# Patient Record
Sex: MALE | Race: WHITE | NOT HISPANIC OR LATINO | Employment: OTHER | URBAN - METROPOLITAN AREA
[De-identification: names, ages, dates, MRNs, and addresses within clinical notes are randomized per-mention and may not be internally consistent; named-entity substitution may affect disease eponyms.]

---

## 2017-01-27 RX ORDER — LEVOTHYROXINE SODIUM 0.1 MG/1
TABLET ORAL
Qty: 30 TABLET | Refills: 0 | OUTPATIENT
Start: 2017-01-27

## 2017-02-27 RX ORDER — LEVOTHYROXINE SODIUM 0.1 MG/1
TABLET ORAL
Qty: 30 TABLET | Refills: 0 | OUTPATIENT
Start: 2017-02-27

## 2017-03-28 RX ORDER — LEVOTHYROXINE SODIUM 0.1 MG/1
TABLET ORAL
Qty: 30 TABLET | Refills: 0 | OUTPATIENT
Start: 2017-03-28

## 2017-04-27 RX ORDER — LEVOTHYROXINE SODIUM 0.1 MG/1
TABLET ORAL
Qty: 30 TABLET | Refills: 0 | OUTPATIENT
Start: 2017-04-27

## 2017-05-27 RX ORDER — LEVOTHYROXINE SODIUM 0.1 MG/1
TABLET ORAL
Qty: 30 TABLET | Refills: 0 | OUTPATIENT
Start: 2017-05-27

## 2017-06-26 RX ORDER — LEVOTHYROXINE SODIUM 0.1 MG/1
TABLET ORAL
Qty: 30 TABLET | Refills: 0 | Status: SHIPPED | OUTPATIENT
Start: 2017-06-26 | End: 2017-12-14 | Stop reason: SDUPTHER

## 2017-12-14 ENCOUNTER — OFFICE VISIT (OUTPATIENT)
Dept: FAMILY MEDICINE CLINIC | Facility: CLINIC | Age: 42
End: 2017-12-14

## 2017-12-14 VITALS
DIASTOLIC BLOOD PRESSURE: 90 MMHG | WEIGHT: 223.2 LBS | SYSTOLIC BLOOD PRESSURE: 134 MMHG | BODY MASS INDEX: 33.06 KG/M2 | HEART RATE: 90 BPM | OXYGEN SATURATION: 97 % | RESPIRATION RATE: 16 BRPM | HEIGHT: 69 IN

## 2017-12-14 DIAGNOSIS — J40 BRONCHITIS: Primary | ICD-10-CM

## 2017-12-14 DIAGNOSIS — E03.9 ACQUIRED HYPOTHYROIDISM: ICD-10-CM

## 2017-12-14 PROBLEM — F32.A DEPRESSION: Status: ACTIVE | Noted: 2017-12-14

## 2017-12-14 PROBLEM — K21.9 GASTROESOPHAGEAL REFLUX DISEASE: Status: ACTIVE | Noted: 2017-12-14

## 2017-12-14 PROBLEM — F41.1 GENERALIZED ANXIETY DISORDER: Status: ACTIVE | Noted: 2017-12-14

## 2017-12-14 PROCEDURE — 99213 OFFICE O/P EST LOW 20 MIN: CPT | Performed by: FAMILY MEDICINE

## 2017-12-14 RX ORDER — LEVOTHYROXINE SODIUM 0.1 MG/1
100 TABLET ORAL DAILY
Qty: 90 TABLET | Refills: 1 | Status: SHIPPED | OUTPATIENT
Start: 2017-12-14 | End: 2018-06-13 | Stop reason: SDUPTHER

## 2017-12-14 RX ORDER — AZITHROMYCIN 250 MG/1
TABLET, FILM COATED ORAL
Qty: 6 TABLET | Refills: 0 | Status: SHIPPED | OUTPATIENT
Start: 2017-12-14 | End: 2018-03-01

## 2017-12-15 NOTE — PROGRESS NOTES
"Subjective   Manjeet Hall is a 42 y.o. male.     URI    This is a new problem. The current episode started in the past 7 days. The problem has been unchanged. There has been no fever. Associated symptoms include coughing and wheezing. Pertinent negatives include no congestion, dysuria, nausea or sinus pain. He has tried inhaler use for the symptoms. The treatment provided mild relief.   Wheezing    This is a chronic problem. The problem occurs intermittently. The problem has been unchanged. Associated symptoms include coughing. Pertinent negatives include no chills, fever or shortness of breath. The symptoms are aggravated by exercise. He has tried beta agonist inhalers for the symptoms. The treatment provided mild relief. His past medical history is significant for asthma.   Hypothyroidism   This is a chronic problem. The problem has been unchanged. Associated symptoms include coughing. Pertinent negatives include no chills, congestion, fever or nausea. The treatment provided significant relief.        The following portions of the patient's history were reviewed and updated as appropriate: allergies, current medications, past social history and problem list.    Review of Systems   Constitutional: Negative for chills and fever.   HENT: Positive for postnasal drip. Negative for congestion and sinus pain.    Respiratory: Positive for cough and wheezing. Negative for shortness of breath.    Gastrointestinal: Negative for nausea.   Genitourinary: Negative for dysuria.       Objective   /90  Pulse 90  Resp 16  Ht 175.3 cm (69\")  Wt 101 kg (223 lb 3.2 oz)  SpO2 97%  BMI 32.96 kg/m2  Physical Exam   Constitutional: He is oriented to person, place, and time. He appears well-developed and well-nourished. He is cooperative.   HENT:   Head: Normocephalic.   Right Ear: External ear normal.   Left Ear: External ear normal.   Nose: Nose normal.   Mouth/Throat: Oropharynx is clear and moist.   Eyes: Conjunctivae are " normal. Pupils are equal, round, and reactive to light. No scleral icterus.   Neck: Neck supple. Carotid bruit is not present. No thyromegaly present.   Cardiovascular: Normal rate, regular rhythm and normal heart sounds.    Pulmonary/Chest: Effort normal.   Upper rhonchi   Abdominal: There is no hepatosplenomegaly.   Musculoskeletal: Normal range of motion.   Neurological: He is alert and oriented to person, place, and time.   No focal deficits no lateralizing signs   Skin: Skin is warm and dry. No rash noted.   Psychiatric: He has a normal mood and affect. Cognition and memory are normal.   Nursing note and vitals reviewed.      Assessment/Plan   Problem List Items Addressed This Visit     None      Visit Diagnoses     Bronchitis    -  Primary    Acquired hypothyroidism        Relevant Medications    levothyroxine (SYNTHROID, LEVOTHROID) 100 MCG tablet          New Medications Ordered This Visit   Medications   • levothyroxine (SYNTHROID, LEVOTHROID) 100 MCG tablet     Sig: Take 1 tablet by mouth Daily.     Dispense:  90 tablet     Refill:  1   • azithromycin (ZITHROMAX) 250 MG tablet     Sig: Take 2 tablets the first day, then 1 tablet daily for 4 days.     Dispense:  6 tablet     Refill:  0     Thyroid 100 µg recheck level in 6-8 weeks.

## 2018-03-01 ENCOUNTER — OFFICE VISIT (OUTPATIENT)
Dept: FAMILY MEDICINE CLINIC | Facility: CLINIC | Age: 43
End: 2018-03-01

## 2018-03-01 VITALS
RESPIRATION RATE: 16 BRPM | HEART RATE: 92 BPM | DIASTOLIC BLOOD PRESSURE: 88 MMHG | OXYGEN SATURATION: 98 % | WEIGHT: 222 LBS | BODY MASS INDEX: 32.88 KG/M2 | SYSTOLIC BLOOD PRESSURE: 142 MMHG | HEIGHT: 69 IN

## 2018-03-01 DIAGNOSIS — Z98.890 H/O AORTIC VALVE REPAIR: ICD-10-CM

## 2018-03-01 DIAGNOSIS — Z86.79 H/O AORTIC VALVE REPAIR: ICD-10-CM

## 2018-03-01 DIAGNOSIS — J40 BRONCHITIS: Primary | ICD-10-CM

## 2018-03-01 DIAGNOSIS — J45.21 MILD INTERMITTENT ASTHMA WITH ACUTE EXACERBATION: ICD-10-CM

## 2018-03-01 PROBLEM — Q24.9 CONGENITAL HEART DISEASE: Status: ACTIVE | Noted: 2018-03-01

## 2018-03-01 PROCEDURE — 99213 OFFICE O/P EST LOW 20 MIN: CPT | Performed by: FAMILY MEDICINE

## 2018-03-01 RX ORDER — DOXYCYCLINE HYCLATE 100 MG/1
100 CAPSULE ORAL 2 TIMES DAILY
Qty: 20 CAPSULE | Refills: 0 | Status: SHIPPED | OUTPATIENT
Start: 2018-03-01 | End: 2018-04-11

## 2018-03-01 RX ORDER — LAMOTRIGINE 200 MG/1
300 TABLET ORAL DAILY
Refills: 2 | COMMUNITY
Start: 2018-02-15 | End: 2018-11-06 | Stop reason: SDUPTHER

## 2018-03-01 RX ORDER — METHYLPREDNISOLONE 4 MG/1
TABLET ORAL
Qty: 1 EACH | Refills: 0 | Status: SHIPPED | OUTPATIENT
Start: 2018-03-01 | End: 2018-04-11

## 2018-03-01 RX ORDER — DESVENLAFAXINE 100 MG/1
100 TABLET, EXTENDED RELEASE ORAL DAILY
Refills: 0 | COMMUNITY
Start: 2018-01-30 | End: 2018-11-06 | Stop reason: SDUPTHER

## 2018-03-01 RX ORDER — GABAPENTIN 300 MG/1
300 CAPSULE ORAL
Refills: 2 | COMMUNITY
Start: 2018-02-01 | End: 2021-06-30

## 2018-03-02 NOTE — PROGRESS NOTES
"Subjective   Manjeet Hall is a 42 y.o. male.     URI    This is a new problem. The current episode started in the past 7 days. The problem has been unchanged. There has been no fever. Associated symptoms include congestion, coughing and wheezing. Pertinent negatives include no chest pain, dysuria, nausea, sore throat or vomiting. He has tried inhaler use for the symptoms. The treatment provided no relief.   Wheezing    This is a recurrent problem. The current episode started in the past 7 days. The problem occurs intermittently. The problem has been unchanged. Associated symptoms include coughing. Pertinent negatives include no chest pain, chills, fever, sore throat or vomiting. He has tried beta agonist inhalers for the symptoms. The treatment provided mild relief. His past medical history is significant for asthma.        The following portions of the patient's history were reviewed and updated as appropriate: allergies, current medications, past social history and problem list.    Review of Systems   Constitutional: Negative for chills and fever.   HENT: Positive for congestion. Negative for sore throat.    Respiratory: Positive for cough and wheezing.    Cardiovascular: Negative for chest pain, palpitations and leg swelling.   Gastrointestinal: Negative for nausea and vomiting.   Genitourinary: Negative for dysuria and hematuria.   Neurological: Negative.        Objective   /88  Pulse 92  Resp 16  Ht 175.3 cm (69\")  Wt 101 kg (222 lb)  SpO2 98%  BMI 32.78 kg/m2  Physical Exam   Constitutional: He is oriented to person, place, and time. He appears well-developed and well-nourished. He is cooperative.   HENT:   Head: Normocephalic.   Right Ear: External ear normal.   Left Ear: External ear normal.   Nose: Nose normal.   Mouth/Throat: Oropharynx is clear and moist.   Eyes: Conjunctivae are normal. Pupils are equal, round, and reactive to light. No scleral icterus.   Neck: Neck supple. Carotid bruit is " not present. No thyromegaly present.   Cardiovascular: Normal rate and regular rhythm.    Pulmonary/Chest: Effort normal. He has wheezes.   Abdominal: There is no hepatosplenomegaly.   Musculoskeletal: Normal range of motion.   Neurological: He is alert and oriented to person, place, and time.   No focal deficits no lateralizing signs   Skin: Skin is warm and dry. No rash noted.   Psychiatric: He has a normal mood and affect. Cognition and memory are normal.   Nursing note and vitals reviewed.      Assessment/Plan   Problem List Items Addressed This Visit     H/O aortic valve repair    Relevant Orders    Ambulatory Referral to Cardiology      Other Visit Diagnoses     Bronchitis    -  Primary    Mild intermittent asthma with acute exacerbation              New Medications Ordered This Visit   Medications   • lamoTRIgine (LaMICtal) 200 MG tablet     Sig: Take 200 mg by mouth Daily.     Refill:  2   • desvenlafaxine (PRISTIQ) 100 MG 24 hr tablet     Sig: Take 100 mg by mouth Daily.     Refill:  0   • gabapentin (NEURONTIN) 300 MG capsule     Sig: Take 200 mg by mouth 2 (Two) Times a Day. Take 2 tablets AM, 3 tablets in the afternoon     Refill:  2   • doxycycline (VIBRAMYCIN) 100 MG capsule     Sig: Take 1 capsule by mouth 2 (Two) Times a Day.     Dispense:  20 capsule     Refill:  0   • MethylPREDNISolone (MEDROL, PORFIRIO,) 4 MG tablet     Sig: Take as directed on package instructions.     Dispense:  1 each     Refill:  0     OK cardiology consultation as Dr Cortés no longer in practice. Try to obtain her records.

## 2018-04-11 ENCOUNTER — CONSULT (OUTPATIENT)
Dept: CARDIOLOGY | Facility: CLINIC | Age: 43
End: 2018-04-11

## 2018-04-11 VITALS
WEIGHT: 224 LBS | DIASTOLIC BLOOD PRESSURE: 80 MMHG | BODY MASS INDEX: 33.18 KG/M2 | HEART RATE: 87 BPM | SYSTOLIC BLOOD PRESSURE: 134 MMHG | HEIGHT: 69 IN

## 2018-04-11 DIAGNOSIS — Q24.9 CONGENITAL HEART DISEASE: Primary | ICD-10-CM

## 2018-04-11 PROCEDURE — 99204 OFFICE O/P NEW MOD 45 MIN: CPT | Performed by: INTERNAL MEDICINE

## 2018-04-11 PROCEDURE — 93000 ELECTROCARDIOGRAM COMPLETE: CPT | Performed by: INTERNAL MEDICINE

## 2018-04-11 NOTE — PROGRESS NOTES
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  -Developed subvalvular aortic stenosis which required surgery, VSD closed as well at 15yo  -Ross procedure for increasing aortic insufficiency with moderate aortic stenosis at 21yo  -Treated for Hodgkin's lymphoma, age 21 - CTX and chest XRT    ASSESSMENT:   Diagnosis Plan   1. Congenital heart disease         PLAN:  Transthoracic echocardiogram  Fasting lipid panel - will manage lipids more aggressively due to history of chest radiation and subsequent risk for accelerated atherosclerosis    History of Present Illness   Has noticed shortness of breath and a class II pattern over the previous year or 2.  This is been associated with a gradual weight gain.  Denies chest pain.  No palpitations.  No PND or orthopnea.  No lower extremity edema.  No presyncope or syncope.  Notices shortness of breath if he does activities such as jogging up an incline, otherwise no significant limitations.  Blood pressures running less than 140/90 mmHg.    PHYSICAL EXAMINATION:  Vitals:    04/11/18 0833   Weight: 102 kg (224 lb)   Height: 175.3 cm (69\")     General Appearance:    Alert, cooperative, no distress, appears stated age   Head:    Normocephalic, without obvious abnormality, atraumatic   Eyes:    conjunctiva/corneas clear, EOM's intact, fundi     benign, both eyes   Ears:    Normal TM's and external ear canals, both ears   Nose:   Nares normal, septum midline, mucosa normal, no drainage    or sinus tenderness   Throat:   Lips, mucosa, and tongue normal; teeth and gums normal   Neck:   Supple, symmetrical, trachea midline, no adenopathy;     thyroid:  " no enlargement/tenderness/nodules; no carotid    bruit or JVD   Back:     Symmetric, no curvature, ROM normal, no CVA tenderness   Lungs:     Clear to auscultation bilaterally, respirations unlabored   Chest Wall:    No tenderness or deformity    Heart:    Regular rate and rhythm, S1 and S2 normal, 3/6 systolic ejection murmur is heard at the left and right upper sternal borders., rub   or gallop, normal carotid impulse bilaterally without bruit.   Abdomen:     Soft, non-tender, bowel sounds active all four quadrants,     no masses, no organomegaly   Extremities:   Extremities normal, atraumatic, no cyanosis or edema   Pulses:   2+ and symmetric all extremities   Skin:   Skin color, texture, turgor normal, no rashes or lesions   Lymph nodes:   Cervical, supraclavicular, and axillary nodes normal   Neurologic:   normal strength, sensation intact     throughout       Diagnostic Data:    ECG 12 Lead  Date/Time: 4/11/2018 8:43 AM  Performed by: MICKY STEINER III  Authorized by: MICKY STEINER III   Previous ECG: no previous ECG available  Rhythm: sinus rhythm  Other findings comments: Nonspecific lateral ST abnormality  Clinical impression: non-specific ECG          Lab Results   Component Value Date    CHLPL 259 (H) 07/17/2015    TRIG 135 07/17/2015    HDL 59 07/17/2015     Lab Results   Component Value Date    GLUCOSE 88 07/17/2015    BUN 18 07/17/2015    CREATININE 1.1 07/17/2015     07/17/2015    K 4.3 07/17/2015     07/17/2015    CO2 26 07/17/2015     No results found for: HGBA1C  Lab Results   Component Value Date    WBC 6.20 07/17/2015    HGB 15.0 07/17/2015    HCT 45.2 07/17/2015     07/17/2015       PROBLEM LIST:  Patient Active Problem List   Diagnosis   • Depression   • Generalized anxiety disorder   • Hypothyroidism   • Gastroesophageal reflux disease   • Congenital heart disease   • H/O aortic valve repair       PAST MEDICAL HX  Past Medical History:   Diagnosis Date   • Abnormal heart  rhythm    • Anxiety    • Cancer     hodgkins   • Depression    • GERD (gastroesophageal reflux disease)    • Heart murmur    • History of echocardiogram    • History of left heart catheterization    • History of stomach ulcers    • Hypothyroidism    • Mitral valvular disorder    • Thyroid disorder        Allergies  Allergies   Allergen Reactions   • Other        Current Medications    Current Outpatient Prescriptions:   •  desvenlafaxine (PRISTIQ) 100 MG 24 hr tablet, Take 100 mg by mouth Daily., Disp: , Rfl: 0  •  gabapentin (NEURONTIN) 300 MG capsule, Take 200 mg by mouth 5 (Five) Times a Day. Take 2 tablets AM, 3 tablets in the afternoon, Disp: , Rfl: 2  •  lamoTRIgine (LaMICtal) 200 MG tablet, Take 300 mg by mouth Daily., Disp: , Rfl: 2  •  Lansoprazole (PREVACID PO), Take  by mouth., Disp: , Rfl:   •  levothyroxine (SYNTHROID, LEVOTHROID) 100 MCG tablet, Take 1 tablet by mouth Daily., Disp: 90 tablet, Rfl: 1  •  MethylPREDNISolone (MEDROL, PORFIRIO,) 4 MG tablet, Take as directed on package instructions., Disp: 1 each, Rfl: 0  •  NEXIUM 24HR 20 MG capsule, TAKE 1 CAPSULE DAILY., Disp: 30 capsule, Rfl: 0         ROS  Review of Systems   Constitution: Positive for weight gain.   Respiratory: Positive for shortness of breath and wheezing.      All other body systems reviewed and are negative    SOCIAL HX  Social History     Social History   • Marital status:      Spouse name: N/A   • Number of children: N/A   • Years of education: N/A     Occupational History   • Not on file.     Social History Main Topics   • Smoking status: Never Smoker   • Smokeless tobacco: Never Used   • Alcohol use Yes      Comment: social   • Drug use: No   • Sexual activity: Defer     Other Topics Concern   • Not on file     Social History Narrative   • No narrative on file       FAMILY HX  Family History   Problem Relation Age of Onset   • COPD Mother    • Cancer Mother    • Hypertension Father    • Stroke Father    • Stroke Paternal  Grandmother    • Hypertension Paternal Grandmother    • Cancer Paternal Grandmother    • Cancer Paternal Grandfather    • Heart disease Paternal Grandfather              Vimal John III, MD, FACC

## 2018-05-29 ENCOUNTER — HOSPITAL ENCOUNTER (OUTPATIENT)
Dept: CARDIOLOGY | Facility: HOSPITAL | Age: 43
Discharge: HOME | End: 2018-05-29
Attending: INTERNAL MEDICINE | Admitting: INTERNAL MEDICINE

## 2018-05-29 VITALS — HEIGHT: 69 IN | BODY MASS INDEX: 33.18 KG/M2 | WEIGHT: 224 LBS

## 2018-05-29 DIAGNOSIS — I71.9 AORTIC ANEURYSM WITHOUT RUPTURE, UNSPECIFIED PORTION OF AORTA (HCC): Primary | ICD-10-CM

## 2018-05-29 DIAGNOSIS — Q24.9 CONGENITAL HEART DISEASE: ICD-10-CM

## 2018-05-29 LAB
AORTIC DIMENSIONLESS INDEX: 0.4 (DI)
BH CV ECHO MEAS - AI DEC SLOPE: 249.6 CM/SEC^2
BH CV ECHO MEAS - AI MAX PG: 62.4 MMHG
BH CV ECHO MEAS - AI MAX VEL: 387.8 CM/SEC
BH CV ECHO MEAS - AI P1/2T: 455.1 MSEC
BH CV ECHO MEAS - AO MAX PG (FULL): 12.4 MMHG
BH CV ECHO MEAS - AO MAX PG: 19 MMHG
BH CV ECHO MEAS - AO MEAN PG (FULL): 5.8 MMHG
BH CV ECHO MEAS - AO MEAN PG: 10 MMHG
BH CV ECHO MEAS - AO ROOT AREA (BSA CORRECTED): 2.2
BH CV ECHO MEAS - AO ROOT AREA: 18.1 CM^2
BH CV ECHO MEAS - AO ROOT DIAM: 4.8 CM
BH CV ECHO MEAS - AO V2 MAX: 217 CM/SEC
BH CV ECHO MEAS - AO V2 MEAN: 128.4 CM/SEC
BH CV ECHO MEAS - AO V2 VTI: 45 CM
BH CV ECHO MEAS - ASC AORTA: 5.3 CM
BH CV ECHO MEAS - AVA(I,A): 1.5 CM^2
BH CV ECHO MEAS - AVA(I,D): 1.4 CM^2
BH CV ECHO MEAS - AVA(V,A): 1.5 CM^2
BH CV ECHO MEAS - AVA(V,D): 1.5 CM^2
BH CV ECHO MEAS - BSA(HAYCOCK): 2.3 M^2
BH CV ECHO MEAS - BSA: 2.2 M^2
BH CV ECHO MEAS - BZI_BMI: 33.1 KILOGRAMS/M^2
BH CV ECHO MEAS - BZI_METRIC_HEIGHT: 175.3 CM
BH CV ECHO MEAS - BZI_METRIC_WEIGHT: 101.6 KG
BH CV ECHO MEAS - CONTRAST EF (2CH): 56.7 ML/M^2
BH CV ECHO MEAS - CONTRAST EF 4CH: 51.5 ML/M^2
BH CV ECHO MEAS - EDV(CUBED): 84 ML
BH CV ECHO MEAS - EDV(MOD-SP2): 180 ML
BH CV ECHO MEAS - EDV(MOD-SP4): 163 ML
BH CV ECHO MEAS - EDV(TEICH): 86.8 ML
BH CV ECHO MEAS - EF(CUBED): 71.3 %
BH CV ECHO MEAS - EF(MOD-BP): 54 %
BH CV ECHO MEAS - EF(MOD-SP2): 56.7 %
BH CV ECHO MEAS - EF(MOD-SP4): 51.5 %
BH CV ECHO MEAS - EF(TEICH): 63.2 %
BH CV ECHO MEAS - ESV(CUBED): 24.1 ML
BH CV ECHO MEAS - ESV(MOD-SP2): 78 ML
BH CV ECHO MEAS - ESV(MOD-SP4): 79 ML
BH CV ECHO MEAS - ESV(TEICH): 31.9 ML
BH CV ECHO MEAS - FS: 34 %
BH CV ECHO MEAS - IVS/LVPW: 1
BH CV ECHO MEAS - IVSD: 1.2 CM
BH CV ECHO MEAS - LA DIMENSION: 4.5 CM
BH CV ECHO MEAS - LA/AO: 0.94
BH CV ECHO MEAS - LAT PEAK E' VEL: 13.8 CM/SEC
BH CV ECHO MEAS - LV DIASTOLIC VOL/BSA (35-75): 75.2 ML/M^2
BH CV ECHO MEAS - LV IVRT: 0.08 SEC
BH CV ECHO MEAS - LV MASS(C)D: 187.7 GRAMS
BH CV ECHO MEAS - LV MASS(C)DI: 86.6 GRAMS/M^2
BH CV ECHO MEAS - LV MAX PG: 3.6 MMHG
BH CV ECHO MEAS - LV MEAN PG: 2 MMHG
BH CV ECHO MEAS - LV SYSTOLIC VOL/BSA (12-30): 36.4 ML/M^2
BH CV ECHO MEAS - LV V1 MAX: 95 CM/SEC
BH CV ECHO MEAS - LV V1 MEAN: 63.7 CM/SEC
BH CV ECHO MEAS - LV V1 VTI: 20.1 CM
BH CV ECHO MEAS - LVIDD: 4.4 CM
BH CV ECHO MEAS - LVIDS: 2.9 CM
BH CV ECHO MEAS - LVLD AP2: 8 CM
BH CV ECHO MEAS - LVLD AP4: 8.1 CM
BH CV ECHO MEAS - LVLS AP2: 6.7 CM
BH CV ECHO MEAS - LVLS AP4: 6.6 CM
BH CV ECHO MEAS - LVOT AREA (M): 3.1 CM^2
BH CV ECHO MEAS - LVOT AREA: 3.1 CM^2
BH CV ECHO MEAS - LVOT DIAM: 2 CM
BH CV ECHO MEAS - LVPWD: 1.2 CM
BH CV ECHO MEAS - MED PEAK E' VEL: 6.58 CM/SEC
BH CV ECHO MEAS - MV A MAX VEL: 97.4 CM/SEC
BH CV ECHO MEAS - MV DEC TIME: 0.22 SEC
BH CV ECHO MEAS - MV E MAX VEL: 115 CM/SEC
BH CV ECHO MEAS - MV E/A: 1.2
BH CV ECHO MEAS - PA ACC SLOPE: 469 CM/SEC^2
BH CV ECHO MEAS - PA ACC TIME: 0.13 SEC
BH CV ECHO MEAS - PA PR(ACCEL): 18.7 MMHG
BH CV ECHO MEAS - PULM A REVS VEL: 30.3 CM/SEC
BH CV ECHO MEAS - PULM DIAS VEL: 46.3 CM/SEC
BH CV ECHO MEAS - PULM S/D: 1
BH CV ECHO MEAS - PULM SYS VEL: 46.8 CM/SEC
BH CV ECHO MEAS - RAP SYSTOLE: 3 MMHG
BH CV ECHO MEAS - RVSP: 37 MMHG
BH CV ECHO MEAS - SI(AO): 349.8 ML/M^2
BH CV ECHO MEAS - SI(CUBED): 27.6 ML/M^2
BH CV ECHO MEAS - SI(LVOT): 29.1 ML/M^2
BH CV ECHO MEAS - SI(MOD-SP2): 47.1 ML/M^2
BH CV ECHO MEAS - SI(MOD-SP4): 38.8 ML/M^2
BH CV ECHO MEAS - SI(TEICH): 25.3 ML/M^2
BH CV ECHO MEAS - SV(AO): 758.2 ML
BH CV ECHO MEAS - SV(CUBED): 59.9 ML
BH CV ECHO MEAS - SV(LVOT): 63.1 ML
BH CV ECHO MEAS - SV(MOD-SP2): 102 ML
BH CV ECHO MEAS - SV(MOD-SP4): 84 ML
BH CV ECHO MEAS - SV(TEICH): 54.8 ML
BH CV ECHO MEAS - TAPSE (>1.6): 1.8 CM2
BH CV ECHO MEAS - TR MAX V: 34 MMHG
BH CV ECHO MEAS - TR MAX VEL: 292 CM/SEC
BH CV ECHO MEASUREMENTS AVERAGE E/E' RATIO: 11.29
BH CV VAS BP LEFT ARM: NORMAL MMHG
BH CV XLRA - RV BASE: 5 CM
BH CV XLRA - RV LENGTH: 7.7 CM
BH CV XLRA - RV MID: 3.5 CM
BH CV XLRA - TDI S': 13.5 CM/SEC
LEFT ATRIUM VOLUME INDEX: 35.9 ML/M2

## 2018-05-29 PROCEDURE — 93306 TTE W/DOPPLER COMPLETE: CPT

## 2018-05-29 PROCEDURE — 93306 TTE W/DOPPLER COMPLETE: CPT | Performed by: INTERNAL MEDICINE

## 2018-06-13 RX ORDER — LEVOTHYROXINE SODIUM 0.1 MG/1
TABLET ORAL
Qty: 90 TABLET | Refills: 0 | Status: SHIPPED | OUTPATIENT
Start: 2018-06-13 | End: 2018-09-13 | Stop reason: SDUPTHER

## 2018-06-14 ENCOUNTER — HOSPITAL ENCOUNTER (OUTPATIENT)
Dept: CT IMAGING | Facility: HOSPITAL | Age: 43
Discharge: HOME | End: 2018-06-14
Attending: INTERNAL MEDICINE | Admitting: INTERNAL MEDICINE

## 2018-06-14 DIAGNOSIS — I71.9 AORTIC ANEURYSM WITHOUT RUPTURE, UNSPECIFIED PORTION OF AORTA (HCC): ICD-10-CM

## 2018-06-14 PROCEDURE — 71275 CT ANGIOGRAPHY CHEST: CPT

## 2018-06-14 PROCEDURE — 0 IOPAMIDOL PER 1 ML: Performed by: INTERNAL MEDICINE

## 2018-06-14 RX ADMIN — IOPAMIDOL 90 ML: 755 INJECTION, SOLUTION INTRAVENOUS at 09:05

## 2018-06-15 ENCOUNTER — TELEPHONE (OUTPATIENT)
Dept: CARDIOLOGY | Facility: CLINIC | Age: 43
End: 2018-06-15

## 2018-06-15 NOTE — TELEPHONE ENCOUNTER
Needs consultation with CT surgery at [facility] for thoracic aneurysm.  I called and talked to patient about the findings and our gameplan.

## 2018-06-25 ENCOUNTER — OFFICE VISIT (OUTPATIENT)
Dept: CARDIOLOGY | Facility: CLINIC | Age: 43
End: 2018-06-25

## 2018-06-25 VITALS
SYSTOLIC BLOOD PRESSURE: 118 MMHG | HEART RATE: 70 BPM | BODY MASS INDEX: 31.37 KG/M2 | HEIGHT: 68 IN | WEIGHT: 207 LBS | DIASTOLIC BLOOD PRESSURE: 74 MMHG

## 2018-06-25 DIAGNOSIS — Q24.9 CONGENITAL HEART DISEASE: Primary | ICD-10-CM

## 2018-06-25 DIAGNOSIS — I71.20 THORACIC AORTIC ANEURYSM WITHOUT RUPTURE (HCC): ICD-10-CM

## 2018-06-25 DIAGNOSIS — Z86.79 H/O AORTIC VALVE REPAIR: ICD-10-CM

## 2018-06-25 DIAGNOSIS — Z98.890 H/O AORTIC VALVE REPAIR: ICD-10-CM

## 2018-06-25 PROCEDURE — 99213 OFFICE O/P EST LOW 20 MIN: CPT | Performed by: INTERNAL MEDICINE

## 2018-06-25 NOTE — PROGRESS NOTES
[facility]  Office visit  Manjeet Hall  1975      VISIT DATE:  06/25/2018    PCP: Nile Faust MD  [address] [place] [id number]    CC:  Chief Complaint   Patient presents with   • Congenital heart disease        Previous cardiac history:  -Diagnosed with large PDA and VSD at birth, PDA close spontaneously.  -Developed subvalvular aortic stenosis which required surgery, VSD closed as well at 15yo  -Ross procedure for increasing aortic insufficiency with moderate aortic stenosis at 21yo  -Treated for Hodgkin's lymphoma, age 21 - CTX and chest XRT  -May 2018 Echo  · Estimated EF appears to be in the range of 56 - 60%.  · Left ventricular wall thickness is consistent with mild concentric hypertrophy.  · Mild to moderate aortic valve regurgitation is present.  · Right ventricular cavity is borderline dilated.  · Left atrial cavity size is mildly dilated.  · Moderate dilation of the aortic root is present. 4.8 cm. Moderate dilation of the ascending aorta is present. 5.4 cm.  -CT angiography chest June 2018:aortic root at the upper portion of the valve largest AP dimension is 5.4 cm. Approximately 2 cm above the aortic root the ascending thoracic aorta largest oblique dimension is 7.0 cm and AP dimension 6.2 cm.    ASSESSMENT:   Diagnosis Plan   1. Congenital heart disease     2. H/O aortic valve repair     3. Thoracic aortic aneurysm without rupture         PLAN:  Continue beta-blockade, goal blood pressure less than 120/80 mmHg.  Discussed lifting restrictions.  Referral to [facility] for recommendations regarding timing of repair of thoracic aortic aneurysm with potential redo valve surgery pending.  Repeat CT angiography in 6 months.    Subjective  Dyspnea on exertion has improved.  Denies chest discomfort.  Blood pressures running less than 120/80 mmHg.  He is lost about 16 pounds with dietary modifications.  Functional capacity is  "improving.  Long discussion regarding findings on transthoracic echo and CT angiography today.    PHYSICAL EXAMINATION:  Vitals:    06/25/18 0853   BP: 118/74   BP Location: Right arm   Patient Position: Sitting   Pulse: 70   Weight: 93.9 kg (207 lb)   Height: 172.7 cm (68\")     General Appearance:    Alert, cooperative, no distress, appears stated age   Head:    Normocephalic, without obvious abnormality, atraumatic   Eyes:    conjunctiva/corneas clear   Nose:   Nares normal, septum midline, mucosa normal, no drainage   Throat:   Lips, teeth and gums normal   Neck:   Supple, symmetrical, trachea midline, no carotid    bruit or JVD   Lungs:     Clear to auscultation bilaterally, respirations unlabored   Chest Wall:    No tenderness or deformity    Heart:    Regular rate and rhythm, S1 and S2 normal, 2/6 early peaking systolic murmur right upper sternal border, rub   or gallop, normal carotid impulse bilaterally without bruit.   Abdomen:     Soft, non-tender   Extremities:   Extremities normal, atraumatic, no cyanosis or edema   Pulses:   2+ and symmetric all extremities   Skin:   Skin color, texture, turgor normal, no rashes or lesions       Diagnostic Data:  Procedures  Lab Results   Component Value Date    CHLPL 259 (H) 07/17/2015    TRIG 135 07/17/2015    HDL 59 07/17/2015     Lab Results   Component Value Date    GLUCOSE 88 07/17/2015    BUN 18 07/17/2015    CREATININE 1.1 07/17/2015     07/17/2015    K 4.3 07/17/2015     07/17/2015    CO2 26 07/17/2015     No results found for: HGBA1C  Lab Results   Component Value Date    WBC 6.20 07/17/2015    HGB 15.0 07/17/2015    HCT 45.2 07/17/2015     07/17/2015       Allergies  Allergies   Allergen Reactions   • Other        Current Medications    Current Outpatient Prescriptions:   •  desvenlafaxine (PRISTIQ) 100 MG 24 hr tablet, Take 100 mg by mouth Daily., Disp: , Rfl: 0  •  gabapentin (NEURONTIN) 300 MG capsule, Take 300 mg by mouth 5 (Five) Times " a Day. Take 2 tablets AM, 3 tablets in the afternoon, Disp: , Rfl: 2  •  lamoTRIgine (LaMICtal) 200 MG tablet, Take 300 mg by mouth Daily., Disp: , Rfl: 2  •  Lansoprazole (PREVACID PO), Take 20 mg by mouth Daily., Disp: , Rfl:   •  levothyroxine (SYNTHROID, LEVOTHROID) 100 MCG tablet, TAKE 1 TABLET BY MOUTH EVERY DAY, Disp: 90 tablet, Rfl: 0  •  metoprolol tartrate (LOPRESSOR) 25 MG tablet, Take 1 tablet by mouth 2 (Two) Times a Day., Disp: 180 tablet, Rfl: 0          ROS  Review of Systems   Cardiovascular: Negative for chest pain, dyspnea on exertion, irregular heartbeat and syncope.   Respiratory: Negative for cough, shortness of breath and wheezing.          SOCIAL HX  Social History     Social History   • Marital status:      Spouse name: N/A   • Number of children: N/A   • Years of education: N/A     Occupational History   • Not on file.     Social History Main Topics   • Smoking status: Never Smoker   • Smokeless tobacco: Never Used   • Alcohol use Yes      Comment: social   • Drug use: No   • Sexual activity: Defer     Other Topics Concern   • Not on file     Social History Narrative   • No narrative on file       FAMILY HX  Family History   Problem Relation Age of Onset   • COPD Mother    • Cancer Mother    • Hypertension Father    • Stroke Father    • Stroke Paternal Grandmother    • Hypertension Paternal Grandmother    • Cancer Paternal Grandmother    • Cancer Paternal Grandfather    • Heart disease Paternal Grandfather              Vimal John III, MD, [facility]

## 2018-09-13 RX ORDER — LEVOTHYROXINE SODIUM 0.1 MG/1
TABLET ORAL
Qty: 90 TABLET | Refills: 0 | Status: SHIPPED | OUTPATIENT
Start: 2018-09-13 | End: 2018-12-13 | Stop reason: SDUPTHER

## 2018-09-18 ENCOUNTER — OFFICE VISIT (OUTPATIENT)
Dept: FAMILY MEDICINE CLINIC | Facility: CLINIC | Age: 43
End: 2018-09-18

## 2018-09-18 VITALS
DIASTOLIC BLOOD PRESSURE: 60 MMHG | BODY MASS INDEX: 27.89 KG/M2 | TEMPERATURE: 98.3 F | WEIGHT: 184 LBS | RESPIRATION RATE: 16 BRPM | SYSTOLIC BLOOD PRESSURE: 102 MMHG | OXYGEN SATURATION: 97 % | HEIGHT: 68 IN | HEART RATE: 102 BPM

## 2018-09-18 DIAGNOSIS — I71.20 THORACIC AORTIC ANEURYSM WITHOUT RUPTURE (HCC): ICD-10-CM

## 2018-09-18 DIAGNOSIS — I63.132 CEREBROVASCULAR ACCIDENT (CVA) DUE TO EMBOLISM OF LEFT CAROTID ARTERY (HCC): ICD-10-CM

## 2018-09-18 DIAGNOSIS — Z79.01 WARFARIN ANTICOAGULATION: ICD-10-CM

## 2018-09-18 DIAGNOSIS — K85.92 ACUTE PANCREATITIS WITH INFECTED NECROSIS, UNSPECIFIED PANCREATITIS TYPE: Primary | ICD-10-CM

## 2018-09-18 LAB — INR PPP: 4 (ref 0.9–1.1)

## 2018-09-18 PROCEDURE — 99214 OFFICE O/P EST MOD 30 MIN: CPT | Performed by: FAMILY MEDICINE

## 2018-09-18 PROCEDURE — 85610 PROTHROMBIN TIME: CPT | Performed by: FAMILY MEDICINE

## 2018-09-19 ENCOUNTER — OFFICE VISIT (OUTPATIENT)
Dept: FAMILY MEDICINE CLINIC | Facility: CLINIC | Age: 43
End: 2018-09-19

## 2018-09-19 VITALS
HEART RATE: 91 BPM | HEIGHT: 68 IN | DIASTOLIC BLOOD PRESSURE: 60 MMHG | OXYGEN SATURATION: 98 % | RESPIRATION RATE: 16 BRPM | BODY MASS INDEX: 27.98 KG/M2 | SYSTOLIC BLOOD PRESSURE: 98 MMHG

## 2018-09-19 DIAGNOSIS — Z95.2 S/P AVR (AORTIC VALVE REPLACEMENT): Primary | ICD-10-CM

## 2018-09-19 DIAGNOSIS — Z79.01 WARFARIN ANTICOAGULATION: Primary | ICD-10-CM

## 2018-09-19 DIAGNOSIS — K85.81 OTHER ACUTE PANCREATITIS WITH UNINFECTED NECROSIS: ICD-10-CM

## 2018-09-19 PROCEDURE — 99213 OFFICE O/P EST LOW 20 MIN: CPT | Performed by: FAMILY MEDICINE

## 2018-09-19 NOTE — PROGRESS NOTES
Subjective   Manjeet Hall is a 42 y.o. male.     Recent 5 week stay at [facility] has aortic anyeursym repair and valve place complicated by a stroke and pancreatitis, his pain is much better mild soa no swelling some speech difficulty,Needs referral for GI and Neuro and monitoring of INR      Stroke   This is a new problem. The current episode started 1 to 4 weeks ago. The problem has been gradually improving. Associated symptoms include abdominal pain and chest pain. Pertinent negatives include no chills, congestion, fever, myalgias or vomiting. The symptoms are aggravated by stress (talking). Treatments tried: anticoagulation. The treatment provided moderate relief.   Pancreatitis   This is a new problem. The current episode started 1 to 4 weeks ago. The problem has been gradually improving. Associated symptoms include abdominal pain and chest pain. Pertinent negatives include no chills, congestion, fever, myalgias or vomiting. Nothing aggravates the symptoms. Treatments tried: iv antibiotic  The treatment provided significant relief.        The following portions of the patient's history were reviewed and updated as appropriate: allergies, current medications, past social history and problem list.    Review of Systems   Constitutional: Positive for activity change. Negative for chills and fever.   HENT: Negative for congestion.    Cardiovascular: Positive for chest pain. Negative for palpitations and leg swelling.   Gastrointestinal: Positive for abdominal pain. Negative for blood in stool, diarrhea and vomiting.   Endocrine: Negative for cold intolerance and heat intolerance.   Genitourinary: Negative for dysuria and hematuria.   Musculoskeletal: Negative for myalgias.   Skin: Negative for color change.   Neurological:        Some speech impairment mild   Psychiatric/Behavioral: The patient is nervous/anxious.        Objective   /60   Pulse 102   Temp 98.3 °F (36.8 °C)   Resp 16   Ht 172.7 cm  "(68\")   Wt 83.5 kg (184 lb)   SpO2 97%   BMI 27.98 kg/m²   Physical Exam   Constitutional: He is oriented to person, place, and time. He appears well-developed and well-nourished. He is cooperative.   HENT:   Head: Normocephalic.   Right Ear: External ear normal.   Left Ear: External ear normal.   Nose: Nose normal.   Mouth/Throat: Oropharynx is clear and moist.   Eyes: Pupils are equal, round, and reactive to light. Conjunctivae are normal. No scleral icterus.   Neck: Neck supple. Carotid bruit is not present. No thyromegaly present.   Cardiovascular: Normal rate and regular rhythm.    Chest wall well healed PIC line in right arm   Pulmonary/Chest: Effort normal and breath sounds normal.   Abdominal: Soft. Bowel sounds are normal. He exhibits no distension. There is no hepatosplenomegaly. There is no tenderness. There is no guarding.   Musculoskeletal: Normal range of motion.   Neurological: He is alert and oriented to person, place, and time.   Mild speech impairment no weakness sensation is normal  is normal   Skin: Skin is warm and dry. No rash noted.   Psychiatric: He has a normal mood and affect. Cognition and memory are normal.   Nursing note and vitals reviewed.      Assessment/Plan   Problem List Items Addressed This Visit     Thoracic aortic aneurysm (CMS/HCC)      Other Visit Diagnoses     Acute pancreatitis with infected necrosis, unspecified pancreatitis type    -  Primary    Relevant Orders    Ambulatory Referral to Gastroenterology    Warfarin anticoagulation        Relevant Orders    POCT INR (Completed)    Cerebrovascular accident (CVA) due to embolism of left carotid artery (CMS/HCC)        Relevant Orders    Ambulatory Referral to Neurology    Ambulatory Referral to Speech Therapy          No orders of the defined types were placed in this encounter.    Meropen infused per instructions from CC tolerated well. RTC tomorrow for another infusion to watch wife do it and recheck inr redice to " 0.5 mg warfarin today.

## 2018-09-20 DIAGNOSIS — K85.91 ACUTE PANCREATITIS WITH UNINFECTED NECROSIS, UNSPECIFIED PANCREATITIS TYPE: Primary | ICD-10-CM

## 2018-09-20 NOTE — PROGRESS NOTES
"Subjective   Manjeet Hall is a 42 y.o. male.     Patient returns for a follow-up for repeat INR he is feeling a little better today wife is with him and were going to teach her to administer his antibiotics.  He is eating better his bowel movements are normal.  Fever chills or sweats.         The following portions of the patient's history were reviewed and updated as appropriate: allergies, current medications, past social history and problem list.    Review of Systems   Constitutional: Negative for chills and fever.   HENT: Negative for congestion and sore throat.    Respiratory: Negative for cough and shortness of breath.    Cardiovascular: Negative for chest pain and palpitations.   Gastrointestinal: Negative for abdominal pain, blood in stool and constipation.   Genitourinary: Negative for dysuria and hematuria.       Objective   BP 98/60   Pulse 91   Resp 16   Ht 172.7 cm (68\")   SpO2 98%   BMI 27.98 kg/m²   Physical Exam   Constitutional: He is oriented to person, place, and time. He appears well-developed and well-nourished.   HENT:   Head: Normocephalic and atraumatic.   Eyes: Pupils are equal, round, and reactive to light. Conjunctivae are normal.   Neck: Neck supple. No JVD present.   Cardiovascular: Normal rate and regular rhythm.    Pulmonary/Chest: Effort normal and breath sounds normal.   Abdominal: Soft. Bowel sounds are normal.   Musculoskeletal: He exhibits no edema or tenderness.   Neurological: He is alert and oriented to person, place, and time.       Assessment/Plan   Problem List Items Addressed This Visit     None      Visit Diagnoses     Warfarin anticoagulation    -  Primary    Relevant Orders    POC Glycosylated Hemoglobin (Hb A1C) (Completed)    Other acute pancreatitis with uninfected necrosis        Relevant Orders    Ambulatory Referral to Infectious Disease    CT Abdomen Pelvis With Contrast          No orders of the defined types were placed in this encounter.  For a for " infectious disease to assist with his PICC line and IV antibiotics.  Referral for GI evaluation of his pancreatitis and I ordered a CT scan for follow-up to rule out pseudocyst.  Also be seeing neurology in follow-up of his stroke.  Turn to Friday for an INR 0.5 mg warfarin today 1 mg warfarin tomorrow.

## 2018-09-21 ENCOUNTER — ANTICOAGULATION VISIT (OUTPATIENT)
Dept: FAMILY MEDICINE CLINIC | Facility: CLINIC | Age: 43
End: 2018-09-21

## 2018-09-21 ENCOUNTER — TRANSCRIBE ORDERS (OUTPATIENT)
Dept: LAB | Facility: HOSPITAL | Age: 43
End: 2018-09-21

## 2018-09-21 ENCOUNTER — LAB (OUTPATIENT)
Dept: LAB | Facility: HOSPITAL | Age: 43
End: 2018-09-21

## 2018-09-21 ENCOUNTER — LAB (OUTPATIENT)
Dept: FAMILY MEDICINE CLINIC | Facility: CLINIC | Age: 43
End: 2018-09-21

## 2018-09-21 DIAGNOSIS — K85.20 ALCOHOL-INDUCED ACUTE PANCREATITIS, UNSPECIFIED COMPLICATION STATUS: ICD-10-CM

## 2018-09-21 DIAGNOSIS — K85.82 OTHER ACUTE PANCREATITIS WITH INFECTED NECROSIS: ICD-10-CM

## 2018-09-21 DIAGNOSIS — I10 ESSENTIAL HYPERTENSION, MALIGNANT: ICD-10-CM

## 2018-09-21 DIAGNOSIS — C85.90 LEUCOSARCOMA (HCC): Primary | ICD-10-CM

## 2018-09-21 DIAGNOSIS — Z79.01 WARFARIN ANTICOAGULATION: Primary | ICD-10-CM

## 2018-09-21 DIAGNOSIS — K85.91 ACUTE PANCREATITIS WITH UNINFECTED NECROSIS, UNSPECIFIED PANCREATITIS TYPE: ICD-10-CM

## 2018-09-21 DIAGNOSIS — C85.90 LEUCOSARCOMA (HCC): ICD-10-CM

## 2018-09-21 LAB
ALBUMIN SERPL-MCNC: 3.57 G/DL (ref 3.2–4.8)
ALBUMIN/GLOB SERPL: 0.9 G/DL (ref 1.5–2.5)
ALP SERPL-CCNC: 89 U/L (ref 25–100)
ALT SERPL W P-5'-P-CCNC: 47 U/L (ref 7–40)
AMYLASE SERPL-CCNC: 26 U/L (ref 30–118)
ANION GAP SERPL CALCULATED.3IONS-SCNC: 10 MMOL/L (ref 3–11)
AST SERPL-CCNC: 42 U/L (ref 0–33)
BASOPHILS # BLD AUTO: 0.02 10*3/MM3 (ref 0–0.2)
BASOPHILS NFR BLD AUTO: 0.2 % (ref 0–1)
BILIRUB SERPL-MCNC: 0.5 MG/DL (ref 0.3–1.2)
BUN BLD-MCNC: 12 MG/DL (ref 9–23)
BUN/CREAT SERPL: 13 (ref 7–25)
CALCIUM SPEC-SCNC: 8.9 MG/DL (ref 8.7–10.4)
CHLORIDE SERPL-SCNC: 106 MMOL/L (ref 99–109)
CO2 SERPL-SCNC: 27 MMOL/L (ref 20–31)
CREAT BLD-MCNC: 0.92 MG/DL (ref 0.6–1.3)
DEPRECATED RDW RBC AUTO: 54.7 FL (ref 37–54)
EOSINOPHIL # BLD AUTO: 0.15 10*3/MM3 (ref 0–0.3)
EOSINOPHIL NFR BLD AUTO: 1.6 % (ref 0–3)
ERYTHROCYTE [DISTWIDTH] IN BLOOD BY AUTOMATED COUNT: 17.2 % (ref 11.3–14.5)
ERYTHROCYTE [SEDIMENTATION RATE] IN BLOOD: 79 MM/HR (ref 0–15)
GFR SERPL CREATININE-BSD FRML MDRD: 90 ML/MIN/1.73
GLOBULIN UR ELPH-MCNC: 3.8 GM/DL
GLUCOSE BLD-MCNC: 78 MG/DL (ref 70–100)
HCT VFR BLD AUTO: 32.8 % (ref 38.9–50.9)
HGB BLD-MCNC: 9.4 G/DL (ref 13.1–17.5)
HYPOCHROMIA BLD QL: NORMAL
IMM GRANULOCYTES # BLD: 0.17 10*3/MM3 (ref 0–0.03)
IMM GRANULOCYTES NFR BLD: 1.8 % (ref 0–0.6)
INR PPP: 1.5
LIPASE SERPL-CCNC: 30 U/L (ref 6–51)
LYMPHOCYTES # BLD AUTO: 2.67 10*3/MM3 (ref 0.6–4.8)
LYMPHOCYTES NFR BLD AUTO: 28.8 % (ref 24–44)
MCH RBC QN AUTO: 25.2 PG (ref 27–31)
MCHC RBC AUTO-ENTMCNC: 28.7 G/DL (ref 32–36)
MCV RBC AUTO: 87.9 FL (ref 80–99)
MONOCYTES # BLD AUTO: 0.46 10*3/MM3 (ref 0–1)
MONOCYTES NFR BLD AUTO: 5 % (ref 0–12)
NEUTROPHILS # BLD AUTO: 5.98 10*3/MM3 (ref 1.5–8.3)
NEUTROPHILS NFR BLD AUTO: 64.4 % (ref 41–71)
PLAT MORPH BLD: NORMAL
PLATELET # BLD AUTO: 443 10*3/MM3 (ref 150–450)
PMV BLD AUTO: 9.8 FL (ref 6–12)
POTASSIUM BLD-SCNC: 4.4 MMOL/L (ref 3.5–5.5)
PROT SERPL-MCNC: 7.4 G/DL (ref 5.7–8.2)
RBC # BLD AUTO: 3.73 10*6/MM3 (ref 4.2–5.76)
SODIUM BLD-SCNC: 143 MMOL/L (ref 132–146)
WBC MORPH BLD: NORMAL
WBC NRBC COR # BLD: 9.28 10*3/MM3 (ref 3.5–10.8)

## 2018-09-21 PROCEDURE — 85025 COMPLETE CBC W/AUTO DIFF WBC: CPT

## 2018-09-21 PROCEDURE — 36415 COLL VENOUS BLD VENIPUNCTURE: CPT

## 2018-09-21 PROCEDURE — 85610 PROTHROMBIN TIME: CPT | Performed by: FAMILY MEDICINE

## 2018-09-21 PROCEDURE — 85007 BL SMEAR W/DIFF WBC COUNT: CPT

## 2018-09-21 PROCEDURE — 83690 ASSAY OF LIPASE: CPT | Performed by: FAMILY MEDICINE

## 2018-09-21 PROCEDURE — 80053 COMPREHEN METABOLIC PANEL: CPT

## 2018-09-21 PROCEDURE — 82150 ASSAY OF AMYLASE: CPT | Performed by: FAMILY MEDICINE

## 2018-09-21 RX ORDER — WARFARIN SODIUM 1 MG/1
TABLET ORAL
Qty: 30 TABLET | Refills: 0 | Status: SHIPPED | OUTPATIENT
Start: 2018-09-21 | End: 2019-05-17 | Stop reason: DRUGHIGH

## 2018-09-24 ENCOUNTER — ANTICOAGULATION VISIT (OUTPATIENT)
Dept: FAMILY MEDICINE CLINIC | Facility: CLINIC | Age: 43
End: 2018-09-24

## 2018-09-24 DIAGNOSIS — Z79.01 CHRONIC ANTICOAGULATION: Primary | ICD-10-CM

## 2018-09-24 LAB — INR PPP: 1.2 (ref 0.9–1.1)

## 2018-09-24 PROCEDURE — 85610 PROTHROMBIN TIME: CPT | Performed by: FAMILY MEDICINE

## 2018-09-24 NOTE — PROGRESS NOTES
I spoke with Mrs. Hall by phone at [phone].  We discussed her 's INR result today, recent surgical procedure, fragility of optimizing his INR to goal of 2-3.  I recommended the Coumadin Clinic at [facility] and she was amendable (logistics with other specialist).  He will increase his coumadin dose to 2 mg po once daily and present for an INR evaluation on Thursday.  She will communicate any further concerns.

## 2018-09-25 ENCOUNTER — APPOINTMENT (OUTPATIENT)
Dept: CT IMAGING | Facility: HOSPITAL | Age: 43
End: 2018-09-25
Attending: FAMILY MEDICINE

## 2018-09-26 ENCOUNTER — TELEPHONE (OUTPATIENT)
Dept: PHARMACY | Facility: HOSPITAL | Age: 43
End: 2018-09-26

## 2018-09-26 ENCOUNTER — HOSPITAL ENCOUNTER (OUTPATIENT)
Dept: CT IMAGING | Facility: HOSPITAL | Age: 43
Discharge: HOME | End: 2018-09-26
Attending: FAMILY MEDICINE | Admitting: FAMILY MEDICINE

## 2018-09-26 DIAGNOSIS — K85.81 OTHER ACUTE PANCREATITIS WITH UNINFECTED NECROSIS: ICD-10-CM

## 2018-09-26 PROCEDURE — 74177 CT ABD & PELVIS W/CONTRAST: CPT

## 2018-09-26 PROCEDURE — 25010000002 IOPAMIDOL 61 % SOLUTION: Performed by: FAMILY MEDICINE

## 2018-09-26 RX ADMIN — IOPAMIDOL 100 ML: 612 INJECTION, SOLUTION INTRAVENOUS at 11:43

## 2018-09-26 NOTE — TELEPHONE ENCOUNTER
Dr. Portillo called with a new referral for Mr. Manjeet Hall. The patient and his wife are agreeable to come into the clinic for INR checks. Dr. Portillo informed me that this is actually Dr. Nile Faust's patient, however he is on vacation this week. When Dr. Faust comes back from vacation we will need to have him complete an electronic referral as we as sign the collaborative care agreement

## 2018-09-27 ENCOUNTER — LAB (OUTPATIENT)
Dept: LAB | Facility: HOSPITAL | Age: 43
End: 2018-09-27

## 2018-09-27 ENCOUNTER — ANTICOAGULATION VISIT (OUTPATIENT)
Dept: PHARMACY | Facility: HOSPITAL | Age: 43
End: 2018-09-27

## 2018-09-27 ENCOUNTER — TRANSCRIBE ORDERS (OUTPATIENT)
Dept: LAB | Facility: HOSPITAL | Age: 43
End: 2018-09-27

## 2018-09-27 DIAGNOSIS — K85.82 OTHER ACUTE PANCREATITIS WITH INFECTED NECROSIS: ICD-10-CM

## 2018-09-27 DIAGNOSIS — I63.9 IMPENDING CEREBROVASCULAR ACCIDENT (HCC): ICD-10-CM

## 2018-09-27 DIAGNOSIS — Z95.2 HEART VALVE REPLACED: ICD-10-CM

## 2018-09-27 DIAGNOSIS — I71.20 THORACIC AORTIC ANEURYSM WITHOUT RUPTURE (HCC): ICD-10-CM

## 2018-09-27 DIAGNOSIS — I71.20 THORACIC AORTIC ANEURYSM WITHOUT RUPTURE (HCC): Primary | ICD-10-CM

## 2018-09-27 DIAGNOSIS — C85.90 LEUCOSARCOMA (HCC): ICD-10-CM

## 2018-09-27 DIAGNOSIS — K85.02 IDIOPATHIC ACUTE PANCREATITIS WITH INFECTED NECROSIS: ICD-10-CM

## 2018-09-27 DIAGNOSIS — Z95.2 H/O MECHANICAL AORTIC VALVE REPLACEMENT: Primary | ICD-10-CM

## 2018-09-27 LAB
ALBUMIN SERPL-MCNC: 3.57 G/DL (ref 3.2–4.8)
ALBUMIN/GLOB SERPL: 1 G/DL (ref 1.5–2.5)
ALP SERPL-CCNC: 89 U/L (ref 25–100)
ALT SERPL W P-5'-P-CCNC: 48 U/L (ref 7–40)
ANION GAP SERPL CALCULATED.3IONS-SCNC: 10 MMOL/L (ref 3–11)
AST SERPL-CCNC: 33 U/L (ref 0–33)
BASOPHILS # BLD AUTO: 0.02 10*3/MM3 (ref 0–0.2)
BASOPHILS NFR BLD AUTO: 0.3 % (ref 0–1)
BILIRUB SERPL-MCNC: 0.4 MG/DL (ref 0.3–1.2)
BUN BLD-MCNC: 12 MG/DL (ref 9–23)
BUN/CREAT SERPL: 14.3 (ref 7–25)
CALCIUM SPEC-SCNC: 8.7 MG/DL (ref 8.7–10.4)
CHLORIDE SERPL-SCNC: 106 MMOL/L (ref 99–109)
CO2 SERPL-SCNC: 27 MMOL/L (ref 20–31)
CREAT BLD-MCNC: 0.84 MG/DL (ref 0.6–1.3)
CRP SERPL-MCNC: 2.39 MG/DL (ref 0–1)
DEPRECATED RDW RBC AUTO: 56.2 FL (ref 37–54)
EOSINOPHIL # BLD AUTO: 0.15 10*3/MM3 (ref 0–0.3)
EOSINOPHIL NFR BLD AUTO: 2.1 % (ref 0–3)
ERYTHROCYTE [DISTWIDTH] IN BLOOD BY AUTOMATED COUNT: 17.6 % (ref 11.3–14.5)
ERYTHROCYTE [SEDIMENTATION RATE] IN BLOOD: 53 MM/HR (ref 0–15)
GFR SERPL CREATININE-BSD FRML MDRD: 100 ML/MIN/1.73
GLOBULIN UR ELPH-MCNC: 3.5 GM/DL
GLUCOSE BLD-MCNC: 83 MG/DL (ref 70–100)
HCT VFR BLD AUTO: 28.8 % (ref 38.9–50.9)
HGB BLD-MCNC: 8.4 G/DL (ref 13.1–17.5)
IMM GRANULOCYTES # BLD: 0.03 10*3/MM3 (ref 0–0.03)
IMM GRANULOCYTES NFR BLD: 0.4 % (ref 0–0.6)
INR PPP: 1.3 (ref 0.91–1.09)
LYMPHOCYTES # BLD AUTO: 2.51 10*3/MM3 (ref 0.6–4.8)
LYMPHOCYTES NFR BLD AUTO: 34.6 % (ref 24–44)
MCH RBC QN AUTO: 25.6 PG (ref 27–31)
MCHC RBC AUTO-ENTMCNC: 29.2 G/DL (ref 32–36)
MCV RBC AUTO: 87.8 FL (ref 80–99)
MONOCYTES # BLD AUTO: 0.58 10*3/MM3 (ref 0–1)
MONOCYTES NFR BLD AUTO: 8 % (ref 0–12)
NEUTROPHILS # BLD AUTO: 4 10*3/MM3 (ref 1.5–8.3)
NEUTROPHILS NFR BLD AUTO: 55 % (ref 41–71)
PLATELET # BLD AUTO: 319 10*3/MM3 (ref 150–450)
PMV BLD AUTO: 10.6 FL (ref 6–12)
POTASSIUM BLD-SCNC: 4.3 MMOL/L (ref 3.5–5.5)
PROT SERPL-MCNC: 7.1 G/DL (ref 5.7–8.2)
PROTHROMBIN TIME: 15.7 SECONDS (ref 10–13.8)
RBC # BLD AUTO: 3.28 10*6/MM3 (ref 4.2–5.76)
SODIUM BLD-SCNC: 143 MMOL/L (ref 132–146)
WBC NRBC COR # BLD: 7.26 10*3/MM3 (ref 3.5–10.8)

## 2018-09-27 PROCEDURE — G0463 HOSPITAL OUTPT CLINIC VISIT: HCPCS

## 2018-09-27 PROCEDURE — 86140 C-REACTIVE PROTEIN: CPT

## 2018-09-27 PROCEDURE — 80053 COMPREHEN METABOLIC PANEL: CPT

## 2018-09-27 PROCEDURE — 85025 COMPLETE CBC W/AUTO DIFF WBC: CPT

## 2018-09-27 PROCEDURE — 36415 COLL VENOUS BLD VENIPUNCTURE: CPT

## 2018-09-27 PROCEDURE — 36416 COLLJ CAPILLARY BLOOD SPEC: CPT

## 2018-09-27 PROCEDURE — 85610 PROTHROMBIN TIME: CPT

## 2018-09-27 NOTE — PROGRESS NOTES
Anticoagulation Clinic - Remote Progress Note   REMOTE LAB    Indication: On-X Mechanical valve #25 and a 30mm hemashield  Referring Provider: Bandar (will change to Dr. Faust)  Initial Warfarin Start Date:  ~9/2018  Goal INR: 2-3 per referral (will confirm not 2.5-3.5 as patient's wife has reported that [facility] was targeting)  Current Drug Interactions: Figure out antibiotic  CHADS-VASc:   Bleed Risk: ascending aortic aneurysm   Other: Non- Hodgkins lymphoma (hx of chemo and radiation); CAD, HTN,     Diet: avoids  Alcohol: socially  Tobacco: None  OTC Pain Medication: APAP PRN pain (voiced understanding to     INR History:  Date 9/27           Total Weekly Dose 8mg           INR 1.3           Notes              Phone Interview:  Verbal Release Authorization signed on 9/27/2018- may speak with Gaby Hall (Wife)  Tablet Strength: pt has 1mg   Current Maintenance Dose: dose finding was taking 1mg   Patient Contact Info: [phone] (Gaby- Wife)  Verbal Release Auth:   Lab Contact Info: Megan donovan     Patient Findings   Positives:   Change in medications   Negatives:   Signs/symptoms of thrombosis, Signs/symptoms of bleeding, Laboratory test error suspected, Change in health, Change in alcohol use, Change in activity, Upcoming invasive procedure, Emergency department visit, Upcoming dental procedure, Missed doses, Extra doses, Change in diet/appetite, Hospital admission, Bruising, Other complaints   Comments:   No history of warfarin dosing while inpatient at this point in time except for the dosing from Dr. Portillo's office and that he had an INR of 6.0 in the hospital. He is currently receiving infusions from infectious disease; Invanz one dose daily per Dr. Peterson. He is still in his appointment with Dr. Peterson today and will find out when he is able to discontinue. They will call the clinic with any medication changes.     Dr. Faust wants patient between 2-3 not 2.5-3.5 per [place].  "  Per his discharge records from [facility]: \"Anticoagulation difficult as patient ahs unusual responoses to coumadin doses. Coumadin restarted on 9/11 after being held for 5 days. First 5mg followed by 2.5 9/12. On 9/13 INR was 5.6, then further increased to 6.6 on 9/14. Coumadin will be held. Discussed with GI regarding possible pancreatitits being the cause, neurology aware. ID consulted ABX did not interact with INR. INR 3.1 on 9/17      Dosing since outpatient at Dr. Faust's office:  9/18: received 0.5mg of warfarin  9/19: INR was 3.3; warfarin dose 0.5mg  9/20: 1mg of warfarin  Per Dr. Christianne Paige *Of note: Lovenox has not been used yet*    Welcomed Manjeet Hall to the [facility]. I introduced myself and discussed that we will assist with his warfarin monitoring and work with his cardiologist or other physicians to provide patient care. Encouraged patient to call the clinic with requests for warfarin refills, changes in medications / diet, change in health, or upcoming procedures / surgeries. Discussed signs and symptoms of bleeding, signs and symptoms of TIA / CVA, use of OTC pain medications, and alcohol / tobacco / dietary / other drug interactions in relation to warfarin. Furthermore, we discussed available dates to come to the [facility] for face to face meetings. Patient was agreeable to meeting twice per year. At this time, Manjeet Hall did not have further questions or concerns. Provided patient with the clinic's contact information for future reference.     Plan:  1. INR is SUBtherapeutic today. Instructed pt to begin lovenox 80mg SubQ q12h starting today. Ok per Dr. Portillo. Will boost dose tonight to 4mg and repeat INR. Given uncertainty of warfarin dosing while at [facility] and reports of variable INR results. Will monitor closely.  2. Repeat INR Friday 9/28/2018.  3. Pt declines warfarin refills at this time. Consider calling in a higher " strength tablet.   4. Verbal information provided over the phone to  Manjeet Hall and caregiver. Manjeet Hall and caregiver (wife) expresses understanding by teach back, RBV dosing instructions, and has no further questions at this time.  5. Dr. Portillo is the referring provider today given Dr. Faust is out of town. Will need Dr. Faust to sign the CCA and send over a referral so referring provider can be switched to him on Monday 10/1.  6. Would like to look into home monitoring once has been on warfarin for 90 days.      Sondra Brooks, PharmD  9/27/2018  3:02 PM

## 2018-09-27 NOTE — TELEPHONE ENCOUNTER
Per Dr. Portillo's last note he is to have an INR drawn today. Please call the patient to schedule an appointment in clinic (tomorrow at the latest). Please call his office to have Dr. Portillo send over a referral, and then we can switch referring provider to Dr. Faust once he has returned from vacation next week/ signs CCA and referral.     Do you mind to send his information to registration to check estimated OOP?    Thanks

## 2018-09-28 ENCOUNTER — INFUSION (OUTPATIENT)
Dept: ONCOLOGY | Facility: HOSPITAL | Age: 43
End: 2018-09-28

## 2018-09-28 ENCOUNTER — LAB (OUTPATIENT)
Dept: LAB | Facility: HOSPITAL | Age: 43
End: 2018-09-28

## 2018-09-28 VITALS
RESPIRATION RATE: 16 BRPM | TEMPERATURE: 98.1 F | SYSTOLIC BLOOD PRESSURE: 113 MMHG | DIASTOLIC BLOOD PRESSURE: 68 MMHG | HEART RATE: 87 BPM

## 2018-09-28 DIAGNOSIS — Q24.9 CONGENITAL HEART DISEASE: ICD-10-CM

## 2018-09-28 DIAGNOSIS — Z95.2 H/O MECHANICAL AORTIC VALVE REPLACEMENT: ICD-10-CM

## 2018-09-28 LAB
INR PPP: 1.22 (ref 0.91–1.09)
PROTHROMBIN TIME: 12.8 SECONDS (ref 9.6–11.5)

## 2018-09-28 PROCEDURE — 85610 PROTHROMBIN TIME: CPT

## 2018-09-28 PROCEDURE — G0463 HOSPITAL OUTPT CLINIC VISIT: HCPCS

## 2018-09-29 ENCOUNTER — ANTICOAGULATION VISIT (OUTPATIENT)
Dept: PHARMACY | Facility: HOSPITAL | Age: 43
End: 2018-09-29

## 2018-09-29 NOTE — PROGRESS NOTES
Anticoagulation Clinic - Remote Progress Note   REMOTE LAB    Indication: On-X Mechanical valve #25 and a 30mm hemashield  Referring Provider: Bandar (will change to Dr. Faust)  Initial Warfarin Start Date:  ~9/2018  Goal INR: 2-3 per referral (will confirm not 2.5-3.5 as patient's wife has reported that [facility] was targeting)  Current Drug Interactions: Figure out antibiotic  CHADS-VASc:   Bleed Risk: ascending aortic aneurysm   Other: Non- Hodgkins lymphoma (hx of chemo and radiation); CAD, HTN,     Diet: avoids  Alcohol: socially  Tobacco: None  OTC Pain Medication: APAP PRN pain (voiced understanding to     INR History:  Date 9/27 9/28          Total Weekly Dose 8mg 12mg 19mg         INR 1.3 1.22          Notes  enox enox           Phone Interview:  Verbal Release Authorization signed on 9/27/2018- may speak with Gaby Hall (Wife)  Tablet Strength: pt has 1mg   Current Maintenance Dose: dose finding was taking 1mg   Patient Contact Info: [phone] (Gaby- Wife)  Verbal Release Auth:   Lab Contact Info: Megan Vu     Patient Findings:  Negatives:   Signs/symptoms of thrombosis, Signs/symptoms of bleeding, Laboratory test error suspected, Change in health, Change in alcohol use, Change in activity, Upcoming invasive procedure, Emergency department visit, Upcoming dental procedure, Missed doses, Extra doses, Change in medications, Change in diet/appetite, Hospital admission, Bruising, Other complaints     Dosing from [facility]:  Date INR Dose   9/01 1.3 5 mg   9/02 1.3 5 mg   9/03 1.3 7.5 mg   9/04 1.3 7.5 mg   9/05 1.4 Coumadin held from 9/5 to   9/06 2.5 9/10 due to acute brain   9/07 2.5 infarct   9/08 2.2 HELD   9/09 2.5 HELD   9/10 2.3 HELD   9/11 2.2 5 mg   9/12 2.8 2.5 mg   9/13 5.6 HELD   9/14 6.6 HELD   9/15 6.0 HELD   9/16 4.7 HELD   9/17 3.1 1mg   9/18 Have your INR checked on this day     Dosing since outpatient at Dr. Faust's office:  9/18: received 0.5mg of  warfarin  9/19: INR was 3.3; warfarin dose 0.5mg  9/20: 1mg of warfarin    Plan:  1. INR reamins SUBtherapeutic, so instructed Mrs. Hall to have her  take warfarin 4mg tonight and tomorrow, and continue Lovenox 80mg SubQ q12h.  2. Repeat INR Monday (encouraged lab draw before 1400, so results are more likely to result prior to clinic closing for the day).   3. Mrs. Hall declines warfarin refills at this time. Consider calling in a higher strength tablet once needed.  4. Verbal information provided over the phone. Manjeet Hall 's wife expresses understanding by teach back, RBV dosing instructions, and has no further questions at this time.  5. Dr. Portillo is the referring provider given Dr. Faust is out of town. Will need Dr. Faust to sign the CCA and send over a referral so referring provider can be switched to him on Monday 10/1.  6. Would like to look into home monitoring once has been on warfarin for 90 days.    [facility], located [facility]  9/29/2018  2:57 PM

## 2018-10-01 ENCOUNTER — LAB (OUTPATIENT)
Dept: LAB | Facility: HOSPITAL | Age: 43
End: 2018-10-01

## 2018-10-01 ENCOUNTER — ANTICOAGULATION VISIT (OUTPATIENT)
Dept: PHARMACY | Facility: HOSPITAL | Age: 43
End: 2018-10-01

## 2018-10-01 DIAGNOSIS — Z95.2 H/O MECHANICAL AORTIC VALVE REPLACEMENT: ICD-10-CM

## 2018-10-01 LAB
INR PPP: 1.3 (ref 0.91–1.09)
PROTHROMBIN TIME: 13.6 SECONDS (ref 9.6–11.5)

## 2018-10-01 PROCEDURE — 85610 PROTHROMBIN TIME: CPT

## 2018-10-01 PROCEDURE — 36415 COLL VENOUS BLD VENIPUNCTURE: CPT

## 2018-10-01 RX ORDER — WARFARIN SODIUM 5 MG/1
TABLET ORAL
Qty: 45 TABLET | Refills: 1 | Status: SHIPPED | OUTPATIENT
Start: 2018-10-01 | End: 2018-11-23 | Stop reason: SDUPTHER

## 2018-10-01 NOTE — PROGRESS NOTES
Anticoagulation Clinic - Remote Progress Note   REMOTE LAB    Indication: On-X Mechanical valve #25 and a 30mm hemashield  Referring Provider: Bandar (will change to Dr. Faust)  Initial Warfarin Start Date:  ~9/2018  Goal INR: 2-3 per referral (will confirm not 2.5-3.5 as patient's wife has reported that [facility] was targeting)  Current Drug Interactions: ertapenem til 10/1  CHADS-VASc:   Bleed Risk: ascending aortic aneurysm   Other: Non- Hodgkins lymphoma (hx of chemo and radiation); CAD, HTN,     Diet: avoids  Alcohol: socially  Tobacco: None  OTC Pain Medication: APAP PRN pain (voiced understanding to     INR History:  Date 9/27 9/28 10/1 10/4        Total Weekly Dose 8mg 12mg 19mg         INR 1.3 1.22 1.3         Notes  enox enox           Phone Interview:  Verbal Release Authorization signed on 9/27/2018- may speak with Gaby Hall (Wife)  Tablet Strength: pt has 1mg   Current Maintenance Dose: dose finding was taking 1mg   Patient Contact Info: [phone] (Gaby- Wife)  Verbal Release Auth:   Lab Contact Info: Megan Vu     Patient Findings:  Negatives:   Signs/symptoms of thrombosis, Signs/symptoms of bleeding, Laboratory test error suspected, Change in health, Change in alcohol use, Change in activity, Upcoming invasive procedure, Emergency department visit, Upcoming dental procedure, Missed doses, Extra doses, Change in medications, Change in diet/appetite, Hospital admission, Bruising, Other complaints   Comments:   Patients last dose of ertapenem will be Thur 10/4 (know DDI on micromedex listed)       Dosing from [facility]:  Date INR Dose   9/01 1.3 5 mg   9/02 1.3 5 mg   9/03 1.3 7.5 mg   9/04 1.3 7.5 mg   9/05 1.4 Coumadin held from 9/5 to   9/06 2.5 9/10 due to acute brain   9/07 2.5 infarct   9/08 2.2 HELD   9/09 2.5 HELD   9/10 2.3 HELD   9/11 2.2 5 mg   9/12 2.8 2.5 mg   9/13 5.6 HELD   9/14 6.6 HELD   9/15 6.0 HELD   9/16 4.7 HELD   9/17 3.1 1mg   9/18 Have  your INR checked on this day     Dosing since outpatient at Dr. Faust's office:  9/18: received 0.5mg of warfarin  9/19: INR was 3.3; warfarin dose 0.5mg  9/20: 1mg of warfarin    Plan:  1. INR reamins SUBtherapeutic at 1.3 today despite dose escalation last week, so instructed Mrs. Hall to have her  take warfarin 5mg tonight, Tue and Wed along with Lovenox 80mg SubQ q12h. Pt will return to the clinic here for INR check Thur (52% dose inc for the week).  2. Repeat INR Thur here in clinic  3. Call in 5mg tabs to pharmacy for Mr Hall ([facility] ) see separate refill encounter.  4. Verbal information provided over the phone. Manjeet Hall 's wife expresses understanding by teach back, RBV dosing instructions, and has no further questions at this time.  5. Dr. Portillo is the referring provider given Dr. Faust is out of town. Will need Dr. Faust to sign the CCA and send over a referral so referring provider can be switched to him on Monday 10/1.  6. Would like to look into home monitoring once has been on warfarin for 90 days.    Nik Burgos, PharmD Candidate 2019  10/1/2018  2:07 PM       IAbdi, [facility], have reviewed the note in full and agree with the assessment and plan.  10/01/18  3:48 PM

## 2018-10-04 ENCOUNTER — HOSPITAL ENCOUNTER (OUTPATIENT)
Dept: CARDIOLOGY | Facility: HOSPITAL | Age: 43
Discharge: HOME | End: 2018-10-04
Attending: INTERNAL MEDICINE | Admitting: INTERNAL MEDICINE

## 2018-10-04 ENCOUNTER — ANTICOAGULATION VISIT (OUTPATIENT)
Dept: PHARMACY | Facility: HOSPITAL | Age: 43
End: 2018-10-04

## 2018-10-04 ENCOUNTER — TELEPHONE (OUTPATIENT)
Dept: PHARMACY | Facility: HOSPITAL | Age: 43
End: 2018-10-04

## 2018-10-04 VITALS — WEIGHT: 184 LBS | HEIGHT: 68 IN | BODY MASS INDEX: 27.89 KG/M2

## 2018-10-04 DIAGNOSIS — Z95.2 S/P AVR (AORTIC VALVE REPLACEMENT): ICD-10-CM

## 2018-10-04 DIAGNOSIS — Z95.2 HX OF AORTIC VALVE REPLACEMENT, MECHANICAL: ICD-10-CM

## 2018-10-04 LAB
BH CV ECHO MEAS - AO MAX PG (FULL): 8.8 MMHG
BH CV ECHO MEAS - AO MAX PG: 11.6 MMHG
BH CV ECHO MEAS - AO MEAN PG (FULL): 3.6 MMHG
BH CV ECHO MEAS - AO MEAN PG: 4.9 MMHG
BH CV ECHO MEAS - AO V2 MAX: 170.6 CM/SEC
BH CV ECHO MEAS - AO V2 MEAN: 101.9 CM/SEC
BH CV ECHO MEAS - AO V2 VTI: 32.8 CM
BH CV ECHO MEAS - AVA(I,A): 1.5 CM^2
BH CV ECHO MEAS - AVA(I,D): 1.5 CM^2
BH CV ECHO MEAS - AVA(V,A): 1.6 CM^2
BH CV ECHO MEAS - AVA(V,D): 1.6 CM^2
BH CV ECHO MEAS - BSA(HAYCOCK): 2 M^2
BH CV ECHO MEAS - BSA: 2 M^2
BH CV ECHO MEAS - BZI_BMI: 28 KILOGRAMS/M^2
BH CV ECHO MEAS - BZI_METRIC_HEIGHT: 172.7 CM
BH CV ECHO MEAS - BZI_METRIC_WEIGHT: 83.5 KG
BH CV ECHO MEAS - EDV(CUBED): 55.8 ML
BH CV ECHO MEAS - EDV(MOD-SP2): 79 ML
BH CV ECHO MEAS - EDV(MOD-SP4): 98 ML
BH CV ECHO MEAS - EDV(TEICH): 62.8 ML
BH CV ECHO MEAS - EF(CUBED): 54.9 %
BH CV ECHO MEAS - EF(MOD-BP): 55 %
BH CV ECHO MEAS - EF(MOD-SP2): 55.7 %
BH CV ECHO MEAS - EF(MOD-SP4): 57.1 %
BH CV ECHO MEAS - EF(TEICH): 47.3 %
BH CV ECHO MEAS - ESV(CUBED): 25.2 ML
BH CV ECHO MEAS - ESV(MOD-SP2): 35 ML
BH CV ECHO MEAS - ESV(MOD-SP4): 42 ML
BH CV ECHO MEAS - ESV(TEICH): 33 ML
BH CV ECHO MEAS - FS: 23.3 %
BH CV ECHO MEAS - IVS/LVPW: 1.2
BH CV ECHO MEAS - IVSD: 1.4 CM
BH CV ECHO MEAS - LA DIMENSION: 3.5 CM
BH CV ECHO MEAS - LAD MAJOR: 6.1 CM
BH CV ECHO MEAS - LAT PEAK E' VEL: 8.6 CM/SEC
BH CV ECHO MEAS - LATERAL E/E' RATIO: 12.2
BH CV ECHO MEAS - LV DIASTOLIC VOL/BSA (35-75): 49.7 ML/M^2
BH CV ECHO MEAS - LV MASS(C)D: 176.2 GRAMS
BH CV ECHO MEAS - LV MASS(C)DI: 89.3 GRAMS/M^2
BH CV ECHO MEAS - LV MAX PG: 2.8 MMHG
BH CV ECHO MEAS - LV MEAN PG: 1.3 MMHG
BH CV ECHO MEAS - LV SYSTOLIC VOL/BSA (12-30): 21.3 ML/M^2
BH CV ECHO MEAS - LV V1 MAX: 83.9 CM/SEC
BH CV ECHO MEAS - LV V1 MEAN: 50.8 CM/SEC
BH CV ECHO MEAS - LV V1 VTI: 15.4 CM
BH CV ECHO MEAS - LVIDD: 3.8 CM
BH CV ECHO MEAS - LVIDS: 2.9 CM
BH CV ECHO MEAS - LVLD AP2: 6.8 CM
BH CV ECHO MEAS - LVLD AP4: 7.6 CM
BH CV ECHO MEAS - LVLS AP2: 6.7 CM
BH CV ECHO MEAS - LVLS AP4: 6.7 CM
BH CV ECHO MEAS - LVOT AREA (M): 3.1 CM^2
BH CV ECHO MEAS - LVOT AREA: 3.3 CM^2
BH CV ECHO MEAS - LVOT DIAM: 2 CM
BH CV ECHO MEAS - LVPWD: 1.2 CM
BH CV ECHO MEAS - MV A MAX VEL: 75.5 CM/SEC
BH CV ECHO MEAS - MV DEC TIME: 0.18 SEC
BH CV ECHO MEAS - MV E MAX VEL: 106.1 CM/SEC
BH CV ECHO MEAS - MV E/A: 1.4
BH CV ECHO MEAS - PA ACC SLOPE: 567.5 CM/SEC^2
BH CV ECHO MEAS - PA ACC TIME: 0.15 SEC
BH CV ECHO MEAS - PA MAX PG: 8.9 MMHG
BH CV ECHO MEAS - PA PR(ACCEL): 13.6 MMHG
BH CV ECHO MEAS - PA V2 MAX: 149.3 CM/SEC
BH CV ECHO MEAS - RAP SYSTOLE: 8 MMHG
BH CV ECHO MEAS - RVSP: 19 MMHG
BH CV ECHO MEAS - SI(CUBED): 15.5 ML/M^2
BH CV ECHO MEAS - SI(LVOT): 25.4 ML/M^2
BH CV ECHO MEAS - SI(MOD-SP2): 22.3 ML/M^2
BH CV ECHO MEAS - SI(MOD-SP4): 28.4 ML/M^2
BH CV ECHO MEAS - SI(TEICH): 15.1 ML/M^2
BH CV ECHO MEAS - SV(CUBED): 30.6 ML
BH CV ECHO MEAS - SV(LVOT): 50.2 ML
BH CV ECHO MEAS - SV(MOD-SP2): 44 ML
BH CV ECHO MEAS - SV(MOD-SP4): 56 ML
BH CV ECHO MEAS - SV(TEICH): 29.7 ML
BH CV ECHO MEAS - TAPSE (>1.6): 0.9 CM2
BH CV ECHO MEAS - TR MAX PG: 11 MMHG
BH CV ECHO MEAS - TR MAX VEL: 166.8 CM/SEC
BH CV XLRA - RV BASE: 3.9 CM
BH CV XLRA - RV LENGTH: 5.9 CM
BH CV XLRA - RV MID: 3.1 CM
INR PPP: 1.8 (ref 0.91–1.09)
LEFT ATRIUM VOLUME INDEX: 25 ML/M2
PROTHROMBIN TIME: 21.8 SECONDS (ref 10–13.8)

## 2018-10-04 PROCEDURE — 93306 TTE W/DOPPLER COMPLETE: CPT

## 2018-10-04 PROCEDURE — G0463 HOSPITAL OUTPT CLINIC VISIT: HCPCS

## 2018-10-04 PROCEDURE — 85610 PROTHROMBIN TIME: CPT

## 2018-10-04 PROCEDURE — 93306 TTE W/DOPPLER COMPLETE: CPT | Performed by: INTERNAL MEDICINE

## 2018-10-04 PROCEDURE — 36416 COLLJ CAPILLARY BLOOD SPEC: CPT

## 2018-10-04 PROCEDURE — 25010000002 SULFUR HEXAFLUORIDE MICROSPH 60.7-25 MG RECONSTITUTED SUSPENSION: Performed by: INTERNAL MEDICINE

## 2018-10-04 RX ORDER — WARFARIN SODIUM 2 MG/1
TABLET ORAL
Qty: 180 TABLET | Refills: 1 | Status: SHIPPED | OUTPATIENT
Start: 2018-10-04 | End: 2019-05-17 | Stop reason: SDUPTHER

## 2018-10-04 RX ADMIN — SULFUR HEXAFLUORIDE 3 ML: KIT at 14:26

## 2018-10-04 NOTE — PROGRESS NOTES
Anticoagulation Clinic - Remote Progress Note  REMOTE LAB    Indication: On-X Mechanical valve #25 and a 30mm hemashield  Referring Provider: Bandar Danielson)  Initial Warfarin Start Date:  ~9/2018  Goal INR: 2-3 per referral  Current Drug Interactions: desvenlafaxine, levothyroxine  Bleed Risk: ascending aortic aneurysm   Other: Hodgkins Lymphoma (hx of chemo and radiation); h/o CVA + hemorrhagic pancreatitis (during hospitalization for mAVR)    Diet: avoids  Alcohol: socially  Tobacco: None  OTC Pain Medication: APAP PRN pain (voiced understanding to     INR History:  Date 9/27 9/28 10/1 10/4        Total Weekly Dose 8mg 12mg 19mg 29mg        INR 1.3 1.22 1.3 1.8        Notes clinic enox enox enox; clinic enox         Dosing from [facility]:  Date INR Dose   9/01 1.3 5 mg   9/02 1.3 5 mg   9/03 1.3 7.5 mg   9/04 1.3 7.5 mg   9/05 1.4 Coumadin held from 9/5 to   9/06 2.5 9/10 due to acute brain   9/07 2.5 infarct   9/08 2.2 HELD   9/09 2.5 HELD   9/10 2.3 HELD   9/11 2.2 5 mg   9/12 2.8 2.5 mg   9/13 5.6 HELD   9/14 6.6 HELD   9/15 6.0 HELD   9/16 4.7 HELD   9/17 3.1 1mg   9/18 Have your INR checked on this day     Dosing since outpatient at Dr. Faust's office:  9/18: received 0.5mg of warfarin  9/19: INR was 3.3; warfarin dose 0.5mg  9/20: 1mg of warfarin    Phone Interview:  Verbal Release Authorization signed on 9/27/2018- may speak with Gaby Hall (Wife)  Tablet Strength: pt has 1mg, 2mg, and 5mg tablets  Current Maintenance Dose: dose finding was taking 1mg   Patient Contact Info: [phone] (Gaby- Wife)  Lab Contact Info: Megan Vu     Patient Findings:  Negatives:   Signs/symptoms of thrombosis, Signs/symptoms of bleeding, Laboratory test error suspected, Change in health, Change in alcohol use, Change in activity, Upcoming invasive procedure, Emergency department visit, Upcoming dental procedure, Missed doses, Extra doses, Change in medications, Change in diet/appetite, Hospital  admission, Bruising, Other complaints     Plan:  1. INR is subtherapeutic but much improved since earlier this week. For now, instructed Mr. Hall to take warfarin 4mg daily (target 31mg by recheck = 7% incr.) and continue Lovenox 80mg SubQ q12h.   2. Repeat INR on Monday at [facility].  3. Verbal and written information provided today in clinic. Manjeet Hall 's wife expresses understanding by teach back, RBV dosing instructions, and has no further questions at this time.  4. Dr. Portillo is the original referring provider given Dr. Faust was out of town. Will have Dr. John sign an updated referral.  5. Mr. Hall would like to look into home monitoring once has been on warfarin for 90 days.  6. Refill sent for Lovenox, warfarin 2mg tabs.    Ann Cowden Mayer, PharmD  10/4/2018  11:49 AM

## 2018-10-05 ENCOUNTER — TELEPHONE (OUTPATIENT)
Dept: CARDIOLOGY | Facility: CLINIC | Age: 43
End: 2018-10-05

## 2018-10-05 NOTE — TELEPHONE ENCOUNTER
----- Message from Vimal John III, MD sent at 10/5/2018  8:58 AM EDT -----  Normal heart and valve function, no fluid around heart.

## 2018-10-05 NOTE — PROGRESS NOTES
I apologize. It was my understanding that you had called his referral to us on behalf of Dr. Faust. We are seeking a referral from Mr. Hall's cardiologist, so I will update the orders appropriately.

## 2018-10-05 NOTE — TELEPHONE ENCOUNTER
Called patient. Spoke to spouse. Told her echo revealed normal heart and valve function, no fluid around the heart. She verbalized understanding.

## 2018-10-08 ENCOUNTER — LAB (OUTPATIENT)
Dept: LAB | Facility: HOSPITAL | Age: 43
End: 2018-10-08

## 2018-10-08 ENCOUNTER — ANTICOAGULATION VISIT (OUTPATIENT)
Dept: PHARMACY | Facility: HOSPITAL | Age: 43
End: 2018-10-08

## 2018-10-08 DIAGNOSIS — Z95.2 HX OF AORTIC VALVE REPLACEMENT, MECHANICAL: ICD-10-CM

## 2018-10-08 DIAGNOSIS — Z95.2 H/O MECHANICAL AORTIC VALVE REPLACEMENT: ICD-10-CM

## 2018-10-08 LAB
INR PPP: 1.85 (ref 0.91–1.09)
PROTHROMBIN TIME: 19.4 SECONDS (ref 9.6–11.5)

## 2018-10-08 PROCEDURE — 85610 PROTHROMBIN TIME: CPT

## 2018-10-08 PROCEDURE — 36415 COLL VENOUS BLD VENIPUNCTURE: CPT

## 2018-10-09 ENCOUNTER — OFFICE VISIT (OUTPATIENT)
Dept: NEUROLOGY | Facility: CLINIC | Age: 43
End: 2018-10-09

## 2018-10-09 ENCOUNTER — LAB (OUTPATIENT)
Dept: LAB | Facility: HOSPITAL | Age: 43
End: 2018-10-09

## 2018-10-09 VITALS
OXYGEN SATURATION: 99 % | HEART RATE: 83 BPM | WEIGHT: 184 LBS | HEIGHT: 68 IN | DIASTOLIC BLOOD PRESSURE: 64 MMHG | SYSTOLIC BLOOD PRESSURE: 118 MMHG | BODY MASS INDEX: 27.89 KG/M2 | RESPIRATION RATE: 16 BRPM

## 2018-10-09 DIAGNOSIS — I69.30 SEQUELAE, POST-STROKE: ICD-10-CM

## 2018-10-09 DIAGNOSIS — I69.30 SEQUELAE, POST-STROKE: Primary | ICD-10-CM

## 2018-10-09 LAB
ARTICHOKE IGE QN: 132 MG/DL (ref 0–130)
CHOLEST SERPL-MCNC: 197 MG/DL (ref 0–200)
HDLC SERPL-MCNC: 53 MG/DL (ref 40–60)
TRIGL SERPL-MCNC: 182 MG/DL (ref 0–150)

## 2018-10-09 PROCEDURE — 80061 LIPID PANEL: CPT

## 2018-10-09 PROCEDURE — 99204 OFFICE O/P NEW MOD 45 MIN: CPT | Performed by: PSYCHIATRY & NEUROLOGY

## 2018-10-09 RX ORDER — ATORVASTATIN CALCIUM 40 MG/1
40 TABLET, FILM COATED ORAL DAILY
Qty: 30 TABLET | Refills: 11 | Status: SHIPPED | OUTPATIENT
Start: 2018-10-09 | End: 2019-09-30 | Stop reason: SDUPTHER

## 2018-10-09 NOTE — PROGRESS NOTES
Subjective:    CC: Manjeet Hall is seen today in consultation at the request of Nile Faust MD for Stroke (new pt)       HPI:  42-year-old male accompanied by his wife with a history of aortic stenosis status post valve replacement and VSD repair at age 14, aortic aneurysm and aortic valve regurgitation status post aneurysmal repair and aortic valve repair, anxiety, depression, Hodgkin's lymphoma at age 23 status post chemotherapy and radiation, hypothyroidism presents with a stroke.  As per patient's wife he underwent  a mechanical aortic valve repair as well as and aneurysmal repair in [facility] in August.  He was started on Coumadin soon after the procedure however he developed pancreatitis after surgery hence his Coumadin was stopped for 1 week and he was started on IV heparin.  During that time he had symptoms of garbled speech.  An MRI brain later confirmed a left hemispheric stroke.  He was also found to have a thrombus in his mechanical aortic valve and restarted back on Coumadin.  Currently being bridged with Lovenox.  His last INR yesterday was 1.85.  He has not yet started getting speech therapy and will start soon.    The following portions of the patient's history were reviewed today and updated as of 10/09/2018  : allergies, current medications, past family history, past medical history, past social history, past surgical history and problem list  These document will be scanned to patient's chart.      Current Outpatient Prescriptions:   •  desvenlafaxine (PRISTIQ) 100 MG 24 hr tablet, Take 100 mg by mouth Daily., Disp: , Rfl: 0  •  enoxaparin (LOVENOX) 80 MG/0.8ML solution syringe, Inject 0.8 mL under the skin into the appropriate area as directed Every 12 (Twelve) Hours., Disp: 14 syringe, Rfl: 2  •  Ertapenem Sodium (INVANZ IV), Infuse  into a venous catheter., Disp: , Rfl:   •  gabapentin (NEURONTIN) 300 MG capsule, Take 300 mg by mouth 5 (Five) Times a Day. Take 2 tablets AM, 3  tablets in the afternoon, Disp: , Rfl: 2  •  lamoTRIgine (LaMICtal) 200 MG tablet, Take 300 mg by mouth Daily., Disp: , Rfl: 2  •  Lansoprazole (PREVACID PO), Take 20 mg by mouth Daily., Disp: , Rfl:   •  levothyroxine (SYNTHROID, LEVOTHROID) 100 MCG tablet, TAKE 1 TABLET BY MOUTH EVERY DAY **NEED APPT FOR FURTHER REFILLS, Disp: 90 tablet, Rfl: 0  •  metoprolol tartrate (LOPRESSOR) 25 MG tablet, Take 1 tablet by mouth 2 (Two) Times a Day., Disp: 180 tablet, Rfl: 0  •  warfarin (COUMADIN) 1 MG tablet, Take 1 daily as directed, Disp: 30 tablet, Rfl: 0  •  warfarin (COUMADIN) 2 MG tablet, Take 2 tablets by mouth daily, as directed by the anticoagulation clinic., Disp: 180 tablet, Rfl: 1  •  warfarin (COUMADIN) 5 MG tablet, Take 1 tablet po daily or as directed by anticoagulation clinic., Disp: 45 tablet, Rfl: 1   Past Medical History:   Diagnosis Date   • Abnormal heart rhythm    • Anxiety    • Cancer (CMS/HCC)     hodgkins   • Depression    • GERD (gastroesophageal reflux disease)    • Heart murmur    • History of echocardiogram    • History of left heart catheterization    • History of stomach ulcers    • Hypothyroidism    • Mitral valvular disorder    • Thyroid disorder       Past Surgical History:   Procedure Laterality Date   • SKIN CANCER EXCISION      mass removed from neck and chest for hodgkins      Family History   Problem Relation Age of Onset   • COPD Mother    • Cancer Mother    • Hypertension Father    • Stroke Father    • Stroke Paternal Grandmother    • Hypertension Paternal Grandmother    • Cancer Paternal Grandmother    • Cancer Paternal Grandfather    • Heart disease Paternal Grandfather       Social History     Social History   • Marital status:      Spouse name: N/A   • Number of children: N/A   • Years of education: N/A     Occupational History   • Not on file.     Social History Main Topics   • Smoking status: Never Smoker   • Smokeless tobacco: Never Used   • Alcohol use Yes      Comment:  "social   • Drug use: No   • Sexual activity: Defer     Other Topics Concern   • Not on file     Social History Narrative   • No narrative on file     Review of Systems    Objective:    /64   Pulse 83   Resp 16   Ht 172.7 cm (68\")   Wt 83.5 kg (184 lb)   SpO2 99%   BMI 27.98 kg/m²     Neurology Exam:    General apperance: NAD.     Mental status: Alert, awake and oriented to time place and person.    Recent and Remote memory: Intact.    Attention span and Concentration: Normal.     Language and Speech: Intact- No dysarthria.    Fluency, Naming , Repitition and Comprehension: Significantly impaired fluency, naming and repetition.  Could comprehend simple commands but not complex commands    Cranial Nerves:   CN II: Visual fields are full. Intact. Fundi - Normal, No papillederma, Pupils - MATTHIEU  CN III, IV and VI: Extraocular movements are intact. Normal saccades.   CN V: Facial sensation is intact.   CN VII: Muscles of facial expression reveal no asymmetry. Intact.   CN VIII: Hearing is intact. Whispered voice intact.   CN IX and X: Palate elevates symmetrically. Intact  CN XI: Shoulder shrug is intact.   CN XII: Tongue is midline without evidence of atrophy or fasciculation.     Ophthalmoscopic exam of optic disc-normal    Motor:  Right UE muscle strength 5/5. Normal tone.     Left UE muscle strength 5/5. Normal tone.      Right LE muscle strength5/5. Normal tone.     Left LE muscle strength 5/5. Normal tone.      Sensory: Normal light touch, vibration and pinprick sensation bilaterally.    DTRs: 2+ bilaterally in upper and lower extremities.    Babinski: Negative bilaterally.    Co-ordination: Normal finger-to-nose, heel to shin B/L.    Rhomberg: Negative.    Gait: Normal.    Cardiovascular: Regular rate and rhythm     Assessment and Plan:  1. Sequelae, post-stroke  Patient had a left hemispheric infarct secondary to an artificial aortic valve thrombus ( will obtain his MRI brain from [facility] " clinic)  Currently on Coumadin, being bridged with Lovenox.  Goal INR 2.5-3.5  I will get a carotid Doppler at baseline  Maintain blood pressure less than 130/90  I will also get a lipid panel as the last one done in 2015 showed an elevated cholesterol and LDL.  Goal LDL less than 100  He will need extensive speech therapy  I have advised patient to not drive.  We will write a letter for his workplace stating that he is unable to return to work at this time due to significant speech deficits as well as difficulty comprehending  - Lipid Panel; Future  - Duplex Carotid Ultrasound CAR; Future     Addendum-I reviewed his notes from [facility].  CT head showed decreased attenuation in the left temporal operculum and left inferior parietal lobe and parts of the lateral left superior parietal lobe reflecting a left MCA infarct.  MRI brain also showed restricted diffusion in the left posterior MCA area and left insular cortex (aspect score of 5)  CTA head showed thrombus in the left M1-2 junction with good distal collateral vessels and absent left A1 .The rest of the anterior circulation and vertebral basilar circulation was patent.  CTA neck showed mild carotid plaques without any significant stenosis on either side.  Echo showed EF of 51% with the limitation of right ventricle and moderate dilatation of left atrium, moderate aortic valve regurg    Return in about 2 months (around 12/9/2018).       Lindsay Summers MD

## 2018-10-10 ENCOUNTER — TELEPHONE (OUTPATIENT)
Dept: FAMILY MEDICINE CLINIC | Facility: CLINIC | Age: 43
End: 2018-10-10

## 2018-10-10 NOTE — TELEPHONE ENCOUNTER
----- Message from Nile Faust MD sent at 10/10/2018 11:51 AM EDT -----  Regarding: RE: PRESCRIPTION  Contact: [phone]  Taking 4 mg daily is managed by anticoagulation clinic at [facility]  [id number]  ----- Message -----  From: Royal Ferreira MA  Sent: 10/10/2018  11:25 AM  To: Nile Faust MD  Subject: FW: PRESCRIPTION                                     ----- Message -----  From: Ann Diallo RegSched Rep  Sent: 10/10/2018  11:21 AM  To: Royal Ferreira MA  Subject: PRESCRIPTION                                     CALL BACK [phone] REGARDING PATIENTS WARFEN DOSING

## 2018-10-11 ENCOUNTER — OFFICE VISIT (OUTPATIENT)
Dept: FAMILY MEDICINE CLINIC | Facility: CLINIC | Age: 43
End: 2018-10-11

## 2018-10-11 ENCOUNTER — ANTICOAGULATION VISIT (OUTPATIENT)
Dept: PHARMACY | Facility: HOSPITAL | Age: 43
End: 2018-10-11

## 2018-10-11 ENCOUNTER — LAB (OUTPATIENT)
Dept: LAB | Facility: HOSPITAL | Age: 43
End: 2018-10-11

## 2018-10-11 VITALS
WEIGHT: 189 LBS | HEART RATE: 97 BPM | DIASTOLIC BLOOD PRESSURE: 82 MMHG | RESPIRATION RATE: 16 BRPM | BODY MASS INDEX: 28.64 KG/M2 | TEMPERATURE: 98.6 F | HEIGHT: 68 IN | SYSTOLIC BLOOD PRESSURE: 116 MMHG | OXYGEN SATURATION: 97 %

## 2018-10-11 DIAGNOSIS — Z95.2 HX OF AORTIC VALVE REPLACEMENT, MECHANICAL: ICD-10-CM

## 2018-10-11 DIAGNOSIS — F41.1 GENERALIZED ANXIETY DISORDER: Primary | ICD-10-CM

## 2018-10-11 DIAGNOSIS — Z95.2 H/O MECHANICAL AORTIC VALVE REPLACEMENT: ICD-10-CM

## 2018-10-11 DIAGNOSIS — I69.30 SEQUELAE, POST-STROKE: ICD-10-CM

## 2018-10-11 LAB
INR PPP: 1.7 (ref 0.91–1.09)
PROTHROMBIN TIME: 17.8 SECONDS (ref 9.6–11.5)

## 2018-10-11 PROCEDURE — 90471 IMMUNIZATION ADMIN: CPT | Performed by: FAMILY MEDICINE

## 2018-10-11 PROCEDURE — 99213 OFFICE O/P EST LOW 20 MIN: CPT | Performed by: FAMILY MEDICINE

## 2018-10-11 PROCEDURE — 36415 COLL VENOUS BLD VENIPUNCTURE: CPT

## 2018-10-11 PROCEDURE — 90686 IIV4 VACC NO PRSV 0.5 ML IM: CPT | Performed by: FAMILY MEDICINE

## 2018-10-11 PROCEDURE — 85610 PROTHROMBIN TIME: CPT

## 2018-10-11 NOTE — PROGRESS NOTES
Anticoagulation Clinic - Remote Progress Note  REMOTE LAB    Indication: On-X Mechanical valve #25 and a 30mm hemashield  Referring Provider: Alvaro  Initial Warfarin Start Date:  ~9/2018  Goal INR: 2-3 per referral  Current Drug Interactions: desvenlafaxine, levothyroxine  Bleed Risk: ascending aortic aneurysm   Other: Hodgkins Lymphoma (hx of chemo and radiation); h/o CVA + hemorrhagic pancreatitis (during hospitalization for mAVR)    Diet: avoids  Alcohol: socially  Tobacco: None  OTC Pain Medication: APAP PRN pain (voiced understanding to     INR History:  Date 9/27 9/28 10/1 10/4 10/8 10/11      Total Weekly Dose 8mg 12mg 19mg 29mg 31mg 34mg 40mg     INR 1.3 1.22 1.3 1.8 1.85 1.7      Notes clinic enox enox enox; clinic enox enox        Dosing from [facility]:  Date INR Dose   9/01 1.3 5 mg   9/02 1.3 5 mg   9/03 1.3 7.5 mg   9/04 1.3 7.5 mg   9/05 1.4 Coumadin held from 9/5 to   9/06 2.5 9/10 due to acute brain   9/07 2.5 infarct   9/08 2.2 HELD   9/09 2.5 HELD   9/10 2.3 HELD   9/11 2.2 5 mg   9/12 2.8 2.5 mg   9/13 5.6 HELD   9/14 6.6 HELD   9/15 6.0 HELD   9/16 4.7 HELD   9/17 3.1 1mg   9/18 Have your INR checked on this day     Dosing since outpatient at Dr. Faust's office:  9/18: received 0.5mg of warfarin  9/19: INR was 3.3; warfarin dose 0.5mg  9/20: 1mg of warfarin    Phone Interview:  Verbal Release Authorization signed on 9/27/2018- may speak with Gaby Hall (Wife)  Tablet Strength: pt has 1mg, 2mg, and 5mg tablets  Current Maintenance Dose: dose finding was taking 1mg   Patient Contact Info: [phone] (Gaby-wife)  Lab Contact Info: Megan Vu     Patient Findings:  Negatives:   Signs/symptoms of thrombosis, Signs/symptoms of bleeding, Laboratory test error suspected, Change in health, Change in alcohol use, Change in activity, Upcoming invasive procedure, Emergency department visit, Upcoming dental procedure, Missed doses, Extra doses, Change in medications, Change in  diet/appetite, Hospital admission, Bruising, Other complaints     Plan:  1. INR has decreased slightly to 1.7 today despite boosting the last 2 days doses to 7mg. Mrs Hall denies any changes and confirmed doses/compliance. Instructed Mrs. Hlal to have her  take warfarin 7mg tonight and tomorrow then 5mg on SatSun (17.6% increase) + continue Lovenox 80mg SubQ q12h.   2. Repeat INR on Monday 10/15.   3. Verbal information provided over the phone. Mrs. Hall expresses understanding with teach back and has no further questions at this time.  4. Mr. Hall would like to look into home monitoring once has been on warfarin for 90 days.    Nik Burgos, PharmD Candidate 2019  10/11/2018  4:12 PM     Called and verified that [facility] pharmacy had refills for Lovenox -- staff reported a fill was sold today, along with a 10mg warfarin rx under Dr. Shelby? Discussed with pt's wife and asked her to be sure her  isn't taking this strength (white tab).   I, Hafsa Lowery, [facility], have reviewed the note in full and agree with the assessment and plan.  10/11/18  4:31 PM

## 2018-10-12 ENCOUNTER — TRANSCRIBE ORDERS (OUTPATIENT)
Dept: PHARMACY | Facility: HOSPITAL | Age: 43
End: 2018-10-12

## 2018-10-12 ENCOUNTER — ANTICOAGULATION VISIT (OUTPATIENT)
Dept: PHARMACY | Facility: HOSPITAL | Age: 43
End: 2018-10-12

## 2018-10-12 DIAGNOSIS — Z95.2 H/O MECHANICAL AORTIC VALVE REPLACEMENT: Primary | ICD-10-CM

## 2018-10-12 DIAGNOSIS — Z95.2 HX OF AORTIC VALVE REPLACEMENT, MECHANICAL: ICD-10-CM

## 2018-10-12 LAB — INR PPP: 1.7

## 2018-10-12 RX ORDER — ALPRAZOLAM 0.25 MG/1
0.25 TABLET ORAL 2 TIMES DAILY PRN
Qty: 30 TABLET | Refills: 0
Start: 2018-10-12 | End: 2018-11-06 | Stop reason: SDUPTHER

## 2018-10-12 NOTE — PROGRESS NOTES
"Subjective   Manjeet Hall is a 42 y.o. male.     Fabrizio was recently briefly hospitalized at [facility] where he was taken from [facility] where he had been arrested for some sort of domestic conflict, his daughters with him today she is now managing his medicines and he does seem to be doing some better.  He is getting pro times at the anticoagulation clinic at [facility].  He admits to sometimes getting upset and being unable to control his anxiety and mood.  Is still having some expressive difficulty speech.         The following portions of the patient's history were reviewed and updated as appropriate: allergies, current medications, past social history and problem list.    Review of Systems   Constitutional: Negative for chills and fever.   HENT: Negative for congestion and sore throat.    Respiratory: Negative for cough and shortness of breath.    Cardiovascular: Negative for chest pain, palpitations and leg swelling.   Gastrointestinal: Negative for diarrhea, nausea and vomiting.   Endocrine: Positive for heat intolerance. Negative for cold intolerance.   Genitourinary: Negative for dysuria and hematuria.   Skin: Negative.    Neurological: Positive for weakness. Negative for dizziness.        Some expressive aphasia   Psychiatric/Behavioral: Positive for agitation and dysphoric mood. The patient is nervous/anxious.        Objective   /82   Pulse 97   Temp 98.6 °F (37 °C)   Resp 16   Ht 172.7 cm (68\")   Wt 85.7 kg (189 lb)   SpO2 97%   BMI 28.74 kg/m²   Physical Exam   Constitutional: He appears well-developed and well-nourished.   HENT:   Head: Normocephalic and atraumatic.   Eyes: Pupils are equal, round, and reactive to light. Conjunctivae are normal.   Neck: Neck supple.   Cardiovascular: Normal rate and regular rhythm.    Pulmonary/Chest: Effort normal and breath sounds normal.   Musculoskeletal: Normal range of motion. He exhibits no edema.   Neurological: He is alert.   Mild-to-moderate " speech impediment   Skin: Skin is warm and dry.   Psychiatric:   Mood is essentially normal he feels like things are better and he is on good terms again with his wife, his daughter Pramod taking care of his medicines and taking him to appointments.   Nursing note and vitals reviewed.      Assessment/Plan   Problem List Items Addressed This Visit     Generalized anxiety disorder - Primary    Hx of aortic valve replacement, mechanical [Z95.2]    Sequelae, post-stroke          No orders of the defined types were placed in this encounter.    Ill need follow-up with mental health services's daughter will administer Xanax as directed for panic or agitation until he can be treated by his psychiatrist.

## 2018-10-12 NOTE — PROGRESS NOTES
Anticoagulation Clinic - Remote Progress Note  REMOTE LAB    Indication: On-X Mechanical valve #25 and a 30mm hemashield  Referring Provider: Alvaro  Initial Warfarin Start Date:  ~9/2018  Goal INR: 2-3 per referral  Current Drug Interactions: desvenlafaxine, levothyroxine  Bleed Risk: ascending aortic aneurysm   Other: Hodgkins Lymphoma (hx of chemo and radiation); h/o CVA + hemorrhagic pancreatitis (during hospitalization for mAVR)    Diet: avoids  Alcohol: socially  Tobacco: None  OTC Pain Medication: APAP PRN pain (voiced understanding to     INR History:  Date 9/27 9/28 10/1 10/4 10/8 10/11      Total Weekly Dose 8mg 12mg 19mg 29mg 31mg 34mg 40mg     INR 1.3 1.22 1.3 1.8 1.85 1.7      Notes clinic enox enox enox; clinic enox enox        Dosing from [facility]:  Date INR Dose   9/01 1.3 5 mg   9/02 1.3 5 mg   9/03 1.3 7.5 mg   9/04 1.3 7.5 mg   9/05 1.4 Coumadin held from 9/5 to   9/06 2.5 9/10 due to acute brain   9/07 2.5 infarct   9/08 2.2 HELD   9/09 2.5 HELD   9/10 2.3 HELD   9/11 2.2 5 mg   9/12 2.8 2.5 mg   9/13 5.6 HELD   9/14 6.6 HELD   9/15 6.0 HELD   9/16 4.7 HELD   9/17 3.1 1mg   9/18 Have your INR checked on this day     Dosing since outpatient at Dr. Faust's office:  9/18: received 0.5mg of warfarin  9/19: INR was 3.3; warfarin dose 0.5mg  9/20: 1mg of warfarin    Phone Interview:  Verbal Release Authorization: Spoke with patient 10/12, Mr. Hall reports that Mrs. Hall (his wife) is  him and we are no longer to provide her his health information. Mr. Hall's daughter, Faina (goes by [place]) Rafael will be helping with his care and he has authorized us to share all healthcare related information with her.  Tablet Strength: pt has 1mg, 2mg, and 5mg tablets  Current Maintenance Dose: dose finding was taking 1mg   Patient Contact Info:  Lab Contact Info: Megan Vu     Patient Findings:  Positives:   Other complaints   Negatives:   Signs/symptoms of thrombosis,  Signs/symptoms of bleeding, Laboratory test error suspected, Change in health, Change in alcohol use, Change in activity, Upcoming invasive procedure, Emergency department visit, Upcoming dental procedure, Missed doses, Extra doses, Change in medications, Change in diet/appetite, Hospital admission, Bruising   Comments:   Wife will no longer be a health care provider or authorized to receive health care information. Provide all information to Mr. Hall or his daughter Salima     *Spoke with Salima and Mr. Hall via phone today 10/12, Mr. Hall's wife is  him and his daughter will be taking over managing his care. Please cease providing health information to Mrs. Hall. Mr. Hall gave a verbal release for health information to be communicated to Salima. Re-communicated the plan for Mr. Lemuss warfarin to daughter as she reports the wife has stopped passing on health information to the patient. Provided Salima with warfarin education including bleeding risk, what to do in case of a bleed, fall risk and response, DDIs, vitamin K intake, medication refills, and lovenox bridging information. Salima confirms that pt received correct 7mg dose last night and continues to use lovenox 80mg sq bid, declines refills for either.    Plan:  1. INR has decreased slightly to 1.7 today despite boosting the last 2 days doses to 7mg. Mrs Hall denies any changes and confirmed doses/compliance. Instructed Mr. Hall and Salima to have Mr. Hall take warfarin 7mg tonight and tomorrow then 5mg on SatSun (17.6% increase) + continue Lovenox 80mg SubQ q12h.   2. Repeat INR on Monday 10/15.   3. Verbal information provided over the phone. Mr. Hall's daughter Salima who expresses understanding with teach back and has no further questions at this time.  4. Mr. Hall would like to look into home monitoring once has been on warfarin for 90 days.    Nik Burgos, PharmD Candidate 2019  10/12/2018  1:35 PM        Recommended dose increase is to 38mg/week- patient will receive 42mg by Monday. Agree with plan given patient has been SUBtherapeutic.  I, Sondra Brooks, PharmD, have reviewed the note in full and agree with the assessment and plan.  10/12/18  5:26 PM

## 2018-10-16 ENCOUNTER — LAB (OUTPATIENT)
Dept: LAB | Facility: HOSPITAL | Age: 43
End: 2018-10-16

## 2018-10-16 DIAGNOSIS — Z95.2 HX OF AORTIC VALVE REPLACEMENT, MECHANICAL: Primary | ICD-10-CM

## 2018-10-16 DIAGNOSIS — Z95.2 HX OF AORTIC VALVE REPLACEMENT, MECHANICAL: ICD-10-CM

## 2018-10-16 LAB
INR PPP: 2.91 (ref 0.91–1.09)
PROTHROMBIN TIME: 30.6 SECONDS (ref 9.6–11.5)

## 2018-10-16 PROCEDURE — 85610 PROTHROMBIN TIME: CPT

## 2018-10-16 PROCEDURE — 36415 COLL VENOUS BLD VENIPUNCTURE: CPT

## 2018-10-17 ENCOUNTER — ANTICOAGULATION VISIT (OUTPATIENT)
Dept: PHARMACY | Facility: HOSPITAL | Age: 43
End: 2018-10-17

## 2018-10-17 DIAGNOSIS — Z95.2 HX OF AORTIC VALVE REPLACEMENT, MECHANICAL: ICD-10-CM

## 2018-10-17 NOTE — PROGRESS NOTES
Anticoagulation Clinic - Remote Progress Note  REMOTE LAB    Indication: On-X Mechanical valve #25 and a 30mm hemashield  Referring Provider: Alvaro  Initial Warfarin Start Date:  ~9/2018  Goal INR: 2-3 per referral  Current Drug Interactions: desvenlafaxine, levothyroxine  Bleed Risk: ascending aortic aneurysm   Other: Hodgkins Lymphoma (hx of chemo and radiation); h/o CVA + hemorrhagic pancreatitis (during hospitalization for mAVR)    Diet: avoids  Alcohol: socially  Tobacco: None  OTC Pain Medication: APAP PRN pain (voiced understanding to     INR History:  Date 9/27 9/28 10/1 10/4 10/8 10/11 10/17     Total Weekly Dose 8mg 12mg 19mg 29mg 31mg 34mg 43mg     INR 1.3 1.22 1.3 1.8 1.85 1.7 2.91     Notes clinic enox enox enox; clinic enox enox        Phone Interview:  Verbal Release Authorization: Spoke with patient 10/12, Mr. Hall reports that Mrs. Hall (his wife) is  him and we are no longer to provide her his health information. Mr. Hall's daughter, Faina (goes by Salima) Rafael will be helping with his care and he has authorized us to share all healthcare related information with her.  Tablet Strength: pt has 1mg, 2mg, and 5mg tablets  Current Maintenance Dose: dose finding was taking 1mg   Patient Contact Info: brother- (armida: [phone])  Lab Contact Info: Megan Vu     Patient Findings   Negatives:   Signs/symptoms of thrombosis, Signs/symptoms of bleeding, Laboratory test error suspected, Change in health, Change in alcohol use, Change in activity, Upcoming invasive procedure, Emergency department visit, Upcoming dental procedure, Missed doses, Extra doses, Change in medications, Change in diet/appetite, Hospital admission, Bruising, Other complaints     *Spoke with Salima and Mr. Hall via phone today 10/12, Mr. Hall's wife is  him and his daughter will be taking over managing his care. Please cease providing health information to Mrs. Hall. Mr. Hall gave a  verbal release for health information to be communicated to Salima. Re-communicated the plan for Mr. Hall's warfarin to daughter as she reports the wife has stopped passing on health information to the patient. Provided Salima with warfarin education including bleeding risk, what to do in case of a bleed, fall risk and response, DDIs, vitamin K intake, medication refills, and lovenox bridging information. Salima confirms that pt received correct 7mg dose last night and continues to use lovenox 80mg sq bid, declines refills for either.    Plan:  1. INR is WNL at 2.91. Mrs. Hall answered the phone today she shared Marko Mas phone number and reported he will be involved with Mr. Hall's care (Mr. Mas brothers number: [phone]). I spoke with Marko who will be involved with Mr. Hall's anticoagulation care. Given increase over 1.0 point, decrease slightly to 5mg daily except 7mg TueThurSat tentatively.   2. Repeat INR Friday 10/19.  3. Verbal information provided over the phone. Mr. Hall who expresses understanding with teach back and has no further questions at this time.  4. Mr. Hall would like to look into home monitoring once has been on warfarin for 90 days.  5. Of note: need to get Salima's phone number to add to profile as well.     Sondra Brooks, PharmD  10/17/2018  8:53 AM

## 2018-10-19 ENCOUNTER — LAB (OUTPATIENT)
Dept: LAB | Facility: HOSPITAL | Age: 43
End: 2018-10-19

## 2018-10-19 DIAGNOSIS — Z95.2 HX OF AORTIC VALVE REPLACEMENT, MECHANICAL: ICD-10-CM

## 2018-10-19 LAB
INR PPP: 2.82
INR PPP: 2.82 (ref 0.91–1.09)
PROTHROMBIN TIME: 29.6 SECONDS (ref 9.6–11.5)

## 2018-10-19 PROCEDURE — 85610 PROTHROMBIN TIME: CPT

## 2018-10-19 PROCEDURE — 36415 COLL VENOUS BLD VENIPUNCTURE: CPT

## 2018-10-22 ENCOUNTER — ANTICOAGULATION VISIT (OUTPATIENT)
Dept: PHARMACY | Facility: HOSPITAL | Age: 43
End: 2018-10-22

## 2018-10-22 ENCOUNTER — HOSPITAL ENCOUNTER (OUTPATIENT)
Dept: CARDIOLOGY | Facility: HOSPITAL | Age: 43
Discharge: HOME | End: 2018-10-22
Attending: PSYCHIATRY & NEUROLOGY | Admitting: PSYCHIATRY & NEUROLOGY

## 2018-10-22 VITALS — WEIGHT: 189 LBS | BODY MASS INDEX: 28.64 KG/M2 | HEIGHT: 68 IN

## 2018-10-22 DIAGNOSIS — Z95.2 HX OF AORTIC VALVE REPLACEMENT, MECHANICAL: ICD-10-CM

## 2018-10-22 DIAGNOSIS — I69.30 SEQUELAE, POST-STROKE: ICD-10-CM

## 2018-10-22 LAB
BH CV ECHO MEAS - BSA(HAYCOCK): 2 M^2
BH CV ECHO MEAS - BSA: 2 M^2
BH CV ECHO MEAS - BZI_BMI: 28.7 KILOGRAMS/M^2
BH CV ECHO MEAS - BZI_METRIC_HEIGHT: 172.7 CM
BH CV ECHO MEAS - BZI_METRIC_WEIGHT: 85.7 KG
BH CV XLRA MEAS LEFT CCA RATIO VEL: 93.3 CM/SEC
BH CV XLRA MEAS LEFT DIST CCA EDV: 23.6 CM/SEC
BH CV XLRA MEAS LEFT DIST CCA PSV: 93.3 CM/SEC
BH CV XLRA MEAS LEFT DIST ICA EDV: 18.3 CM/SEC
BH CV XLRA MEAS LEFT DIST ICA PSV: 55.2 CM/SEC
BH CV XLRA MEAS LEFT ICA RATIO VEL: 77.2 CM/SEC
BH CV XLRA MEAS LEFT ICA/CCA RATIO: 0.83
BH CV XLRA MEAS LEFT MID CCA EDV: 29.5 CM/SEC
BH CV XLRA MEAS LEFT MID CCA PSV: 124 CM/SEC
BH CV XLRA MEAS LEFT MID ICA EDV: 19.9 CM/SEC
BH CV XLRA MEAS LEFT MID ICA PSV: 56.2 CM/SEC
BH CV XLRA MEAS LEFT PROX CCA EDV: 23.6 CM/SEC
BH CV XLRA MEAS LEFT PROX CCA PSV: 107 CM/SEC
BH CV XLRA MEAS LEFT PROX ECA PSV: 116 CM/SEC
BH CV XLRA MEAS LEFT PROX ICA EDV: 23.7 CM/SEC
BH CV XLRA MEAS LEFT PROX ICA PSV: 77.2 CM/SEC
BH CV XLRA MEAS LEFT PROX SCLA PSV: 61.4 CM/SEC
BH CV XLRA MEAS LEFT VERTEBRAL A EDV: 27 CM/SEC
BH CV XLRA MEAS LEFT VERTEBRAL A PSV: 81.1 CM/SEC
BH CV XLRA MEAS RIGHT CCA RATIO VEL: 72.3 CM/SEC
BH CV XLRA MEAS RIGHT DIST CCA EDV: 22 CM/SEC
BH CV XLRA MEAS RIGHT DIST CCA PSV: 72.3 CM/SEC
BH CV XLRA MEAS RIGHT DIST ICA EDV: 20.2 CM/SEC
BH CV XLRA MEAS RIGHT DIST ICA PSV: 56.2 CM/SEC
BH CV XLRA MEAS RIGHT ICA RATIO VEL: 66.8 CM/SEC
BH CV XLRA MEAS RIGHT ICA/CCA RATIO: 0.92
BH CV XLRA MEAS RIGHT MID CCA EDV: 22.8 CM/SEC
BH CV XLRA MEAS RIGHT MID CCA PSV: 97.4 CM/SEC
BH CV XLRA MEAS RIGHT MID ICA EDV: 21 CM/SEC
BH CV XLRA MEAS RIGHT MID ICA PSV: 59 CM/SEC
BH CV XLRA MEAS RIGHT PROX CCA EDV: 21.2 CM/SEC
BH CV XLRA MEAS RIGHT PROX CCA PSV: 82.5 CM/SEC
BH CV XLRA MEAS RIGHT PROX ECA PSV: 94.3 CM/SEC
BH CV XLRA MEAS RIGHT PROX ICA EDV: 23.6 CM/SEC
BH CV XLRA MEAS RIGHT PROX ICA PSV: 66.8 CM/SEC
BH CV XLRA MEAS RIGHT PROX SCLA PSV: 117 CM/SEC
BH CV XLRA MEAS RIGHT VERTEBRAL A EDV: 14.9 CM/SEC
BH CV XLRA MEAS RIGHT VERTEBRAL A PSV: 50.2 CM/SEC

## 2018-10-22 PROCEDURE — 93880 EXTRACRANIAL BILAT STUDY: CPT | Performed by: INTERNAL MEDICINE

## 2018-10-22 PROCEDURE — 93880 EXTRACRANIAL BILAT STUDY: CPT

## 2018-10-22 NOTE — PROGRESS NOTES
Anticoagulation Clinic - Remote Progress Note  REMOTE LAB    Indication: On-X Mechanical valve #25 and a 30mm hemashield  Referring Provider: Alvaro  Initial Warfarin Start Date:  ~9/2018  Goal INR: 2-3 per referral  Current Drug Interactions: desvenlafaxine, levothyroxine  Bleed Risk: ascending aortic aneurysm   Other: Hodgkins Lymphoma (hx of chemo and radiation); h/o CVA + hemorrhagic pancreatitis (during hospitalization for mAVR)    Diet: avoids  Alcohol: socially  Tobacco: None  OTC Pain Medication: APAP PRN pain (voiced understanding to     INR History:  Date 9/27 9/28 10/1 10/4 10/8 10/11 10/17 10/19    Total Weekly Dose 8mg 12mg 19mg 29mg 31mg 34mg 43mg 41 mg    INR 1.3 1.22 1.3 1.8 1.85 1.7 2.91 2.82    Notes clinic enox enox enox; clinic enox enox        Phone Interview:  Verbal Release Authorization: Spoke with patient 10/12, Mr. Hall reports that Mrs. Hall (his wife) is  him and we are no longer to provide her his health information. Mr. Hall's daughter, Faina (goes by Salima) Rafael will be helping with his care and he has authorized us to share all healthcare related information with her.  Tablet Strength: pt has 1mg, 2mg, and 5mg tablets  Current Maintenance Dose: dose finding was taking 1mg   Patient Contact Info: brother- (Marko: [phone])  Lab Contact Info: Megan Vu     Patient Findings   Positives:   Change in medications   Negatives:   Signs/symptoms of thrombosis, Signs/symptoms of bleeding, Laboratory test error suspected, Change in health, Change in alcohol use, Change in activity, Upcoming invasive procedure, Emergency department visit, Upcoming dental procedure, Missed doses, Extra doses, Change in diet/appetite, Hospital admission, Bruising, Other complaints   Comments:   Spoke to Marko, patient's brother today.  He stated that Manjeet had only taken one dose of 5 mg last week, that he thought he had taken 7 mg doses all other days.   We had a different  dosing regimen prescribed.  Unsure what doses he actually took Sunday and Monday of last week.     To verify within therapeutic range, he will have his INR checked tomorrow.       Plan:  1. INR was WNL 10/19 at 2.82.  I spoke with Marko who will be managing Mr. Hall's anticoagulation care. See patient findings above.  As unsure exactly what doses Mr. Hall has been receiving, recommend warfarin 5 mg oral tonight and then recheck INR in am tomorrow.   note:  planned maintenance dose was 7mg TueThurSat tentatively.   2. Repeat INR Tuesday, 10/23.  3. Verbal information provided over the phone. Mr. Hall who expresses understanding with teach back and has no further questions at this time.  4. Mr. Hall would like to look into home monitoring once has been on warfarin for 90 days.  5. Of note: need to get Salima's phone number to add to profile as well.     Adeline Bearden, PharmD  10/22/2018  9:03 AM

## 2018-10-23 ENCOUNTER — LAB (OUTPATIENT)
Dept: LAB | Facility: HOSPITAL | Age: 43
End: 2018-10-23

## 2018-10-23 DIAGNOSIS — Z95.2 HX OF AORTIC VALVE REPLACEMENT, MECHANICAL: ICD-10-CM

## 2018-10-23 LAB
INR PPP: 3.79 (ref 0.91–1.09)
PROTHROMBIN TIME: 39.8 SECONDS (ref 9.6–11.5)

## 2018-10-23 PROCEDURE — 36415 COLL VENOUS BLD VENIPUNCTURE: CPT

## 2018-10-23 PROCEDURE — 85610 PROTHROMBIN TIME: CPT

## 2018-10-24 ENCOUNTER — ANTICOAGULATION VISIT (OUTPATIENT)
Dept: PHARMACY | Facility: HOSPITAL | Age: 43
End: 2018-10-24

## 2018-10-24 ENCOUNTER — LAB (OUTPATIENT)
Dept: LAB | Facility: HOSPITAL | Age: 43
End: 2018-10-24

## 2018-10-24 DIAGNOSIS — Z95.2 HX OF AORTIC VALVE REPLACEMENT, MECHANICAL: ICD-10-CM

## 2018-10-24 LAB
INR PPP: 3.3 (ref 0.91–1.09)
PROTHROMBIN TIME: 34.7 SECONDS (ref 9.6–11.5)

## 2018-10-24 PROCEDURE — 85610 PROTHROMBIN TIME: CPT

## 2018-10-24 RX ORDER — LANSOPRAZOLE 30 MG/1
30 CAPSULE, DELAYED RELEASE ORAL DAILY
Qty: 30 CAPSULE | Refills: 2 | Status: SHIPPED | OUTPATIENT
Start: 2018-10-24 | End: 2019-01-23 | Stop reason: SDUPTHER

## 2018-10-24 NOTE — PROGRESS NOTES
Anticoagulation Clinic - Remote Progress Note  REMOTE LAB    Indication: On-X Mechanical valve #25 and a 30mm hemashield  Referring Provider: Alvaro  Initial Warfarin Start Date:  ~9/2018  Goal INR: 2-3 per referral  Current Drug Interactions: desvenlafaxine, levothyroxine  Bleed Risk: ascending aortic aneurysm   Other: Hodgkins Lymphoma (hx of chemo and radiation); h/o CVA + hemorrhagic pancreatitis (during hospitalization for mAVR)    Diet: avoids  Alcohol: socially  Tobacco: None  OTC Pain Medication: APAP PRN pain    INR History:  Date 9/27 9/28 10/1 10/4 10/8 10/11 10/17 10/19 10/23   Total Weekly Dose 8mg 12mg 19mg 29mg 31mg 34mg 43mg 41 mg 45mg   INR 1.3 1.22 1.3 1.8 1.85 1.7 2.91 2.82 3.79   Notes clinic enox enox enox; clinic enox enox        Date            Total Weekly Dose 40mg           INR            Notes              Phone Interview:  Verbal Release Authorization: Spoke with patient 10/12, Mr. Hall reports that Mrs. Hall (his wife) is  him and we are no longer to provide her his health information. Mr. Hall's daughter, Faina (goes by [place]) Rafael will be helping with his care and he has authorized us to share all healthcare related information with her.  Tablet Strength: pt has 1mg, 2mg, and 5mg tablets  Current Maintenance Dose: dose finding was taking 1mg   Patient Contact Info: Marko (brother): [phone]  Lab Contact Info: Megan Vu     Patient Findings:  Negatives:   Signs/symptoms of thrombosis, Signs/symptoms of bleeding, Laboratory test error suspected, Change in health, Change in alcohol use, Change in activity, Upcoming invasive procedure, Emergency department visit, Upcoming dental procedure, Missed doses, Extra doses, Change in medications, Change in diet/appetite, Hospital admission, Bruising, Other complaints     Plan:  1. INR was supratherapeutic yesterday, uncertain the cause (although there has been some recent uncertainty re: dosing). Instructed  Marko to have Mr. Hall take warfarin 2mg tonight, 5mg tomorrow.  2. Repeat INR on Friday.   3. Verbal information provided over the phone. Mr. Hall's brother, Marko, expresses understanding with teach back and has no further questions at this time.  4. Mr. Hall would like to look into home monitoring once has been on warfarin for 90 days.  5. Of note: need to get Salima's phone number to add to profile as well.     Ann Cowden Mayer, PharmD  10/24/2018  8:50 AM

## 2018-10-25 ENCOUNTER — TELEPHONE (OUTPATIENT)
Dept: FAMILY MEDICINE CLINIC | Facility: CLINIC | Age: 43
End: 2018-10-25

## 2018-10-25 NOTE — TELEPHONE ENCOUNTER
Called informed pt brotheryoli ASHLEY    ----- Message from Nile Fauts MD sent at 10/24/2018  4:59 PM EDT -----  Regarding: RE: MED REFILL   I do not prescribe lamictal and pristiq has a psych for that can ok prevacid  ----- Message -----  From: Royal Ferreira MA  Sent: 10/23/2018   4:55 PM  To: Nile Faust MD  Subject: FW: MED REFILL                                    The Synthroid is to early to refill, but the others don't look like you prescribed them to him.    ----- Message -----  From: Chapo Pleitez  Sent: 10/23/2018   3:49 PM  To: Royal Ferreira MA  Subject: MED REFILL                                       Pt called in to refill rx     PRISTIQ 100 MG  GABAPENTIN 300 MG  LAMICTAL 200 MG  PREVACID 20 MG  SYNTHROID 100 MG    [facility]

## 2018-10-26 ENCOUNTER — TELEPHONE (OUTPATIENT)
Dept: NEUROLOGY | Facility: CLINIC | Age: 43
End: 2018-10-26

## 2018-10-26 NOTE — TELEPHONE ENCOUNTER
----- Message from Lindsay Summers MD sent at 10/25/2018  4:48 PM EDT -----  His carotid Doppler shows only mild plaques bilaterally without any significant carotid stenosis.

## 2018-11-01 ENCOUNTER — TRANSCRIBE ORDERS (OUTPATIENT)
Dept: ADMINISTRATIVE | Facility: HOSPITAL | Age: 43
End: 2018-11-01

## 2018-11-01 DIAGNOSIS — K85.82 OTHER ACUTE PANCREATITIS WITH INFECTED NECROSIS: Primary | ICD-10-CM

## 2018-11-06 ENCOUNTER — OFFICE VISIT (OUTPATIENT)
Dept: FAMILY MEDICINE CLINIC | Facility: CLINIC | Age: 43
End: 2018-11-06

## 2018-11-06 ENCOUNTER — TELEPHONE (OUTPATIENT)
Dept: PHARMACY | Facility: HOSPITAL | Age: 43
End: 2018-11-06

## 2018-11-06 VITALS
RESPIRATION RATE: 16 BRPM | BODY MASS INDEX: 29.61 KG/M2 | WEIGHT: 195.4 LBS | HEART RATE: 77 BPM | OXYGEN SATURATION: 96 % | SYSTOLIC BLOOD PRESSURE: 110 MMHG | HEIGHT: 68 IN | DIASTOLIC BLOOD PRESSURE: 70 MMHG

## 2018-11-06 DIAGNOSIS — I69.30 SEQUELAE, POST-STROKE: ICD-10-CM

## 2018-11-06 DIAGNOSIS — F32.89 OTHER DEPRESSION: Primary | ICD-10-CM

## 2018-11-06 DIAGNOSIS — F41.1 GENERALIZED ANXIETY DISORDER: ICD-10-CM

## 2018-11-06 PROCEDURE — 99213 OFFICE O/P EST LOW 20 MIN: CPT | Performed by: FAMILY MEDICINE

## 2018-11-06 RX ORDER — LAMOTRIGINE 200 MG/1
300 TABLET ORAL DAILY
Qty: 45 TABLET | Refills: 0 | Status: SHIPPED | OUTPATIENT
Start: 2018-11-06 | End: 2018-12-01 | Stop reason: SDUPTHER

## 2018-11-06 RX ORDER — DESVENLAFAXINE 100 MG/1
100 TABLET, EXTENDED RELEASE ORAL DAILY
Qty: 30 TABLET | Refills: 0 | Status: SHIPPED | OUTPATIENT
Start: 2018-11-06 | End: 2019-01-07

## 2018-11-06 RX ORDER — ALPRAZOLAM 0.25 MG/1
0.25 TABLET ORAL 2 TIMES DAILY PRN
Qty: 30 TABLET | Refills: 0
Start: 2018-11-06 | End: 2019-05-17

## 2018-11-07 ENCOUNTER — TELEPHONE (OUTPATIENT)
Dept: FAMILY MEDICINE CLINIC | Facility: CLINIC | Age: 43
End: 2018-11-07

## 2018-11-07 RX ORDER — WARFARIN SODIUM 10 MG/1
TABLET ORAL
Refills: 0 | COMMUNITY
Start: 2018-10-11 | End: 2019-05-17 | Stop reason: DRUGHIGH

## 2018-11-07 NOTE — TELEPHONE ENCOUNTER
----- Message from Lina Nunez sent at 11/7/2018  1:59 PM EST -----  Regarding: PLEASE CALL   Contact: [phone]  PLEASE CALL REGARDING LETTER FOR DISABILITY

## 2018-11-08 ENCOUNTER — ANTICOAGULATION VISIT (OUTPATIENT)
Dept: PHARMACY | Facility: HOSPITAL | Age: 43
End: 2018-11-08

## 2018-11-08 ENCOUNTER — LAB (OUTPATIENT)
Dept: LAB | Facility: HOSPITAL | Age: 43
End: 2018-11-08

## 2018-11-08 DIAGNOSIS — Z95.2 HX OF AORTIC VALVE REPLACEMENT, MECHANICAL: ICD-10-CM

## 2018-11-08 LAB
INR PPP: 3 (ref 0.91–1.09)
PROTHROMBIN TIME: 31.5 SECONDS (ref 9.6–11.5)

## 2018-11-08 PROCEDURE — 36415 COLL VENOUS BLD VENIPUNCTURE: CPT

## 2018-11-08 PROCEDURE — 85610 PROTHROMBIN TIME: CPT

## 2018-11-08 NOTE — PROGRESS NOTES
Anticoagulation Clinic - Remote Progress Note  REMOTE LAB    Indication: On-X Mechanical valve #25 and a 30mm hemashield  Referring Provider: Alvaro  Initial Warfarin Start Date:  ~9/2018  Goal INR: 2-3 per referral  Current Drug Interactions: desvenlafaxine, levothyroxine  Bleed Risk: ascending aortic aneurysm   Other: Hodgkins Lymphoma (hx of chemo and radiation); h/o CVA + hemorrhagic pancreatitis (during hospitalization for mAVR)    Diet: avoids  Alcohol: socially  Tobacco: None  OTC Pain Medication: APAP PRN pain    INR History:  Date 9/27 9/28 10/1 10/4 10/8 10/11 10/17 10/19 10/23   Total Weekly Dose 8mg 12mg 19mg 29mg 31mg 34mg 43mg 41 mg 45mg   INR 1.3 1.22 1.3 1.8 1.85 1.7 2.91 2.82 3.79   Notes clinic enox enox enox; clinic enox enox        Date 11/8           Total Weekly Dose 49mg           INR 3.0           Notes              Phone Interview:  Verbal Release Authorization: Spoke with patient 10/12, Mr. Hall reports that Mrs. Hall (his wife) is  him and we are no longer to provide her his health information. Mr. Hall's daughter, Faina (goes by [place]) Rafael will be helping with his care and he has authorized us to share all healthcare related information with her.  Tablet Strength: pt has 1mg, 2mg, and 5mg tablets  Patient Contact Info: Marko (brother): [phone]  Lab Contact Info: Megan Vu     Patient Findings:  Positives:   Other complaints   Negatives:   Signs/symptoms of thrombosis, Signs/symptoms of bleeding, Laboratory test error suspected, Change in health, Change in alcohol use, Change in activity, Upcoming invasive procedure, Emergency department visit, Upcoming dental procedure, Missed doses, Extra doses, Change in medications, Change in diet/appetite, Hospital admission, Bruising   Comments:   Patient has been taking 7mg daily which was different than the scheduled maintenance plan. Patient is therapeutic so per Sondra Brooks, PharmD, we will continue this  dose and have patient recheck INR in one week.     Plan:  1. INR is therapeutic today at 3.0. After consulting with Sondra Brooks, PharmD, instructed Marko to have Mr. Hall continue warfarin 7mg daily.  2. Repeat INR in one week, 11/15.   3. Verbal information provided over the phone. Mr. Hall's brother, Marko, expresses understanding with teach back and has no further questions at this time.  4. Mr. Hall would like to look into home monitoring once has been on warfarin for 90 days.  5. Of note: need to get [place]'s phone number to add to profile as well.     Aileen Grullon, Laine  11/8/2018  4:08 PM     I, Abdi Vides, [facility], have reviewed the note in full and agree with the assessment and plan.  11/09/18  1:04 PM

## 2018-11-08 NOTE — PROGRESS NOTES
"Subjective   Manjeet Hall is a 42 y.o. male.     Anxiety   Presents for follow-up visit. Symptoms include nervous/anxious behavior and restlessness. Patient reports no chest pain, confusion, decreased concentration, depressed mood, dizziness, nausea, shortness of breath or suicidal ideas. Symptoms occur constantly. The severity of symptoms is causing significant distress. The quality of sleep is fair. Nighttime awakenings: occasional.     His past medical history is significant for depression.   Depression   Visit Type: follow-up  Patient presents with the following symptoms: nervousness/anxiety and restlessness.  Patient is not experiencing: confusion, decreased concentration, depressed mood, shortness of breath and suicidal ideas.  Severity: moderate   Sleep quality: fair  Nighttime awakenings: occasional         The following portions of the patient's history were reviewed and updated as appropriate: allergies, current medications, past social history and problem list.    Review of Systems   Constitutional: Negative for appetite change and fatigue.   HENT: Negative for congestion and sore throat.    Respiratory: Negative for chest tightness and shortness of breath.    Cardiovascular: Negative for chest pain and leg swelling.   Gastrointestinal: Negative for abdominal pain, diarrhea and nausea.   Neurological: Positive for speech difficulty. Negative for dizziness, tremors, weakness, light-headedness and headaches.        Is now in speech therapy   Psychiatric/Behavioral: Positive for dysphoric mood and sleep disturbance. Negative for agitation, behavioral problems, confusion, decreased concentration and suicidal ideas. The patient is nervous/anxious.        Objective   /70   Pulse 77   Resp 16   Ht 172.7 cm (68\")   Wt 88.6 kg (195 lb 6.4 oz)   SpO2 96%   BMI 29.71 kg/m²   Physical Exam   Constitutional: He is oriented to person, place, and time. He appears well-developed and well-nourished. He is " cooperative.   HENT:   Head: Normocephalic.   Right Ear: External ear normal.   Left Ear: External ear normal.   Nose: Nose normal.   Mouth/Throat: Oropharynx is clear and moist.   Eyes: Pupils are equal, round, and reactive to light. Conjunctivae are normal. No scleral icterus.   Neck: Neck supple. Carotid bruit is not present. No thyromegaly present.   Cardiovascular: Normal rate, regular rhythm and intact distal pulses.    Pulmonary/Chest: Effort normal and breath sounds normal.   Abdominal: There is no hepatosplenomegaly.   Musculoskeletal: Normal range of motion.   Neurological: He is alert and oriented to person, place, and time.   No focal deficits no lateralizing signs   Skin: Skin is warm and dry. No rash noted.   Psychiatric: Cognition and memory are normal.   Anxious mood   Nursing note and vitals reviewed.      Assessment/Plan   Problem List Items Addressed This Visit     Depression - Primary    Relevant Medications    ALPRAZolam (XANAX) 0.25 MG tablet    desvenlafaxine (PRISTIQ) 100 MG 24 hr tablet    Generalized anxiety disorder    Relevant Medications    ALPRAZolam (XANAX) 0.25 MG tablet    desvenlafaxine (PRISTIQ) 100 MG 24 hr tablet    Sequelae, post-stroke          New Medications Ordered This Visit   Medications   • ALPRAZolam (XANAX) 0.25 MG tablet     Sig: Take 1 tablet by mouth 2 (Two) Times a Day As Needed for Anxiety.     Dispense:  30 tablet     Refill:  0   • desvenlafaxine (PRISTIQ) 100 MG 24 hr tablet     Sig: Take 1 tablet by mouth Daily.     Dispense:  30 tablet     Refill:  0   • lamoTRIgine (LaMICtal) 200 MG tablet     Sig: Take 1.5 tablets by mouth Daily.     Dispense:  45 tablet     Refill:  0       Follow up with mental health for future refills on meds. Family members trying to help, but is difficult ex wife with him today.

## 2018-11-12 ENCOUNTER — TELEPHONE (OUTPATIENT)
Dept: FAMILY MEDICINE CLINIC | Facility: CLINIC | Age: 43
End: 2018-11-12

## 2018-11-12 NOTE — TELEPHONE ENCOUNTER
Family notified  ----- Message from Nile Faust MD sent at 11/9/2018  1:13 PM EST -----  Regarding: RE: PLEASE CALL   Contact: [phone]  Had a stroke after open heart surgery and has significant impairment.  ----- Message -----  From: Ijeoma Fabian MA  Sent: 11/9/2018   1:05 PM  To: Nile Faust MD  Subject: FW: PLEASE CALL                                  I can write this letter. What exactly is the reason that he cannot return to work?    ----- Message -----  From: Royal Ferreira MA  Sent: 11/9/2018   8:54 AM  To: Nile Faust MD, Ijeoma Fabian MA  Subject: FW: PLEASE CALL                                  Spoke with Chacha Delgadillo, she said that the disability  needs a letter stating why pt can not return back to work.    ----- Message -----  From: Lina Nunez  Sent: 11/7/2018   1:59 PM  To: Royal Ferreira MA  Subject: PLEASE CALL                                      PLEASE CALL REGARDING LETTER FOR DISABILITY

## 2018-11-16 ENCOUNTER — LAB (OUTPATIENT)
Dept: LAB | Facility: HOSPITAL | Age: 43
End: 2018-11-16

## 2018-11-16 ENCOUNTER — ANTICOAGULATION VISIT (OUTPATIENT)
Dept: PHARMACY | Facility: HOSPITAL | Age: 43
End: 2018-11-16

## 2018-11-16 DIAGNOSIS — Z95.2 HX OF AORTIC VALVE REPLACEMENT, MECHANICAL: ICD-10-CM

## 2018-11-16 LAB
INR PPP: 2.96 (ref 0.91–1.09)
PROTHROMBIN TIME: 31.1 SECONDS (ref 9.6–11.5)

## 2018-11-16 PROCEDURE — 85610 PROTHROMBIN TIME: CPT

## 2018-11-16 PROCEDURE — 36415 COLL VENOUS BLD VENIPUNCTURE: CPT

## 2018-11-16 NOTE — PROGRESS NOTES
Anticoagulation Clinic - Remote Progress Note  REMOTE LAB    Indication: On-X Mechanical valve #25 and a 30mm hemashield  Referring Provider: Alvaro  Initial Warfarin Start Date:  ~9/2018  Goal INR: 2-3 per referral  Current Drug Interactions: desvenlafaxine, levothyroxine  Bleed Risk: ascending aortic aneurysm   Other: Hodgkins Lymphoma (hx of chemo and radiation); h/o CVA + hemorrhagic pancreatitis (during hospitalization for mAVR)    Diet: avoids  Alcohol: socially  Tobacco: None  OTC Pain Medication: APAP PRN pain    INR History:  Date 9/27 9/28 10/1 10/4 10/8 10/11 10/17 10/19 10/23   Total Weekly Dose 8mg 12mg 19mg 29mg 31mg 34mg 43mg 41 mg 45mg   INR 1.3 1.22 1.3 1.8 1.85 1.7 2.91 2.82 3.79   Notes clinic enox enox enox; clinic enox enox        Date 11/8 11/16          Total Weekly Dose 49mg 49mg          INR 3.0 2.96          Notes              Phone Interview:  Verbal Release Authorization: Spoke with patient 10/12, Mr. Hall reports that Mrs. Hall (his wife) is  him and we are no longer to provide her his health information. Mr. Hall's daughter, Faina (goes by [place]) Rafael will be helping with his care and he has authorized us to share all healthcare related information with her.  Tablet Strength: pt has 1mg, 2mg, and 5mg tablets  Patient Contact Info: Marko (brother): [phone]  Lab Contact Info: Megan Vu     Patient Findings:  Negatives:  Signs/symptoms of thrombosis, Signs/symptoms of bleeding, Laboratory test error suspected, Change in health, Change in alcohol use, Change in activity, Upcoming invasive procedure, Emergency department visit, Upcoming dental procedure, Missed doses, Extra doses, Change in medications, Change in diet/appetite, Hospital admission, Bruising, Other complaints     Plan:  1. INR is therapeutic today at 2.96. After consulting with Sondra Brooks, PharmD, instructed Marko to have Mr. Hall continue warfarin 7mg daily.  2. Repeat INR in two  weeks, 11/30.   3. Verbal information provided over the phone. Mr. Hall's brother, Marko, expresses understanding with teach back and has no further questions at this time.  4. Mr. Hall would like to look into home monitoring once has been on warfarin for 90 days.  5. Of note, asked Marko for Salima's phone number (patient daughter) and he states he is the only one we need to talk to about Manjeet's INR/warfarin.    Aileen Grullon, Laine  11/16/2018  3:58 PM     ISondra, PharmD, have reviewed the note in full and agree with the assessment and plan.  11/16/18  5:30 PM

## 2018-11-20 ENCOUNTER — TELEPHONE (OUTPATIENT)
Dept: NEUROLOGY | Facility: CLINIC | Age: 43
End: 2018-11-20

## 2018-11-26 ENCOUNTER — HOSPITAL ENCOUNTER (OUTPATIENT)
Dept: CT IMAGING | Facility: HOSPITAL | Age: 43
End: 2018-11-26
Attending: INTERNAL MEDICINE

## 2018-11-26 RX ORDER — WARFARIN SODIUM 5 MG/1
TABLET ORAL
Qty: 45 TABLET | Refills: 1 | Status: SHIPPED | OUTPATIENT
Start: 2018-11-26 | End: 2019-01-23 | Stop reason: SDUPTHER

## 2018-11-30 ENCOUNTER — HOSPITAL ENCOUNTER (OUTPATIENT)
Dept: CT IMAGING | Facility: HOSPITAL | Age: 43
Discharge: HOME | End: 2018-11-30
Attending: INTERNAL MEDICINE | Admitting: INTERNAL MEDICINE

## 2018-11-30 DIAGNOSIS — K85.82 OTHER ACUTE PANCREATITIS WITH INFECTED NECROSIS: ICD-10-CM

## 2018-11-30 PROCEDURE — 74150 CT ABDOMEN W/O CONTRAST: CPT

## 2018-12-03 RX ORDER — LAMOTRIGINE 200 MG/1
300 TABLET ORAL DAILY
Qty: 45 TABLET | Refills: 3 | Status: SHIPPED | OUTPATIENT
Start: 2018-12-03 | End: 2019-01-24 | Stop reason: SDUPTHER

## 2018-12-13 RX ORDER — LEVOTHYROXINE SODIUM 0.1 MG/1
TABLET ORAL
Qty: 90 TABLET | Refills: 0 | Status: SHIPPED | OUTPATIENT
Start: 2018-12-13 | End: 2019-03-10 | Stop reason: SDUPTHER

## 2018-12-17 ENCOUNTER — TELEPHONE (OUTPATIENT)
Dept: FAMILY MEDICINE CLINIC | Facility: CLINIC | Age: 43
End: 2018-12-17

## 2018-12-17 NOTE — TELEPHONE ENCOUNTER
----- Message from Nile Faust MD sent at 12/17/2018 12:54 PM EST -----  Regarding: RE: [facility]  Contact: [phone]  Left a message  ----- Message -----  From: Royal Ferreira MA  Sent: 12/11/2018   4:59 PM  To: Nile Faust MD  Subject: FW: [facility]                     ----- Message -----  From: Katerina Jules RegSched Rep  Sent: 12/11/2018   3:29 PM  To: Royal Ferreira MA  Subject: [facility]                     EMMA CASTELLANO WITH [facility] IS SEEING ABOVE PT BUT WOULD LIKE TO SPEAK TO DR FAUST ABOUT SOME CONCERNS SHE HAS

## 2018-12-18 ENCOUNTER — TELEPHONE (OUTPATIENT)
Dept: PHARMACY | Facility: HOSPITAL | Age: 43
End: 2018-12-18

## 2018-12-21 ENCOUNTER — LAB (OUTPATIENT)
Dept: LAB | Facility: HOSPITAL | Age: 43
End: 2018-12-21

## 2018-12-21 ENCOUNTER — ANTICOAGULATION VISIT (OUTPATIENT)
Dept: PHARMACY | Facility: HOSPITAL | Age: 43
End: 2018-12-21

## 2018-12-21 DIAGNOSIS — Z95.2 HX OF AORTIC VALVE REPLACEMENT, MECHANICAL: ICD-10-CM

## 2018-12-21 LAB
INR PPP: 3.44 (ref 0.91–1.09)
PROTHROMBIN TIME: 36.1 SECONDS (ref 9.6–11.5)

## 2018-12-21 PROCEDURE — 85610 PROTHROMBIN TIME: CPT

## 2018-12-21 PROCEDURE — 36415 COLL VENOUS BLD VENIPUNCTURE: CPT

## 2018-12-21 NOTE — PROGRESS NOTES
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 Hall reports that Mrs. Hall (his wife) is  him and we are no longer to provide her his health information. Mr. Hall's daughter, Faina (goes by [place]) Rafael will be helping with his care and he has authorized us to share all healthcare related information with her.  Tablet Strength: pt has 1mg, 2mg, and 5mg tablets  Patient Contact Info: Marko (brother): [phone]  Lab Contact Info: Megan Vu     Patient Findings:  Positives:  Change in medications, Change in diet/appetite   Negatives:  Signs/symptoms of thrombosis, Signs/symptoms of bleeding, Laboratory test error suspected, Change in health, Change in alcohol use, Change in activity, Upcoming invasive procedure, Emergency department visit, Upcoming dental procedure, Missed doses, Extra doses, Hospital admission, Bruising, Other complaints   Comments:  Patient's brother states patient is trying to lose weight so overall he has been eating smaller portions. He has also been put on  Gabapentin and Pristiq which are reflected in the medication list. Per [employer]: Concurrent use of DESVENLAFAXINE and ANTICOAGULANTS may result in an increased risk of bleeding.      Plan:  1. INR is supratherapeutic today at 3.44. After consulting with Sondra Brooks, PharmD, instructed Marko to have Mr. Hall DECREASE tonight's dose to warfarin 2.5mg then continue warfarin 7mg daily until recheck.  2. Repeat INR in two weeks, 1/4.   3. Verbal information provided over the phone. Mr. Hall's brother, Marko, expresses understanding with teach back and has no further questions at this time.  4. Mr. Hall would like to look into home monitoring once has been on warfarin for 90 days.  5. Of note, asked Marko for Salima's phone number (patient daughter) and he states he is the only one we need to talk to about Manjeet's INR/warfarin.    Aileen Grullon, Laine  12/21/2018  5:05 PM     I, Sondra Brooks, PharmD, have reviewed the note in full and agree with the assessment and plan.  12/21/18  5:15 PM

## 2019-01-02 ENCOUNTER — TELEPHONE (OUTPATIENT)
Dept: NEUROLOGY | Facility: CLINIC | Age: 44
End: 2019-01-02

## 2019-01-02 NOTE — TELEPHONE ENCOUNTER
Sujit, , called again to check on status for work and since we put patient out past December this past time and appt was changed to January, he went ahead and continued FMLA for now, but will need updated paperwork/office notes etc. When we do see him in January.

## 2019-01-07 ENCOUNTER — OFFICE VISIT (OUTPATIENT)
Dept: CARDIOLOGY | Facility: CLINIC | Age: 44
End: 2019-01-07

## 2019-01-07 VITALS
HEART RATE: 72 BPM | SYSTOLIC BLOOD PRESSURE: 120 MMHG | DIASTOLIC BLOOD PRESSURE: 72 MMHG | WEIGHT: 183 LBS | BODY MASS INDEX: 27.11 KG/M2 | OXYGEN SATURATION: 93 % | HEIGHT: 69 IN

## 2019-01-07 DIAGNOSIS — Q24.9 CONGENITAL HEART DISEASE: Primary | ICD-10-CM

## 2019-01-07 DIAGNOSIS — I71.20 THORACIC AORTIC ANEURYSM WITHOUT RUPTURE (HCC): ICD-10-CM

## 2019-01-07 PROCEDURE — 99213 OFFICE O/P EST LOW 20 MIN: CPT | Performed by: INTERNAL MEDICINE

## 2019-01-07 NOTE — PROGRESS NOTES
[facility] at [facility]  Office visit  Manjeet Hall  1975      VISIT DATE:  06/25/2018    PCP: Nile Faust MD  [address]  [facility] [id number]    CC:  Chief Complaint   Patient presents with   • congenital herat disease       Previous cardiac history:  -Diagnosed with large PDA and VSD at birth, PDA close spontaneously.  -Developed subvalvular aortic stenosis which required surgery, VSD closed as well at 13yo  -Ross procedure for increasing aortic insufficiency with moderate aortic stenosis at 21yo  -Treated for Hodgkin's lymphoma, age 21 - CTX and chest XRT    -May 2018 Echo  · Estimated EF appears to be in the range of 56 - 60%.  · Left ventricular wall thickness is consistent with mild concentric hypertrophy.  · Mild to moderate aortic valve regurgitation is present.  · Right ventricular cavity is borderline dilated.  · Left atrial cavity size is mildly dilated.  · Moderate dilation of the aortic root is present. 4.8 cm. Moderate dilation of the ascending aorta is present. 5.4 cm.    -CT angiography chest June 2018:aortic root at the upper portion of the valve largest AP dimension is 5.4 cm. Approximately 2 cm above the aortic root the ascending thoracic aorta largest oblique dimension is 7.0 cm and AP dimension 6.2 cm.    Cardiac catheterization in July 2018 [facility]  LMT: - The LMT has mild luminal irregularities.  LAD:  - There was moderate diffuse disease.  - The mid LAD is narrowed 30 % - focal disease.   Additional Comment: very large vessel, wraps around the apex to perfuse the   entire mid and apical inferior wall.  LCX: - There was mild diffuse disease.  - The circ AV groove continuation circumflex is narrowed 80 % - focal disease.  - The 1st obtuse marginal circumflex is narrowed 60 % - focal disease.   Additional Comment: AV LCx perfuses small left PDA.  RAMUS: - The Ramus is Absent.  RCA:  - The RCA has mild luminal irregularities.     August  13, 2018  Reoperation, third open-heart surgery.  Reversed Ross procedure  including aortic valve and root re-replacement with a composite graft including an On-X mechanical valve #25 and a 30-mm Hemashield graft, right ventricular outflow  tract reconstruction with the re-repaired autograft.    Echo September 2018 [facility]  EF = 60 ± 5% . There is no evidence of LV apical thrombus.  - The right ventricle is normal in size. Right ventricular systolic function is   low normal.  - On-X prosthetic aortic valve (size #25). There is no aortic valve regurgitation.   Transprosthetic gradients could not be obtained on the current study. Valve   leaflets not well visualized. If clinically indicated, consider ZACHARY to assess   further.  - There is a small pericardial effusion with organization. It is smaller when   compared to that documented on the prior echocardiogram performed 8/23/18. There   is no RV/RA collapse. There is no obvious evidence of cardiac tamponade.  - Status post reverse Ross procedure including aortic root, ascending aorta and   aortic valve replacement, RVOT reconstruction with rerepaired autograft. No   significant PI. The peak/mean gradients 19/10 mmHg [similar to the prior study]      ASSESSMENT:   Diagnosis Plan   1. Congenital heart disease     2. Thoracic aortic aneurysm without rupture (CMS/HCC)         PLAN:  Congenital heart disease: Status post reversed Ross procedure  including aortic valve and root re-replacement with a composite graft including an On-X mechanical valve #25 and a 30-mm Hemashield graft, right ventricular outflow  tract reconstruction with the re-repaired autograft.  Study functional improvement.  Postoperative course complicated by stroke and pancreatitis.  Afterload currently well-controlled.  Continue anticoagulation with goal INR of 2-3.  Repeat echo in 6 months then annually. CTA chest at 6 months, 18 months, then every 24 months.    Coronary artery disease:  "Currently stable and asymptomatic.  Afterload well controlled.  Continue atorvastatin 40 mg by mouth daily, goal LDL less than 70.    Subjective  Denies chest discomfort.  Blood pressures running less than 120/80 mmHg.  compliant with medical therapy.  Progressing and neuro rehabilitation after recent postoperative stroke.  Still with intermittent word finding difficulties.  Abdominal discomfort secondary to postoperative pancreatitis has resolved as well.  He is exercising on a regular basis and has no physical limitations.    PHYSICAL EXAMINATION:  Vitals:    01/07/19 0845   BP: 120/72   BP Location: Right arm   Patient Position: Sitting   Pulse: 72   SpO2: 93%   Weight: 83 kg (183 lb)   Height: 175.3 cm (69\")     General Appearance:    Alert, cooperative, no distress, appears stated age   Head:    Normocephalic, without obvious abnormality, atraumatic   Eyes:    conjunctiva/corneas clear   Nose:   Nares normal, septum midline, mucosa normal, no drainage   Throat:   Lips, teeth and gums normal   Neck:   Supple, symmetrical, trachea midline, no carotid    bruit or JVD   Lungs:     Clear to auscultation bilaterally, respirations unlabored   Chest Wall:    No tenderness or deformity    Heart:    Regular rate and rhythm, prominent A2,, 2/6 early peaking systolic murmur left upper sternal border, rub   or gallop, normal carotid impulse bilaterally without bruit.   Abdomen:     Soft, non-tender   Extremities:   Extremities normal, atraumatic, no cyanosis or edema   Pulses:   2+ and symmetric all extremities   Skin:   Skin color, texture, turgor normal, no rashes or lesions       Diagnostic Data:  Procedures  Lab Results   Component Value Date    CHLPL 259 (H) 07/17/2015    TRIG 182 (H) 10/09/2018    HDL 53 10/09/2018     Lab Results   Component Value Date    GLUCOSE 83 09/27/2018    BUN 12 09/27/2018    CREATININE 0.84 09/27/2018     09/27/2018    K 4.3 09/27/2018     09/27/2018    CO2 27.0 09/27/2018     No " results found for: HGBA1C  Lab Results   Component Value Date    WBC 7.26 09/27/2018    HGB 8.4 (L) 09/27/2018    HCT 28.8 (L) 09/27/2018     09/27/2018       Allergies  Allergies   Allergen Reactions   • Other        Current Medications    Current Outpatient Medications:   •  ALPRAZolam (XANAX) 0.25 MG tablet, Take 1 tablet by mouth 2 (Two) Times a Day As Needed for Anxiety., Disp: 30 tablet, Rfl: 0  •  atorvastatin (LIPITOR) 40 MG tablet, Take 1 tablet by mouth Daily., Disp: 30 tablet, Rfl: 11  •  enoxaparin (LOVENOX) 80 MG/0.8ML solution syringe, Inject 0.8 mL under the skin into the appropriate area as directed Every 12 (Twelve) Hours., Disp: 14 syringe, Rfl: 2  •  gabapentin (NEURONTIN) 300 MG capsule, Take 300 mg by mouth 5 (Five) Times a Day. Take 2 tablets AM, 3 tablets in the afternoon, Disp: , Rfl: 2  •  lamoTRIgine (LaMICtal) 200 MG tablet, TAKE 1.5 TABLETS BY MOUTH DAILY., Disp: 45 tablet, Rfl: 3  •  lansoprazole (PREVACID) 30 MG capsule, Take 1 capsule by mouth Daily., Disp: 30 capsule, Rfl: 2  •  levothyroxine (SYNTHROID, LEVOTHROID) 100 MCG tablet, TAKE 1 TABLET BY MOUTH EVERY DAY **NEED APPT FOR FURTHER REFILLS, Disp: 90 tablet, Rfl: 0  •  metoprolol tartrate (LOPRESSOR) 25 MG tablet, Take 1 tablet by mouth 2 (Two) Times a Day., Disp: 180 tablet, Rfl: 0  •  warfarin (COUMADIN) 1 MG tablet, Take 1 daily as directed, Disp: 30 tablet, Rfl: 0  •  warfarin (COUMADIN) 10 MG tablet, TAKE 1 TABLET BY MOUTH EVERY DAY FOR 3 DAYS CHECK INR THURSDAY AND FRIDAY AND ADJUST DOSE, Disp: , Rfl: 0  •  warfarin (COUMADIN) 2 MG tablet, Take 2 tablets by mouth daily, as directed by the anticoagulation clinic., Disp: 180 tablet, Rfl: 1  •  warfarin (COUMADIN) 5 MG tablet, TAKE 1 TABLET BY MOUTH DAILY OR AS DIRECTED BY ANTICOAGULATION CLINIC., Disp: 45 tablet, Rfl: 1          ROS  Review of Systems   Cardiovascular: Negative for chest pain, dyspnea on exertion, irregular heartbeat and syncope.   Respiratory:  Negative for cough, shortness of breath and wheezing.          SOCIAL HX  Social History     Socioeconomic History   • Marital status: Legally      Spouse name: Not on file   • Number of children: Not on file   • Years of education: Not on file   • Highest education level: Not on file   Social Needs   • Financial resource strain: Not on file   • Food insecurity - worry: Not on file   • Food insecurity - inability: Not on file   • Transportation needs - medical: Not on file   • Transportation needs - non-medical: Not on file   Occupational History   • Not on file   Tobacco Use   • Smoking status: Never Smoker   • Smokeless tobacco: Never Used   Substance and Sexual Activity   • Alcohol use: Yes     Comment: social   • Drug use: No   • Sexual activity: Defer   Other Topics Concern   • Not on file   Social History Narrative   • Not on file       FAMILY HX  Family History   Problem Relation Age of Onset   • COPD Mother    • Cancer Mother    • Hypertension Father    • Stroke Father    • Stroke Paternal Grandmother    • Hypertension Paternal Grandmother    • Cancer Paternal Grandmother    • Cancer Paternal Grandfather    • Heart disease Paternal Grandfather              Vimal John III, MD, FACC

## 2019-01-09 ENCOUNTER — OFFICE VISIT (OUTPATIENT)
Dept: FAMILY MEDICINE CLINIC | Facility: CLINIC | Age: 44
End: 2019-01-09

## 2019-01-09 ENCOUNTER — ANTICOAGULATION VISIT (OUTPATIENT)
Dept: PHARMACY | Facility: HOSPITAL | Age: 44
End: 2019-01-09

## 2019-01-09 VITALS
RESPIRATION RATE: 16 BRPM | SYSTOLIC BLOOD PRESSURE: 122 MMHG | OXYGEN SATURATION: 98 % | WEIGHT: 180 LBS | DIASTOLIC BLOOD PRESSURE: 82 MMHG | HEART RATE: 74 BPM | HEIGHT: 69 IN | BODY MASS INDEX: 26.66 KG/M2

## 2019-01-09 DIAGNOSIS — F32.89 OTHER DEPRESSION: Primary | ICD-10-CM

## 2019-01-09 DIAGNOSIS — Z95.2 HX OF AORTIC VALVE REPLACEMENT, MECHANICAL: ICD-10-CM

## 2019-01-09 DIAGNOSIS — I69.30 SEQUELAE, POST-STROKE: ICD-10-CM

## 2019-01-09 DIAGNOSIS — Z79.01 WARFARIN ANTICOAGULATION: ICD-10-CM

## 2019-01-09 LAB — INR PPP: 4.4 (ref 0.9–1.1)

## 2019-01-09 PROCEDURE — 85610 PROTHROMBIN TIME: CPT | Performed by: FAMILY MEDICINE

## 2019-01-09 PROCEDURE — 99214 OFFICE O/P EST MOD 30 MIN: CPT | Performed by: FAMILY MEDICINE

## 2019-01-09 RX ORDER — VORTIOXETINE 20 MG/1
1 TABLET, FILM COATED ORAL DAILY
Refills: 2 | COMMUNITY
Start: 2018-12-28 | End: 2019-12-06 | Stop reason: ALTCHOICE

## 2019-01-09 NOTE — PROGRESS NOTES
Anticoagulation Clinic - Remote Progress Note  REMOTE LAB    Indication: On-X Mechanical valve #25 and a 30mm hemashield  Referring Provider: Alvaro  Initial Warfarin Start Date:  ~9/2018  Goal INR: 2-3 per referral  Current Drug Interactions: desvenlafaxine, levothyroxine  Bleed Risk: ascending aortic aneurysm   Other: Hodgkins Lymphoma (hx of chemo and radiation); h/o CVA + hemorrhagic pancreatitis (during hospitalization for mAVR)    Diet: avoids  Alcohol: socially  Tobacco: None  OTC Pain Medication: APAP PRN pain    INR History:  Date 9/27 9/28 10/1 10/4 10/8 10/11 10/17 10/19 10/23   Total Weekly Dose 8mg 12mg 19mg 29mg 31mg 34mg 43mg 41 mg 45mg   INR 1.3 1.22 1.3 1.8 1.85 1.7 2.91 2.82 3.79   Notes clinic enox enox enox; clinic enox enox        Date 11/8 11/16 12/21 1/9        Total Weekly Dose 49mg 49mg 49mg 49 mg        INR 3.0 2.96 3.44 4.4        Notes   michivenl ini           Phone Interview:  Verbal Release Authorization: Spoke with patient 10/12, Mr. Hall reports that Mrs. Hall (his wife) is  him and we are no longer to provide her his health information. Mr. Hall's daughter, Faina (goes by Black) Rafael will be helping with his care and he has authorized us to share all healthcare related information with her.  Tablet Strength: pt has 1mg, 2mg, and 5mg tablets  Patient Contact Info: Marko (brother): [phone]  Lab Contact Info: Megan Vu     Patient Findings:    Plan:  1. INR is supratherapeutic today at 4.4. As this is his second supratherapeutic INR with no definite cause, will change patient to a regimen of  Warfarin 7 mg daily, except 5 mg on Wednesdays (47.5 mg weekly, 5% decrease).  2. Repeat INR in one week, 1/16.   3. Verbal information provided over the phone. Mr. Hall's brother, Marko, expresses understanding with teach back and has no further questions at this time.  4. Mr. Hall would like to look into home monitoring once has been on warfarin for 90  days.    Kayla Taveras, PharmD  Pharmacy Resident  1/9/2019  4:02 PM

## 2019-01-10 NOTE — PROGRESS NOTES
Subjective   Manjeet Hall is a 43 y.o. male.     Depression   Visit Type: follow-up (mood improving)  Patient presents with the following symptoms: nervousness/anxiety.  Patient is not experiencing: palpitations and shortness of breath.  Frequency of symptoms: occasionally   Severity: moderate   Sleep quality: fair  Nighttime awakenings: occasional    Cerebrovascular Accident   The current episode started more than 1 month ago. The problem has been gradually improving. Pertinent negatives include no arthralgias, chest pain, congestion, coughing, headaches, joint swelling, nausea, rash, sore throat or vomiting. Associated symptoms comments: Speech issues with word finding. The symptoms are aggravated by stress. Treatments tried: speech therapy is ongoing. The treatment provided moderate relief.        The following portions of the patient's history were reviewed and updated as appropriate: allergies, current medications, past social history and problem list.    Review of Systems   Constitutional: Negative for activity change and unexpected weight change.   HENT: Negative for congestion and sore throat.    Respiratory: Negative for cough and shortness of breath.    Cardiovascular: Negative for chest pain, palpitations and leg swelling.        Recent cardiology evaluation was satisfactory   Gastrointestinal: Negative for diarrhea, nausea and vomiting.   Genitourinary: Negative for dysuria and hematuria.   Musculoskeletal: Negative for arthralgias and joint swelling.   Skin: Negative for color change and rash.   Allergic/Immunologic: Negative for environmental allergies and food allergies.   Neurological: Negative for syncope and headaches.        Still having speech therapy for difficulty with word finding   Hematological: Negative for adenopathy. Does not bruise/bleed easily.        On chronic warfarin anticoagulation   Psychiatric/Behavioral: Negative for dysphoric mood and sleep disturbance. The patient is  "nervous/anxious.         He is followed by mental health and seems to be improved       Objective   /82   Pulse 74   Resp 16   Ht 175.3 cm (69\")   Wt 81.6 kg (180 lb)   SpO2 98%   BMI 26.58 kg/m²   Physical Exam   Constitutional: He is oriented to person, place, and time. He appears well-developed and well-nourished. He is cooperative.   HENT:   Head: Normocephalic.   Right Ear: External ear normal.   Left Ear: External ear normal.   Nose: Nose normal.   Mouth/Throat: Oropharynx is clear and moist.   Eyes: Conjunctivae are normal. Pupils are equal, round, and reactive to light. No scleral icterus.   Neck: Neck supple. No JVD present. Carotid bruit is not present. No thyromegaly present.   Cardiovascular: Normal rate and regular rhythm. Exam reveals no gallop and no friction rub.   Pulmonary/Chest: Effort normal and breath sounds normal.   Abdominal: There is no hepatosplenomegaly.   Musculoskeletal: Normal range of motion. He exhibits no edema.   Neurological: He is alert and oriented to person, place, and time.   Speech impediment   Skin: Skin is warm and dry. No rash noted.   Psychiatric: He has a normal mood and affect. Cognition and memory are normal.   Nursing note and vitals reviewed.      Assessment/Plan   Problem List Items Addressed This Visit        Active Problems    Depression - Primary    Relevant Medications    TRINTELLIX 20 MG tablet    Sequelae, post-stroke    Warfarin anticoagulation    Relevant Orders    POC INR (Completed)          No orders of the defined types were placed in this encounter.      INR was high at 4.4 I asked him to reduce his warfarin to 5 mg for 2 days and go to 6 recheck in 1 week       "

## 2019-01-16 ENCOUNTER — ANTICOAGULATION VISIT (OUTPATIENT)
Dept: PHARMACY | Facility: HOSPITAL | Age: 44
End: 2019-01-16

## 2019-01-16 ENCOUNTER — LAB (OUTPATIENT)
Dept: LAB | Facility: HOSPITAL | Age: 44
End: 2019-01-16

## 2019-01-16 DIAGNOSIS — Z95.2 HX OF AORTIC VALVE REPLACEMENT, MECHANICAL: ICD-10-CM

## 2019-01-16 LAB
INR PPP: 2.36 (ref 0.85–1.16)
PROTHROMBIN TIME: 24.9 SECONDS (ref 11.2–14.3)

## 2019-01-16 PROCEDURE — 36415 COLL VENOUS BLD VENIPUNCTURE: CPT

## 2019-01-16 PROCEDURE — 85610 PROTHROMBIN TIME: CPT

## 2019-01-16 NOTE — PROGRESS NOTES
Anticoagulation Clinic - Remote Progress Note  REMOTE LAB    Indication: On-X Mechanical valve #25 and a 30mm hemashield  Referring Provider: Alvaro  Initial Warfarin Start Date:  ~9/2018  Goal INR: 2-3 per referral  Current Drug Interactions: desvenlafaxine, levothyroxine  Bleed Risk: ascending aortic aneurysm   Other: Hodgkins Lymphoma (hx of chemo and radiation); h/o CVA + hemorrhagic pancreatitis (during hospitalization for mAVR)    Diet: avoids GLV  Alcohol: socially  Tobacco: None  OTC Pain Medication: APAP PRN pain    INR History:  Date 9/27 9/28 10/1 10/4 10/8 10/11 10/17 10/19 10/23   Total Weekly Dose 8mg 12mg 19mg 29mg 31mg 34mg 43mg 41 mg 45mg   INR 1.3 1.22 1.3 1.8 1.85 1.7 2.91 2.82 3.79   Notes clinic enox enox enox; clinic enox enox        Date 11/8 11/16 12/21 1/9 1/16       Total Weekly Dose 49mg 49mg 49mg 49 mg 47mg       INR 3.0 2.96 3.44 4.4 2.36       Notes   desvenl ini           Phone Interview:  Verbal Release Authorization: Spoke with patient 10/12, Mr. Hall reports that Mrs. Hall (his wife) is  him and we are no longer to provide her his health information. Mr. Hall's daughter, Faina (goes by Capron) Rafael will be helping with his care and he has authorized us to share all healthcare related information with her.  Tablet Strength: pt has 1mg, 2mg, and 5mg tablets  Patient Contact Info: Marko (brother): [phone]  Lab Contact Info: Megan Vu     Patient Findings:  Negatives:  Signs/symptoms of thrombosis, Signs/symptoms of bleeding, Laboratory test error suspected, Change in health, Change in alcohol use, Change in activity, Upcoming invasive procedure, Emergency department visit, Upcoming dental procedure, Missed doses, Extra doses, Change in medications, Change in diet/appetite, Hospital admission, Bruising, Other complaints   Comments:  Marko states that his brother has been getting on the treadmill and exercising the past 5 days.       Plan:  1. INR is  therapeutic today at 2.36. Instructed patient's brother to continue warfarin 7 mg daily, except 5 mg on Wednesdays.  2. Repeat INR in one week, 1/23.   3. Verbal information provided over the phone. Mr. Hall's brother, Marko, expresses understanding with teach back and has no further questions at this time.  4. Mr. Hall would like to look into home monitoring once has been on warfarin for 90 days.    Whitney Vale, Pharmacy Intern  1/16/2019  4:00 PM    Kayla LOZANO, [facility], have reviewed the note in full and agree with the assessment and plan.  01/17/19  12:14 PM

## 2019-01-21 ENCOUNTER — OFFICE VISIT (OUTPATIENT)
Dept: NEUROLOGY | Facility: CLINIC | Age: 44
End: 2019-01-21

## 2019-01-21 VITALS
HEIGHT: 69 IN | SYSTOLIC BLOOD PRESSURE: 120 MMHG | HEART RATE: 63 BPM | DIASTOLIC BLOOD PRESSURE: 76 MMHG | BODY MASS INDEX: 27.11 KG/M2 | WEIGHT: 183 LBS | OXYGEN SATURATION: 98 %

## 2019-01-21 DIAGNOSIS — I69.30 SEQUELAE, POST-STROKE: Primary | ICD-10-CM

## 2019-01-21 PROCEDURE — 99214 OFFICE O/P EST MOD 30 MIN: CPT | Performed by: PSYCHIATRY & NEUROLOGY

## 2019-01-21 NOTE — PROGRESS NOTES
Subjective:    CC: Manjeet Hall is seen today for Stroke       HPI:  Current visit-since his last visit his speech has improved and he is still getting speech therapy twice a week at [facility].  His carotid Doppler that I had ordered showed mild plaques bilaterally but no significant stenosis.  He continues to be on Coumadin with a therapeutic INR.  His lipid panel was as follows-, , , HDL 53.  I had started him on atorvastatin 40 mg which she is continuing to take.  I also personally reviewed his notes from [facility] which are as follows.    CT head showed decreased attenuation in the left temporal operculum and left inferior parietal lobe and parts of the lateral left superior parietal lobe reflecting a left MCA infarct.  MRI brain also showed restricted diffusion in the left posterior MCA area and left insular cortex (aspect score of 5)  CTA head showed thrombus in the left M1-2 junction with good distal collateral vessels and absent left A1 .The rest of the anterior circulation and vertebral basilar circulation was patent.  CTA neck showed mild carotid plaques without any significant stenosis on either side.  Echo showed EF of 51% with the limitation of right ventricle and moderate dilatation of left atrium, moderate aortic valve regurg    Initial [id number]-old male accompanied by his wife with a history of aortic stenosis status post valve replacement and VSD repair at age 14, aortic aneurysm and aortic valve regurgitation status post aneurysmal repair and aortic valve repair, anxiety, depression, Hodgkin's lymphoma at age 23 status post chemotherapy and radiation, hypothyroidism presents with a stroke.  As per patient's wife he underwent  a mechanical aortic valve repair as well as and aneurysmal repair in [facility] in August.  He was started on Coumadin soon after the procedure however he developed pancreatitis after surgery hence his Coumadin was stopped for 1 week  and he was started on IV heparin.  During that time he had symptoms of garbled speech.  An MRI brain later confirmed a left hemispheric stroke.  He was also found to have a thrombus in his mechanical aortic valve and restarted back on Coumadin.  Currently being bridged with Lovenox.  His last INR yesterday was 1.85.  He has not yet started getting speech therapy and will start soon.    The following portions of the patient's history were reviewed today and updated as of 01/21/2019  : allergies, current medications, past family history, past medical history, past social history, past surgical history and problem list  These document will be scanned to patient's chart.      Current Outpatient Medications:   •  ALPRAZolam (XANAX) 0.25 MG tablet, Take 1 tablet by mouth 2 (Two) Times a Day As Needed for Anxiety., Disp: 30 tablet, Rfl: 0  •  atorvastatin (LIPITOR) 40 MG tablet, Take 1 tablet by mouth Daily., Disp: 30 tablet, Rfl: 11  •  enoxaparin (LOVENOX) 80 MG/0.8ML solution syringe, Inject 0.8 mL under the skin into the appropriate area as directed Every 12 (Twelve) Hours., Disp: 14 syringe, Rfl: 2  •  gabapentin (NEURONTIN) 300 MG capsule, Take 300 mg by mouth 5 (Five) Times a Day. Take 2 tablets AM, 3 tablets in the afternoon, Disp: , Rfl: 2  •  lamoTRIgine (LaMICtal) 200 MG tablet, TAKE 1.5 TABLETS BY MOUTH DAILY., Disp: 45 tablet, Rfl: 3  •  lansoprazole (PREVACID) 30 MG capsule, Take 1 capsule by mouth Daily., Disp: 30 capsule, Rfl: 2  •  levothyroxine (SYNTHROID, LEVOTHROID) 100 MCG tablet, TAKE 1 TABLET BY MOUTH EVERY DAY **NEED APPT FOR FURTHER REFILLS, Disp: 90 tablet, Rfl: 0  •  metoprolol tartrate (LOPRESSOR) 25 MG tablet, Take 1 tablet by mouth 2 (Two) Times a Day., Disp: 180 tablet, Rfl: 0  •  TRINTELLIX 20 MG tablet, Take 1 tablet by mouth Daily., Disp: , Rfl: 2  •  warfarin (COUMADIN) 1 MG tablet, Take 1 daily as directed, Disp: 30 tablet, Rfl: 0  •  warfarin (COUMADIN) 10 MG tablet, TAKE 1 TABLET BY  MOUTH EVERY DAY FOR 3 DAYS CHECK INR THURSDAY AND FRIDAY AND ADJUST DOSE, Disp: , Rfl: 0  •  warfarin (COUMADIN) 2 MG tablet, Take 2 tablets by mouth daily, as directed by the anticoagulation clinic., Disp: 180 tablet, Rfl: 1  •  warfarin (COUMADIN) 5 MG tablet, TAKE 1 TABLET BY MOUTH DAILY OR AS DIRECTED BY ANTICOAGULATION CLINIC., Disp: 45 tablet, Rfl: 1   Past Medical History:   Diagnosis Date   • Abnormal heart rhythm    • Anxiety    • Cancer (CMS/HCC)     hodgkins   • Depression    • GERD (gastroesophageal reflux disease)    • Heart murmur    • History of echocardiogram    • History of left heart catheterization    • History of stomach ulcers    • Hypothyroidism    • Mitral valvular disorder    • Thyroid disorder       Past Surgical History:   Procedure Laterality Date   • CARDIAC SURGERY     • SKIN CANCER EXCISION      mass removed from neck and chest for hodgkins      Family History   Problem Relation Age of Onset   • COPD Mother    • Cancer Mother    • Hypertension Father    • Stroke Father    • Stroke Paternal Grandmother    • Hypertension Paternal Grandmother    • Cancer Paternal Grandmother    • Cancer Paternal Grandfather    • Heart disease Paternal Grandfather       Social History     Socioeconomic History   • Marital status: Legally      Spouse name: Not on file   • Number of children: Not on file   • Years of education: Not on file   • Highest education level: Not on file   Social Needs   • Financial resource strain: Not on file   • Food insecurity - worry: Not on file   • Food insecurity - inability: Not on file   • Transportation needs - medical: Not on file   • Transportation needs - non-medical: Not on file   Occupational History   • Not on file   Tobacco Use   • Smoking status: Never Smoker   • Smokeless tobacco: Never Used   Substance and Sexual Activity   • Alcohol use: Yes     Comment: social   • Drug use: No   • Sexual activity: Defer   Other Topics Concern   • Not on file   Social  "History Narrative   • Not on file     Review of Systems   Neurological: Positive for dizziness, weakness and light-headedness.   All other systems reviewed and are negative.      Objective:    /76 (BP Location: Right arm, Patient Position: Sitting, Cuff Size: Adult)   Pulse 63   Ht 175.3 cm (69\")   Wt 83 kg (183 lb)   SpO2 98%   BMI 27.02 kg/m²     Neurology Exam:    General apperance: NAD.     Mental status: Alert, awake and oriented to time place and person.    Recent and Remote memory: Difficult to check    Attention span and Concentration: Normal.     Language and Speech: Intact- No dysarthria.    Fluency, Naming , Repitition and Comprehension: His fluency has slightly improved since his last visit although he still has deficits.  He could name a few objects such as watch and pen.  He did have difficulty with repetition and some problems with comprehension    Cranial Nerves:   CN II: Visual fields are full. Intact. Fundi - Normal, No papillederma, Pupils - MATTHIEU  CN III, IV and VI: Extraocular movements are intact. Normal saccades.   CN V: Facial sensation is intact.   CN VII: Muscles of facial expression reveal no asymmetry. Intact.   CN VIII: Hearing is intact. Whispered voice intact.   CN IX and X: Palate elevates symmetrically. Intact  CN XI: Shoulder shrug is intact.   CN XII: Tongue is midline without evidence of atrophy or fasciculation.     Ophthalmoscopic exam of optic disc-normal    Motor:  Right UE muscle strength 5/5. Normal tone.     Left UE muscle strength 5/5. Normal tone.      Right LE muscle strength5/5. Normal tone.     Left LE muscle strength 5/5. Normal tone.      Sensory: Normal light touch, vibration and pinprick sensation bilaterally.    DTRs: 2+ bilaterally in upper and lower extremities.    Babinski: Negative bilaterally.    Co-ordination: Normal finger-to-nose, heel to shin B/L.    Rhomberg: Negative.    Gait: Normal.    Cardiovascular: Regular rate and rhythm without murmur, " gallop or rub.    Assessment and Plan:  1. Sequelae, post-stroke  Patient had a left hemispheric infarct secondary to an artificial aortic valve thrombus   Currently on Coumadin.  Goal INR 2.5-3.5  Maintain blood pressure less than 130/90  Continue atorvastatin 40 mg daily.  Goal LDL less than 100  Continue speech therapy  We will write a letter for his workplace stating that he is unable to return to work at this time due to significant speech deficits as well as difficulty comprehending         Return in about 4 months (around 5/21/2019).     I spent over 25 minutes with the patient face to face out of which over 50% (15  minutes) was spent in management, instructions and education regarding his medications and secondary stroke prevention.     Lindsay Summers MD

## 2019-01-23 RX ORDER — LANSOPRAZOLE 30 MG/1
CAPSULE, DELAYED RELEASE ORAL
Qty: 30 CAPSULE | Refills: 5 | Status: SHIPPED | OUTPATIENT
Start: 2019-01-23 | End: 2019-05-22 | Stop reason: SDUPTHER

## 2019-01-23 RX ORDER — WARFARIN SODIUM 5 MG/1
TABLET ORAL
Qty: 45 TABLET | Refills: 5 | Status: SHIPPED | OUTPATIENT
Start: 2019-01-23 | End: 2019-05-22 | Stop reason: SDUPTHER

## 2019-01-24 RX ORDER — LAMOTRIGINE 200 MG/1
300 TABLET ORAL DAILY
Qty: 45 TABLET | Refills: 5 | Status: SHIPPED | OUTPATIENT
Start: 2019-01-24 | End: 2019-09-26 | Stop reason: SDUPTHER

## 2019-01-31 ENCOUNTER — TELEPHONE (OUTPATIENT)
Dept: PHARMACY | Facility: HOSPITAL | Age: 44
End: 2019-01-31

## 2019-02-04 ENCOUNTER — LAB (OUTPATIENT)
Dept: LAB | Facility: HOSPITAL | Age: 44
End: 2019-02-04

## 2019-02-04 DIAGNOSIS — Z95.2 HX OF AORTIC VALVE REPLACEMENT, MECHANICAL: ICD-10-CM

## 2019-02-04 LAB
INR PPP: 2.62 (ref 0.85–1.16)
PROTHROMBIN TIME: 27 SECONDS (ref 11.2–14.3)

## 2019-02-04 PROCEDURE — 85610 PROTHROMBIN TIME: CPT

## 2019-02-04 PROCEDURE — 36415 COLL VENOUS BLD VENIPUNCTURE: CPT

## 2019-02-05 ENCOUNTER — ANTICOAGULATION VISIT (OUTPATIENT)
Dept: PHARMACY | Facility: HOSPITAL | Age: 44
End: 2019-02-05

## 2019-02-05 DIAGNOSIS — Z95.2 HX OF AORTIC VALVE REPLACEMENT, MECHANICAL: ICD-10-CM

## 2019-02-05 NOTE — PROGRESS NOTES
Anticoagulation Clinic - Remote Progress Note  REMOTE LAB    Indication: On-X Mechanical valve #25 and a 30mm hemashield  Referring Provider: Alvaro  Initial Warfarin Start Date:  ~9/2018  Goal INR: 2.0-3.0 per referral  Current Drug Interactions: desvenlafaxine, levothyroxine  Bleed Risk: ascending aortic aneurysm   Other: Hodgkins Lymphoma (hx of chemo and radiation); h/o CVA + hemorrhagic pancreatitis (during hospitalization for mAVR)    Diet: avoids GLV  Alcohol: socially  Tobacco: None  OTC Pain Medication: APAP PRN pain    INR History:  Date 9/27 9/28 10/1 10/4 10/8 10/11 10/17 10/19 10/23   Total Weekly Dose 8mg 12mg 19mg 29mg 31mg 34mg 43mg 41mg 45mg   INR 1.3 1.22 1.3 1.8 1.85 1.7 2.91 2.82 3.79   Notes clinic enox enox enox; clinic enox enox        Date 11/8 11/16 12/21 1/9/19 1/16 2/4          Total Weekly Dose 49mg 49mg 49mg 49mg 47mg 49mg          INR 3.0 2.96 3.44 4.4 2.36 2.62          Notes   desvenl ini   Self adj            Phone Interview:  Verbal Release Authorization: Spoke with patient 10/12, Mr. Hall reports that Mrs. Hall (his wife) is  him and we are no longer to provide her his health information. Mr. Hall's daughter, Faina (goes by [place]) Rafael will be helping with his care and he has authorized us to share all healthcare related information with her.  Tablet Strength: 1mg, 2mg, and 5mg tablets  Patient Contact Info: Marko (brother): [phone]  Lab Contact Info: Megan Vu     Patient Findings   Positives:  Extra doses   Negatives:  Signs/symptoms of thrombosis, Signs/symptoms of bleeding, Laboratory test error suspected, Change in health, Change in alcohol use, Change in activity, Upcoming invasive procedure, Emergency department visit, Upcoming dental procedure, Missed doses, Change in medications, Change in diet/appetite, Hospital admission, Bruising, Other complaints   Comments:  Patient's brother reports patient has been taking warfarin 7mg every day  (vs a 5mg dose on Wed, 4.3% increase) since last encounter. He believed the one day of a decreased dose was a one time thing. As patient is therapeutic on 49mg/week, instructed patient's brother to have him continue with that dose, but to return to lab in 3 weeks.     Plan:  1. INR was therapeutic on 2/4 at 2.62. Instructed patient's brother to continue warfarin 7mg daily  2. Repeat INR in 3 weeks, 2/25.   3. Verbal information provided over the phone. Mr. Hall's brother, Marko, expresses understanding with teach back and has no further questions at this time.  4. Mr. Hall would like to look into home monitoring once has been on warfarin for 90 days.    Abdi Petit CPhT  2/5/2019  9:22 AM    I, Abdi Vides, [facility], have reviewed the note in full and agree with the assessment and plan.  02/06/19  10:32 AM

## 2019-02-13 ENCOUNTER — TELEPHONE (OUTPATIENT)
Dept: FAMILY MEDICINE CLINIC | Facility: CLINIC | Age: 44
End: 2019-02-13

## 2019-02-13 NOTE — TELEPHONE ENCOUNTER
LVM informing pt friend.  ETHAN Ferreira  CHUCK    ----- Message from Nile Faust MD sent at 2/12/2019  5:22 PM EST -----  Regarding: RE: REFERRAL/QUESTION  Contact: [phone]  Whether he needs a referral depends on where he goes   ----- Message -----  From: Royal Ferreira MA  Sent: 2/12/2019   5:09 PM  To: Nile Faust MD  Subject: FW: REFERRAL/QUESTION                                ----- Message -----  From: Rupali Velez  Sent: 2/12/2019   5:02 PM  To: Royal Ferreira MA  Subject: REFERRAL/QUESTION                                Patient's friend (Susan Delgadillo) called and said Dr Faust wants patient to take a 's test, (he has aphasia) and referred him to [facility], but it costs $1,000 and she has been searching for other places that are cheaper and she wants to know if he needs a referral for the test or if he can just call and set up the appointment somewhere else on his own.  Please call and advise @ [phone].

## 2019-03-01 ENCOUNTER — LAB (OUTPATIENT)
Dept: LAB | Facility: HOSPITAL | Age: 44
End: 2019-03-01

## 2019-03-01 DIAGNOSIS — Z95.2 HX OF AORTIC VALVE REPLACEMENT, MECHANICAL: ICD-10-CM

## 2019-03-01 LAB
INR PPP: 3.02 (ref 0.85–1.16)
PROTHROMBIN TIME: 30.1 SECONDS (ref 11.2–14.3)

## 2019-03-01 PROCEDURE — 36415 COLL VENOUS BLD VENIPUNCTURE: CPT

## 2019-03-01 PROCEDURE — 85610 PROTHROMBIN TIME: CPT

## 2019-03-04 ENCOUNTER — ANTICOAGULATION VISIT (OUTPATIENT)
Dept: PHARMACY | Facility: HOSPITAL | Age: 44
End: 2019-03-04

## 2019-03-04 DIAGNOSIS — Z95.2 HX OF AORTIC VALVE REPLACEMENT, MECHANICAL: ICD-10-CM

## 2019-03-04 NOTE — PROGRESS NOTES
Anticoagulation Clinic - Remote Progress Note  REMOTE LAB    Indication: On-X Mechanical valve #25 and a 30mm hemashield  Referring Provider: Alvaro  Initial Warfarin Start Date:  ~9/2018  Goal INR: 2.0-3.0 per referral  Current Drug Interactions: desvenlafaxine, levothyroxine  Bleed Risk: ascending aortic aneurysm   Other: Hodgkins Lymphoma (hx of chemo and radiation); h/o CVA + hemorrhagic pancreatitis (during hospitalization for mAVR)    Diet: avoids GLV  Alcohol: socially  Tobacco: none  OTC Pain Medication: APAP PRN pain    INR History:  Date 9/27 9/28 10/1 10/4 10/8 10/11 10/17 10/19 10/23   Total Weekly Dose 8mg 12mg 19mg 29mg 31mg 34mg 43mg 41mg 45mg   INR 1.3 1.22 1.3 1.8 1.85 1.7 2.91 2.82 3.79   Notes clinic enox enox enox; clinic enox enox        Date 11/8 11/16 12/21 1/9/19 1/16 2/4 3/1     Total Weekly Dose 49mg 49mg 49mg 49mg 47mg 49mg 49mg 49mg    INR 3.0 2.96 3.44 4.4 2.36 2.62 3.02     Notes   desvenla   self adj        Phone Interview:  Verbal Release Authorization: Spoke with patient 10/12, Mr. Hall reports that Mrs. Hall (his wife) is  him and we are no longer to provide her his health information. Mr. Hall's daughter, Faina (goes by [place]) Rafael will be helping with his care and he has authorized us to share all healthcare related information with her.  Tablet Strength: 1mg, 2mg, and 5mg tablets  Patient Contact Info: Marko (brother): [phone]  Lab Contact Info: Megan Vu     Patient Findings:  Positives:  Change in activity   Negatives:  Signs/symptoms of thrombosis, Signs/symptoms of bleeding, Laboratory test error suspected, Change in health, Change in alcohol use, Upcoming invasive procedure, Emergency department visit, Upcoming dental procedure, Missed doses, Extra doses, Change in medications, Change in diet/appetite, Hospital admission, Bruising, Other complaints   Comments:  Mr. Hall has been exercising a lot more than in the recent past (on the  treadmill) -- regular physical activity is likely to result in higher warfarin dose requirements. He is also now driving himself to the lab -- becoming more independent.      Plan:  1. INR is slightly supratherapeutic / therapeutic at the upper end of goal. For now, instructed Marko to have his brother continue warfarin 7mg daily.  2. Repeat INR in two weeks (Thurs 3/14) to ensure it remains WNL.   3. Verbal information provided over the phone. Mr. Hall's brother, Marko, expresses understanding with teach back and has no further questions at this time.    Ann Cowden Mayer, PharmD  3/4/2019  8:54 AM

## 2019-03-11 RX ORDER — LEVOTHYROXINE SODIUM 0.1 MG/1
TABLET ORAL
Qty: 90 TABLET | Refills: 0 | Status: SHIPPED | OUTPATIENT
Start: 2019-03-11 | End: 2019-06-03 | Stop reason: SDUPTHER

## 2019-03-22 ENCOUNTER — LAB (OUTPATIENT)
Dept: LAB | Facility: HOSPITAL | Age: 44
End: 2019-03-22

## 2019-03-22 DIAGNOSIS — Z95.2 HX OF AORTIC VALVE REPLACEMENT, MECHANICAL: ICD-10-CM

## 2019-03-22 LAB
INR PPP: 2.15 (ref 0.85–1.16)
PROTHROMBIN TIME: 23.1 SECONDS (ref 11.2–14.3)

## 2019-03-22 PROCEDURE — 85610 PROTHROMBIN TIME: CPT

## 2019-03-22 PROCEDURE — 36415 COLL VENOUS BLD VENIPUNCTURE: CPT

## 2019-03-25 ENCOUNTER — ANTICOAGULATION VISIT (OUTPATIENT)
Dept: PHARMACY | Facility: HOSPITAL | Age: 44
End: 2019-03-25

## 2019-03-25 DIAGNOSIS — Z95.2 HX OF AORTIC VALVE REPLACEMENT, MECHANICAL: ICD-10-CM

## 2019-03-25 NOTE — PROGRESS NOTES
Anticoagulation Clinic - Remote Progress Note  REMOTE LAB    Indication: On-X Mechanical valve #25 and a 30mm hemashield  Referring Provider: Alvaro  Initial Warfarin Start Date:  ~9/2018  Goal INR: 2.0-3.0 per referral  Current Drug Interactions: desvenlafaxine, levothyroxine  Bleed Risk: ascending aortic aneurysm   Other: Hodgkins Lymphoma (hx of chemo and radiation); h/o CVA + hemorrhagic pancreatitis (during hospitalization for mAVR)    Diet: avoids GLV  Alcohol: socially  Tobacco: none  OTC Pain Medication: APAP PRN pain    INR History:  Date 9/27 9/28 10/1 10/4 10/8 10/11 10/17 10/19 10/23   Total Weekly Dose 8mg 12mg 19mg 29mg 31mg 34mg 43mg 41mg 45mg   INR 1.3 1.22 1.3 1.8 1.85 1.7 2.91 2.82 3.79   Notes clinic enox enox enox; clinic enox enox        Date 11/8 11/16 12/21 1/9/19 1/16 2/4 3/1 3/22    Total Weekly Dose 49mg 49mg 49mg 49mg 47mg 49mg 49mg 49mg 49mg   INR 3.0 2.96 3.44 4.4 2.36 2.62 3.02 2.15    Notes   desvenla   self adj        Phone Interview:  Verbal Release Authorization: Spoke with patient 10/12, Mr. Hall reports that Mrs. Hall (his wife) is  him and we are no longer to provide her his health information. Mr. Hall's daughter, Faina (goes by [place]) Rafael will be helping with his care and he has authorized us to share all healthcare related information with her.  Tablet Strength: 1mg, 2mg, and 5mg tablets  Patient Contact Info: Marko (brother): [phone]  Lab Contact Info: Megan Vu     Patient Findings:  Positives:  Change in health   Negatives:  Signs/symptoms of thrombosis, Signs/symptoms of bleeding, Laboratory test error suspected, Change in alcohol use, Change in activity, Upcoming invasive procedure, Emergency department visit, Upcoming dental procedure, Missed doses, Extra doses, Change in medications, Change in diet/appetite, Hospital admission, Bruising, Other complaints   Comments:  Spoke with patient's brother, Marko, who states patient has lost  a significant amount of weight and continues to exercise regularly.     Plan:  1. INR was back WNL on 3/22/19. For now, instructed Marko to have his brother continue warfarin 7mg daily.  2. Repeat INR in 2 weeks.   3. Verbal information provided over the phone. Mr. Hall's brother, Marko, expresses understanding with teach back and has no further questions at this time.    Aileen Grullon, MICHELLE  3/25/2019  2:05 PM     I, Amor Briggs, [facility], have reviewed the note in full and agree with the assessment and plan.  03/26/19  2:25 PM

## 2019-04-16 ENCOUNTER — OFFICE VISIT (OUTPATIENT)
Dept: FAMILY MEDICINE CLINIC | Facility: CLINIC | Age: 44
End: 2019-04-16

## 2019-04-16 VITALS
SYSTOLIC BLOOD PRESSURE: 118 MMHG | HEIGHT: 69 IN | HEART RATE: 72 BPM | DIASTOLIC BLOOD PRESSURE: 72 MMHG | OXYGEN SATURATION: 98 % | BODY MASS INDEX: 26.48 KG/M2 | RESPIRATION RATE: 16 BRPM | WEIGHT: 178.8 LBS

## 2019-04-16 DIAGNOSIS — Q24.9 CONGENITAL HEART DISEASE: ICD-10-CM

## 2019-04-16 DIAGNOSIS — Z79.01 WARFARIN ANTICOAGULATION: ICD-10-CM

## 2019-04-16 DIAGNOSIS — F41.9 ANXIETY: ICD-10-CM

## 2019-04-16 DIAGNOSIS — F32.0 CURRENT MILD EPISODE OF MAJOR DEPRESSIVE DISORDER, UNSPECIFIED WHETHER RECURRENT (HCC): Primary | ICD-10-CM

## 2019-04-16 PROBLEM — I63.9 ISCHEMIC STROKE (HCC): Status: ACTIVE | Noted: 2018-09-05

## 2019-04-16 PROBLEM — Z86.79 H/O AORTIC VALVE REPAIR: Status: RESOLVED | Noted: 2018-03-01 | Resolved: 2019-04-16

## 2019-04-16 PROBLEM — I10 HYPERTENSION: Status: ACTIVE | Noted: 2018-08-14

## 2019-04-16 PROBLEM — Z98.890 H/O AORTIC VALVE REPAIR: Status: RESOLVED | Noted: 2018-03-01 | Resolved: 2019-04-16

## 2019-04-16 PROBLEM — I37.0 PULMONIC STENOSIS: Status: ACTIVE | Noted: 2018-08-13

## 2019-04-16 PROCEDURE — 99213 OFFICE O/P EST LOW 20 MIN: CPT | Performed by: FAMILY MEDICINE

## 2019-04-16 PROCEDURE — 90632 HEPA VACCINE ADULT IM: CPT | Performed by: FAMILY MEDICINE

## 2019-04-16 PROCEDURE — 90471 IMMUNIZATION ADMIN: CPT | Performed by: FAMILY MEDICINE

## 2019-04-17 NOTE — PROGRESS NOTES
"Subjective   Manjeet Hall is a 43 y.o. male.     Depression   Visit Type: follow-up (mood improved now able to drive)  Patient presents with the following symptoms: irritability and nervousness/anxiety.  Patient is not experiencing: confusion, decreased concentration, depressed mood, restlessness, shortness of breath and suicidal ideas.  Frequency of symptoms: occasionally   Severity: moderate   Sleep quality: fair  Nighttime awakenings: occasional    Anxiety   Presents for follow-up visit. Symptoms include irritability and nervous/anxious behavior. Patient reports no chest pain, confusion, decreased concentration, depressed mood, dizziness, nausea, restlessness, shortness of breath or suicidal ideas. Symptoms occur constantly. The severity of symptoms is causing significant distress. The quality of sleep is fair. Nighttime awakenings: occasional.     His past medical history is significant for depression.        The following portions of the patient's history were reviewed and updated as appropriate: allergies, current medications, past social history and problem list.    Review of Systems   Constitutional: Positive for irritability. Negative for appetite change and fatigue.   Respiratory: Negative for chest tightness and shortness of breath.    Cardiovascular: Negative for chest pain.   Gastrointestinal: Negative for abdominal pain, diarrhea and nausea.   Neurological: Negative for dizziness, tremors, weakness, light-headedness and headaches.   Psychiatric/Behavioral: Positive for dysphoric mood and sleep disturbance. Negative for agitation, behavioral problems, confusion, decreased concentration and suicidal ideas. The patient is nervous/anxious.        Objective   /72   Pulse 72   Resp 16   Ht 175.3 cm (69\")   Wt 81.1 kg (178 lb 12.8 oz)   SpO2 98%   BMI 26.40 kg/m²   Physical Exam   Constitutional: He is oriented to person, place, and time. He appears well-developed and well-nourished. He is " cooperative.   HENT:   Head: Normocephalic.   Right Ear: External ear normal.   Left Ear: External ear normal.   Nose: Nose normal.   Mouth/Throat: Oropharynx is clear and moist.   Eyes: Conjunctivae are normal. Pupils are equal, round, and reactive to light. No scleral icterus.   Neck: Neck supple. No JVD present. Carotid bruit is not present. No thyromegaly present.   Cardiovascular: Normal rate and regular rhythm.   Murmur heard.  Pulmonary/Chest: Effort normal and breath sounds normal. He has no wheezes. He has no rales.   Abdominal: There is no hepatosplenomegaly.   Musculoskeletal: Normal range of motion.   Lymphadenopathy:     He has no cervical adenopathy.   Neurological: He is alert and oriented to person, place, and time. No cranial nerve deficit. Coordination normal.   Still having speech therapy at [facility]   Skin: Skin is warm and dry. No rash noted.   Psychiatric: He has a normal mood and affect. Cognition and memory are normal.   Nursing note and vitals reviewed.      Assessment/Plan   Problem List Items Addressed This Visit        Active Problems    Depression - Primary    Congenital heart disease    Warfarin anticoagulation      Other Visit Diagnoses     Anxiety              No orders of the defined types were placed in this encounter.    Continued follow-up with mental health provider and cardiology.  He does go to the warfarin clinic at [facility] for management of his anticoagulation.  Continued speech therapy.

## 2019-04-18 ENCOUNTER — TELEPHONE (OUTPATIENT)
Dept: PHARMACY | Facility: HOSPITAL | Age: 44
End: 2019-04-18

## 2019-05-16 ENCOUNTER — OFFICE VISIT (OUTPATIENT)
Dept: NEUROLOGY | Facility: CLINIC | Age: 44
End: 2019-05-16

## 2019-05-16 ENCOUNTER — LAB (OUTPATIENT)
Dept: LAB | Facility: HOSPITAL | Age: 44
End: 2019-05-16

## 2019-05-16 ENCOUNTER — ANTICOAGULATION VISIT (OUTPATIENT)
Dept: PHARMACY | Facility: HOSPITAL | Age: 44
End: 2019-05-16

## 2019-05-16 VITALS
WEIGHT: 185 LBS | DIASTOLIC BLOOD PRESSURE: 74 MMHG | BODY MASS INDEX: 27.4 KG/M2 | HEIGHT: 69 IN | OXYGEN SATURATION: 98 % | SYSTOLIC BLOOD PRESSURE: 128 MMHG | HEART RATE: 76 BPM

## 2019-05-16 DIAGNOSIS — Z95.2 HX OF AORTIC VALVE REPLACEMENT, MECHANICAL: ICD-10-CM

## 2019-05-16 DIAGNOSIS — I69.30 SEQUELAE, POST-STROKE: Primary | ICD-10-CM

## 2019-05-16 LAB
INR PPP: 2.6 (ref 0.85–1.16)
PROTHROMBIN TIME: 26.8 SECONDS (ref 11.2–14.3)

## 2019-05-16 PROCEDURE — 85610 PROTHROMBIN TIME: CPT

## 2019-05-16 PROCEDURE — 99213 OFFICE O/P EST LOW 20 MIN: CPT | Performed by: PSYCHIATRY & NEUROLOGY

## 2019-05-16 PROCEDURE — 36415 COLL VENOUS BLD VENIPUNCTURE: CPT

## 2019-05-16 NOTE — PROGRESS NOTES
Subjective:    CC: Manjeet Hall is seen today  for Stroke       HPI:  Current visit- patient speech continues to improve.  He is still getting speech therapy at [facility] at least 2-3 times a week.  He continues to be on Coumadin as well as atorvastatin 40 mg daily.  He is also exercising now and has lost weight.  Takes gabapentin for neuropathy.    since his last visit his speech has improved and he is still getting speech therapy twice a week at [facility].  His carotid Doppler that I had ordered showed mild plaques bilaterally but no significant stenosis.  He continues to be on Coumadin with a therapeutic INR.  His lipid panel was as follows-, , , HDL 53.  I had started him on atorvastatin 40 mg which she is continuing to take.  I also personally reviewed his notes from [facility] which are as follows.    CT head showed decreased attenuation in the left temporal operculum and left inferior parietal lobe and parts of the lateral left superior parietal lobe reflecting a left MCA infarct.  MRI brain also showed restricted diffusion in the left posterior MCA area and left insular cortex (aspect score of 5)  CTA head showed thrombus in the left M1-2 junction with good distal collateral vessels and absent left A1 .The rest of the anterior circulation and vertebral basilar circulation was patent.  CTA neck showed mild carotid plaques without any significant stenosis on either side.  Echo showed EF of 51% with the limitation of right ventricle and moderate dilatation of left atrium, moderate aortic valve regurg    Initial [id number] male accompanied by his wife with a history of aortic stenosis status post valve replacement and VSD repair at age 14, aortic aneurysm and aortic valve regurgitation status post aneurysmal repair and aortic valve repair, anxiety, depression, Hodgkin's lymphoma at age 23 status post chemotherapy and radiation, hypothyroidism presents with a stroke.  As  per patient's wife he underwent  a mechanical aortic valve repair as well as and aneurysmal repair in [facility] in August.  He was started on Coumadin soon after the procedure however he developed pancreatitis after surgery hence his Coumadin was stopped for 1 week and he was started on IV heparin.  During that time he had symptoms of garbled speech.  An MRI brain later confirmed a left hemispheric stroke.  He was also found to have a thrombus in his mechanical aortic valve and restarted back on Coumadin.  Currently being bridged with Lovenox.  His last INR yesterday was 1.85.  He has not yet started getting speech therapy and will start soon.    The following portions of the patient's history were reviewed today and updated as of 05/16/2019  : allergies, current medications, past family history, past medical history, past social history, past surgical history and problem list  These document will be scanned to patient's chart.      Current Outpatient Medications:   •  ALPRAZolam (XANAX) 0.25 MG tablet, Take 1 tablet by mouth 2 (Two) Times a Day As Needed for Anxiety., Disp: 30 tablet, Rfl: 0  •  atorvastatin (LIPITOR) 40 MG tablet, Take 1 tablet by mouth Daily., Disp: 30 tablet, Rfl: 11  •  gabapentin (NEURONTIN) 300 MG capsule, Take 300 mg by mouth 5 (Five) Times a Day. Take 2 tablets AM, 3 tablets in the afternoon, Disp: , Rfl: 2  •  lamoTRIgine (LaMICtal) 200 MG tablet, TAKE 1.5 TABLETS BY MOUTH DAILY., Disp: 45 tablet, Rfl: 5  •  lansoprazole (PREVACID) 30 MG capsule, TAKE 1 CAPSULE BY MOUTH EVERY DAY, Disp: 30 capsule, Rfl: 5  •  levothyroxine (SYNTHROID, LEVOTHROID) 100 MCG tablet, TAKE 1 TABLET BY MOUTH EVERY DAY **NEED APPT FOR FURTHER REFILLS, Disp: 90 tablet, Rfl: 0  •  metoprolol tartrate (LOPRESSOR) 25 MG tablet, Take 1 tablet by mouth 2 (Two) Times a Day., Disp: 180 tablet, Rfl: 0  •  TRINTELLIX 20 MG tablet, Take 1 tablet by mouth Daily., Disp: , Rfl: 2  •  warfarin (COUMADIN) 2 MG tablet, Take  2 tablets by mouth daily, as directed by the anticoagulation clinic., Disp: 180 tablet, Rfl: 1  •  warfarin (COUMADIN) 5 MG tablet, TAKE 1 TABLET BY MOUTH DAILY OR AS DIRECTED BY ANTICOAGULATION CLINIC., Disp: 45 tablet, Rfl: 5  •  warfarin (COUMADIN) 1 MG tablet, Take 1 daily as directed, Disp: 30 tablet, Rfl: 0  •  warfarin (COUMADIN) 10 MG tablet, TAKE 1 TABLET BY MOUTH EVERY DAY FOR 3 DAYS CHECK INR THURSDAY AND FRIDAY AND ADJUST DOSE, Disp: , Rfl: 0   Past Medical History:   Diagnosis Date   • Abnormal heart rhythm    • Anxiety    • Cancer (CMS/HCC)     hodgkins   • Depression    • GERD (gastroesophageal reflux disease)    • Heart murmur    • History of echocardiogram    • History of left heart catheterization    • History of stomach ulcers    • Hypothyroidism    • Mitral valvular disorder    • Thyroid disorder       Past Surgical History:   Procedure Laterality Date   • CARDIAC SURGERY     • SKIN CANCER EXCISION      mass removed from neck and chest for hodgkins      Family History   Problem Relation Age of Onset   • COPD Mother    • Cancer Mother    • Hypertension Father    • Stroke Father    • Stroke Paternal Grandmother    • Hypertension Paternal Grandmother    • Cancer Paternal Grandmother    • Cancer Paternal Grandfather    • Heart disease Paternal Grandfather       Social History     Socioeconomic History   • Marital status: Legally      Spouse name: Not on file   • Number of children: Not on file   • Years of education: Not on file   • Highest education level: Not on file   Tobacco Use   • Smoking status: Never Smoker   • Smokeless tobacco: Never Used   Substance and Sexual Activity   • Alcohol use: Yes     Comment: social   • Drug use: No   • Sexual activity: Defer     Review of Systems   HENT: Positive for hearing loss and voice change.    Neurological: Positive for speech difficulty.   All other systems reviewed and are negative.      Objective:    /74 (BP Location: Right arm, Patient  "Position: Sitting, Cuff Size: Adult)   Pulse 76   Ht 175.3 cm (69\")   Wt 83.9 kg (185 lb)   SpO2 98%   BMI 27.32 kg/m²     Neurology Exam:    General apperance: NAD.     Mental status: Alert, awake and oriented to time place and person.    Recent and Remote memory: Intact.    Attention span and Concentration: Normal.     Language and Speech: Intact- No dysarthria.    Fluency, Naming , Repitition and Comprehension: Could name most objects.  Still making paraphasic errors.  Fluency has improved.    Cranial Nerves:   CN II: Visual fields are full. Intact. Fundi - Normal, No papillederma, Pupils - MATTHIEU  CN III, IV and VI: Extraocular movements are intact. Normal saccades.   CN V: Facial sensation is intact.   CN VII: Muscles of facial expression reveal no asymmetry. Intact.   CN VIII: Hearing is intact. Whispered voice intact.   CN IX and X: Palate elevates symmetrically. Intact  CN XI: Shoulder shrug is intact.   CN XII: Tongue is midline without evidence of atrophy or fasciculation.     Ophthalmoscopic exam of optic disc-normal    Motor:  Right UE muscle strength 5/5. Normal tone.     Left UE muscle strength 5/5. Normal tone.      Right LE muscle strength5/5. Normal tone.     Left LE muscle strength 5/5. Normal tone.      Sensory: Normal light touch, vibration and pinprick sensation bilaterally.    DTRs: 2+ bilaterally in upper and lower extremities.    Babinski: Negative bilaterally.    Co-ordination: Normal finger-to-nose, heel to shin B/L.    Rhomberg: Negative.    Gait: Normal.    Cardiovascular: Regular rate and rhythm without murmur, gallop or rub.    Assessment and Plan:  1. Sequelae, post-stroke  Patient had a left hemispheric infarct secondary to an artificial aortic valve thrombus   Currently on Coumadin.  Goal INR 2.5-3.5  Maintain blood pressure less than 130/90  Continue atorvastatin 40 mg daily.  Goal LDL less than 100  Continue speech therapy         Return in about 6 months (around 11/16/2019). "     I spent over 15  minutes with the patient face to face out of which over 50% (7.5  minutes) was spent in management, instructions and education regarding secondary stroke risk factors.     Lindsay Summers MD

## 2019-05-16 NOTE — PROGRESS NOTES
Anticoagulation Clinic - Remote Progress Note  REMOTE LAB    Indication: On-X Mechanical valve #25 and a 30mm hemashield; stroke  Referring Provider: Alvaro  Initial Warfarin Start Date:  ~9/2018  Goal INR: 2.0-3.0 per referral  Current Drug Interactions: desvenlafaxine, levothyroxine  Bleed Risk: ascending aortic aneurysm   Other: Hodgkins Lymphoma (hx of chemo and radiation); h/o CVA + hemorrhagic pancreatitis (during hospitalization for mAVR)    Diet: avoids GLV  Alcohol: socially  Tobacco: none  OTC Pain Medication: APAP PRN pain    INR History:  Date 9/27 9/28 10/1 10/4 10/8 10/11 10/17 10/19 10/23   Total Weekly Dose 8mg 12mg 19mg 29mg 31mg 34mg 43mg 41mg 45mg   INR 1.3 1.22 1.3 1.8 1.85 1.7 2.91 2.82 3.79   Notes clinic enox enox enox; clinic enox enox        Date 11/8 11/16 12/21 1/9/19 1/16 2/4 3/1 3/22 5/16   Total Weekly Dose 49mg 49mg 49mg 49mg 47mg 49mg 49mg 49mg 49mg   INR 3.0 2.96 3.44 4.4 2.36 2.62 3.02 2.15 2.6   Notes   desvenla   self adj        Phone Interview:  Verbal Release Authorization: Spoke with patient 10/12, Mr. Hall reports that Mrs. Hall (his wife) is  him and we are no longer to provide her his health information. Please contact Mr. Hall directly- if unable to reach patient may contact brotherMarko  Tablet Strength: , 2mg, and 5mg tablets  Patient Contact Info: [phone]; Susan will call and speak with us.  Lab Contact Info: Megan Vu     Patient Findings     Negatives:  Signs/symptoms of thrombosis, Signs/symptoms of bleeding, Laboratory test error suspected, Change in health, Change in alcohol use, Change in activity, Upcoming invasive procedure, Emergency department visit, Upcoming dental procedure, Missed doses, Extra doses, Change in medications, Change in diet/appetite, Hospital admission, Bruising, Other complaints   Comments:  He will have antibiotics prior to dental procedure- if it is not amoxicillin Susan will call us to let us know  what antibiotic it is. Mr. Hall was instructed to take atorvastatin at night when we went over his medications. Marko, patient's brother, states patient is living independent now and moved out so for further encounters we will need to contact patient directly.     Patient saw neurology yesterday- noted that they marked goal INR 2.5-3.5- however Dr. Faust and Dr. John referral has goal of 2-3 with On-X valve and CVA.     Plan:  1. INR is therapeutic today. For now, instructed Susan and Mr. Hall continue warfarin 7mg daily.  2. Repeat INR in 4 weeks. Discussed need for compliance- they wrote in his calendar.   3. Warfarin 2mg tablets refilled at [facility]  4. Verbal information provided over the phone. Mr. Hall's brother, Marko, expresses understanding with teach back and has no further questions at this time.    Sondra Brooks, PharmD  5/16/2019  2:36 PM

## 2019-05-17 RX ORDER — WARFARIN SODIUM 2 MG/1
TABLET ORAL
Qty: 100 TABLET | Refills: 0 | Status: SHIPPED | OUTPATIENT
Start: 2019-05-17 | End: 2019-10-18 | Stop reason: SDUPTHER

## 2019-05-22 RX ORDER — LANSOPRAZOLE 30 MG/1
CAPSULE, DELAYED RELEASE ORAL
Qty: 30 CAPSULE | Refills: 2 | Status: SHIPPED | OUTPATIENT
Start: 2019-05-22 | End: 2022-03-21 | Stop reason: SDUPTHER

## 2019-05-22 RX ORDER — WARFARIN SODIUM 5 MG/1
TABLET ORAL
Qty: 45 TABLET | Refills: 4 | Status: SHIPPED | OUTPATIENT
Start: 2019-05-22 | End: 2019-12-16 | Stop reason: SDUPTHER

## 2019-06-03 RX ORDER — LEVOTHYROXINE SODIUM 0.1 MG/1
TABLET ORAL
Qty: 90 TABLET | Refills: 0 | Status: SHIPPED | OUTPATIENT
Start: 2019-06-03 | End: 2019-09-05 | Stop reason: SDUPTHER

## 2019-06-12 ENCOUNTER — LAB (OUTPATIENT)
Dept: LAB | Facility: HOSPITAL | Age: 44
End: 2019-06-12

## 2019-06-12 DIAGNOSIS — Z95.2 HX OF AORTIC VALVE REPLACEMENT, MECHANICAL: ICD-10-CM

## 2019-06-12 LAB
INR PPP: 2.25 (ref 0.85–1.16)
PROTHROMBIN TIME: 24 SECONDS (ref 11.2–14.3)

## 2019-06-12 PROCEDURE — 85610 PROTHROMBIN TIME: CPT

## 2019-06-12 PROCEDURE — 36415 COLL VENOUS BLD VENIPUNCTURE: CPT

## 2019-06-13 ENCOUNTER — ANTICOAGULATION VISIT (OUTPATIENT)
Dept: PHARMACY | Facility: HOSPITAL | Age: 44
End: 2019-06-13

## 2019-06-13 DIAGNOSIS — Z95.2 HX OF AORTIC VALVE REPLACEMENT, MECHANICAL: ICD-10-CM

## 2019-06-13 NOTE — PROGRESS NOTES
Anticoagulation Clinic - Remote Progress Note  REMOTE LAB    Indication: On-X Mechanical valve #25 and a 30mm hemashield; stroke  Referring Provider: Alvaro  Initial Warfarin Start Date:  ~9/2018  Goal INR: 2.0-3.0 per referral  Current Drug Interactions: desvenlafaxine, levothyroxine  Bleed Risk: ascending aortic aneurysm   Other: Hodgkins Lymphoma (hx of chemo and radiation); h/o CVA + hemorrhagic pancreatitis (during hospitalization for mAVR)    Diet: avoids GLV  Alcohol: socially  Tobacco: none  OTC Pain Medication: APAP PRN pain    INR History:  Date 9/27 9/28 10/1 10/4 10/8 10/11 10/17 10/19 10/23   Total Weekly Dose 8mg 12mg 19mg 29mg 31mg 34mg 43mg 41mg 45mg   INR 1.3 1.22 1.3 1.8 1.85 1.7 2.91 2.82 3.79   Notes clinic enox enox enox; clinic enox enox        Date 11/8 11/16 12/21 1/9/19 1/16 2/4 3/1 3/22 5/16   Total Weekly Dose 49mg 49mg 49mg 49mg 47mg 49mg 49mg 49mg 49mg   INR 3.0 2.96 3.44 4.4 2.36 2.62 3.02 2.15 2.6   Notes   desvenla   self adj        Date 6/13           Total Weekly Dose 49mg           INR 2.25           Notes              Phone Interview:  Verbal Release Authorization: Spoke with patient 10/12, Mr. Hall reports that Mrs. Hall (his wife) is  him and we are no longer to provide her his health information. Please contact Mr. Hall directly- if unable to reach patient may contact brotherMarko  Tablet Strength: , 2mg, and 5mg tablets  Patient Contact Info: [phone]; Susan will call and speak with us.  Lab Contact Info: Megan Vu     Patient Findings:  Negatives:  Signs/symptoms of thrombosis, Signs/symptoms of bleeding, Laboratory test error suspected, Change in health, Change in alcohol use, Change in activity, Upcoming invasive procedure, Emergency department visit, Upcoming dental procedure, Missed doses, Extra doses, Change in medications, Change in diet/appetite, Hospital admission, Bruising, Other complaints   Comments:  Jose G reported  no changes.      Plan:  1. INR is therapeutic today, so instructed Susan and Mr. Hall to continue warfarin 7mg daily.  2. Repeat INR in 3 weeks on 7/2.   3. Verbal information provided over the phone. Mr. Hall's brother, Marko, expresses understanding with teach back and has no further questions at this time.    Suleiman Baptiste, Pharmacy Intern  6/13/2019  9:00 AM     Consider repeat INR in clinic in 4 weeks. LVM to discuss with pt / caregiver.  [facility] have reviewed the note in full and agree with the assessment and plan.  06/17/19  9:12 AM

## 2019-06-24 ENCOUNTER — LAB (OUTPATIENT)
Dept: LAB | Facility: HOSPITAL | Age: 44
End: 2019-06-24

## 2019-06-24 DIAGNOSIS — Z95.2 HX OF AORTIC VALVE REPLACEMENT, MECHANICAL: ICD-10-CM

## 2019-06-24 LAB
INR PPP: 2.98 (ref 0.85–1.16)
PROTHROMBIN TIME: 29.8 SECONDS (ref 11.2–14.3)

## 2019-06-24 PROCEDURE — 36415 COLL VENOUS BLD VENIPUNCTURE: CPT

## 2019-06-24 PROCEDURE — 85610 PROTHROMBIN TIME: CPT

## 2019-06-25 ENCOUNTER — ANTICOAGULATION VISIT (OUTPATIENT)
Dept: PHARMACY | Facility: HOSPITAL | Age: 44
End: 2019-06-25

## 2019-06-25 DIAGNOSIS — Z95.2 HX OF AORTIC VALVE REPLACEMENT, MECHANICAL: ICD-10-CM

## 2019-06-25 NOTE — PROGRESS NOTES
Anticoagulation Clinic - Remote Progress Note  REMOTE LAB    Indication: On-X Mechanical valve #25 and a 30mm hemashield; stroke  Referring Provider: Alvaro  Initial Warfarin Start Date:  ~9/2018  Goal INR: 2.0-3.0 per referral  Current Drug Interactions: , lansoprazole, levothyroxine, Trintellix  Bleed Risk: ascending aortic aneurysm   Other: Hodgkins Lymphoma (hx of chemo and radiation); h/o CVA + hemorrhagic pancreatitis (during hospitalization for mAVR)    Diet: avoids GLV  Alcohol: socially  Tobacco: none  OTC Pain Medication: APAP PRN pain    INR History:  Date 9/27 9/28 10/1 10/4 10/8 10/11 10/17 10/19 10/23   Total Weekly Dose 8mg 12mg 19mg 29mg 31mg 34mg 43mg 41mg 45mg   INR 1.3 1.22 1.3 1.8 1.85 1.7 2.91 2.82 3.79   Notes clinic enox enox enox; clinic enox enox        Date 11/8 11/16 12/21 1/9/19 1/16 2/4 3/1 3/22 5/16   Total Weekly Dose 49mg 49mg 49mg 49mg 47mg 49mg 49mg 49mg 49mg   INR 3.0 2.96 3.44 4.4 2.36 2.62 3.02 2.15 2.6   Notes   desvenla   self adj        Date 6/13 6/24          Total Weekly Dose 49mg 49 mg          INR 2.25 2.98          Notes              Phone Interview:  Verbal Release Authorization: please contact Mr. Hall directly - if unable to reach patient may contact [email or web address]  Tablet Strength: , 2mg, and 5mg tablets  Patient Contact Info: [phone]; Susan will call and speak with us  Lab Contact Info: Megan Vu     Patient Findings:  Negatives:  Signs/symptoms of thrombosis, Signs/symptoms of bleeding, Laboratory test error suspected, Change in health, Change in alcohol use, Change in activity, Upcoming invasive procedure, Emergency department visit, Upcoming dental procedure, Missed doses, Extra doses, Change in medications, Change in diet/appetite, Hospital admission, Bruising, Other complaints     Plan:  1. INR is therapeutic, so instructed Mr. Hall to continue warfarin 7mg daily.  2. Repeat INR in clinic in 2 weeks before appt with Dr. John.   3.  Verbal information provided over the phone. Mr. Hall expresses understanding with teach back and has no further questions at this time.    Ann Cowden Mayer, ClaudiaD  6/25/2019  8:22 AM

## 2019-07-10 ENCOUNTER — OFFICE VISIT (OUTPATIENT)
Dept: CARDIOLOGY | Facility: CLINIC | Age: 44
End: 2019-07-10

## 2019-07-10 ENCOUNTER — LAB (OUTPATIENT)
Dept: LAB | Facility: HOSPITAL | Age: 44
End: 2019-07-10

## 2019-07-10 VITALS
OXYGEN SATURATION: 96 % | SYSTOLIC BLOOD PRESSURE: 140 MMHG | HEART RATE: 71 BPM | WEIGHT: 183 LBS | HEIGHT: 69 IN | DIASTOLIC BLOOD PRESSURE: 92 MMHG | BODY MASS INDEX: 27.11 KG/M2

## 2019-07-10 DIAGNOSIS — Q24.9 CONGENITAL HEART DISEASE: ICD-10-CM

## 2019-07-10 DIAGNOSIS — Z95.2 HX OF AORTIC VALVE REPLACEMENT, MECHANICAL: ICD-10-CM

## 2019-07-10 DIAGNOSIS — I71.20 THORACIC AORTIC ANEURYSM WITHOUT RUPTURE (HCC): Primary | ICD-10-CM

## 2019-07-10 DIAGNOSIS — I10 ESSENTIAL HYPERTENSION: ICD-10-CM

## 2019-07-10 LAB
INR PPP: 2.83 (ref 0.85–1.16)
PROTHROMBIN TIME: 28.6 SECONDS (ref 11.2–14.3)

## 2019-07-10 PROCEDURE — 36415 COLL VENOUS BLD VENIPUNCTURE: CPT

## 2019-07-10 PROCEDURE — 85610 PROTHROMBIN TIME: CPT

## 2019-07-10 PROCEDURE — 99214 OFFICE O/P EST MOD 30 MIN: CPT | Performed by: INTERNAL MEDICINE

## 2019-07-10 NOTE — PROGRESS NOTES
[facility]  Office visit  Manjeet Hall  1975      VISIT DATE:  7/10/2019      PCP: Nile Faust MD  [id number]  [place] [address]    CC:  Chief Complaint   Patient presents with   • Congenital heart disease   • Hypertension       Previous cardiac history:  -Diagnosed with large PDA and VSD at birth, PDA close spontaneously.  -Developed subvalvular aortic stenosis which required surgery, VSD closed as well at 13yo  -Ross procedure for increasing aortic insufficiency with moderate aortic stenosis at 21yo  -Treated for Hodgkin's lymphoma, age 21 - CTX and chest XRT    -May 2018 Echo  · Estimated EF appears to be in the range of 56 - 60%.  · Left ventricular wall thickness is consistent with mild concentric hypertrophy.  · Mild to moderate aortic valve regurgitation is present.  · Right ventricular cavity is borderline dilated.  · Left atrial cavity size is mildly dilated.  · Moderate dilation of the aortic root is present. 4.8 cm. Moderate dilation of the ascending aorta is present. 5.4 cm.    -CT angiography chest June 2018:aortic root at the upper portion of the valve largest AP dimension is 5.4 cm. Approximately 2 cm above the aortic root the ascending thoracic aorta largest oblique dimension is 7.0 cm and AP dimension 6.2 cm.    Cardiac catheterization in July 2018 [facility]  LMT: - The LMT has mild luminal irregularities.  LAD:  - There was moderate diffuse disease.  - The mid LAD is narrowed 30 % - focal disease.   Additional Comment: very large vessel, wraps around the apex to perfuse the   entire mid and apical inferior wall.  LCX: - There was mild diffuse disease.  - The circ AV groove continuation circumflex is narrowed 80 % - focal disease.  - The 1st obtuse marginal circumflex is narrowed 60 % - focal disease.   Additional Comment: AV LCx perfuses small left PDA.  RAMUS: - The Ramus is Absent.  RCA:  - The RCA has mild luminal  irregularities.     August 13, 2018  Reoperation, third open-heart surgery.  Reversed Ross procedure  including aortic valve and root re-replacement with a composite graft including an On-X mechanical valve #25 and a 30-mm Hemashield graft, right ventricular outflow  tract reconstruction with the re-repaired autograft.    Echo September 2018 [facility]  EF = 60 ± 5% . There is no evidence of LV apical thrombus.  - The right ventricle is normal in size. Right ventricular systolic function is   low normal.  - On-X prosthetic aortic valve (size #25). There is no aortic valve regurgitation.   Transprosthetic gradients could not be obtained on the current study. Valve   leaflets not well visualized. If clinically indicated, consider ZACHARY to assess   further.  - There is a small pericardial effusion with organization. It is smaller when   compared to that documented on the prior echocardiogram performed 8/23/18. There   is no RV/RA collapse. There is no obvious evidence of cardiac tamponade.  - Status post reverse Ross procedure including aortic root, ascending aorta and   aortic valve replacement, RVOT reconstruction with rerepaired autograft. No   significant PI. The peak/mean gradients 19/10 mmHg [similar to the prior study]      ASSESSMENT:   Diagnosis Plan   1. Thoracic aortic aneurysm without rupture (CMS/HCC)     2. Congenital heart disease     3. Essential hypertension         PLAN:  Congenital heart disease: Status post reversed Ross procedure  including aortic valve and root re-replacement with a composite graft including an On-X mechanical valve #25 and a 30-mm Hemashield graft, right ventricular outflow  tract reconstruction with the re-repaired autograft.  Study functional improvement.  Postoperative course complicated by stroke and pancreatitis.  Afterload currently well-controlled.  Continue anticoagulation with goal INR of 2-3.  Initial postop echo pending, surveillance echocardiographic assessment  "every 2-3 years. CTA chest at 6 months, 18 months, then every 24 months.    Coronary artery disease: Currently stable and asymptomatic.  Afterload well controlled.  Continue atorvastatin 40 mg by mouth daily, goal LDL less than 70.    Subjective  History of stroke with persistent word finding difficulties.  Postoperative pancreatitis which is resolved.  Denies chest discomfort.  Blood pressures running less than 120/80 mmHg.  compliant with medical therapy.  He is exercising on a regular basis and has no physical limitations.    PHYSICAL EXAMINATION:  Vitals:    07/10/19 1140   BP: 140/92   BP Location: Left arm   Patient Position: Sitting   Pulse: 71   SpO2: 96%   Weight: 83 kg (183 lb)   Height: 175.3 cm (69\")     General Appearance:    Alert, cooperative, no distress, appears stated age   Head:    Normocephalic, without obvious abnormality, atraumatic   Eyes:    conjunctiva/corneas clear   Nose:   Nares normal, septum midline, mucosa normal, no drainage   Throat:   Lips, teeth and gums normal   Neck:   Supple, symmetrical, trachea midline, no carotid    bruit or JVD   Lungs:     Clear to auscultation bilaterally, respirations unlabored   Chest Wall:    No tenderness or deformity    Heart:    Regular rate and rhythm, prominent A2,, 2/6 early peaking systolic murmur left upper sternal border, rub   or gallop, normal carotid impulse bilaterally without bruit.   Abdomen:     Soft, non-tender   Extremities:   Extremities normal, atraumatic, no cyanosis or edema   Pulses:   2+ and symmetric all extremities   Skin:   Skin color, texture, turgor normal, no rashes or lesions       Diagnostic Data:  Procedures  Lab Results   Component Value Date    CHLPL 259 (H) 07/17/2015    TRIG 182 (H) 10/09/2018    HDL 53 10/09/2018     Lab Results   Component Value Date    GLUCOSE 83 09/27/2018    BUN 12 09/27/2018    CREATININE 0.84 09/27/2018     09/27/2018    K 4.3 09/27/2018     09/27/2018    CO2 27.0 09/27/2018     No " results found for: HGBA1C  Lab Results   Component Value Date    WBC 7.26 09/27/2018    HGB 8.4 (L) 09/27/2018    HCT 28.8 (L) 09/27/2018     09/27/2018       Allergies  Allergies   Allergen Reactions   • Other        Current Medications    Current Outpatient Medications:   •  atorvastatin (LIPITOR) 40 MG tablet, Take 1 tablet by mouth Daily., Disp: 30 tablet, Rfl: 11  •  gabapentin (NEURONTIN) 300 MG capsule, Take 300 mg by mouth 5 (Five) Times a Day. Take 2 tablets AM, 3 tablets in the afternoon, Disp: , Rfl: 2  •  lamoTRIgine (LaMICtal) 200 MG tablet, TAKE 1.5 TABLETS BY MOUTH DAILY., Disp: 45 tablet, Rfl: 5  •  lansoprazole (PREVACID) 30 MG capsule, TAKE 1 CAPSULE BY MOUTH EVERY DAY, Disp: 30 capsule, Rfl: 2  •  levothyroxine (SYNTHROID, LEVOTHROID) 100 MCG tablet, TAKE 1 TABLET BY MOUTH EVERY DAY **NEED APPT FOR FURTHER REFILLS, Disp: 90 tablet, Rfl: 0  •  metoprolol tartrate (LOPRESSOR) 25 MG tablet, Take 1 tablet by mouth 2 (Two) Times a Day., Disp: 180 tablet, Rfl: 0  •  TRINTELLIX 20 MG tablet, Take 1 tablet by mouth Daily., Disp: , Rfl: 2  •  warfarin (COUMADIN) 2 MG tablet, Take 2 tablets by mouth daily, as directed by the anticoagulation clinic., Disp: 100 tablet, Rfl: 0  •  warfarin (COUMADIN) 5 MG tablet, TAKE 1 TABLET BY MOUTH DAILY OR AS DIRECTED BY ANTICOAGULATION CLINIC., Disp: 45 tablet, Rfl: 4          ROS  Review of Systems   Cardiovascular: Negative for chest pain, dyspnea on exertion, irregular heartbeat and syncope.   Respiratory: Negative for cough, shortness of breath and wheezing.          SOCIAL HX  Social History     Socioeconomic History   • Marital status: Legally      Spouse name: Not on file   • Number of children: Not on file   • Years of education: Not on file   • Highest education level: Not on file   Tobacco Use   • Smoking status: Never Smoker   • Smokeless tobacco: Never Used   Substance and Sexual Activity   • Alcohol use: Yes     Comment: social   • Drug use:  No   • Sexual activity: Defer       FAMILY HX  Family History   Problem Relation Age of Onset   • COPD Mother    • Cancer Mother    • Hypertension Father    • Stroke Father    • Stroke Paternal Grandmother    • Hypertension Paternal Grandmother    • Cancer Paternal Grandmother    • Cancer Paternal Grandfather    • Heart disease Paternal Grandfather    • No Known Problems Brother              Vimal John III, MD, FACC

## 2019-07-11 ENCOUNTER — ANTICOAGULATION VISIT (OUTPATIENT)
Dept: PHARMACY | Facility: HOSPITAL | Age: 44
End: 2019-07-11

## 2019-07-11 DIAGNOSIS — Z95.2 HX OF AORTIC VALVE REPLACEMENT, MECHANICAL: ICD-10-CM

## 2019-07-11 NOTE — PROGRESS NOTES
Anticoagulation Clinic - Remote Progress Note  REMOTE LAB    Indication: On-X Mechanical valve #25 and a 30mm hemashield; stroke  Referring Provider: Alvaro  Initial Warfarin Start Date:  ~9/2018  Goal INR: 2.0-3.0 per referral  Current Drug Interactions: , lansoprazole, levothyroxine, Trintellix  Bleed Risk: ascending aortic aneurysm   Other: Hodgkins Lymphoma (hx of chemo and radiation); h/o CVA + hemorrhagic pancreatitis (during hospitalization for mAVR)    Diet: avoids GLV  Alcohol: socially  Tobacco: none  OTC Pain Medication: APAP PRN pain    INR History:  Date 9/27 9/28 10/1 10/4 10/8 10/11 10/17 10/19 10/23   Total Weekly Dose 8mg 12mg 19mg 29mg 31mg 34mg 43mg 41mg 45mg   INR 1.3 1.22 1.3 1.8 1.85 1.7 2.91 2.82 3.79   Notes clinic enox enox enox; clinic enox enox        Date 11/8 11/16 12/21 1/9/19 1/16 2/4 3/1 3/22 5/16   Total Weekly Dose 49mg 49mg 49mg 49mg 47mg 49mg 49mg 49mg 49mg   INR 3.0 2.96 3.44 4.4 2.36 2.62 3.02 2.15 2.6   Notes   desvenla   self adj        Date 6/13 6/24 7/10         Total Weekly Dose 49mg 49mg 49mg 49mg        INR 2.25 2.98 2.83         Notes              Phone Interview:  Verbal Release Authorization: please contact Mr. Hall directly - if unable to reach patient may contact [email or web address]  Tablet Strength: , 2mg, and 5mg tablets  Patient Contact Info: [phone]; Susan will call and speak with us  Lab Contact Info: Megan Vu     Patient Findings   Negatives:  Signs/symptoms of thrombosis, Signs/symptoms of bleeding, Laboratory test error suspected, Change in health, Change in alcohol use, Change in activity, Upcoming invasive procedure, Emergency department visit, Upcoming dental procedure, Missed doses, Extra doses, Change in medications, Change in diet/appetite, Hospital admission, Bruising, Other complaints   Comments:  Patient denies all findings.     Plan:  1. INR was therapeutic on 7/10 at 2.83. Instructed Mr. Hall to continue warfarin 7mg daily.  2.  Repeat INR in 4 weeks.  3. Verbal information provided over the phone. Manjeet Hall RBV dosing instructions, expresses understanding by teach back, and has no further questions at this time.    Abdi Petit CPhT  7/11/2019  10:42 AM    I, Amor Briggs, [facility], have reviewed the note in full and agree with the assessment and plan.  07/12/19  11:14 AM

## 2019-07-22 ENCOUNTER — LAB (OUTPATIENT)
Dept: LAB | Facility: HOSPITAL | Age: 44
End: 2019-07-22

## 2019-07-22 DIAGNOSIS — Z95.2 HX OF AORTIC VALVE REPLACEMENT, MECHANICAL: ICD-10-CM

## 2019-07-22 LAB
INR PPP: 2.97 (ref 0.85–1.16)
PROTHROMBIN TIME: 29.7 SECONDS (ref 11.2–14.3)

## 2019-07-22 PROCEDURE — 36415 COLL VENOUS BLD VENIPUNCTURE: CPT

## 2019-07-22 PROCEDURE — 85610 PROTHROMBIN TIME: CPT

## 2019-07-23 ENCOUNTER — ANTICOAGULATION VISIT (OUTPATIENT)
Dept: PHARMACY | Facility: HOSPITAL | Age: 44
End: 2019-07-23

## 2019-07-23 DIAGNOSIS — Z95.2 HX OF AORTIC VALVE REPLACEMENT, MECHANICAL: ICD-10-CM

## 2019-07-23 RX ORDER — ARIPIPRAZOLE 2 MG/1
2 TABLET ORAL DAILY
Refills: 2 | COMMUNITY
Start: 2019-07-11 | End: 2019-11-19

## 2019-07-23 NOTE — PROGRESS NOTES
Anticoagulation Clinic - Remote Progress Note  REMOTE LAB    Indication: On-X Mechanical valve #25 and a 30mm hemashield; stroke  Referring Provider: Alvaro  Initial Warfarin Start Date:  ~9/2018  Goal INR: 2.0-3.0 per referral  Current Drug Interactions: , lansoprazole, levothyroxine, Trintellix  Bleed Risk: ascending aortic aneurysm   Other: Hodgkins Lymphoma (hx of chemo and radiation); h/o CVA + hemorrhagic pancreatitis (during hospitalization for mAVR)    Diet: avoids GLV  Alcohol: socially  Tobacco: none  OTC Pain Medication: APAP PRN pain    INR History:  Date 9/27 9/28 10/1 10/4 10/8 10/11 10/17 10/19 10/23   Total Weekly Dose 8mg 12mg 19mg 29mg 31mg 34mg 43mg 41mg 45mg   INR 1.3 1.22 1.3 1.8 1.85 1.7 2.91 2.82 3.79   Notes clinic enox enox enox; clinic enox enox        Date 11/8 11/16 12/21 1/9/19 1/16 2/4 3/1 3/22 5/16   Total Weekly Dose 49mg 49mg 49mg 49mg 47mg 49mg 49mg 49mg 49mg   INR 3.0 2.96 3.44 4.4 2.36 2.62 3.02 2.15 2.6   Notes   desvenla   self adj        Date 6/13 6/24 7/10 7/22        Total Weekly Dose 49mg 49mg 49mg 49mg        INR 2.25 2.98 2.83 2.97        Notes              *takes warfarin in AM*    Phone Interview:  Verbal Release Authorization: please contact Mr. Hall directly - if unable to reach patient may contact Marko erickson  Tablet Strength: 2mg, and 5mg tablets  Patient Contact Info: [phone]; Susan will call and speak with us  Lab Contact Info: Megan Vu     Patient Findings   Positives:  Change in medications   Negatives:  Signs/symptoms of thrombosis, Signs/symptoms of bleeding, Laboratory test error suspected, Change in health, Change in alcohol use, Change in activity, Upcoming invasive procedure, Emergency department visit, Upcoming dental procedure, Missed doses, Extra doses, Change in diet/appetite, Hospital admission, Bruising, Other complaints   Comments:  Started taking aripiprazole 2mg QD on 7/11. No interaction noted in [employer]rom[employer]. He feels he  will be talking to his PCP rather soon about this medication, he does not like how it makes him feel.     GLV intake seems to be rather sporadic, will try to be consistent going forward.     Plan:  1. INR was therapeutic yesterday at 2.97, though this is near the upper limit of his INR goal range. Instructed Mr. Hall to be consistent with GLV intake and continue warfarin 7mg daily.  2. Repeat INR in 2 weeks.  3. Verbal information provided over the phone. Manjeet Hall RBV dosing instructions, expresses understanding by teach back, and has no further questions at this time.    Abdi Petit CPhT  7/23/2019  8:25 AM     I, Amor Briggs, [facility], have reviewed the note in full and agree with the assessment and plan.  07/23/19  4:22 PM

## 2019-08-05 ENCOUNTER — LAB (OUTPATIENT)
Dept: LAB | Facility: HOSPITAL | Age: 44
End: 2019-08-05

## 2019-08-05 DIAGNOSIS — Z95.2 HX OF AORTIC VALVE REPLACEMENT, MECHANICAL: ICD-10-CM

## 2019-08-05 LAB
INR PPP: 2.9 (ref 0.85–1.16)
PROTHROMBIN TIME: 29.2 SECONDS (ref 11.2–14.3)

## 2019-08-05 PROCEDURE — 85610 PROTHROMBIN TIME: CPT

## 2019-08-05 PROCEDURE — 36415 COLL VENOUS BLD VENIPUNCTURE: CPT

## 2019-08-06 ENCOUNTER — ANTICOAGULATION VISIT (OUTPATIENT)
Dept: PHARMACY | Facility: HOSPITAL | Age: 44
End: 2019-08-06

## 2019-08-06 DIAGNOSIS — Z95.2 HX OF AORTIC VALVE REPLACEMENT, MECHANICAL: ICD-10-CM

## 2019-08-06 RX ORDER — LAMOTRIGINE 25 MG/1
TABLET ORAL
Refills: 0 | COMMUNITY
Start: 2019-07-29 | End: 2019-10-02

## 2019-08-06 NOTE — PROGRESS NOTES
Anticoagulation Clinic - Remote Progress Note  REMOTE LAB    Indication: On-X Mechanical valve #25 and a 30mm hemashield; stroke  Referring Provider: Alvaro  Initial Warfarin Start Date:  ~9/2018  Goal INR: 2.0-3.0 per referral  Current Drug Interactions: , lansoprazole, levothyroxine, Trintellix  Bleed Risk: ascending aortic aneurysm   Other: Hodgkins Lymphoma (hx of chemo and radiation); h/o CVA + hemorrhagic pancreatitis (during hospitalization for mAVR)    Diet: avoids GLV  Alcohol: socially  Tobacco: none  OTC Pain Medication: APAP PRN pain    INR History:  Date 9/27 9/28 10/1 10/4 10/8 10/11 10/17 10/19 10/23   Total Weekly Dose 8mg 12mg 19mg 29mg 31mg 34mg 43mg 41mg 45mg   INR 1.3 1.22 1.3 1.8 1.85 1.7 2.91 2.82 3.79   Notes clinic enox enox enox; clinic enox enox        Date 11/8 11/16 12/21 1/9/19 1/16 2/4 3/1 3/22 5/16   Total Weekly Dose 49mg 49mg 49mg 49mg 47mg 49mg 49mg 49mg 49mg   INR 3.0 2.96 3.44 4.4 2.36 2.62 3.02 2.15 2.6   Notes   desvenla   self adj        Date 6/13 6/24 7/10 7/22 8/5       Total Weekly Dose 49mg 49mg 49mg 49mg 49mg       INR 2.25 2.98 2.83 2.97 2.9       Notes              *takes warfarin in AM*    Phone Interview:  Verbal Release Authorization: please contact Mr. Hall directly - if unable to reach patient may contact Marko erickson  Tablet Strength: 2mg, and 5mg tablets  Patient Contact Info: [phone]; Susan will call and speak with us  Lab Contact Info: Megan Vu     Patient Findings   Negatives:  Signs/symptoms of thrombosis, Signs/symptoms of bleeding, Laboratory test error suspected, Change in health, Change in alcohol use, Change in activity, Upcoming invasive procedure, Emergency department visit, Upcoming dental procedure, Missed doses, Extra doses, Change in medications, Change in diet/appetite, Hospital admission, Bruising, Other complaints   Comments:  Have clarified with patient that he is currently taking 325mg (1.5x 200mg tab and 1x of 25mg  tab) lamictal QD then after 2 weeks will be increased to 350mg (1.5x 200mg tab and 2x 25mg tab) QD     Plan:  1. INR was therapeutic yesterday at 2.90. Instructed Mr. Hall to continue warfarin 7mg daily.  2. Repeat INR in 3 weeks, 8/26,   3. Verbal information provided over the phone. Manjeet Hall RBV dosing instructions, expresses understanding by teach back, and has no further questions at this time.    Abdi Petit CPhT  8/6/2019  8:35 AM    I, Sondra Brooks, PharmD, have reviewed the note in full and agree with the assessment and plan.  08/08/19  4:50 PM

## 2019-08-13 ENCOUNTER — TELEPHONE (OUTPATIENT)
Dept: CARDIOLOGY | Facility: CLINIC | Age: 44
End: 2019-08-13

## 2019-08-13 ENCOUNTER — HOSPITAL ENCOUNTER (OUTPATIENT)
Dept: CARDIOLOGY | Facility: HOSPITAL | Age: 44
Discharge: HOME | End: 2019-08-13
Admitting: INTERNAL MEDICINE

## 2019-08-13 ENCOUNTER — HOSPITAL ENCOUNTER (OUTPATIENT)
Dept: CT IMAGING | Facility: HOSPITAL | Age: 44
Discharge: HOME | End: 2019-08-13

## 2019-08-13 VITALS — WEIGHT: 183 LBS | HEIGHT: 69 IN | BODY MASS INDEX: 27.11 KG/M2

## 2019-08-13 DIAGNOSIS — Q24.9 CONGENITAL HEART DISEASE: ICD-10-CM

## 2019-08-13 DIAGNOSIS — I71.20 THORACIC AORTIC ANEURYSM WITHOUT RUPTURE (HCC): ICD-10-CM

## 2019-08-13 LAB
BH CV ECHO MEAS - AO MAX PG (FULL): 3.8 MMHG
BH CV ECHO MEAS - AO MAX PG: 6.6 MMHG
BH CV ECHO MEAS - AO MEAN PG (FULL): 2.1 MMHG
BH CV ECHO MEAS - AO MEAN PG: 3.6 MMHG
BH CV ECHO MEAS - AO ROOT AREA (BSA CORRECTED): 1.4
BH CV ECHO MEAS - AO ROOT AREA: 6.1 CM^2
BH CV ECHO MEAS - AO ROOT DIAM: 2.8 CM
BH CV ECHO MEAS - AO V2 MAX: 128.1 CM/SEC
BH CV ECHO MEAS - AO V2 MEAN: 88.2 CM/SEC
BH CV ECHO MEAS - AO V2 VTI: 23.3 CM
BH CV ECHO MEAS - AVA(I,A): 2.5 CM^2
BH CV ECHO MEAS - AVA(I,D): 2.5 CM^2
BH CV ECHO MEAS - AVA(V,A): 2.1 CM^2
BH CV ECHO MEAS - AVA(V,D): 2.1 CM^2
BH CV ECHO MEAS - BSA(HAYCOCK): 2 M^2
BH CV ECHO MEAS - BSA: 2 M^2
BH CV ECHO MEAS - BZI_BMI: 27 KILOGRAMS/M^2
BH CV ECHO MEAS - BZI_METRIC_HEIGHT: 175.3 CM
BH CV ECHO MEAS - BZI_METRIC_WEIGHT: 83 KG
BH CV ECHO MEAS - EDV(CUBED): 69.5 ML
BH CV ECHO MEAS - EDV(MOD-SP2): 48 ML
BH CV ECHO MEAS - EDV(MOD-SP4): 59 ML
BH CV ECHO MEAS - EDV(TEICH): 74.8 ML
BH CV ECHO MEAS - EF(CUBED): 58.8 %
BH CV ECHO MEAS - EF(MOD-BP): 49 %
BH CV ECHO MEAS - EF(MOD-SP2): 54.2 %
BH CV ECHO MEAS - EF(MOD-SP4): 49.2 %
BH CV ECHO MEAS - EF(TEICH): 50.9 %
BH CV ECHO MEAS - ESV(CUBED): 28.6 ML
BH CV ECHO MEAS - ESV(MOD-SP2): 22 ML
BH CV ECHO MEAS - ESV(MOD-SP4): 30 ML
BH CV ECHO MEAS - ESV(TEICH): 36.7 ML
BH CV ECHO MEAS - FS: 25.6 %
BH CV ECHO MEAS - IVS/LVPW: 0.95
BH CV ECHO MEAS - IVSD: 1.2 CM
BH CV ECHO MEAS - LA DIMENSION: 4.4 CM
BH CV ECHO MEAS - LA/AO: 1.6
BH CV ECHO MEAS - LAD MAJOR: 6 CM
BH CV ECHO MEAS - LAT PEAK E' VEL: 5.9 CM/SEC
BH CV ECHO MEAS - LATERAL E/E' RATIO: 18.2
BH CV ECHO MEAS - LV DIASTOLIC VOL/BSA (35-75): 29.7 ML/M^2
BH CV ECHO MEAS - LV MASS(C)D: 181.1 GRAMS
BH CV ECHO MEAS - LV MASS(C)DI: 91 GRAMS/M^2
BH CV ECHO MEAS - LV MAX PG: 2.8 MMHG
BH CV ECHO MEAS - LV MEAN PG: 1.4 MMHG
BH CV ECHO MEAS - LV SYSTOLIC VOL/BSA (12-30): 15.1 ML/M^2
BH CV ECHO MEAS - LV V1 MAX: 82.9 CM/SEC
BH CV ECHO MEAS - LV V1 MEAN: 56.2 CM/SEC
BH CV ECHO MEAS - LV V1 VTI: 18.2 CM
BH CV ECHO MEAS - LVIDD: 4.1 CM
BH CV ECHO MEAS - LVIDS: 3.1 CM
BH CV ECHO MEAS - LVLD AP2: 6.4 CM
BH CV ECHO MEAS - LVLD AP4: 6.9 CM
BH CV ECHO MEAS - LVLS AP2: 5.4 CM
BH CV ECHO MEAS - LVLS AP4: 6.2 CM
BH CV ECHO MEAS - LVOT AREA (M): 3.1 CM^2
BH CV ECHO MEAS - LVOT AREA: 3.2 CM^2
BH CV ECHO MEAS - LVOT DIAM: 2 CM
BH CV ECHO MEAS - LVPWD: 1.3 CM
BH CV ECHO MEAS - MED PEAK E' VEL: 4.9 CM/SEC
BH CV ECHO MEAS - MEDIAL E/E' RATIO: 21.8
BH CV ECHO MEAS - MV A MAX VEL: 80.9 CM/SEC
BH CV ECHO MEAS - MV DEC SLOPE: 504.3 CM/SEC^2
BH CV ECHO MEAS - MV DEC TIME: 0.16 SEC
BH CV ECHO MEAS - MV E MAX VEL: 110.1 CM/SEC
BH CV ECHO MEAS - MV E/A: 1.4
BH CV ECHO MEAS - MV MAX PG: 7.4 MMHG
BH CV ECHO MEAS - MV MEAN PG: 2.5 MMHG
BH CV ECHO MEAS - MV P1/2T MAX VEL: 145.2 CM/SEC
BH CV ECHO MEAS - MV P1/2T: 84.4 MSEC
BH CV ECHO MEAS - MV V2 MAX: 135.7 CM/SEC
BH CV ECHO MEAS - MV V2 MEAN: 71.8 CM/SEC
BH CV ECHO MEAS - MV V2 VTI: 41.4 CM
BH CV ECHO MEAS - MVA P1/2T LCG: 1.5 CM^2
BH CV ECHO MEAS - MVA(P1/2T): 2.6 CM^2
BH CV ECHO MEAS - MVA(VTI): 1.4 CM^2
BH CV ECHO MEAS - PA ACC SLOPE: 294.4 CM/SEC^2
BH CV ECHO MEAS - PA ACC TIME: 0.21 SEC
BH CV ECHO MEAS - PA MAX PG: 12.8 MMHG
BH CV ECHO MEAS - PA MEAN PG: 8.6 MMHG
BH CV ECHO MEAS - PA PR(ACCEL): -16.1 MMHG
BH CV ECHO MEAS - PA V2 MAX: 178.6 CM/SEC
BH CV ECHO MEAS - PA V2 MEAN: 141.1 CM/SEC
BH CV ECHO MEAS - PA V2 VTI: 47.7 CM
BH CV ECHO MEAS - PULM A REVS VEL: 28.6 CM/SEC
BH CV ECHO MEAS - PULM DIAS VEL: 65.4 CM/SEC
BH CV ECHO MEAS - PULM S/D: 0.74
BH CV ECHO MEAS - PULM SYS VEL: 48.1 CM/SEC
BH CV ECHO MEAS - RAP SYSTOLE: 3 MMHG
BH CV ECHO MEAS - RVSP: 28 MMHG
BH CV ECHO MEAS - SI(AO): 71.3 ML/M^2
BH CV ECHO MEAS - SI(CUBED): 20.6 ML/M^2
BH CV ECHO MEAS - SI(LVOT): 29.7 ML/M^2
BH CV ECHO MEAS - SI(MOD-SP2): 13.1 ML/M^2
BH CV ECHO MEAS - SI(MOD-SP4): 14.6 ML/M^2
BH CV ECHO MEAS - SI(TEICH): 19.1 ML/M^2
BH CV ECHO MEAS - SV(AO): 141.7 ML
BH CV ECHO MEAS - SV(CUBED): 40.9 ML
BH CV ECHO MEAS - SV(LVOT): 59 ML
BH CV ECHO MEAS - SV(MOD-SP2): 26 ML
BH CV ECHO MEAS - SV(MOD-SP4): 29 ML
BH CV ECHO MEAS - SV(TEICH): 38 ML
BH CV ECHO MEAS - TAPSE (>1.6): 1.2 CM2
BH CV ECHO MEAS - TR MAX PG: 25 MMHG
BH CV ECHO MEAS - TR MAX VEL: 249.8 CM/SEC
BH CV ECHO MEASUREMENTS AVERAGE E/E' RATIO: 20.39
BH CV XLRA - RV BASE: 3.3 CM
BH CV XLRA - RV LENGTH: 7.6 CM
BH CV XLRA - RV MID: 2.8 CM
BH CV XLRA - TDI S': 5.5 CM/SEC
MAXIMAL PREDICTED HEART RATE: 177 BPM
STRESS TARGET HR: 150 BPM

## 2019-08-13 PROCEDURE — 25010000002 IOPAMIDOL 61 % SOLUTION: Performed by: INTERNAL MEDICINE

## 2019-08-13 PROCEDURE — 93306 TTE W/DOPPLER COMPLETE: CPT

## 2019-08-13 PROCEDURE — 93306 TTE W/DOPPLER COMPLETE: CPT | Performed by: INTERNAL MEDICINE

## 2019-08-13 PROCEDURE — 71270 CT THORAX DX C-/C+: CPT

## 2019-08-13 RX ADMIN — IOPAMIDOL 85 ML: 612 INJECTION, SOLUTION INTRAVENOUS at 09:05

## 2019-08-13 NOTE — TELEPHONE ENCOUNTER
----- Message from Vimal John III, MD sent at 8/13/2019  2:48 PM EDT -----  Normal strength of heart muscle, normal valve function.

## 2019-08-13 NOTE — TELEPHONE ENCOUNTER
Patient notified of normal strength of heart muscle, normal valve function from his echo. Verbalized understanding.

## 2019-08-16 ENCOUNTER — TELEPHONE (OUTPATIENT)
Dept: CARDIOLOGY | Facility: CLINIC | Age: 44
End: 2019-08-16

## 2019-08-26 ENCOUNTER — LAB (OUTPATIENT)
Dept: LAB | Facility: HOSPITAL | Age: 44
End: 2019-08-26

## 2019-08-26 DIAGNOSIS — Z95.2 HX OF AORTIC VALVE REPLACEMENT, MECHANICAL: ICD-10-CM

## 2019-08-26 LAB
INR PPP: 3.2 (ref 0.85–1.16)
PROTHROMBIN TIME: 31.5 SECONDS (ref 11.2–14.3)

## 2019-08-26 PROCEDURE — 85610 PROTHROMBIN TIME: CPT

## 2019-08-26 PROCEDURE — 36415 COLL VENOUS BLD VENIPUNCTURE: CPT

## 2019-08-27 ENCOUNTER — ANTICOAGULATION VISIT (OUTPATIENT)
Dept: PHARMACY | Facility: HOSPITAL | Age: 44
End: 2019-08-27

## 2019-08-27 DIAGNOSIS — Z95.2 HX OF AORTIC VALVE REPLACEMENT, MECHANICAL: ICD-10-CM

## 2019-08-27 NOTE — PROGRESS NOTES
Anticoagulation Clinic - Remote Progress Note  REMOTE LAB    Indication: On-X Mechanical valve #25 and a 30mm hemashield; stroke  Referring Provider: Alvaro  Initial Warfarin Start Date:  ~9/2018  Goal INR: 2.0-3.0 per referral  Current Drug Interactions: , lansoprazole, levothyroxine, Trintellix  Bleed Risk: ascending aortic aneurysm   Other: Hodgkins Lymphoma (hx of chemo and radiation); h/o CVA + hemorrhagic pancreatitis (during hospitalization for mAVR)    Diet: avoids GLV  Alcohol: socially  Tobacco: none  OTC Pain Medication: APAP PRN pain    INR History:  Date 9/27 9/28 10/1 10/4 10/8 10/11 10/17 10/19 10/23   Total Weekly Dose 8mg 12mg 19mg 29mg 31mg 34mg 43mg 41mg 45mg   INR 1.3 1.22 1.3 1.8 1.85 1.7 2.91 2.82 3.79   Notes clinic enox enox enox; clinic enox enox        Date 11/8 11/16 12/21 1/9/19 1/16 2/4 3/1 3/22 5/16   Total Weekly Dose 49mg 49mg 49mg 49mg 47mg 49mg 49mg 49mg 49mg   INR 3.0 2.96 3.44 4.4 2.36 2.62 3.02 2.15 2.6   Notes   desvenla   self adj        Date 6/13 6/24 7/10 7/22 8/5 8/26      Total Weekly Dose 49mg 49mg 49mg 49mg 49mg 49mg      INR 2.25 2.98 2.83 2.97 2.9 3.2      Notes              *takes warfarin in AM*    Phone Interview:  Verbal Release Authorization: please contact Mr. Hall directly - if unable to reach patient may contact Marko erickson  Tablet Strength: 2mg, and 5mg tablets  Patient Contact Info: [phone]; Susan will call and speak with us  Lab Contact Info: Megan Vu     Patient Findings   Negatives:  Signs/symptoms of thrombosis, Signs/symptoms of bleeding, Laboratory test error suspected, Change in health, Change in alcohol use, Change in activity, Upcoming invasive procedure, Emergency department visit, Upcoming dental procedure, Missed doses, Extra doses, Change in medications, Change in diet/appetite, Hospital admission, Bruising, Other complaints   Comments:  Have clarified with patient that he is currently taking 325mg (1.5x 200mg tab and 1x  of 25mg tab) lamictal QD. He is due to increase his dose to 350mg (1.5x 200mg tab and 2x 25mg tab) QD. He has not done this yet. Otherwise he denies all patient findings at this time.     Plan:  1. INR was slightly SUPRAtherapeutic yesterday. Instructed Mr. Hall to decrease his dose to 5mg tomorrow (pt has already taken his dose of warfarin today) and then continue warfarin 7mg daily.  2. Repeat INR in 3 weeks, 9/16.   3. Verbal information provided over the phone. Manjeetpedro Hall RBV dosing instructions, expresses understanding by teach back, and has no further questions at this time.    Sondra Brooks, PharmD  8/27/2019  8:37 AM

## 2019-09-05 RX ORDER — LEVOTHYROXINE SODIUM 0.1 MG/1
TABLET ORAL
Qty: 30 TABLET | Refills: 2 | Status: SHIPPED | OUTPATIENT
Start: 2019-09-05 | End: 2019-11-29 | Stop reason: SDUPTHER

## 2019-09-20 ENCOUNTER — TELEPHONE (OUTPATIENT)
Dept: FAMILY MEDICINE CLINIC | Facility: CLINIC | Age: 44
End: 2019-09-20

## 2019-09-20 NOTE — TELEPHONE ENCOUNTER
Informed Susan that we haven't received the forms yet.  She said she will get in contact with Felipe about the forms.  ETHAN CORNEJO    ----- Message from Susan Newman sent at 9/20/2019 11:12 AM EDT -----  Contact: [phone]  Susan Delgadillo- Emergency contact called and is wanting to see if we have received paperwork from his long term disability claims. They are needing updated documentation about his condition.  Please advise.

## 2019-09-25 ENCOUNTER — LAB (OUTPATIENT)
Dept: LAB | Facility: HOSPITAL | Age: 44
End: 2019-09-25

## 2019-09-25 DIAGNOSIS — Z95.2 HX OF AORTIC VALVE REPLACEMENT, MECHANICAL: ICD-10-CM

## 2019-09-25 LAB
INR PPP: 3.57 (ref 0.85–1.16)
PROTHROMBIN TIME: 34.3 SECONDS (ref 11.2–14.3)

## 2019-09-25 PROCEDURE — 85610 PROTHROMBIN TIME: CPT

## 2019-09-25 PROCEDURE — 36415 COLL VENOUS BLD VENIPUNCTURE: CPT

## 2019-09-26 ENCOUNTER — ANTICOAGULATION VISIT (OUTPATIENT)
Dept: PHARMACY | Facility: HOSPITAL | Age: 44
End: 2019-09-26

## 2019-09-26 DIAGNOSIS — Z95.2 HX OF AORTIC VALVE REPLACEMENT, MECHANICAL: ICD-10-CM

## 2019-09-26 RX ORDER — LAMOTRIGINE 200 MG/1
300 TABLET ORAL DAILY
Qty: 135 TABLET | Refills: 0 | Status: SHIPPED | OUTPATIENT
Start: 2019-09-26 | End: 2019-10-02

## 2019-09-26 NOTE — PROGRESS NOTES
Anticoagulation Clinic - Remote Progress Note  REMOTE LAB    Indication: On-X Mechanical valve #25 and a 30mm hemashield; stroke  Referring Provider: Alvaro  Initial Warfarin Start Date:  ~9/2018  Goal INR: 2.0-3.0 per referral  Current Drug Interactions: , lansoprazole, levothyroxine, Trintellix  Bleed Risk: ascending aortic aneurysm   Other: Hodgkins Lymphoma (hx of chemo and radiation); h/o CVA + hemorrhagic pancreatitis (during hospitalization for mAVR)    Diet: avoids GLV  Alcohol: socially  Tobacco: none  OTC Pain Medication: APAP PRN pain    INR History:  Date 9/27 9/28 10/1 10/4 10/8 10/11 10/17 10/19 10/23   Total Weekly Dose 8mg 12mg 19mg 29mg 31mg 34mg 43mg 41mg 45mg   INR 1.3 1.22 1.3 1.8 1.85 1.7 2.91 2.82 3.79   Notes clinic enox enox enox; clinic enox enox        Date 11/8 11/16 12/21 1/9/19 1/16 2/4 3/1 3/22 5/16   Total Weekly Dose 49mg 49mg 49mg 49mg 47mg 49mg 49mg 49mg 49mg   INR 3.0 2.96 3.44 4.4 2.36 2.62 3.02 2.15 2.6   Notes   desvenla   self adj        Date 6/13 6/24 7/10 7/22 8/5 8/26 9/26     Total Weekly Dose 49mg 49mg 49mg 49mg 49mg 49mg 49mg     INR 2.25 2.98 2.83 2.97 2.9 3.2 3.57     Notes              *takes warfarin in AM*    Phone Interview:  Verbal Release Authorization: please contact Mr. Hall directly - if unable to reach patient may contact brotherMarko  Tablet Strength: 2mg, and 5mg tablets  Patient Contact Info: [phone]; Susan will call and speak with us  Lab Contact Info: Megan Vu     Patient Findings   Negatives:  Signs/symptoms of thrombosis, Signs/symptoms of bleeding, Laboratory test error suspected, Change in health, Change in alcohol use, Change in activity, Upcoming invasive procedure, Emergency department visit, Upcoming dental procedure, Missed doses, Extra doses, Change in medications, Change in diet/appetite, Hospital admission, Bruising, Other complaints   Comments:  Says he may be eating fewer vegetables but denies all other changes          Plan:  1. INR was slightly SUPRAtherapeutic again yesterday. Instructed Mr. Hall to decrease his dose  7mg daily except 5mg on Thursdays (4.1% decrease).  2. Repeat INR in 2 weeks, 10/9.   3. Verbal information provided over the phone. Manjeet Hall RBV dosing instructions, expresses understanding by teach back, and has no further questions at this time.    Kayla Stockton, PharmD.  09/26/19   9:16 AM

## 2019-09-30 RX ORDER — ATORVASTATIN CALCIUM 40 MG/1
TABLET, FILM COATED ORAL
Qty: 90 TABLET | Refills: 3 | Status: SHIPPED | OUTPATIENT
Start: 2019-09-30 | End: 2019-11-19

## 2019-10-01 ENCOUNTER — TELEPHONE (OUTPATIENT)
Dept: FAMILY MEDICINE CLINIC | Facility: CLINIC | Age: 44
End: 2019-10-01

## 2019-10-01 NOTE — TELEPHONE ENCOUNTER
Ro Desai with  [facility] was informed.  She said that she would give the pt a call to make an appt.  ETHAN CORNEJO    ----- Message from Nile Faust MD sent at 9/30/2019  5:39 PM EDT -----  Regarding: RE: NEW ORDER  Contact: [phone]  He has not been here so will need to be seen by me before I can ok  ----- Message -----  From: Royal Ferreira MA  Sent: 9/30/2019  12:55 PM  To: Nile Faust MD  Subject: FW: NEW ORDER                                        ----- Message -----  From: Rupali Velez  Sent: 9/30/2019  12:44 PM  To: Royal Ferreira MA  Subject: NEW ORDER                                        Ro Desai with  [facility] outpatient services called and said she has been treating the patient for a year and after a year they need a new order to continue to treat and evaluate for language.  Please call her and advise @  [phone]  Ext. [phone].  Thank you.

## 2019-10-02 ENCOUNTER — OFFICE VISIT (OUTPATIENT)
Dept: FAMILY MEDICINE CLINIC | Facility: CLINIC | Age: 44
End: 2019-10-02

## 2019-10-02 VITALS
HEART RATE: 76 BPM | WEIGHT: 187.2 LBS | OXYGEN SATURATION: 97 % | DIASTOLIC BLOOD PRESSURE: 84 MMHG | SYSTOLIC BLOOD PRESSURE: 114 MMHG | HEIGHT: 69 IN | BODY MASS INDEX: 27.73 KG/M2 | RESPIRATION RATE: 16 BRPM

## 2019-10-02 DIAGNOSIS — Z79.01 WARFARIN ANTICOAGULATION: ICD-10-CM

## 2019-10-02 DIAGNOSIS — F41.1 GENERALIZED ANXIETY DISORDER: ICD-10-CM

## 2019-10-02 DIAGNOSIS — I10 ESSENTIAL HYPERTENSION: Primary | ICD-10-CM

## 2019-10-02 DIAGNOSIS — F32.89 OTHER DEPRESSION: ICD-10-CM

## 2019-10-02 PROCEDURE — 90674 CCIIV4 VAC NO PRSV 0.5 ML IM: CPT | Performed by: FAMILY MEDICINE

## 2019-10-02 PROCEDURE — 90471 IMMUNIZATION ADMIN: CPT | Performed by: FAMILY MEDICINE

## 2019-10-02 PROCEDURE — 99213 OFFICE O/P EST LOW 20 MIN: CPT | Performed by: FAMILY MEDICINE

## 2019-10-02 RX ORDER — DULOXETIN HYDROCHLORIDE 60 MG/1
60 CAPSULE, DELAYED RELEASE ORAL DAILY
Refills: 2 | COMMUNITY
Start: 2019-09-25

## 2019-10-02 RX ORDER — DIVALPROEX SODIUM 250 MG/1
250 TABLET, DELAYED RELEASE ORAL 2 TIMES DAILY PRN
Refills: 2 | COMMUNITY
Start: 2019-09-05 | End: 2019-12-06

## 2019-10-02 NOTE — PROGRESS NOTES
Subjective   Manjeet Hall is a 43 y.o. male.     Depression   Visit Type: follow-up (Mood has improved he is driving again still undergoing speech therapy)  Patient presents with the following symptoms: depressed mood and nervousness/anxiety.  Patient is not experiencing: confusion, decreased concentration, excessive worry, panic, restlessness, shortness of breath and suicidal ideas.  Frequency of symptoms: occasionally   Severity: moderate   Sleep quality: fair  Nighttime awakenings: occasional    Anxiety   Presents for follow-up visit. Symptoms include depressed mood and nervous/anxious behavior. Patient reports no confusion, decreased concentration, dizziness, excessive worry, nausea, panic, restlessness, shortness of breath or suicidal ideas. Symptoms occur most days. The severity of symptoms is moderate. The quality of sleep is fair. Nighttime awakenings: occasional.     His past medical history is significant for depression.   Hypertension   This is a chronic problem. The problem is unchanged. Associated symptoms include anxiety. Pertinent negatives include no headaches or shortness of breath. Risk factors for coronary artery disease include male gender. Current antihypertension treatment includes beta blockers. The current treatment provides significant improvement. Compliance problems include psychosocial issues.  Hypertensive end-organ damage includes CVA.        The following portions of the patient's history were reviewed and updated as appropriate: allergies, current medications, past social history and problem list.    Review of Systems   Constitutional: Negative for appetite change and fatigue.   HENT:        Speech is improved   Respiratory: Negative for chest tightness and shortness of breath.    Gastrointestinal: Negative for abdominal pain, diarrhea and nausea.   Neurological: Negative for dizziness, tremors, weakness, light-headedness and headaches.        Old CVA   Psychiatric/Behavioral:  "Positive for dysphoric mood and sleep disturbance. Negative for agitation, behavioral problems, confusion, decreased concentration and suicidal ideas. The patient is nervous/anxious.         Affect improved       Objective   /84   Pulse 76   Resp 16   Ht 175.3 cm (69\")   Wt 84.9 kg (187 lb 3.2 oz)   SpO2 97%   BMI 27.64 kg/m²   Physical Exam   Constitutional: He is oriented to person, place, and time. He appears well-developed and well-nourished. He is cooperative.   HENT:   Head: Normocephalic.   Right Ear: External ear normal.   Left Ear: External ear normal.   Nose: Nose normal.   Mouth/Throat: Oropharynx is clear and moist.   Eyes: Conjunctivae are normal. Pupils are equal, round, and reactive to light. No scleral icterus.   Neck: Neck supple. Carotid bruit is not present. No thyromegaly present.   Cardiovascular: Normal rate, regular rhythm, normal heart sounds and intact distal pulses.   Pulmonary/Chest: Effort normal and breath sounds normal.   Abdominal: There is no hepatosplenomegaly.   Musculoskeletal: Normal range of motion. He exhibits no edema or tenderness.   Neurological: He is alert and oriented to person, place, and time.   No focal deficits no lateralizing signs   Skin: Skin is warm and dry. No rash noted.   Psychiatric: He has a normal mood and affect. Cognition and memory are normal.   Mood and affect are improving   Nursing note and vitals reviewed.      Assessment/Plan   Problem List Items Addressed This Visit        Active Problems    Depression    Relevant Medications    DULoxetine (CYMBALTA) 60 MG capsule    Generalized anxiety disorder    Relevant Medications    DULoxetine (CYMBALTA) 60 MG capsule    Warfarin anticoagulation    Hypertension - Primary          No orders of the defined types were placed in this encounter.    Roscoe continued speech therapy at [facility], continue Cymbalta which she has found helpful.  He is also taking Trintellix.         "

## 2019-10-17 ENCOUNTER — LAB (OUTPATIENT)
Dept: LAB | Facility: HOSPITAL | Age: 44
End: 2019-10-17

## 2019-10-17 DIAGNOSIS — Z95.2 H/O MECHANICAL AORTIC VALVE REPLACEMENT: ICD-10-CM

## 2019-10-17 DIAGNOSIS — Z95.2 HX OF AORTIC VALVE REPLACEMENT, MECHANICAL: Primary | ICD-10-CM

## 2019-10-17 DIAGNOSIS — Z95.2 HX OF AORTIC VALVE REPLACEMENT, MECHANICAL: ICD-10-CM

## 2019-10-17 LAB
INR PPP: 3.12 (ref 0.85–1.16)
PROTHROMBIN TIME: 30.9 SECONDS (ref 11.2–14.3)

## 2019-10-17 PROCEDURE — 85610 PROTHROMBIN TIME: CPT

## 2019-10-18 ENCOUNTER — ANTICOAGULATION VISIT (OUTPATIENT)
Dept: PHARMACY | Facility: HOSPITAL | Age: 44
End: 2019-10-18

## 2019-10-18 DIAGNOSIS — Z95.2 HX OF AORTIC VALVE REPLACEMENT, MECHANICAL: ICD-10-CM

## 2019-10-18 RX ORDER — WARFARIN SODIUM 2 MG/1
TABLET ORAL
Qty: 100 TABLET | Refills: 0 | Status: SHIPPED | OUTPATIENT
Start: 2019-10-18 | End: 2019-11-15 | Stop reason: SDUPTHER

## 2019-10-18 RX ORDER — WARFARIN SODIUM 2 MG/1
TABLET ORAL
Qty: 60 TABLET | Refills: 1 | OUTPATIENT
Start: 2019-10-18

## 2019-10-18 NOTE — PROGRESS NOTES
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)    Diet: avoids GLV  Alcohol: socially  Tobacco: none  OTC Pain Medication: APAP PRN pain    INR History:  Date 9/27 9/28 10/1 10/4 10/8 10/11 10/17 10/19 10/23   Total Weekly Dose 8mg 12mg 19mg 29mg 31mg 34mg 43mg 41mg 45mg   INR 1.3 1.22 1.3 1.8 1.85 1.7 2.91 2.82 3.79   Notes clinic enox enox enox; clinic enox enox        Date 11/8 11/16 12/21 1/9/19 1/16 2/4 3/1 3/22 5/16   Total Weekly Dose 49mg 49mg 49mg 49mg 47mg 49mg 49mg 49mg 49mg   INR 3.0 2.96 3.44 4.4 2.36 2.62 3.02 2.15 2.6   Notes   desvenla   self adj        Date 6/13 6/24 7/10 7/22 8/5 8/26 9/26 10/18    Total Weekly Dose 49mg 49mg 49mg 49mg 49mg 49mg 49mg 49mg    INR 2.25 2.98 2.83 2.97 2.9 3.2 3.57 3.12    Notes       fewer GLV Cymbalta start 9/25; Depakote start 9/5 1x decr dose   *takes warfarin in AM*    Phone Interview:  Verbal Release Authorization: please contact Mr. Hall directly - if unable to reach patient may contact Marko erickson  Tablet Strength: 2mg, and 5mg tablets  Patient Contact Info: [phone]; Susan will call and speak with us  Lab Contact Info: Megan Vu     Patient Findings:   Positives:  Extra doses, Change in medications   Negatives:  Signs/symptoms of thrombosis, Signs/symptoms of bleeding, Laboratory test error suspected, Change in health, Change in alcohol use, Change in activity, Upcoming invasive procedure, Emergency department visit, Upcoming dental procedure, Missed doses,  Change in diet/appetite, Hospital admission, Bruising, Other complaints   Comments:  Patient reports he started taking Cymbalta 60mg QD on 9/25, also reports he started Depakote 250mg on 9/5. He reports he has been taking warfarin 7mg QD (verbalized as taking 5mg and 2mg in the morning) since our last encounter with patient. Appears he did not decrease dose as instructed. Otherwise feels he has been consistent with GLV intake and denies other findings.     Plan:  1. INR was SUPRAtherapeutic yesterday at 3.12 . After consulting with Sondra Brooks, PharmD, instructed Mr. Hall to DECREASE this morning's dose to warfarin 5mg and then continue warfarin 7mg oral daily.  2. Repeat INR in ~1.5 weeks, 10/29.  3. Verbal information provided over the phone. Manjeet Hall RBV dosing instructions, expresses understanding by teach back, and has no further questions at this time.    Abdi Petit CPhT  10/18/2019  8:16 AM    Note: duloxetine and valproic acid may both increase the risk of bleeding / increase the INR when used concomitantly with warfarin. VPA-warfarin DDI has a rapid onset. Please remind Mr. Hall of the importance of calling the clinic more immediately with any medication changes in the future.  Given (relatively) new DDIs, consider continuing a lower weekly dose hereafter.   IHafsa, [facility], have reviewed the note in full and agree with the assessment and plan.  10/18/19  10:03 AM

## 2019-10-28 ENCOUNTER — LAB (OUTPATIENT)
Dept: LAB | Facility: HOSPITAL | Age: 44
End: 2019-10-28

## 2019-10-28 DIAGNOSIS — Z95.2 HX OF AORTIC VALVE REPLACEMENT, MECHANICAL: ICD-10-CM

## 2019-10-28 DIAGNOSIS — Z95.2 H/O MECHANICAL AORTIC VALVE REPLACEMENT: ICD-10-CM

## 2019-10-28 LAB
INR PPP: 2.88 (ref 0.85–1.16)
PROTHROMBIN TIME: 29.1 SECONDS (ref 11.2–14.3)

## 2019-10-28 PROCEDURE — 85610 PROTHROMBIN TIME: CPT

## 2019-10-29 ENCOUNTER — ANTICOAGULATION VISIT (OUTPATIENT)
Dept: PHARMACY | Facility: HOSPITAL | Age: 44
End: 2019-10-29

## 2019-10-29 DIAGNOSIS — Z95.2 HX OF AORTIC VALVE REPLACEMENT, MECHANICAL: ICD-10-CM

## 2019-11-15 RX ORDER — WARFARIN SODIUM 2 MG/1
TABLET ORAL
Qty: 60 TABLET | Refills: 1 | OUTPATIENT
Start: 2019-11-15

## 2019-11-15 RX ORDER — WARFARIN SODIUM 2 MG/1
TABLET ORAL
Qty: 60 TABLET | Refills: 0 | Status: SHIPPED | OUTPATIENT
Start: 2019-11-15 | End: 2020-01-15 | Stop reason: SDUPTHER

## 2019-11-19 ENCOUNTER — ANTICOAGULATION VISIT (OUTPATIENT)
Dept: PHARMACY | Facility: HOSPITAL | Age: 44
End: 2019-11-19

## 2019-11-19 ENCOUNTER — LAB (OUTPATIENT)
Dept: LAB | Facility: HOSPITAL | Age: 44
End: 2019-11-19

## 2019-11-19 ENCOUNTER — OFFICE VISIT (OUTPATIENT)
Dept: NEUROLOGY | Facility: CLINIC | Age: 44
End: 2019-11-19

## 2019-11-19 VITALS
HEIGHT: 69 IN | BODY MASS INDEX: 23.99 KG/M2 | OXYGEN SATURATION: 98 % | DIASTOLIC BLOOD PRESSURE: 78 MMHG | WEIGHT: 162 LBS | SYSTOLIC BLOOD PRESSURE: 120 MMHG | HEART RATE: 79 BPM

## 2019-11-19 DIAGNOSIS — Z95.2 H/O MECHANICAL AORTIC VALVE REPLACEMENT: ICD-10-CM

## 2019-11-19 DIAGNOSIS — I69.30 SEQUELAE, POST-STROKE: ICD-10-CM

## 2019-11-19 DIAGNOSIS — Z95.2 HX OF AORTIC VALVE REPLACEMENT, MECHANICAL: ICD-10-CM

## 2019-11-19 DIAGNOSIS — I69.30 SEQUELAE, POST-STROKE: Primary | ICD-10-CM

## 2019-11-19 LAB
CHOLEST SERPL-MCNC: 181 MG/DL (ref 0–200)
HDLC SERPL-MCNC: 59 MG/DL (ref 40–60)
INR PPP: 3.33 (ref 0.85–1.16)
LDLC SERPL CALC-MCNC: 92 MG/DL (ref 0–100)
LDLC/HDLC SERPL: 1.57 {RATIO}
PROTHROMBIN TIME: 32.5 SECONDS (ref 11.2–14.3)
TRIGL SERPL-MCNC: 148 MG/DL (ref 0–150)
VLDLC SERPL-MCNC: 29.6 MG/DL (ref 5–40)

## 2019-11-19 PROCEDURE — 99213 OFFICE O/P EST LOW 20 MIN: CPT | Performed by: PSYCHIATRY & NEUROLOGY

## 2019-11-19 PROCEDURE — 85610 PROTHROMBIN TIME: CPT

## 2019-11-19 PROCEDURE — 80061 LIPID PANEL: CPT

## 2019-11-19 PROCEDURE — 36415 COLL VENOUS BLD VENIPUNCTURE: CPT

## 2019-11-19 NOTE — PROGRESS NOTES
Subjective:    CC: Manjeet Hall is seen today for Stroke       HPI:  Current visit- patient has been steadily making progress in terms of his speech.  He continues to go to [facility] for speech therapy twice a week.  He still has difficulty comprehending certain words and he tells me today that it is tough for him to hear certain words.  He has stopped taking atorvastatin but cannot tell me why.  Also takes Depakote.  Again is unsure of why he is taking it, thinks it may be for his headaches.    Last visit- patient speech continues to improve.  He is still getting speech therapy at [facility] at least 2-3 times a week.  He continues to be on Coumadin as well as atorvastatin 40 mg daily.  He is also exercising now and has lost weight.  Takes gabapentin for neuropathy.    Last visit- since his last visit his speech has improved and he is still getting speech therapy twice a week at [facility].  His carotid Doppler that I had ordered showed mild plaques bilaterally but no significant stenosis.  He continues to be on Coumadin with a therapeutic INR.  His lipid panel was as follows-, , , HDL 53.  I had started him on atorvastatin 40 mg which she is continuing to take.  Of note-I also personally reviewed his notes from [facility] which are as follows.    CT head showed decreased attenuation in the left temporal operculum and left inferior parietal lobe and parts of the lateral left superior parietal lobe reflecting a left MCA infarct.  MRI brain also showed restricted diffusion in the left posterior MCA area and left insular cortex (aspect score of 5)  CTA head showed thrombus in the left M1-2 junction with good distal collateral vessels and absent left A1 .The rest of the anterior circulation and vertebral basilar circulation was patent.  CTA neck showed mild carotid plaques without any significant stenosis on either side.  Echo showed EF of 51% with the limitation of right  ventricle and moderate dilatation of left atrium, moderate aortic valve regurg    Initial [id number]-old male accompanied by his wife with a history of aortic stenosis status post valve replacement and VSD repair at age 14, aortic aneurysm and aortic valve regurgitation status post aneurysmal repair and aortic valve repair, anxiety, depression, Hodgkin's lymphoma at age 23 status post chemotherapy and radiation, hypothyroidism presents with a stroke.  As per patient's wife he underwent  a mechanical aortic valve repair as well as and aneurysmal repair in [facility] in August.  He was started on Coumadin soon after the procedure however he developed pancreatitis after surgery hence his Coumadin was stopped for 1 week and he was started on IV heparin.  During that time he had symptoms of garbled speech.  An MRI brain later confirmed a left hemispheric stroke.  He was also found to have a thrombus in his mechanical aortic valve and restarted back on Coumadin.  Currently being bridged with Lovenox.  His last INR yesterday was 1.85.  He has not yet started getting speech therapy and will start soon.    The following portions of the patient's history were reviewed today and updated as of 11/19/2019  : allergies, current medications, past family history, past medical history, past social history, past surgical history and problem list  These document will be scanned to patient's chart.      Current Outpatient Medications:   •  divalproex (DEPAKOTE) 250 MG DR tablet, Take 250 mg by mouth 2 (Two) Times a Day As Needed., Disp: , Rfl: 2  •  DULoxetine (CYMBALTA) 60 MG capsule, Take 60 mg by mouth Daily., Disp: , Rfl: 2  •  gabapentin (NEURONTIN) 300 MG capsule, Take 300 mg by mouth 5 (Five) Times a Day. Take 2 tablets AM, 3 tablets in the afternoon, Disp: , Rfl: 2  •  lansoprazole (PREVACID) 30 MG capsule, TAKE 1 CAPSULE BY MOUTH EVERY DAY, Disp: 30 capsule, Rfl: 2  •  levothyroxine (SYNTHROID, LEVOTHROID) 100 MCG  tablet, TAKE 1 TABLET BY MOUTH EVERY DAY **NEED APPT FOR FURTHER REFILLS, Disp: 30 tablet, Rfl: 2  •  metoprolol tartrate (LOPRESSOR) 25 MG tablet, Take 1 tablet by mouth 2 (Two) Times a Day., Disp: 180 tablet, Rfl: 0  •  TRINTELLIX 20 MG tablet, Take 1 tablet by mouth Daily., Disp: , Rfl: 2  •  warfarin (COUMADIN) 2 MG tablet, Take one tablet by mouth daily or as directed by the anticoagulation clinic., Disp: 60 tablet, Rfl: 0  •  warfarin (COUMADIN) 5 MG tablet, TAKE 1 TABLET BY MOUTH DAILY OR AS DIRECTED BY ANTICOAGULATION CLINIC., Disp: 45 tablet, Rfl: 4   Past Medical History:   Diagnosis Date   • Abnormal heart rhythm    • Anxiety    • Cancer (CMS/HCC)     hodgkins   • Depression    • GERD (gastroesophageal reflux disease)    • Heart murmur    • History of echocardiogram    • History of left heart catheterization    • History of stomach ulcers    • Hypothyroidism    • Mitral valvular disorder    • Thyroid disorder       Past Surgical History:   Procedure Laterality Date   • CARDIAC SURGERY     • SKIN CANCER EXCISION      mass removed from neck and chest for hodgkins      Family History   Problem Relation Age of Onset   • COPD Mother    • Cancer Mother    • Hypertension Father    • Stroke Father    • Stroke Paternal Grandmother    • Hypertension Paternal Grandmother    • Cancer Paternal Grandmother    • Cancer Paternal Grandfather    • Heart disease Paternal Grandfather    • No Known Problems Brother       Social History     Socioeconomic History   • Marital status: Legally      Spouse name: Not on file   • Number of children: Not on file   • Years of education: Not on file   • Highest education level: Not on file   Tobacco Use   • Smoking status: Never Smoker   • Smokeless tobacco: Never Used   Substance and Sexual Activity   • Alcohol use: Yes     Comment: social   • Drug use: No   • Sexual activity: Defer     Review of Systems   Neurological: Positive for speech difficulty.   All other systems  "reviewed and are negative.      Objective:    /78   Pulse 79   Ht 175.3 cm (69\")   Wt 73.5 kg (162 lb)   SpO2 98%   BMI 23.92 kg/m²     Neurology Exam:    General apperance: NAD.     Mental status: Alert, awake and oriented to time place and person.    Recent and Remote memory: Intact.    Attention span and Concentration: Normal.     Language and Speech: Intact- No dysarthria.    Fluency, Naming , Repitition and Comprehension: Difficulty comprehending certain things.  Also reduced fluency however significantly improved since initial visit    Cranial Nerves:   CN II: Visual fields are full. Intact. Fundi - Normal, No papillederma, Pupils - MATTHIEU  CN III, IV and VI: Extraocular movements are intact. Normal saccades.   CN V: Facial sensation is intact.   CN VII: Muscles of facial expression reveal no asymmetry. Intact.   CN VIII: Hearing is intact. Whispered voice intact.   CN IX and X: Palate elevates symmetrically. Intact  CN XI: Shoulder shrug is intact.   CN XII: Tongue is midline without evidence of atrophy or fasciculation.     Ophthalmoscopic exam of optic disc-normal    Motor:  Right UE muscle strength 5/5. Normal tone.     Left UE muscle strength 5/5. Normal tone.      Right LE muscle strength5/5. Normal tone.     Left LE muscle strength 5/5. Normal tone.      Sensory: Normal light touch, vibration and pinprick sensation bilaterally.    DTRs: 2+ bilaterally in upper and lower extremities.    Babinski: Negative bilaterally.    Co-ordination: Normal finger-to-nose, heel to shin B/L.    Rhomberg: Negative.    Gait: Normal.    Cardiovascular: Regular rate and rhythm without murmur, gallop or rub.    Assessment and Plan:  1. Sequelae, post-stroke  Patient had a left hemispheric infarct secondary to an artificial aortic valve thrombus after he was taken off Coumadin briefly for pancreatitis  Currently on Coumadin.  Goal INR 2.5-3.5  Maintain blood pressure less than 130/90  He has stopped taking Lipitor.  " I will repeat a fasting lipid panel today and may resume Lipitor if LDL still high  Continue speech therapy    - Lipid Panel; Future       Return in about 6 months (around 5/19/2020).         Lindsay Summers MD

## 2019-11-19 NOTE — PROGRESS NOTES
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)    Diet: avoids GLV 10/29/19  Alcohol: socially  Tobacco: none  OTC Pain Medication: APAP PRN pain    INR History:  Date 9/27 9/28 10/1 10/4 10/8 10/11 10/17 10/19 10/23   Total Weekly Dose 8mg 12mg 19mg 29mg 31mg 34mg 43mg 41mg 45mg   INR 1.3 1.22 1.3 1.8 1.85 1.7 2.91 2.82 3.79   Notes clinic enox enox enox; clinic enox enox        Date 11/8 11/16 12/21 1/9/19 1/16 2/4 3/1 3/22 5/16   Total Weekly Dose 49mg 49mg 49mg 49mg 47mg 49mg 49mg 49mg 49mg   INR 3.0 2.96 3.44 4.4 2.36 2.62 3.02 2.15 2.6   Notes   desvenla   self adj        Date 6/13 6/24 7/10 7/22 8/5 8/26 9/26 10/18 10/28   Total Weekly Dose 49mg 49mg 49mg 49mg 49mg 49mg 49mg 49mg 49mg   INR 2.25 2.98 2.83 2.97 2.9 3.2 3.57 3.12 2.88   Notes       fewer GLV Cymbalta start 9/25; Depakote start 9/5 1x decr dose     Date 11/19           Total Weekly Dose 49mg           INR 3.33           Notes            *takes warfarin in AM*    Phone Interview:  Verbal Release Authorization: please contact Mr. Hall directly - if unable to reach patient may contact brotherMarko  Tablet Strength: 2mg, and 5mg tablets  Patient Contact Info: [phone]; Susan will call and speak with us  Lab Contact Info: Megan Vu       Patient Findings:   Negatives:  Signs/symptoms of thrombosis, Signs/symptoms of bleeding, Laboratory test error suspected, Change in health, Change in alcohol use, Change in activity, Upcoming invasive procedure,  Emergency department visit, Upcoming dental procedure, Missed doses, Extra doses, Change in medications, Change in diet/appetite, Hospital admission, Bruising, Other complaints   Comments:  Patient reported no change in medications or diet. Mr. Hall has not noticed any increases in bleeding or bruising.       Plan:  1. INR in [facility]PRAtherapeutic today at 3.33. Instructed patient to take reduced dose of warfarin 5mg on Wednesday (11/20) and Thursday (11/21), followed by maintenance dose of warfarin 7mg daily. Requested that patient wait to take warfarin dose on 11/26 until contacted by anticoagulation clinic.  2. Repeat INR on 11/26.  3. Verbal information provided over the phone. Manjeet Hall RBV dosing instructions, expresses understanding by teach back, and has no further questions at this time.      Fernanda Young, PharmD  Pharmacy Resident  11/19/2019  4:16 PM

## 2019-11-19 NOTE — PROGRESS NOTES
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)    Diet: avoids GLV 10/29/19  Alcohol: socially  Tobacco: none  OTC Pain Medication: APAP PRN pain    INR History:  Date 9/27 9/28 10/1 10/4 10/8 10/11 10/17 10/19 10/23   Total Weekly Dose 8mg 12mg 19mg 29mg 31mg 34mg 43mg 41mg 45mg   INR 1.3 1.22 1.3 1.8 1.85 1.7 2.91 2.82 3.79   Notes clinic enox enox enox; clinic enox enox        Date 11/8 11/16 12/21 1/9/19 1/16 2/4 3/1 3/22 5/16   Total Weekly Dose 49mg 49mg 49mg 49mg 47mg 49mg 49mg 49mg 49mg   INR 3.0 2.96 3.44 4.4 2.36 2.62 3.02 2.15 2.6   Notes   desvenla   self adj        Date 6/13 6/24 7/10 7/22 8/5 8/26 9/26 10/18 10/28   Total Weekly Dose 49mg 49mg 49mg 49mg 49mg 49mg 49mg 49mg 49mg   INR 2.25 2.98 2.83 2.97 2.9 3.2 3.57 3.12 2.88   Notes       fewer GLV Cymbalta start 9/25; Depakote start 9/5 1x decr dose     Date 11/19           Total Weekly Dose            INR 3.33           Notes            *takes warfarin in AM*    Phone Interview:  Verbal Release Authorization: please contact Mr. Hall directly - if unable to reach patient may contact brotherMarko  Tablet Strength: 2mg, and 5mg tablets  Patient Contact Info: [phone]; Susan will call and speak with us  Lab Contact Info: Megan Vu     Patient Findings:    Plan:  1. INR is supratherapeutic today.  warfarin 7mg oral daily.  2. Repeat INR in two weeks.  3. Verbal information provided over the phone. Manjeet Hall RBV dosing instructions, expresses  understanding by teach back, and has no further questions at this time.    Hafsa Lowery, PharmD  11/19/2019  3:34 PM

## 2019-11-20 ENCOUNTER — TELEPHONE (OUTPATIENT)
Dept: NEUROLOGY | Facility: CLINIC | Age: 44
End: 2019-11-20

## 2019-11-20 NOTE — TELEPHONE ENCOUNTER
----- Message from Lindsay Summers MD sent at 11/20/2019 10:14 AM EST -----  Can you let him know that his cholesterol is within normal limits and he does not have to restart his atorvastatin for now

## 2019-11-30 RX ORDER — LEVOTHYROXINE SODIUM 0.1 MG/1
TABLET ORAL
Qty: 30 TABLET | Refills: 2 | Status: SHIPPED | OUTPATIENT
Start: 2019-11-30 | End: 2020-02-19

## 2019-12-03 ENCOUNTER — APPOINTMENT (OUTPATIENT)
Dept: LAB | Facility: HOSPITAL | Age: 44
End: 2019-12-03

## 2019-12-03 ENCOUNTER — LAB (OUTPATIENT)
Dept: LAB | Facility: HOSPITAL | Age: 44
End: 2019-12-03

## 2019-12-03 ENCOUNTER — TRANSCRIBE ORDERS (OUTPATIENT)
Dept: LAB | Facility: HOSPITAL | Age: 44
End: 2019-12-03

## 2019-12-03 DIAGNOSIS — Z95.2 H/O MECHANICAL AORTIC VALVE REPLACEMENT: ICD-10-CM

## 2019-12-03 DIAGNOSIS — F31.81 BIPOLAR II DISORDER (HCC): Primary | ICD-10-CM

## 2019-12-03 DIAGNOSIS — Z95.2 HX OF AORTIC VALVE REPLACEMENT, MECHANICAL: ICD-10-CM

## 2019-12-03 LAB
ALBUMIN SERPL-MCNC: 4.8 G/DL (ref 3.5–5.2)
ALP SERPL-CCNC: 58 U/L (ref 39–117)
ALT SERPL W P-5'-P-CCNC: 17 U/L (ref 1–41)
AST SERPL-CCNC: 18 U/L (ref 1–40)
BILIRUB CONJ SERPL-MCNC: <0.2 MG/DL (ref 0.2–0.3)
BILIRUB INDIRECT SERPL-MCNC: ABNORMAL MG/DL
BILIRUB SERPL-MCNC: 0.4 MG/DL (ref 0.2–1.2)
INR PPP: 4.06 (ref 0.85–1.16)
PROT SERPL-MCNC: 7.9 G/DL (ref 6–8.5)
PROTHROMBIN TIME: 38 SECONDS (ref 11.2–14.3)
VALPROATE SERPL-MCNC: 81 MCG/ML (ref 50–125)

## 2019-12-03 PROCEDURE — 36415 COLL VENOUS BLD VENIPUNCTURE: CPT | Performed by: NURSE PRACTITIONER

## 2019-12-03 PROCEDURE — 80164 ASSAY DIPROPYLACETIC ACD TOT: CPT | Performed by: NURSE PRACTITIONER

## 2019-12-03 PROCEDURE — 80076 HEPATIC FUNCTION PANEL: CPT | Performed by: NURSE PRACTITIONER

## 2019-12-03 PROCEDURE — 85610 PROTHROMBIN TIME: CPT

## 2019-12-04 ENCOUNTER — ANTICOAGULATION VISIT (OUTPATIENT)
Dept: PHARMACY | Facility: HOSPITAL | Age: 44
End: 2019-12-04

## 2019-12-04 DIAGNOSIS — Z95.2 HX OF AORTIC VALVE REPLACEMENT, MECHANICAL: ICD-10-CM

## 2019-12-04 NOTE — PROGRESS NOTES
Anticoagulation Clinic - Remote Progress Note  REMOTE LAB    Indication: On-X Mechanical valve #25 and a 30mm hemashield; stroke  Referring Provider: Alvaro (Last seen: 7/10/19  next appt: 1/15/20)  Initial Warfarin Start Date:  ~9/2018  Goal INR: 2.0-3.0 per referral  Current Drug Interactions: duloxetine, lansoprazole, levothyroxine, Trintellix, valproic acid  Bleed Risk: ascending aortic aneurysm    Other: Hodgkins Lymphoma (hx of chemo and radiation); h/o CVA + hemorrhagic pancreatitis (during hospitalization for mAVR)    Diet: avoids GLV 10/29/19  Alcohol: socially  Tobacco: none  OTC Pain Medication: APAP PRN pain    INR History:  Date 9/27 9/28 10/1 10/4 10/8 10/11 10/17 10/19 10/23   Total Weekly Dose 8mg 12mg 19mg 29mg 31mg 34mg 43mg 41mg 45mg   INR 1.3 1.22 1.3 1.8 1.85 1.7 2.91 2.82 3.79   Notes clinic enox enox enox; clinic enox enox        Date 11/8 11/16 12/21 1/9/19 1/16 2/4 3/1 3/22 5/16   Total Weekly Dose 49mg 49mg 49mg 49mg 47mg 49mg 49mg 49mg 49mg   INR 3.0 2.96 3.44 4.4 2.36 2.62 3.02 2.15 2.6   Notes   desvenla   self adj        Date 6/13 6/24 7/10 7/22 8/5 8/26 9/26 10/18 10/28   Total Weekly Dose 49mg 49mg 49mg 49mg 49mg 49mg 49mg 49mg 49mg   INR 2.25 2.98 2.83 2.97 2.9 3.2 3.57 3.12 2.88   Notes       fewer GLV Cymbalta start 9/25; Depakote start 9/5 1x decr dose     Date 11/19 12/3          Total Weekly Dose 49mg 49mg 45mg         INR 3.33 4.06          Notes            *takes warfarin in AM*    Phone Interview:  Verbal Release Authorization: please contact Mr. Hall directly - if unable to reach patient may contact brotherMarko  Tablet Strength: 2mg, and 5mg tablets  Patient Contact Info: [phone]; Susan will call and speak with us   Lab Contact Info: Megan Vu     Patient Findings:  Positives:  Change in medications, Bruising   Negatives:  Signs/symptoms of thrombosis, Signs/symptoms of bleeding, Laboratory test error suspected, Change in health, Change in alcohol  use, Change in activity, Upcoming invasive procedure, Emergency department visit, Upcoming dental procedure, Missed doses, Extra doses, Change in diet/appetite, Hospital admission, Other complaints   Comments:  Mr. Hall's Depakote dose was increased to 500mg BID, and he is no longer taking Trintellix. Susan notes he has also reported a slight increase in bruising recently.     Plan:  1. INR was SUPRAtherapeutic again on 12/3 at 4.06. Susan confirmed that Mr. Hall has continued taking warfarin 7mg daily, so for now, recommend that he lower his maintenance dose (8%) to warfarin 7mg daily except 5mg MonFri.  2. Repeat INR in two weeks.  3. Verbal information provided over the phone to Susan. She expresses understanding with teach back and has no further questions at this time.  4. Verified and updated Mr. Hall's meds and relayed Dr. Summers's message re: atorvastatin d/c.     Hafsa Lowery PharmD  12/6/2019  10:17 AM

## 2019-12-06 RX ORDER — ATORVASTATIN CALCIUM 40 MG/1
40 TABLET, FILM COATED ORAL NIGHTLY
COMMUNITY
Start: 2019-11-30 | End: 2019-12-06

## 2019-12-06 RX ORDER — DIVALPROEX SODIUM 500 MG/1
500 TABLET, DELAYED RELEASE ORAL 2 TIMES DAILY
COMMUNITY
Start: 2019-12-03 | End: 2022-07-05 | Stop reason: SDUPTHER

## 2019-12-12 ENCOUNTER — OFFICE VISIT (OUTPATIENT)
Dept: FAMILY MEDICINE CLINIC | Facility: CLINIC | Age: 44
End: 2019-12-12

## 2019-12-12 VITALS
BODY MASS INDEX: 28.64 KG/M2 | RESPIRATION RATE: 16 BRPM | SYSTOLIC BLOOD PRESSURE: 110 MMHG | HEART RATE: 95 BPM | WEIGHT: 193.4 LBS | HEIGHT: 69 IN | DIASTOLIC BLOOD PRESSURE: 70 MMHG | TEMPERATURE: 97.7 F | OXYGEN SATURATION: 96 %

## 2019-12-12 DIAGNOSIS — J40 BRONCHITIS: Primary | ICD-10-CM

## 2019-12-12 PROCEDURE — 99213 OFFICE O/P EST LOW 20 MIN: CPT | Performed by: FAMILY MEDICINE

## 2019-12-12 RX ORDER — CEFUROXIME AXETIL 250 MG/1
250 TABLET ORAL 2 TIMES DAILY
Qty: 20 TABLET | Refills: 0 | Status: SHIPPED | OUTPATIENT
Start: 2019-12-12 | End: 2020-01-09

## 2019-12-12 NOTE — PROGRESS NOTES
"Subjective   Manjeet Hall is a 44 y.o. male.     Cough   This is a new problem. The current episode started in the past 7 days. The problem has been unchanged. The problem occurs every few minutes. The cough is productive of sputum. Associated symptoms include postnasal drip and wheezing. Pertinent negatives include no chest pain, chills, fever, hemoptysis, sore throat or shortness of breath. The symptoms are aggravated by exercise. He has tried OTC cough suppressant for the symptoms. The treatment provided no relief. His past medical history is significant for pneumonia.        The following portions of the patient's history were reviewed and updated as appropriate: allergies, current medications, past social history and problem list.    Review of Systems   Constitutional: Negative for chills, fatigue and fever.   HENT: Positive for congestion and postnasal drip. Negative for sore throat.    Respiratory: Positive for cough, chest tightness and wheezing. Negative for hemoptysis and shortness of breath.    Cardiovascular: Negative for chest pain and palpitations.   Gastrointestinal: Negative for diarrhea, nausea and vomiting.   Genitourinary: Negative for dysuria and hematuria.       Objective   /70   Pulse 95   Temp 97.7 °F (36.5 °C)   Resp 16   Ht 175.3 cm (69\")   Wt 87.7 kg (193 lb 6.4 oz)   SpO2 96%   BMI 28.56 kg/m²   Physical Exam   Constitutional: He is oriented to person, place, and time. He appears well-developed and well-nourished. He is cooperative.   HENT:   Head: Normocephalic.   Right Ear: External ear normal.   Left Ear: External ear normal.   Nose: Nose normal.   Cloudy postnasal drip   Eyes: Pupils are equal, round, and reactive to light. Conjunctivae are normal. No scleral icterus.   Neck: Neck supple. Carotid bruit is not present. No thyromegaly present.   Cardiovascular: Normal rate, regular rhythm, normal heart sounds and intact distal pulses.   Pulmonary/Chest: Effort " normal. He has no wheezes.   Coarse upper airway rhonchi   Abdominal: There is no hepatosplenomegaly.   Musculoskeletal: Normal range of motion.   Lymphadenopathy:     He has no cervical adenopathy.   Neurological: He is alert and oriented to person, place, and time.   No focal deficits no lateralizing signs   Skin: Skin is warm and dry. No rash noted.   Psychiatric: He has a normal mood and affect. Cognition and memory are normal.   Nursing note and vitals reviewed.      Assessment/Plan   Problem List Items Addressed This Visit     None      Visit Diagnoses     Bronchitis    -  Primary          New Medications Ordered This Visit   Medications   • cefuroxime (CEFTIN) 250 MG tablet     Sig: Take 1 tablet by mouth 2 (Two) Times a Day.     Dispense:  20 tablet     Refill:  0

## 2019-12-16 RX ORDER — WARFARIN SODIUM 5 MG/1
TABLET ORAL
Qty: 30 TABLET | Refills: 7 | Status: SHIPPED | OUTPATIENT
Start: 2019-12-16 | End: 2020-08-17

## 2019-12-16 RX ORDER — WARFARIN SODIUM 2 MG/1
TABLET ORAL
Qty: 30 TABLET | Refills: 1 | OUTPATIENT
Start: 2019-12-16

## 2019-12-16 NOTE — TELEPHONE ENCOUNTER
Noted recent initiation of cefuroxime. Called and requested that Mr. Hall have his INR checked today or tomorrow. He is agreeable.

## 2019-12-17 ENCOUNTER — ANTICOAGULATION VISIT (OUTPATIENT)
Dept: PHARMACY | Facility: HOSPITAL | Age: 44
End: 2019-12-17

## 2019-12-17 ENCOUNTER — LAB (OUTPATIENT)
Dept: LAB | Facility: HOSPITAL | Age: 44
End: 2019-12-17

## 2019-12-17 DIAGNOSIS — Z95.2 HX OF AORTIC VALVE REPLACEMENT, MECHANICAL: ICD-10-CM

## 2019-12-17 DIAGNOSIS — Z95.2 H/O MECHANICAL AORTIC VALVE REPLACEMENT: ICD-10-CM

## 2019-12-17 LAB
INR PPP: 2.59 (ref 0.85–1.16)
PROTHROMBIN TIME: 26.7 SECONDS (ref 11.2–14.3)

## 2019-12-17 PROCEDURE — 85610 PROTHROMBIN TIME: CPT

## 2019-12-17 NOTE — PROGRESS NOTES
Anticoagulation Clinic - Remote Progress Note  REMOTE LAB    Indication: On-X Mechanical valve #25 and a 30mm hemashield; stroke  Referring Provider: Alvaro (Last seen: 7/10/19  next appt: 1/15/20)  Initial Warfarin Start Date:  ~9/2018  Goal INR: 2.0-3.0 per referral  Current Drug Interactions: duloxetine, lansoprazole, levothyroxine, Trintellix, valproic acid  Bleed Risk: ascending aortic aneurysm    Other: Hodgkins Lymphoma (hx of chemo and radiation); h/o CVA + hemorrhagic pancreatitis (during hospitalization for mAVR)    Diet: avoids GLV 10/29/19  Alcohol: socially  Tobacco: none  OTC Pain Medication: APAP PRN pain    INR History:  Date 9/27 9/28 10/1 10/4 10/8 10/11 10/17 10/19 10/23   Total Weekly Dose 8mg 12mg 19mg 29mg 31mg 34mg 43mg 41mg 45mg   INR 1.3 1.22 1.3 1.8 1.85 1.7 2.91 2.82 3.79   Notes clinic enox enox enox; clinic enox enox        Date 11/8 11/16 12/21 1/9/19 1/16 2/4 3/1 3/22 5/16   Total Weekly Dose 49mg 49mg 49mg 49mg 47mg 49mg 49mg 49mg 49mg   INR 3.0 2.96 3.44 4.4 2.36 2.62 3.02 2.15 2.6   Notes   desvenla   self adj        Date 6/13 6/24 7/10 7/22 8/5 8/26 9/26 10/18 10/28   Total Weekly Dose 49mg 49mg 49mg 49mg 49mg 49mg 49mg 49mg 49mg   INR 2.25 2.98 2.83 2.97 2.9 3.2 3.57 3.12 2.88   Notes       fewer GLV Cymbalta start 9/25; Depakote start 9/5 1x decr dose     Date 11/19 12/3 12/17         Total Weekly Dose 49mg 49mg 45mg         INR 3.33 4.06 2.59         Notes            *takes warfarin in AM*    Phone Interview:  Verbal Release Authorization: please contact Mr. Hall directly - if unable to reach patient may contact brotherLeeMarko  Tablet Strength: 2mg, and 5mg tablets  Patient Contact Info: [phone]; Susan will call and speak with us ; [email or web address]  Lab Contact Info: Megan Vu     Patient Findings   Negatives:  Signs/symptoms of thrombosis, Signs/symptoms of bleeding, Laboratory test error suspected, Change in health, Change in alcohol use, Change in  activity, Upcoming invasive procedure, Emergency department visit, Upcoming dental procedure, Missed doses, Extra doses, Change in medications, Change in diet/appetite, Hospital admission, Bruising, Other complaints   Comments:  Above findings negative.   Mr Hall stated that he still has some issues communicating verbally and is going the [facility] on Mondays and Wednesdays for therapy.  He stated that he has improved quite a bit since his stroke.     Plan:  1. INR is therapeutic today at 2.59. Instructed Manjeet to continue warfarin 7mg oral daily except 5mg MonFri.  2. Repeat INR on 12/30  3. Verbal information provided over the phone as well as emailed to Manjeet.  He expresses understanding with teach back and has no further questions at this time.    Adeline Bearden, PharmD  12/17/2019  4:14 PM

## 2020-01-03 ENCOUNTER — LAB (OUTPATIENT)
Dept: LAB | Facility: HOSPITAL | Age: 45
End: 2020-01-03

## 2020-01-03 DIAGNOSIS — Z95.2 HX OF AORTIC VALVE REPLACEMENT, MECHANICAL: ICD-10-CM

## 2020-01-03 DIAGNOSIS — Z95.2 H/O MECHANICAL AORTIC VALVE REPLACEMENT: ICD-10-CM

## 2020-01-03 LAB
INR PPP: 3.21 (ref 0.85–1.16)
PROTHROMBIN TIME: 31.6 SECONDS (ref 11.2–14.3)

## 2020-01-03 PROCEDURE — 85610 PROTHROMBIN TIME: CPT

## 2020-01-06 ENCOUNTER — ANTICOAGULATION VISIT (OUTPATIENT)
Dept: PHARMACY | Facility: HOSPITAL | Age: 45
End: 2020-01-06

## 2020-01-06 DIAGNOSIS — Z95.2 HX OF AORTIC VALVE REPLACEMENT, MECHANICAL: ICD-10-CM

## 2020-01-06 NOTE — PROGRESS NOTES
Anticoagulation Clinic - Remote Progress Note  REMOTE LAB    Indication: On-X Mechanical valve #25 and a 30mm hemashield; stroke  Referring Provider: Alvaro (Last seen: 7/10/19  next appt: 1/15/20)  Initial Warfarin Start Date:  ~9/2018  Goal INR: 2.0-3.0 per referral  Current Drug Interactions: duloxetine, lansoprazole, levothyroxine, Trintellix, valproic acid  Bleed Risk: ascending aortic aneurysm    Other: Hodgkins Lymphoma (hx of chemo and radiation); h/o CVA + hemorrhagic pancreatitis (during hospitalization for mAVR)    Diet: avoids GLV 10/29/19  Alcohol: socially  Tobacco: none  OTC Pain Medication: APAP PRN pain    INR History:  Date 9/27 9/28 10/1 10/4 10/8 10/11 10/17 10/19 10/23   Total Weekly Dose 8mg 12mg 19mg 29mg 31mg 34mg 43mg 41mg 45mg   INR 1.3 1.22 1.3 1.8 1.85 1.7 2.91 2.82 3.79   Notes clinic enox enox enox; clinic enox enox        Date 11/8 11/16 12/21 1/9/19 1/16 2/4 3/1 3/22 5/16   Total Weekly Dose 49mg 49mg 49mg 49mg 47mg 49mg 49mg 49mg 49mg   INR 3.0 2.96 3.44 4.4 2.36 2.62 3.02 2.15 2.6   Notes   desvenla   self adj        Date 6/13 6/24 7/10 7/22 8/5 8/26 9/26 10/18 10/28  11/19 12/3 12/17   Total Weekly Dose 49mg 49mg 49mg 49mg 49mg 49mg 49mg 49mg 49mg 49 mg 49 mg  45 mg   INR 2.25 2.98 2.83 2.97 2.9 3.2 3.57 3.12 2.88 3.33  4.06 2.59   Notes       fewer GLV Cymbalta start 9/25; Depakote start 9/5 1x decr dose  depakote inc; trintellix dcd ceftin x 10 days     Date  1/3/2020             Total Weekly Dose 45 mg           INR 3.21           Notes Received 1/6           *takes warfarin in AM*    Phone Interview:  Verbal Release Authorization: please contact Mr. Hall directly - if unable to reach patient may contact Marko erickson  Tablet Strength: 2mg, and 5mg tablets  Patient Contact Info: [phone]; Susan will call and speak with us ; [email or web address]  Lab Contact Info: Megan Vu     Patient Findings   Positives:  Change in alcohol use, Change in diet/appetite,  Other complaints   Negatives:  Signs/symptoms of thrombosis, Signs/symptoms of bleeding, Laboratory test error suspected, Change in health, Change in activity, Upcoming invasive procedure, Emergency department visit, Upcoming dental procedure, Missed doses, Extra doses, Change in medications, Hospital admission, Bruising   Comments:  Mr. Hall was due to recheck on 12/30.  He had lab draw on 1/3; results received today.   He stated that he had quite a bit of GLV this past week but he has resumed his usual diet.   He did have some beer for the [employer] game.       Plan:  1. INR was supra therapeutic on Friday at 3/21.  Results received today. Instructed Manjeet to continue warfarin 7mg oral daily except 5mg MonWedFri this week, then resume warfarin 7.5 mg oral daily except 5 mg MonFri until recheck.  2. Repeat INR on 1/15/2020  3. Verbal information provided over the phone as well as emailed to Manjeet.  He expresses understanding with teach back and has no further questions at this time.    Adeline Bearden, PharmD  1/6/2020  1:08 PM

## 2020-01-09 ENCOUNTER — OFFICE VISIT (OUTPATIENT)
Dept: RETAIL CLINIC | Facility: CLINIC | Age: 45
End: 2020-01-09

## 2020-01-09 VITALS
TEMPERATURE: 98.4 F | DIASTOLIC BLOOD PRESSURE: 78 MMHG | HEART RATE: 71 BPM | SYSTOLIC BLOOD PRESSURE: 124 MMHG | RESPIRATION RATE: 19 BRPM | OXYGEN SATURATION: 98 % | BODY MASS INDEX: 29.83 KG/M2 | WEIGHT: 202 LBS

## 2020-01-09 DIAGNOSIS — H10.021 PINK EYE DISEASE OF RIGHT EYE: Primary | ICD-10-CM

## 2020-01-09 PROCEDURE — 99213 OFFICE O/P EST LOW 20 MIN: CPT | Performed by: NURSE PRACTITIONER

## 2020-01-09 RX ORDER — POLYMYXIN B SULFATE AND TRIMETHOPRIM 1; 10000 MG/ML; [USP'U]/ML
1 SOLUTION OPHTHALMIC EVERY 4 HOURS
Qty: 1 EACH | Refills: 0 | OUTPATIENT
Start: 2020-01-09 | End: 2020-01-09 | Stop reason: SDUPTHER

## 2020-01-09 RX ORDER — VORTIOXETINE 20 MG/1
TABLET, FILM COATED ORAL
COMMUNITY
Start: 2019-12-13 | End: 2020-01-15 | Stop reason: SDDI

## 2020-01-09 RX ORDER — ATORVASTATIN CALCIUM 40 MG/1
TABLET, FILM COATED ORAL DAILY
COMMUNITY
Start: 2019-12-30 | End: 2020-07-20

## 2020-01-09 RX ORDER — POLYMYXIN B SULFATE AND TRIMETHOPRIM 1; 10000 MG/ML; [USP'U]/ML
1 SOLUTION OPHTHALMIC EVERY 4 HOURS
Qty: 1 EACH | Refills: 0 | Status: SHIPPED | OUTPATIENT
Start: 2020-01-09 | End: 2020-01-14

## 2020-01-09 NOTE — PATIENT INSTRUCTIONS
Bacterial Conjunctivitis, Adult  Bacterial conjunctivitis is an infection of your conjunctiva. This is the clear membrane that covers the white part of your eye and the inner part of your eyelid. This infection can make your eye:  · Red or pink.  · Itchy.  This condition spreads easily from person to person (is contagious) and from one eye to the other eye.  What are the causes?  · This condition is caused by germs (bacteria). You may get the infection if you come into close contact with:  ? A person who has the infection.  ? Items that have germs on them (are contaminated), such as face towels, contact lens solution, or eye makeup.  What increases the risk?  You are more likely to get this condition if you:  · Have contact with people who have the infection.  · Wear contact lenses.  · Have a sinus infection.  · Have had a recent eye injury or surgery.  · Have a weak body defense system (immune system).  · Have dry eyes.  What are the signs or symptoms?    · Thick, yellowish discharge from the eye.  · Tearing or watery eyes.  · Itchy eyes.  · Burning feeling in your eyes.  · Eye redness.  · Swollen eyelids.  · Blurred vision.  How is this treated?    · Antibiotic eye drops or ointment.  · Antibiotic medicine taken by mouth. This is used for infections that do not get better with drops or ointment or that last more than 10 days.  · Cool, wet cloths placed on the eyes.  · Artificial tears used 2-6 times a day.  Follow these instructions at home:  Medicines  · Take or apply your antibiotic medicine as told by your doctor. Do not stop taking or applying the antibiotic even if you start to feel better.  · Take or apply over-the-counter and prescription medicines only as told by your doctor.  · Do not touch your eyelid with the eye-drop bottle or the ointment tube.  Managing discomfort  · Wipe any fluid from your eye with a warm, wet washcloth or a cotton ball.  · Place a clean, cool, wet cloth on your eye. Do this for  10-20 minutes, 3-4 times per day.  General instructions  · Do not wear contacts until the infection is gone. Wear glasses until your doctor says it is okay to wear contacts again.  · Do not wear eye makeup until the infection is gone. Throw away old eye makeup.  · Change or wash your pillowcase every day.  · Do not share towels or washcloths.  · Wash your hands often with soap and water. Use paper towels to dry your hands.  · Do not touch or rub your eyes.  · Do not drive or use heavy machinery if your vision is blurred.  Contact a doctor if:  · You have a fever.  · You do not get better after 10 days.  Get help right away if:  · You have a fever and your symptoms get worse all of a sudden.  · You have very bad pain when you move your eye.  · Your face:  ? Hurts.  ? Is red.  ? Is swollen.  · You have sudden loss of vision.  Summary  · Bacterial conjunctivitis is an infection of your conjunctiva.  · This infection spreads easily from person to person.  · Wash your hands often with soap and water. Use paper towels to dry your hands.  · Take or apply your antibiotic medicine as told by your doctor.  · Contact a doctor if you have a fever or you do not get better after 10 days.  This information is not intended to replace advice given to you by your health care provider. Make sure you discuss any questions you have with your health care provider.  Document Released: 09/26/2009 Document Revised: 07/24/2019 Document Reviewed: 07/24/2019  [employer] Patient Education © 2019 [employer]

## 2020-01-09 NOTE — PROGRESS NOTES
Conjunctivitis      Subjective   Manjeetpedro Hall is a 44 y.o. male.     Conjunctivitis    The current episode started 2 days ago. The onset was gradual. The problem has been gradually worsening. The problem is moderate. Nothing relieves the symptoms. Nothing aggravates the symptoms. Associated symptoms include eye discharge and eye redness. Pertinent negatives include no fever, no decreased vision, no double vision, no eye itching, no photophobia, no diarrhea, no nausea, no vomiting, no congestion, no ear discharge, no ear pain, no headaches, no hearing loss, no mouth sores, no rhinorrhea, no sore throat, no stridor, no swollen glands, no muscle aches, no neck pain, no neck stiffness, no URI, no rash, no diaper rash and no eye pain. The right eye is affected. The eye pain is not associated with movement. The eyelid exhibits no abnormality. He has been behaving normally. He has been eating and drinking normally. Urine output has been normal. There were no sick contacts. He has received no recent medical care.        Patient Active Problem List   Diagnosis   • Depression   • Generalized anxiety disorder   • Hypothyroidism   • Gastroesophageal reflux disease   • Congenital heart disease   • Thoracic aortic aneurysm (CMS/HCC)   • Hx of aortic valve replacement, mechanical [Z95.2]   • Sequelae, post-stroke   • Warfarin anticoagulation   • Hypertension   • Ischemic stroke (CMS/HCC)   • Pulmonic stenosis       Allergies   Allergen Reactions   • Other         Current Outpatient Medications on File Prior to Visit   Medication Sig Dispense Refill   • atorvastatin (LIPITOR) 40 MG tablet      • divalproex (DEPAKOTE) 500 MG DR tablet Take 500 mg by mouth 2 (Two) Times a Day.     • DULoxetine (CYMBALTA) 60 MG capsule Take 60 mg by mouth Daily.  2   • gabapentin (NEURONTIN) 300 MG capsule Take 300 mg by mouth 5 (Five) Times a Day. Take 2 tablets AM, 3 tablets in the afternoon  2   • lansoprazole (PREVACID) 30 MG capsule TAKE  1 CAPSULE BY MOUTH EVERY DAY 30 capsule 2   • levothyroxine (SYNTHROID, LEVOTHROID) 100 MCG tablet TAKE 1 TABLET BY MOUTH EVERY DAY **NEED APPT FOR FURTHER REFILLS 30 tablet 2   • metoprolol tartrate (LOPRESSOR) 25 MG tablet Take 1 tablet by mouth 2 (Two) Times a Day. 180 tablet 0   • metoprolol tartrate (LOPRESSOR) 25 MG tablet TAKE 1 TABLET BY MOUTH TWICE A DAY 60 tablet 1   • TRINTELLIX 20 MG tablet      • warfarin (COUMADIN) 2 MG tablet Take one tablet by mouth daily or as directed by the anticoagulation clinic. 60 tablet 0   • warfarin (COUMADIN) 5 MG tablet TAKE 1 TABLET BY MOUTH DAILY OR AS DIRECTED BY ANTICOAGULATION CLINIC. 30 tablet 7   • [DISCONTINUED] cefuroxime (CEFTIN) 250 MG tablet Take 1 tablet by mouth 2 (Two) Times a Day. 20 tablet 0     No current facility-administered medications on file prior to visit.        Past Medical History:   Diagnosis Date   • Abnormal heart rhythm    • Anxiety    • Cancer (CMS/HCC)     hodgkins   • Depression    • GERD (gastroesophageal reflux disease)    • Heart murmur    • History of echocardiogram    • History of left heart catheterization    • History of stomach ulcers    • Hypothyroidism    • Mitral valvular disorder    • Thyroid disorder        Past Surgical History:   Procedure Laterality Date   • CARDIAC SURGERY     • SKIN CANCER EXCISION      mass removed from neck and chest for hodgkins       Family History   Problem Relation Age of Onset   • COPD Mother    • Cancer Mother    • Hypertension Father    • Stroke Father    • Stroke Paternal Grandmother    • Hypertension Paternal Grandmother    • Cancer Paternal Grandmother    • Cancer Paternal Grandfather    • Heart disease Paternal Grandfather    • No Known Problems Brother        Social History     Socioeconomic History   • Marital status: Legally      Spouse name: Not on file   • Number of children: Not on file   • Years of education: Not on file   • Highest education level: Not on file   Tobacco Use    • Smoking status: Never Smoker   • Smokeless tobacco: Never Used   Substance and Sexual Activity   • Alcohol use: Yes     Comment: social   • Drug use: No   • Sexual activity: Defer       Travel:  No recent travel within the last 21 days outside the [place] Denies recent travel to one of the following [place]  Countries:  [place], [place], [place], or [place].  Denies contact with anyone who has traveled to one of the following [place]  Countries: [place], [place], [place], or [place] within the last 21 days and is known or suspected to have Ebola.  Denies having had any contact with the human remains, blood or any bodily fluids of someone who is known or suspected to have Ebola within the last 21 days.     Review of Systems   Constitutional: Negative for chills, fatigue and fever.   HENT: Negative for congestion, ear discharge, ear pain, hearing loss, mouth sores, rhinorrhea and sore throat.    Eyes: Positive for discharge and redness. Negative for double vision, photophobia, pain, itching and visual disturbance.   Respiratory: Negative for stridor.    Gastrointestinal: Negative for diarrhea, nausea and vomiting.   Musculoskeletal: Negative for neck pain.   Skin: Negative for rash.   Neurological: Negative for headaches.   All other systems reviewed and are negative.      /78   Pulse 71   Temp 98.4 °F (36.9 °C) (Oral)   Resp 19   Wt 91.6 kg (202 lb)   SpO2 98%   BMI 29.83 kg/m²     Objective   Physical Exam   Constitutional: He is oriented to person, place, and time. He appears well-developed and well-nourished. No distress.   HENT:   Head: Normocephalic and atraumatic.   Right Ear: External ear normal.   Left Ear: External ear normal.   Nose: Nose normal.   Mouth/Throat: Oropharynx is clear and moist. No oropharyngeal exudate.   Eyes: Pupils are equal, round, and reactive to light. EOM and lids are normal. Lids are everted and swept, no foreign bodies found. Right eye exhibits exudate. Right  conjunctiva is injected. Left conjunctiva is not injected.       Neck: Normal range of motion. Neck supple.   Cardiovascular: Normal rate, regular rhythm and normal heart sounds.   Pulmonary/Chest: Effort normal and breath sounds normal. No respiratory distress. He has no wheezes. He has no rales.   Musculoskeletal: Normal range of motion.   Neurological: He is alert and oriented to person, place, and time.   Skin: Skin is warm and dry. No rash noted. He is not diaphoretic.   Psychiatric: He has a normal mood and affect. His behavior is normal.   Vitals reviewed.      Assessment/Plan   Manjeet was seen today for conjunctivitis.    Diagnoses and all orders for this visit:    Pink eye disease of right eye  -     trimethoprim-polymyxin b (POLYTRIM) 81166-5.1 UNIT/ML-% ophthalmic solution; Administer 1 drop into the left eye Every 4 (Four) Hours for 5 days.    Instructed to avoid rubbing affected eye and wash hands after touching affected eye; may wipe matting from the affected eye gently with warm moist cloth from inner eye to outer; use drops as directed; follow up with PCP/Opthalmologist for no improvement or new or worsening symptoms. Verbalized understanding.        No follow-ups on file.

## 2020-01-14 ENCOUNTER — OFFICE VISIT (OUTPATIENT)
Dept: FAMILY MEDICINE CLINIC | Facility: CLINIC | Age: 45
End: 2020-01-14

## 2020-01-14 VITALS
SYSTOLIC BLOOD PRESSURE: 124 MMHG | OXYGEN SATURATION: 98 % | WEIGHT: 201 LBS | DIASTOLIC BLOOD PRESSURE: 80 MMHG | TEMPERATURE: 98 F | BODY MASS INDEX: 29.77 KG/M2 | RESPIRATION RATE: 16 BRPM | HEIGHT: 69 IN | HEART RATE: 80 BPM

## 2020-01-14 DIAGNOSIS — H10.023 OTHER MUCOPURULENT CONJUNCTIVITIS OF BOTH EYES: Primary | ICD-10-CM

## 2020-01-14 PROCEDURE — 99213 OFFICE O/P EST LOW 20 MIN: CPT | Performed by: FAMILY MEDICINE

## 2020-01-14 RX ORDER — SULFACETAMIDE SODIUM 100 MG/ML
1 SOLUTION/ DROPS OPHTHALMIC 4 TIMES DAILY
Qty: 10 ML | Refills: 0 | Status: SHIPPED | OUTPATIENT
Start: 2020-01-14 | End: 2020-06-30

## 2020-01-14 NOTE — PROGRESS NOTES
"Subjective   Manjeet Hall is a 44 y.o. male.     Conjunctivitis    The current episode started 2 days ago. The problem has been gradually worsening. The problem is moderate. Nothing relieves the symptoms. Associated symptoms include eye itching, diarrhea, eye discharge, eye pain and eye redness. Pertinent negatives include no fever, no decreased vision, no double vision, no nausea, no vomiting, no congestion, no sore throat and no cough. The eye pain is mild.        The following portions of the patient's history were reviewed and updated as appropriate: allergies, current medications, past social history and problem list.    Review of Systems   Constitutional: Negative for chills and fever.   HENT: Negative for congestion and sore throat.    Eyes: Positive for pain, discharge, redness and itching. Negative for double vision.   Respiratory: Negative for cough and shortness of breath.    Cardiovascular: Negative for chest pain and palpitations.   Gastrointestinal: Positive for diarrhea. Negative for nausea and vomiting.       Objective   /80   Pulse 80   Temp 98 °F (36.7 °C)   Resp 16   Ht 175.3 cm (69\")   Wt 91.2 kg (201 lb)   SpO2 98%   BMI 29.68 kg/m²   Physical Exam   Constitutional: He appears well-developed and well-nourished.   HENT:   Head: Normocephalic and atraumatic.   Mouth/Throat: Oropharynx is clear and moist.   Eyes: Right eye exhibits discharge. Left eye exhibits discharge.   Conjunctival inflammation bilaterally worse on the right with some purulent drainage and crusting   Neck: Neck supple.   Cardiovascular: Normal rate and regular rhythm.   Pulmonary/Chest: Effort normal and breath sounds normal.   Skin: Skin is warm and dry.   Nursing note and vitals reviewed.      Assessment/Plan   Problem List Items Addressed This Visit     None      Visit Diagnoses     Other mucopurulent conjunctivitis of both eyes    -  Primary    Relevant Medications    sulfacetamide (BLEPH-10) 10 % " ophthalmic solution          New Medications Ordered This Visit   Medications   • sulfacetamide (BLEPH-10) 10 % ophthalmic solution     Sig: Administer 1 drop to both eyes 4 (Four) Times a Day.     Dispense:  10 mL     Refill:  0     Return to clinic if symptoms persist or worsen or any change in vision.

## 2020-01-15 ENCOUNTER — OFFICE VISIT (OUTPATIENT)
Dept: CARDIOLOGY | Facility: CLINIC | Age: 45
End: 2020-01-15

## 2020-01-15 VITALS
OXYGEN SATURATION: 96 % | HEART RATE: 76 BPM | DIASTOLIC BLOOD PRESSURE: 88 MMHG | SYSTOLIC BLOOD PRESSURE: 140 MMHG | HEIGHT: 69 IN | WEIGHT: 199 LBS | BODY MASS INDEX: 29.47 KG/M2

## 2020-01-15 DIAGNOSIS — Q24.9 CONGENITAL HEART DISEASE: Primary | ICD-10-CM

## 2020-01-15 DIAGNOSIS — I71.20 THORACIC AORTIC ANEURYSM WITHOUT RUPTURE (HCC): ICD-10-CM

## 2020-01-15 DIAGNOSIS — I10 ESSENTIAL HYPERTENSION: ICD-10-CM

## 2020-01-15 PROCEDURE — 99214 OFFICE O/P EST MOD 30 MIN: CPT | Performed by: INTERNAL MEDICINE

## 2020-01-15 RX ORDER — WARFARIN SODIUM 2 MG/1
TABLET ORAL
Qty: 30 TABLET | Refills: 1 | OUTPATIENT
Start: 2020-01-15

## 2020-01-15 RX ORDER — WARFARIN SODIUM 2 MG/1
TABLET ORAL
Qty: 30 TABLET | Refills: 1 | Status: SHIPPED | OUTPATIENT
Start: 2020-01-15 | End: 2020-07-17 | Stop reason: SDUPTHER

## 2020-01-15 NOTE — TELEPHONE ENCOUNTER
Mary contacted the [facility] clinic;  Will send warfarin 2mg tablet refills in as requested.  He has both 5 mg and 2 mg tablets    Adeline Bearden, ClaudiaD

## 2020-01-15 NOTE — PROGRESS NOTES
[facility] at [facility]  Office visit  Manjeet Hall  1975      VISIT DATE:  1/15/2020      PCP: Nile Faust MD  [address]  [facility] [address]    CC:  Chief Complaint   Patient presents with   • thoracic aortic aneurysm without rupture   • congenital heart disease       Previous cardiac history:  -Diagnosed with large PDA and VSD at birth, PDA close spontaneously.  -Developed subvalvular aortic stenosis which required surgery, VSD closed as well at 13yo  -Ross procedure for increasing aortic insufficiency with moderate aortic stenosis at 21yo  -Treated for Hodgkin's lymphoma, age 21 - CTX and chest XRT    -May 2018 Echo  · Estimated EF appears to be in the range of 56 - 60%.  · Left ventricular wall thickness is consistent with mild concentric hypertrophy.  · Mild to moderate aortic valve regurgitation is present.  · Right ventricular cavity is borderline dilated.  · Left atrial cavity size is mildly dilated.  · Moderate dilation of the aortic root is present. 4.8 cm. Moderate dilation of the ascending aorta is present. 5.4 cm.    -CT angiography chest June 2018:aortic root at the upper portion of the valve largest AP dimension is 5.4 cm. Approximately 2 cm above the aortic root the ascending thoracic aorta largest oblique dimension is 7.0 cm and AP dimension 6.2 cm.    Cardiac catheterization in July 2018 [facility]  LMT: - The LMT has mild luminal irregularities.  LAD:  - There was moderate diffuse disease.  - The mid LAD is narrowed 30 % - focal disease.   Additional Comment: very large vessel, wraps around the apex to perfuse the   entire mid and apical inferior wall.  LCX: - There was mild diffuse disease.  - The circ AV groove continuation circumflex is narrowed 80 % - focal disease.  - The 1st obtuse marginal circumflex is narrowed 60 % - focal disease.   Additional Comment: AV LCx perfuses small left PDA.  RAMUS: - The Ramus is Absent.  RCA:  -  The RCA has mild luminal irregularities.     August 13, 2018  Reoperation, third open-heart surgery.  Reversed Ross procedure  including aortic valve and root re-replacement with a composite graft including an On-X mechanical valve #25 and a 30-mm Hemashield graft, right ventricular outflow  tract reconstruction with the re-repaired autograft.    Echo September 2018 [facility]  EF = 60 ± 5% . There is no evidence of LV apical thrombus.  - The right ventricle is normal in size. Right ventricular systolic function is   low normal.  - On-X prosthetic aortic valve (size #25). There is no aortic valve regurgitation.   Transprosthetic gradients could not be obtained on the current study. Valve   leaflets not well visualized. If clinically indicated, consider ZACHARY to assess   further.  - There is a small pericardial effusion with organization. It is smaller when   compared to that documented on the prior echocardiogram performed 8/23/18. There   is no RV/RA collapse. There is no obvious evidence of cardiac tamponade.  - Status post reverse Ross procedure including aortic root, ascending aorta and   aortic valve replacement, RVOT reconstruction with rerepaired autograft. No   significant PI. The peak/mean gradients 19/10 mmHg [similar to the prior study]    August 2019 echo  · Left ventricular systolic function is normal.  · Estimated EF appears to be in the range of 56 - 60%  · Left ventricular diastolic dysfunction (grade II) consistent with pseudonormalization.  · Left ventricular wall thickness is consistent with mild concentric hypertrophy.  · Normal mechanical aortic valve.    ASSESSMENT:   Diagnosis Plan   1. Congenital heart disease     2. Thoracic aortic aneurysm without rupture (CMS/HCC)     3. Essential hypertension         PLAN:  Congenital heart disease: Status post reversed Ross procedure  including aortic valve and root re-replacement with a composite graft including an On-X mechanical valve #25 and a  "30-mm Hemashield graft, right ventricular outflow  tract reconstruction with the re-repaired autograft in 2018.  Postoperative course complicated by stroke and pancreatitis.  Afterload currently well-controlled.  Continue anticoagulation with goal INR of 2-3.  surveillance echocardiographic assessment every 3 years throughout the first 10 years post valve implant. CTA chest pending in 6 months then every 24 months if stable.    Coronary artery disease: Currently stable and asymptomatic.  Afterload well controlled.  Continue atorvastatin 40 mg by mouth daily, goal LDL less than 70.    Subjective  History of stroke with persistent word finding difficulties.  Postoperative pancreatitis which has resolved.  Denies chest discomfort.  Blood pressures running less than 120/80 mmHg.  compliant with medical therapy.  He is exercising on a regular basis and has no physical limitations.  Denies easy bruising or bleeding complications on chronic anticoagulation.    PHYSICAL EXAMINATION:  Vitals:    01/15/20 1132   BP: 140/88   BP Location: Left arm   Patient Position: Sitting   Pulse: 76   SpO2: 96%   Weight: 90.3 kg (199 lb)   Height: 175.3 cm (69\")     General Appearance:    Alert, cooperative, no distress, appears stated age   Head:    Normocephalic, without obvious abnormality, atraumatic   Eyes:    conjunctiva/corneas clear   Nose:   Nares normal, septum midline, mucosa normal, no drainage   Throat:   Lips, teeth and gums normal   Neck:   Supple, symmetrical, trachea midline, no carotid    bruit or JVD   Lungs:     Clear to auscultation bilaterally, respirations unlabored   Chest Wall:    No tenderness or deformity    Heart:    Regular rate and rhythm, mechanical A2,, 2/6 early peaking systolic murmur left upper sternal border, rub   or gallop, normal carotid impulse bilaterally without bruit.   Abdomen:     Soft, non-tender   Extremities:   Extremities normal, atraumatic, no cyanosis or edema   Pulses:   2+ and symmetric " all extremities   Skin:   Skin color, texture, turgor normal, no rashes or lesions       Diagnostic Data:  Procedures  Lab Results   Component Value Date    CHLPL 259 (H) 07/17/2015    TRIG 148 11/19/2019    HDL 59 11/19/2019     Lab Results   Component Value Date    GLUCOSE 83 09/27/2018    BUN 12 09/27/2018    CREATININE 0.84 09/27/2018     09/27/2018    K 4.3 09/27/2018     09/27/2018    CO2 27.0 09/27/2018     No results found for: HGBA1C  Lab Results   Component Value Date    WBC 7.26 09/27/2018    HGB 8.4 (L) 09/27/2018    HCT 28.8 (L) 09/27/2018     09/27/2018       Allergies  No Known Allergies    Current Medications    Current Outpatient Medications:   •  atorvastatin (LIPITOR) 40 MG tablet, Daily., Disp: , Rfl:   •  divalproex (DEPAKOTE) 500 MG DR tablet, Take 500 mg by mouth 2 (Two) Times a Day., Disp: , Rfl:   •  DULoxetine (CYMBALTA) 60 MG capsule, Take 60 mg by mouth Daily., Disp: , Rfl: 2  •  gabapentin (NEURONTIN) 300 MG capsule, Take 300 mg by mouth 5 (Five) Times a Day. Take 2 tablets AM, 3 tablets in the afternoon, Disp: , Rfl: 2  •  lansoprazole (PREVACID) 30 MG capsule, TAKE 1 CAPSULE BY MOUTH EVERY DAY, Disp: 30 capsule, Rfl: 2  •  levothyroxine (SYNTHROID, LEVOTHROID) 100 MCG tablet, TAKE 1 TABLET BY MOUTH EVERY DAY **NEED APPT FOR FURTHER REFILLS, Disp: 30 tablet, Rfl: 2  •  metoprolol tartrate (LOPRESSOR) 25 MG tablet, Take 1 tablet by mouth 2 (Two) Times a Day., Disp: 180 tablet, Rfl: 3  •  sulfacetamide (BLEPH-10) 10 % ophthalmic solution, Administer 1 drop to both eyes 4 (Four) Times a Day., Disp: 10 mL, Rfl: 0  •  warfarin (COUMADIN) 2 MG tablet, Take one tablet by mouth daily or as directed by the anticoagulation clinic., Disp: 30 tablet, Rfl: 1  •  warfarin (COUMADIN) 5 MG tablet, TAKE 1 TABLET BY MOUTH DAILY OR AS DIRECTED BY ANTICOAGULATION CLINIC., Disp: 30 tablet, Rfl: 7          ROS  Review of Systems   Cardiovascular: Negative for chest pain, dyspnea on  exertion, irregular heartbeat and syncope.   Respiratory: Negative for cough, shortness of breath and wheezing.          SOCIAL HX  Social History     Socioeconomic History   • Marital status: Legally      Spouse name: Not on file   • Number of children: Not on file   • Years of education: Not on file   • Highest education level: Not on file   Tobacco Use   • Smoking status: Never Smoker   • Smokeless tobacco: Never Used   Substance and Sexual Activity   • Alcohol use: Yes     Comment: social   • Drug use: No   • Sexual activity: Defer       FAMILY HX  Family History   Problem Relation Age of Onset   • COPD Mother    • Cancer Mother    • Hypertension Father    • Stroke Father    • Stroke Paternal Grandmother    • Hypertension Paternal Grandmother    • Cancer Paternal Grandmother    • Cancer Paternal Grandfather    • Heart disease Paternal Grandfather    • No Known Problems Brother              Vimal John III, MD, FACC

## 2020-01-16 ENCOUNTER — TELEPHONE (OUTPATIENT)
Dept: FAMILY MEDICINE CLINIC | Facility: CLINIC | Age: 45
End: 2020-01-16

## 2020-01-16 NOTE — TELEPHONE ENCOUNTER
Pt girlfriend Susan called on behalf of patient and stated that patient was seen Friday at [facility] in [place] and was given medication for Pink eye. Susan stated that patient eye is not getting better and was wondering how long it should take to start getting better. Please advise    Call back number  [phone]

## 2020-01-20 ENCOUNTER — TELEPHONE (OUTPATIENT)
Dept: PHARMACY | Facility: HOSPITAL | Age: 45
End: 2020-01-20

## 2020-01-20 NOTE — TELEPHONE ENCOUNTER
Spoke with patient re:overdue PT/INR lab draw. Patient is agreeable to have labs drawn tomorrow morning.

## 2020-01-21 ENCOUNTER — LAB (OUTPATIENT)
Dept: LAB | Facility: HOSPITAL | Age: 45
End: 2020-01-21

## 2020-01-21 DIAGNOSIS — Z95.2 H/O MECHANICAL AORTIC VALVE REPLACEMENT: ICD-10-CM

## 2020-01-21 DIAGNOSIS — Z95.2 HX OF AORTIC VALVE REPLACEMENT, MECHANICAL: ICD-10-CM

## 2020-01-21 LAB
INR PPP: 3.85 (ref 0.85–1.16)
PROTHROMBIN TIME: 36.4 SECONDS (ref 11.2–14.3)

## 2020-01-21 PROCEDURE — 85610 PROTHROMBIN TIME: CPT

## 2020-01-22 ENCOUNTER — ANTICOAGULATION VISIT (OUTPATIENT)
Dept: PHARMACY | Facility: HOSPITAL | Age: 45
End: 2020-01-22

## 2020-01-22 DIAGNOSIS — Z95.2 HX OF AORTIC VALVE REPLACEMENT, MECHANICAL: ICD-10-CM

## 2020-01-22 NOTE — PROGRESS NOTES
Anticoagulation Clinic - Remote Progress Note  REMOTE LAB    Indication: On-X Mechanical valve #25 and a 30mm hemashield; stroke  Referring Provider: Alvaro (Last seen: 7/10/19  next appt: 1/15/20)  Initial Warfarin Start Date:  ~9/2018  Goal INR: 2.0-3.0 per referral  Current Drug Interactions: duloxetine, lansoprazole, levothyroxine, Trintellix, valproic acid  Bleed Risk: ascending aortic aneurysm    Other: Hodgkins Lymphoma (hx of chemo and radiation); h/o CVA + hemorrhagic pancreatitis (during hospitalization for mAVR)    Diet: avoids GLV 10/29/19  Alcohol: socially  Tobacco: none  OTC Pain Medication: APAP PRN pain    INR History:  Date 9/27 9/28 10/1 10/4 10/8 10/11 10/17 10/19 10/23   Total Weekly Dose 8mg 12mg 19mg 29mg 31mg 34mg 43mg 41mg 45mg   INR 1.3 1.22 1.3 1.8 1.85 1.7 2.91 2.82 3.79   Notes clinic enox enox enox; clinic enox enox        Date 11/8 11/16 12/21 1/9/19 1/16 2/4 3/1 3/22 5/16   Total Weekly Dose 49mg 49mg 49mg 49mg 47mg 49mg 49mg 49mg 49mg   INR 3.0 2.96 3.44 4.4 2.36 2.62 3.02 2.15 2.6   Notes   desvenla   self adj        Date 6/13 6/24 7/10 7/22 8/5 8/26 9/26 10/18 10/28  11/19 12/3 12/17   Total Weekly Dose 49mg 49mg 49mg 49mg 49mg 49mg 49mg 49mg 49mg 49 mg 49 mg  45 mg   INR 2.25 2.98 2.83 2.97 2.9 3.2 3.57 3.12 2.88 3.33  4.06 2.59   Notes       fewer GLV Cymbalta start 9/25; Depakote start 9/5 1x decr dose  depakote inc; trintellix dcd ceftin x 10 days     Date  1/3/2020  1/21                Total Weekly Dose 45 mg 45 mg               INR 3.21 3.85               Notes Received 1/6                *takes warfarin in AM*    Phone Interview:  Verbal Release Authorization: please contact Mr. Hall directly - if unable to reach patient may contact brother, Marko  Tablet Strength: 2mg, and 5mg tablets  Patient Contact Info: [phone]; Susan's number is [phone] ; [email or web address]; [email or web address]  Lab Contact Info: Megan Vu     Patient Findings    Positives:  Extra doses, Change in diet/appetite, Other complaints   Negatives:  Signs/symptoms of thrombosis, Signs/symptoms of bleeding, Laboratory test error suspected, Change in health, Change in alcohol use, Change in activity, Upcoming invasive procedure, Emergency department visit, Upcoming dental procedure, Missed doses, Change in medications, Hospital admission, Bruising   Comments:  Mr. Hall was due to recheck on 1/15 due to supratherapeutic INR at last check.  Will continue to encourage him to be timely with INR checks.   It sounds like Mr. Hall forgot to take the reduced dose on Wednesday of 5 mg.  He asked that I contact Susan and let her know the doses so that she could take of it for him.  I added her phone # and email to the progress notes contact information.   He stated that he is eating less but is trying to get back on track     Plan:    1. INR was supra therapeutic yesterday at 3.85 (see pt findings). Instructed Екатерина to take warfarin 4 mg dose today, then tomorrow begin warfarin 7mg oral daily except 5mg MonWedFri until recheck  2. Repeat INR in one week on 1/29 to ensure wnl  3. Verbal information provided over the phone as well as emailed to Manjeet.  He expresses understanding with teach back and has no further questions at this time.    Adeline Bearden, PharmD  1/22/2020  7:56 AM

## 2020-02-03 ENCOUNTER — LAB (OUTPATIENT)
Dept: LAB | Facility: HOSPITAL | Age: 45
End: 2020-02-03

## 2020-02-03 DIAGNOSIS — Z95.2 H/O MECHANICAL AORTIC VALVE REPLACEMENT: ICD-10-CM

## 2020-02-03 DIAGNOSIS — Z95.2 HX OF AORTIC VALVE REPLACEMENT, MECHANICAL: ICD-10-CM

## 2020-02-03 LAB
INR PPP: 3.11 (ref 0.85–1.16)
PROTHROMBIN TIME: 30.8 SECONDS (ref 11.2–14.3)

## 2020-02-03 PROCEDURE — 85610 PROTHROMBIN TIME: CPT

## 2020-02-04 ENCOUNTER — ANTICOAGULATION VISIT (OUTPATIENT)
Dept: PHARMACY | Facility: HOSPITAL | Age: 45
End: 2020-02-04

## 2020-02-04 DIAGNOSIS — Z95.2 HX OF AORTIC VALVE REPLACEMENT, MECHANICAL: ICD-10-CM

## 2020-02-04 NOTE — PROGRESS NOTES
Anticoagulation Clinic - Remote Progress Note  REMOTE LAB    Indication: On-X Mechanical valve #25 and a 30mm hemashield; stroke  Referring Provider: Alvaro (Last seen: 7/10/19  next appt: 1/15/20)  Initial Warfarin Start Date:  ~9/2018  Goal INR: 2.0-3.0 per referral  Current Drug Interactions: duloxetine, lansoprazole, levothyroxine, Trintellix, valproic acid  Bleed Risk: ascending aortic aneurysm    Other: Hodgkins Lymphoma (hx of chemo and radiation); h/o CVA + hemorrhagic pancreatitis (during hospitalization for mAVR)    Diet: avoids GLV 10/29/19  Alcohol: socially  Tobacco: none  OTC Pain Medication: APAP PRN pain    INR History:  Date 9/27 9/28 10/1 10/4 10/8 10/11 10/17 10/19 10/23   Total Weekly Dose 8mg 12mg 19mg 29mg 31mg 34mg 43mg 41mg 45mg   INR 1.3 1.22 1.3 1.8 1.85 1.7 2.91 2.82 3.79   Notes clinic enox enox enox; clinic enox enox        Date 11/8 11/16 12/21 1/9/19 1/16 2/4 3/1 3/22 5/16   Total Weekly Dose 49mg 49mg 49mg 49mg 47mg 49mg 49mg 49mg 49mg   INR 3.0 2.96 3.44 4.4 2.36 2.62 3.02 2.15 2.6   Notes   desvenla   self adj        Date 6/13 6/24 7/10 7/22 8/5 8/26 9/26 10/18 10/28  11/19 12/3 12/17   Total Weekly Dose 49mg 49mg 49mg 49mg 49mg 49mg 49mg 49mg 49mg 49 mg 49 mg  45 mg   INR 2.25 2.98 2.83 2.97 2.9 3.2 3.57 3.12 2.88 3.33  4.06 2.59   Notes       fewer GLV Cymbalta start 9/25; Depakote start 9/5 1x decr dose  depakote inc; trintellix dcd ceftin x 10 days     Date  1/3/2020  1/21  2/3              Total Weekly Dose 45 mg 45 mg 47 mg               INR 3.21 3.85 3.11              Notes Received 1/6  Some GLV dieting               *takes warfarin in AM*    Phone Interview:  Verbal Release Authorization: please contact Mr. Hall directly - if unable to reach patient may contact floridalmaerMarko  Tablet Strength: 2mg, and 5mg tablets  Patient Contact Info: [phone]; Susan Delgadillo's number is [phone] ; [email or web address]; [email or web address]  Lab Contact Info: Megan Jeff  "[place]     Patient Findings   Positives:  Change in diet/appetite   Negatives:  Signs/symptoms of thrombosis, Signs/symptoms of bleeding, Laboratory test error suspected, Change in health, Change in alcohol use, Change in activity, Upcoming invasive procedure, Emergency department visit, Upcoming dental procedure, Missed doses, Change in medications, Hospital admission, Bruising   Comments:  Mr. Hall started the previously discussed warfarin dosing plan on 1/22. He continued this until 1/29 when he was scheduled to retest his INR. According to Susan, he was under the impression the dose reduction was temporary, and he began taking 7mg daily on 1/29. Will continue to encourage him to be timely with INR checks.   He stated that he is eating less but is trying to get back on track. He is trying to lose weight and has been eating some vegetables including \"carrots and things like that.\" Said there were some greens in his meals.      Plan:  1. INR was supra therapeutic yesterday at 3.11 (see pt findings). Instructed Екатерина to begin warfarin 7mg oral daily except 5mg MonWedFri until recheck as previously discussed.   2. Repeat INR in one week on 2/10 to ensure wnl.  3. Please discuss GLV and update profile at next encounter. Have previously emailed him GLV intake chart with Vit K content.  4. Verbal information provided over the phone as well as emailed to Manjeet.  He expresses understanding with teach back and has no further questions at this time.    Thank you,    Cameron TaylorD, JASON  Pharmacy Resident  2/4/2020  8:37 AM     Updated note above.   I, Sondra Brooks, PharmD, have reviewed the note in full and agree with the assessment and plan.  02/04/20  9:51 AM    "

## 2020-02-12 ENCOUNTER — ANTICOAGULATION VISIT (OUTPATIENT)
Dept: PHARMACY | Facility: HOSPITAL | Age: 45
End: 2020-02-12

## 2020-02-12 ENCOUNTER — LAB (OUTPATIENT)
Dept: LAB | Facility: HOSPITAL | Age: 45
End: 2020-02-12

## 2020-02-12 DIAGNOSIS — Z95.2 HX OF AORTIC VALVE REPLACEMENT, MECHANICAL: ICD-10-CM

## 2020-02-12 DIAGNOSIS — Z95.2 H/O MECHANICAL AORTIC VALVE REPLACEMENT: ICD-10-CM

## 2020-02-12 LAB
INR PPP: 2.34 (ref 0.85–1.16)
PROTHROMBIN TIME: 24.7 SECONDS (ref 11.2–14.3)

## 2020-02-12 PROCEDURE — 85610 PROTHROMBIN TIME: CPT

## 2020-02-12 NOTE — PROGRESS NOTES
Anticoagulation Clinic - Remote Progress Note  REMOTE LAB    Indication: On-X Mechanical valve #25 and a 30mm hemashield; stroke  Referring Provider: Alvaro (Last seen: 7/10/19  next appt: 1/15/20)  Initial Warfarin Start Date:  ~9/2018  Goal INR: 2.0-3.0 per referral  Current Drug Interactions: duloxetine, lansoprazole, levothyroxine, Trintellix, valproic acid  Bleed Risk: ascending aortic aneurysm    Other: Hodgkins Lymphoma (hx of chemo and radiation); h/o CVA + hemorrhagic pancreatitis (during hospitalization for mAVR)    Diet: avoids GLV 10/29/19  Alcohol: socially  Tobacco: none  OTC Pain Medication: APAP PRN pain    INR History:  Date 9/27 9/28 10/1 10/4 10/8 10/11 10/17 10/19 10/23   Total Weekly Dose 8mg 12mg 19mg 29mg 31mg 34mg 43mg 41mg 45mg   INR 1.3 1.22 1.3 1.8 1.85 1.7 2.91 2.82 3.79   Notes clinic enox enox enox; clinic enox enox        Date 11/8 11/16 12/21 1/9/19 1/16 2/4 3/1 3/22 5/16   Total Weekly Dose 49mg 49mg 49mg 49mg 47mg 49mg 49mg 49mg 49mg   INR 3.0 2.96 3.44 4.4 2.36 2.62 3.02 2.15 2.6   Notes   desvenla   self adj        Date 6/13 6/24 7/10 7/22 8/5 8/26 9/26 10/18 10/28 11/19 12/3 12/17   Total Weekly Dose 49mg 49mg 49mg 49mg 49mg 49mg 49mg 49mg 49mg 49mg 49mg 45mg   INR 2.25 2.98 2.83 2.97 2.9 3.2 3.57 3.12 2.88 3.33 4.06 2.59   Notes       fewer GLV Cymbalta start 9/25; Depakote start 9/5 1x decr dose  depakote inc; trintellix dcd ceftin x 10 days     Date  1/3/20 1/21 2/3 2/12     Total Weekly Dose 45 mg 45mg 47mg  43mg     INR 3.21 3.85 3.11 2.34     Notes Received 1/6  Some GLV dieting       *takes warfarin in AM*    Phone Interview:  Verbal Release Authorization: please contact Mr. Hall directly - if unable to reach patient may contact brotherMarko  Tablet Strength: 2mg, and 5mg tablets  Patient Contact Info: [phone]; Susan Delgadillo's number is [phone] *please call both at ; [email or web address]; [email or web address]  Lab Contact Info: Megan Vu  "    Patient Findings   Negatives:  Signs/symptoms of thrombosis, Signs/symptoms of bleeding, Laboratory test error suspected, Change in health, Change in alcohol use, Change in activity, Upcoming invasive procedure, Emergency department visit, Upcoming dental procedure, Missed doses, Extra doses, Change in medications, Change in diet/appetite, Hospital admission, Bruising, Other complaints     Plan:  1. INR is therapeutic and back WNL today at 2.34. Instructed Екатерина to continue warfarin 7mg oral daily except 5mg MonWedFri until recheck  2. When discussing doses, it may be better to tell patient to take \"1x 5mg and 1x 2mg tablet on X day\" for his 7mg days and take \"1x 5mg tablet only on Y day\" for his 5mg days (instead of \"take 7mg on X day\")  3. Repeat INR in one week, 2/19, to ensure patient stays WNL  4. Verbal information provided over the phone to Екатерина. They have declined to be emailed today. They express understanding with teach back and have no further questions at this time.    Abdi Petit CPhT  2/12/2020  3:53 PM      Kayla LOZANO [facility], have reviewed the note in full and agree with the assessment and plan.  02/12/20  4:28 PM    "

## 2020-02-19 ENCOUNTER — ANTICOAGULATION VISIT (OUTPATIENT)
Dept: PHARMACY | Facility: HOSPITAL | Age: 45
End: 2020-02-19

## 2020-02-19 ENCOUNTER — TELEPHONE (OUTPATIENT)
Dept: PHARMACY | Facility: HOSPITAL | Age: 45
End: 2020-02-19

## 2020-02-19 ENCOUNTER — LAB (OUTPATIENT)
Dept: LAB | Facility: HOSPITAL | Age: 45
End: 2020-02-19

## 2020-02-19 DIAGNOSIS — Z95.2 HX OF AORTIC VALVE REPLACEMENT, MECHANICAL: ICD-10-CM

## 2020-02-19 DIAGNOSIS — Z95.2 H/O MECHANICAL AORTIC VALVE REPLACEMENT: ICD-10-CM

## 2020-02-19 LAB
INR PPP: 3.01 (ref 0.85–1.16)
PROTHROMBIN TIME: 30 SECONDS (ref 11.2–14.3)

## 2020-02-19 PROCEDURE — 85610 PROTHROMBIN TIME: CPT

## 2020-02-19 RX ORDER — LEVOTHYROXINE SODIUM 0.1 MG/1
100 TABLET ORAL DAILY
Qty: 30 TABLET | Refills: 5 | Status: SHIPPED | OUTPATIENT
Start: 2020-02-19 | End: 2021-07-27 | Stop reason: DRUGHIGH

## 2020-02-19 NOTE — PROGRESS NOTES
Anticoagulation Clinic - Remote Progress Note  REMOTE LAB    Indication: On-X Mechanical valve #25 and a 30mm hemashield; stroke  Referring Provider: Alvaro (Last seen: 7/10/19  next appt: 1/15/20)  Initial Warfarin Start Date:  ~9/2018  Goal INR: 2.0-3.0 per referral  Current Drug Interactions: duloxetine, lansoprazole, levothyroxine, Trintellix, valproic acid  Bleed Risk: ascending aortic aneurysm    Other: Hodgkins Lymphoma (hx of chemo and radiation); h/o CVA + hemorrhagic pancreatitis (during hospitalization for mAVR)    Diet: avoids GLV 10/29/19  Alcohol: socially  Tobacco: none  OTC Pain Medication: APAP PRN pain    INR History:  Date 9/27 9/28 10/1 10/4 10/8 10/11 10/17 10/19 10/23   Total Weekly Dose 8mg 12mg 19mg 29mg 31mg 34mg 43mg 41mg 45mg   INR 1.3 1.22 1.3 1.8 1.85 1.7 2.91 2.82 3.79   Notes clinic enox enox enox; clinic enox enox        Date 11/8 11/16 12/21 1/9/19 1/16 2/4 3/1 3/22 5/16   Total Weekly Dose 49mg 49mg 49mg 49mg 47mg 49mg 49mg 49mg 49mg   INR 3.0 2.96 3.44 4.4 2.36 2.62 3.02 2.15 2.6   Notes   desvenla   self adj        Date 6/13 6/24 7/10 7/22 8/5 8/26 9/26 10/18 10/28 11/19 12/3 12/17   Total Weekly Dose 49mg 49mg 49mg 49mg 49mg 49mg 49mg 49mg 49mg 49mg 49mg 45mg   INR 2.25 2.98 2.83 2.97 2.9 3.2 3.57 3.12 2.88 3.33 4.06 2.59   Notes       fewer GLV Cymbalta start 9/25; Depakote start 9/5 1x decr dose  depakote inc; trintellix dcd ceftin x 10 days     Date  1/3/20 1/21 2/3 2/12 2/19    Total Weekly Dose 45 mg 45mg 47mg  43mg 43mg    INR 3.21 3.85 3.11 2.34 3.01    Notes Received 1/6  Some GLV dieting       *takes warfarin in AM*    Phone Interview:  Verbal Release Authorization: please contact Mr. Hall directly - if unable to reach patient may contact floridalmaerMarko  Tablet Strength: 2mg, and 5mg tablets  Patient Contact Info: [phone]; Susan Delgadillo's number is [phone] *please call both at ; [email or web address]; [email or web address]  Lab Contact Info: Megan  "[place]     Patient Findings   Negatives:  Signs/symptoms of thrombosis, Signs/symptoms of bleeding, Laboratory test error suspected, Change in health, Change in alcohol use, Change in activity, Upcoming invasive procedure, Emergency department visit, Upcoming dental procedure, Missed doses, Extra doses, Change in medications, Change in diet/appetite, Hospital admission, Bruising, Other complaints   Comments:  Mr. Hall denies all findings at this time. Susan also denied any changes.     Plan:  1. INR is slightly SUPRAtherapeutic at 3.01. Instructed Екатерина to continue warfarin 7mg oral daily except 5mg MonWedFri until recheck. He will also have x1 serving(1 cup) of a GLV. Susan has been instructed to avoid spinach, kale, brussels sprouts, and all types of greens.    2. When discussing doses, it may be better to tell patient to take \"1x 5mg and 1x 2mg tablet on X day\" for his 7mg days and take \"1x 5mg tablet only on Y day\" for his 5mg days (instead of \"take 7mg on X day\")  3. Repeat INR in one week, 2/26.  4. Verbal information provided over the phone to Manjeet and Susan. They express understanding with teach back and have no further questions at this time.    Alison Nieves, Pharmacy Intern   2/19/2020  3:41 PM      IKayla, [facility], have reviewed the note in full and agree with the assessment and plan.  02/19/20  4:25 PM      "

## 2020-02-27 ENCOUNTER — LAB (OUTPATIENT)
Dept: LAB | Facility: HOSPITAL | Age: 45
End: 2020-02-27

## 2020-02-27 DIAGNOSIS — Z95.2 HX OF AORTIC VALVE REPLACEMENT, MECHANICAL: ICD-10-CM

## 2020-02-27 DIAGNOSIS — Z95.2 H/O MECHANICAL AORTIC VALVE REPLACEMENT: ICD-10-CM

## 2020-02-27 LAB
INR PPP: 3.2 (ref 0.85–1.16)
PROTHROMBIN TIME: 32.4 SECONDS (ref 11.5–14)

## 2020-02-27 PROCEDURE — 85610 PROTHROMBIN TIME: CPT

## 2020-02-28 ENCOUNTER — ANTICOAGULATION VISIT (OUTPATIENT)
Dept: PHARMACY | Facility: HOSPITAL | Age: 45
End: 2020-02-28

## 2020-02-28 DIAGNOSIS — Z95.2 HX OF AORTIC VALVE REPLACEMENT, MECHANICAL: ICD-10-CM

## 2020-02-28 NOTE — PROGRESS NOTES
Anticoagulation Clinic - Remote Progress Note  REMOTE LAB    Indication: On-X Mechanical valve #25 and a 30mm hemashield; stroke  Referring Provider: Alvaro (Last seen: 7/10/19  next appt: 1/15/20)  Initial Warfarin Start Date:  ~9/2018  Goal INR: 2.0-3.0 per referral  Current Drug Interactions: duloxetine, lansoprazole, levothyroxine, Trintellix, valproic acid  Bleed Risk: ascending aortic aneurysm    Other: Hodgkins Lymphoma (hx of chemo and radiation); h/o CVA + hemorrhagic pancreatitis (during hospitalization for mAVR)    Diet: avoids GLV 10/29/19  Alcohol: socially  Tobacco: none  OTC Pain Medication: APAP PRN pain    INR History:  Date 9/27 9/28 10/1 10/4 10/8 10/11 10/17 10/19 10/23   Total Weekly Dose 8mg 12mg 19mg 29mg 31mg 34mg 43mg 41mg 45mg   INR 1.3 1.22 1.3 1.8 1.85 1.7 2.91 2.82 3.79   Notes clinic enox enox enox; clinic enox enox        Date 11/8 11/16 12/21 1/9/19 1/16 2/4 3/1 3/22 5/16   Total Weekly Dose 49mg 49mg 49mg 49mg 47mg 49mg 49mg 49mg 49mg   INR 3.0 2.96 3.44 4.4 2.36 2.62 3.02 2.15 2.6   Notes   desvenla   self adj        Date 6/13 6/24 7/10 7/22 8/5 8/26 9/26 10/18 10/28 11/19 12/3 12/17   Total Weekly Dose 49mg 49mg 49mg 49mg 49mg 49mg 49mg 49mg 49mg 49mg 49mg 45mg   INR 2.25 2.98 2.83 2.97 2.9 3.2 3.57 3.12 2.88 3.33 4.06 2.59   Notes       fewer GLV Cymbalta start 9/25; Depakote start 9/5 1x decr dose  depakote inc; trintellix dcd ceftin x 10 days     Date  1/3/20 1/21 2/3 2/12 2/19 2/27    Total Weekly Dose 45 mg 45mg 47mg  43mg 43mg 43mg    INR 3.21 3.85 3.11 2.34 3.01 3.2    Notes Received 1/6  Some GLV dieting        *takes warfarin in AM*    Phone Interview:  Verbal Release Authorization: please contact Mr. Hall directly - if unable to reach patient may contact [email or web address]  Tablet Strength: 2mg, and 5mg tablets  Patient Contact Info: [phone]; Susan Delgadillo's number is [phone] *please call both at; [email or web address]; [email or web address]  Lab Contact Info:  "[place]     Patient Findings   Positives:  Change in activity   Negatives:  Signs/symptoms of thrombosis, Signs/symptoms of bleeding, Laboratory test error suspected, Change in health, Change in alcohol use, Upcoming invasive procedure, Emergency department visit, Upcoming dental procedure, Missed doses, Extra doses, Change in medications, Change in diet/appetite, Hospital admission, Bruising, Other complaints   Comments:  Manjeet reports that his diet and appetite are consistent. He denies all findings.   Susan reports that Manjeet has been visiting the gym daily and is spending ~2 hours on the treadmill. She is unsure if he has had any weight loss or change in diet or appetite. Otherwise, she denies all findings.     Plan:  1. INR is SUPRAtherapeutic again at 3.2. Instructed Manjeet and Susan to decrease Saturday's dose to 5mg then otherwise continue 7mg oral daily except 5mg MonWedFri until recheck.   2. When discussing doses, it may be better to tell patient to take \"1x 5mg and 1x 2mg tablet on X day\" for his 7mg days and take \"1x 5mg tablet only on Y day\" for his 5mg days (instead of \"take 7mg on X day\")  3. Repeat INR in one week, 3/5.  4. Verbal information provided over the phone to Manjeet and Susan. They express understanding with teach back and have no further questions at this time.    Alison Nieves, Pharmacy Intern   2/28/2020  8:54 AM    I, Sondra Brooks, PharmD, have reviewed the note in full and agree with the assessment and plan.  02/28/20  4:48 PM    "

## 2020-03-05 ENCOUNTER — LAB (OUTPATIENT)
Dept: LAB | Facility: HOSPITAL | Age: 45
End: 2020-03-05

## 2020-03-05 ENCOUNTER — ANTICOAGULATION VISIT (OUTPATIENT)
Dept: PHARMACY | Facility: HOSPITAL | Age: 45
End: 2020-03-05

## 2020-03-05 DIAGNOSIS — Z95.2 HX OF AORTIC VALVE REPLACEMENT, MECHANICAL: ICD-10-CM

## 2020-03-05 DIAGNOSIS — Z95.2 H/O MECHANICAL AORTIC VALVE REPLACEMENT: ICD-10-CM

## 2020-03-05 LAB
INR PPP: 3.07 (ref 0.85–1.16)
PROTHROMBIN TIME: 31.4 SECONDS (ref 11.5–14)

## 2020-03-05 PROCEDURE — 85610 PROTHROMBIN TIME: CPT

## 2020-03-05 NOTE — PROGRESS NOTES
Anticoagulation Clinic - Remote Progress Note  REMOTE LAB    Indication: On-X Mechanical valve #25 and a 30mm hemashield; stroke  Referring Provider: Alvaro (Last seen: 7/10/19  next appt: 1/15/20)  Initial Warfarin Start Date:  ~9/2018  Goal INR: 2.0-3.0 per referral  Current Drug Interactions: duloxetine, lansoprazole, levothyroxine, Trintellix, valproic acid  Bleed Risk: ascending aortic aneurysm    Other: Hodgkins Lymphoma (hx of chemo and radiation); h/o CVA + hemorrhagic pancreatitis (during hospitalization for mAVR)    Diet: avoids GLV 10/29/19  Alcohol: socially  Tobacco: none  OTC Pain Medication: APAP PRN pain    INR History:  Date 9/27 9/28 10/1 10/4 10/8 10/11 10/17 10/19 10/23   Total Weekly Dose 8mg 12mg 19mg 29mg 31mg 34mg 43mg 41mg 45mg   INR 1.3 1.22 1.3 1.8 1.85 1.7 2.91 2.82 3.79   Notes clinic enox enox enox; clinic enox enox        Date 11/8 11/16 12/21 1/9/19 1/16 2/4 3/1 3/22 5/16   Total Weekly Dose 49mg 49mg 49mg 49mg 47mg 49mg 49mg 49mg 49mg   INR 3.0 2.96 3.44 4.4 2.36 2.62 3.02 2.15 2.6   Notes   desvenla   self adj        Date 6/13 6/24 7/10 7/22 8/5 8/26 9/26 10/18 10/28 11/19 12/3 12/17   Total Weekly Dose 49mg 49mg 49mg 49mg 49mg 49mg 49mg 49mg 49mg 49mg 49mg 45mg   INR 2.25 2.98 2.83 2.97 2.9 3.2 3.57 3.12 2.88 3.33 4.06 2.59   Notes       fewer GLV Cymbalta start 9/25; Depakote start 9/5 1x decr dose  depakote inc; trintellix dcd ceftin x 10 days     Date 1/3/20 1/21 2/3 2/12 2/19 2/27 3/5      Total Weekly Dose 45 mg 45mg 47mg  43mg 43mg 43mg 41mg 41mg     INR 3.21 3.85 3.11 2.34 3.01 3.2 3.07      Notes rec'vd 1/6  some GLV dieting           *takes warfarin in AM*    Phone Interview:  Verbal Release Authorization: please contact Mr. Hall directly - if unable to reach patient may contact brotherMarko  Tablet Strength: 2mg, and 5mg tablets  Patient Contact Info: [phone]; Susan Delgadillo's number is [phone] *please call both at; [email or web address];  [email or web address]  Lab Contact Info: [facility] [address]     Patient Findings   Negatives:  Signs/symptoms of thrombosis, Signs/symptoms of bleeding, Laboratory test error suspected, Change in health, Change in alcohol use, Change in activity, Upcoming invasive procedure, Emergency department visit, Upcoming dental procedure, Missed doses, Extra doses, Change in medications, Change in diet/appetite, Hospital admission, Bruising, Other complaints   Comments:  Susan and Manjeet deny all findings. I was unable to reach Manjeet on 3/5 so directions were given to Susan to relay to him. Manjeet confirmed he did receive the dosing instructions from Susan and followed the plan of decreasing his dose x1. He verbalizes understanding of the plan going forward and is aware to check in one week, 3/12.     Plan:  1. INR is slightly SUPRAtherapeutic today at 3.07. Instructed Manjeet and Susan to decrease tonight's dose to 5mg then continue warfarin 7mg daily except 5mg MonWedFri until recheck.   2. When discussing doses, it may be better to tell patient to take \"1x 5mg and 1x 2mg tablet on X day\" for his 7mg days and take \"1x 5mg tablet only on Y day\" for his 5mg days (instead of \"take 7mg on X day\")  3. Repeat INR in one week, 3/12.  4. Verbal information provided over the phone to Manjeet and Susan. They express understanding with teach back and have no further questions at this time.    Alison Nieves, Pharmacy Intern   3/5/2020  4:07 PM    IHafsa, [facility], have reviewed the note in full and agree with the assessment and plan.  03/06/20  1:15 PM  "

## 2020-03-12 ENCOUNTER — LAB (OUTPATIENT)
Dept: LAB | Facility: HOSPITAL | Age: 45
End: 2020-03-12

## 2020-03-12 ENCOUNTER — ANTICOAGULATION VISIT (OUTPATIENT)
Dept: PHARMACY | Facility: HOSPITAL | Age: 45
End: 2020-03-12

## 2020-03-12 DIAGNOSIS — Z95.2 H/O MECHANICAL AORTIC VALVE REPLACEMENT: ICD-10-CM

## 2020-03-12 DIAGNOSIS — Z95.2 HX OF AORTIC VALVE REPLACEMENT, MECHANICAL: ICD-10-CM

## 2020-03-12 LAB
INR PPP: 3.05 (ref 0.85–1.16)
PROTHROMBIN TIME: 31.3 SECONDS (ref 11.5–14)

## 2020-03-12 PROCEDURE — 85610 PROTHROMBIN TIME: CPT

## 2020-03-12 NOTE — PROGRESS NOTES
Anticoagulation Clinic - Remote Progress Note  REMOTE LAB    Indication: On-X Mechanical valve #25 and a 30mm hemashield; stroke  Referring Provider: Alvaro (Last seen: 1/1519  next appt: 7/20/20)  Initial Warfarin Start Date:  ~9/2018  Goal INR: 2.0-3.0 per referral  Current Drug Interactions: duloxetine, lansoprazole, levothyroxine, Trintellix, valproic acid  Bleed Risk: ascending aortic aneurysm    Other: Hodgkins Lymphoma (hx of chemo and radiation); h/o CVA + hemorrhagic pancreatitis (during hospitalization for mAVR)    Diet: avoids GLV 10/29/19  Alcohol: socially  Tobacco: none  OTC Pain Medication: APAP PRN pain    INR History:  Date 9/27 9/28 10/1 10/4 10/8 10/11 10/17 10/19 10/23   Total Weekly Dose 8mg 12mg 19mg 29mg 31mg 34mg 43mg 41mg 45mg   INR 1.3 1.22 1.3 1.8 1.85 1.7 2.91 2.82 3.79   Notes clinic enox enox enox; clinic enox enox        Date 11/8 11/16 12/21 1/9/19 1/16 2/4 3/1 3/22 5/16   Total Weekly Dose 49mg 49mg 49mg 49mg 47mg 49mg 49mg 49mg 49mg   INR 3.0 2.96 3.44 4.4 2.36 2.62 3.02 2.15 2.6   Notes   desvenla   self adj        Date 6/13 6/24 7/10 7/22 8/5 8/26 9/26 10/18 10/28 11/19 12/3 12/17   Total Weekly Dose 49mg 49mg 49mg 49mg 49mg 49mg 49mg 49mg 49mg 49mg 49mg 45mg   INR 2.25 2.98 2.83 2.97 2.9 3.2 3.57 3.12 2.88 3.33 4.06 2.59   Notes       fewer GLV Cymbalta start 9/25; Depakote start 9/5 1x decr dose  depakote inc; trintellix dcd ceftin x 10 days     Date 1/3/20 1/21 2/3 2/12 2/19 2/27 3/5 3/12     Total Weekly Dose 45 mg 45mg 47mg  43mg 43mg 43mg 41mg 41mg 41mg    INR 3.21 3.85 3.11 2.34 3.01 3.2 3.07 3.01     Notes rec'vd 1/6  some GLV dieting     x1 decr x1 decr     *takes warfarin in AM*    Phone Interview:  Verbal Release Authorization: please contact Mr. Hall directly - if unable to reach patient may contact brotherMarko  Tablet Strength: 2mg, and 5mg tablets  Patient Contact Info: [phone]; Susan Delgadillo's number is [phone] *please call both at;  [email or web address]; [email or web address]  Lab Contact Info: Megan Vu     Patient Findings   Negatives:  Signs/symptoms of thrombosis, Signs/symptoms of bleeding, Laboratory test error suspected, Change in health, Change in alcohol use, Change in activity, Upcoming invasive procedure, Emergency department visit, Upcoming dental procedure, Missed doses, Extra doses, Change in medications, Change in diet/appetite, Hospital admission, Bruising, Other complaints   Comments:  Mr. Hall denies all findings today. He states he is still attending rehab sessions at [facility].     Plan:  1. INR remains slightly SUPRAtherapeutic, today at 3.01. Instructed Екатерина to start new maintenance regimen of warfarin 5mg daily except 7mg MonWedFri until recheck.(41mg/week)   2. When discussing doses, it may be better to tell patient to take \"1x 5mg and 1x 2mg tablet on X day\" for his 7mg days and take \"1x 5mg tablet only on Y day\" for his 5mg days (instead of \"take 7mg on X day\")  3. Repeat INR in one week, 3/19, to ensure WNL. May decrease frequency to Q2W if patient remains stable - uses remote lab.  4. Verbal information provided over the phone to Екатерина. Екатерина RBV dosing instructions, expresses understanding with teach back, and have no further questions at this time.    Alison Nieves, Pharmacy Intern   3/12/2020  13:43    Same total weekly dose, 7mg doses more evenly dispersed.   IKayla, [facility], have reviewed the note in full and agree with the assessment and plan.  03/12/20  16:23    "

## 2020-03-19 ENCOUNTER — LAB (OUTPATIENT)
Dept: LAB | Facility: HOSPITAL | Age: 45
End: 2020-03-19

## 2020-03-19 DIAGNOSIS — Z95.2 HX OF AORTIC VALVE REPLACEMENT, MECHANICAL: ICD-10-CM

## 2020-03-19 DIAGNOSIS — Z95.2 H/O MECHANICAL AORTIC VALVE REPLACEMENT: ICD-10-CM

## 2020-03-19 LAB
INR PPP: 2.95 (ref 0.85–1.16)
PROTHROMBIN TIME: 30.5 SECONDS (ref 11.5–14)

## 2020-03-19 PROCEDURE — 85610 PROTHROMBIN TIME: CPT

## 2020-03-20 ENCOUNTER — ANTICOAGULATION VISIT (OUTPATIENT)
Dept: PHARMACY | Facility: HOSPITAL | Age: 45
End: 2020-03-20

## 2020-03-20 DIAGNOSIS — Z95.2 HX OF AORTIC VALVE REPLACEMENT, MECHANICAL: ICD-10-CM

## 2020-03-20 NOTE — PROGRESS NOTES
Anticoagulation Clinic - Remote Progress Note  REMOTE LAB    Indication: On-X Mechanical valve #25 and a 30mm hemashield; stroke  Referring Provider: Alvaro (Last seen: 1/1519  next appt: 7/20/20)  Initial Warfarin Start Date:  ~9/2018  Goal INR: 2.0-3.0 per referral  Current Drug Interactions: duloxetine, lansoprazole, levothyroxine, Trintellix, valproic acid  Bleed Risk: ascending aortic aneurysm    Other: Hodgkins Lymphoma (hx of chemo and radiation); h/o CVA + hemorrhagic pancreatitis (during hospitalization for mAVR)    Diet: avoids GLV 3/20/20  Alcohol: socially  Tobacco: none  OTC Pain Medication: APAP PRN pain    INR History:  Date 9/27 9/28 10/1 10/4 10/8 10/11 10/17 10/19 10/23   Total Weekly Dose 8mg 12mg 19mg 29mg 31mg 34mg 43mg 41mg 45mg   INR 1.3 1.22 1.3 1.8 1.85 1.7 2.91 2.82 3.79   Notes clinic enox enox enox; clinic enox enox        Date 11/8 11/16 12/21 1/9/19 1/16 2/4 3/1 3/22 5/16   Total Weekly Dose 49mg 49mg 49mg 49mg 47mg 49mg 49mg 49mg 49mg   INR 3.0 2.96 3.44 4.4 2.36 2.62 3.02 2.15 2.6   Notes   desvenla   self adj        Date 6/13 6/24 7/10 7/22 8/5 8/26 9/26 10/18 10/28 11/19 12/3 12/17   Total Weekly Dose 49mg 49mg 49mg 49mg 49mg 49mg 49mg 49mg 49mg 49mg 49mg 45mg   INR 2.25 2.98 2.83 2.97 2.9 3.2 3.57 3.12 2.88 3.33 4.06 2.59   Notes       fewer GLV Cymbalta start 9/25; Depakote start 9/5 1x decr dose  depakote inc; trintellix dcd ceftin x 10 days     Date 1/3/20 1/21 2/3 2/12 2/19 2/27 3/5 3/12 3/20    Total Weekly Dose 45 mg 45mg 47mg  43mg 43mg 43mg 41mg 41mg 41mg    INR 3.21 3.85 3.11 2.34 3.01 3.2 3.07 3.01 2.95    Notes rec'vd 1/6  some GLV dieting     x1 decr x1 decr     *takes warfarin in AM*    Phone Interview:  Verbal Release Authorization: please contact Mr. Hall directly - if unable to reach patient may contact brotherMarko  Tablet Strength: 2mg, and 5mg tablets  Patient Contact Info: [phone]; Susan Delgadillo's number is [phone] *please call both at;  [email or web address]; [email or web address]  Lab Contact Info: Megan Vu     Patient Findings:  Negatives:  Signs/symptoms of thrombosis, Signs/symptoms of bleeding, Laboratory test error suspected, Change in health, Change in alcohol use, Change in activity, Upcoming invasive procedure, Emergency department visit, Upcoming dental procedure, Missed doses, Extra doses, Change in medications, Change in diet/appetite, Hospital admission, Bruising, Other complaints   Comments:  Spoke with Mr. Hall about his diet, medications and upcoming appointments/procedures. He denies any changes or upcoming medical procedures. He was unable to verify dosing and says Susan takes care of this for him. Spoke with Susan regarding dosing. She states \" He takes it however you all tell him to, I send him a message with dosing then throw the paper away \". She then states afterwards, \" I believe he took 7mg on MonWedFri and 5mg the rest of the days \". Verified with Susan this is the correct dosing.     Plan:  1. INR is back WNL today. Instructed Екатерина to continue warfarin 5mg daily except 7mg on MonWedFri until recheck.  2. Mr. Hall prefers we contact Susan with dosing instructions and follow up testing dates.  3. Repeat INR in two weeks.  4. Verbal information provided over the phone to Екатерина. Екатерина RBV dosing instructions, expresses understanding with teach back, and have no further questions at this time.    Aileen Grullon, Pharmacy Technician   3/20/2020  12:23      Could push out further with current COVID-19 concerns.  I, Kayla Stockton, [facility], have reviewed the note in full and agree with the assessment and plan.  03/20/20  14:35    "

## 2020-04-02 ENCOUNTER — ANTICOAGULATION VISIT (OUTPATIENT)
Dept: PHARMACY | Facility: HOSPITAL | Age: 45
End: 2020-04-02

## 2020-04-02 ENCOUNTER — LAB (OUTPATIENT)
Dept: LAB | Facility: HOSPITAL | Age: 45
End: 2020-04-02

## 2020-04-02 DIAGNOSIS — Z95.2 H/O MECHANICAL AORTIC VALVE REPLACEMENT: ICD-10-CM

## 2020-04-02 DIAGNOSIS — Z95.2 HX OF AORTIC VALVE REPLACEMENT, MECHANICAL: ICD-10-CM

## 2020-04-02 LAB
INR PPP: 2.66 (ref 0.85–1.16)
PROTHROMBIN TIME: 28 SECONDS (ref 11.5–14)

## 2020-04-02 PROCEDURE — 85610 PROTHROMBIN TIME: CPT

## 2020-04-02 NOTE — PROGRESS NOTES
Anticoagulation Clinic - Remote Progress Note  REMOTE LAB    Indication: On-X Mechanical valve #25 and a 30mm hemashield; stroke  Referring Provider: Alvaro (Last seen: 1/1519  next appt: 7/20/20)  Initial Warfarin Start Date:  ~9/2018  Goal INR: 2.0-3.0 per referral  Current Drug Interactions: duloxetine, lansoprazole, levothyroxine, Trintellix, valproic acid  Bleed Risk: ascending aortic aneurysm    Other: Hodgkins Lymphoma (hx of chemo and radiation); h/o CVA + hemorrhagic pancreatitis (during hospitalization for mAVR)    Diet: avoids GLV 3/20/20  Alcohol: socially  Tobacco: none  OTC Pain Medication: APAP PRN pain    INR History:  Date 9/27 9/28 10/1 10/4 10/8 10/11 10/17 10/19 10/23   Total Weekly Dose 8mg 12mg 19mg 29mg 31mg 34mg 43mg 41mg 45mg   INR 1.3 1.22 1.3 1.8 1.85 1.7 2.91 2.82 3.79   Notes clinic enox enox enox; clinic enox enox        Date 11/8 11/16 12/21 1/9/19 1/16 2/4 3/1 3/22 5/16   Total Weekly Dose 49mg 49mg 49mg 49mg 47mg 49mg 49mg 49mg 49mg   INR 3.0 2.96 3.44 4.4 2.36 2.62 3.02 2.15 2.6   Notes   desvenla   self adj        Date 6/13 6/24 7/10 7/22 8/5 8/26 9/26 10/18 10/28 11/19 12/3 12/17   Total Weekly Dose 49mg 49mg 49mg 49mg 49mg 49mg 49mg 49mg 49mg 49mg 49mg 45mg   INR 2.25 2.98 2.83 2.97 2.9 3.2 3.57 3.12 2.88 3.33 4.06 2.59   Notes       fewer GLV Cymbalta start 9/25; Depakote start 9/5 1x decr dose  depakote inc; trintellix dcd ceftin x 10 days     Date 1/3/20 1/21 2/3 2/12 2/19 2/27 3/5 3/12 3/20 4/2   Total Weekly Dose 45 mg 45mg 47mg  43mg 43mg 43mg 41mg 41mg 41mg    INR 3.21 3.85 3.11 2.34 3.01 3.2 3.07 3.01 2.95 2.66   Notes rec'vd 1/6  some GLV dieting     x1 decr x1 decr     *takes warfarin in AM*    Phone Interview:  Verbal Release Authorization: please contact Mr. Hall directly - if unable to reach patient may contact brotherMarko  Tablet Strength: 2mg, and 5mg tablets  Patient Contact Info: [phone]; Susan Delgadillo's number is [phone] *please call both at;  [email or web address]; [email or web address]  Lab Contact Info: Megan Vu     Patient Findings:  Negatives:  Signs/symptoms of thrombosis, Signs/symptoms of bleeding, Laboratory test error suspected, Change in health, Change in alcohol use, Change in activity, Upcoming invasive procedure, Emergency department visit, Upcoming dental procedure, Missed doses, Extra doses, Change in medications, Change in diet/appetite, Hospital admission, Bruising, Other complaints     Plan:  1. INR is back WNL today. Instructed Екатерина to continue warfarin 5mg daily except 7mg on MonWedFri until recheck.  2. Mr. Hall prefers we contact Susan with dosing instructions and follow up testing dates. Plan was discussed with Susan.  3. Repeat INR in two weeks.  4. Verbal information provided over the phone to Екатерина. Екатерина RBV dosing instructions, expresses understanding with teach back, and have no further questions at this time.    Alysha Wesley, PharmD  Pharmacy Resident   4/2/2020  16:01

## 2020-04-16 ENCOUNTER — LAB (OUTPATIENT)
Dept: LAB | Facility: HOSPITAL | Age: 45
End: 2020-04-16

## 2020-04-16 ENCOUNTER — ANTICOAGULATION VISIT (OUTPATIENT)
Dept: PHARMACY | Facility: HOSPITAL | Age: 45
End: 2020-04-16

## 2020-04-16 DIAGNOSIS — Z95.2 HX OF AORTIC VALVE REPLACEMENT, MECHANICAL: ICD-10-CM

## 2020-04-16 DIAGNOSIS — Z95.2 H/O MECHANICAL AORTIC VALVE REPLACEMENT: ICD-10-CM

## 2020-04-16 LAB
INR PPP: 2.9 (ref 0.85–1.16)
PROTHROMBIN TIME: 30 SECONDS (ref 11.5–14)

## 2020-04-16 PROCEDURE — 85610 PROTHROMBIN TIME: CPT

## 2020-04-16 NOTE — PROGRESS NOTES
Anticoagulation Clinic - Remote Progress Note  REMOTE LAB    Indication: On-X Mechanical valve #25 and a 30mm hemashield; stroke  Referring Provider: Alvaro (Last seen: 1/1519  next appt: 7/20/20)  Initial Warfarin Start Date:  ~9/2018  Goal INR: 2.0-3.0 per referral  Current Drug Interactions: duloxetine, lansoprazole, levothyroxine, Trintellix, valproic acid  Bleed Risk: ascending aortic aneurysm    Other: Hodgkins Lymphoma (hx of chemo and radiation); h/o CVA + hemorrhagic pancreatitis (during hospitalization for mAVR)    Diet: avoids GLV 3/20/20  Alcohol: socially  Tobacco: none  OTC Pain Medication: APAP PRN pain    INR History:  Date 9/27 9/28 10/1 10/4 10/8 10/11 10/17 10/19 10/23   Total Weekly Dose 8mg 12mg 19mg 29mg 31mg 34mg 43mg 41mg 45mg   INR 1.3 1.22 1.3 1.8 1.85 1.7 2.91 2.82 3.79   Notes clinic enox enox enox; clinic enox enox        Date 11/8 11/16 12/21 1/9/19 1/16 2/4 3/1 3/22 5/16   Total Weekly Dose 49mg 49mg 49mg 49mg 47mg 49mg 49mg 49mg 49mg   INR 3.0 2.96 3.44 4.4 2.36 2.62 3.02 2.15 2.6   Notes   desvenla   self adj        Date 6/13 6/24 7/10 7/22 8/5 8/26 9/26 10/18 10/28 11/19 12/3 12/17   Total Weekly Dose 49mg 49mg 49mg 49mg 49mg 49mg 49mg 49mg 49mg 49mg 49mg 45mg   INR 2.25 2.98 2.83 2.97 2.9 3.2 3.57 3.12 2.88 3.33 4.06 2.59   Notes       fewer GLV Cymbalta start 9/25; Depakote start 9/5 1x decr dose  depakote inc; trintellix dcd ceftin x 10 days     Date 1/3/20 1/21 2/3 2/12 2/19 2/27 3/5 3/12 3/20 4/2 4/17   Total Weekly Dose 45 mg 45mg 47mg  43mg 43mg 43mg 41mg 41mg 41mg 41mg 41mg   INR 3.21 3.85 3.11 2.34 3.01 3.2 3.07 3.01 2.95 2.66 2.9   Notes rec'vd 1/6  some GLV dieting     x1 decr x1 decr      *takes warfarin in AM*    Phone Interview:  Verbal Release Authorization: please contact Mr. Hall directly - if unable to reach patient may contact brotherMarko  Tablet Strength: 2mg, and 5mg tablets  Patient Contact Info: [phone]; Susan Delgadillo's number is [phone]  *please call both at; [email or web address]; [email or web address]  Lab Contact Info: Megan Vu     Patient Findings:  Negatives:  Signs/symptoms of thrombosis, Signs/symptoms of bleeding, Laboratory test error suspected, Change in health, Change in alcohol use, Change in activity, Upcoming invasive procedure, Emergency department visit, Upcoming dental procedure, Missed doses, Extra doses, Change in medications, Change in diet/appetite, Hospital admission, Bruising, Other complaints     Plan:  1. INR is therapeutic again today. Instructed Екатерина to continue warfarin 5mg daily except 7mg on MonWedFri until recheck.  2. Mr. Hall prefers we contact Susan with dosing instructions and follow up testing dates. Plan was discussed with Susan.  3. Repeat INR in 4 weeks.  4. Verbal information provided over the phone to Екатерина. Екатерина RBV dosing instructions, expresses understanding with teach back, and have no further questions at this time.    Aileen Grullon CPhT  4/16/2020  14:59       IKayla, [facility], have reviewed the note in full and agree with the assessment and plan.  04/17/20  15:33

## 2020-05-07 ENCOUNTER — LAB (OUTPATIENT)
Dept: LAB | Facility: HOSPITAL | Age: 45
End: 2020-05-07

## 2020-05-07 ENCOUNTER — ANTICOAGULATION VISIT (OUTPATIENT)
Dept: PHARMACY | Facility: HOSPITAL | Age: 45
End: 2020-05-07

## 2020-05-07 DIAGNOSIS — Z95.2 HX OF AORTIC VALVE REPLACEMENT, MECHANICAL: ICD-10-CM

## 2020-05-07 DIAGNOSIS — Z95.2 H/O MECHANICAL AORTIC VALVE REPLACEMENT: ICD-10-CM

## 2020-05-07 LAB
INR PPP: 3.03 (ref 0.85–1.16)
PROTHROMBIN TIME: 31.1 SECONDS (ref 11.5–14)

## 2020-05-07 PROCEDURE — 85610 PROTHROMBIN TIME: CPT

## 2020-05-07 NOTE — PROGRESS NOTES
Anticoagulation Clinic - Remote Progress Note  REMOTE LAB    Indication: On-X Mechanical valve #25 and a 30mm hemashield; stroke  Referring Provider: Alvaro (Last seen: 1/1519  next appt: 7/20/20)  Initial Warfarin Start Date:  ~9/2018  Goal INR: 2.0-3.0 per referral  Current Drug Interactions: duloxetine, lansoprazole, levothyroxine, Trintellix, valproic acid  Bleed Risk: ascending aortic aneurysm    Other: Hodgkins Lymphoma (hx of chemo and radiation); h/o CVA + hemorrhagic pancreatitis (during hospitalization for mAVR)    Diet: avoids GLV 3/20/20  Alcohol: socially  Tobacco: none  OTC Pain Medication: APAP PRN pain    INR History:  Date 9/27 9/28 10/1 10/4 10/8 10/11 10/17 10/19 10/23   Total Weekly Dose 8mg 12mg 19mg 29mg 31mg 34mg 43mg 41mg 45mg   INR 1.3 1.22 1.3 1.8 1.85 1.7 2.91 2.82 3.79   Notes clinic enox enox enox; clinic enox enox        Date 11/8 11/16 12/21 1/9/19 1/16 2/4 3/1 3/22 5/16   Total Weekly Dose 49mg 49mg 49mg 49mg 47mg 49mg 49mg 49mg 49mg   INR 3.0 2.96 3.44 4.4 2.36 2.62 3.02 2.15 2.6   Notes   desvenla   self adj        Date 6/13 6/24 7/10 7/22 8/5 8/26 9/26 10/18 10/28 11/19 12/3 12/17   Total Weekly Dose 49mg 49mg 49mg 49mg 49mg 49mg 49mg 49mg 49mg 49mg 49mg 45mg   INR 2.25 2.98 2.83 2.97 2.9 3.2 3.57 3.12 2.88 3.33 4.06 2.59   Notes       fewer GLV Cymbalta start 9/25; Depakote start 9/5 1x decr dose  depakote inc; trintellix dcd ceftin x 10 days     Date 1/3/20 1/21 2/3 2/12 2/19 2/27 3/5 3/12 3/20 4/2 4/17 5/7   Total Weekly Dose 45mg 45mg 47mg  43mg 43mg 43mg 41mg 41mg 41mg 41mg 41mg 41mg   INR 3.21 3.85 3.11 2.34 3.01 3.2 3.07 3.01 2.95 2.66 2.9 3.03   Notes rec'vd 1/6  some GLV dieting     x1 decr x1 decr       *takes warfarin in AM*    Phone Interview:  Verbal Release Authorization: please contact Mr. Hall directly - if unable to reach patient may contact floridalmaerMarko  Tablet Strength: 2mg, and 5mg tablets  Patient Contact Info: [phone]; Susan Delgadillo's number is  [phone] *please call both at; [email or web address]; [email or web address]  Lab Contact Info: Megan Vu     Patient Findings   Negatives:  Signs/symptoms of thrombosis, Signs/symptoms of bleeding, Laboratory test error suspected, Change in health, Change in alcohol use, Change in activity, Upcoming invasive procedure, Emergency department visit, Upcoming dental procedure, Missed doses, Extra doses, Change in medications, Change in diet/appetite, Hospital admission, Bruising, Other complaints     Plan:  1. INR is slightly SUPRAtherapeutic today at 3.03. As patient is due for his lower dose tonight, Instructed Susan to have Manjeet continue warfarin 5mg daily except 7mg on MonWedFri until recheck.  2. Mr. Hall prefers we contact Ssuan with dosing instructions and follow up testing dates. Plan was discussed with Susan.  3. Repeat INR in 2 weeks, 5/21  4. Verbal information provided over the phone. Manjeet Srinivas Hall's SO, Susan, RBV dosing instructions, expresses understanding by teach back, and has no further questions at this time.    Abdi Petit CPhT  5/7/2020  15:55      I, Kayla Stockton [facility], have reviewed the note in full and agree with the assessment and plan.  05/08/20  16:31

## 2020-05-28 ENCOUNTER — ANTICOAGULATION VISIT (OUTPATIENT)
Dept: PHARMACY | Facility: HOSPITAL | Age: 45
End: 2020-05-28

## 2020-05-28 ENCOUNTER — LAB (OUTPATIENT)
Dept: LAB | Facility: HOSPITAL | Age: 45
End: 2020-05-28

## 2020-05-28 DIAGNOSIS — Z95.2 H/O MECHANICAL AORTIC VALVE REPLACEMENT: ICD-10-CM

## 2020-05-28 DIAGNOSIS — Z95.2 HX OF AORTIC VALVE REPLACEMENT, MECHANICAL: ICD-10-CM

## 2020-05-28 LAB
INR PPP: 3.36 (ref 0.85–1.16)
PROTHROMBIN TIME: 33.8 SECONDS (ref 11.5–14)

## 2020-05-28 PROCEDURE — 85610 PROTHROMBIN TIME: CPT

## 2020-05-28 NOTE — PROGRESS NOTES
Anticoagulation Clinic - Remote Progress Note  REMOTE LAB    Indication: On-X Mechanical valve #25 and a 30mm hemashield; stroke  Referring Provider: Alvaro (Last seen: 1/1519  next appt: 7/20/20)  Initial Warfarin Start Date:  ~9/2018  Goal INR: 2.0-3.0 per referral  Current Drug Interactions: duloxetine, lansoprazole, levothyroxine, Trintellix, valproic acid  Bleed Risk: ascending aortic aneurysm    Other: Hodgkins Lymphoma (hx of chemo and radiation); h/o CVA + hemorrhagic pancreatitis (during hospitalization for mAVR)    Diet: avoids GLV 3/20/20  Alcohol: socially  Tobacco: none  OTC Pain Medication: APAP PRN pain    INR History:  Date 9/27 9/28 10/1 10/4 10/8 10/11 10/17 10/19 10/23   Total Weekly Dose 8mg 12mg 19mg 29mg 31mg 34mg 43mg 41mg 45mg   INR 1.3 1.22 1.3 1.8 1.85 1.7 2.91 2.82 3.79   Notes clinic enox enox enox; clinic enox enox        Date 11/8 11/16 12/21 1/9/19 1/16 2/4 3/1 3/22 5/16   Total Weekly Dose 49mg 49mg 49mg 49mg 47mg 49mg 49mg 49mg 49mg   INR 3.0 2.96 3.44 4.4 2.36 2.62 3.02 2.15 2.6   Notes   desvenla   self adj        Date 6/13 6/24 7/10 7/22 8/5 8/26 9/26 10/18 10/28 11/19 12/3 12/17   Total Weekly Dose 49mg 49mg 49mg 49mg 49mg 49mg 49mg 49mg 49mg 49mg 49mg 45mg   INR 2.25 2.98 2.83 2.97 2.9 3.2 3.57 3.12 2.88 3.33 4.06 2.59   Notes       fewer GLV Cymbalta start 9/25; Depakote start 9/5 1x decr dose  depakote inc; trintellix dcd ceftin x 10 days     Date 1/3/20 1/21 2/3 2/12 2/19 2/27 3/5 3/12 3/20 4/2 4/17 5/7   Total Weekly Dose 45mg 45mg 47mg  43mg 43mg 43mg 41mg 41mg 41mg 41mg 41mg 41mg   INR 3.21 3.85 3.11 2.34 3.01 3.2 3.07 3.01 2.95 2.66 2.9 3.03   Notes rec'vd 1/6  some GLV dieting     x1 decr x1 decr         Date 5/28              Total Weekly Dose 41mg              INR 3.36              Notes                 *takes warfarin in AM*    Phone Interview:  Verbal Release Authorization: please contact Mr. Hall directly - if unable to reach patient may contact brother,  Marko  Tablet Strength: 2mg, and 5mg tablets  Patient Contact Info: [phone]; Susan Delgadillo's number is [phone] *please call both at; [email or web address]; [email or web address]  Lab Contact Info: Megan Vu     Patient Findings   Positives:  Change in diet/appetite   Negatives:  Signs/symptoms of thrombosis, Signs/symptoms of bleeding, Laboratory test error suspected, Change in health, Change in alcohol use, Change in activity, Upcoming invasive procedure, Emergency department visit, Upcoming dental procedure, Missed doses, Extra doses, Change in medications, Hospital admission, Bruising, Other complaints   Comments:  Has been trying to lose weight so he has changed his eating habits, does not think he has been eating more GLV but is eating less overall       Plan:  1. INR is SUPRAtherapeutic today at 3.36, and has trended at the ULN. Instructed Susan to have Manjeet decrease dose to warfarin 5mg daily except 7mg on MonFri until recheck.  2. Mr. Hall prefers we contact Susan with dosing instructions and follow up testing dates. Plan was discussed with Susan.  3. Repeat INR in 2 weeks, 6/11  4. Verbal information provided over the phone. Manjeet Srinivas Hall's SO, Susan, RBV dosing instructions, expresses understanding by teach back, and has no further questions at this time.    Kayla Stockton, ClaudiaD.  05/28/20   15:50

## 2020-06-12 ENCOUNTER — TELEPHONE (OUTPATIENT)
Dept: PHARMACY | Facility: HOSPITAL | Age: 45
End: 2020-06-12

## 2020-06-15 ENCOUNTER — LAB (OUTPATIENT)
Dept: LAB | Facility: HOSPITAL | Age: 45
End: 2020-06-15

## 2020-06-15 DIAGNOSIS — Z95.2 H/O MECHANICAL AORTIC VALVE REPLACEMENT: ICD-10-CM

## 2020-06-15 DIAGNOSIS — Z95.2 HX OF AORTIC VALVE REPLACEMENT, MECHANICAL: ICD-10-CM

## 2020-06-15 LAB
INR PPP: 2.48 (ref 0.85–1.16)
PROTHROMBIN TIME: 26.5 SECONDS (ref 11.5–14)

## 2020-06-15 PROCEDURE — 85610 PROTHROMBIN TIME: CPT

## 2020-06-16 ENCOUNTER — ANTICOAGULATION VISIT (OUTPATIENT)
Dept: PHARMACY | Facility: HOSPITAL | Age: 45
End: 2020-06-16

## 2020-06-16 DIAGNOSIS — Z95.2 HX OF AORTIC VALVE REPLACEMENT, MECHANICAL: ICD-10-CM

## 2020-06-16 NOTE — PROGRESS NOTES
Anticoagulation Clinic - Remote Progress Note  REMOTE LAB    Indication: On-X Mechanical valve #25 and a 30mm hemashield; stroke  Referring Provider: Alvaro (Last seen: 1/1519  next appt: 7/20/20)  Initial Warfarin Start Date:  ~9/2018  Goal INR: 2.0-3.0 per referral  Current Drug Interactions: duloxetine, lansoprazole, levothyroxine, Trintellix, valproic acid  Bleed Risk: ascending aortic aneurysm    Other: Hodgkins Lymphoma (hx of chemo and radiation); h/o CVA + hemorrhagic pancreatitis (during hospitalization for mAVR)    Diet: avoids GLV 06/16/2020  Alcohol: socially  Tobacco: none  OTC Pain Medication: APAP PRN pain    INR History:  Date 9/27 9/28 10/1 10/4 10/8 10/11 10/17 10/19 10/23   Total Weekly Dose 8mg 12mg 19mg 29mg 31mg 34mg 43mg 41mg 45mg   INR 1.3 1.22 1.3 1.8 1.85 1.7 2.91 2.82 3.79   Notes clinic enox enox enox; clinic enox enox        Date 11/8 11/16 12/21 1/9/19 1/16 2/4 3/1 3/22 5/16   Total Weekly Dose 49mg 49mg 49mg 49mg 47mg 49mg 49mg 49mg 49mg   INR 3.0 2.96 3.44 4.4 2.36 2.62 3.02 2.15 2.6   Notes   desvenla   self adj        Date 6/13 6/24 7/10 7/22 8/5 8/26 9/26 10/18 10/28 11/19 12/3 12/17   Total Weekly Dose 49mg 49mg 49mg 49mg 49mg 49mg 49mg 49mg 49mg 49mg 49mg 45mg   INR 2.25 2.98 2.83 2.97 2.9 3.2 3.57 3.12 2.88 3.33 4.06 2.59   Notes       fewer GLV Cymbalta start 9/25; Depakote start 9/5 1x decr dose  depakote inc; trintellix dcd ceftin x 10 days     Date 1/3/20 1/21 2/3 2/12 2/19 2/27 3/5 3/12 3/20 4/2 4/17 5/7   Total Weekly Dose 45mg 45mg 47mg  43mg 43mg 43mg 41mg 41mg 41mg 41mg 41mg 41mg   INR 3.21 3.85 3.11 2.34 3.01 3.2 3.07 3.01 2.95 2.66 2.9 3.03   Notes rec'vd 1/6  some GLV dieting     x1 decr x1 decr         Date 5/28 6/16             Total Weekly Dose 41mg 39mg             INR 3.36 2.48             Notes                 *takes warfarin in AM*    Phone Interview:  Verbal Release Authorization: please contact Thomas Rafael directly - if unable to reach patient may contact  Marko erickson  Tablet Strength: 2mg, and 5mg tablets  Patient Contact Info: [phone]; Susan Delgadillo's number is [phone] *please call both at; [email or web address]; [email or web address]  Lab Contact Info: Megan Vu     Patient Findings   Negatives:  Signs/symptoms of thrombosis, Signs/symptoms of bleeding, Laboratory test error suspected, Change in health, Change in alcohol use, Change in activity, Upcoming invasive procedure, Emergency department visit, Upcoming dental procedure, Missed doses, Extra doses, Change in medications, Change in diet/appetite, Hospital admission, Bruising, Other complaints     Plan:  1. INR is therapeutic today. Instructed Susan to have Manjeet decrease dose to warfarin 5mg daily except 7mg on MonFri until recheck.  2. Mr. Hall prefers we contact Susan with dosing instructions and follow up testing dates. Plan was discussed with Susan.  3. Repeat INR in 2 weeks, 6/29.  4. Verbal information provided over the phone. Manjeet Hall's SO, Susan, RBV dosing instructions, expresses understanding by teach back, and has no further questions at this time.    Sondra Brooks, PharmD  06/16/20   08:39

## 2020-06-29 ENCOUNTER — LAB (OUTPATIENT)
Dept: LAB | Facility: HOSPITAL | Age: 45
End: 2020-06-29

## 2020-06-29 DIAGNOSIS — Z95.2 HX OF AORTIC VALVE REPLACEMENT, MECHANICAL: ICD-10-CM

## 2020-06-29 DIAGNOSIS — Z95.2 H/O MECHANICAL AORTIC VALVE REPLACEMENT: ICD-10-CM

## 2020-06-29 LAB
INR PPP: 2.42 (ref 0.85–1.16)
PROTHROMBIN TIME: 26 SECONDS (ref 11.5–14)

## 2020-06-29 PROCEDURE — 85610 PROTHROMBIN TIME: CPT

## 2020-06-30 ENCOUNTER — OFFICE VISIT (OUTPATIENT)
Dept: NEUROLOGY | Facility: CLINIC | Age: 45
End: 2020-06-30

## 2020-06-30 ENCOUNTER — ANTICOAGULATION VISIT (OUTPATIENT)
Dept: PHARMACY | Facility: HOSPITAL | Age: 45
End: 2020-06-30

## 2020-06-30 VITALS
HEIGHT: 69 IN | OXYGEN SATURATION: 98 % | BODY MASS INDEX: 32.58 KG/M2 | HEART RATE: 98 BPM | SYSTOLIC BLOOD PRESSURE: 130 MMHG | DIASTOLIC BLOOD PRESSURE: 84 MMHG | WEIGHT: 220 LBS

## 2020-06-30 DIAGNOSIS — I69.30 SEQUELAE, POST-STROKE: Primary | ICD-10-CM

## 2020-06-30 DIAGNOSIS — Z95.2 HX OF AORTIC VALVE REPLACEMENT, MECHANICAL: ICD-10-CM

## 2020-06-30 PROCEDURE — 99213 OFFICE O/P EST LOW 20 MIN: CPT | Performed by: PSYCHIATRY & NEUROLOGY

## 2020-06-30 NOTE — PROGRESS NOTES
Subjective:    CC: Manjeet Hall is seen today for stroke       HPI:  Current visit- since the last visit patient has been doing the same in terms of his speech.  He is still getting speech therapy.  Continues to take Coumadin but stopped taking Lipitor.  His last lipid panel was well controlled as follows-, , LDL 92, HDL 59.  He is also on Depakote which is helping tremendously with his mood.  He states that he has gained a lot of weight in the past year due to not exercising as much.  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  Also takes gabapentin 600 mg in the morning and 900 mg at night but cannot tell me exactly why he is taking that.    Last visit-patient has been steadily making progress in terms of his speech.  He continues to go to [facility] for speech therapy twice a week.  He still has difficulty comprehending certain words and he tells me today that it is tough for him to hear certain words.  He has stopped taking atorvastatin but cannot tell me why.  Also takes Depakote.  Again is unsure of why he is taking it, thinks it may be for his headaches.    Last visit- patient speech continues to improve.  He is still getting speech therapy at [facility] at least 2-3 times a week.  He continues to be on Coumadin as well as atorvastatin 40 mg daily.  He is also exercising now and has lost weight.  Takes gabapentin for neuropathy.    Last visit- since his last visit his speech has improved and he is still getting speech therapy twice a week at [facility].  His carotid Doppler that I had ordered showed mild plaques bilaterally but no significant stenosis.  He continues to be on Coumadin with a therapeutic INR.  His lipid panel was as follows-, , , HDL 53.  I had started him on atorvastatin 40 mg which she is continuing to take.  Of note-I also personally reviewed his notes from [facility] which are as  follows.    CT head showed decreased attenuation in the left temporal operculum and left inferior parietal lobe and parts of the lateral left superior parietal lobe reflecting a left MCA infarct.  MRI brain also showed restricted diffusion in the left posterior MCA area and left insular cortex (aspect score of 5)  CTA head showed thrombus in the left M1-2 junction with good distal collateral vessels and absent left A1 .The rest of the anterior circulation and vertebral basilar circulation was patent.  CTA neck showed mild carotid plaques without any significant stenosis on either side.  Echo showed EF of 51% with the limitation of right ventricle and moderate dilatation of left atrium, moderate aortic valve regurg    Initial dxipe-43-sdbe-old male accompanied by his wife with a history of aortic stenosis status post valve replacement and VSD repair at age 14, aortic aneurysm and aortic valve regurgitation status post aneurysmal repair and aortic valve repair, anxiety, depression, Hodgkin's lymphoma at age 23 status post chemotherapy and radiation, hypothyroidism presents with a stroke.  As per patient's wife he underwent  a mechanical aortic valve repair as well as and aneurysmal repair in [facility] in August.  He was started on Coumadin soon after the procedure however he developed pancreatitis after surgery hence his Coumadin was stopped for 1 week and he was started on IV heparin.  During that time he had symptoms of garbled speech.  An MRI brain later confirmed a left hemispheric stroke.  He was also found to have a thrombus in his mechanical aortic valve and restarted back on Coumadin.  Currently being bridged with Lovenox.  His last INR yesterday was 1.85.  He has not yet started getting speech therapy and will start soon.    The following portions of the patient's history were reviewed today and updated as of 11/19/2019  : allergies, current medications, past family history, past medical history, past  social history, past surgical history and problem list  These document will be scanned to patient's chart.      Current Outpatient Medications:   •  atorvastatin (LIPITOR) 40 MG tablet, Daily., Disp: , Rfl:   •  divalproex (DEPAKOTE) 500 MG DR tablet, Take 500 mg by mouth 2 (Two) Times a Day., Disp: , Rfl:   •  DULoxetine (CYMBALTA) 60 MG capsule, Take 60 mg by mouth Daily., Disp: , Rfl: 2  •  gabapentin (NEURONTIN) 300 MG capsule, Take 300 mg by mouth 5 (Five) Times a Day. Take 2 tablets AM, 3 tablets in the afternoon, Disp: , Rfl: 2  •  lansoprazole (PREVACID) 30 MG capsule, TAKE 1 CAPSULE BY MOUTH EVERY DAY, Disp: 30 capsule, Rfl: 2  •  levothyroxine (SYNTHROID, LEVOTHROID) 100 MCG tablet, Take 1 tablet by mouth Daily., Disp: 30 tablet, Rfl: 5  •  metoprolol tartrate (LOPRESSOR) 25 MG tablet, Take 1 tablet by mouth 2 (Two) Times a Day., Disp: 180 tablet, Rfl: 3  •  warfarin (COUMADIN) 2 MG tablet, Take one tablet by mouth daily or as directed by the anticoagulation clinic., Disp: 30 tablet, Rfl: 1  •  warfarin (COUMADIN) 5 MG tablet, TAKE 1 TABLET BY MOUTH DAILY OR AS DIRECTED BY ANTICOAGULATION CLINIC., Disp: 30 tablet, Rfl: 7   Past Medical History:   Diagnosis Date   • Abnormal heart rhythm    • Anxiety    • Cancer (CMS/HCC)     hodgkins   • Depression    • GERD (gastroesophageal reflux disease)    • Heart murmur    • History of echocardiogram    • History of left heart catheterization    • History of stomach ulcers    • Hypothyroidism    • Mitral valvular disorder    • Thyroid disorder       Past Surgical History:   Procedure Laterality Date   • CARDIAC SURGERY     • SKIN CANCER EXCISION      mass removed from neck and chest for hodgkins      Family History   Problem Relation Age of Onset   • COPD Mother    • Cancer Mother    • Hypertension Father    • Stroke Father    • Stroke Paternal Grandmother    • Hypertension Paternal Grandmother    • Cancer Paternal Grandmother    • Cancer Paternal Grandfather    •  "Heart disease Paternal Grandfather    • No Known Problems Brother       Social History     Socioeconomic History   • Marital status: Legally      Spouse name: Not on file   • Number of children: Not on file   • Years of education: Not on file   • Highest education level: Not on file   Tobacco Use   • Smoking status: Never Smoker   • Smokeless tobacco: Never Used   Substance and Sexual Activity   • Alcohol use: Yes     Comment: social   • Drug use: No   • Sexual activity: Defer     Review of Systems   Neurological: Positive for speech difficulty.   All other systems reviewed and are negative.      Objective:    /84   Pulse 98   Ht 175.3 cm (69\")   Wt 99.8 kg (220 lb)   SpO2 98%   BMI 32.49 kg/m²     Neurology Exam:    General apperance: NAD.     Mental status: Alert, awake and oriented to time place and person.    Recent and Remote memory: Intact.    Attention span and Concentration: Normal.     Language and Speech: Intact- No dysarthria.    Fluency, Naming , Repitition and Comprehension: Difficulty comprehending certain things.  Also reduced fluency however significantly improved since initial visit    Cranial Nerves:   CN II: Visual fields are full. Intact. Fundi - Normal, No papillederma, Pupils - MATTHIEU  CN III, IV and VI: Extraocular movements are intact. Normal saccades.   CN V: Facial sensation is intact.   CN VII: Muscles of facial expression reveal no asymmetry. Intact.   CN VIII: Hearing is intact. Whispered voice intact.   CN IX and X: Palate elevates symmetrically. Intact  CN XI: Shoulder shrug is intact.   CN XII: Tongue is midline without evidence of atrophy or fasciculation.     Ophthalmoscopic exam of optic disc-normal    Motor:  Right UE muscle strength 5/5. Normal tone.     Left UE muscle strength 5/5. Normal tone.      Right LE muscle strength5/5. Normal tone.     Left LE muscle strength 5/5. Normal tone.      Sensory: Normal light touch, vibration and pinprick sensation " bilaterally.    DTRs: 2+ bilaterally in upper and lower extremities.    Babinski: Negative bilaterally.    Co-ordination: Normal finger-to-nose, heel to shin B/L.    Rhomberg: Negative.    Gait: Normal.    Cardiovascular: Regular rate and rhythm without murmur, gallop or rub.    Assessment and Plan:  1. Sequelae, post-stroke  Patient had a left hemispheric infarct secondary to an artificial aortic valve thrombus after he was taken off Coumadin briefly for pancreatitis  Currently on Coumadin.  Goal INR 2.5-3.5  Maintain blood pressure less than 130/90  He has stopped taking Lipitor.  Goal LDL of less than 100.  His LDL was 92  Continue speech therapy  I have also told him to start exercising at least a few times a week.  He should also speak to his PCP about cutting back on his gabapentin as that may be the cause of his weight gain.  Patient does not know why he is taking it         Return in about 1 year (around 6/30/2021).         Lindsay Summers MD

## 2020-06-30 NOTE — PROGRESS NOTES
Anticoagulation Clinic - Remote Progress Note  REMOTE LAB    Indication: On-X Mechanical valve #25 and a 30mm hemashield; stroke  Referring Provider: Alvaro (Last seen: 1/1519  next appt: 7/20/20)  Initial Warfarin Start Date:  ~9/2018  Goal INR: 2.0-3.0 per referral  Current Drug Interactions: duloxetine, lansoprazole, levothyroxine, Trintellix, valproic acid  Bleed Risk: ascending aortic aneurysm    Other: Hodgkins Lymphoma (hx of chemo and radiation); h/o CVA + hemorrhagic pancreatitis (during hospitalization for mAVR)    Diet: avoids GLV 06/16/2020  Alcohol: socially  Tobacco: none  OTC Pain Medication: APAP PRN pain    INR History:  Date 9/27 9/28 10/1 10/4 10/8 10/11 10/17 10/19 10/23   Total Weekly Dose 8mg 12mg 19mg 29mg 31mg 34mg 43mg 41mg 45mg   INR 1.3 1.22 1.3 1.8 1.85 1.7 2.91 2.82 3.79   Notes clinic enox enox enox; clinic enox enox        Date 11/8 11/16 12/21 1/9/19 1/16 2/4 3/1 3/22 5/16   Total Weekly Dose 49mg 49mg 49mg 49mg 47mg 49mg 49mg 49mg 49mg   INR 3.0 2.96 3.44 4.4 2.36 2.62 3.02 2.15 2.6   Notes   desvenla   self adj        Date 6/13 6/24 7/10 7/22 8/5 8/26 9/26 10/18 10/28 11/19 12/3 12/17   Total Weekly Dose 49mg 49mg 49mg 49mg 49mg 49mg 49mg 49mg 49mg 49mg 49mg 45mg   INR 2.25 2.98 2.83 2.97 2.9 3.2 3.57 3.12 2.88 3.33 4.06 2.59   Notes       fewer GLV Cymbalta start 9/25; Depakote start 9/5 1x decr dose  depakote inc; trintellix dcd ceftin x 10 days     Date 1/3/20 1/21 2/3 2/12 2/19 2/27 3/5 3/12 3/20 4/2 4/17 5/7   Total Weekly Dose 45mg 45mg 47mg  43mg 43mg 43mg 41mg 41mg 41mg 41mg 41mg 41mg   INR 3.21 3.85 3.11 2.34 3.01 3.2 3.07 3.01 2.95 2.66 2.9 3.03   Notes rec'vd 1/6  some GLV dieting     x1 decr x1 decr         Date 5/28 6/16 6/29            Total Weekly Dose 41mg 39mg 39mg            INR 3.36 2.48 2.42            Notes   rec'd 6/30              *takes warfarin in AM*    Phone Interview:  Verbal Release Authorization: please contact Mr. Hall directly - if unable to reach  patient may contact [email or web address]  Tablet Strength: 2mg, and 5mg tablets  Patient Contact Info: [phone]; Susan Delgadillo's number is [phone] *please call both at; [email or web address]; [email or web address]  Lab Contact Info: Megan Vu     Patient Findings   Negatives:  Signs/symptoms of thrombosis, Signs/symptoms of bleeding, Laboratory test error suspected, Change in health, Change in alcohol use, Change in activity, Upcoming invasive procedure, Emergency department visit, Upcoming dental procedure, Missed doses, Extra doses, Change in medications, Change in diet/appetite, Hospital admission, Bruising, Other complaints     Plan:  1. INR is therapeutic today. Instructed Susan to have Manjeet continue warfarin 5mg daily except 7mg on MonFri until recheck.  2. Mr. Hall prefers we contact Susan with dosing instructions and follow up testing dates. Plan was discussed with Susan.  3. Repeat INR in 2 weeks, 7/13.  4. Verbal information provided over the phone. Manjeetbria Hall's SO, Susan, RBV dosing instructions, expresses understanding by teach back, and has no further questions at this time.    Harika Lopez, Pharmacy Intern   06/30/20   08:17     Can push out further if WNL at next encounter  I, Kayla Stockton, [facility], have reviewed the note in full and agree with the assessment and plan.  06/30/20  10:41

## 2020-07-16 ENCOUNTER — TELEPHONE (OUTPATIENT)
Dept: PHARMACY | Facility: HOSPITAL | Age: 45
End: 2020-07-16

## 2020-07-17 ENCOUNTER — LAB (OUTPATIENT)
Dept: LAB | Facility: HOSPITAL | Age: 45
End: 2020-07-17

## 2020-07-17 DIAGNOSIS — Z95.2 HX OF AORTIC VALVE REPLACEMENT, MECHANICAL: ICD-10-CM

## 2020-07-17 DIAGNOSIS — Z95.2 H/O MECHANICAL AORTIC VALVE REPLACEMENT: ICD-10-CM

## 2020-07-17 LAB
INR PPP: 3.08 (ref 0.85–1.16)
PROTHROMBIN TIME: 31.5 SECONDS (ref 11.5–14)

## 2020-07-17 PROCEDURE — 85610 PROTHROMBIN TIME: CPT

## 2020-07-17 RX ORDER — WARFARIN SODIUM 2 MG/1
TABLET ORAL
Qty: 30 TABLET | Refills: 1 | OUTPATIENT
Start: 2020-07-17

## 2020-07-17 RX ORDER — WARFARIN SODIUM 2 MG/1
TABLET ORAL
Qty: 30 TABLET | Refills: 1 | Status: SHIPPED | OUTPATIENT
Start: 2020-07-17 | End: 2021-02-10 | Stop reason: SDUPTHER

## 2020-07-20 ENCOUNTER — HOSPITAL ENCOUNTER (OUTPATIENT)
Dept: CT IMAGING | Facility: HOSPITAL | Age: 45
Discharge: HOME | End: 2020-07-20
Admitting: INTERNAL MEDICINE

## 2020-07-20 ENCOUNTER — OFFICE VISIT (OUTPATIENT)
Dept: CARDIOLOGY | Facility: CLINIC | Age: 45
End: 2020-07-20

## 2020-07-20 ENCOUNTER — ANTICOAGULATION VISIT (OUTPATIENT)
Dept: PHARMACY | Facility: HOSPITAL | Age: 45
End: 2020-07-20

## 2020-07-20 VITALS
DIASTOLIC BLOOD PRESSURE: 72 MMHG | WEIGHT: 223 LBS | SYSTOLIC BLOOD PRESSURE: 110 MMHG | HEART RATE: 74 BPM | TEMPERATURE: 97.8 F | BODY MASS INDEX: 33.8 KG/M2 | OXYGEN SATURATION: 96 % | HEIGHT: 68 IN

## 2020-07-20 DIAGNOSIS — Z95.2 HX OF AORTIC VALVE REPLACEMENT, MECHANICAL: ICD-10-CM

## 2020-07-20 DIAGNOSIS — I71.20 THORACIC AORTIC ANEURYSM WITHOUT RUPTURE (HCC): Primary | ICD-10-CM

## 2020-07-20 DIAGNOSIS — I10 ESSENTIAL HYPERTENSION: ICD-10-CM

## 2020-07-20 DIAGNOSIS — I71.20 THORACIC AORTIC ANEURYSM WITHOUT RUPTURE (HCC): ICD-10-CM

## 2020-07-20 PROCEDURE — 99213 OFFICE O/P EST LOW 20 MIN: CPT | Performed by: INTERNAL MEDICINE

## 2020-07-20 PROCEDURE — 71270 CT THORAX DX C-/C+: CPT

## 2020-07-20 PROCEDURE — 82565 ASSAY OF CREATININE: CPT

## 2020-07-20 PROCEDURE — 0 IOPAMIDOL PER 1 ML: Performed by: INTERNAL MEDICINE

## 2020-07-20 RX ADMIN — IOPAMIDOL 100 ML: 755 INJECTION, SOLUTION INTRAVENOUS at 12:34

## 2020-07-20 NOTE — PROGRESS NOTES
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  -Developed subvalvular aortic stenosis which required surgery, VSD closed as well at 13yo  -Ross procedure for increasing aortic insufficiency with moderate aortic stenosis at 19yo  -Treated for Hodgkin's lymphoma, age 21 - CTX and chest XRT    -May 2018 Echo  · Estimated EF appears to be in the range of 56 - 60%.  · Left ventricular wall thickness is consistent with mild concentric hypertrophy.  · Mild to moderate aortic valve regurgitation is present.  · Right ventricular cavity is borderline dilated.  · Left atrial cavity size is mildly dilated.  · Moderate dilation of the aortic root is present. 4.8 cm. Moderate dilation of the ascending aorta is present. 5.4 cm.    -CT angiography chest June 2018:aortic root at the upper portion of the valve largest AP dimension is 5.4 cm. Approximately 2 cm above the aortic root the ascending thoracic aorta largest oblique dimension is 7.0 cm and AP dimension 6.2 cm.    Cardiac catheterization in July 2018 [facility]  LMT: - The LMT has mild luminal irregularities.  LAD:  - There was moderate diffuse disease.  - The mid LAD is narrowed 30 % - focal disease.   Additional Comment: very large vessel, wraps around the apex to perfuse the   entire mid and apical inferior wall.  LCX: - There was mild diffuse disease.  - The circ AV groove continuation circumflex is narrowed 80 % - focal disease.  - The 1st obtuse marginal circumflex is narrowed 60 % - focal disease.   Additional Comment: AV LCx perfuses small left PDA.  RAMUS: - The Ramus is Absent.  RCA:  - The RCA has mild luminal irregularities.      August 13, 2018  Reoperation, third open-heart surgery.  Reversed Ross procedure  including aortic valve and root re-replacement with a composite graft including an On-X mechanical valve #25 and a 30-mm Hemashield graft, right ventricular outflow  tract reconstruction with the re-repaired autograft.    Echo September 2018 [facility]  EF = 60 ± 5% . There is no evidence of LV apical thrombus.  - The right ventricle is normal in size. Right ventricular systolic function is   low normal.  - On-X prosthetic aortic valve (size #25). There is no aortic valve regurgitation.   Transprosthetic gradients could not be obtained on the current study. Valve   leaflets not well visualized. If clinically indicated, consider ZACHARY to assess   further.  - There is a small pericardial effusion with organization. It is smaller when   compared to that documented on the prior echocardiogram performed 8/23/18. There   is no RV/RA collapse. There is no obvious evidence of cardiac tamponade.  - Status post reverse Ross procedure including aortic root, ascending aorta and   aortic valve replacement, RVOT reconstruction with rerepaired autograft. No   significant PI. The peak/mean gradients 19/10 mmHg [similar to the prior study]    August 2019 echo  · Left ventricular systolic function is normal.  · Estimated EF appears to be in the range of 56 - 60%  · Left ventricular diastolic dysfunction (grade II) consistent with pseudonormalization.  · Left ventricular wall thickness is consistent with mild concentric hypertrophy.  · Normal mechanical aortic valve.    ASSESSMENT:   Diagnosis Plan   1. Thoracic aortic aneurysm without rupture (CMS/HCC)     2. Essential hypertension         PLAN:  Congenital heart disease: Status post reversed Ross procedure  including aortic valve and root re-replacement with a composite graft including an On-X mechanical valve #25 and a 30-mm Hemashield graft, right ventricular outflow  tract reconstruction with  "the re-repaired autograft in 2018.  Postoperative course complicated by stroke and pancreatitis.  Afterload currently well-controlled.  Continue anticoagulation with goal INR of 2-3.  surveillance echocardiographic assessment every 3 years throughout the first 10 years post valve implant. CTA chest reveals stable repair.  Repeat CTA chest every 24 months.    Coronary artery disease: Currently stable and asymptomatic.  Afterload well controlled.  Currently off statin.  We will continue to trend fasting lipid panel and restart if LDL trends higher than 100.    Subjective  History of stroke with persistent word finding difficulties.  History of postoperative pancreatitis.  Denies chest discomfort.  Blood pressures running less than 120/80 mmHg.  compliant with medical therapy, although is no longer taking atorvastatin.  He is exercising on a regular basis and has no physical limitations, desires to lose approximately 35 pounds.  Denies easy bruising or bleeding complications on chronic anticoagulation.    PHYSICAL EXAMINATION:  Vitals:    07/20/20 1317   BP: 110/72   BP Location: Left arm   Patient Position: Sitting   Pulse: 74   Temp: 97.8 °F (36.6 °C)   SpO2: 96%   Weight: 101 kg (223 lb)   Height: 172.7 cm (68\")     General Appearance:    Alert, cooperative, no distress, appears stated age   Head:    Normocephalic, without obvious abnormality, atraumatic   Eyes:    conjunctiva/corneas clear   Nose:   Nares normal, septum midline, mucosa normal, no drainage   Throat:   Lips, teeth and gums normal   Neck:   Supple, symmetrical, trachea midline, no carotid    bruit or JVD   Lungs:     Clear to auscultation bilaterally, respirations unlabored   Chest Wall:    No tenderness or deformity    Heart:    Regular rate and rhythm, mechanical A2,, 2/6 early peaking systolic murmur left upper sternal border, rub   or gallop, normal carotid impulse bilaterally without bruit.   Abdomen:     Soft, non-tender   Extremities:   " Extremities normal, atraumatic, no cyanosis or edema   Pulses:   2+ and symmetric all extremities   Skin:   Skin color, texture, turgor normal, no rashes or lesions       Diagnostic Data:  Procedures  Lab Results   Component Value Date    CHLPL 259 (H) 07/17/2015    TRIG 148 11/19/2019    HDL 59 11/19/2019     Lab Results   Component Value Date    GLUCOSE 83 09/27/2018    BUN 12 09/27/2018    CREATININE 0.84 09/27/2018     09/27/2018    K 4.3 09/27/2018     09/27/2018    CO2 27.0 09/27/2018     No results found for: HGBA1C  Lab Results   Component Value Date    WBC 7.26 09/27/2018    HGB 8.4 (L) 09/27/2018    HCT 28.8 (L) 09/27/2018     09/27/2018       Allergies  No Known Allergies    Current Medications    Current Outpatient Medications:   •  atorvastatin (LIPITOR) 40 MG tablet, Daily., Disp: , Rfl:   •  divalproex (DEPAKOTE) 500 MG DR tablet, Take 500 mg by mouth 2 (Two) Times a Day., Disp: , Rfl:   •  DULoxetine (CYMBALTA) 60 MG capsule, Take 60 mg by mouth Daily., Disp: , Rfl: 2  •  gabapentin (NEURONTIN) 300 MG capsule, Take 300 mg by mouth 5 (Five) Times a Day. Take 2 tablets AM, 3 tablets in the afternoon, Disp: , Rfl: 2  •  lansoprazole (PREVACID) 30 MG capsule, TAKE 1 CAPSULE BY MOUTH EVERY DAY, Disp: 30 capsule, Rfl: 2  •  levothyroxine (SYNTHROID, LEVOTHROID) 100 MCG tablet, Take 1 tablet by mouth Daily., Disp: 30 tablet, Rfl: 5  •  metoprolol tartrate (LOPRESSOR) 25 MG tablet, Take 1 tablet by mouth 2 (Two) Times a Day., Disp: 180 tablet, Rfl: 3  •  warfarin (Coumadin) 2 MG tablet, Take one tablet by mouth daily or as directed by the anticoagulation clinic., Disp: 30 tablet, Rfl: 1  •  warfarin (COUMADIN) 5 MG tablet, TAKE 1 TABLET BY MOUTH DAILY OR AS DIRECTED BY ANTICOAGULATION CLINIC., Disp: 30 tablet, Rfl: 7  No current facility-administered medications for this visit.           ROS  Review of Systems   Cardiovascular: Negative for chest pain, dyspnea on exertion, irregular  heartbeat and syncope.   Respiratory: Negative for cough, shortness of breath and wheezing.          SOCIAL HX  Social History     Socioeconomic History   • Marital status: Legally      Spouse name: Not on file   • Number of children: Not on file   • Years of education: Not on file   • Highest education level: Not on file   Tobacco Use   • Smoking status: Never Smoker   • Smokeless tobacco: Never Used   Substance and Sexual Activity   • Alcohol use: Yes     Comment: social   • Drug use: No   • Sexual activity: Defer       FAMILY HX  Family History   Problem Relation Age of Onset   • COPD Mother    • Cancer Mother    • Hypertension Father    • Stroke Father    • Stroke Paternal Grandmother    • Hypertension Paternal Grandmother    • Cancer Paternal Grandmother    • Cancer Paternal Grandfather    • Heart disease Paternal Grandfather    • No Known Problems Brother              Vimal John III, MD, FACC

## 2020-07-20 NOTE — PROGRESS NOTES
Anticoagulation Clinic - Remote Progress Note  REMOTE LAB    Indication: On-X Mechanical valve #25 and a 30mm hemashield; stroke  Referring Provider: Alvaro (Last seen: 1/1519  next appt: 7/20/20)  Initial Warfarin Start Date:  ~9/2018  Goal INR: 2.0-3.0 per referral  Current Drug Interactions: duloxetine, lansoprazole, levothyroxine, Trintellix, valproic acid  Bleed Risk: ascending aortic aneurysm    Other: Hodgkins Lymphoma (hx of chemo and radiation); h/o CVA + hemorrhagic pancreatitis (during hospitalization for mAVR)    Diet: avoids GLV 06/16/2020  Alcohol: socially  Tobacco: none  OTC Pain Medication: APAP PRN pain    INR History:  Date 9/27 9/28 10/1 10/4 10/8 10/11 10/17 10/19 10/23   Total Weekly Dose 8mg 12mg 19mg 29mg 31mg 34mg 43mg 41mg 45mg   INR 1.3 1.22 1.3 1.8 1.85 1.7 2.91 2.82 3.79   Notes clinic enox enox enox; clinic enox enox        Date 11/8 11/16 12/21 1/9/19 1/16 2/4 3/1 3/22 5/16   Total Weekly Dose 49mg 49mg 49mg 49mg 47mg 49mg 49mg 49mg 49mg   INR 3.0 2.96 3.44 4.4 2.36 2.62 3.02 2.15 2.6   Notes   desvenla   self adj        Date 6/13 6/24 7/10 7/22 8/5 8/26 9/26 10/18 10/28 11/19 12/3 12/17   Total Weekly Dose 49mg 49mg 49mg 49mg 49mg 49mg 49mg 49mg 49mg 49mg 49mg 45mg   INR 2.25 2.98 2.83 2.97 2.9 3.2 3.57 3.12 2.88 3.33 4.06 2.59   Notes       fewer GLV Cymbalta start 9/25; Depakote start 9/5 1x decr dose  depakote inc; trintellix dcd ceftin x 10 days     Date 1/3/20 1/21 2/3 2/12 2/19 2/27 3/5 3/12 3/20 4/2 4/17 5/7   Total Weekly Dose 45mg 45mg 47mg  43mg 43mg 43mg 41mg 41mg 41mg 41mg 41mg 41mg   INR 3.21 3.85 3.11 2.34 3.01 3.2 3.07 3.01 2.95 2.66 2.9 3.03   Notes rec'vd 1/6  some GLV dieting     x1 decr x1 decr         Date 5/28 6/16 6/29 7/17           Total Weekly Dose 41mg 39mg 39mg 39 mg           INR 3.36 2.48 2.42 3.08           Notes   rec'd 6/30              *takes warfarin in AM*    Phone Interview:  Verbal Release Authorization: please contact Mr. Hall directly - if  unable to reach patient may contact [email or web address]  Tablet Strength: 2mg, and 5mg tablets  Patient Contact Info: [phone]; Susan Delgadillo's number is [phone] *please call both at; [email or web address]; [email or web address]  Lab Contact Info: Megan Vu     Patient Findings   Negatives:  Signs/symptoms of thrombosis, Signs/symptoms of bleeding, Laboratory test error suspected, Change in health, Change in alcohol use, Change in activity, Upcoming invasive procedure, Emergency department visit, Upcoming dental procedure, Missed doses, Extra doses, Change in medications, Hospital admission, Bruising, Other complaints   Comments:  Susan says that he is considering changing his diet and trying to lose weight, but has not committed to it yet.   Otherwise negative findings.       Plan:  1. INR is therapeutic today at the upper end of goal range at 3.08. Instructed Susan to have Manjeet continue warfarin 5mg daily except 7mg on MonFri until recheck.  2. Mr. Hall prefers we contact Susan with dosing instructions and follow up testing dates. Plan was discussed with Susan.  3. Repeat INR in 2 weeks,  7/31.  4. Verbal information provided over the phone. Manjeet Srinivas Hall's SO, Susan, RBV dosing instructions, expresses understanding by teach back, and has no further questions at this time.    Abdi Vides [facility], PharmD  07/20/20   08:15

## 2020-07-22 LAB — CREAT BLDA-MCNC: 0.9 MG/DL (ref 0.6–1.3)

## 2020-08-11 ENCOUNTER — TELEPHONE (OUTPATIENT)
Dept: PHARMACY | Facility: HOSPITAL | Age: 45
End: 2020-08-11

## 2020-08-13 ENCOUNTER — LAB (OUTPATIENT)
Dept: LAB | Facility: HOSPITAL | Age: 45
End: 2020-08-13

## 2020-08-13 DIAGNOSIS — Z95.2 H/O MECHANICAL AORTIC VALVE REPLACEMENT: ICD-10-CM

## 2020-08-13 DIAGNOSIS — Z95.2 HX OF AORTIC VALVE REPLACEMENT, MECHANICAL: ICD-10-CM

## 2020-08-13 LAB
INR PPP: 2.78 (ref 0.85–1.16)
PROTHROMBIN TIME: 29.1 SECONDS (ref 11.5–14)

## 2020-08-13 PROCEDURE — 85610 PROTHROMBIN TIME: CPT

## 2020-08-14 ENCOUNTER — ANTICOAGULATION VISIT (OUTPATIENT)
Dept: PHARMACY | Facility: HOSPITAL | Age: 45
End: 2020-08-14

## 2020-08-14 DIAGNOSIS — Z95.2 HX OF AORTIC VALVE REPLACEMENT, MECHANICAL: ICD-10-CM

## 2020-08-14 NOTE — PROGRESS NOTES
Anticoagulation Clinic - Remote Progress Note  REMOTE LAB    Indication: On-X Mechanical valve #25 and a 30mm hemashield; stroke  Referring Provider: Alvaro (Last seen: 1/1519  next appt: 7/20/20)  Initial Warfarin Start Date:  ~9/2018  Goal INR: 2.0-3.0 per referral  Current Drug Interactions: duloxetine, lansoprazole, levothyroxine, Trintellix, valproic acid  Bleed Risk: ascending aortic aneurysm    Other: Hodgkins Lymphoma (hx of chemo and radiation); h/o CVA + hemorrhagic pancreatitis (during hospitalization for mAVR)    Diet: avoids GLV 06/16/2020  Alcohol: socially  Tobacco: none  OTC Pain Medication: APAP PRN pain    INR History:  Date 9/27 9/28 10/1 10/4 10/8 10/11 10/17 10/19 10/23   Total Weekly Dose 8mg 12mg 19mg 29mg 31mg 34mg 43mg 41mg 45mg   INR 1.3 1.22 1.3 1.8 1.85 1.7 2.91 2.82 3.79   Notes clinic enox enox enox; clinic enox enox        Date 11/8 11/16 12/21 1/9/19 1/16 2/4 3/1 3/22 5/16   Total Weekly Dose 49mg 49mg 49mg 49mg 47mg 49mg 49mg 49mg 49mg   INR 3.0 2.96 3.44 4.4 2.36 2.62 3.02 2.15 2.6   Notes   desvenla   self adj        Date 6/13 6/24 7/10 7/22 8/5 8/26 9/26 10/18 10/28 11/19 12/3 12/17   Total Weekly Dose 49mg 49mg 49mg 49mg 49mg 49mg 49mg 49mg 49mg 49mg 49mg 45mg   INR 2.25 2.98 2.83 2.97 2.9 3.2 3.57 3.12 2.88 3.33 4.06 2.59   Notes       fewer GLV Cymbalta start 9/25; Depakote start 9/5 1x decr dose  depakote inc; trintellix dcd ceftin x 10 days     Date 1/3/20 1/21 2/3 2/12 2/19 2/27 3/5 3/12 3/20 4/2 4/17 5/7   Total Weekly Dose 45mg 45mg 47mg  43mg 43mg 43mg 41mg 41mg 41mg 41mg 41mg 41mg   INR 3.21 3.85 3.11 2.34 3.01 3.2 3.07 3.01 2.95 2.66 2.9 3.03   Notes rec'vd 1/6  some GLV dieting     x1 decr x1 decr         Date 5/28 6/16 6/29 7/17 8/14          Total Weekly Dose 41mg 39mg 39mg 39 mg 39mg          INR 3.36 2.48 2.42 3.08 2.78          Notes   rec'd 6/30  rec'd 8/13            *takes warfarin in AM*    Phone Interview:  Verbal Release Authorization: please contact   "Rafael directly - if unable to reach patient may contact brotherMarko  Tablet Strength: 2mg, and 5mg tablets  Patient Contact Info: [phone]; Susan Delgadillo's number is [phone] *please call both at; [email or web address]; [email or web address]  Lab Contact Info: Megan Vu     Patient Findings   Negatives:  Signs/symptoms of thrombosis, Signs/symptoms of bleeding, Laboratory test error suspected, Change in health, Change in alcohol use, Change in activity, Upcoming invasive procedure, Emergency department visit, Upcoming dental procedure, Missed doses, Extra doses, Change in medications, Change in diet/appetite, Hospital admission, Bruising, Other complaints   Comments:  Per encounter 7/17 \"Susan says that he is considering changing his diet and trying to lose weight, but has not committed to it yet. \" Confirmed with patient that he hasn't start any plans on changing diet or losing weight. Not much GLV intake.         Plan:  1. INR is therapeutic today at 2.78. Instructed Manjeet continue warfarin 5mg daily except 7mg on MonFri until recheck.  2. Mr. Hall prefers we contact Susan with dosing instructions and follow up testing dates. Plan was discussed with Susan.  3. Repeat INR in 2 weeks,  8/28.  4. Verbal information provided over the phone. Manjeet Hall's SO, Susan, RBV dosing instructions, expresses understanding by teach back, and has no further questions at this time.    Kylah Gonzales, Pharmacy Intern.  08/14/20   08:08     Would be fine with 4 weeks, WNL for 2 months on this dose.  I, Kayla Stockton, PharmD, have reviewed the note in full and agree with the assessment and plan.  08/14/20  14:04      "

## 2020-08-17 RX ORDER — WARFARIN SODIUM 5 MG/1
TABLET ORAL
Qty: 90 TABLET | Refills: 0 | Status: SHIPPED | OUTPATIENT
Start: 2020-08-17 | End: 2020-11-10

## 2020-08-28 ENCOUNTER — LAB (OUTPATIENT)
Dept: LAB | Facility: HOSPITAL | Age: 45
End: 2020-08-28

## 2020-08-28 DIAGNOSIS — Z95.2 H/O MECHANICAL AORTIC VALVE REPLACEMENT: ICD-10-CM

## 2020-08-28 DIAGNOSIS — Z95.2 HX OF AORTIC VALVE REPLACEMENT, MECHANICAL: ICD-10-CM

## 2020-08-28 LAB
INR PPP: 2.28 (ref 0.85–1.16)
PROTHROMBIN TIME: 24.8 SECONDS (ref 11.5–14)

## 2020-08-28 PROCEDURE — 85610 PROTHROMBIN TIME: CPT

## 2020-08-31 ENCOUNTER — ANTICOAGULATION VISIT (OUTPATIENT)
Dept: PHARMACY | Facility: HOSPITAL | Age: 45
End: 2020-08-31

## 2020-08-31 DIAGNOSIS — Z95.2 HX OF AORTIC VALVE REPLACEMENT, MECHANICAL: ICD-10-CM

## 2020-08-31 RX ORDER — LEVOTHYROXINE SODIUM 0.1 MG/1
TABLET ORAL
Qty: 90 TABLET | Refills: 1 | OUTPATIENT
Start: 2020-08-31

## 2020-08-31 NOTE — PROGRESS NOTES
Anticoagulation Clinic - Remote Progress Note  REMOTE LAB    Indication: On-X Mechanical valve #25 and a 30mm hemashield; stroke  Referring Provider: Alvaro (Last seen: 1/1519  next appt: 7/20/20)  Initial Warfarin Start Date:  ~9/2018  Goal INR: 2.0-3.0 per referral  Current Drug Interactions: duloxetine, lansoprazole, levothyroxine, Trintellix, valproic acid  Bleed Risk: ascending aortic aneurysm    Other: Hodgkins Lymphoma (hx of chemo and radiation); h/o CVA + hemorrhagic pancreatitis (during hospitalization for mAVR)    Diet: avoids GLV 06/16/2020  Alcohol: socially  Tobacco: none  OTC Pain Medication: APAP PRN pain    INR History:  Date 9/27 9/28 10/1 10/4 10/8 10/11 10/17 10/19 10/23   Total Weekly Dose 8mg 12mg 19mg 29mg 31mg 34mg 43mg 41mg 45mg   INR 1.3 1.22 1.3 1.8 1.85 1.7 2.91 2.82 3.79   Notes clinic enox enox enox; clinic enox enox        Date 11/8 11/16 12/21 1/9/19 1/16 2/4 3/1 3/22 5/16   Total Weekly Dose 49mg 49mg 49mg 49mg 47mg 49mg 49mg 49mg 49mg   INR 3.0 2.96 3.44 4.4 2.36 2.62 3.02 2.15 2.6   Notes   desvenla   self adj        Date 6/13 6/24 7/10 7/22 8/5 8/26 9/26 10/18 10/28 11/19 12/3 12/17   Total Weekly Dose 49mg 49mg 49mg 49mg 49mg 49mg 49mg 49mg 49mg 49mg 49mg 45mg   INR 2.25 2.98 2.83 2.97 2.9 3.2 3.57 3.12 2.88 3.33 4.06 2.59   Notes       fewer GLV Cymbalta start 9/25; Depakote start 9/5 1x decr dose  depakote inc; trintellix dcd ceftin x 10 days     Date 1/3/20 1/21 2/3 2/12 2/19 2/27 3/5 3/12 3/20 4/2 4/17 5/7 5/28 6/16 6/29 7/17   Total Weekly Dose 45mg 45mg 47mg  43mg 43mg 43mg 41mg 41mg 41mg 41mg 41mg 41mg 41 mg 39 mg 39 mg 39 mg   INR 3.21 3.85 3.11 2.34 3.01 3.2 3.07 3.01 2.95 2.66 2.9 3.03 3.36 2.48 2.42 3.08   Notes rec'vd 1/6  some GLV dieting     x1 decr x1 decr       rec'd 6/30      Date  8/14 8/28                  Total Weekly Dose 39 mg 39 mg                 INR 2.78 2.28                 Notes rec'd 8/13 rec'd 8/31                   *takes warfarin in AM*    Phone  Interview:  Verbal Release Authorization: please contact Mr. Hall directly - if unable to reach patient may contact brotherMarko  Tablet Strength: 2mg, and 5mg tablets  Patient Contact Info: [phone]; Susan Delgadillo's number is [phone] *please call both at; [email or web address]; [email or web address]  Lab Contact Info: Megan Vu     Patient Findings   Negatives:  Signs/symptoms of thrombosis, Signs/symptoms of bleeding, Laboratory test error suspected, Change in health, Change in alcohol use, Change in activity, Upcoming invasive procedure, Emergency department visit, Upcoming dental procedure, Missed doses, Extra doses, Change in medications, Change in diet/appetite, Hospital admission, Bruising, Other complaints   Comments:   Susan says that he is considering changing his diet and trying to lose weight; however, seems to be more talking about it than anything.     Plan:  1. INR was therapeutic on Friday at 2.28.  Results received today.  Instructed Susan to have Manjeet continue warfarin 5mg oral daily except 7mg on MonFri until recheck.  2. Mr. Hall prefers we contact Susan with dosing instructions and follow up testing dates. Plan was discussed with Susan.  LV for Manjeet.  3. Repeat INR in 4 weeks,  9/23.  Recommend to avoid Fridays due to delay in receiving results.  4. Verbal information provided over the phone. Manjeet Hall's SO, Susan, RBV dosing instructions, expresses understanding by teach back, and has no further questions at this time.    Adeline Bearden, PharmD  08/31/20   08:29

## 2020-09-28 ENCOUNTER — LAB (OUTPATIENT)
Dept: LAB | Facility: HOSPITAL | Age: 45
End: 2020-09-28

## 2020-09-28 DIAGNOSIS — Z95.2 H/O MECHANICAL AORTIC VALVE REPLACEMENT: ICD-10-CM

## 2020-09-28 DIAGNOSIS — Z95.2 HX OF AORTIC VALVE REPLACEMENT, MECHANICAL: ICD-10-CM

## 2020-09-28 LAB
INR PPP: 2.33 (ref 0.85–1.16)
PROTHROMBIN TIME: 25.2 SECONDS (ref 11.5–14)

## 2020-09-28 PROCEDURE — 85610 PROTHROMBIN TIME: CPT

## 2020-09-29 ENCOUNTER — ANTICOAGULATION VISIT (OUTPATIENT)
Dept: PHARMACY | Facility: HOSPITAL | Age: 45
End: 2020-09-29

## 2020-09-29 DIAGNOSIS — Z95.2 HX OF AORTIC VALVE REPLACEMENT, MECHANICAL: ICD-10-CM

## 2020-09-29 NOTE — PROGRESS NOTES
Anticoagulation Clinic - Remote Progress Note  REMOTE LAB    Indication: On-X Mechanical valve #25 and a 30mm hemashield; stroke  Referring Provider: Alvaro (Last seen: 1/1519  next appt: 7/20/20)  Initial Warfarin Start Date:  ~9/2018  Goal INR: 2.0-3.0 per referral  Current Drug Interactions: duloxetine, lansoprazole, levothyroxine, Trintellix, valproic acid  Bleed Risk: ascending aortic aneurysm    Other: Hodgkins Lymphoma (hx of chemo and radiation); h/o CVA + hemorrhagic pancreatitis (during hospitalization for mAVR)    Diet: avoids GLV 9/30/20  Alcohol: socially  Tobacco: none  OTC Pain Medication: APAP PRN pain    INR History:  Date 9/27 9/28 10/1 10/4 10/8 10/11 10/17 10/19 10/23   Total Weekly Dose 8mg 12mg 19mg 29mg 31mg 34mg 43mg 41mg 45mg   INR 1.3 1.22 1.3 1.8 1.85 1.7 2.91 2.82 3.79   Notes clinic enox enox enox; clinic enox enox        Date 11/8 11/16 12/21 1/9/19 1/16 2/4 3/1 3/22 5/16   Total Weekly Dose 49mg 49mg 49mg 49mg 47mg 49mg 49mg 49mg 49mg   INR 3.0 2.96 3.44 4.4 2.36 2.62 3.02 2.15 2.6   Notes   desvenla   self adj        Date 6/13 6/24 7/10 7/22 8/5 8/26 9/26 10/18 10/28 11/19 12/3 12/17   Total Weekly Dose 49mg 49mg 49mg 49mg 49mg 49mg 49mg 49mg 49mg 49mg 49mg 45mg   INR 2.25 2.98 2.83 2.97 2.9 3.2 3.57 3.12 2.88 3.33 4.06 2.59   Notes       fewer GLV Cymbalta start 9/25; Depakote start 9/5 1x decr dose  depakote inc; trintellix dcd ceftin x 10 days     Date 1/3/20 1/21 2/3 2/12 2/19 2/27 3/5 3/12 3/20 4/2 4/17 5/7 5/28 6/16 6/29 7/17   Total Weekly Dose 45mg 45mg 47mg  43mg 43mg 43mg 41mg 41mg 41mg 41mg 41mg 41mg 41 mg 39 mg 39 mg 39 mg   INR 3.21 3.85 3.11 2.34 3.01 3.2 3.07 3.01 2.95 2.66 2.9 3.03 3.36 2.48 2.42 3.08   Notes rec'vd 1/6  some GLV dieting     x1 decr x1 decr       rec'd 6/30      Date  8/14 8/28 9/28                 Total Weekly Dose 39 mg 39 mg 39 mg                INR 2.78 2.28 2.33                Notes rec'd 8/13 rec'd 8/31 recv'd 9/29                  *takes  warfarin in AM*    Phone Interview:  Verbal Release Authorization: please contact Mr. Hall directly - if unable to reach patient may contact brotherMarko  Tablet Strength: 2mg, and 5mg tablets  Patient Contact Info: [phone]; Susan Delgadillo's number is [phone] *please call both at; [email or web address]; [email or web address]  Lab Contact Info: Megan Vu     Patient Findings:  Negatives:  Signs/symptoms of thrombosis, Signs/symptoms of bleeding, Laboratory test error suspected, Change in health, Change in alcohol use, Change in activity, Upcoming invasive procedure, Emergency department visit, Upcoming dental procedure, Missed doses, Extra doses, Change in medications, Change in diet/appetite, Hospital admission, Bruising, Other complaints     Plan:  1. INR was therapeutic yesterday but results not received by clinic until today. Instructed Susan to have Manjeet continue maintenance dose of warfarin 5mg daily except 7mg on MonFri until recheck.  2. Mr. Hall prefers we contact Susan with dosing instructions and follow up testing dates. Plan was discussed with Susan.   3. Repeat INR in 4 weeks.  4. Verbal information provided over the phone. Manjeet Hall's SO, Susan, RBV dosing instructions, expresses understanding by teach back, and has no further questions at this time.    Aileen Grullon, Laine  09/29/20   11:15 EDT     I, Sondra Brooks, PharmD, have reviewed the note in full and agree with the assessment and plan.  09/30/20  12:12 EDT

## 2020-10-26 ENCOUNTER — LAB (OUTPATIENT)
Dept: LAB | Facility: HOSPITAL | Age: 45
End: 2020-10-26

## 2020-10-28 DIAGNOSIS — Z95.2 HX OF AORTIC VALVE REPLACEMENT, MECHANICAL: Primary | ICD-10-CM

## 2020-10-28 DIAGNOSIS — Z95.2 H/O MECHANICAL AORTIC VALVE REPLACEMENT: ICD-10-CM

## 2020-10-29 ENCOUNTER — LAB (OUTPATIENT)
Dept: LAB | Facility: HOSPITAL | Age: 45
End: 2020-10-29

## 2020-10-29 DIAGNOSIS — Z95.2 H/O MECHANICAL AORTIC VALVE REPLACEMENT: ICD-10-CM

## 2020-10-29 DIAGNOSIS — Z95.2 HX OF AORTIC VALVE REPLACEMENT, MECHANICAL: ICD-10-CM

## 2020-10-29 LAB
INR PPP: 2.39 (ref 0.85–1.16)
PROTHROMBIN TIME: 25.8 SECONDS (ref 11.5–14)

## 2020-10-29 PROCEDURE — 85610 PROTHROMBIN TIME: CPT

## 2020-10-29 PROCEDURE — 36415 COLL VENOUS BLD VENIPUNCTURE: CPT

## 2020-10-30 ENCOUNTER — ANTICOAGULATION VISIT (OUTPATIENT)
Dept: PHARMACY | Facility: HOSPITAL | Age: 45
End: 2020-10-30

## 2020-10-30 DIAGNOSIS — Z95.2 HX OF AORTIC VALVE REPLACEMENT, MECHANICAL: ICD-10-CM

## 2020-10-30 NOTE — PROGRESS NOTES
Anticoagulation Clinic - Remote Progress Note  REMOTE LAB    Indication: On-X Mechanical valve #25 and a 30mm hemashield; stroke  Referring Provider: Alvaro (Last seen: 1/1519  next appt: 7/20/20)  Initial Warfarin Start Date:  ~9/2018  Goal INR: 2.0-3.0 per referral  Current Drug Interactions: duloxetine, lansoprazole, levothyroxine, Trintellix, valproic acid  Bleed Risk: ascending aortic aneurysm    Other: Hodgkins Lymphoma (hx of chemo and radiation); h/o CVA + hemorrhagic pancreatitis (during hospitalization for mAVR)    Diet: avoids GLV 9/30/20  Alcohol: socially  Tobacco: none  OTC Pain Medication: APAP PRN pain    INR History:  Date 9/27 9/28 10/1 10/4 10/8 10/11 10/17 10/19 10/23   Total Weekly Dose 8mg 12mg 19mg 29mg 31mg 34mg 43mg 41mg 45mg   INR 1.3 1.22 1.3 1.8 1.85 1.7 2.91 2.82 3.79   Notes clinic enox enox enox; clinic enox enox        Date 11/8 11/16 12/21 1/9/19 1/16 2/4 3/1 3/22 5/16   Total Weekly Dose 49mg 49mg 49mg 49mg 47mg 49mg 49mg 49mg 49mg   INR 3.0 2.96 3.44 4.4 2.36 2.62 3.02 2.15 2.6   Notes   desvenla   self adj        Date 6/13 6/24 7/10 7/22 8/5 8/26 9/26 10/18 10/28 11/19 12/3 12/17   Total Weekly Dose 49mg 49mg 49mg 49mg 49mg 49mg 49mg 49mg 49mg 49mg 49mg 45mg   INR 2.25 2.98 2.83 2.97 2.9 3.2 3.57 3.12 2.88 3.33 4.06 2.59   Notes       fewer GLV Cymbalta start 9/25; Depakote start 9/5 1x decr dose  depakote inc; trintellix dcd ceftin x 10 days     Date 1/3/20 1/21 2/3 2/12 2/19 2/27 3/5 3/12 3/20 4/2 4/17 5/7 5/28 6/16 6/29 7/17   Total Weekly Dose 45mg 45mg 47mg  43mg 43mg 43mg 41mg 41mg 41mg 41mg 41mg 41mg 41 mg 39 mg 39 mg 39 mg   INR 3.21 3.85 3.11 2.34 3.01 3.2 3.07 3.01 2.95 2.66 2.9 3.03 3.36 2.48 2.42 3.08   Notes rec'vd 1/6  some GLV dieting     x1 decr x1 decr       rec'd 6/30      Date  8/14 8/28 9/28 10/29                Total Weekly Dose 39 mg 39 mg 39 mg 39mg               INR 2.78 2.28 2.33 2.39               Notes rec'd 8/13 rec'd 8/31 recv'd 9/29 recv'd 10/30                  *takes warfarin in AM*    Phone Interview:  Verbal Release Authorization: please contact Mr. Hall directly - if unable to reach patient may contact brotherMarko  Tablet Strength: 2mg, and 5mg tablets  Patient Contact Info: [phone]; Susan Delgadillo's number is [phone] *please call both at; [email or web address]; [email or web address]  Lab Contact Info: Megan Vu     Patient Findings:  Negatives:  Signs/symptoms of thrombosis, Signs/symptoms of bleeding, Laboratory test error suspected, Change in health, Change in alcohol use, Change in activity, Upcoming invasive procedure, Emergency department visit, Upcoming dental procedure, Missed doses, Extra doses, Change in medications, Change in diet/appetite, Hospital admission, Bruising, Other complaints   Comments:  Patient denies any changes since last encounter     Plan:  1. INR was therapeutic yesterday at 2.39 but results not received by clinic until today. Instructed Susan to have Manjeet continue maintenance dose of warfarin 5mg daily except 7mg on MonFri until recheck.  2. Mr. Hall prefers we contact Susan with dosing instructions and follow up testing dates. Plan was discussed with Susan.   3. Repeat INR in 4 weeks, 11/26.  4. Verbal information provided over the phone. Manjeet Hall's SO, Susan, RBV dosing instructions, expresses understanding by teach back, and has no further questions at this time.  5. Patient declines refills at this time     Rola Almaraz, PharmD Candidate 2021  10/30/20   08:07 EDT     I, Mahnaz Burkett, have reviewed the note in full and agree with the assessment and plan.  10/30/20  08:36 EDT

## 2020-11-10 RX ORDER — WARFARIN SODIUM 5 MG/1
TABLET ORAL
Qty: 90 TABLET | Refills: 0 | Status: SHIPPED | OUTPATIENT
Start: 2020-11-10 | End: 2021-06-29 | Stop reason: SDUPTHER

## 2020-12-04 ENCOUNTER — ANTICOAGULATION VISIT (OUTPATIENT)
Dept: PHARMACY | Facility: HOSPITAL | Age: 45
End: 2020-12-04

## 2020-12-04 ENCOUNTER — OFFICE VISIT (OUTPATIENT)
Dept: FAMILY MEDICINE CLINIC | Facility: CLINIC | Age: 45
End: 2020-12-04

## 2020-12-04 ENCOUNTER — LAB (OUTPATIENT)
Dept: LAB | Facility: HOSPITAL | Age: 45
End: 2020-12-04

## 2020-12-04 VITALS — HEART RATE: 77 BPM | BODY MASS INDEX: 34.61 KG/M2 | TEMPERATURE: 97.8 F | OXYGEN SATURATION: 98 % | WEIGHT: 227.6 LBS

## 2020-12-04 DIAGNOSIS — Z95.2 H/O MECHANICAL AORTIC VALVE REPLACEMENT: ICD-10-CM

## 2020-12-04 DIAGNOSIS — J06.9 UPPER RESPIRATORY TRACT INFECTION, UNSPECIFIED TYPE: Primary | ICD-10-CM

## 2020-12-04 DIAGNOSIS — Z95.2 HX OF AORTIC VALVE REPLACEMENT, MECHANICAL: ICD-10-CM

## 2020-12-04 LAB
INR PPP: 2.46 (ref 0.85–1.16)
PROTHROMBIN TIME: 26.4 SECONDS (ref 11.5–14)

## 2020-12-04 PROCEDURE — 99213 OFFICE O/P EST LOW 20 MIN: CPT | Performed by: FAMILY MEDICINE

## 2020-12-04 PROCEDURE — 85610 PROTHROMBIN TIME: CPT

## 2020-12-04 PROCEDURE — 36415 COLL VENOUS BLD VENIPUNCTURE: CPT

## 2020-12-04 RX ORDER — AZITHROMYCIN 250 MG/1
TABLET, FILM COATED ORAL
Qty: 6 TABLET | Refills: 0 | Status: SHIPPED | OUTPATIENT
Start: 2020-12-04 | End: 2021-05-07

## 2020-12-04 NOTE — PROGRESS NOTES
Anticoagulation Clinic - Remote Progress Note  REMOTE LAB    Indication: On-X Mechanical valve #25 and a 30mm hemashield; stroke  Referring Provider: Alvaro (Last seen: 1/1519  next appt: 7/20/20)  Initial Warfarin Start Date:  ~9/2018  Goal INR: 2.0-3.0 per referral  Current Drug Interactions: duloxetine, lansoprazole, levothyroxine, Trintellix, valproic acid  Bleed Risk: ascending aortic aneurysm    Other: Hodgkins Lymphoma (hx of chemo and radiation); h/o CVA + hemorrhagic pancreatitis (during hospitalization for mAVR)    Diet: avoids GLV 9/30/20  Alcohol: socially  Tobacco: none  OTC Pain Medication: APAP PRN pain    INR History:  Date 9/27 9/28 10/1 10/4 10/8 10/11 10/17 10/19 10/23   Total Weekly Dose 8mg 12mg 19mg 29mg 31mg 34mg 43mg 41mg 45mg   INR 1.3 1.22 1.3 1.8 1.85 1.7 2.91 2.82 3.79   Notes clinic enox enox enox; clinic enox enox        Date 11/8 11/16 12/21 1/9/19 1/16 2/4 3/1 3/22 5/16   Total Weekly Dose 49mg 49mg 49mg 49mg 47mg 49mg 49mg 49mg 49mg   INR 3.0 2.96 3.44 4.4 2.36 2.62 3.02 2.15 2.6   Notes   desvenla   self adj        Date 6/13 6/24 7/10 7/22 8/5 8/26 9/26 10/18 10/28 11/19 12/3 12/17   Total Weekly Dose 49mg 49mg 49mg 49mg 49mg 49mg 49mg 49mg 49mg 49mg 49mg 45mg   INR 2.25 2.98 2.83 2.97 2.9 3.2 3.57 3.12 2.88 3.33 4.06 2.59   Notes       fewer GLV Cymbalta start 9/25; Depakote start 9/5 1x decr dose  depakote inc; trintellix dcd ceftin x 10 days     Date 1/3/20 1/21 2/3 2/12 2/19 2/27 3/5 3/12 3/20 4/2 4/17 5/7 5/28 6/16 6/29 7/17   Total Weekly Dose 45mg 45mg 47mg  43mg 43mg 43mg 41mg 41mg 41mg 41mg 41mg 41mg 41 mg 39 mg 39 mg 39 mg   INR 3.21 3.85 3.11 2.34 3.01 3.2 3.07 3.01 2.95 2.66 2.9 3.03 3.36 2.48 2.42 3.08   Notes rec'vd 1/6  some GLV dieting     x1 decr x1 decr       rec'd 6/30      Date  8/14 8/28 9/28 10/29  12/4              Total Weekly Dose 39mg 39mg 39mg 39mg 39mg              INR 2.78 2.28 2.33 2.39 2.46              Notes rec'd 8/13 rec'd 8/31 recv'd 9/29 recv'd  10/30                 *takes warfarin in AM*    Phone Interview:  Verbal Release Authorization: please contact Mr. Hall directly - if unable to reach patient may contact brotherMarko  Tablet Strength: 2mg, and 5mg tablets  Patient Contact Info: [phone]; Susan Delgadillo's number is [phone] *please call both at; [email or web address]; [email or web address]  Lab Contact Info: Megan Vu     UNABLE TO GET IN CONTACT WITH THE PATIENT. PLEASE DISREGARD THE FOLLOWING PLAN UNTIL ABLE TO GET IN CONTACT WITH PATIENT / PATIENT REPRESENTATIVE.    LVM 12/04/20 at 15:06 EST    Plan:  1. INR is therapeutic today at 2.46. Instructed Susan to have Manjeet continue maintenance dose of warfarin 5mg oral daily except 7mg on MonFri until recheck.  2. Mr. Hall prefers we contact Susan with dosing instructions and follow up testing dates. Plan was discussed with Susan.   3. Repeat INR  4. Verbal information provided over the phone. Manjeetpedro Hall's SO, Susan, RBV dosing instructions, expresses understanding by teach back, and has no further questions at this time.    Abdi Petit CPhT  12/4/2020  15:04 EST     I, Riccardo Stanton, [facility], have reviewed the note in full and agree with the assessment and plan.  12/04/20  16:06 EST

## 2020-12-06 NOTE — PROGRESS NOTES
Pablito Hall is a 44 y.o. male.     Sore Throat   This is a new (Patient complains of a runny nose and a scratchy throat no fever chills sweats or shortness of breath) problem. The current episode started in the past 7 days. There has been no fever. The patient is experiencing no pain. Pertinent negatives include no coughing, ear pain or shortness of breath. He has tried acetaminophen for the symptoms. The treatment provided mild relief.        The following portions of the patient's history were reviewed and updated as appropriate: allergies, current medications, past social history and problem list.    Review of Systems   Constitutional: Negative for chills and fever.   HENT: Positive for sore throat. Negative for ear pain.    Respiratory: Negative for cough and shortness of breath.    Cardiovascular: Negative for chest pain and palpitations.   Endocrine: Negative for cold intolerance and heat intolerance.   Genitourinary: Negative for dysuria and hematuria.       Objective   Pulse 77   Temp 97.8 °F (36.6 °C)   Wt 103 kg (227 lb 9.6 oz)   SpO2 98%   BMI 34.61 kg/m²   Physical Exam  Vitals signs and nursing note reviewed.   Constitutional:       Appearance: He is well-developed.   HENT:      Head: Normocephalic.      Right Ear: External ear normal.      Left Ear: External ear normal.      Nose: Nose normal.      Mouth/Throat:      Comments: Clear postnasal drip clear nasal discharge  Eyes:      General: No scleral icterus.     Conjunctiva/sclera: Conjunctivae normal.      Pupils: Pupils are equal, round, and reactive to light.   Neck:      Musculoskeletal: Neck supple.      Thyroid: No thyromegaly.      Vascular: No carotid bruit.   Cardiovascular:      Rate and Rhythm: Normal rate and regular rhythm.   Pulmonary:      Effort: Pulmonary effort is normal.      Breath sounds: Normal breath sounds. No wheezing, rhonchi or rales.   Musculoskeletal: Normal range of motion.   Skin:     General:  Skin is warm and dry.      Findings: No rash.   Neurological:      Mental Status: He is alert and oriented to person, place, and time.      Comments: No focal deficits no lateralizing signs   Psychiatric:         Behavior: Behavior is cooperative.         Assessment/Plan   Problems Addressed this Visit     None      Visit Diagnoses     Upper respiratory tract infection, unspecified type    -  Primary    Relevant Medications    azithromycin (Zithromax) 250 MG tablet      Diagnoses       Codes Comments    Upper respiratory tract infection, unspecified type    -  Primary ICD-10-CM: J06.9  ICD-9-CM: 465.9           New Medications Ordered This Visit   Medications   • azithromycin (Zithromax) 250 MG tablet     Sig: Take 2 tablets the first day, then 1 tablet daily for 4 days.     Dispense:  6 tablet     Refill:  0     I recommended he go be tested for COVID-19 these can go through one of the drive-through places.

## 2021-01-08 ENCOUNTER — TELEPHONE (OUTPATIENT)
Dept: PHARMACY | Facility: HOSPITAL | Age: 46
End: 2021-01-08

## 2021-01-08 ENCOUNTER — LAB (OUTPATIENT)
Dept: LAB | Facility: HOSPITAL | Age: 46
End: 2021-01-08

## 2021-01-08 DIAGNOSIS — Z95.2 H/O MECHANICAL AORTIC VALVE REPLACEMENT: ICD-10-CM

## 2021-01-08 DIAGNOSIS — Z95.2 HX OF AORTIC VALVE REPLACEMENT, MECHANICAL: ICD-10-CM

## 2021-01-08 LAB
INR PPP: 2.45 (ref 0.85–1.16)
PROTHROMBIN TIME: 26.2 SECONDS (ref 11.5–14)

## 2021-01-08 PROCEDURE — 85610 PROTHROMBIN TIME: CPT

## 2021-01-08 PROCEDURE — 36415 COLL VENOUS BLD VENIPUNCTURE: CPT

## 2021-01-11 ENCOUNTER — ANTICOAGULATION VISIT (OUTPATIENT)
Dept: PHARMACY | Facility: HOSPITAL | Age: 46
End: 2021-01-11

## 2021-01-11 DIAGNOSIS — Z95.2 HX OF AORTIC VALVE REPLACEMENT, MECHANICAL: ICD-10-CM

## 2021-01-11 NOTE — PROGRESS NOTES
Anticoagulation Clinic - Remote Progress Note  REMOTE LAB    Indication: On-X Mechanical valve #25 and a 30mm hemashield; stroke  Referring Provider: Alvaro (Last seen: 1/1519  next appt: 7/20/20)  Initial Warfarin Start Date:  ~9/2018  Goal INR: 2.0-3.0 per referral  Current Drug Interactions: duloxetine, lansoprazole, levothyroxine, Trintellix, valproic acid  Bleed Risk: ascending aortic aneurysm    Other: Hodgkins Lymphoma (hx of chemo and radiation); h/o CVA + hemorrhagic pancreatitis (during hospitalization for mAVR)    Diet: avoids GLV 9/30/20  Alcohol: socially  Tobacco: none  OTC Pain Medication: APAP PRN pain    INR History:  Date 9/27 9/28 10/1 10/4 10/8 10/11 10/17 10/19 10/23   Total Weekly Dose 8mg 12mg 19mg 29mg 31mg 34mg 43mg 41mg 45mg   INR 1.3 1.22 1.3 1.8 1.85 1.7 2.91 2.82 3.79   Notes clinic enox enox enox; clinic enox enox        Date 11/8 11/16 12/21 1/9/19 1/16 2/4 3/1 3/22 5/16   Total Weekly Dose 49mg 49mg 49mg 49mg 47mg 49mg 49mg 49mg 49mg   INR 3.0 2.96 3.44 4.4 2.36 2.62 3.02 2.15 2.6   Notes   desvenla   self adj        Date 6/13 6/24 7/10 7/22 8/5 8/26 9/26 10/18 10/28 11/19 12/3 12/17   Total Weekly Dose 49mg 49mg 49mg 49mg 49mg 49mg 49mg 49mg 49mg 49mg 49mg 45mg   INR 2.25 2.98 2.83 2.97 2.9 3.2 3.57 3.12 2.88 3.33 4.06 2.59   Notes       fewer GLV Cymbalta start 9/25; Depakote start 9/5 1x decr dose  depakote inc; trintellix dcd ceftin x 10 days     Date 1/3/20 1/21 2/3 2/12 2/19 2/27 3/5 3/12 3/20 4/2 4/17 5/7 5/28 6/16 6/29 7/17   Total Weekly Dose 45mg 45mg 47mg  43mg 43mg 43mg 41mg 41mg 41mg 41mg 41mg 41mg 41 mg 39 mg 39 mg 39 mg   INR 3.21 3.85 3.11 2.34 3.01 3.2 3.07 3.01 2.95 2.66 2.9 3.03 3.36 2.48 2.42 3.08   Notes rec'vd 1/6  some GLV dieting     x1 decr x1 decr       rec'd 6/30      Date  8/14 8/28 9/28 10/29 12/4 1/8/21             Total Weekly Dose 39mg 39mg 39mg 39mg 39mg 39mg             INR 2.78 2.28 2.33 2.39 2.46 2.45             Notes rec'd 8/13 rec'd 8/31 recv'd  9/29 recv'd 10/30  recv'd 1/11               *takes warfarin in AM*    Phone Interview:  Verbal Release Authorization: please contact Mr. Hall directly - if unable to reach patient may contact brotherMarko  Tablet Strength: 2mg and 5mg tablets  Patient Contact Info: [phone]; Susan Delgadillo's number is [phone] *please call both at; [email or web address]; [email or web address]  Lab Contact Info: Megan Vu     Patient Findings  Negatives:  Signs/symptoms of thrombosis, Signs/symptoms of bleeding, Laboratory test error suspected, Change in health, Change in alcohol use, Change in activity, Upcoming invasive procedure, Emergency department visit, Upcoming dental procedure, Missed doses, Extra doses, Change in medications, Change in diet/appetite, Hospital admission, Bruising, Other complaints     Plan:  1. INR was therapeutic on 1/8 at 2.45, results received 1/11. Instructed Susan to have Manjeet continue maintenance dose of warfarin 5mg oral daily except 7mg on MonFri until recheck.  2. Mr. Hall prefers we contact Susan with dosing instructions and follow up testing dates. Plan was discussed with Susan.   3. Repeat INR in ~4 weeks, 2/4  4. Verbal information provided over the phone. Manjeet Hall's SO, Susan, RBV dosing instructions, expresses understanding by teach back, and has no further questions at this time.    Abdi Petit, [place]  1/11/2021  13:06 EST    I, Sondra Brooks, PharmD, have reviewed the note in full and agree with the assessment and plan.  01/11/21  16:01 EST

## 2021-02-09 ENCOUNTER — LAB (OUTPATIENT)
Dept: LAB | Facility: HOSPITAL | Age: 46
End: 2021-02-09

## 2021-02-09 DIAGNOSIS — Z95.2 H/O MECHANICAL AORTIC VALVE REPLACEMENT: ICD-10-CM

## 2021-02-09 DIAGNOSIS — Z95.2 HX OF AORTIC VALVE REPLACEMENT, MECHANICAL: ICD-10-CM

## 2021-02-09 LAB
INR PPP: 2.85 (ref 0.85–1.16)
PROTHROMBIN TIME: 29.6 SECONDS (ref 11.5–14)

## 2021-02-09 PROCEDURE — 36415 COLL VENOUS BLD VENIPUNCTURE: CPT

## 2021-02-09 PROCEDURE — 85610 PROTHROMBIN TIME: CPT

## 2021-02-10 ENCOUNTER — ANTICOAGULATION VISIT (OUTPATIENT)
Dept: PHARMACY | Facility: HOSPITAL | Age: 46
End: 2021-02-10

## 2021-02-10 DIAGNOSIS — Z95.2 HX OF AORTIC VALVE REPLACEMENT, MECHANICAL: ICD-10-CM

## 2021-02-10 RX ORDER — WARFARIN SODIUM 2 MG/1
TABLET ORAL
Qty: 30 TABLET | Refills: 1 | Status: SHIPPED | OUTPATIENT
Start: 2021-02-10 | End: 2021-04-12

## 2021-03-24 ENCOUNTER — TELEPHONE (OUTPATIENT)
Dept: PHARMACY | Facility: HOSPITAL | Age: 46
End: 2021-03-24

## 2021-03-31 ENCOUNTER — LAB (OUTPATIENT)
Dept: LAB | Facility: HOSPITAL | Age: 46
End: 2021-03-31

## 2021-03-31 DIAGNOSIS — Z95.2 HX OF AORTIC VALVE REPLACEMENT, MECHANICAL: ICD-10-CM

## 2021-03-31 DIAGNOSIS — Z95.2 H/O MECHANICAL AORTIC VALVE REPLACEMENT: ICD-10-CM

## 2021-03-31 LAB
INR PPP: 2.48 (ref 0.85–1.16)
PROTHROMBIN TIME: 26.5 SECONDS (ref 11.5–14)

## 2021-03-31 PROCEDURE — 85610 PROTHROMBIN TIME: CPT

## 2021-03-31 PROCEDURE — 36415 COLL VENOUS BLD VENIPUNCTURE: CPT

## 2021-04-01 ENCOUNTER — ANTICOAGULATION VISIT (OUTPATIENT)
Dept: PHARMACY | Facility: HOSPITAL | Age: 46
End: 2021-04-01

## 2021-04-01 DIAGNOSIS — Z95.2 HX OF AORTIC VALVE REPLACEMENT, MECHANICAL: ICD-10-CM

## 2021-04-01 NOTE — PROGRESS NOTES
Anticoagulation Clinic - Remote Progress Note  REMOTE LAB    Indication: On-X Mechanical valve #25 and a 30mm hemashield; stroke  Referring Provider: Alvaro (Last seen: 1/1519  next appt: 7/20/20)  Initial Warfarin Start Date:  ~9/2018  Goal INR: 2.0-3.0 per referral  Current Drug Interactions: duloxetine, lansoprazole, levothyroxine, Trintellix, valproic acid  Bleed Risk: ascending aortic aneurysm    Other: Hodgkins Lymphoma (hx of chemo and radiation); h/o CVA + hemorrhagic pancreatitis (during hospitalization for mAVR)    Diet: avoids GLV 4/1/21  Alcohol: socially  Tobacco: none  OTC Pain Medication: APAP PRN pain    INR History:  Date 9/27 9/28 10/1 10/4 10/8 10/11 10/17 10/19 10/23   Total Weekly Dose 8mg 12mg 19mg 29mg 31mg 34mg 43mg 41mg 45mg   INR 1.3 1.22 1.3 1.8 1.85 1.7 2.91 2.82 3.79   Notes clinic enox enox enox; clinic enox enox        Date 11/8 11/16 12/21 1/9/19 1/16 2/4 3/1 3/22 5/16   Total Weekly Dose 49mg 49mg 49mg 49mg 47mg 49mg 49mg 49mg 49mg   INR 3.0 2.96 3.44 4.4 2.36 2.62 3.02 2.15 2.6   Notes   desvenla   self adj        Date 6/13 6/24 7/10 7/22 8/5 8/26 9/26 10/18 10/28 11/19 12/3 12/17   Total Weekly Dose 49mg 49mg 49mg 49mg 49mg 49mg 49mg 49mg 49mg 49mg 49mg 45mg   INR 2.25 2.98 2.83 2.97 2.9 3.2 3.57 3.12 2.88 3.33 4.06 2.59   Notes       less GLV Cymbalta start 9/25; Depakote start 9/5 1x decr dose  depakote inc; trintellix dcd ceftin x 10 days     Date 1/3/20 1/21 2/3 2/12 2/19 2/27 3/5 3/12 3/20 4/2 4/17 5/7 5/28 6/16 6/29 7/17   Total Weekly Dose 45mg 45mg 47mg  43mg 43mg 43mg 41mg 41mg 41mg 41mg 41mg 41mg 41 mg 39 mg 39 mg 39 mg   INR 3.21 3.85 3.11 2.34 3.01 3.2 3.07 3.01 2.95 2.66 2.9 3.03 3.36 2.48 2.42 3.08   Notes rec'vd 1/6  inc GLV dieting     x1 decr x1 decr       rec'd 6/30      Date  8/14 8/28 9/28 10/29 12/4 1/8/21 2/9 3/31           Total Weekly Dose 39mg 39mg 39mg 39mg 39mg 39mg 39 mg 39 mg           INR 2.78 2.28 2.33 2.39 2.46 2.45 2.85 2.48           Notes rec'd  8/13 rec'd 8/31 recv'd 9/29 recv'd 10/30  recv'd 1/11  rec'd 4/1             *takes warfarin in AM*    Phone Interview:  Verbal Release Authorization: please contact Mr. Hall directly - if unable to reach patient may contact [email or web address]  Tablet Strength: 2mg and 5mg tablets  Patient Contact Info: [phone]; Susan Delgadillo's number is [phone] *please call both at; [email or web address]"; [email or web address]  Lab Contact Info: Megan Vu     Patient Findings:  Positives:  Change in diet/appetite   Negatives:  Signs/symptoms of thrombosis, Signs/symptoms of bleeding, Laboratory test error suspected, Change in health, Change in alcohol use, Change in activity, Upcoming invasive procedure, Emergency department visit, Upcoming dental procedure, Missed doses, Extra doses, Change in medications, Hospital admission, Bruising, Other complaints   Comments:  Susan reports Mr. Hall may start eating some iceberg lettuce salads. He had one in his fridge she noticed a couple days ago. In future, he would like to see a dietician about eating healthier and possibly incorporating more GLV into his diet. Susan was advised if he starts a keto diet or makes changes to his diet otherwise, that she needs to call the clinic as he may need to check his INR sooner. She verbalized understanding.     Plan:  1. INR was therapeutic yesterday although patient non compliant with requested follow up. Results received today. Instructed Susan to have Manjeet continue current maintenance dose of warfarin 5 mg daily except 7 mg on MonFri until recheck.  2. Mr. Hall prefers we contact Susan with dosing instructions and follow up testing dates. Plan was discussed with Susan.   3. Repeat INR in 4 weeks.  4. Verbal information provided over the phone. Manjeet Hall's SO, Susan, RBV dosing instructions, expresses understanding by teach back, and has no further questions at this time.    Aileen Grullon,  [facility]  4/1/2021  14:31 EDT     I, Sondra Brooks, PharmD, have reviewed the note in full and agree with the assessment and plan.  04/01/21  15:27 EDT

## 2021-04-12 RX ORDER — WARFARIN SODIUM 2 MG/1
TABLET ORAL
Qty: 30 TABLET | Refills: 1 | Status: SHIPPED | OUTPATIENT
Start: 2021-04-12 | End: 2021-05-10

## 2021-05-07 ENCOUNTER — OFFICE VISIT (OUTPATIENT)
Dept: FAMILY MEDICINE CLINIC | Facility: CLINIC | Age: 46
End: 2021-05-07

## 2021-05-07 VITALS
DIASTOLIC BLOOD PRESSURE: 60 MMHG | BODY MASS INDEX: 34.86 KG/M2 | HEART RATE: 69 BPM | RESPIRATION RATE: 20 BRPM | OXYGEN SATURATION: 98 % | HEIGHT: 68 IN | WEIGHT: 230 LBS | TEMPERATURE: 98 F | SYSTOLIC BLOOD PRESSURE: 116 MMHG

## 2021-05-07 DIAGNOSIS — R09.81 SINUS CONGESTION: ICD-10-CM

## 2021-05-07 DIAGNOSIS — R09.82 PND (POST-NASAL DRIP): Primary | ICD-10-CM

## 2021-05-07 LAB
EXPIRATION DATE: NORMAL
EXPIRATION DATE: NORMAL
FLUAV AG NPH QL: NEGATIVE
FLUBV AG NPH QL: NEGATIVE
INTERNAL CONTROL: NORMAL
INTERNAL CONTROL: NORMAL
Lab: NORMAL
Lab: NORMAL
S PYO AG THROAT QL: NEGATIVE

## 2021-05-07 PROCEDURE — U0005 INFEC AGEN DETEC AMPLI PROBE: HCPCS | Performed by: NURSE PRACTITIONER

## 2021-05-07 PROCEDURE — U0004 COV-19 TEST NON-CDC HGH THRU: HCPCS | Performed by: NURSE PRACTITIONER

## 2021-05-07 PROCEDURE — 87880 STREP A ASSAY W/OPTIC: CPT | Performed by: NURSE PRACTITIONER

## 2021-05-07 PROCEDURE — 87804 INFLUENZA ASSAY W/OPTIC: CPT | Performed by: NURSE PRACTITIONER

## 2021-05-07 PROCEDURE — 99213 OFFICE O/P EST LOW 20 MIN: CPT | Performed by: NURSE PRACTITIONER

## 2021-05-07 RX ORDER — ASPIRIN 81 MG/1
81 TABLET ORAL DAILY
COMMUNITY
End: 2022-07-05

## 2021-05-07 NOTE — PROGRESS NOTES
"Chief Complaint  Sinus Problem (severe sinus pain)    Subjective          Manjeet Hall presents to [facility]  Patient is a 45-year-old male.  He is here for follow-up from going to urgent care on Wednesday.  He states he had a lot of severe sinus pain and pressure.  It has resolved.  He is having some postnasal drainage.  He denies fever.  Headache has resolved.  Denies nausea or vomiting.  No known exposure to Covid strep or influenza.  He has been out of town to the [employer] over the weekend.   Sinus Problem  This is a new problem. The current episode started in the past 7 days. The problem has been resolved since onset. There has been no fever. His pain is at a severity of 0/10. He is experiencing no pain. (Post nasal drainage ) Past treatments include nothing. The treatment provided no relief.       Objective   Vital Signs:   /60   Pulse 69   Temp 98 °F (36.7 °C) (Temporal)   Resp 20   Ht 172.7 cm (67.99\")   Wt 104 kg (230 lb)   SpO2 98%   BMI 34.98 kg/m²     Physical Exam  Constitutional:       Appearance: Normal appearance. He is well-developed, well-groomed and overweight.   HENT:      Head: Normocephalic.      Right Ear: Hearing, tympanic membrane, ear canal and external ear normal.      Left Ear: Hearing, tympanic membrane, ear canal and external ear normal.      Nose: Congestion present. No rhinorrhea.      Right Turbinates: Not enlarged, swollen or pale.      Left Turbinates: Not enlarged, swollen or pale.      Right Sinus: No maxillary sinus tenderness or frontal sinus tenderness.      Left Sinus: No maxillary sinus tenderness or frontal sinus tenderness.      Comments: PND - clear      Mouth/Throat:      Comments: Cobblestoning posterior pharynx  Cardiovascular:      Rate and Rhythm: Normal rate and regular rhythm.      Comments: Aortic valve - mechanical   Psychiatric:         Behavior: Behavior is cooperative.        Result Review : "                 Assessment and Plan    Diagnoses and all orders for this visit:    1. PND (post-nasal drip) (Primary)  -     POC Influenza A / B  -     POC Rapid Strep A  -     COVID-19 PCR, [facility], NP SWAB IN [facility]SP[facility] MEDIA 24-30 HR TAT - Swab, Nasopharynx    2. Sinus congestion  -     POC Influenza A / B  -     COVID-19 PCR, [facility], NP SWAB IN [facility] MEDIA 24-30 HR TAT - Swab, Nasopharynx      POCT influenza A/B: negative   POCT Rapid strep A:  Negative      COVID -19 -PCR     Discussed taking seasonal allergy medication. He will take what he has at home. No new order   He declines any nasal spray.   Take tylenol prn for aches, pains or fever.      I spent 20 minutes caring for Manjeet on this date of service. This time includes time spent by me in the following activities:preparing for the visit, reviewing tests, obtaining and/or reviewing a separately obtained history, ordering medications, tests, or procedures, referring and communicating with other health care professionals  and documenting information in the medical record  Follow Up   Return if symptoms worsen or fail to improve.  Patient was given instructions and counseling regarding his condition or for health maintenance advice. Please see specific information pulled into the AVS if appropriate.

## 2021-05-08 LAB — SARS-COV-2 RNA NOSE QL NAA+PROBE: NOT DETECTED

## 2021-05-10 ENCOUNTER — TELEPHONE (OUTPATIENT)
Dept: FAMILY MEDICINE CLINIC | Facility: CLINIC | Age: 46
End: 2021-05-10

## 2021-05-10 RX ORDER — WARFARIN SODIUM 2 MG/1
TABLET ORAL
Qty: 30 TABLET | Refills: 1 | Status: SHIPPED | OUTPATIENT
Start: 2021-05-10 | End: 2021-06-08

## 2021-05-12 ENCOUNTER — TELEPHONE (OUTPATIENT)
Dept: PHARMACY | Facility: HOSPITAL | Age: 46
End: 2021-05-12

## 2021-05-12 ENCOUNTER — LAB (OUTPATIENT)
Dept: LAB | Facility: HOSPITAL | Age: 46
End: 2021-05-12

## 2021-05-12 DIAGNOSIS — Z95.2 H/O MECHANICAL AORTIC VALVE REPLACEMENT: ICD-10-CM

## 2021-05-12 DIAGNOSIS — Z95.2 HX OF AORTIC VALVE REPLACEMENT, MECHANICAL: ICD-10-CM

## 2021-05-12 LAB
INR PPP: 2.52 (ref 0.85–1.16)
PROTHROMBIN TIME: 26.1 SECONDS (ref 11.4–14.4)

## 2021-05-12 PROCEDURE — 36415 COLL VENOUS BLD VENIPUNCTURE: CPT

## 2021-05-12 PROCEDURE — 85610 PROTHROMBIN TIME: CPT

## 2021-05-13 ENCOUNTER — ANTICOAGULATION VISIT (OUTPATIENT)
Dept: PHARMACY | Facility: HOSPITAL | Age: 46
End: 2021-05-13

## 2021-05-13 DIAGNOSIS — Z95.2 HX OF AORTIC VALVE REPLACEMENT, MECHANICAL: Primary | ICD-10-CM

## 2021-05-13 NOTE — PROGRESS NOTES
Anticoagulation Clinic - Remote Progress Note  REMOTE LAB    Indication: On-X Mechanical valve #25 and a 30mm hemashield; stroke  Referring Provider: Alvaro (Last seen: 1/1519  next appt: 7/20/20)  Initial Warfarin Start Date:  ~9/2018  Goal INR: 2.0-3.0 per referral  Current Drug Interactions: duloxetine, lansoprazole, levothyroxine, Trintellix, valproic acid  Bleed Risk: ascending aortic aneurysm    Other: Hodgkins Lymphoma (hx of chemo and radiation); h/o CVA + hemorrhagic pancreatitis (during hospitalization for mAVR)    Diet: avoids GLV 4/1/21  Alcohol: socially  Tobacco: none  OTC Pain Medication: APAP PRN pain    INR History:  Date 9/27 9/28 10/1 10/4 10/8 10/11 10/17 10/19 10/23   Total Weekly Dose 8mg 12mg 19mg 29mg 31mg 34mg 43mg 41mg 45mg   INR 1.3 1.22 1.3 1.8 1.85 1.7 2.91 2.82 3.79   Notes clinic enox enox enox; clinic enox enox        Date 11/8 11/16 12/21 1/9/19 1/16 2/4 3/1 3/22 5/16   Total Weekly Dose 49mg 49mg 49mg 49mg 47mg 49mg 49mg 49mg 49mg   INR 3.0 2.96 3.44 4.4 2.36 2.62 3.02 2.15 2.6   Notes   desvenla   self adj        Date 6/13 6/24 7/10 7/22 8/5 8/26 9/26 10/18 10/28 11/19 12/3 12/17   Total Weekly Dose 49mg 49mg 49mg 49mg 49mg 49mg 49mg 49mg 49mg 49mg 49mg 45mg   INR 2.25 2.98 2.83 2.97 2.9 3.2 3.57 3.12 2.88 3.33 4.06 2.59   Notes       less GLV Cymbalta start 9/25; Depakote start 9/5 1x decr dose  depakote inc; trintellix dcd ceftin x 10 days     Date 1/3/20 1/21 2/3 2/12 2/19 2/27 3/5 3/12 3/20 4/2 4/17 5/7 5/28 6/16 6/29 7/17   Total Weekly Dose 45mg 45mg 47mg  43mg 43mg 43mg 41mg 41mg 41mg 41mg 41mg 41mg 41 mg 39 mg 39 mg 39 mg   INR 3.21 3.85 3.11 2.34 3.01 3.2 3.07 3.01 2.95 2.66 2.9 3.03 3.36 2.48 2.42 3.08   Notes rec'vd 1/6  inc GLV dieting     x1 decr x1 decr       rec'd 6/30      Date  8/14 8/28 9/28 10/29 12/4 1/8/21 2/9 3/31           Total Weekly Dose 39mg 39mg 39mg 39mg 39mg 39mg 39 mg 39 mg           INR 2.78 2.28 2.33 2.39 2.46 2.45 2.85 2.48           Notes rec'd  8/13 rec'd 8/31 recv'd 9/29 recv'd 10/30  recv'd 1/11  rec'd 4/1             *takes warfarin in AM*    Phone Interview:  Verbal Release Authorization: please contact Mr. Hall directly - if unable to reach patient may contact brotherMarko  Tablet Strength: 2mg and 5mg tablets  Patient Contact Info: [phone]; Susan Delgadillo's number is [phone] *please call both at; [email or web address]; [email or web address]  Lab Contact Info: Megan Vu     Patient Findings:  Negatives:  Signs/symptoms of thrombosis, Signs/symptoms of bleeding, Laboratory test error suspected, Change in health, Change in alcohol use, Change in activity, Upcoming invasive procedure, Emergency department visit, Upcoming dental procedure, Missed doses, Extra doses, Change in medications, Change in diet/appetite, Hospital admission, Bruising, Other complaints   Comments:  Per Susan, all negative         Plan:  1. INR was therapeutic yesterday at 2.52. Instructed Susan to have Manjeet continue current maintenance dose of warfarin 5 mg daily except 7 mg on MonFri until recheck.  2. Mr. Hall prefers we contact Susan with dosing instructions and follow up testing dates. Plan was discussed with Susan.   3. Repeat INR in 4 weeks.  4. Verbal information provided over the phone. Manjeetpedro Hall's SO, Susan, RBV dosing instructions, expresses understanding by teach back, and has no further questions at this time.    Kayla Stockton, ClaudiaD.  05/13/21   09:49 EDT

## 2021-06-08 RX ORDER — WARFARIN SODIUM 2 MG/1
TABLET ORAL
Qty: 30 TABLET | Refills: 1 | Status: SHIPPED | OUTPATIENT
Start: 2021-06-08 | End: 2021-07-06

## 2021-06-09 ENCOUNTER — ANTICOAGULATION VISIT (OUTPATIENT)
Dept: PHARMACY | Facility: HOSPITAL | Age: 46
End: 2021-06-09

## 2021-06-09 ENCOUNTER — LAB (OUTPATIENT)
Dept: LAB | Facility: HOSPITAL | Age: 46
End: 2021-06-09

## 2021-06-09 DIAGNOSIS — Z95.2 HX OF AORTIC VALVE REPLACEMENT, MECHANICAL: ICD-10-CM

## 2021-06-09 DIAGNOSIS — Z95.2 H/O MECHANICAL AORTIC VALVE REPLACEMENT: ICD-10-CM

## 2021-06-09 DIAGNOSIS — Z95.2 HX OF AORTIC VALVE REPLACEMENT, MECHANICAL: Primary | ICD-10-CM

## 2021-06-09 LAB
INR PPP: 2.51 (ref 0.85–1.16)
PROTHROMBIN TIME: 26 SECONDS (ref 11.4–14.4)

## 2021-06-09 PROCEDURE — 36415 COLL VENOUS BLD VENIPUNCTURE: CPT

## 2021-06-09 PROCEDURE — 85610 PROTHROMBIN TIME: CPT

## 2021-06-09 NOTE — PROGRESS NOTES
Anticoagulation Clinic - Remote Progress Note  REMOTE LAB    Indication: On-X Mechanical valve #25 and a 30mm hemashield; stroke  Referring Provider: Alvaro (Last seen: 1/1519  next appt: 7/20/20)  Initial Warfarin Start Date:  ~9/2018  Goal INR: 2.0-3.0 per referral  Current Drug Interactions: duloxetine, lansoprazole, levothyroxine, Trintellix, valproic acid  Bleed Risk: ascending aortic aneurysm    Other: Hodgkins Lymphoma (hx of chemo and radiation); h/o CVA + hemorrhagic pancreatitis (during hospitalization for mAVR)    Diet: avoids GLV 4/1/21  Alcohol: socially  Tobacco: none  OTC Pain Medication: APAP PRN pain    INR History:  Date 9/27 9/28 10/1 10/4 10/8 10/11 10/17 10/19 10/23   Total Weekly Dose 8mg 12mg 19mg 29mg 31mg 34mg 43mg 41mg 45mg   INR 1.3 1.22 1.3 1.8 1.85 1.7 2.91 2.82 3.79   Notes clinic enox enox enox; clinic enox enox        Date 11/8 11/16 12/21 1/9/19 1/16 2/4 3/1 3/22 5/16   Total Weekly Dose 49mg 49mg 49mg 49mg 47mg 49mg 49mg 49mg 49mg   INR 3.0 2.96 3.44 4.4 2.36 2.62 3.02 2.15 2.6   Notes   desvenla   self adj        Date 6/13 6/24 7/10 7/22 8/5 8/26 9/26 10/18 10/28 11/19 12/3 12/17   Total Weekly Dose 49mg 49mg 49mg 49mg 49mg 49mg 49mg 49mg 49mg 49mg 49mg 45mg   INR 2.25 2.98 2.83 2.97 2.9 3.2 3.57 3.12 2.88 3.33 4.06 2.59   Notes       less GLV Cymbalta start 9/25; Depakote start 9/5 1x decr dose  depakote inc; trintellix dcd ceftin x 10 days     Date 1/3/20 1/21 2/3 2/12 2/19 2/27 3/5 3/12 3/20 4/2 4/17 5/7 5/28 6/16 6/29 7/17   Total Weekly Dose 45mg 45mg 47mg  43mg 43mg 43mg 41mg 41mg 41mg 41mg 41mg 41mg 41 mg 39 mg 39 mg 39 mg   INR 3.21 3.85 3.11 2.34 3.01 3.2 3.07 3.01 2.95 2.66 2.9 3.03 3.36 2.48 2.42 3.08   Notes rec'vd 1/6  inc GLV dieting     x1 decr x1 decr       rec'd 6/30      Date  8/14 8/28 9/28 10/29 12/4 1/8/21 2/9 3/31 5/12 6/9         Total Weekly Dose 39mg 39mg 39mg 39mg 39mg 39mg 39 mg 39 mg 39mg 39mg         INR 2.78 2.28 2.33 2.39 2.46 2.45 2.85 2.48 2.52 2.51          Notes rec'd 8/13 rec'd 8/31 recv'd 9/29 recv'd 10/30  recv'd 1/11  rec'd 4/1             *takes warfarin in AM*    Phone Interview:  Verbal Release Authorization: please contact Mr. Hall directly - if unable to reach patient may contact brotherMarko  Tablet Strength: 2mg and 5mg tablets  Patient Contact Info: [phone]; Susan Delgadillo's number is [phone] *please call both at; [email or web address]; [email or web address]  Lab Contact Info: Megan Vu     Positives:  Other complaints   Negatives:  Signs/symptoms of thrombosis, Signs/symptoms of bleeding, Laboratory test error suspected, Change in health, Change in alcohol use, Change in activity, Upcoming invasive procedure, Emergency department visit, Upcoming dental procedure, Missed doses, Extra doses, Change in medications, Change in diet/appetite, Hospital admission, Bruising   Comments:  Patient denies all findings.   Susan reports that Manjeet has been complaining of his hands and feet always being cold. She is unsure if this could be an effect of the warfarin. We discussed that chronic bilateral cold hands and feet could be a symptom of poor circulation and to discuss with his cardiologist at his next appointment. We also discussed that sudden onset coldness or pain/numbness in only one foot could be more serious and to seek care urgently if he experiences those symptoms.     Plan:  1. INR was therapeutic today at 2.51. Instructed Manjeet and Susan to continue current maintenance dose of warfarin 5 mg daily except 7 mg on MonFri until recheck.  2. Mr. Hall prefers we contact Susan with dosing instructions and follow up testing dates. Plan was discussed with both Manjeet and Susan.   3. Repeat INR in 4 weeks on 7/7.  4. Verbal information provided over the phone. Manjeet Hall RBV dosing instructions, expresses understanding by teach back, and has no further questions at this time.    Thank you,  Madelyn Grady,  PharmD.  Pharmacy Resident  6/10/2021 09:06 EDT

## 2021-06-29 RX ORDER — WARFARIN SODIUM 5 MG/1
TABLET ORAL
Qty: 90 TABLET | Refills: 0 | Status: SHIPPED | OUTPATIENT
Start: 2021-06-29 | End: 2021-08-03 | Stop reason: SDUPTHER

## 2021-06-29 RX ORDER — WARFARIN SODIUM 5 MG/1
TABLET ORAL
Qty: 90 TABLET | Refills: 0 | Status: SHIPPED | OUTPATIENT
Start: 2021-06-29 | End: 2021-06-29 | Stop reason: SDUPTHER

## 2021-06-29 NOTE — TELEPHONE ENCOUNTER
Patient's s/o Susan requests refills of Mr. Hall's warfarin 5 mg tablets be sent to [facility] in [place], [place].

## 2021-06-30 ENCOUNTER — OFFICE VISIT (OUTPATIENT)
Dept: NEUROLOGY | Facility: CLINIC | Age: 46
End: 2021-06-30

## 2021-06-30 VITALS
SYSTOLIC BLOOD PRESSURE: 122 MMHG | DIASTOLIC BLOOD PRESSURE: 84 MMHG | WEIGHT: 224.2 LBS | HEIGHT: 68 IN | BODY MASS INDEX: 33.98 KG/M2

## 2021-06-30 DIAGNOSIS — I69.30 SEQUELAE, POST-STROKE: Primary | ICD-10-CM

## 2021-06-30 PROCEDURE — 99213 OFFICE O/P EST LOW 20 MIN: CPT | Performed by: PSYCHIATRY & NEUROLOGY

## 2021-06-30 RX ORDER — GABAPENTIN 300 MG/1
300 CAPSULE ORAL 2 TIMES DAILY
COMMUNITY

## 2021-06-30 NOTE — PROGRESS NOTES
Subjective:    CC: Manjeet Hall is seen today for stroke       HPI:  Current visit-patient states he is improving gradually in terms of his speech.  Has stopped getting speech therapy.  Continues to take Coumadin with aspirin 81 mg.  He did cut back on his gabapentin and is now taking 300 mg twice a day (still does not know what he was started on it for).  He has not lost any weight but is now starting to exercise more.    Last visit-since the last visit patient has been doing the same in terms of his speech.  He is still getting speech therapy.  Continues to take Coumadin but stopped taking Lipitor.  His last lipid panel was well controlled as follows-, , LDL 92, HDL 59.  He is also on Depakote which is helping tremendously with his mood.  He states that he has gained a lot of weight in the past year due to not exercising as much.  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  Also takes gabapentin 600 mg in the morning and 900 mg at night but cannot tell me exactly why he is taking that.    Last visit-patient has been steadily making progress in terms of his speech.  He continues to go to [facility] for speech therapy twice a week.  He still has difficulty comprehending certain words and he tells me today that it is tough for him to hear certain words.  He has stopped taking atorvastatin but cannot tell me why.  Also takes Depakote.  Again is unsure of why he is taking it, thinks it may be for his headaches.    Last visit- patient speech continues to improve.  He is still getting speech therapy at [facility] at least 2-3 times a week.  He continues to be on Coumadin as well as atorvastatin 40 mg daily.  He is also exercising now and has lost weight.  Takes gabapentin for neuropathy.    Last visit- since his last visit his speech has improved and he is still getting speech therapy twice a week at [facility].  His carotid Doppler that I  had ordered showed mild plaques bilaterally but no significant stenosis.  He continues to be on Coumadin with a therapeutic INR.  His lipid panel was as follows-, , , HDL 53.  I had started him on atorvastatin 40 mg which she is continuing to take.  Of note-I also personally reviewed his notes from [facility] which are as follows.    CT head showed decreased attenuation in the left temporal operculum and left inferior parietal lobe and parts of the lateral left superior parietal lobe reflecting a left MCA infarct.  MRI brain also showed restricted diffusion in the left posterior MCA area and left insular cortex (aspect score of 5)  CTA head showed thrombus in the left M1-2 junction with good distal collateral vessels and absent left A1 .The rest of the anterior circulation and vertebral basilar circulation was patent.  CTA neck showed mild carotid plaques without any significant stenosis on either side.  Echo showed EF of 51% with the limitation of right ventricle and moderate dilatation of left atrium, moderate aortic valve regurg    Initial [id number]old male accompanied by his wife with a history of aortic stenosis status post valve replacement and VSD repair at age 14, aortic aneurysm and aortic valve regurgitation status post aneurysmal repair and aortic valve repair, anxiety, depression, Hodgkin's lymphoma at age 23 status post chemotherapy and radiation, hypothyroidism presents with a stroke.  As per patient's wife he underwent  a mechanical aortic valve repair as well as and aneurysmal repair in [facility] in August.  He was started on Coumadin soon after the procedure however he developed pancreatitis after surgery hence his Coumadin was stopped for 1 week and he was started on IV heparin.  During that time he had symptoms of garbled speech.  An MRI brain later confirmed a left hemispheric stroke.  He was also found to have a thrombus in his mechanical aortic valve and  restarted back on Coumadin.  Currently being bridged with Lovenox.  His last INR yesterday was 1.85.  He has not yet started getting speech therapy and will start soon.    The following portions of the patient's history were reviewed today and updated as of 11/19/2019  : allergies, current medications, past family history, past medical history, past social history, past surgical history and problem list  These document will be scanned to patient's chart.      Current Outpatient Medications:   •  aspirin (aspirin) 81 MG EC tablet, Take 81 mg by mouth Daily., Disp: , Rfl:   •  divalproex (DEPAKOTE) 500 MG DR tablet, Take 500 mg by mouth 2 (Two) Times a Day., Disp: , Rfl:   •  DULoxetine (CYMBALTA) 60 MG capsule, Take 60 mg by mouth Daily., Disp: , Rfl: 2  •  gabapentin (NEURONTIN) 300 MG capsule, Take 300 mg by mouth 5 (Five) Times a Day. Take 2 tablets AM, 3 tablets in the afternoon, Disp: , Rfl: 2  •  lansoprazole (PREVACID) 30 MG capsule, TAKE 1 CAPSULE BY MOUTH EVERY DAY, Disp: 30 capsule, Rfl: 2  •  levothyroxine (SYNTHROID, LEVOTHROID) 100 MCG tablet, Take 1 tablet by mouth Daily., Disp: 30 tablet, Rfl: 5  •  metoprolol tartrate (LOPRESSOR) 25 MG tablet, TAKE 1 TABLET BY MOUTH TWICE A DAY, Disp: 60 tablet, Rfl: 4  •  warfarin (COUMADIN) 2 MG tablet, TAKE ONE TABLET BY MOUTH DAILY OR AS DIRECTED BY THE ANTICOAGULATION CLINIC., Disp: 30 tablet, Rfl: 1  •  warfarin (COUMADIN) 5 MG tablet, Take 1 tablet by mouth daily as directed by anticoagulation clinic., Disp: 90 tablet, Rfl: 0   Past Medical History:   Diagnosis Date   • Abnormal heart rhythm    • Anxiety    • Cancer (CMS/HCC)     hodgkins   • Depression    • GERD (gastroesophageal reflux disease)    • Heart murmur    • History of echocardiogram    • History of left heart catheterization    • History of stomach ulcers    • Hypothyroidism    • Mitral valvular disorder    • Thyroid disorder       Past Surgical History:   Procedure Laterality Date   • CARDIAC  "SURGERY     • SKIN CANCER EXCISION      mass removed from neck and chest for hodgkins      Family History   Problem Relation Age of Onset   • COPD Mother    • Cancer Mother    • Hypertension Father    • Stroke Father    • Stroke Paternal Grandmother    • Hypertension Paternal Grandmother    • Cancer Paternal Grandmother    • Cancer Paternal Grandfather    • Heart disease Paternal Grandfather    • No Known Problems Brother       Social History     Socioeconomic History   • Marital status: Legally      Spouse name: Not on file   • Number of children: Not on file   • Years of education: Not on file   • Highest education level: Not on file   Tobacco Use   • Smoking status: Never Smoker   • Smokeless tobacco: Never Used   Vaping Use   • Vaping Use: Never used   Substance and Sexual Activity   • Alcohol use: Yes     Comment: social   • Drug use: No   • Sexual activity: Defer     Review of Systems   Neurological: Positive for speech difficulty.   All other systems reviewed and are negative.      Objective:    /84   Ht 172.7 cm (68\")   Wt 102 kg (224 lb 3.2 oz)   BMI 34.09 kg/m²     Neurology Exam:    General apperance: NAD.     Mental status: Alert, awake and oriented to time place and person.    Recent and Remote memory: Intact.    Attention span and Concentration: Normal.     Language and Speech: Intact- No dysarthria.    Fluency, Naming , Repitition and Comprehension: Difficulty comprehending certain things.  Also reduced fluency however significantly improved since initial visit    Cranial Nerves:   CN II: Visual fields are full. Intact. Fundi - Normal, No papillederma, Pupils - MATTHIEU  CN III, IV and VI: Extraocular movements are intact. Normal saccades.   CN V: Facial sensation is intact.   CN VII: Muscles of facial expression reveal no asymmetry. Intact.   CN VIII: Hearing is intact. Whispered voice intact.   CN IX and X: Palate elevates symmetrically. Intact  CN XI: Shoulder shrug is intact.   CN " XII: Tongue is midline without evidence of atrophy or fasciculation.     Ophthalmoscopic exam of optic disc-normal    Motor:  Right UE muscle strength 5/5. Normal tone.     Left UE muscle strength 5/5. Normal tone.      Right LE muscle strength5/5. Normal tone.     Left LE muscle strength 5/5. Normal tone.      Sensory: Normal light touch, vibration and pinprick sensation bilaterally.    DTRs: 2+ bilaterally in upper and lower extremities.    Babinski: Negative bilaterally.    Co-ordination: Normal finger-to-nose, heel to shin B/L.    Rhomberg: Negative.    Gait: Normal.    Cardiovascular: Regular rate and rhythm without murmur, gallop or rub.    Assessment and Plan:  1. Sequelae, post-stroke  Patient had a left hemispheric infarct secondary to an artificial aortic valve thrombus after he was taken off Coumadin briefly for pancreatitis  Currently on Coumadin.  Goal INR 2.5-3.5.  Also on aspirin 81 mg daily  Maintain blood pressure less than 130/90  He has stopped taking Lipitor.  Goal LDL of less than 100.  His LDL was 92.  I will recheck a lipid panel and A1c  I have once again told him to start exercising at least a few times a week.    Return in about 1 year (around 6/30/2022).         Lindsay Summers MD

## 2021-07-06 RX ORDER — WARFARIN SODIUM 2 MG/1
TABLET ORAL
Qty: 30 TABLET | Refills: 1 | Status: SHIPPED | OUTPATIENT
Start: 2021-07-06 | End: 2021-08-02

## 2021-07-22 ENCOUNTER — TELEPHONE (OUTPATIENT)
Dept: PHARMACY | Facility: HOSPITAL | Age: 46
End: 2021-07-22

## 2021-07-26 ENCOUNTER — OFFICE VISIT (OUTPATIENT)
Dept: CARDIOLOGY | Facility: CLINIC | Age: 46
End: 2021-07-26

## 2021-07-26 ENCOUNTER — LAB (OUTPATIENT)
Dept: LAB | Facility: HOSPITAL | Age: 46
End: 2021-07-26

## 2021-07-26 VITALS
HEART RATE: 86 BPM | HEIGHT: 69 IN | WEIGHT: 222.8 LBS | SYSTOLIC BLOOD PRESSURE: 106 MMHG | DIASTOLIC BLOOD PRESSURE: 58 MMHG | BODY MASS INDEX: 33 KG/M2 | OXYGEN SATURATION: 98 %

## 2021-07-26 DIAGNOSIS — I69.30 SEQUELAE, POST-STROKE: ICD-10-CM

## 2021-07-26 DIAGNOSIS — I71.20 THORACIC AORTIC ANEURYSM WITHOUT RUPTURE (HCC): ICD-10-CM

## 2021-07-26 DIAGNOSIS — R06.09 DYSPNEA ON EXERTION: ICD-10-CM

## 2021-07-26 DIAGNOSIS — Z95.2 HX OF AORTIC VALVE REPLACEMENT, MECHANICAL: Primary | ICD-10-CM

## 2021-07-26 DIAGNOSIS — I10 ESSENTIAL HYPERTENSION: ICD-10-CM

## 2021-07-26 LAB
ALBUMIN SERPL-MCNC: 4.7 G/DL (ref 3.5–5.2)
ALBUMIN/GLOB SERPL: 1.5 G/DL
ALP SERPL-CCNC: 54 U/L (ref 39–117)
ALT SERPL W P-5'-P-CCNC: 15 U/L (ref 1–41)
ANION GAP SERPL CALCULATED.3IONS-SCNC: 15 MMOL/L (ref 5–15)
AST SERPL-CCNC: 16 U/L (ref 1–40)
BASOPHILS # BLD AUTO: 0.05 10*3/MM3 (ref 0–0.2)
BASOPHILS NFR BLD AUTO: 0.6 % (ref 0–1.5)
BILIRUB SERPL-MCNC: 0.3 MG/DL (ref 0–1.2)
BUN SERPL-MCNC: 13 MG/DL (ref 6–20)
BUN/CREAT SERPL: 12.1 (ref 7–25)
CALCIUM SPEC-SCNC: 9.1 MG/DL (ref 8.6–10.5)
CHLORIDE SERPL-SCNC: 104 MMOL/L (ref 98–107)
CO2 SERPL-SCNC: 22 MMOL/L (ref 22–29)
CREAT SERPL-MCNC: 1.07 MG/DL (ref 0.76–1.27)
DEPRECATED RDW RBC AUTO: 45.5 FL (ref 37–54)
EOSINOPHIL # BLD AUTO: 0.92 10*3/MM3 (ref 0–0.4)
EOSINOPHIL NFR BLD AUTO: 10.8 % (ref 0.3–6.2)
ERYTHROCYTE [DISTWIDTH] IN BLOOD BY AUTOMATED COUNT: 14.4 % (ref 12.3–15.4)
GFR SERPL CREATININE-BSD FRML MDRD: 75 ML/MIN/1.73
GLOBULIN UR ELPH-MCNC: 3.1 GM/DL
GLUCOSE SERPL-MCNC: 158 MG/DL (ref 65–99)
HCT VFR BLD AUTO: 41.1 % (ref 37.5–51)
HGB BLD-MCNC: 13.2 G/DL (ref 13–17.7)
IMM GRANULOCYTES # BLD AUTO: 0.08 10*3/MM3 (ref 0–0.05)
IMM GRANULOCYTES NFR BLD AUTO: 0.9 % (ref 0–0.5)
LYMPHOCYTES # BLD AUTO: 3.29 10*3/MM3 (ref 0.7–3.1)
LYMPHOCYTES NFR BLD AUTO: 38.7 % (ref 19.6–45.3)
MCH RBC QN AUTO: 28 PG (ref 26.6–33)
MCHC RBC AUTO-ENTMCNC: 32.1 G/DL (ref 31.5–35.7)
MCV RBC AUTO: 87.3 FL (ref 79–97)
MONOCYTES # BLD AUTO: 0.45 10*3/MM3 (ref 0.1–0.9)
MONOCYTES NFR BLD AUTO: 5.3 % (ref 5–12)
NEUTROPHILS NFR BLD AUTO: 3.71 10*3/MM3 (ref 1.7–7)
NEUTROPHILS NFR BLD AUTO: 43.7 % (ref 42.7–76)
NRBC BLD AUTO-RTO: 0 /100 WBC (ref 0–0.2)
NT-PROBNP SERPL-MCNC: 307.4 PG/ML (ref 0–450)
PLATELET # BLD AUTO: 193 10*3/MM3 (ref 140–450)
PMV BLD AUTO: 11.4 FL (ref 6–12)
POTASSIUM SERPL-SCNC: 4.2 MMOL/L (ref 3.5–5.2)
PROT SERPL-MCNC: 7.8 G/DL (ref 6–8.5)
RBC # BLD AUTO: 4.71 10*6/MM3 (ref 4.14–5.8)
SODIUM SERPL-SCNC: 141 MMOL/L (ref 136–145)
TSH SERPL DL<=0.05 MIU/L-ACNC: 31.2 UIU/ML (ref 0.27–4.2)
WBC # BLD AUTO: 8.5 10*3/MM3 (ref 3.4–10.8)

## 2021-07-26 PROCEDURE — 80053 COMPREHEN METABOLIC PANEL: CPT

## 2021-07-26 PROCEDURE — 83036 HEMOGLOBIN GLYCOSYLATED A1C: CPT

## 2021-07-26 PROCEDURE — 84443 ASSAY THYROID STIM HORMONE: CPT | Performed by: INTERNAL MEDICINE

## 2021-07-26 PROCEDURE — 36415 COLL VENOUS BLD VENIPUNCTURE: CPT

## 2021-07-26 PROCEDURE — 83880 ASSAY OF NATRIURETIC PEPTIDE: CPT

## 2021-07-26 PROCEDURE — 99214 OFFICE O/P EST MOD 30 MIN: CPT | Performed by: INTERNAL MEDICINE

## 2021-07-26 PROCEDURE — 85025 COMPLETE CBC W/AUTO DIFF WBC: CPT | Performed by: INTERNAL MEDICINE

## 2021-07-26 PROCEDURE — 80061 LIPID PANEL: CPT

## 2021-07-26 NOTE — PROGRESS NOTES
[facility] at [facility]  Office visit  Manjeet Hall  1975      VISIT DATE:  7/26/2021      PCP: Nile Faust MD  [id number]  [facility] [address]    CC:  Chief Complaint   Patient presents with   • Thoracic aortic aneurysm without rupture       Previous cardiac history:  -Diagnosed with large PDA and VSD at birth, PDA close spontaneously.  -Developed subvalvular aortic stenosis which required surgery, VSD closed as well at 13yo  -Ross procedure for increasing aortic insufficiency with moderate aortic stenosis at 19yo  -Treated for Hodgkin's lymphoma, age 21 - CTX and chest XRT    -May 2018 Echo  · Estimated EF appears to be in the range of 56 - 60%.  · Left ventricular wall thickness is consistent with mild concentric hypertrophy.  · Mild to moderate aortic valve regurgitation is present.  · Right ventricular cavity is borderline dilated.  · Left atrial cavity size is mildly dilated.  · Moderate dilation of the aortic root is present. 4.8 cm. Moderate dilation of the ascending aorta is present. 5.4 cm.    -CT angiography chest June 2018:aortic root at the upper portion of the valve largest AP dimension is 5.4 cm. Approximately 2 cm above the aortic root the ascending thoracic aorta largest oblique dimension is 7.0 cm and AP dimension 6.2 cm.    Cardiac catheterization in July 2018 [facility]  LMT: - The LMT has mild luminal irregularities.  LAD:  - There was moderate diffuse disease.  - The mid LAD is narrowed 30 % - focal disease.   Additional Comment: very large vessel, wraps around the apex to perfuse the   entire mid and apical inferior wall.  LCX: - There was mild diffuse disease.  - The circ AV groove continuation circumflex is narrowed 80 % - focal disease.  - The 1st obtuse marginal circumflex is narrowed 60 % - focal disease.   Additional Comment: AV LCx perfuses small left PDA.  RAMUS: - The Ramus is Absent.  RCA:  - The RCA has mild luminal  irregularities.     August 13, 2018  Reoperation, third open-heart surgery.  Reversed Ross procedure  including aortic valve and root re-replacement with a composite graft including an On-X mechanical valve #25 and a 30-mm Hemashield graft, right ventricular outflow  tract reconstruction with the re-repaired autograft.    Echo September 2018 [facility]  EF = 60 ± 5% . There is no evidence of LV apical thrombus.  - The right ventricle is normal in size. Right ventricular systolic function is   low normal.  - On-X prosthetic aortic valve (size #25). There is no aortic valve regurgitation.   Transprosthetic gradients could not be obtained on the current study. Valve   leaflets not well visualized. If clinically indicated, consider ZACHARY to assess   further.  - There is a small pericardial effusion with organization. It is smaller when   compared to that documented on the prior echocardiogram performed 8/23/18. There   is no RV/RA collapse. There is no obvious evidence of cardiac tamponade.  - Status post reverse Ross procedure including aortic root, ascending aorta and   aortic valve replacement, RVOT reconstruction with rerepaired autograft. No   significant PI. The peak/mean gradients 19/10 mmHg [similar to the prior study]    August 2019 echo  · Left ventricular systolic function is normal.  · Estimated EF appears to be in the range of 56 - 60%  · Left ventricular diastolic dysfunction (grade II) consistent with pseudonormalization.  · Left ventricular wall thickness is consistent with mild concentric hypertrophy.  · Normal mechanical aortic valve.    ASSESSMENT:   Diagnosis Plan   1. Hx of aortic valve replacement, mechanical [Z95.2]     2. Essential hypertension     3. Thoracic aortic aneurysm without rupture (CMS/HCC)         PLAN:  Congenital heart disease: Status post reversed Ross procedure  including aortic valve and root re-replacement with a composite graft including an On-X mechanical valve #25 and a  "30-mm Hemashield graft, right ventricular outflow  tract reconstruction with the re-repaired autograft in 2018.  Postoperative course complicated by stroke and pancreatitis.  Afterload currently well-controlled.  Continue anticoagulation with goal INR of 2-3.  surveillance echocardiographic assessment every 3 years throughout the first 10 years post valve implant. CTA chest reveals stable repair.  Repeat CTA chest every 24 months.  Repeat echo pending given onset of shortness of breath with exertion in a class II pattern.  I have ordered CBC, CMP, proBNP and TSH.    Coronary artery disease: Currently stable and asymptomatic.  Afterload well controlled.  Currently off statin.  We will continue to trend fasting lipid panel and restart if LDL trends higher than 100.    Tremors: Neurology evaluation pending.    Subjective  History of stroke with persistent word finding difficulties.  History of postoperative pancreatitis.  Denies chest discomfort.  Blood pressures running less than 120/80 mmHg.  compliant with medical therapy.  Denies easy bruising or bleeding complications on chronic anticoagulation.  Does note some mild shortness of breath in a class II pattern with such activities as walking more than 100 feet on level ground or household chores.  Onset over the previous 2 to 3 months.  Denies chest pain.  Has also noticed intermittent bilateral upper extremity tremors at rest and sometimes with using utensils.    PHYSICAL EXAMINATION:  Vitals:    07/26/21 1405   BP: 106/58   BP Location: Left arm   Patient Position: Sitting   Pulse: 86   SpO2: 98%   Weight: 101 kg (222 lb 12.8 oz)   Height: 175.3 cm (69\")     General Appearance:    Alert, cooperative, no distress, appears stated age   Head:    Normocephalic, without obvious abnormality, atraumatic   Eyes:    conjunctiva/corneas clear   Nose:   Nares normal, septum midline, mucosa normal, no drainage   Throat:   Lips, teeth and gums normal   Neck:   Supple, " symmetrical, trachea midline, no carotid    bruit or JVD   Lungs:     Clear to auscultation bilaterally, respirations unlabored   Chest Wall:    No tenderness or deformity    Heart:    Regular rate and rhythm, mechanical A2,, 2/6 early peaking systolic murmur left upper sternal border, rub   or gallop, normal carotid impulse bilaterally without bruit.   Abdomen:     Soft, non-tender   Extremities:   Extremities normal, atraumatic, no cyanosis or edema   Pulses:   2+ and symmetric all extremities   Skin:   Skin color, texture, turgor normal, no rashes or lesions       Diagnostic Data:  Procedures  Lab Results   Component Value Date    CHLPL 259 (H) 07/17/2015    TRIG 148 11/19/2019    HDL 59 11/19/2019     Lab Results   Component Value Date    GLUCOSE 83 09/27/2018    BUN 12 09/27/2018    CREATININE 0.90 07/20/2020     09/27/2018    K 4.3 09/27/2018     09/27/2018    CO2 27.0 09/27/2018     No results found for: HGBA1C  Lab Results   Component Value Date    WBC 7.26 09/27/2018    HGB 8.4 (L) 09/27/2018    HCT 28.8 (L) 09/27/2018     09/27/2018       Allergies  No Known Allergies    Current Medications    Current Outpatient Medications:   •  aspirin (aspirin) 81 MG EC tablet, Take 81 mg by mouth Daily., Disp: , Rfl:   •  divalproex (DEPAKOTE) 500 MG DR tablet, Take 500 mg by mouth 2 (Two) Times a Day., Disp: , Rfl:   •  DULoxetine (CYMBALTA) 60 MG capsule, Take 60 mg by mouth Daily., Disp: , Rfl: 2  •  gabapentin (NEURONTIN) 300 MG capsule, Take 300 mg by mouth 2 (Two) Times a Day., Disp: , Rfl:   •  lansoprazole (PREVACID) 30 MG capsule, TAKE 1 CAPSULE BY MOUTH EVERY DAY, Disp: 30 capsule, Rfl: 2  •  levothyroxine (SYNTHROID, LEVOTHROID) 100 MCG tablet, Take 1 tablet by mouth Daily., Disp: 30 tablet, Rfl: 5  •  metoprolol tartrate (LOPRESSOR) 25 MG tablet, TAKE 1 TABLET BY MOUTH TWICE A DAY, Disp: 180 tablet, Rfl: 1  •  warfarin (COUMADIN) 2 MG tablet, TAKE ONE TABLET BY MOUTH DAILY OR AS DIRECTED  BY THE ANTICOAGULATION CLINIC., Disp: 30 tablet, Rfl: 1  •  warfarin (COUMADIN) 5 MG tablet, Take 1 tablet by mouth daily as directed by anticoagulation clinic., Disp: 90 tablet, Rfl: 0          ROS  Review of Systems   Cardiovascular: Negative for chest pain, dyspnea on exertion, irregular heartbeat and syncope.   Respiratory: Negative for cough, shortness of breath and wheezing.          SOCIAL HX  Social History     Socioeconomic History   • Marital status: Legally      Spouse name: Not on file   • Number of children: Not on file   • Years of education: Not on file   • Highest education level: Not on file   Tobacco Use   • Smoking status: Never Smoker   • Smokeless tobacco: Never Used   Vaping Use   • Vaping Use: Never used   Substance and Sexual Activity   • Alcohol use: Yes     Comment: social   • Drug use: No   • Sexual activity: Defer       FAMILY HX  Family History   Problem Relation Age of Onset   • COPD Mother    • Cancer Mother    • Hypertension Father    • Stroke Father    • Stroke Paternal Grandmother    • Hypertension Paternal Grandmother    • Cancer Paternal Grandmother    • Cancer Paternal Grandfather    • Heart disease Paternal Grandfather    • No Known Problems Brother              Vimal John III, MD, FACC

## 2021-07-27 ENCOUNTER — TELEPHONE (OUTPATIENT)
Dept: CARDIOLOGY | Facility: CLINIC | Age: 46
End: 2021-07-27

## 2021-07-27 ENCOUNTER — TELEPHONE (OUTPATIENT)
Dept: NEUROLOGY | Facility: CLINIC | Age: 46
End: 2021-07-27

## 2021-07-27 LAB
CHOLEST SERPL-MCNC: 245 MG/DL (ref 0–200)
HBA1C MFR BLD: 5.42 % (ref 4.8–5.6)
HDLC SERPL-MCNC: 48 MG/DL (ref 40–60)
LDLC SERPL CALC-MCNC: 156 MG/DL (ref 0–100)
LDLC/HDLC SERPL: 3.17 {RATIO}
TRIGL SERPL-MCNC: 225 MG/DL (ref 0–150)
VLDLC SERPL-MCNC: 41 MG/DL (ref 5–40)

## 2021-07-27 RX ORDER — ROSUVASTATIN CALCIUM 10 MG/1
10 TABLET, COATED ORAL DAILY
Qty: 30 TABLET | Refills: 5 | Status: SHIPPED | OUTPATIENT
Start: 2021-07-27 | End: 2022-02-07

## 2021-07-27 RX ORDER — LEVOTHYROXINE SODIUM 0.12 MG/1
125 TABLET ORAL DAILY
Qty: 30 TABLET | Refills: 1 | Status: SHIPPED | OUTPATIENT
Start: 2021-07-27 | End: 2021-09-22 | Stop reason: SDUPTHER

## 2021-07-27 NOTE — TELEPHONE ENCOUNTER
----- Message from Lindsay Summers MD sent at 7/27/2021 12:52 PM EDT -----  Please let the patient know that his A1c was normal but his cholesterol is high.  He should start the statin that was prescribed by his cardiologist

## 2021-07-27 NOTE — TELEPHONE ENCOUNTER
----- Message from Vimal John III, MD sent at 7/27/2021  8:22 AM EDT -----  Thyroid function is significantly low and your cholesterol numbers are high.  Need to increase your levothyroxine to 125 mcg p.o. daily.  Make sure you are taking it first thing in the morning on an empty stomach, 30 minutes before any other medications.  You will need to follow-up with your primary care physician for repeat thyroid function test in 4 to 6 weeks.  With regards to your cholesterol we need to start rosuvastatin 10 mg p.o. daily.

## 2021-08-02 RX ORDER — WARFARIN SODIUM 2 MG/1
TABLET ORAL
Qty: 7 TABLET | Refills: 1 | Status: SHIPPED | OUTPATIENT
Start: 2021-08-02 | End: 2021-08-03 | Stop reason: SDUPTHER

## 2021-08-03 ENCOUNTER — LAB (OUTPATIENT)
Dept: LAB | Facility: HOSPITAL | Age: 46
End: 2021-08-03

## 2021-08-03 ENCOUNTER — ANTICOAGULATION VISIT (OUTPATIENT)
Dept: PHARMACY | Facility: HOSPITAL | Age: 46
End: 2021-08-03

## 2021-08-03 DIAGNOSIS — Z95.2 HX OF AORTIC VALVE REPLACEMENT, MECHANICAL: ICD-10-CM

## 2021-08-03 DIAGNOSIS — Z95.2 HX OF AORTIC VALVE REPLACEMENT, MECHANICAL: Primary | ICD-10-CM

## 2021-08-03 DIAGNOSIS — Z95.2 H/O MECHANICAL AORTIC VALVE REPLACEMENT: ICD-10-CM

## 2021-08-03 LAB
INR PPP: 2.88 (ref 0.85–1.16)
PROTHROMBIN TIME: 28.9 SECONDS (ref 11.4–14.4)

## 2021-08-03 PROCEDURE — 85610 PROTHROMBIN TIME: CPT

## 2021-08-03 PROCEDURE — 36415 COLL VENOUS BLD VENIPUNCTURE: CPT

## 2021-08-03 RX ORDER — WARFARIN SODIUM 2 MG/1
TABLET ORAL
Qty: 30 TABLET | Refills: 0 | Status: SHIPPED | OUTPATIENT
Start: 2021-08-03 | End: 2021-09-01

## 2021-08-03 RX ORDER — WARFARIN SODIUM 5 MG/1
TABLET ORAL
Qty: 30 TABLET | Refills: 1 | Status: SHIPPED | OUTPATIENT
Start: 2021-08-03 | End: 2021-09-21

## 2021-08-03 NOTE — TELEPHONE ENCOUNTER
Susan called back.  Manjeet had thought that he had his INR drawn on 7/26 with his other labs; however, an INR was not drawn that day.  Susan will take him to lab today to have drawn.      Adeline Beraden, ClaudiaD

## 2021-08-03 NOTE — PROGRESS NOTES
Anticoagulation Clinic - Remote Progress Note  REMOTE LAB    Indication: On-X Mechanical valve #25 and a 30mm hemashield; stroke  Referring Provider: Alvaro (Last seen: 7/26/21  next appt: 1/31/21)  Initial Warfarin Start Date: ~ Sept 2018  Goal INR: 2.0-3.0 per referral  Current Drug Interactions: duloxetine, lansoprazole, levothyroxine, Trintellix, valproic acid  Bleed Risk: ascending aortic aneurysm   Other: Hodgkins Lymphoma (hx of chemo and radiation); h/o CVA + hemorrhagic pancreatitis (during hospitalization for mAVR)    Diet: avoids GLV (4/1/21)  Alcohol: socially  Tobacco: none  OTC Pain Medication: APAP PRN pain    INR History:  Date 9/27 9/28 10/1 10/4 10/8 10/11 10/17 10/19 10/23 11/8 11/16 12/21   Total Weekly Dose 8mg 12mg 19mg 29mg 31mg 34mg 43mg 41mg 45mg 49mg 49mg 49mg   INR 1.3 1.22 1.3 1.8 1.85 1.70 2.91 2.82 3.79 3.00 2.96 3.44   Notes clinic enox enox enox; clinic enox/ enox      desvenla     Date 1/9/19 1/16 2/4 3/1 3/22 5/16 6/13 6/24 7/10 7/22 8/5 8/26   Total Weekly Dose 49mg 47mg 49mg 49mg 49mg 49mg 49mg 49mg 49mg 49mg 49mg 49mg   INR 4.40 2.36 2.62 3.02 2.15 2.60 2.25 2.98 2.83 2.97 2.90 3.20   Notes   self adj              Date 9/26 10/18 10/28 11/19 12/3 12/17 1/3/20 1/21 2/3 2/12 2/19 2/27   Total Weekly Dose 49mg 49mg 49mg 49mg 49mg 45mg 45mg 45mg 47mg 43mg 43mg 43mg   INR 3.57 3.12 2.88 3.33 4.06 2.59 3.21 3.85 3.11 2.34 3.01 3.20   Notes less GLV Cymbalta start 9/25; Depakote start 9/5 1x decr dose  depakote inc; trintellix dcd ceftin x10 days Recv'd 1/6  incr GLV  dieting        Date 3/5 3/12 3/20 4/2 4/17 5/7 5/28 6/16 6/29 7/17 8/14 8/28   Total Weekly Dose 41mg 41mg 41mg 41mg 41mg 41mg 41mg 39mg 39mg 39mg 39mg 39mg   INR 3.07 3.01 2.95 2.66 2.9 3.03 3.36 2.48 2.42 3.08 2.78 2.28   Notes 1x decr dose 1x decr dose       recv'd 6/30  recv'd 8/13 recv'd 8/31     Date 9/28 10/29  12/4 1/8/21 2/9 3/31 5/12 6/9  8/3    Total Weekly Dose 39mg 39mg non-  compliant 39mg 39mg 39mg 39mg 39mg  39mg non- compliant 39mg    INR 2.33 2.39  2.46 2.45 2.85 2.48 2.52 2.51  2.88    Notes recv'd 9/29 recv'd 10/30   recv'd 1/11  recv'd 4/1          *takes warfarin in AM*    Phone Interview:  Verbal Release Authorization: please contact Mr. Hall directly - if unable to reach patient may contact brotherMarko  Tablet Strength: 2mg and 5mg tablets  Patient Contact Info: [phone]; Susan Elie's number is [phone] *please call both at; [email or web address]; [email or web address]  Lab Contact Info: Megan Vu     Patient Findings  Negatives:  Signs/symptoms of thrombosis, Signs/symptoms of bleeding, Laboratory test error suspected, Change in health, Change in alcohol use, Change in activity, Upcoming invasive procedure, Emergency department visit, Upcoming dental procedure, Missed doses, Extra doses, Change in medications, Change in diet/appetite, Hospital admission, Bruising, Other complaints     Plan:  1. INR was therapeutic today at 2.88. Patient was non-compliant with requested follow-up. Instructed Manjeet and Susan to continue current maintenance dose of warfarin 5mg oral daily except 7mg on MonFri until recheck (39mg/week).   2. Mr. Hall prefers we contact Susan with dosing instructions and follow up testing dates. Plan was discussed with both Manjeet and Susan.   3. Repeat INR in 4 weeks, 8/31.   4. Verbal information provided over the phone. Manjeetpedro Hall RBV dosing instructions, expresses understanding by teach back, and has no further questions at this time.    Abdi Petit CPhT  8/3/2021  14:19 EDT    I, Taylor Riedel, [facility], have reviewed the note in full and agree with the assessment and plan.  08/03/21  16:05 EDT

## 2021-08-03 NOTE — TELEPHONE ENCOUNTER
Received refill request for warfarin. Sent seven day supply and attempted to contact patient.  LVM requesting that he have his INR drawn asap.  He was due to have his INR drawn on 7/7.    Adeline Bearden, ClaudiaD

## 2021-08-20 ENCOUNTER — TELEPHONE (OUTPATIENT)
Dept: CARDIOLOGY | Facility: CLINIC | Age: 46
End: 2021-08-20

## 2021-08-20 NOTE — TELEPHONE ENCOUNTER
Notified patient that  finished his [employer] paperwork and he can pick it up whenever he has time. Verbalized understanding.

## 2021-08-31 ENCOUNTER — ANTICOAGULATION VISIT (OUTPATIENT)
Dept: PHARMACY | Facility: HOSPITAL | Age: 46
End: 2021-08-31

## 2021-08-31 ENCOUNTER — LAB (OUTPATIENT)
Dept: LAB | Facility: HOSPITAL | Age: 46
End: 2021-08-31

## 2021-08-31 ENCOUNTER — TELEPHONE (OUTPATIENT)
Dept: CARDIOLOGY | Facility: CLINIC | Age: 46
End: 2021-08-31

## 2021-08-31 ENCOUNTER — HOSPITAL ENCOUNTER (OUTPATIENT)
Dept: CARDIOLOGY | Facility: HOSPITAL | Age: 46
Discharge: HOME | End: 2021-08-31

## 2021-08-31 VITALS — HEIGHT: 69 IN | BODY MASS INDEX: 32.88 KG/M2 | WEIGHT: 222 LBS

## 2021-08-31 DIAGNOSIS — Z95.2 HX OF AORTIC VALVE REPLACEMENT, MECHANICAL: ICD-10-CM

## 2021-08-31 DIAGNOSIS — Z95.2 H/O MECHANICAL AORTIC VALVE REPLACEMENT: ICD-10-CM

## 2021-08-31 DIAGNOSIS — Z95.2 HX OF AORTIC VALVE REPLACEMENT, MECHANICAL: Primary | ICD-10-CM

## 2021-08-31 LAB
ASCENDING AORTA: 3.2 CM
BH CV ECHO MEAS - AO MAX PG (FULL): 2.7 MMHG
BH CV ECHO MEAS - AO MAX PG: 7 MMHG
BH CV ECHO MEAS - AO MEAN PG (FULL): 1.8 MMHG
BH CV ECHO MEAS - AO MEAN PG: 3.8 MMHG
BH CV ECHO MEAS - AO ROOT AREA (BSA CORRECTED): 1.1
BH CV ECHO MEAS - AO ROOT AREA: 4.6 CM^2
BH CV ECHO MEAS - AO ROOT DIAM: 2.4 CM
BH CV ECHO MEAS - AO V2 MAX: 128.1 CM/SEC
BH CV ECHO MEAS - AO V2 MEAN: 94.7 CM/SEC
BH CV ECHO MEAS - AO V2 VTI: 24.7 CM
BH CV ECHO MEAS - ASC AORTA: 3.2 CM
BH CV ECHO MEAS - AVA(I,A): 2.8 CM^2
BH CV ECHO MEAS - AVA(I,D): 2.8 CM^2
BH CV ECHO MEAS - AVA(V,A): 2.5 CM^2
BH CV ECHO MEAS - AVA(V,D): 2.5 CM^2
BH CV ECHO MEAS - BSA(HAYCOCK): 2.2 M^2
BH CV ECHO MEAS - BSA: 2.2 M^2
BH CV ECHO MEAS - BZI_BMI: 32.8 KILOGRAMS/M^2
BH CV ECHO MEAS - BZI_METRIC_HEIGHT: 175.3 CM
BH CV ECHO MEAS - BZI_METRIC_WEIGHT: 100.7 KG
BH CV ECHO MEAS - EDV(CUBED): 36.4 ML
BH CV ECHO MEAS - EDV(MOD-SP2): 71 ML
BH CV ECHO MEAS - EDV(MOD-SP4): 66 ML
BH CV ECHO MEAS - EDV(TEICH): 44.6 ML
BH CV ECHO MEAS - EF(CUBED): 62.2 %
BH CV ECHO MEAS - EF(MOD-BP): 55 %
BH CV ECHO MEAS - EF(MOD-SP2): 52.1 %
BH CV ECHO MEAS - EF(MOD-SP4): 57.6 %
BH CV ECHO MEAS - EF(TEICH): 54.9 %
BH CV ECHO MEAS - ESV(CUBED): 13.8 ML
BH CV ECHO MEAS - ESV(MOD-SP2): 34 ML
BH CV ECHO MEAS - ESV(MOD-SP4): 28 ML
BH CV ECHO MEAS - ESV(TEICH): 20.1 ML
BH CV ECHO MEAS - FS: 27.7 %
BH CV ECHO MEAS - IVS/LVPW: 0.99
BH CV ECHO MEAS - IVSD: 1.3 CM
BH CV ECHO MEAS - LA DIMENSION: 3.6 CM
BH CV ECHO MEAS - LA/AO: 1.5
BH CV ECHO MEAS - LAD MAJOR: 6.7 CM
BH CV ECHO MEAS - LAT PEAK E' VEL: 10.9 CM/SEC
BH CV ECHO MEAS - LATERAL E/E' RATIO: 11.4
BH CV ECHO MEAS - LV DIASTOLIC VOL/BSA (35-75): 30.6 ML/M^2
BH CV ECHO MEAS - LV IVRT: 0.04 SEC
BH CV ECHO MEAS - LV MASS(C)D: 134.9 GRAMS
BH CV ECHO MEAS - LV MASS(C)DI: 62.5 GRAMS/M^2
BH CV ECHO MEAS - LV MAX PG: 4.3 MMHG
BH CV ECHO MEAS - LV MEAN PG: 2 MMHG
BH CV ECHO MEAS - LV SYSTOLIC VOL/BSA (12-30): 13 ML/M^2
BH CV ECHO MEAS - LV V1 MAX: 103.2 CM/SEC
BH CV ECHO MEAS - LV V1 MEAN: 66.5 CM/SEC
BH CV ECHO MEAS - LV V1 VTI: 22.3 CM
BH CV ECHO MEAS - LVIDD: 3.3 CM
BH CV ECHO MEAS - LVIDS: 2.4 CM
BH CV ECHO MEAS - LVLD AP2: 7 CM
BH CV ECHO MEAS - LVLD AP4: 7.1 CM
BH CV ECHO MEAS - LVLS AP2: 6.3 CM
BH CV ECHO MEAS - LVLS AP4: 6.3 CM
BH CV ECHO MEAS - LVOT AREA (M): 3.1 CM^2
BH CV ECHO MEAS - LVOT AREA: 3.1 CM^2
BH CV ECHO MEAS - LVOT DIAM: 2 CM
BH CV ECHO MEAS - LVPWD: 1.3 CM
BH CV ECHO MEAS - MED PEAK E' VEL: 6.4 CM/SEC
BH CV ECHO MEAS - MEDIAL E/E' RATIO: 19.4
BH CV ECHO MEAS - MV A MAX VEL: 94.3 CM/SEC
BH CV ECHO MEAS - MV DEC SLOPE: 534.6 CM/SEC^2
BH CV ECHO MEAS - MV DEC TIME: 0.2 SEC
BH CV ECHO MEAS - MV E MAX VEL: 125.4 CM/SEC
BH CV ECHO MEAS - MV E/A: 1.3
BH CV ECHO MEAS - MV MAX PG: 9.2 MMHG
BH CV ECHO MEAS - MV MEAN PG: 3.2 MMHG
BH CV ECHO MEAS - MV P1/2T MAX VEL: 148.2 CM/SEC
BH CV ECHO MEAS - MV P1/2T: 81.2 MSEC
BH CV ECHO MEAS - MV V2 MAX: 152 CM/SEC
BH CV ECHO MEAS - MV V2 MEAN: 80.8 CM/SEC
BH CV ECHO MEAS - MV V2 VTI: 37.8 CM
BH CV ECHO MEAS - MVA P1/2T LCG: 1.5 CM^2
BH CV ECHO MEAS - MVA(P1/2T): 2.7 CM^2
BH CV ECHO MEAS - MVA(VTI): 1.8 CM^2
BH CV ECHO MEAS - PA ACC SLOPE: 350.5 CM/SEC^2
BH CV ECHO MEAS - PA ACC TIME: 0.2 SEC
BH CV ECHO MEAS - PA PR(ACCEL): -12.9 MMHG
BH CV ECHO MEAS - RAP SYSTOLE: 8 MMHG
BH CV ECHO MEAS - RVSP: 17 MMHG
BH CV ECHO MEAS - SI(AO): 53 ML/M^2
BH CV ECHO MEAS - SI(CUBED): 10.5 ML/M^2
BH CV ECHO MEAS - SI(LVOT): 31.9 ML/M^2
BH CV ECHO MEAS - SI(MOD-SP2): 17.1 ML/M^2
BH CV ECHO MEAS - SI(MOD-SP4): 17.6 ML/M^2
BH CV ECHO MEAS - SI(TEICH): 11.3 ML/M^2
BH CV ECHO MEAS - SV(AO): 114.4 ML
BH CV ECHO MEAS - SV(CUBED): 22.6 ML
BH CV ECHO MEAS - SV(LVOT): 68.9 ML
BH CV ECHO MEAS - SV(MOD-SP2): 37 ML
BH CV ECHO MEAS - SV(MOD-SP4): 38 ML
BH CV ECHO MEAS - SV(TEICH): 24.5 ML
BH CV ECHO MEAS - TAPSE (>1.6): 1.5 CM
BH CV ECHO MEAS - TR MAX PG: 9 MMHG
BH CV ECHO MEAS - TR MAX VEL: 152 CM/SEC
BH CV ECHO MEASUREMENTS AVERAGE E/E' RATIO: 14.5
BH CV VAS BP LEFT ARM: NORMAL MMHG
BH CV XLRA - RV BASE: 4.2 CM
BH CV XLRA - RV LENGTH: 7.7 CM
BH CV XLRA - RV MID: 2.6 CM
BH CV XLRA - TDI S': 9.86 CM/SEC
INR PPP: 2.77 (ref 0.85–1.16)
IVRT: 35 MSEC
LEFT ATRIUM VOLUME INDEX: 40.3 ML/M2
PROTHROMBIN TIME: 28 SECONDS (ref 11.4–14.4)

## 2021-08-31 PROCEDURE — 36415 COLL VENOUS BLD VENIPUNCTURE: CPT

## 2021-08-31 PROCEDURE — 93306 TTE W/DOPPLER COMPLETE: CPT | Performed by: INTERNAL MEDICINE

## 2021-08-31 PROCEDURE — 85610 PROTHROMBIN TIME: CPT

## 2021-08-31 PROCEDURE — 93306 TTE W/DOPPLER COMPLETE: CPT

## 2021-08-31 NOTE — PROGRESS NOTES
Anticoagulation Clinic - Remote Progress Note  REMOTE LAB    Indication: On-X Mechanical valve #25 and a 30mm hemashield; stroke  Referring Provider: Alvaro (Last seen: 7/26/21  next appt: 1/31/21)  Initial Warfarin Start Date: ~ Sept 2018  Goal INR: 2.0-3.0 per referral  Current Drug Interactions: duloxetine, lansoprazole, levothyroxine, Trintellix, valproic acid  Bleed Risk: ascending aortic aneurysm   Other: Hodgkins Lymphoma (hx of chemo and radiation); h/o CVA + hemorrhagic pancreatitis (during hospitalization for mAVR)    Diet: avoids GLV (4/1/21)  Alcohol: socially  Tobacco: none  OTC Pain Medication: APAP PRN pain    INR History:  Date 9/27 9/28 10/1 10/4 10/8 10/11 10/17 10/19 10/23 11/8 11/16 12/21   Total Weekly Dose 8mg 12mg 19mg 29mg 31mg 34mg 43mg 41mg 45mg 49mg 49mg 49mg   INR 1.3 1.22 1.3 1.8 1.85 1.70 2.91 2.82 3.79 3.00 2.96 3.44   Notes clinic enox enox enox; clinic enox/ enox      desvenla     Date 1/9/19 1/16 2/4 3/1 3/22 5/16 6/13 6/24 7/10 7/22 8/5 8/26   Total Weekly Dose 49mg 47mg 49mg 49mg 49mg 49mg 49mg 49mg 49mg 49mg 49mg 49mg   INR 4.40 2.36 2.62 3.02 2.15 2.60 2.25 2.98 2.83 2.97 2.90 3.20   Notes   self adj              Date 9/26 10/18 10/28 11/19 12/3 12/17 1/3/20 1/21 2/3 2/12 2/19 2/27   Total Weekly Dose 49mg 49mg 49mg 49mg 49mg 45mg 45mg 45mg 47mg 43mg 43mg 43mg   INR 3.57 3.12 2.88 3.33 4.06 2.59 3.21 3.85 3.11 2.34 3.01 3.20   Notes less GLV Cymbalta start 9/25; Depakote start 9/5 1x decr dose  depakote inc; trintellix dcd ceftin x10 days Recv'd 1/6  incr GLV  dieting        Date 3/5 3/12 3/20 4/2 4/17 5/7 5/28 6/16 6/29 7/17 8/14 8/28   Total Weekly Dose 41mg 41mg 41mg 41mg 41mg 41mg 41mg 39mg 39mg 39mg 39mg 39mg   INR 3.07 3.01 2.95 2.66 2.9 3.03 3.36 2.48 2.42 3.08 2.78 2.28   Notes 1x decr dose 1x decr dose       recv'd 6/30  recv'd 8/13 recv'd 8/31     Date 9/28 10/29  12/4 1/8/21 2/9 3/31 5/12 6/9  8/3 8/31   Total Weekly Dose 39mg 39mg non-  compliant 39mg 39mg 39mg 39mg  39mg 39mg non- compliant 39mg 39mg   INR 2.33 2.39  2.46 2.45 2.85 2.48 2.52 2.51  2.88 2.77   Notes recv'd 9/29 recv'd 10/30   recv'd 1/11  recv'd 4/1          *takes warfarin in AM*    Phone Interview:  Verbal Release Authorization: please contact Mr. Hall directly - if unable to reach patient may contact brotherMarko  Tablet Strength: 2mg and 5mg tablets  Patient Contact Info: [phone]; Susan Elie's number is [phone] *please call both at; [email or web address]; [email or web address]  Lab Contact Info: Megan Vu     Patient Findings    Negatives:  Signs/symptoms of thrombosis, Signs/symptoms of bleeding, Laboratory test error suspected, Change in health, Change in alcohol use, Change in activity, Upcoming invasive procedure, Emergency department visit, Upcoming dental procedure, Missed doses, Extra doses, Change in medications, Change in diet/appetite, Hospital admission, Bruising, Other complaints         Plan:  1. INR was therapeutic today at 2.77. Instructed Manjeet and Susan to continue current maintenance dose of warfarin 5mg oral daily except 7mg on MonFri until recheck (39mg/week).   2. Mr. Hall prefers we contact Susan with dosing instructions and follow up testing dates. Plan was discussed with both Manjeet and Susan.   3. Repeat INR 9/28  4. Verbal information provided over the phone. Manjeet Srinivas Hall RBV dosing instructions, expresses understanding by teach back, and has no further questions at this time.    Thank you,    Cameron TaylorD, JASON, BCPS  8/31/2021  14:03 EDT

## 2021-08-31 NOTE — TELEPHONE ENCOUNTER
----- Message from Vimal John III, MD sent at 8/31/2021  2:12 PM EDT -----  Strength of heart muscle looks good, valve is functioning normally.

## 2021-09-01 RX ORDER — LEVOTHYROXINE SODIUM 0.12 MG/1
125 TABLET ORAL DAILY
Qty: 30 TABLET | Refills: 1 | OUTPATIENT
Start: 2021-09-01

## 2021-09-01 RX ORDER — WARFARIN SODIUM 2 MG/1
TABLET ORAL
Qty: 35 TABLET | Refills: 0 | Status: SHIPPED | OUTPATIENT
Start: 2021-09-01 | End: 2021-10-08

## 2021-09-09 ENCOUNTER — TELEPHONE (OUTPATIENT)
Dept: FAMILY MEDICINE CLINIC | Facility: CLINIC | Age: 46
End: 2021-09-09

## 2021-09-09 NOTE — TELEPHONE ENCOUNTER
Caller: SUKUMAR GREENBERG    Relationship: Emergency Contact    Best call back number: [phone]     Medication needed:   Requested Prescriptions      No prescriptions requested or ordered in this encounter     levothyroxine (SYNTHROID, LEVOTHROID) 125 MCG tablet    What additional details did the patient provide when requesting the medication: PATIENT HAS 1 OTHER REFILL FOR THIS MEDICATION  SUKUMAR STATES DR STEINER'S OFFICE SAID TO CONTACT DR AMBROSE TO EVALUATE  TO SEE IF HE NEEDS TO STILL TAKE THE MEDICATION  AND TO GET THE RIGHT DOSAGE     PLEASE ADVISE     Does the patient have less than a 3 day supply:  [] Yes  [x] No    What is the patient's preferred pharmacy: [id number] - [place], [place] - [address] AT [address] - [phone]  - [phone] FX

## 2021-09-13 NOTE — TELEPHONE ENCOUNTER
Spoke with pt's brother, who is pt's POA.  Advised him of Dr. Faust's msg that pt needs to make an appt to recheck thyroid level.

## 2021-09-21 ENCOUNTER — OFFICE VISIT (OUTPATIENT)
Dept: FAMILY MEDICINE CLINIC | Facility: CLINIC | Age: 46
End: 2021-09-21

## 2021-09-21 VITALS
SYSTOLIC BLOOD PRESSURE: 128 MMHG | TEMPERATURE: 96.9 F | RESPIRATION RATE: 16 BRPM | HEART RATE: 80 BPM | BODY MASS INDEX: 33.92 KG/M2 | DIASTOLIC BLOOD PRESSURE: 80 MMHG | HEIGHT: 69 IN | OXYGEN SATURATION: 97 % | WEIGHT: 229 LBS

## 2021-09-21 DIAGNOSIS — I10 ESSENTIAL HYPERTENSION: ICD-10-CM

## 2021-09-21 DIAGNOSIS — F41.1 GENERALIZED ANXIETY DISORDER: ICD-10-CM

## 2021-09-21 DIAGNOSIS — E03.9 ACQUIRED HYPOTHYROIDISM: Primary | ICD-10-CM

## 2021-09-21 PROCEDURE — 36415 COLL VENOUS BLD VENIPUNCTURE: CPT | Performed by: FAMILY MEDICINE

## 2021-09-21 PROCEDURE — 84439 ASSAY OF FREE THYROXINE: CPT | Performed by: FAMILY MEDICINE

## 2021-09-21 PROCEDURE — 84443 ASSAY THYROID STIM HORMONE: CPT | Performed by: FAMILY MEDICINE

## 2021-09-21 PROCEDURE — 99213 OFFICE O/P EST LOW 20 MIN: CPT | Performed by: FAMILY MEDICINE

## 2021-09-21 RX ORDER — BUPROPION HYDROCHLORIDE 300 MG/1
300 TABLET ORAL EVERY MORNING
COMMUNITY
Start: 2021-09-17 | End: 2022-01-31

## 2021-09-21 RX ORDER — WARFARIN SODIUM 5 MG/1
TABLET ORAL
Qty: 90 TABLET | Refills: 0 | Status: SHIPPED | OUTPATIENT
Start: 2021-09-21 | End: 2022-01-04

## 2021-09-21 RX ORDER — BUPROPION HYDROCHLORIDE 150 MG/1
150 TABLET ORAL EVERY MORNING
COMMUNITY
Start: 2021-09-09 | End: 2022-12-28

## 2021-09-21 NOTE — PROGRESS NOTES
"Chief Complaint  Hypertension and Anxiety    Subjective          Manjeet Hall presents to [facility]  Hypertension  This is a chronic problem. Associated symptoms include anxiety.   Anxiety  Presents for follow-up visit.         Manjeet Hall comes to the clinic today for a follow-up. He is accompanied today by his daughter.    Thyroid  He saw a specialist who is requesting his thyroid be checked. He has been off of his thyroid medication for a long time.    Hypertension  Since his stroke, he is seeing Dr. John, and gets blood work every month. He states that he is not enrolled in any therapy. He denies any breathing problems, chest pain, or fast heart beats.    Anxiety  His attitude is good.    Medication   He denies needing any refills on his medication. He is on gabapentin twice daily, Cymbalta, metoprolol, blood thinner, and cholesterol medication.    Labs  He had labs drawn in 07/2021, and his cholesterol was elevated at 245. He will have labs drawn today. He goes to the warfarin clinic to get his proTime.    Health maintenance  He is due for an upcoming flu vaccination in 10/2021. He states he wants to get back to exercising, and eating better.    Objective   Vital Signs:   /80   Pulse 80   Temp 96.9 °F (36.1 °C)   Resp 16   Ht 175.3 cm (69\")   Wt 104 kg (229 lb)   SpO2 97%   BMI 33.82 kg/m²     Physical Exam  Vitals and nursing note reviewed.   Constitutional:       Appearance: Normal appearance. He is well-developed.   HENT:      Head: Normocephalic and atraumatic.   Eyes:      General: No scleral icterus.     Conjunctiva/sclera: Conjunctivae normal.      Pupils: Pupils are equal, round, and reactive to light.   Neck:      Thyroid: No thyromegaly.      Vascular: No carotid bruit.   Cardiovascular:      Rate and Rhythm: Normal rate and regular rhythm.   Pulmonary:      Effort: Pulmonary effort is normal.      Breath sounds: Normal breath sounds. "   Musculoskeletal:         General: Normal range of motion.      Cervical back: Neck supple.   Skin:     General: Skin is warm and dry.      Findings: No rash.   Neurological:      Mental Status: He is alert and oriented to person, place, and time. Mental status is at baseline.      Comments: No focal deficits no lateralizing signs   Psychiatric:         Mood and Affect: Mood normal.         Behavior: Behavior is cooperative.        Result Review :                 Assessment and Plan    Diagnoses and all orders for this visit:    1. Acquired hypothyroidism (Primary)  -     TSH  -     T4, Free    2. Essential hypertension    3. Generalized anxiety disorder        Follow Up   Return in about 6 months (around 3/21/2022).  Patient was given instructions and counseling regarding his condition or for health maintenance advice. Please see specific information pulled into the AVS if appropriate.       Transcribed from ambient dictation for Nile Faust MD by Dilma Newman.  09/21/21   17:29 EDT    I have personally performed the services described in this document as transcribed by the above individual, and it is both accurate and complete.  Nile Faust MD  9/21/2021  22:19 EDT

## 2021-09-22 ENCOUNTER — TELEPHONE (OUTPATIENT)
Dept: FAMILY MEDICINE CLINIC | Facility: CLINIC | Age: 46
End: 2021-09-22

## 2021-09-22 LAB
T4 FREE SERPL-MCNC: 0.63 NG/DL (ref 0.93–1.7)
TSH SERPL DL<=0.05 MIU/L-ACNC: 11.9 UIU/ML (ref 0.27–4.2)

## 2021-09-22 RX ORDER — LEVOTHYROXINE SODIUM 0.12 MG/1
125 TABLET ORAL DAILY
Qty: 90 TABLET | Refills: 1 | Status: SHIPPED | OUTPATIENT
Start: 2021-09-22 | End: 2022-03-21

## 2021-09-22 NOTE — TELEPHONE ENCOUNTER
----- Message from Nile Faust MD sent at 9/22/2021  9:20 AM EDT -----  Vimla your thyroid level is definitely low and I recommend you get back on your medication permanently.  I have sent a prescription to your pharmacy.

## 2021-10-04 ENCOUNTER — LAB (OUTPATIENT)
Dept: LAB | Facility: HOSPITAL | Age: 46
End: 2021-10-04

## 2021-10-04 DIAGNOSIS — Z95.2 HX OF AORTIC VALVE REPLACEMENT, MECHANICAL: ICD-10-CM

## 2021-10-04 DIAGNOSIS — Z95.2 H/O MECHANICAL AORTIC VALVE REPLACEMENT: ICD-10-CM

## 2021-10-04 LAB
INR PPP: 2.5 (ref 0.85–1.16)
PROTHROMBIN TIME: 25.9 SECONDS (ref 11.4–14.4)

## 2021-10-04 PROCEDURE — 85610 PROTHROMBIN TIME: CPT

## 2021-10-04 PROCEDURE — 36415 COLL VENOUS BLD VENIPUNCTURE: CPT

## 2021-10-05 ENCOUNTER — ANTICOAGULATION VISIT (OUTPATIENT)
Dept: PHARMACY | Facility: HOSPITAL | Age: 46
End: 2021-10-05

## 2021-10-05 DIAGNOSIS — Z95.2 HX OF AORTIC VALVE REPLACEMENT, MECHANICAL: Primary | ICD-10-CM

## 2021-10-05 NOTE — PROGRESS NOTES
Anticoagulation Clinic - Remote Progress Note  REMOTE LAB    Indication: On-X Mechanical valve #25 and a 30mm hemashield; stroke  Referring Provider: Alvaro (Last seen: 7/26/21  next appt: 1/31/21)  Initial Warfarin Start Date: ~ Sept 2018  Goal INR: 2.0-3.0 per referral  Current Drug Interactions: duloxetine, lansoprazole, levothyroxine, Trintellix, valproic acid  Bleed Risk: ascending aortic aneurysm   Other: Hodgkins Lymphoma (hx of chemo and radiation); h/o CVA + hemorrhagic pancreatitis (during hospitalization for mAVR)    Diet: avoids GLV (4/1/21)  Alcohol: socially  Tobacco: none  OTC Pain Medication: APAP PRN pain    INR History:  Date 9/27 9/28 10/1 10/4 10/8 10/11 10/17 10/19 10/23 11/8 11/16 12/21   Total Weekly Dose 8mg 12mg 19mg 29mg 31mg 34mg 43mg 41mg 45mg 49mg 49mg 49mg   INR 1.3 1.22 1.3 1.8 1.85 1.70 2.91 2.82 3.79 3.00 2.96 3.44   Notes clinic enox enox enox; clinic enox/ enox      desvenla     Date 1/9/19 1/16 2/4 3/1 3/22 5/16 6/13 6/24 7/10 7/22 8/5 8/26   Total Weekly Dose 49mg 47mg 49mg 49mg 49mg 49mg 49mg 49mg 49mg 49mg 49mg 49mg   INR 4.40 2.36 2.62 3.02 2.15 2.60 2.25 2.98 2.83 2.97 2.90 3.20   Notes   self adj              Date 9/26 10/18 10/28 11/19 12/3 12/17 1/3/20 1/21 2/3 2/12 2/19 2/27   Total Weekly Dose 49mg 49mg 49mg 49mg 49mg 45mg 45mg 45mg 47mg 43mg 43mg 43mg   INR 3.57 3.12 2.88 3.33 4.06 2.59 3.21 3.85 3.11 2.34 3.01 3.20   Notes less GLV Cymbalta start 9/25; Depakote start 9/5 1x decr dose  depakote inc; trintellix dcd ceftin x10 days Recv'd 1/6  incr GLV  dieting        Date 3/5 3/12 3/20 4/2 4/17 5/7 5/28 6/16 6/29 7/17 8/14 8/28   Total Weekly Dose 41mg 41mg 41mg 41mg 41mg 41mg 41mg 39mg 39mg 39mg 39mg 39mg   INR 3.07 3.01 2.95 2.66 2.9 3.03 3.36 2.48 2.42 3.08 2.78 2.28   Notes 1x decr dose 1x decr dose       recv'd 6/30  recv'd 8/13 recv'd 8/31     Date 9/28 10/29  12/4 1/8/21 2/9 3/31 5/12 6/9  8/3 8/31   Total Weekly Dose 39mg 39mg non-  compliant 39mg 39mg 39mg 39mg  39mg 39mg non- compliant 39mg 39mg   INR 2.33 2.39  2.46 2.45 2.85 2.48 2.52 2.51  2.88 2.77   Notes recv'd 9/29 recv'd 10/30   recv'd 1/11  recv'd 4/1           Date 10/4              Total Weekly Dose 39mg?              INR 2.5              Notes                   *takes warfarin in AM*    Phone Interview:  Verbal Release Authorization: please contact Mr. Hall directly - if unable to reach patient may contact [email or web address]  Tablet Strength: 2mg and 5mg tablets  Patient Contact Info: [phone]; Susan Delgadillo's number is [phone] *please call both at; [email or web address]; [email or web address]  Lab Contact Info: Megan Vu     Patient Findings    Negatives:  Signs/symptoms of thrombosis, Signs/symptoms of bleeding, Laboratory test error suspected, Change in health, Change in alcohol use, Change in activity, Upcoming invasive procedure, Emergency department visit, Upcoming dental procedure, Missed doses, Extra doses, Change in medications, Change in diet/appetite, Hospital admission, Bruising, Other complaints   Comments:  Mr. Hall reported all patient findings above to be negative. LVM 10/5, 10/6, and 10/7 with Susan to confirm daily warfarin dose and any new vitamins, supplements, or Rx medication changes however have not received call back.       Plan:  1. INR was therapeutic on 10/4 at 2.5. Was able to contact Mr. Hall on 10/7. He reports that he has had no changes and all patient findings are negative. He was unable to give his dosing of warfarin each day. Called and LVM for Susan 10/5, 10/6, and 10/7 to confirm daily dosing, however did not receive a call back. Provided dosing instructions and re-test date of 11/2 on voicemail.  2. Instructed Екатерина (via voicemail) to continue current maintenance dose of warfarin 5mg oral daily except 7mg on MonFri until recheck (39mg/week).   2. Mr. Hall prefers we contact Susan with dosing instructions and follow up testing  dates. Plan was discussed with both Manjeet and Susan.   3. Repeat INR 11/2.  4. Verbal information provided over the phone. Manjeet Hall RBV dosing instructions, expresses understanding by teach back, and has no further questions at this time.    Brennan Muñoz, PharmD  10/7/2021  08:48 EDT

## 2021-10-08 RX ORDER — WARFARIN SODIUM 2 MG/1
TABLET ORAL
Qty: 35 TABLET | Refills: 0 | Status: SHIPPED | OUTPATIENT
Start: 2021-10-08 | End: 2022-01-04

## 2021-11-01 ENCOUNTER — LAB (OUTPATIENT)
Dept: LAB | Facility: HOSPITAL | Age: 46
End: 2021-11-01

## 2021-11-01 ENCOUNTER — ANTICOAGULATION VISIT (OUTPATIENT)
Dept: PHARMACY | Facility: HOSPITAL | Age: 46
End: 2021-11-01

## 2021-11-01 DIAGNOSIS — Z95.2 HX OF AORTIC VALVE REPLACEMENT, MECHANICAL: Primary | ICD-10-CM

## 2021-11-01 DIAGNOSIS — Z95.2 HX OF AORTIC VALVE REPLACEMENT, MECHANICAL: ICD-10-CM

## 2021-11-01 LAB
INR PPP: 2.54 (ref 0.85–1.16)
PROTHROMBIN TIME: 26.3 SECONDS (ref 11.4–14.4)

## 2021-11-01 PROCEDURE — 36415 COLL VENOUS BLD VENIPUNCTURE: CPT

## 2021-11-01 PROCEDURE — 85610 PROTHROMBIN TIME: CPT

## 2021-11-01 NOTE — PROGRESS NOTES
Anticoagulation Clinic - Remote Progress Note  REMOTE LAB    Indication: On-X Mechanical valve #25 and a 30mm hemashield; stroke  Referring Provider: Alvaro (Last seen: 7/26/21  next appt: 1/31/21)  Initial Warfarin Start Date: ~ Sept 2018  Goal INR: 2.0-3.0 per referral  Current Drug Interactions: duloxetine, lansoprazole, levothyroxine, Trintellix, valproic acid  Bleed Risk: ascending aortic aneurysm   Other: Hodgkins Lymphoma (hx of chemo and radiation); h/o CVA + hemorrhagic pancreatitis (during hospitalization for mAVR)    Diet: avoids GLV (4/1/21)  Alcohol: socially  Tobacco: none  OTC Pain Medication: APAP PRN pain    INR History:  Date 9/27 9/28 10/1 10/4 10/8 10/11 10/17 10/19 10/23 11/8 11/16 12/21   Total Weekly Dose 8mg 12mg 19mg 29mg 31mg 34mg 43mg 41mg 45mg 49mg 49mg 49mg   INR 1.3 1.22 1.3 1.8 1.85 1.70 2.91 2.82 3.79 3.00 2.96 3.44   Notes clinic enox enox enox; clinic enox enox      desvenla     Date 1/9/19 1/16 2/4 3/1 3/22 5/16 6/13 6/24 7/10 7/22 8/5 8/26   Total Weekly Dose 49mg 47mg 49mg 49mg 49mg 49mg 49mg 49mg 49mg 49mg 49mg 49mg   INR 4.40 2.36 2.62 3.02 2.15 2.60 2.25 2.98 2.83 2.97 2.90 3.20   Notes   self adj              Date 9/26 10/18 10/28 11/19 12/3 12/17 1/3/20 1/21 2/3 2/12 2/19 2/27   Total Weekly Dose 49mg 49mg 49mg 49mg 49mg 45mg 45mg 45mg 47mg 43mg 43mg 43mg   INR 3.57 3.12 2.88 3.33 4.06 2.59 3.21 3.85 3.11 2.34 3.01 3.20   Notes less GLV Cymbalta start 9/25; Depakote start 9/5 1x decr dose  depakote inc; trintellix dcd ceftin x10 days Recv'd 1/6  incr GLV  dieting        Date 3/5 3/12 3/20 4/2 4/17 5/7 5/28 6/16 6/29 7/17 8/14 8/28   Total Weekly Dose 41mg 41mg 41mg 41mg 41mg 41mg 41mg 39mg 39mg 39mg 39mg 39mg   INR 3.07 3.01 2.95 2.66 2.9 3.03 3.36 2.48 2.42 3.08 2.78 2.28   Notes 1x decr dose 1x decr dose       recv'd 6/30  recv'd 8/13 recv'd 8/31     Date 9/28 10/29  12/4 1/8/21 2/9 3/31 5/12 6/9  8/3 8/31   Total Weekly Dose 39mg 39mg non-  compliant 39mg 39mg 39mg 39mg  39mg 39mg non- compliant 39mg 39mg   INR 2.33 2.39  2.46 2.45 2.85 2.48 2.52 2.51  2.88 2.77   Notes recv'd 9/29 recv'd 10/30   recv'd 1/11  recv'd 4/1           Date 10/4 11/1             Total Weekly Dose 39mg 39mg             INR 2.50 2.54             Notes  Rec 11/2                 *takes warfarin in AM*    Phone Interview:  Verbal Release Authorization: please contact Mr. Hall directly - if unable to reach patient may contact Marko erickson  Tablet Strength: 2mg and 5mg tablets  Patient Contact Info: [phone]; Susan Johnsonley's number is [phone] *please call both at; [email or web address]; [email or web address]  Lab Contact Info: Megan Vu     Patient Findings  Negatives:  Signs/symptoms of thrombosis, Signs/symptoms of bleeding, Laboratory test error suspected, Change in health, Change in alcohol use, Change in activity, Upcoming invasive procedure, Emergency department visit, Upcoming dental procedure, Missed doses, Extra doses, Change in medications, Change in diet/appetite, Hospital admission, Bruising, Other complaints     Plan:  1. INR is therapeutic today at 2.54. Instructed Susan to have patient continue current maintenance dose of warfarin 5mg oral daily except 7mg on MonFri until recheck (39mg/week).   2. Mr. Hall prefers we contact Susan with dosing instructions and follow up testing dates. Plan was discussed with both Manjeet and Susan.   3. Repeat INR in 4 weeks, 11/29  4. Verbal information provided over the phone. Manjeet Hall RBV dosing instructions, expresses understanding by teach back, and has no further questions at this time.    Abdi Petit, Laine  11/1/2021  16:06 EDT    I, Rich Miranda, PharmD, have reviewed the note in full and agree with the assessment and plan.  11/02/21  12:31 EDT

## 2021-11-15 RX ORDER — WARFARIN SODIUM 2 MG/1
TABLET ORAL
Qty: 35 TABLET | Refills: 0 | OUTPATIENT
Start: 2021-11-15

## 2021-11-21 NOTE — PROGRESS NOTES
Anticoagulation Clinic - Remote Progress Note  REMOTE LAB    Indication: On-X Mechanical valve #25 and a 30mm hemashield; stroke  Referring Provider: Alvaro (Last seen: 1/1519  next appt: 7/20/20)  Initial Warfarin Start Date:  ~9/2018  Goal INR: 2.0-3.0 per referral  Current Drug Interactions: duloxetine, lansoprazole, levothyroxine, Trintellix, valproic acid  Bleed Risk: ascending aortic aneurysm    Other: Hodgkins Lymphoma (hx of chemo and radiation); h/o CVA + hemorrhagic pancreatitis (during hospitalization for mAVR)    Diet: avoids GLV 9/30/20  Alcohol: socially  Tobacco: none  OTC Pain Medication: APAP PRN pain    INR History:  Date 9/27 9/28 10/1 10/4 10/8 10/11 10/17 10/19 10/23   Total Weekly Dose 8mg 12mg 19mg 29mg 31mg 34mg 43mg 41mg 45mg   INR 1.3 1.22 1.3 1.8 1.85 1.7 2.91 2.82 3.79   Notes clinic enox enox enox; clinic enox enox        Date 11/8 11/16 12/21 1/9/19 1/16 2/4 3/1 3/22 5/16   Total Weekly Dose 49mg 49mg 49mg 49mg 47mg 49mg 49mg 49mg 49mg   INR 3.0 2.96 3.44 4.4 2.36 2.62 3.02 2.15 2.6   Notes   desvenla   self adj        Date 6/13 6/24 7/10 7/22 8/5 8/26 9/26 10/18 10/28 11/19 12/3 12/17   Total Weekly Dose 49mg 49mg 49mg 49mg 49mg 49mg 49mg 49mg 49mg 49mg 49mg 45mg   INR 2.25 2.98 2.83 2.97 2.9 3.2 3.57 3.12 2.88 3.33 4.06 2.59   Notes       less GLV Cymbalta start 9/25; Depakote start 9/5 1x decr dose  depakote inc; trintellix dcd ceftin x 10 days     Date 1/3/20 1/21 2/3 2/12 2/19 2/27 3/5 3/12 3/20 4/2 4/17 5/7 5/28 6/16 6/29 7/17   Total Weekly Dose 45mg 45mg 47mg  43mg 43mg 43mg 41mg 41mg 41mg 41mg 41mg 41mg 41 mg 39 mg 39 mg 39 mg   INR 3.21 3.85 3.11 2.34 3.01 3.2 3.07 3.01 2.95 2.66 2.9 3.03 3.36 2.48 2.42 3.08   Notes rec'vd 1/6  inc GLV dieting     x1 decr x1 decr       rec'd 6/30      Date  8/14 8/28 9/28 10/29 12/4 1/8/21 2/9            Total Weekly Dose 39mg 39mg 39mg 39mg 39mg 39mg 39 mg            INR 2.78 2.28 2.33 2.39 2.46 2.45 2.85            Notes rec'd 8/13 rec'd  8/31 recv'd 9/29 recv'd 10/30  recv'd 1/11               *takes warfarin in AM*    Phone Interview:  Verbal Release Authorization: please contact Mr. Hall directly - if unable to reach patient may contact brotherMarko  Tablet Strength: 2mg and 5mg tablets  Patient Contact Info: [phone]; Susan Delgadillo's number is [phone] *please call both at; [email or web address]  Lab Contact Info: Megan Vu     Patient Findings  Negatives:  Signs/symptoms of thrombosis, Signs/symptoms of bleeding, Laboratory test error suspected, Change in health, Change in alcohol use, Change in activity, Upcoming invasive procedure, Emergency department visit, Upcoming dental procedure, Missed doses, Extra doses, Change in medications, Change in diet/appetite, Hospital admission, Bruising, Other complaints   Comments:  Above findings negative     Plan:  1. INR was therapeutic on 2/9 at 2.85, results received 2/10. Instructed Susan to have Manjeet continue warfarin 5mg oral daily except 7mg on MonFri until recheck.  2. Mr. Hall prefers we contact Susan with dosing instructions and follow up testing dates. Plan was discussed with Susan.   3. Repeat INR in ~4 weeks, 3/9  4. Verbal information provided over the phone. Manjeet Srinivas Hall's SO, Susan, RBV dosing instructions, expresses understanding by teach back, and has no further questions at this time.    Adeline Bearden, PharmD  2/10/2021  08:57 EST        Not applicable

## 2021-12-06 ENCOUNTER — LAB (OUTPATIENT)
Dept: LAB | Facility: HOSPITAL | Age: 46
End: 2021-12-06

## 2021-12-06 DIAGNOSIS — Z95.2 HX OF AORTIC VALVE REPLACEMENT, MECHANICAL: ICD-10-CM

## 2021-12-06 LAB
INR PPP: 2.2 (ref 0.85–1.16)
PROTHROMBIN TIME: 23.6 SECONDS (ref 11.4–14.4)

## 2021-12-06 PROCEDURE — 36415 COLL VENOUS BLD VENIPUNCTURE: CPT

## 2021-12-06 PROCEDURE — 85610 PROTHROMBIN TIME: CPT

## 2021-12-07 ENCOUNTER — ANTICOAGULATION VISIT (OUTPATIENT)
Dept: PHARMACY | Facility: HOSPITAL | Age: 46
End: 2021-12-07

## 2021-12-07 DIAGNOSIS — Z95.2 HX OF AORTIC VALVE REPLACEMENT, MECHANICAL: Primary | ICD-10-CM

## 2021-12-07 NOTE — PROGRESS NOTES
Anticoagulation Clinic - Remote Progress Note  REMOTE LAB    Indication: On-X Mechanical valve #25 and a 30mm hemashield; stroke  Referring Provider: Alvaro (Last seen: 7/26/21  next appt: 1/31/21)  Initial Warfarin Start Date: ~ Sept 2018  Goal INR: 2.0-3.0 per referral  Current Drug Interactions: duloxetine, lansoprazole, levothyroxine, Trintellix, valproic acid  Bleed Risk: ascending aortic aneurysm   Other: Hodgkins Lymphoma (hx of chemo and radiation); h/o CVA + hemorrhagic pancreatitis (during hospitalization for mAVR)    Diet: avoids GLV (4/1/21)  Alcohol: socially  Tobacco: none  OTC Pain Medication: APAP PRN pain    INR History:  Date 9/27 9/28 10/1 10/4 10/8 10/11 10/17 10/19 10/23 11/8 11/16 12/21   Total Weekly Dose 8mg 12mg 19mg 29mg 31mg 34mg 43mg 41mg 45mg 49mg 49mg 49mg   INR 1.3 1.22 1.3 1.8 1.85 1.70 2.91 2.82 3.79 3.00 2.96 3.44   Notes clinic enox enox enox; clinic enox enox      desvenla     Date 1/9/19 1/16 2/4 3/1 3/22 5/16 6/13 6/24 7/10 7/22 8/5 8/26   Total Weekly Dose 49mg 47mg 49mg 49mg 49mg 49mg 49mg 49mg 49mg 49mg 49mg 49mg   INR 4.40 2.36 2.62 3.02 2.15 2.60 2.25 2.98 2.83 2.97 2.90 3.20   Notes   self adj              Date 9/26 10/18 10/28 11/19 12/3 12/17 1/3/20 1/21 2/3 2/12 2/19 2/27   Total Weekly Dose 49mg 49mg 49mg 49mg 49mg 45mg 45mg 45mg 47mg 43mg 43mg 43mg   INR 3.57 3.12 2.88 3.33 4.06 2.59 3.21 3.85 3.11 2.34 3.01 3.20   Notes less GLV Cymbalta start 9/25; Depakote start 9/5 1x decr dose  depakote inc; trintellix dcd ceftin x10 days Recv'd 1/6  incr GLV  dieting        Date 3/5 3/12 3/20 4/2 4/17 5/7 5/28 6/16 6/29 7/17 8/14 8/28   Total Weekly Dose 41mg 41mg 41mg 41mg 41mg 41mg 41mg 39mg 39mg 39mg 39mg 39mg   INR 3.07 3.01 2.95 2.66 2.9 3.03 3.36 2.48 2.42 3.08 2.78 2.28   Notes 1x decr dose 1x decr dose       recv'd 6/30  recv'd 8/13 recv'd 8/31     Date 9/28 10/29  12/4 1/8/21 2/9 3/31 5/12 6/9  8/3 8/31   Total Weekly Dose 39mg 39mg non-  compliant 39mg 39mg 39mg 39mg  39mg 39mg non- compliant 39mg 39mg   INR 2.33 2.39  2.46 2.45 2.85 2.48 2.52 2.51  2.88 2.77   Notes recv'd 9/29 recv'd 10/30   recv'd 1/11  recv'd 4/1           Date 10/4 11/1 12/6            Total Weekly Dose 39mg 39mg 39mg             INR 2.50 2.54 2.2            Notes  Rec 11/2 Rec 12/7                *takes warfarin in AM*    Phone Interview:  Verbal Release Authorization: please contact Mr. Hall directly - if unable to reach patient may contact brotherMarko  Tablet Strength: 2mg and 5mg tablets  Patient Contact Info: [phone]; Susan Delgadillo's number is [phone] *please call both at; [email or web address]; [email or web address]  Lab Contact Info: Megna Vu     Patient Findings    Positives:  Change in health   Negatives:  Signs/symptoms of thrombosis, Signs/symptoms of bleeding, Laboratory test error suspected, Change in alcohol use, Change in activity, Upcoming invasive procedure, Emergency department visit, Upcoming dental procedure, Missed doses, Extra doses, Change in medications, Change in diet/appetite, Hospital admission, Bruising, Other complaints   Comments:  Susan reports noticing patient has been having shaky hands and increased shortness of breath over the past few weeks. Patient is meeting with PCP and have encouraged to have this brought to their attention. Otherwise no changes from previous visit and patient has been taking warfarin as directed.        Plan:  1. INR is therapeutic today at 2.2 (Goal 2-3). Instructed Susan to have patient continue current maintenance dose of warfarin 5mg oral daily except 7mg on MonFri until recheck (39mg/week).   2. Mr. Hall prefers we contact Susan with dosing instructions and follow up testing dates. Plan was discussed with both Manjeet and Susan.   3. Repeat INR in 4 weeks, 1/3/22  4. Verbal information provided over the phone. Manjeet Hall RBV dosing instructions, expresses understanding by teach back, and has no  further questions at this time.    Mateusz Figueroa,  PharmD Candidate 2022 12/07/21  13:19 EST    I, Kayla Stockton, PharmD, have reviewed the note in full and agree with the assessment and plan.  12/07/21  14:42 EST

## 2022-01-04 RX ORDER — WARFARIN SODIUM 5 MG/1
TABLET ORAL
Qty: 30 TABLET | Refills: 0 | Status: SHIPPED | OUTPATIENT
Start: 2022-01-04 | End: 2022-04-20

## 2022-01-04 RX ORDER — WARFARIN SODIUM 2 MG/1
TABLET ORAL
Qty: 12 TABLET | Refills: 0 | Status: SHIPPED | OUTPATIENT
Start: 2022-01-04

## 2022-01-31 ENCOUNTER — OFFICE VISIT (OUTPATIENT)
Dept: CARDIOLOGY | Facility: CLINIC | Age: 47
End: 2022-01-31

## 2022-01-31 VITALS
DIASTOLIC BLOOD PRESSURE: 74 MMHG | HEART RATE: 89 BPM | HEIGHT: 69 IN | WEIGHT: 230 LBS | SYSTOLIC BLOOD PRESSURE: 116 MMHG | BODY MASS INDEX: 34.07 KG/M2 | OXYGEN SATURATION: 97 %

## 2022-01-31 DIAGNOSIS — I37.0 NONRHEUMATIC PULMONARY VALVE STENOSIS: ICD-10-CM

## 2022-01-31 DIAGNOSIS — I10 PRIMARY HYPERTENSION: ICD-10-CM

## 2022-01-31 DIAGNOSIS — I71.20 THORACIC AORTIC ANEURYSM WITHOUT RUPTURE: ICD-10-CM

## 2022-01-31 DIAGNOSIS — Z95.2 HX OF AORTIC VALVE REPLACEMENT, MECHANICAL: Primary | ICD-10-CM

## 2022-01-31 PROCEDURE — 99214 OFFICE O/P EST MOD 30 MIN: CPT | Performed by: INTERNAL MEDICINE

## 2022-01-31 RX ORDER — PROPRANOLOL HCL 60 MG
60 CAPSULE, EXTENDED RELEASE 24HR ORAL DAILY
Qty: 90 CAPSULE | Refills: 1 | Status: SHIPPED | OUTPATIENT
Start: 2022-01-31 | End: 2022-07-26

## 2022-01-31 NOTE — PROGRESS NOTES
[facility] at [facility]  Office visit  Manjeet Hall  1975      VISIT DATE:  1/31/2022      PCP: Nile Faust MD  [facility] [address] [address]    CC:  Chief Complaint   Patient presents with   • hx of aortic valve replacement       Previous cardiac history:  -Diagnosed with large PDA and VSD at birth, PDA close spontaneously.  -Developed subvalvular aortic stenosis which required surgery, VSD closed as well at 13yo  -Ross procedure for increasing aortic insufficiency with moderate aortic stenosis at 21yo  -Treated for Hodgkin's lymphoma, age 21 - CTX and chest XRT    -May 2018 Echo  · Estimated EF appears to be in the range of 56 - 60%.  · Left ventricular wall thickness is consistent with mild concentric hypertrophy.  · Mild to moderate aortic valve regurgitation is present.  · Right ventricular cavity is borderline dilated.  · Left atrial cavity size is mildly dilated.  · Moderate dilation of the aortic root is present. 4.8 cm. Moderate dilation of the ascending aorta is present. 5.4 cm.    -CT angiography chest June 2018:aortic root at the upper portion of the valve largest AP dimension is 5.4 cm. Approximately 2 cm above the aortic root the ascending thoracic aorta largest oblique dimension is 7.0 cm and AP dimension 6.2 cm.    Cardiac catheterization in July 2018 [facility]  LMT: - The LMT has mild luminal irregularities.  LAD:  - There was moderate diffuse disease.  - The mid LAD is narrowed 30 % - focal disease.   Additional Comment: very large vessel, wraps around the apex to perfuse the   entire mid and apical inferior wall.  LCX: - There was mild diffuse disease.  - The circ AV groove continuation circumflex is narrowed 80 % - focal disease.  - The 1st obtuse marginal circumflex is narrowed 60 % - focal disease.   Additional Comment: AV LCx perfuses small left PDA.  RAMUS: - The Ramus is Absent.  RCA:  - The RCA has mild luminal irregularities.      August 13, 2018  Reoperation, third open-heart surgery.  Reversed Ross procedure  including aortic valve and root re-replacement with a composite graft including an On-X mechanical valve #25 and a 30-mm Hemashield graft, right ventricular outflow  tract reconstruction with the re-repaired autograft.    Echo September 2018 [facility]  EF = 60 ± 5% . There is no evidence of LV apical thrombus.  - The right ventricle is normal in size. Right ventricular systolic function is   low normal.  - On-X prosthetic aortic valve (size #25). There is no aortic valve regurgitation.   Transprosthetic gradients could not be obtained on the current study. Valve   leaflets not well visualized. If clinically indicated, consider ZACHARY to assess   further.  - There is a small pericardial effusion with organization. It is smaller when   compared to that documented on the prior echocardiogram performed 8/23/18. There   is no RV/RA collapse. There is no obvious evidence of cardiac tamponade.  - Status post reverse Ross procedure including aortic root, ascending aorta and   aortic valve replacement, RVOT reconstruction with rerepaired autograft. No   significant PI. The peak/mean gradients 19/10 mmHg [similar to the prior study]    August 2019 echo  · Left ventricular systolic function is normal.  · Estimated EF appears to be in the range of 56 - 60%  · Left ventricular diastolic dysfunction (grade II) consistent with pseudonormalization.  · Left ventricular wall thickness is consistent with mild concentric hypertrophy.  · Normal mechanical aortic valve.    August 2021 TTE  · Left ventricular ejection fraction appears to be 56 - 60%. Left ventricular systolic function is normal.  · Left ventricular wall thickness is consistent with mild concentric hypertrophy.  · Left ventricular diastolic function is consistent with (grade II w/high LAP) pseudonormalization.  · Left atrial volume is mildly increased.  · The right atrial cavity is  mildly dilated.  · There is a normally functioning mechanical aortic valve prosthesis present.      ASSESSMENT:   Diagnosis Plan   1. Hx of aortic valve replacement, mechanical [Z95.2]     2. Primary hypertension     3. Nonrheumatic pulmonary valve stenosis     4. Thoracic aortic aneurysm without rupture (HCC)         PLAN:  Congenital heart disease: Status post reversed Ross procedure  including aortic valve and root re-replacement with a composite graft including an On-X mechanical valve #25 and a 30-mm Hemashield graft, right ventricular outflow  tract reconstruction with the re-repaired autograft in 2018.  Postoperative course complicated by stroke and pancreatitis.  Afterload currently well-controlled.  Continue anticoagulation with goal INR of 2-3.  surveillance echocardiographic assessment every 3 years throughout the first 10 years post valve implant. CTA chest reveals stable repair.  Repeat CTA chest every 24 months.      Coronary artery disease: Currently stable and asymptomatic.  Afterload well controlled.  Currently off statin.  We will continue to trend fasting lipid panel and restart if LDL trends higher than 100.    Tremors: Neurology evaluation ongoing.  Switching metoprolol tartrate to propranolol LA.    Encouraged dietary and lifestyle modifications to achieve weight loss.    Subjective  History of stroke with persistent word finding difficulties.  History of postoperative pancreatitis.  Denies chest discomfort.  Blood pressures running less than 120/80 mmHg.  compliant with medical therapy.  Denies easy bruising or bleeding complications on chronic anticoagulation.  Stable shortness of breath and a mild class II pattern.  Denies chest pain.  Continued bilateral tremors mainly affecting his hands which makes it difficult for him to sometimes hold utensils.    PHYSICAL EXAMINATION:  Vitals:    01/31/22 1315   BP: 116/74   BP Location: Right arm   Patient Position: Sitting   Pulse: 89   SpO2: 97%  "  Weight: 104 kg (230 lb)   Height: 175.3 cm (69\")     General Appearance:    Alert, cooperative, no distress, appears stated age   Head:    Normocephalic, without obvious abnormality, atraumatic   Eyes:    conjunctiva/corneas clear   Nose:   Nares normal, septum midline, mucosa normal, no drainage   Throat:   Lips, teeth and gums normal   Neck:   Supple, symmetrical, trachea midline, no carotid    bruit or JVD   Lungs:     Clear to auscultation bilaterally, respirations unlabored   Chest Wall:    No tenderness or deformity    Heart:    Regular rate and rhythm, mechanical A2,, III/6 early peaking systolic murmur left upper sternal border, rub   or gallop, normal carotid impulse bilaterally without bruit.   Abdomen:     Soft, non-tender   Extremities:   Extremities normal, atraumatic, no cyanosis or edema   Pulses:   2+ and symmetric all extremities   Skin:   Skin color, texture, turgor normal, no rashes or lesions       Diagnostic Data:  Procedures  Lab Results   Component Value Date    CHLPL 259 (H) 07/17/2015    TRIG 225 (H) 07/26/2021    HDL 48 07/26/2021     Lab Results   Component Value Date    GLUCOSE 158 (H) 07/26/2021    BUN 13 07/26/2021    CREATININE 1.07 07/26/2021     07/26/2021    K 4.2 07/26/2021     07/26/2021    CO2 22.0 07/26/2021     Lab Results   Component Value Date    HGBA1C 5.42 07/26/2021     Lab Results   Component Value Date    WBC 8.50 07/26/2021    HGB 13.2 07/26/2021    HCT 41.1 07/26/2021     07/26/2021       Allergies  No Known Allergies    Current Medications    Current Outpatient Medications:   •  aspirin (aspirin) 81 MG EC tablet, Take 81 mg by mouth Daily., Disp: , Rfl:   •  buPROPion XL (WELLBUTRIN XL) 150 MG 24 hr tablet, Take 150 mg by mouth Every Morning., Disp: , Rfl:   •  divalproex (DEPAKOTE) 500 MG DR tablet, Take 500 mg by mouth 2 (Two) Times a Day., Disp: , Rfl:   •  DULoxetine (CYMBALTA) 60 MG capsule, Take 60 mg by mouth Daily., Disp: , Rfl: 2  •  " gabapentin (NEURONTIN) 300 MG capsule, Take 300 mg by mouth 2 (Two) Times a Day., Disp: , Rfl:   •  lansoprazole (PREVACID) 30 MG capsule, TAKE 1 CAPSULE BY MOUTH EVERY DAY, Disp: 30 capsule, Rfl: 2  •  levothyroxine (SYNTHROID, LEVOTHROID) 125 MCG tablet, Take 1 tablet by mouth Daily. Further refills per PCP with repeat lab work., Disp: 90 tablet, Rfl: 1  •  rosuvastatin (CRESTOR) 10 MG tablet, Take 1 tablet by mouth Daily., Disp: 30 tablet, Rfl: 5  •  warfarin (COUMADIN) 2 MG tablet, TAKE 1 TABLET BY MOUTH EVERY DAY AS DIRECTED, Disp: 12 tablet, Rfl: 0  •  warfarin (COUMADIN) 5 MG tablet, TAKE 1 TABLET BY MOUTH DAILY AS DIRECTED BY ANTICOAGULATION CLINIC., Disp: 30 tablet, Rfl: 0  •  propranolol LA (Inderal LA) 60 MG 24 hr capsule, Take 1 capsule by mouth Daily., Disp: 90 capsule, Rfl: 1          ROS  Review of Systems   Cardiovascular: Negative for chest pain, dyspnea on exertion, irregular heartbeat and syncope.   Respiratory: Negative for cough, shortness of breath and wheezing.          SOCIAL HX  Social History     Socioeconomic History   • Marital status: Legally    Tobacco Use   • Smoking status: Never Smoker   • Smokeless tobacco: Never Used   Vaping Use   • Vaping Use: Never used   Substance and Sexual Activity   • Alcohol use: Yes     Comment: social   • Drug use: No   • Sexual activity: Defer       FAMILY HX  Family History   Problem Relation Age of Onset   • COPD Mother    • Cancer Mother    • Hypertension Father    • Stroke Father    • Stroke Paternal Grandmother    • Hypertension Paternal Grandmother    • Cancer Paternal Grandmother    • Cancer Paternal Grandfather    • Heart disease Paternal Grandfather    • No Known Problems Brother              Vimal John III, MD, [facility]

## 2022-02-07 RX ORDER — ROSUVASTATIN CALCIUM 10 MG/1
TABLET, COATED ORAL
Qty: 90 TABLET | Refills: 1 | Status: SHIPPED | OUTPATIENT
Start: 2022-02-07 | End: 2022-06-23 | Stop reason: DRUGHIGH

## 2022-02-24 ENCOUNTER — TELEPHONE (OUTPATIENT)
Dept: PHARMACY | Facility: HOSPITAL | Age: 47
End: 2022-02-24

## 2022-03-08 ENCOUNTER — LAB (OUTPATIENT)
Dept: LAB | Facility: HOSPITAL | Age: 47
End: 2022-03-08

## 2022-03-08 DIAGNOSIS — Z95.2 HX OF AORTIC VALVE REPLACEMENT, MECHANICAL: ICD-10-CM

## 2022-03-08 LAB
INR PPP: 2.4 (ref 0.85–1.16)
PROTHROMBIN TIME: 25.2 SECONDS (ref 11.4–14.4)

## 2022-03-08 PROCEDURE — 85610 PROTHROMBIN TIME: CPT

## 2022-03-08 PROCEDURE — 36415 COLL VENOUS BLD VENIPUNCTURE: CPT

## 2022-03-09 ENCOUNTER — ANTICOAGULATION VISIT (OUTPATIENT)
Dept: PHARMACY | Facility: HOSPITAL | Age: 47
End: 2022-03-09

## 2022-03-09 DIAGNOSIS — Z95.2 HX OF AORTIC VALVE REPLACEMENT, MECHANICAL: Primary | ICD-10-CM

## 2022-03-09 NOTE — PROGRESS NOTES
Anticoagulation Clinic - Remote Progress Note  REMOTE LAB    Indication: On-X Mechanical valve #25 and a 30mm hemashield; stroke  Referring Provider: Alvaro (Last seen:  1/31/21)  Initial Warfarin Start Date: ~ Sept 2018  Goal INR: 2.0-3.0 per referral  Current Drug Interactions: duloxetine, lansoprazole, levothyroxine, Trintellix, valproic acid  Bleed Risk: ascending aortic aneurysm   Other: Hodgkins Lymphoma (hx of chemo and radiation); h/o CVA + hemorrhagic pancreatitis (during hospitalization for mAVR)    Diet: avoids GLV (3/9/22)  Alcohol: socially  Tobacco: none  OTC Pain Medication: APAP PRN pain    INR History:  Date 9/27 9/28 10/1 10/4 10/8 10/11 10/17 10/19 10/23 11/8 11/16 12/21   Total Weekly Dose 8mg 12mg 19mg 29mg 31mg 34mg 43mg 41mg 45mg 49mg 49mg 49mg   INR 1.3 1.22 1.3 1.8 1.85 1.70 2.91 2.82 3.79 3.00 2.96 3.44   Notes clinic enox enox enox; clinic enox enox      desvenla     Date 1/9/19 1/16 2/4 3/1 3/22 5/16 6/13 6/24 7/10 7/22 8/5 8/26   Total Weekly Dose 49mg 47mg 49mg 49mg 49mg 49mg 49mg 49mg 49mg 49mg 49mg 49mg   INR 4.40 2.36 2.62 3.02 2.15 2.60 2.25 2.98 2.83 2.97 2.90 3.20   Notes   self adj              Date 9/26 10/18 10/28 11/19 12/3 12/17 1/3/20 1/21 2/3 2/12 2/19 2/27   Total Weekly Dose 49mg 49mg 49mg 49mg 49mg 45mg 45mg 45mg 47mg 43mg 43mg 43mg   INR 3.57 3.12 2.88 3.33 4.06 2.59 3.21 3.85 3.11 2.34 3.01 3.20   Notes less GLV Cymbalta start 9/25; Depakote start 9/5 1x decr dose  depakote inc; trintellix dcd ceftin x10 days Recv'd 1/6  incr GLV  dieting        Date 3/5 3/12 3/20 4/2 4/17 5/7 5/28 6/16 6/29 7/17 8/14 8/28   Total Weekly Dose 41mg 41mg 41mg 41mg 41mg 41mg 41mg 39mg 39mg 39mg 39mg 39mg   INR 3.07 3.01 2.95 2.66 2.9 3.03 3.36 2.48 2.42 3.08 2.78 2.28   Notes 1x decr dose 1x decr dose       recv'd 6/30  recv'd 8/13 recv'd 8/31     Date 9/28 10/29  12/4 1/8/21 2/9 3/31 5/12 6/9  8/3 8/31   Total Weekly Dose 39mg 39mg non-  compliant 39mg 39mg 39mg 39mg 39mg 39mg non-  compliant 39mg 39mg   INR 2.33 2.39  2.46 2.45 2.85 2.48 2.52 2.51  2.88 2.77   Notes recv'd 9/29 recv'd 10/30   recv'd 1/11  recv'd 4/1           Date 10/4 11/1 12/6  3/8          Total Weekly Dose 39mg 39mg 39mg  Non compliant with f/u 39 mg          INR 2.50 2.54 2.2  2.40          Notes  Rec 11/2 Rec 12/7              *takes warfarin in AM*    Phone Interview:  Verbal Release Authorization: please contact Mr. Hall directly - if unable to reach patient may contact [email or web address]  Tablet Strength: 2mg and 5mg tablets  Patient Contact Info: [phone]; Susan Delgadillo's number is [phone] *please call both at; [email or web address]; [email or web address]  Lab Contact Info: Megan Vu     Patient Findings:  Negatives:  Signs/symptoms of thrombosis, Signs/symptoms of bleeding, Laboratory test error suspected, Change in health, Change in alcohol use, Change in activity, Upcoming invasive procedure, Emergency department visit, Upcoming dental procedure, Missed doses, Extra doses, Change in medications, Change in diet/appetite, Hospital admission, Bruising, Other complaints   Comments:  All findings negative upon interview with Susan Delgadillo, point of contact. She did state patient has expressed interest in eating healthier due to recent weight gain and may be interested in incorporating GLV to his diet. They are going to speak with a nutritionist first and will call clinic if they decided to change his diet sooner than 4 weeks.     Plan:  1. INR was therapeutic yesterday at 2.4. Patient was non compliant with requested follow up. Instructed Susan to have Mr. Hall continue current maintenance dose of warfarin 5 mg oral daily except 7 mg on MonFri until recheck (39 mg/week).   2. Mr. Hall prefers we contact Susan with dosing instructions and follow up testing dates.  3. Repeat INR in 4 weeks, 4/5/22.  4. Verbal information provided over the phone. Manjeet Hall RBV dosing  instructions, expresses understanding by teach back, and has no further questions at this time.    Aileen Grullon, MICHELLE  3/9/2022  11:30 EST     I, Kayla Stockton, PharmD, have reviewed the note in full and agree with the assessment and plan.  03/09/22  13:31 EST

## 2022-03-21 ENCOUNTER — OFFICE VISIT (OUTPATIENT)
Dept: FAMILY MEDICINE CLINIC | Facility: CLINIC | Age: 47
End: 2022-03-21

## 2022-03-21 VITALS
BODY MASS INDEX: 36.07 KG/M2 | HEART RATE: 83 BPM | OXYGEN SATURATION: 98 % | DIASTOLIC BLOOD PRESSURE: 82 MMHG | HEIGHT: 68 IN | WEIGHT: 238 LBS | SYSTOLIC BLOOD PRESSURE: 124 MMHG | TEMPERATURE: 98 F

## 2022-03-21 DIAGNOSIS — I10 PRIMARY HYPERTENSION: Primary | ICD-10-CM

## 2022-03-21 DIAGNOSIS — K21.9 GASTROESOPHAGEAL REFLUX DISEASE, UNSPECIFIED WHETHER ESOPHAGITIS PRESENT: ICD-10-CM

## 2022-03-21 DIAGNOSIS — R73.03 PREDIABETES: ICD-10-CM

## 2022-03-21 LAB
EXPIRATION DATE: NORMAL
HBA1C MFR BLD: 6.4 %
Lab: NORMAL

## 2022-03-21 PROCEDURE — 99214 OFFICE O/P EST MOD 30 MIN: CPT | Performed by: FAMILY MEDICINE

## 2022-03-21 PROCEDURE — 3044F HG A1C LEVEL LT 7.0%: CPT | Performed by: FAMILY MEDICINE

## 2022-03-21 PROCEDURE — 83036 HEMOGLOBIN GLYCOSYLATED A1C: CPT | Performed by: FAMILY MEDICINE

## 2022-03-21 RX ORDER — LEVOTHYROXINE SODIUM 0.12 MG/1
125 TABLET ORAL DAILY
Qty: 90 TABLET | Refills: 0 | Status: SHIPPED | OUTPATIENT
Start: 2022-03-21 | End: 2022-04-20

## 2022-03-21 RX ORDER — LANSOPRAZOLE 30 MG/1
30 CAPSULE, DELAYED RELEASE ORAL DAILY
Qty: 30 CAPSULE | Refills: 5 | Status: SHIPPED | OUTPATIENT
Start: 2022-03-21 | End: 2022-10-31 | Stop reason: SDUPTHER

## 2022-03-22 NOTE — PROGRESS NOTES
"Chief Complaint  Hypothyroidism (F/u)    Subjective          Manjeet Hall presents to [facility]  Hypothyroidism  This is a chronic problem. The problem has been unchanged. Associated symptoms comments: Weight gain. The treatment provided significant relief.   Hypertension  This is a chronic problem. The problem is unchanged. The problem is controlled. Associated symptoms include anxiety. Risk factors for coronary artery disease include male gender and sedentary lifestyle. Current antihypertension treatment includes beta blockers and lifestyle changes. The current treatment provides significant improvement. Compliance problems include diet.  heart surgery.   Anxiety  Presents for follow-up visit. Symptoms include insomnia and nervous/anxious behavior. Symptoms occur occasionally. The quality of sleep is fair. Nighttime awakenings: occasional.           Objective   Vital Signs:   /82   Pulse 83   Temp 98 °F (36.7 °C)   Ht 172.7 cm (68\")   Wt 108 kg (238 lb)   SpO2 98%   BMI 36.19 kg/m²     Physical Exam  Vitals and nursing note reviewed.   Constitutional:       Appearance: Normal appearance.   HENT:      Head: Normocephalic and atraumatic.      Nose: Nose normal.   Eyes:      Conjunctiva/sclera: Conjunctivae normal.      Pupils: Pupils are equal, round, and reactive to light.   Cardiovascular:      Rate and Rhythm: Normal rate and regular rhythm.      Heart sounds: Normal heart sounds.   Pulmonary:      Effort: Pulmonary effort is normal.      Breath sounds: Normal breath sounds.   Musculoskeletal:      Cervical back: Neck supple.      Right lower leg: No edema.      Left lower leg: No edema.   Skin:     General: Skin is warm and dry.   Neurological:      General: No focal deficit present.      Mental Status: He is alert. Mental status is at baseline.   Psychiatric:         Mood and Affect: Mood normal.        Result Review :                 Assessment and Plan  "   Diagnoses and all orders for this visit:    1. Primary hypertension (Primary)    2. Prediabetes  -     POC Glycosylated Hemoglobin (Hb A1C)    3. Gastroesophageal reflux disease, unspecified whether esophagitis present  -     lansoprazole (PREVACID) 30 MG capsule; Take 1 capsule by mouth Daily.  Dispense: 30 capsule; Refill: 5        Follow Up   Return in about 3 months (around 6/21/2022) for fasting appt.  Patient was given instructions and counseling regarding his condition or for health maintenance advice. Please see specific information pulled into the AVS if appropriate.

## 2022-03-25 ENCOUNTER — TELEPHONE (OUTPATIENT)
Dept: FAMILY MEDICINE CLINIC | Facility: CLINIC | Age: 47
End: 2022-03-25

## 2022-03-25 NOTE — TELEPHONE ENCOUNTER
I called patient and made him aware that this would be any leafy green vegetables: ie. Spinach, kale, lettuce, broccoli, brussell sprouts, patient understood and doesn't feel need for a a referral right now but will let us know if he does or has further questions.

## 2022-03-25 NOTE — TELEPHONE ENCOUNTER
Caller: SUKUMAR GREENBERG    Relationship: Emergency Contact    Best call back number: [phone]    What is the medical concern/diagnosis: WEIGHT LOSS     What specialty or service is being requested: DIETICIAN OR NUTRITIONIST    Any additional details: PATIENT IS CONCERNED ABOUT BEING ON WARFARIN AND WHAT HE SHOULD BE EATING AND SHOULD NOT BE EATING

## 2022-04-05 ENCOUNTER — LAB (OUTPATIENT)
Dept: LAB | Facility: HOSPITAL | Age: 47
End: 2022-04-05

## 2022-04-05 DIAGNOSIS — Z95.2 HX OF AORTIC VALVE REPLACEMENT, MECHANICAL: ICD-10-CM

## 2022-04-05 LAB
INR PPP: 2.4 (ref 0.84–1.13)
PROTHROMBIN TIME: 26.3 SECONDS (ref 11.4–14.4)

## 2022-04-05 PROCEDURE — 85610 PROTHROMBIN TIME: CPT

## 2022-04-05 PROCEDURE — 36415 COLL VENOUS BLD VENIPUNCTURE: CPT

## 2022-04-06 ENCOUNTER — ANTICOAGULATION VISIT (OUTPATIENT)
Dept: PHARMACY | Facility: HOSPITAL | Age: 47
End: 2022-04-06

## 2022-04-06 DIAGNOSIS — Z95.2 HX OF AORTIC VALVE REPLACEMENT, MECHANICAL: Primary | ICD-10-CM

## 2022-04-06 NOTE — PROGRESS NOTES
Anticoagulation Clinic - Remote Progress Note  REMOTE LAB    Indication: On-X Mechanical valve #25 and a 30mm hemashield; stroke  Referring Provider: Alvaro (Last seen:  1/31/21)  Initial Warfarin Start Date: ~ Sept 2018  Goal INR: 2.0-3.0 per referral  Current Drug Interactions: duloxetine, lansoprazole, levothyroxine, Trintellix, valproic acid  Bleed Risk: ascending aortic aneurysm   Other: Hodgkins Lymphoma (hx of chemo and radiation); h/o CVA + hemorrhagic pancreatitis (during hospitalization for mAVR)    Diet: avoids GLV (3/9/22)  Alcohol: socially  Tobacco: none  OTC Pain Medication: APAP PRN pain    INR History:  Date 9/27 9/28 10/1 10/4 10/8 10/11 10/17 10/19 10/23 11/8 11/16 12/21   Total Weekly Dose 8mg 12mg 19mg 29mg 31mg 34mg 43mg 41mg 45mg 49mg 49mg 49mg   INR 1.3 1.22 1.3 1.8 1.85 1.70 2.91 2.82 3.79 3.00 2.96 3.44   Notes clinic enox enox enox; clinic enox enox      desvenla     Date 1/9/19 1/16 2/4 3/1 3/22 5/16 6/13 6/24 7/10 7/22 8/5 8/26   Total Weekly Dose 49mg 47mg 49mg 49mg 49mg 49mg 49mg 49mg 49mg 49mg 49mg 49mg   INR 4.40 2.36 2.62 3.02 2.15 2.60 2.25 2.98 2.83 2.97 2.90 3.20   Notes   self adj              Date 9/26 10/18 10/28 11/19 12/3 12/17 1/3/20 1/21 2/3 2/12 2/19 2/27   Total Weekly Dose 49mg 49mg 49mg 49mg 49mg 45mg 45mg 45mg 47mg 43mg 43mg 43mg   INR 3.57 3.12 2.88 3.33 4.06 2.59 3.21 3.85 3.11 2.34 3.01 3.20   Notes less GLV Cymbalta start 9/25; Depakote start 9/5 1x decr dose  depakote inc; trintellix dcd ceftin x10 days Recv'd 1/6  incr GLV  dieting        Date 3/5 3/12 3/20 4/2 4/17 5/7 5/28 6/16 6/29 7/17 8/14 8/28   Total Weekly Dose 41mg 41mg 41mg 41mg 41mg 41mg 41mg 39mg 39mg 39mg 39mg 39mg   INR 3.07 3.01 2.95 2.66 2.9 3.03 3.36 2.48 2.42 3.08 2.78 2.28   Notes 1x decr dose 1x decr dose       recv'd 6/30  recv'd 8/13 recv'd 8/31     Date 9/28 10/29  12/4 1/8/21 2/9 3/31 5/12 6/9  8/3 8/31   Total Weekly Dose 39mg 39mg non-  compliant 39mg 39mg 39mg 39mg 39mg 39mg non-  compliant 39mg 39mg   INR 2.33 2.39  2.46 2.45 2.85 2.48 2.52 2.51  2.88 2.77   Notes recv'd 9/29 recv'd 10/30   recv'd 1/11  recv'd 4/1           Date 10/4 11/1 12/6  3/8 4/5         Total Weekly Dose 39mg 39mg 39mg  non- compliant  39mg 39mg         INR 2.50 2.54 2.2  2.40 2.40         Notes  Rec 11/2 Rec 12/7              *takes warfarin in AM*    Phone Interview:  Verbal Release Authorization: please contact Mr. Hall directly - if unable to reach patient may contact [email or web address]  Tablet Strength: 2mg and 5mg tablets  Patient Contact Info: [phone]; Susan Delgadillo's number is [phone] *please call both at; [email or web address]; [email or web address]  Lab Contact Info: Megan Vu     Patient Findings  Negatives:  Signs/symptoms of thrombosis, Signs/symptoms of bleeding, Laboratory test error suspected, Change in health, Change in alcohol use, Change in activity, Upcoming invasive procedure, Emergency department visit, Upcoming dental procedure, Missed doses, Extra doses, Change in medications, Change in diet/appetite, Hospital admission, Bruising, Other complaints       Plan:  1. INR was therapeutic yesterday, again at 2.4 (goal 2.0-3.0). Instructed Susan to have Mr. Hall continue current maintenance dose of warfarin 5mg oral daily except 7mg on MonFri until recheck (39mg/week).   2. Mr. Hall prefers we contact Susan with dosing instructions and follow up testing dates.  3. Repeat INR in 4 weeks, 5/3  4. Verbal information provided over the phone. Manjeet Hall RBV dosing instructions, expresses understanding by teach back, and has no further questions at this time.    Abdi Petit, Laine  4/6/2022  08:29 EDT    I, Kayla Stockton, PharmD, have reviewed the note in full and agree with the assessment and plan.  04/06/22  10:22 EDT

## 2022-04-20 RX ORDER — WARFARIN SODIUM 5 MG/1
TABLET ORAL
Qty: 90 TABLET | Refills: 0 | Status: SHIPPED | OUTPATIENT
Start: 2022-04-20 | End: 2022-07-26

## 2022-04-20 RX ORDER — LEVOTHYROXINE SODIUM 0.12 MG/1
125 TABLET ORAL DAILY
Qty: 90 TABLET | Refills: 0 | Status: SHIPPED | OUTPATIENT
Start: 2022-04-20 | End: 2022-08-31

## 2022-05-09 ENCOUNTER — LAB (OUTPATIENT)
Dept: LAB | Facility: HOSPITAL | Age: 47
End: 2022-05-09

## 2022-05-09 DIAGNOSIS — Z95.2 HX OF AORTIC VALVE REPLACEMENT, MECHANICAL: ICD-10-CM

## 2022-05-09 LAB
INR PPP: 2.98 (ref 0.84–1.13)
PROTHROMBIN TIME: 31.2 SECONDS (ref 11.4–14.4)

## 2022-05-09 PROCEDURE — 85610 PROTHROMBIN TIME: CPT

## 2022-05-09 PROCEDURE — 36415 COLL VENOUS BLD VENIPUNCTURE: CPT

## 2022-05-10 ENCOUNTER — ANTICOAGULATION VISIT (OUTPATIENT)
Dept: PHARMACY | Facility: HOSPITAL | Age: 47
End: 2022-05-10

## 2022-05-10 DIAGNOSIS — Z95.2 HX OF AORTIC VALVE REPLACEMENT, MECHANICAL: Primary | ICD-10-CM

## 2022-05-13 NOTE — PROGRESS NOTES
Anticoagulation Clinic - Remote Progress Note  REMOTE LAB    Indication: On-X Mechanical valve #25 and a 30mm hemashield; stroke  Referring Provider: Alvaro (Last seen:  1/31/21)  Initial Warfarin Start Date: ~ Sept 2018  Goal INR: 2.0-3.0 per referral  Current Drug Interactions: duloxetine, lansoprazole, levothyroxine, Trintellix, valproic acid  Bleed Risk: ascending aortic aneurysm   Other: Hodgkins Lymphoma (hx of chemo and radiation); h/o CVA + hemorrhagic pancreatitis (during hospitalization for mAVR)    Diet: avoids GLV (3/9/22)  Alcohol: socially  Tobacco: none  OTC Pain Medication: APAP PRN pain    INR History:  Date 9/27 9/28 10/1 10/4 10/8 10/11 10/17 10/19 10/23 11/8 11/16 12/21   Total Weekly Dose 8mg 12mg 19mg 29mg 31mg 34mg 43mg 41mg 45mg 49mg 49mg 49mg   INR 1.3 1.22 1.3 1.8 1.85 1.70 2.91 2.82 3.79 3.00 2.96 3.44   Notes clinic enox enox enox; clinic enox enox      desvenla     Date 1/9/19 1/16 2/4 3/1 3/22 5/16 6/13 6/24 7/10 7/22 8/5 8/26   Total Weekly Dose 49mg 47mg 49mg 49mg 49mg 49mg 49mg 49mg 49mg 49mg 49mg 49mg   INR 4.40 2.36 2.62 3.02 2.15 2.60 2.25 2.98 2.83 2.97 2.90 3.20   Notes   self adj              Date 9/26 10/18 10/28 11/19 12/3 12/17 1/3/20 1/21 2/3 2/12 2/19 2/27   Total Weekly Dose 49mg 49mg 49mg 49mg 49mg 45mg 45mg 45mg 47mg 43mg 43mg 43mg   INR 3.57 3.12 2.88 3.33 4.06 2.59 3.21 3.85 3.11 2.34 3.01 3.20   Notes less GLV Cymbalta start 9/25; Depakote start 9/5 1x decr dose  depakote inc; trintellix dcd ceftin x10 days Recv'd 1/6  incr GLV  dieting        Date 3/5 3/12 3/20 4/2 4/17 5/7 5/28 6/16 6/29 7/17 8/14 8/28   Total Weekly Dose 41mg 41mg 41mg 41mg 41mg 41mg 41mg 39mg 39mg 39mg 39mg 39mg   INR 3.07 3.01 2.95 2.66 2.9 3.03 3.36 2.48 2.42 3.08 2.78 2.28   Notes 1x decr dose 1x decr dose       recv'd 6/30  recv'd 8/13 recv'd 8/31     Date 9/28 10/29  12/4 1/8/21 2/9 3/31 5/12 6/9  8/3 8/31   Total Weekly Dose 39mg 39mg non-  compliant 39mg 39mg 39mg 39mg 39mg 39mg non-  compliant 39mg 39mg   INR 2.33 2.39  2.46 2.45 2.85 2.48 2.52 2.51  2.88 2.77   Notes recv'd 9/29 recv'd 10/30   recv'd 1/11  recv'd 4/1           Date 10/4 11/1 12/6  3/8 4/5 5/9        Total Weekly Dose 39mg 39mg 39mg  non- compliant  39mg 39mg 39 mg        INR 2.50 2.54 2.2  2.40 2.40 2.98        Notes  Rec 11/2 Rec 12/7              *takes warfarin in AM*    Phone Interview:  Verbal Release Authorization: please contact Mr. Hall directly - if unable to reach patient may contact brotherMarko  Tablet Strength: 2mg and 5mg tablets  Patient Contact Info: [phone]; Susan Delgadillo's number is [phone] *please call both at; [email or web address]; [email or web address]  Lab Contact Info: Megan Vu     Patient Findings:  Negatives:  Signs/symptoms of thrombosis, Signs/symptoms of bleeding, Laboratory test error suspected, Change in health, Change in alcohol use, Change in activity, Upcoming invasive procedure, Emergency department visit, Upcoming dental procedure, Missed doses, Extra doses, Change in medications, Change in diet/appetite, Hospital admission, Bruising, Other complaints   Comments:  All findings negative per Susan.     Plan:  1. INR was therapeutic on 5/9 at 2.98; unable to speak with Susan until today 5/13. Instructed Susan to have Mr. Hall continue current maintenance dose of warfarin 5 mg oral daily except 7 mg on MonFri until recheck (39 mg/week).   2. Mr. Hall prefers we contact Susan with dosing instructions and follow up testing dates.  3. Repeat INR in 4 weeks, 6/6/22.  4. Verbal information provided over the phone. Manjeet Hall RBV dosing instructions, expresses understanding by teach back, and has no further questions at this time.    Aileen Grullon, Laine  5/13/2022  09:37 EDT    I, Sondra Brooks, PharmD, have reviewed the note in full and agree with the assessment and plan.  05/13/22  15:40 EDT

## 2022-06-10 ENCOUNTER — LAB (OUTPATIENT)
Dept: LAB | Facility: HOSPITAL | Age: 47
End: 2022-06-10

## 2022-06-10 DIAGNOSIS — Z95.2 HX OF AORTIC VALVE REPLACEMENT, MECHANICAL: ICD-10-CM

## 2022-06-10 LAB
INR PPP: 2.26 (ref 0.84–1.13)
PROTHROMBIN TIME: 25 SECONDS (ref 11.4–14.4)

## 2022-06-10 PROCEDURE — 36415 COLL VENOUS BLD VENIPUNCTURE: CPT

## 2022-06-10 PROCEDURE — 85610 PROTHROMBIN TIME: CPT

## 2022-06-13 ENCOUNTER — ANTICOAGULATION VISIT (OUTPATIENT)
Dept: PHARMACY | Facility: HOSPITAL | Age: 47
End: 2022-06-13

## 2022-06-13 DIAGNOSIS — Z95.2 HX OF AORTIC VALVE REPLACEMENT, MECHANICAL: Primary | ICD-10-CM

## 2022-06-13 NOTE — PROGRESS NOTES
Anticoagulation Clinic - Remote Progress Note  REMOTE LAB    Indication: On-X Mechanical valve #25 and a 30mm hemashield; stroke  Referring Provider: Alvaro (Last seen:  1/31/21)  Initial Warfarin Start Date: ~ Sept 2018  Goal INR: 2.0-3.0 per referral  Current Drug Interactions: duloxetine, lansoprazole, levothyroxine, Trintellix, valproic acid  Bleed Risk: ascending aortic aneurysm   Other: Hodgkins Lymphoma (hx of chemo and radiation); h/o CVA + hemorrhagic pancreatitis (during hospitalization for mAVR)    Diet: avoids GLV (6/13/22)  Alcohol: socially  Tobacco: none  OTC Pain Medication: APAP PRN pain    INR History:  Date 9/27 9/28 10/1 10/4 10/8 10/11 10/17 10/19 10/23 11/8 11/16 12/21   Total Weekly Dose 8mg 12mg 19mg 29mg 31mg 34mg 43mg 41mg 45mg 49mg 49mg 49mg   INR 1.3 1.22 1.3 1.8 1.85 1.70 2.91 2.82 3.79 3.00 2.96 3.44   Notes clinic enox enox enox; clinic enox enox      desvenla     Date 1/9/19 1/16 2/4 3/1 3/22 5/16 6/13 6/24 7/10 7/22 8/5 8/26   Total Weekly Dose 49mg 47mg 49mg 49mg 49mg 49mg 49mg 49mg 49mg 49mg 49mg 49mg   INR 4.40 2.36 2.62 3.02 2.15 2.60 2.25 2.98 2.83 2.97 2.90 3.20   Notes   self adj              Date 9/26 10/18 10/28 11/19 12/3 12/17 1/3/20 1/21 2/3 2/12 2/19 2/27   Total Weekly Dose 49mg 49mg 49mg 49mg 49mg 45mg 45mg 45mg 47mg 43mg 43mg 43mg   INR 3.57 3.12 2.88 3.33 4.06 2.59 3.21 3.85 3.11 2.34 3.01 3.20   Notes less GLV Cymbalta start 9/25; Depakote start 9/5 1x decr dose  depakote inc; trintellix dcd ceftin x10 days Recv'd 1/6  incr GLV  dieting        Date 3/5 3/12 3/20 4/2 4/17 5/7 5/28 6/16 6/29 7/17 8/14 8/28   Total Weekly Dose 41mg 41mg 41mg 41mg 41mg 41mg 41mg 39mg 39mg 39mg 39mg 39mg   INR 3.07 3.01 2.95 2.66 2.9 3.03 3.36 2.48 2.42 3.08 2.78 2.28   Notes 1x decr dose 1x decr dose       recv'd 6/30  recv'd 8/13 recv'd 8/31     Date 9/28 10/29  12/4 1/8/21 2/9 3/31 5/12 6/9  8/3 8/31   Total Weekly Dose 39mg 39mg non-  compliant 39mg 39mg 39mg 39mg 39mg 39mg non-  compliant 39mg 39mg   INR 2.33 2.39  2.46 2.45 2.85 2.48 2.52 2.51  2.88 2.77   Notes recv'd 9/29 recv'd 10/30   recv'd 1/11  recv'd 4/1           Date 10/4 11/1 12/6  3/8 4/5 5/9 6/10       Total Weekly Dose 39mg 39mg 39mg  non- compliant  39mg 39mg 39 mg 39 mg       INR 2.50 2.54 2.2  2.40 2.40 2.98 2.26       Notes  Rec 11/2 Rec 12/7              *takes warfarin in AM*    Phone Interview:  Verbal Release Authorization: please contact Mr. Hall directly - if unable to reach patient may contact Marko erickson  Tablet Strength: 2mg and 5mg tablets  Patient Contact Info: [phone]; Susan Delgadillo's number is [phone] *please call both at; [email or web address]; [email or web address]  Lab Contact Info: Megan Vu     Patient Findings:  Positives:  Change in activity, Change in diet/appetite   Negatives:  Signs/symptoms of thrombosis, Signs/symptoms of bleeding, Laboratory test error suspected, Change in health, Change in alcohol use, Upcoming invasive procedure, Emergency department visit, Upcoming dental procedure, Missed doses, Extra doses, Change in medications, Hospital admission, Bruising, Other complaints   Comments:  Susan reports Mr. Hall has started exercising regularly and has added apple cider vinegar to his diet. He does a couple teaspoons per day. She reports he hasn't weighed himself yet so he doesn't know if he has lost any weight. Has been exercising about a week now.     Plan:  1. INR was therapeutic on 6/10 at 2.26; result received today. Instructed Susan to have Mr. Hall continue current maintenance dose of warfarin 5 mg oral daily except 7 mg on MonFri until recheck (39 mg/week).   2. Mr. Hall prefers we contact Susan with dosing instructions and follow up testing dates.  3. Repeat INR in two weeks, 6/24/22.  4. Verbal information provided over the phone. Manjeet Hall RBV dosing instructions, expresses understanding by teach back, and has no further  questions at this time.    Aileen Grullon, Laine  6/13/2022  08:14 EDT      I, Kayla Stockton, PharmD, have reviewed the note in full and agree with the assessment and plan.  06/13/22  09:25 EDT

## 2022-06-22 ENCOUNTER — OFFICE VISIT (OUTPATIENT)
Dept: FAMILY MEDICINE CLINIC | Facility: CLINIC | Age: 47
End: 2022-06-22

## 2022-06-22 ENCOUNTER — LAB (OUTPATIENT)
Dept: LAB | Facility: HOSPITAL | Age: 47
End: 2022-06-22

## 2022-06-22 VITALS
OXYGEN SATURATION: 96 % | BODY MASS INDEX: 34.78 KG/M2 | DIASTOLIC BLOOD PRESSURE: 70 MMHG | SYSTOLIC BLOOD PRESSURE: 128 MMHG | HEIGHT: 69 IN | HEART RATE: 94 BPM | WEIGHT: 234.8 LBS | TEMPERATURE: 98 F

## 2022-06-22 DIAGNOSIS — Z95.2 HX OF AORTIC VALVE REPLACEMENT, MECHANICAL: ICD-10-CM

## 2022-06-22 DIAGNOSIS — I10 PRIMARY HYPERTENSION: ICD-10-CM

## 2022-06-22 DIAGNOSIS — I10 PRIMARY HYPERTENSION: Primary | ICD-10-CM

## 2022-06-22 DIAGNOSIS — E78.01 FAMILIAL HYPERCHOLESTEROLEMIA: ICD-10-CM

## 2022-06-22 DIAGNOSIS — Z12.5 PROSTATE CANCER SCREENING: ICD-10-CM

## 2022-06-22 DIAGNOSIS — E03.9 ACQUIRED HYPOTHYROIDISM: ICD-10-CM

## 2022-06-22 LAB
ALBUMIN SERPL-MCNC: 4.3 G/DL (ref 3.5–5.2)
ALBUMIN/GLOB SERPL: 1.5 G/DL
ALP SERPL-CCNC: 61 U/L (ref 39–117)
ALT SERPL W P-5'-P-CCNC: 21 U/L (ref 1–41)
ANION GAP SERPL CALCULATED.3IONS-SCNC: 13 MMOL/L (ref 5–15)
AST SERPL-CCNC: 20 U/L (ref 1–40)
BILIRUB SERPL-MCNC: 0.4 MG/DL (ref 0–1.2)
BUN SERPL-MCNC: 12 MG/DL (ref 6–20)
BUN/CREAT SERPL: 12.4 (ref 7–25)
CALCIUM SPEC-SCNC: 9 MG/DL (ref 8.6–10.5)
CHLORIDE SERPL-SCNC: 102 MMOL/L (ref 98–107)
CHOLEST SERPL-MCNC: 238 MG/DL (ref 0–200)
CO2 SERPL-SCNC: 23 MMOL/L (ref 22–29)
CREAT SERPL-MCNC: 0.97 MG/DL (ref 0.76–1.27)
DEPRECATED RDW RBC AUTO: 44.4 FL (ref 37–54)
EGFRCR SERPLBLD CKD-EPI 2021: 97.5 ML/MIN/1.73
ERYTHROCYTE [DISTWIDTH] IN BLOOD BY AUTOMATED COUNT: 14.6 % (ref 12.3–15.4)
GLOBULIN UR ELPH-MCNC: 2.9 GM/DL
GLUCOSE SERPL-MCNC: 121 MG/DL (ref 65–99)
HCT VFR BLD AUTO: 41 % (ref 37.5–51)
HDLC SERPL-MCNC: 44 MG/DL (ref 40–60)
HGB BLD-MCNC: 13.6 G/DL (ref 13–17.7)
INR PPP: 2.96 (ref 0.84–1.13)
LDLC SERPL CALC-MCNC: 149 MG/DL (ref 0–100)
LDLC/HDLC SERPL: 3.29 {RATIO}
MCH RBC QN AUTO: 28.4 PG (ref 26.6–33)
MCHC RBC AUTO-ENTMCNC: 33.2 G/DL (ref 31.5–35.7)
MCV RBC AUTO: 85.6 FL (ref 79–97)
PLATELET # BLD AUTO: 176 10*3/MM3 (ref 140–450)
PMV BLD AUTO: 12 FL (ref 6–12)
POTASSIUM SERPL-SCNC: 4.3 MMOL/L (ref 3.5–5.2)
PROT SERPL-MCNC: 7.2 G/DL (ref 6–8.5)
PROTHROMBIN TIME: 31 SECONDS (ref 11.4–14.4)
RBC # BLD AUTO: 4.79 10*6/MM3 (ref 4.14–5.8)
SODIUM SERPL-SCNC: 138 MMOL/L (ref 136–145)
TRIGL SERPL-MCNC: 247 MG/DL (ref 0–150)
TSH SERPL DL<=0.05 MIU/L-ACNC: 1.7 UIU/ML (ref 0.27–4.2)
VLDLC SERPL-MCNC: 45 MG/DL (ref 5–40)
WBC NRBC COR # BLD: 7.55 10*3/MM3 (ref 3.4–10.8)

## 2022-06-22 PROCEDURE — 85027 COMPLETE CBC AUTOMATED: CPT

## 2022-06-22 PROCEDURE — 36415 COLL VENOUS BLD VENIPUNCTURE: CPT

## 2022-06-22 PROCEDURE — 99214 OFFICE O/P EST MOD 30 MIN: CPT | Performed by: FAMILY MEDICINE

## 2022-06-22 PROCEDURE — 84443 ASSAY THYROID STIM HORMONE: CPT

## 2022-06-22 PROCEDURE — 85610 PROTHROMBIN TIME: CPT

## 2022-06-22 PROCEDURE — 80053 COMPREHEN METABOLIC PANEL: CPT

## 2022-06-22 PROCEDURE — 80061 LIPID PANEL: CPT

## 2022-06-22 NOTE — PROGRESS NOTES
"Chief Complaint  Hypertension (F/u)    Subjective        Manjeet Hall presents to [facility]  Hypertension  This is a chronic problem. The problem is unchanged. The problem is controlled. Risk factors for coronary artery disease include male gender and sedentary lifestyle. Current antihypertension treatment includes beta blockers and lifestyle changes. The current treatment provides significant improvement. Compliance problems include diet.  heart surgery.   Hypothyroidism  This is a chronic problem. The problem has been unchanged. Associated symptoms comments: Weight gain. The treatment provided significant relief.   Hyperlipidemia  This is a chronic problem. The problem is controlled. Exacerbating diseases include hypothyroidism. Current antihyperlipidemic treatment includes statins and diet change. The current treatment provides significant improvement of lipids. There are no compliance problems.        Objective   Vital Signs:  /70   Pulse 94   Temp 98 °F (36.7 °C)   Ht 175.3 cm (69\")   Wt 107 kg (234 lb 12.8 oz)   SpO2 96%   BMI 34.67 kg/m²   Estimated body mass index is 34.67 kg/m² as calculated from the following:    Height as of this encounter: 175.3 cm (69\").    Weight as of this encounter: 107 kg (234 lb 12.8 oz).          Physical Exam  Vitals and nursing note reviewed.   Constitutional:       Appearance: Normal appearance. He is well-developed.   HENT:      Head: Normocephalic and atraumatic.      Right Ear: External ear normal.      Left Ear: External ear normal.      Nose: Nose normal.   Eyes:      General: No scleral icterus.     Conjunctiva/sclera: Conjunctivae normal.      Pupils: Pupils are equal, round, and reactive to light.   Neck:      Thyroid: No thyromegaly.      Vascular: No carotid bruit.   Cardiovascular:      Rate and Rhythm: Normal rate and regular rhythm.      Heart sounds: Murmur heard.   Pulmonary:      Effort: Pulmonary effort is " normal.      Breath sounds: Normal breath sounds.   Musculoskeletal:      Cervical back: Neck supple.   Skin:     General: Skin is warm and dry.      Findings: No rash.   Neurological:      Mental Status: He is alert and oriented to person, place, and time.      Comments: No focal deficits no lateralizing signs   Psychiatric:         Mood and Affect: Mood normal.         Behavior: Behavior is cooperative.        Result Review :                Assessment and Plan   Diagnoses and all orders for this visit:    1. Primary hypertension (Primary)  -     CBC (No Diff); Future    2. Acquired hypothyroidism  -     TSH; Future    3. Familial hypercholesterolemia  -     Comprehensive Metabolic Panel; Future  -     Lipid Panel; Future    4. Prostate cancer screening             Follow Up   Return in about 4 months (around 10/22/2022) for Recheck.  Patient was given instructions and counseling regarding his condition or for health maintenance advice. Please see specific information pulled into the AVS if appropriate.

## 2022-06-23 ENCOUNTER — ANTICOAGULATION VISIT (OUTPATIENT)
Dept: PHARMACY | Facility: HOSPITAL | Age: 47
End: 2022-06-23

## 2022-06-23 ENCOUNTER — TELEPHONE (OUTPATIENT)
Dept: CARDIOLOGY | Facility: CLINIC | Age: 47
End: 2022-06-23

## 2022-06-23 DIAGNOSIS — Z95.2 HX OF AORTIC VALVE REPLACEMENT, MECHANICAL: Primary | ICD-10-CM

## 2022-06-23 RX ORDER — ROSUVASTATIN CALCIUM 20 MG/1
20 TABLET, COATED ORAL DAILY
Qty: 30 TABLET | Refills: 5 | Status: SHIPPED | OUTPATIENT
Start: 2022-06-23 | End: 2022-12-19

## 2022-06-23 NOTE — PROGRESS NOTES
Anticoagulation Clinic - Remote Progress Note  REMOTE LAB    Indication: On-X Mechanical valve #25 and a 30mm hemashield; stroke  Referring Provider: Alvaro (Last seen:  1/31/21)  Initial Warfarin Start Date: ~ Sept 2018  Goal INR: 2.0-3.0 per referral  Current Drug Interactions: duloxetine, lansoprazole, levothyroxine, Trintellix, valproic acid  Bleed Risk: ascending aortic aneurysm   Other: Hodgkins Lymphoma (hx of chemo and radiation); h/o CVA + hemorrhagic pancreatitis (during hospitalization for mAVR)    Diet: avoids GLV (6/13/22)  Alcohol: socially  Tobacco: none  OTC Pain Medication: APAP PRN pain    INR History:  Date 9/27 9/28 10/1 10/4 10/8 10/11 10/17 10/19 10/23 11/8 11/16 12/21   Total Weekly Dose 8mg 12mg 19mg 29mg 31mg 34mg 43mg 41mg 45mg 49mg 49mg 49mg   INR 1.3 1.22 1.3 1.8 1.85 1.70 2.91 2.82 3.79 3.00 2.96 3.44   Notes clinic enox enox enox; clinic enox enox      desvenla     Date 1/9/19 1/16 2/4 3/1 3/22 5/16 6/13 6/24 7/10 7/22 8/5 8/26   Total Weekly Dose 49mg 47mg 49mg 49mg 49mg 49mg 49mg 49mg 49mg 49mg 49mg 49mg   INR 4.40 2.36 2.62 3.02 2.15 2.60 2.25 2.98 2.83 2.97 2.90 3.20   Notes   self adj              Date 9/26 10/18 10/28 11/19 12/3 12/17 1/3/20 1/21 2/3 2/12 2/19 2/27   Total Weekly Dose 49mg 49mg 49mg 49mg 49mg 45mg 45mg 45mg 47mg 43mg 43mg 43mg   INR 3.57 3.12 2.88 3.33 4.06 2.59 3.21 3.85 3.11 2.34 3.01 3.20   Notes less GLV Cymbalta start 9/25; Depakote start 9/5 1x decr dose  depakote inc; trintellix dcd ceftin x10 days Recv'd 1/6  incr GLV  dieting        Date 3/5 3/12 3/20 4/2 4/17 5/7 5/28 6/16 6/29 7/17 8/14 8/28   Total Weekly Dose 41mg 41mg 41mg 41mg 41mg 41mg 41mg 39mg 39mg 39mg 39mg 39mg   INR 3.07 3.01 2.95 2.66 2.9 3.03 3.36 2.48 2.42 3.08 2.78 2.28   Notes 1x decr dose 1x decr dose       recv'd 6/30  recv'd 8/13 recv'd 8/31     Date 9/28 10/29  12/4 1/8/21 2/9 3/31 5/12 6/9  8/3 8/31   Total Weekly Dose 39mg 39mg non-  compliant 39mg 39mg 39mg 39mg 39mg 39mg non-  compliant 39mg 39mg   INR 2.33 2.39  2.46 2.45 2.85 2.48 2.52 2.51  2.88 2.77   Notes recv'd 9/29 recv'd 10/30   recv'd 1/11  recv'd 4/1           Date 10/4 11/1 12/6  3/8 4/5 5/9 6/10 6/23      Total Weekly Dose 39mg 39mg 39mg  non- compliant  39mg 39mg 39 mg 39 mg 39 mg      INR 2.50 2.54 2.2  2.40 2.40 2.98 2.26 2.96      Notes  Rec 11/2 Rec 12/7              *takes warfarin in AM*    Phone Interview:  Verbal Release Authorization: please contact Mr. Hall directly - if unable to reach patient may contact [email or web address]  Tablet Strength: 2mg and 5mg tablets  Patient Contact Info: [phone]; Susan Delgadillo's number is [phone] *please call both at; [email or web address]; [email or web address]  Lab Contact Info: Megan Vu     Patient Findings:  Negatives:  Signs/symptoms of thrombosis, Signs/symptoms of bleeding, Laboratory test error suspected, Change in health, Change in alcohol use, Change in activity, Upcoming invasive procedure, Emergency department visit, Upcoming dental procedure, Missed doses, Extra doses, Change in medications, Change in diet/appetite, Hospital admission, Bruising, Other complaints       Plan:  1. INR was therapeutic on 6/22 at 2.96; result received today. Instructed Susan to have Mr. Hall continue current maintenance dose of warfarin 5 mg oral daily except 7 mg on MonFri until recheck (39 mg/week). Susan did not answer but LVM with instructions for her.  2. Mr. Hall prefers we contact Susan with dosing instructions and follow up testing dates.  3. Repeat INR in two weeks, 7/7/22.  4. Verbal information provided over the phone. Manjeet Hall RBV dosing instructions, expresses understanding by teach back, and has no further questions at this time.    Alysha Hill, PharmD, BCPS   6/23/2022  10:23 EDT

## 2022-06-23 NOTE — TELEPHONE ENCOUNTER
----- Message from Vimal John III, MD sent at 6/23/2022  9:08 AM EDT -----  Cholesterol numbers are elevated.  Need to start rosuvastatin 20 mg p.o. daily.

## 2022-06-23 NOTE — TELEPHONE ENCOUNTER
Notified of message above from . Verbalized understanding.Patient to call back if any questions or concerns.

## 2022-07-05 ENCOUNTER — OFFICE VISIT (OUTPATIENT)
Dept: NEUROLOGY | Facility: CLINIC | Age: 47
End: 2022-07-05

## 2022-07-05 ENCOUNTER — LAB (OUTPATIENT)
Dept: LAB | Facility: HOSPITAL | Age: 47
End: 2022-07-05

## 2022-07-05 VITALS
HEIGHT: 69 IN | SYSTOLIC BLOOD PRESSURE: 124 MMHG | WEIGHT: 237 LBS | HEART RATE: 78 BPM | OXYGEN SATURATION: 97 % | DIASTOLIC BLOOD PRESSURE: 66 MMHG | BODY MASS INDEX: 35.1 KG/M2

## 2022-07-05 DIAGNOSIS — R73.03 PRE-DIABETES: ICD-10-CM

## 2022-07-05 DIAGNOSIS — I69.30 SEQUELAE, POST-STROKE: Primary | ICD-10-CM

## 2022-07-05 DIAGNOSIS — Z95.2 HX OF AORTIC VALVE REPLACEMENT, MECHANICAL: ICD-10-CM

## 2022-07-05 LAB
INR PPP: 2.84 (ref 0.84–1.13)
PROTHROMBIN TIME: 30 SECONDS (ref 11.4–14.4)

## 2022-07-05 PROCEDURE — 99213 OFFICE O/P EST LOW 20 MIN: CPT | Performed by: PSYCHIATRY & NEUROLOGY

## 2022-07-05 PROCEDURE — 85610 PROTHROMBIN TIME: CPT | Performed by: INTERNAL MEDICINE

## 2022-07-05 PROCEDURE — 36415 COLL VENOUS BLD VENIPUNCTURE: CPT

## 2022-07-05 RX ORDER — DIVALPROEX SODIUM 500 MG/1
1000 TABLET, EXTENDED RELEASE ORAL
COMMUNITY
Start: 2022-05-16

## 2022-07-05 NOTE — PROGRESS NOTES
Subjective:    CC: Manjeet Hall is seen today for stroke       HPI:  Current visit- patient states that his speech has continued to improve but he has been doing less physical activity and this has caused him to gain over 30 pounds.  He continues to be on Coumadin (last INR was 2.96).  He has stopped taking aspirin 81 mg but was restarted on Crestor 20 mg by his PCP as his last lipid panel was elevated as follows-, , , HDL 44.  His A1c has also gone up to 6.4.    Last visit-patient states he is improving gradually in terms of his speech.  Has stopped getting speech therapy.  Continues to take Coumadin with aspirin 81 mg.  He did cut back on his gabapentin and is now taking 300 mg twice a day (still does not know what he was started on it for).  He has not lost any weight but is now starting to exercise more.    Last visit-since the last visit patient has been doing the same in terms of his speech.  He is still getting speech therapy.  Continues to take Coumadin but stopped taking Lipitor.  His last lipid panel was well controlled as follows-, , LDL 92, HDL 59.  He is also on Depakote which is helping tremendously with his mood.  He states that he has gained a lot of weight in the past year due to not exercising as much.  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  Also takes gabapentin 600 mg in the morning and 900 mg at night but cannot tell me exactly why he is taking that.    Last visit-patient has been steadily making progress in terms of his speech.  He continues to go to [facility] for speech therapy twice a week.  He still has difficulty comprehending certain words and he tells me today that it is tough for him to hear certain words.  He has stopped taking atorvastatin but cannot tell me why.  Also takes Depakote.  Again is unsure of why he is taking it, thinks it may be for his headaches.    Last visit- patient  speech continues to improve.  He is still getting speech therapy at [facility] at least 2-3 times a week.  He continues to be on Coumadin as well as atorvastatin 40 mg daily.  He is also exercising now and has lost weight.  Takes gabapentin for neuropathy.    Last visit- since his last visit his speech has improved and he is still getting speech therapy twice a week at [facility].  His carotid Doppler that I had ordered showed mild plaques bilaterally but no significant stenosis.  He continues to be on Coumadin with a therapeutic INR.  His lipid panel was as follows-, , , HDL 53.  I had started him on atorvastatin 40 mg which she is continuing to take.  Of note-I also personally reviewed his notes from [facility] which are as follows.    CT head showed decreased attenuation in the left temporal operculum and left inferior parietal lobe and parts of the lateral left superior parietal lobe reflecting a left MCA infarct.  MRI brain also showed restricted diffusion in the left posterior MCA area and left insular cortex (aspect score of 5)  CTA head showed thrombus in the left M1-2 junction with good distal collateral vessels and absent left A1 .The rest of the anterior circulation and vertebral basilar circulation was patent.  CTA neck showed mild carotid plaques without any significant stenosis on either side.  Echo showed EF of 51% with the limitation of right ventricle and moderate dilatation of left atrium, moderate aortic valve regurg    Initial [id number]-old male accompanied by his wife with a history of aortic stenosis status post valve replacement and VSD repair at age 14, aortic aneurysm and aortic valve regurgitation status post aneurysmal repair and aortic valve repair, anxiety, depression, Hodgkin's lymphoma at age 23 status post chemotherapy and radiation, hypothyroidism presents with a stroke.  As per patient's wife he underwent  a mechanical aortic valve repair as well  as and aneurysmal repair in [facility] in August.  He was started on Coumadin soon after the procedure however he developed pancreatitis after surgery hence his Coumadin was stopped for 1 week and he was started on IV heparin.  During that time he had symptoms of garbled speech.  An MRI brain later confirmed a left hemispheric stroke.  He was also found to have a thrombus in his mechanical aortic valve and restarted back on Coumadin.  Currently being bridged with Lovenox.  His last INR yesterday was 1.85.  He has not yet started getting speech therapy and will start soon.    The following portions of the patient's history were reviewed today and updated as of 11/19/2019  : allergies, current medications, past family history, past medical history, past social history, past surgical history and problem list  These document will be scanned to patient's chart.      Current Outpatient Medications:   •  buPROPion XL (WELLBUTRIN XL) 150 MG 24 hr tablet, Take 150 mg by mouth Every Morning., Disp: , Rfl:   •  divalproex (DEPAKOTE ER) 500 MG 24 hr tablet, Take 1,000 mg by mouth every night at bedtime., Disp: , Rfl:   •  DULoxetine (CYMBALTA) 60 MG capsule, Take 60 mg by mouth Daily., Disp: , Rfl: 2  •  gabapentin (NEURONTIN) 300 MG capsule, Take 300 mg by mouth 2 (Two) Times a Day., Disp: , Rfl:   •  lansoprazole (PREVACID) 30 MG capsule, Take 1 capsule by mouth Daily., Disp: 30 capsule, Rfl: 5  •  levothyroxine (SYNTHROID, LEVOTHROID) 125 MCG tablet, TAKE 1 TABLET BY MOUTH DAILY. FURTHER REFILLS PER PCP WITH REPEAT LAB WORK., Disp: 90 tablet, Rfl: 0  •  propranolol LA (Inderal LA) 60 MG 24 hr capsule, Take 1 capsule by mouth Daily., Disp: 90 capsule, Rfl: 1  •  rosuvastatin (CRESTOR) 20 MG tablet, Take 1 tablet by mouth Daily., Disp: 30 tablet, Rfl: 5  •  warfarin (COUMADIN) 2 MG tablet, TAKE 1 TABLET BY MOUTH EVERY DAY AS DIRECTED, Disp: 12 tablet, Rfl: 0  •  warfarin (COUMADIN) 5 MG tablet, TAKE 1 TABLET BY MOUTH  "DAILY AS DIRECTED BY ANTICOAGULATION CLINIC., Disp: 90 tablet, Rfl: 0   Past Medical History:   Diagnosis Date   • Abnormal heart rhythm    • Anxiety    • Cancer (HCC)     hodgkins   • Depression    • GERD (gastroesophageal reflux disease)    • Heart murmur    • History of echocardiogram    • History of left heart catheterization    • History of stomach ulcers    • Hypothyroidism    • Mitral valvular disorder    • Thyroid disorder       Past Surgical History:   Procedure Laterality Date   • CARDIAC SURGERY     • SKIN CANCER EXCISION      mass removed from neck and chest for hodgkins      Family History   Problem Relation Age of Onset   • COPD Mother    • Cancer Mother    • Hypertension Father    • Stroke Father    • Stroke Paternal Grandmother    • Hypertension Paternal Grandmother    • Cancer Paternal Grandmother    • Cancer Paternal Grandfather    • Heart disease Paternal Grandfather    • No Known Problems Brother       Social History     Socioeconomic History   • Marital status: Legally    Tobacco Use   • Smoking status: Never Smoker   • Smokeless tobacco: Never Used   Vaping Use   • Vaping Use: Never used   Substance and Sexual Activity   • Alcohol use: Yes     Comment: social   • Drug use: No   • Sexual activity: Defer     Review of Systems   Neurological: Positive for speech difficulty.   All other systems reviewed and are negative.      Objective:    /66   Pulse 78   Ht 175.3 cm (69.02\")   Wt 108 kg (237 lb)   SpO2 97%   BMI 34.98 kg/m²     Neurology Exam: Reviewed and remains unchanged    General apperance: NAD.     Mental status: Alert, awake and oriented to time place and person.    Recent and Remote memory: Intact.    Attention span and Concentration: Normal.     Language and Speech: Intact- No dysarthria.    Fluency, Naming , Repitition and Comprehension: Difficulty comprehending certain things.  Also reduced fluency however significantly improved since previous visits    Cranial " Nerves:   CN II: Visual fields are full. Intact. Fundi - Normal, No papillederma, Pupils - MATTHIEU  CN III, IV and VI: Extraocular movements are intact. Normal saccades.   CN V: Facial sensation is intact.   CN VII: Muscles of facial expression reveal no asymmetry. Intact.   CN VIII: Hearing is intact. Whispered voice intact.   CN IX and X: Palate elevates symmetrically. Intact  CN XI: Shoulder shrug is intact.   CN XII: Tongue is midline without evidence of atrophy or fasciculation.     Ophthalmoscopic exam of optic disc-normal    Motor:  Right UE muscle strength 5/5. Normal tone.     Left UE muscle strength 5/5. Normal tone.      Right LE muscle strength5/5. Normal tone.     Left LE muscle strength 5/5. Normal tone.      Sensory: Normal light touch, vibration and pinprick sensation bilaterally.    DTRs: 2+ bilaterally in upper and lower extremities.    Babinski: Negative bilaterally.    Co-ordination: Normal finger-to-nose, heel to shin B/L.    Rhomberg: Negative.    Gait: Normal.    Cardiovascular: Regular rate and rhythm without murmur, gallop or rub.    Assessment and Plan:  1. Sequelae, post-stroke  Patient had a left hemispheric infarct secondary to an artificial aortic valve thrombus after he was taken off Coumadin briefly for pancreatitis  Currently on Coumadin.  Goal INR 2.5-3.5.  Has stopped aspirin  Maintain blood pressure less than 130/90.  His blood pressure is well controlled  He is back to taking Crestor 20 mg daily.  Goal LDL of less than 100.  His LDL was 149  I have once again told him to carry out dietary modifications for his prediabetes.  A1c of 6.4  I have also told him to exercise regularly    2.  Prediabetes  I will give him a nutrition counseling referral at [facility] in about 1 year (around 7/5/2023).         Lindsay Summers MD

## 2022-07-06 ENCOUNTER — ANTICOAGULATION VISIT (OUTPATIENT)
Dept: PHARMACY | Facility: HOSPITAL | Age: 47
End: 2022-07-06

## 2022-07-06 DIAGNOSIS — Z95.2 HX OF AORTIC VALVE REPLACEMENT, MECHANICAL: Primary | ICD-10-CM

## 2022-07-06 NOTE — PROGRESS NOTES
Anticoagulation Clinic - Remote Progress Note  REMOTE LAB    Indication: On-X Mechanical valve #25 and a 30mm hemashield; stroke  Referring Provider: Alvaro (Last seen:  1/31/21)  Initial Warfarin Start Date: ~ Sept 2018  Goal INR: 2.0-3.0 per referral  Current Drug Interactions: duloxetine, lansoprazole, levothyroxine, Trintellix, valproic acid  Bleed Risk: ascending aortic aneurysm   Other: Hodgkins Lymphoma (hx of chemo and radiation); h/o CVA + hemorrhagic pancreatitis (during hospitalization for mAVR)    Diet: avoids GLV (6/13/22)  Alcohol: socially  Tobacco: none  OTC Pain Medication: APAP PRN pain    INR History:  Date 9/27 9/28 10/1 10/4 10/8 10/11 10/17 10/19 10/23 11/8 11/16 12/21   Total Weekly Dose 8mg 12mg 19mg 29mg 31mg 34mg 43mg 41mg 45mg 49mg 49mg 49mg   INR 1.3 1.22 1.3 1.8 1.85 1.70 2.91 2.82 3.79 3.00 2.96 3.44   Notes clinic enox enox enox; clinic enox enox      desvenla     Date 1/9/19 1/16 2/4 3/1 3/22 5/16 6/13 6/24 7/10 7/22 8/5 8/26   Total Weekly Dose 49mg 47mg 49mg 49mg 49mg 49mg 49mg 49mg 49mg 49mg 49mg 49mg   INR 4.40 2.36 2.62 3.02 2.15 2.60 2.25 2.98 2.83 2.97 2.90 3.20   Notes   self adj              Date 9/26 10/18 10/28 11/19 12/3 12/17 1/3/20 1/21 2/3 2/12 2/19 2/27   Total Weekly Dose 49mg 49mg 49mg 49mg 49mg 45mg 45mg 45mg 47mg 43mg 43mg 43mg   INR 3.57 3.12 2.88 3.33 4.06 2.59 3.21 3.85 3.11 2.34 3.01 3.20   Notes less GLV Cymbalta start 9/25; Depakote start 9/5 1x decr dose  depakote inc; trintellix dcd ceftin x10 days Recv'd 1/6  incr GLV  dieting        Date 3/5 3/12 3/20 4/2 4/17 5/7 5/28 6/16 6/29 7/17 8/14 8/28   Total Weekly Dose 41mg 41mg 41mg 41mg 41mg 41mg 41mg 39mg 39mg 39mg 39mg 39mg   INR 3.07 3.01 2.95 2.66 2.9 3.03 3.36 2.48 2.42 3.08 2.78 2.28   Notes 1x decr dose 1x decr dose       recv'd 6/30  recv'd 8/13 recv'd 8/31     Date 9/28 10/29  12/4 1/8/21 2/9 3/31 5/12 6/9  8/3 8/31   Total Weekly Dose 39mg 39mg non-  compliant 39mg 39mg 39mg 39mg 39mg 39mg non-  compliant 39mg 39mg   INR 2.33 2.39  2.46 2.45 2.85 2.48 2.52 2.51  2.88 2.77   Notes recv'd 9/29 recv'd 10/30   recv'd 1/11  recv'd 4/1           Date 10/4 11/1 12/6  3/8 4/5 5/9 6/10 6/23 7/5     Total Weekly Dose 39mg 39mg 39mg  non- compliant  39mg 39mg 39 mg 39 mg 39 mg 39 mg     INR 2.50 2.54 2.2  2.40 2.40 2.98 2.26 2.96 2.84     Notes  Rec 11/2 Rec 12/7       rec'd 7/6     *takes warfarin in AM*    Phone Interview:  Verbal Release Authorization: please contact Mr. Hall directly - if unable to reach patient may contact brotherMarko  Tablet Strength: 2mg and 5mg tablets  Patient Contact Info: [phone]; Susan Elie's number is [phone] *please call both at; [email or web address]; [email or web address]  Lab Contact Info: Megan Vu     Patient Findings  Negatives:  Signs/symptoms of thrombosis, Signs/symptoms of bleeding, Laboratory test error suspected, Change in health, Change in alcohol use, Change in activity, Upcoming invasive procedure, Emergency department visit, Upcoming dental procedure, Missed doses, Extra doses, Change in medications, Change in diet/appetite, Hospital admission, Bruising, Other complaints   Comments:  All findings negative per Susan.      Plan:  1. INR was therapeutic yesterday at 2.84(goal 2.0-3.0,) result received today. Instructed Susan to have Mr. Hall continue current maintenance dose of warfarin 5 mg oral daily except 7 mg on MonFri until recheck (39 mg/week).   2. Mr. Hall prefers we contact Susan with dosing instructions and follow up testing dates.  3. Repeat INR in two weeks, 7/19/22.  4. Verbal information provided over the phone. Manjeetpedro OrtegaSrinivas Rafael RBV dosing instructions, expresses understanding by teach back, and has no further questions at this time.    Parth Fagan CPhT  7/6/2022  09:31 EDT     I, Sondra Brooks, PharmD, have reviewed the note in full and agree with the assessment and plan.  07/06/22  10:50 EDT

## 2022-07-18 ENCOUNTER — HOSPITAL ENCOUNTER (OUTPATIENT)
Dept: NUTRITION | Facility: HOSPITAL | Age: 47
Setting detail: RECURRING SERIES
Discharge: HOME | End: 2022-07-18

## 2022-07-18 PROCEDURE — 97802 MEDICAL NUTRITION INDIV IN: CPT

## 2022-07-18 NOTE — CONSULTS
"Adult Outpatient Nutrition  Assessment/PES    Patient Name: Manjeet Hall  YOB: 1975  MRN: [id number]      Assessment Date: 07/18/22      This medical referred consult was provided via in person services per patient request. Consent for treatment was given verbally. RD spent a total of 60 minutes with patient today. Pt brings support person today as he struggles with verbal communication s/p stroke.      Reason for visit:     Patient is a 46 year old male who is referred today for pre diabetes       Nutrition assessment comments: patient completed assessment via My Chart prior to today's session.     Session Details:     Attendees: patient and support person, Payton   Language/communication details: english   Barriers to learning: difficulty with verbal communication 2/2 sequelae s/p stroke.  Details of home: lives with children     Anthropometrics:     Ht Readings from Last 1 Encounters:   07/05/22 175.3 cm (69.02\")      Wt Readings from Last 1 Encounters:   07/05/22 108 kg (237 lb)      BMI Readings from Last 1 Encounters:   07/05/22 34.98 kg/m²      Ideal body weight: 70.7 kg (155 lb 15.1 oz)  Adjusted ideal body weight: 85.4 kg (188 lb 5.9 oz)              Recent weight change: patient reports weight gain since stroke 2/2 inability to be physically active.     Pertinent Labs:     A1C= 6.4% (3/21/2022)    Pertinent Medications/Supplements:    Synthroid  Coumadin   Crestor    Nutrition Assessment:       Patient reports for appt today with support person, Payton. He has had difficulty verbally communicating since his stroke. Patient reports that his biggest challenges with diet are portion control of carbs, and being a picky eater. He does not like many non starchy vegetables. He reports that he buys foods for his kids that might not be the healthiest, but they are available. We spoke about importance of carb control at meal times to control BS. As well as the consistency of meal intake throughout " the day helping with blood sugar as well as increased satiety. Patient verbalizes his understanding. RD spoke with pt about abiding by the my plate method to ensure that they are providing their body with adequate macro and micronutrients from multiple food groups, we spoke about the importance of a balanced diet in achieving weight loss that is long term. RD spoke with pt about the importance of portion sizes as well. Portion control plays an important role in being more mindful of calorie intake. RD shared recipe resource with pt today, www.diabetesfoodhub.org       Who prepares most meals: patient and Payton   Who does grocery shopping: not disclosed   Food insecurity: no  Food allergies/intolerances/aversions: NKFA  Difficulty chewing: no  Difficulty swallowing: no  History of Eating Disorder: no  Gastrointestinal symptoms that impact intake or food choices: no  Diet requirements related to personal preference or cultural belief: n/a    Diet recall:   Time Food/beverages consumed Quantity    Breakfast 1-2 eggs, 2 pieces of sausage         Lunch  Depends-pizza, ham sandwich, chicken nuggets with rice          Dinner  Chicken nuggets with rice, spaghetti with meat sauce, pork tenderloin with baked potato and corn (depends on night)                Physical activity: patient reports he has not been able to be very active, plans to start walking.     Activity or exercise program:  Frequency of exercise:   Duration of exercise:     Assessed Needs:     Estimated energy needs: MSJ x 1.2-500= ~1841 kcal per day  Estimated protein needs: 0.8 g/kg= 86 g protein per day   Estimated fluid needs: 1 mL/kcal= ~1841 mL per day     PES Statement:     Unintentional weight gain related to stroke, recent heart surgery as evidenced by patient report of weight gain since occurrence.    Intervention Goal(s):     1. Attempt to adhere to plate method at least 1 meal per day; protein and carb at other meals  2. Increased PA; attempting to  increase walking   3. Healthful weight loss     Resources Provided:   Plate method handouts  Healthy snack ideas      Total of 60 minutes spent with patient on nutrition counseling. Education based on Academy of Nutrition and Dietetics guidelines. Patient was provided with RD's contact information. Follow up visit is scheduled for 8/15/2022 @ 10:30 AM.  Thank you for this referral.

## 2022-07-19 ENCOUNTER — LAB (OUTPATIENT)
Dept: LAB | Facility: HOSPITAL | Age: 47
End: 2022-07-19

## 2022-07-19 DIAGNOSIS — Z95.2 HX OF AORTIC VALVE REPLACEMENT, MECHANICAL: ICD-10-CM

## 2022-07-19 LAB
INR PPP: 2.88 (ref 0.84–1.13)
PROTHROMBIN TIME: 30.3 SECONDS (ref 11.4–14.4)

## 2022-07-19 PROCEDURE — 85610 PROTHROMBIN TIME: CPT

## 2022-07-19 PROCEDURE — 36415 COLL VENOUS BLD VENIPUNCTURE: CPT

## 2022-07-20 ENCOUNTER — ANTICOAGULATION VISIT (OUTPATIENT)
Dept: PHARMACY | Facility: HOSPITAL | Age: 47
End: 2022-07-20

## 2022-07-20 DIAGNOSIS — Z95.2 HX OF AORTIC VALVE REPLACEMENT, MECHANICAL: Primary | ICD-10-CM

## 2022-07-20 NOTE — PROGRESS NOTES
Anticoagulation Clinic - Remote Progress Note  REMOTE LAB    Indication: On-X Mechanical valve #25 and a 30mm hemashield; stroke  Referring Provider: Alvaro (Last seen:  1/31/21)  Initial Warfarin Start Date: ~ Sept 2018  Goal INR: 2.0-3.0 per referral  Current Drug Interactions: duloxetine, lansoprazole, levothyroxine, Trintellix, valproic acid  Bleed Risk: ascending aortic aneurysm   Other: Hodgkins Lymphoma (hx of chemo and radiation); h/o CVA + hemorrhagic pancreatitis (during hospitalization for mAVR)    Diet: avoids GLV (6/13/22)  Alcohol: socially  Tobacco: none  OTC Pain Medication: APAP PRN pain    INR History:  Date 9/27 9/28 10/1 10/4 10/8 10/11 10/17 10/19 10/23 11/8 11/16 12/21   Total Weekly Dose 8mg 12mg 19mg 29mg 31mg 34mg 43mg 41mg 45mg 49mg 49mg 49mg   INR 1.3 1.22 1.3 1.8 1.85 1.70 2.91 2.82 3.79 3.00 2.96 3.44   Notes clinic enox enox enox; clinic enox enox      desvenla     Date 1/9/19 1/16 2/4 3/1 3/22 5/16 6/13 6/24 7/10 7/22 8/5 8/26   Total Weekly Dose 49mg 47mg 49mg 49mg 49mg 49mg 49mg 49mg 49mg 49mg 49mg 49mg   INR 4.40 2.36 2.62 3.02 2.15 2.60 2.25 2.98 2.83 2.97 2.90 3.20   Notes   self adj              Date 9/26 10/18 10/28 11/19 12/3 12/17 1/3/20 1/21 2/3 2/12 2/19 2/27   Total Weekly Dose 49mg 49mg 49mg 49mg 49mg 45mg 45mg 45mg 47mg 43mg 43mg 43mg   INR 3.57 3.12 2.88 3.33 4.06 2.59 3.21 3.85 3.11 2.34 3.01 3.20   Notes less GLV Cymbalta start 9/25; Depakote start 9/5 1x decr dose  depakote inc; trintellix dcd ceftin x10 days Recv'd 1/6  incr GLV  dieting        Date 3/5 3/12 3/20 4/2 4/17 5/7 5/28 6/16 6/29 7/17 8/14 8/28   Total Weekly Dose 41mg 41mg 41mg 41mg 41mg 41mg 41mg 39mg 39mg 39mg 39mg 39mg   INR 3.07 3.01 2.95 2.66 2.9 3.03 3.36 2.48 2.42 3.08 2.78 2.28   Notes 1x decr dose 1x decr dose       recv'd 6/30  recv'd 8/13 recv'd 8/31     Date 9/28 10/29  12/4 1/8/21 2/9 3/31 5/12 6/9  8/3 8/31   Total Weekly Dose 39mg 39mg non-  compliant 39mg 39mg 39mg 39mg 39mg 39mg non-  compliant 39mg 39mg   INR 2.33 2.39  2.46 2.45 2.85 2.48 2.52 2.51  2.88 2.77   Notes recv'd 9/29 recv'd 10/30   recv'd 1/11  recv'd 4/1           Date 10/4 11/1 12/6  3/8 4/5 5/9 6/10 6/23 7/5 7/20    Total Weekly Dose 39mg 39mg 39mg  non- compliant  39mg 39mg 39 mg 39 mg 39 mg 39 mg 39 mg    INR 2.50 2.54 2.2  2.40 2.40 2.98 2.26 2.96 2.84 2.88    Notes  Rec 11/2 Rec 12/7       rec'd 7/6     *takes warfarin in AM*    Phone Interview:  Verbal Release Authorization: please contact Mr. Hall directly - if unable to reach patient may contact brotherMarko  Tablet Strength: 2mg and 5mg tablets  Patient Contact Info: [phone]; Susan Delgadillo's number is [phone] *please call both at; [email or web address]; [email or web address]  Lab Contact Info: Megan Vu     Patient Findings  Negatives:  Signs/symptoms of thrombosis, Signs/symptoms of bleeding, Laboratory test error suspected, Change in health, Change in alcohol use, Change in activity, Upcoming invasive procedure, Emergency department visit, Upcoming dental procedure, Missed doses, Extra doses, Change in medications, Change in diet/appetite, Hospital admission, Bruising, Other complaints         Plan:  1. INR was therapeutic yesterday at 2.88(goal 2.0-3.0,) result received today. Instructed Susan to have Mr. Hall continue current maintenance dose of warfarin 5 mg oral daily except 7 mg on MonFri until recheck (39 mg/week).   2. Mr. Hall prefers we contact Susan with dosing instructions and follow up testing dates.  3. Repeat INR in two weeks, 8/2/22.  4. Verbal information provided over the phone. Manjeet Espino Hall RBV dosing instructions, expresses understanding by teach back, and has no further questions at this time.    Alysha Hill, PharmD, BCPS   7/20/2022  14:58 EDT

## 2022-07-26 RX ORDER — WARFARIN SODIUM 5 MG/1
TABLET ORAL
Qty: 90 TABLET | Refills: 0 | Status: SHIPPED | OUTPATIENT
Start: 2022-07-26 | End: 2022-10-17

## 2022-07-26 RX ORDER — PROPRANOLOL HCL 60 MG
CAPSULE, EXTENDED RELEASE 24HR ORAL
Qty: 90 CAPSULE | Refills: 1 | Status: SHIPPED | OUTPATIENT
Start: 2022-07-26 | End: 2023-01-23

## 2022-08-05 ENCOUNTER — LAB (OUTPATIENT)
Dept: LAB | Facility: HOSPITAL | Age: 47
End: 2022-08-05

## 2022-08-05 DIAGNOSIS — Z95.2 HX OF AORTIC VALVE REPLACEMENT, MECHANICAL: ICD-10-CM

## 2022-08-05 LAB
INR PPP: 3.48 (ref 0.84–1.13)
PROTHROMBIN TIME: 35.3 SECONDS (ref 11.4–14.4)

## 2022-08-05 PROCEDURE — 85610 PROTHROMBIN TIME: CPT

## 2022-08-05 PROCEDURE — 36415 COLL VENOUS BLD VENIPUNCTURE: CPT

## 2022-08-08 ENCOUNTER — ANTICOAGULATION VISIT (OUTPATIENT)
Dept: PHARMACY | Facility: HOSPITAL | Age: 47
End: 2022-08-08

## 2022-08-08 DIAGNOSIS — Z95.2 HX OF AORTIC VALVE REPLACEMENT, MECHANICAL: Primary | ICD-10-CM

## 2022-08-08 NOTE — PROGRESS NOTES
Anticoagulation Clinic - Remote Progress Note  REMOTE LAB    Indication: On-X Mechanical valve #25 and a 30mm hemashield; stroke  Referring Provider: Alvaro (Last seen:  1/31/21)  Initial Warfarin Start Date: ~ Sept 2018  Goal INR: 2.0-3.0 per referral  Current Drug Interactions: duloxetine, lansoprazole, levothyroxine, Trintellix, valproic acid  Bleed Risk: ascending aortic aneurysm   Other: Hodgkins Lymphoma (hx of chemo and radiation); h/o CVA + hemorrhagic pancreatitis (during hospitalization for mAVR)    Diet: avoids GLV (6/13/22)  Alcohol: socially  Tobacco: none  OTC Pain Medication: APAP PRN pain    INR History:  Date 9/27 9/28 10/1 10/4 10/8 10/11 10/17 10/19 10/23 11/8 11/16 12/21   Total Weekly Dose 8mg 12mg 19mg 29mg 31mg 34mg 43mg 41mg 45mg 49mg 49mg 49mg   INR 1.3 1.22 1.3 1.8 1.85 1.70 2.91 2.82 3.79 3.00 2.96 3.44   Notes clinic enox enox enox; clinic enox enox      desvenla     Date 1/9/19 1/16 2/4 3/1 3/22 5/16 6/13 6/24 7/10 7/22 8/5 8/26   Total Weekly Dose 49mg 47mg 49mg 49mg 49mg 49mg 49mg 49mg 49mg 49mg 49mg 49mg   INR 4.40 2.36 2.62 3.02 2.15 2.60 2.25 2.98 2.83 2.97 2.90 3.20   Notes   self adj              Date 9/26 10/18 10/28 11/19 12/3 12/17 1/3/20 1/21 2/3 2/12 2/19 2/27   Total Weekly Dose 49mg 49mg 49mg 49mg 49mg 45mg 45mg 45mg 47mg 43mg 43mg 43mg   INR 3.57 3.12 2.88 3.33 4.06 2.59 3.21 3.85 3.11 2.34 3.01 3.20   Notes less GLV Cymbalta start 9/25; Depakote start 9/5 1x decr dose  depakote inc; trintellix dcd ceftin x10 days Recv'd 1/6  incr GLV  dieting        Date 3/5 3/12 3/20 4/2 4/17 5/7 5/28 6/16 6/29 7/17 8/14 8/28   Total Weekly Dose 41mg 41mg 41mg 41mg 41mg 41mg 41mg 39mg 39mg 39mg 39mg 39mg   INR 3.07 3.01 2.95 2.66 2.9 3.03 3.36 2.48 2.42 3.08 2.78 2.28   Notes 1x decr dose 1x decr dose       recv'd 6/30  recv'd 8/13 recv'd 8/31     Date 9/28 10/29  12/4 1/8/21 2/9 3/31 5/12 6/9  8/3 8/31   Total Weekly Dose 39mg 39mg non-  compliant 39mg 39mg 39mg 39mg 39mg 39mg non-  compliant 39mg 39mg   INR 2.33 2.39  2.46 2.45 2.85 2.48 2.52 2.51  2.88 2.77   Notes recv'd 9/29 recv'd 10/30   recv'd 1/11  recv'd 4/1           Date 10/4 11/1 12/6  3/8 4/5 5/9 6/10 6/23 7/5 7/20 8/5   Total Weekly Dose 39mg 39mg 39mg  non- compliant  39mg 39mg 39 mg 39 mg 39 mg 39 mg 39 mg 39mg   INR 2.50 2.54 2.2  2.40 2.40 2.98 2.26 2.96 2.84 2.88 3.48   Notes  Rec 11/2 Rec 12/7       rec'd 7/6  rec 8/8   *takes warfarin in AM*    Phone Interview:  Verbal Release Authorization: please contact Mr. Hall directly - if unable to reach patient may contact brotherMarko  Tablet Strength: 2mg and 5mg tablets  Patient Contact Info: [phone]; Susan Delgadillo's number is [phone] *please call both at; [email or web address]; [email or web address]  Lab Contact Info: Megan Vu     Patient Findings    Positives:  Change in activity, Change in diet/appetite   Negatives:  Signs/symptoms of thrombosis, Signs/symptoms of bleeding, Laboratory test error suspected, Change in health, Change in alcohol use, Upcoming invasive procedure, Emergency department visit, Upcoming dental procedure, Missed doses, Extra doses, Change in medications, Hospital admission, Bruising, Other complaints   Comments:  PerSusan He has been working with dietician (more GLV, smaller portions) and started exercising.          Plan:  1. INR was SUPRAtherapeutic 8/5 at 3.48(goal 2.0-3.0,) result received today. Instructed Susan to have Mr. Hall reduce current maintenance dose of warfarin 5 mg oral daily except 7 mg on Fri until recheck (37 mg/week).   2. Mr. Hall prefers we contact Susan with dosing instructions and follow up testing dates.  3. Repeat INR in two weeks, 8/17  4. Verbal information provided over the phone. Manjeet Hall RBV dosing instructions, expresses understanding by teach back, and has no further questions at this time.    Claudia HannaD.  08/08/22   10:16 EDT

## 2022-08-17 ENCOUNTER — LAB (OUTPATIENT)
Dept: LAB | Facility: HOSPITAL | Age: 47
End: 2022-08-17

## 2022-08-17 ENCOUNTER — ANTICOAGULATION VISIT (OUTPATIENT)
Dept: PHARMACY | Facility: HOSPITAL | Age: 47
End: 2022-08-17

## 2022-08-17 DIAGNOSIS — Z95.2 HX OF AORTIC VALVE REPLACEMENT, MECHANICAL: ICD-10-CM

## 2022-08-17 DIAGNOSIS — Z95.2 HX OF AORTIC VALVE REPLACEMENT, MECHANICAL: Primary | ICD-10-CM

## 2022-08-17 LAB
INR PPP: 3.24 (ref 0.84–1.13)
PROTHROMBIN TIME: 33.3 SECONDS (ref 11.4–14.4)

## 2022-08-17 PROCEDURE — 36415 COLL VENOUS BLD VENIPUNCTURE: CPT

## 2022-08-17 PROCEDURE — 85610 PROTHROMBIN TIME: CPT

## 2022-08-17 NOTE — PROGRESS NOTES
Anticoagulation Clinic - Remote Progress Note  REMOTE LAB    Indication: On-X Mechanical valve #25 and a 30mm hemashield; stroke  Referring Provider: Alvaro (Last seen:  1/31/21)  Initial Warfarin Start Date: ~ Sept 2018  Goal INR: 2.0-3.0 per referral  Current Drug Interactions: duloxetine, lansoprazole, levothyroxine, Trintellix, valproic acid  Bleed Risk: ascending aortic aneurysm   Other: Hodgkins Lymphoma (hx of chemo and radiation); h/o CVA + hemorrhagic pancreatitis (during hospitalization for mAVR)    Diet: avoids GLV (6/13/22)  Alcohol: socially  Tobacco: none  OTC Pain Medication: APAP PRN pain    INR History:  Date 9/27 9/28 10/1 10/4 10/8 10/11 10/17 10/19 10/23 11/8 11/16 12/21   Total Weekly Dose 8mg 12mg 19mg 29mg 31mg 34mg 43mg 41mg 45mg 49mg 49mg 49mg   INR 1.3 1.22 1.3 1.8 1.85 1.70 2.91 2.82 3.79 3.00 2.96 3.44   Notes clinic enox enox enox; clinic enox enox      desvenla     Date 1/9/19 1/16 2/4 3/1 3/22 5/16 6/13 6/24 7/10 7/22 8/5 8/26   Total Weekly Dose 49mg 47mg 49mg 49mg 49mg 49mg 49mg 49mg 49mg 49mg 49mg 49mg   INR 4.40 2.36 2.62 3.02 2.15 2.60 2.25 2.98 2.83 2.97 2.90 3.20   Notes   self adj              Date 9/26 10/18 10/28 11/19 12/3 12/17 1/3/20 1/21 2/3 2/12 2/19 2/27   Total Weekly Dose 49mg 49mg 49mg 49mg 49mg 45mg 45mg 45mg 47mg 43mg 43mg 43mg   INR 3.57 3.12 2.88 3.33 4.06 2.59 3.21 3.85 3.11 2.34 3.01 3.20   Notes less GLV Cymbalta start 9/25; Depakote start 9/5 1x decr dose  depakote inc; trintellix dcd ceftin x10 days Recv'd 1/6  incr GLV  dieting        Date 3/5 3/12 3/20 4/2 4/17 5/7 5/28 6/16 6/29 7/17 8/14 8/28   Total Weekly Dose 41mg 41mg 41mg 41mg 41mg 41mg 41mg 39mg 39mg 39mg 39mg 39mg   INR 3.07 3.01 2.95 2.66 2.9 3.03 3.36 2.48 2.42 3.08 2.78 2.28   Notes 1x decr dose 1x decr dose       recv'd 6/30  recv'd 8/13 recv'd 8/31     Date 9/28 10/29  12/4 1/8/21 2/9 3/31 5/12 6/9  8/3 8/31   Total Weekly Dose 39mg 39mg non-  compliant 39mg 39mg 39mg 39mg 39mg 39mg non-  compliant 39mg 39mg   INR 2.33 2.39  2.46 2.45 2.85 2.48 2.52 2.51  2.88 2.77   Notes recv'd 9/29 recv'd 10/30   recv'd 1/11  recv'd 4/1           Date 10/4 11/1 12/6  3/8 4/5 5/9 6/10 6/23 7/5 7/20 8/5   Total Weekly Dose 39mg 39mg 39mg  non- compliant  39mg 39mg 39 mg 39 mg 39 mg 39 mg 39 mg 39mg   INR 2.50 2.54 2.2  2.40 2.40 2.98 2.26 2.96 2.84 2.88 3.48   Notes  Rec 11/2 Rec 12/7       rec'd 7/6  rec 8/8     Date 8/17              Total Weekly Dose 37mg              INR 3.24              Notes               *takes warfarin in AM*    Phone Interview:  Verbal Release Authorization: please contact Mr. Hall directly - if unable to reach patient may contact floridalmaerMarko  Tablet Strength: 2mg and 5mg tablets  Patient Contact Info: [phone]; Susan Delgadillo's number is [phone] *please call both at; [email or web address]; [email or web address]  Lab Contact Info: Megan Vu     Patient Findings  Positives:  Change in activity, Change in diet/appetite, Bruising   Negatives:  Signs/symptoms of thrombosis, Signs/symptoms of bleeding, Laboratory test error suspected, Change in health, Change in alcohol use, Upcoming invasive procedure, Emergency department visit, Upcoming dental procedure, Missed doses, Extra doses, Change in medications, Hospital admission, Other complaints   Comments:  PerSusan he continues to work with dietician (more GLV which she believes to be consistent intake, smaller portions) and continues exercising.   Slightly increased bruising but nothing too concerning     All other findings negative per Susan     Plan:  1. INR was SUPRAtherapeutic today at 3.24 (goal 2.0-3.0). Instructed Susan to have Mr. Hall reduce current maintenance dose to warfarin 5 mg oral daily until recheck (35 mg/week).   2. Mr. Hall prefers we contact Susan with dosing instructions and follow up testing dates.  3. Repeat INR in one week, 8/24  4. Verbal information provided over the phone.  Manjeet Hall RBV dosing instructions, expresses understanding by teach back, and has no further questions at this time.    Jade Motta, Pharmacy Intern  08/17/22   14:43 EDT    10.3% reduction for the week from 2 weeks ago 39 mg to 35 mg/week.  I, Abdi Vides, PharmD, have reviewed the note in full and agree with the assessment and plan.  08/17/22  15:39 EDT

## 2022-08-25 ENCOUNTER — LAB (OUTPATIENT)
Dept: LAB | Facility: HOSPITAL | Age: 47
End: 2022-08-25

## 2022-08-25 DIAGNOSIS — Z95.2 HX OF AORTIC VALVE REPLACEMENT, MECHANICAL: ICD-10-CM

## 2022-08-25 LAB
INR PPP: 2.6 (ref 0.84–1.13)
PROTHROMBIN TIME: 28 SECONDS (ref 11.4–14.4)

## 2022-08-25 PROCEDURE — 85610 PROTHROMBIN TIME: CPT

## 2022-08-25 PROCEDURE — 36415 COLL VENOUS BLD VENIPUNCTURE: CPT

## 2022-08-26 ENCOUNTER — ANTICOAGULATION VISIT (OUTPATIENT)
Dept: PHARMACY | Facility: HOSPITAL | Age: 47
End: 2022-08-26

## 2022-08-26 DIAGNOSIS — Z95.2 HX OF AORTIC VALVE REPLACEMENT, MECHANICAL: Primary | ICD-10-CM

## 2022-08-26 NOTE — PROGRESS NOTES
Anticoagulation Clinic - Remote Progress Note  REMOTE LAB    Indication: On-X Mechanical valve #25 and a 30mm hemashield; stroke  Referring Provider: Alvaro (Last seen:  1/31/21)  Initial Warfarin Start Date: ~ Sept 2018  Goal INR: 2.0-3.0 per referral  Current Drug Interactions: duloxetine, lansoprazole, levothyroxine, Trintellix, valproic acid  Bleed Risk: ascending aortic aneurysm   Other: Hodgkins Lymphoma (hx of chemo and radiation); h/o CVA + hemorrhagic pancreatitis (during hospitalization for mAVR)    Diet: avoids GLV (6/13/22)  Alcohol: socially  Tobacco: none  OTC Pain Medication: APAP PRN pain    INR History:  Date 9/27 9/28 10/1 10/4 10/8 10/11 10/17 10/19 10/23 11/8 11/16 12/21   Total Weekly Dose 8mg 12mg 19mg 29mg 31mg 34mg 43mg 41mg 45mg 49mg 49mg 49mg   INR 1.3 1.22 1.3 1.8 1.85 1.70 2.91 2.82 3.79 3.00 2.96 3.44   Notes clinic enox enox enox; clinic enox enox      desvenla     Date 1/9/19 1/16 2/4 3/1 3/22 5/16 6/13 6/24 7/10 7/22 8/5 8/26   Total Weekly Dose 49mg 47mg 49mg 49mg 49mg 49mg 49mg 49mg 49mg 49mg 49mg 49mg   INR 4.40 2.36 2.62 3.02 2.15 2.60 2.25 2.98 2.83 2.97 2.90 3.20   Notes   self adj              Date 9/26 10/18 10/28 11/19 12/3 12/17 1/3/20 1/21 2/3 2/12 2/19 2/27   Total Weekly Dose 49mg 49mg 49mg 49mg 49mg 45mg 45mg 45mg 47mg 43mg 43mg 43mg   INR 3.57 3.12 2.88 3.33 4.06 2.59 3.21 3.85 3.11 2.34 3.01 3.20   Notes less GLV Cymbalta start 9/25; Depakote start 9/5 1x decr dose  depakote inc; trintellix dcd ceftin x10 days Recv'd 1/6  incr GLV  dieting        Date 3/5 3/12 3/20 4/2 4/17 5/7 5/28 6/16 6/29 7/17 8/14 8/28   Total Weekly Dose 41mg 41mg 41mg 41mg 41mg 41mg 41mg 39mg 39mg 39mg 39mg 39mg   INR 3.07 3.01 2.95 2.66 2.9 3.03 3.36 2.48 2.42 3.08 2.78 2.28   Notes 1x decr dose 1x decr dose       recv'd 6/30  recv'd 8/13 recv'd 8/31     Date 9/28 10/29  12/4 1/8/21 2/9 3/31 5/12 6/9  8/3 8/31   Total Weekly Dose 39mg 39mg non-  compliant 39mg 39mg 39mg 39mg 39mg 39mg non-  compliant 39mg 39mg   INR 2.33 2.39  2.46 2.45 2.85 2.48 2.52 2.51  2.88 2.77   Notes recv'd 9/29 recv'd 10/30   recv'd 1/11  recv'd 4/1           Date 10/4 11/1 12/6  3/8 4/5 5/9 6/10 6/23 7/5 7/20 8/5   Total Weekly Dose 39mg 39mg 39mg  non- compliant  39mg 39mg 39 mg 39 mg 39 mg 39 mg 39 mg 39mg   INR 2.50 2.54 2.2  2.40 2.40 2.98 2.26 2.96 2.84 2.88 3.48   Notes  Rec 11/2 Rec 12/7       rec'd 7/6  rec 8/8     Date 8/17 8/25             Total Weekly Dose 37mg 35mg             INR 3.24 2.6             Notes  rec 8/26             *takes warfarin in AM*    Phone Interview:  Verbal Release Authorization: please contact Mr. Hall directly - if unable to reach patient may contact brotherMarko  Tablet Strength: 2mg and 5mg tablets  Patient Contact Info: [phone]; Susan Delgadillo's number is [phone] *please call both at; [email or web address]; [email or web address]  Lab Contact Info: Megan Vu     Patient Findings  Negatives:  Signs/symptoms of thrombosis, Signs/symptoms of bleeding, Laboratory test error suspected, Change in health, Change in alcohol use, Change in activity, Upcoming invasive procedure, Emergency department visit, Upcoming dental procedure, Missed doses, Extra doses, Change in medications, Change in diet/appetite, Hospital admission, Bruising, Other complaints   Comments:  Ms Delgadillo confirms previous warfarin dosing. She denies any changes since his last encounter.      Plan:  1. INR was therapeutic 8/25 at 2.6 (goal 2.0-3.0). Instructed Susan to have Mr. Hall resume maintenance dose of warfarin 5 mg oral daily except 7mg MonFri until recheck (39 mg/week).   2. Mr. Hall prefers we contact Susan with dosing instructions and follow up testing dates.  3. Repeat INR in two weeks, 9/8.  4. Verbal information provided over the phone. Susan Delgadillo RBV dosing instructions, expresses understanding by teach back, and has no further questions at this time.    Rich GARCIA  Ruben, PharmALYX  8/26/2022  08:16 EDT

## 2022-08-31 RX ORDER — LEVOTHYROXINE SODIUM 0.12 MG/1
125 TABLET ORAL DAILY
Qty: 90 TABLET | Refills: 0 | Status: SHIPPED | OUTPATIENT
Start: 2022-08-31 | End: 2022-10-31

## 2022-09-06 ENCOUNTER — HOSPITAL ENCOUNTER (OUTPATIENT)
Dept: NUTRITION | Facility: HOSPITAL | Age: 47
Setting detail: RECURRING SERIES
Discharge: HOME | End: 2022-09-06

## 2022-09-06 VITALS — HEIGHT: 69 IN | WEIGHT: 217 LBS | BODY MASS INDEX: 32.14 KG/M2

## 2022-09-06 NOTE — PROGRESS NOTES
Nutrition Services    Patient Name:  Manjeet Hall  YOB: 1975  MRN: [id number]  Admit Date:  9/6/2022    This medical referred consult was provided via in person services per patient request. Consent for treatment was given verbally. FAUSTINO spent a total of 30 minutes with patient today.     Patient reports in office today for follow up with his support person, Payton. Patient reports that he has been making really great progress. He has been cutting down on his portions, he is increasing his fruits and vegetables. He reports that he has cut out almost all soda, occasionally does a diet soda or glass of milk. He has lost 20 lb, down to 217 lb. Patient and support person recognize increased energy throughout the day, he is progressing very well. He is walking 2-3 miles daily. He reports his cat and 14 year old daughter join him on his walks, which make them nice. He reports no barriers, motivated to continue with changes to see the progress continue. RD encouraged pt to reach out should any questions arise or he wishes to schedule a continued follow up.     Intervention Goal(s):     1. Attempt to adhere to plate method at least 1 meal per day; protein and carb at other meals-100% met   2. Increased PA; attempting to increase walking -100% met   3. Healthful weight loss - 100% met, 20 lb down, 217 lb    Electronically signed by:  Ivana Valdovinos RD  09/06/22 11:11 EDT

## 2022-09-08 ENCOUNTER — LAB (OUTPATIENT)
Dept: LAB | Facility: HOSPITAL | Age: 47
End: 2022-09-08

## 2022-09-08 DIAGNOSIS — Z95.2 HX OF AORTIC VALVE REPLACEMENT, MECHANICAL: ICD-10-CM

## 2022-09-08 LAB
INR PPP: 3.15 (ref 0.84–1.13)
PROTHROMBIN TIME: 32.6 SECONDS (ref 11.4–14.4)

## 2022-09-08 PROCEDURE — 85610 PROTHROMBIN TIME: CPT

## 2022-09-08 PROCEDURE — 36415 COLL VENOUS BLD VENIPUNCTURE: CPT

## 2022-09-09 ENCOUNTER — ANTICOAGULATION VISIT (OUTPATIENT)
Dept: PHARMACY | Facility: HOSPITAL | Age: 47
End: 2022-09-09

## 2022-09-09 DIAGNOSIS — Z95.2 HX OF AORTIC VALVE REPLACEMENT, MECHANICAL: Primary | ICD-10-CM

## 2022-09-09 NOTE — PROGRESS NOTES
Anticoagulation Clinic - Remote Progress Note  REMOTE LAB    Indication: On-X Mechanical valve #25 and a 30mm hemashield; stroke  Referring Provider: Alvaro (Last seen:  1/31/21)  Initial Warfarin Start Date: ~ Sept 2018  Goal INR: 2.0-3.0 per referral  Current Drug Interactions: duloxetine, lansoprazole, levothyroxine, Trintellix, valproic acid  Bleed Risk: ascending aortic aneurysm   Other: Hodgkins Lymphoma (hx of chemo and radiation); h/o CVA + hemorrhagic pancreatitis (during hospitalization for mAVR)    Diet: avoids GLV (6/13/22)  Alcohol: socially  Tobacco: none  OTC Pain Medication: APAP PRN pain    INR History:  Date 9/27 9/28 10/1 10/4 10/8 10/11 10/17 10/19 10/23 11/8 11/16 12/21   Total Weekly Dose 8mg 12mg 19mg 29mg 31mg 34mg 43mg 41mg 45mg 49mg 49mg 49mg   INR 1.3 1.22 1.3 1.8 1.85 1.70 2.91 2.82 3.79 3.00 2.96 3.44   Notes clinic enox enox enox; clinic enox enox      desvenla     Date 1/9/19 1/16 2/4 3/1 3/22 5/16 6/13 6/24 7/10 7/22 8/5 8/26   Total Weekly Dose 49mg 47mg 49mg 49mg 49mg 49mg 49mg 49mg 49mg 49mg 49mg 49mg   INR 4.40 2.36 2.62 3.02 2.15 2.60 2.25 2.98 2.83 2.97 2.90 3.20   Notes   self adj              Date 9/26 10/18 10/28 11/19 12/3 12/17 1/3/20 1/21 2/3 2/12 2/19 2/27   Total Weekly Dose 49mg 49mg 49mg 49mg 49mg 45mg 45mg 45mg 47mg 43mg 43mg 43mg   INR 3.57 3.12 2.88 3.33 4.06 2.59 3.21 3.85 3.11 2.34 3.01 3.20   Notes less GLV Cymbalta start 9/25; Depakote start 9/5 1x decr dose  depakote inc; trintellix dcd ceftin x10 days Recv'd 1/6  incr GLV  dieting        Date 3/5 3/12 3/20 4/2 4/17 5/7 5/28 6/16 6/29 7/17 8/14 8/28   Total Weekly Dose 41mg 41mg 41mg 41mg 41mg 41mg 41mg 39mg 39mg 39mg 39mg 39mg   INR 3.07 3.01 2.95 2.66 2.9 3.03 3.36 2.48 2.42 3.08 2.78 2.28   Notes 1x decr dose 1x decr dose       recv'd 6/30  recv'd 8/13 recv'd 8/31     Date 9/28 10/29  12/4 1/8/21 2/9 3/31 5/12 6/9  8/3 8/31   Total Weekly Dose 39mg 39mg non-  compliant 39mg 39mg 39mg 39mg 39mg 39mg non-  compliant 39mg 39mg   INR 2.33 2.39  2.46 2.45 2.85 2.48 2.52 2.51  2.88 2.77   Notes recv'd 9/29 recv'd 10/30   recv'd 1/11  recv'd 4/1           Date 10/4 11/1 12/6  3/8 4/5 5/9 6/10 6/23 7/5 7/20 8/5   Total Weekly Dose 39mg 39mg 39mg  non- compliant  39mg 39mg 39 mg 39 mg 39 mg 39 mg 39 mg 39mg   INR 2.50 2.54 2.2  2.40 2.40 2.98 2.26 2.96 2.84 2.88 3.48   Notes  Rec 11/2 Rec 12/7       rec'd 7/6  rec 8/8     Date 8/17 8/25             Total Weekly Dose 37mg 35mg             INR 3.24 2.6             Notes  rec 8/26             *takes warfarin in AM*    Phone Interview:  Verbal Release Authorization: please contact Mr. Hall directly - if unable to reach patient may contact brotherMarko  Tablet Strength: 2mg and 5mg tablets  Patient Contact Info: [phone]; Susan Delgadillo's number is [phone] *please call both at; [email or web address]; [email or web address]  Lab Contact Info: Megan Vu     Patient Findings  Positives:  Change in activity, Change in diet/appetite   Negatives:  Signs/symptoms of thrombosis, Signs/symptoms of bleeding, Laboratory test error suspected, Change in health, Change in alcohol use, Upcoming invasive procedure, Emergency department visit, Upcoming dental procedure, Missed doses, Extra doses, Change in medications, Hospital admission, Bruising, Other complaints   Comments:  Has lost weight about ~ 10 lbs due to increased exercise + fasting after 7PM daily         Plan:  1. INR was SUPRAtherapeutic  at 3.15 (goal 2.0-3.0). Instructed  Mr. Hall reduce dose to warfarin 5 mg oral daily except 7mg Mon until recheck (37 mg/week).   2.Repeat INR in two weeks, 9/22  3. Verbal information provided over the phone. Susan Delgadillo RBV dosing instructions, expresses understanding by teach back, and has no further questions at this time.    Kayla Stokcton, PharmD.  09/09/22   09:26 EDT

## 2022-09-22 ENCOUNTER — LAB (OUTPATIENT)
Dept: LAB | Facility: HOSPITAL | Age: 47
End: 2022-09-22

## 2022-09-22 ENCOUNTER — ANTICOAGULATION VISIT (OUTPATIENT)
Dept: PHARMACY | Facility: HOSPITAL | Age: 47
End: 2022-09-22

## 2022-09-22 DIAGNOSIS — Z95.2 HX OF AORTIC VALVE REPLACEMENT, MECHANICAL: ICD-10-CM

## 2022-09-22 DIAGNOSIS — Z95.2 HX OF AORTIC VALVE REPLACEMENT, MECHANICAL: Primary | ICD-10-CM

## 2022-09-22 LAB
INR PPP: 3.39 (ref 0.84–1.13)
PROTHROMBIN TIME: 34.5 SECONDS (ref 11.4–14.4)

## 2022-09-22 PROCEDURE — 85610 PROTHROMBIN TIME: CPT

## 2022-09-22 PROCEDURE — 36415 COLL VENOUS BLD VENIPUNCTURE: CPT

## 2022-09-22 NOTE — PROGRESS NOTES
Anticoagulation Clinic - Remote Progress Note  REMOTE LAB    Indication: On-X Mechanical valve #25 and a 30mm hemashield; stroke  Referring Provider: Alvaro (Last seen:  1/31/21)  Initial Warfarin Start Date: ~ Sept 2018  Goal INR: 2.0-3.0 per referral  Current Drug Interactions: duloxetine, lansoprazole, levothyroxine, Trintellix, valproic acid  Bleed Risk: ascending aortic aneurysm   Other: Hodgkins Lymphoma (hx of chemo and radiation); h/o CVA + hemorrhagic pancreatitis (during hospitalization for mAVR)    Diet: avoids GLV (6/13/22)  Alcohol: socially  Tobacco: none  OTC Pain Medication: APAP PRN pain    INR History:  Date 9/27 9/28 10/1 10/4 10/8 10/11 10/17 10/19 10/23 11/8 11/16 12/21   Total Weekly Dose 8mg 12mg 19mg 29mg 31mg 34mg 43mg 41mg 45mg 49mg 49mg 49mg   INR 1.3 1.22 1.3 1.8 1.85 1.70 2.91 2.82 3.79 3.00 2.96 3.44   Notes clinic enox enox enox; clinic enox enox      desvenla     Date 1/9/19 1/16 2/4 3/1 3/22 5/16 6/13 6/24 7/10 7/22 8/5 8/26   Total Weekly Dose 49mg 47mg 49mg 49mg 49mg 49mg 49mg 49mg 49mg 49mg 49mg 49mg   INR 4.40 2.36 2.62 3.02 2.15 2.60 2.25 2.98 2.83 2.97 2.90 3.20   Notes   self adj              Date 9/26 10/18 10/28 11/19 12/3 12/17 1/3/20 1/21 2/3 2/12 2/19 2/27   Total Weekly Dose 49mg 49mg 49mg 49mg 49mg 45mg 45mg 45mg 47mg 43mg 43mg 43mg   INR 3.57 3.12 2.88 3.33 4.06 2.59 3.21 3.85 3.11 2.34 3.01 3.20   Notes less GLV Cymbalta start 9/25; Depakote start 9/5 1x decr dose  depakote inc; trintellix dcd ceftin x10 days Recv'd 1/6  incr GLV  dieting        Date 3/5 3/12 3/20 4/2 4/17 5/7 5/28 6/16 6/29 7/17 8/14 8/28   Total Weekly Dose 41mg 41mg 41mg 41mg 41mg 41mg 41mg 39mg 39mg 39mg 39mg 39mg   INR 3.07 3.01 2.95 2.66 2.9 3.03 3.36 2.48 2.42 3.08 2.78 2.28   Notes 1x decr dose 1x decr dose       recv'd 6/30  recv'd 8/13 recv'd 8/31     Date 9/28 10/29  12/4 1/8/21 2/9 3/31 5/12 6/9  8/3 8/31   Total Weekly Dose 39mg 39mg non-  compliant 39mg 39mg 39mg 39mg 39mg 39mg non-  compliant 39mg 39mg   INR 2.33 2.39  2.46 2.45 2.85 2.48 2.52 2.51  2.88 2.77   Notes recv'd 9/29 recv'd 10/30   recv'd 1/11  recv'd 4/1           Date 10/4 11/1 12/6  3/8 4/5 5/9 6/10 6/23 7/5 7/20 8/5   Total Weekly Dose 39mg 39mg 39mg  non- compliant  39mg 39mg 39 mg 39 mg 39 mg 39 mg 39 mg 39mg   INR 2.50 2.54 2.2  2.40 2.40 2.98 2.26 2.96 2.84 2.88 3.48   Notes  Rec 11/2 Rec 12/7       rec'd 7/6  rec 8/8     Date 8/17 8/25 9/19 9/22           Total Weekly Dose 37mg 35mg 39 mg  37mg           INR 3.24 2.6 3.13 3.39           Notes  rec 8/26  Inc activity           *takes warfarin in AM*    Phone Interview:  Verbal Release Authorization: please contact Mr. Hall directly - if unable to reach patient may contact floridalmaerMarko  Tablet Strength: 2mg and 5mg tablets  Patient Contact Info: [phone]; Susan Delgadillo's number is [phone] *please call both at; [email or web address]; [email or web address]  Lab Contact Info: Megan Vu     Patient Findings    Positives:  Change in activity   Negatives:  Signs/symptoms of thrombosis, Signs/symptoms of bleeding, Laboratory test error suspected, Change in health, Change in alcohol use, Upcoming invasive procedure, Emergency department visit, Upcoming dental procedure, Missed doses, Extra doses, Change in medications, Change in diet/appetite, Hospital admission, Bruising, Other complaints   Comments:  Now running 1-2 miles daily and working to lose weight         Plan:  1. INR was SUPRAtherapeutic  at 3.39 (goal 2.0-3.0). Instructed  Mr. Hall reduce dose tonight's dose to 2mg then reduce dose to warfarin 5 mg oral daily  until recheck (35 mg/week).   2.Repeat INR in two weeks, 10/6  3. Verbal information provided over the phone. Susan Delgadillo RBV dosing instructions, expresses understanding by teach back, and has no further questions at this time.      Claudia HannaD.  09/22/22   16:24 EDT

## 2022-10-13 ENCOUNTER — LAB (OUTPATIENT)
Dept: LAB | Facility: HOSPITAL | Age: 47
End: 2022-10-13

## 2022-10-13 ENCOUNTER — TELEPHONE (OUTPATIENT)
Dept: PHARMACY | Facility: HOSPITAL | Age: 47
End: 2022-10-13

## 2022-10-13 DIAGNOSIS — Z95.2 HX OF AORTIC VALVE REPLACEMENT, MECHANICAL: ICD-10-CM

## 2022-10-13 LAB
INR PPP: 2.46 (ref 0.84–1.13)
PROTHROMBIN TIME: 26.7 SECONDS (ref 11.4–14.4)

## 2022-10-13 PROCEDURE — 85610 PROTHROMBIN TIME: CPT

## 2022-10-13 PROCEDURE — 36415 COLL VENOUS BLD VENIPUNCTURE: CPT

## 2022-10-14 ENCOUNTER — ANTICOAGULATION VISIT (OUTPATIENT)
Dept: PHARMACY | Facility: HOSPITAL | Age: 47
End: 2022-10-14

## 2022-10-14 DIAGNOSIS — Z95.2 HX OF AORTIC VALVE REPLACEMENT, MECHANICAL: Primary | ICD-10-CM

## 2022-10-14 NOTE — PROGRESS NOTES
Anticoagulation Clinic - Remote Progress Note  REMOTE LAB    Indication: On-X Mechanical valve #25 and a 30mm hemashield; stroke  Referring Provider: Alvaro (Last seen:  1/31/21)  Initial Warfarin Start Date: ~ Sept 2018  Goal INR: 2.0-3.0 per referral  Current Drug Interactions: duloxetine, lansoprazole, levothyroxine, Trintellix, valproic acid  Bleed Risk: ascending aortic aneurysm   Other: Hodgkins Lymphoma (hx of chemo and radiation); h/o CVA + hemorrhagic pancreatitis (during hospitalization for mAVR)    Diet: avoids GLV (6/13/22)  Alcohol: socially  Tobacco: none  OTC Pain Medication: APAP PRN pain    INR History:  Date 9/27 9/28 10/1 10/4 10/8 10/11 10/17 10/19 10/23 11/8 11/16 12/21   Total Weekly Dose 8mg 12mg 19mg 29mg 31mg 34mg 43mg 41mg 45mg 49mg 49mg 49mg   INR 1.3 1.22 1.3 1.8 1.85 1.70 2.91 2.82 3.79 3.00 2.96 3.44   Notes clinic enox enox enox; clinic enox enox      desvenla     Date 1/9/19 1/16 2/4 3/1 3/22 5/16 6/13 6/24 7/10 7/22 8/5 8/26   Total Weekly Dose 49mg 47mg 49mg 49mg 49mg 49mg 49mg 49mg 49mg 49mg 49mg 49mg   INR 4.40 2.36 2.62 3.02 2.15 2.60 2.25 2.98 2.83 2.97 2.90 3.20   Notes   self adj              Date 9/26 10/18 10/28 11/19 12/3 12/17 1/3/20 1/21 2/3 2/12 2/19 2/27   Total Weekly Dose 49mg 49mg 49mg 49mg 49mg 45mg 45mg 45mg 47mg 43mg 43mg 43mg   INR 3.57 3.12 2.88 3.33 4.06 2.59 3.21 3.85 3.11 2.34 3.01 3.20   Notes less GLV Cymbalta start 9/25; Depakote start 9/5 1x decr dose  depakote inc; trintellix dcd ceftin x10 days Recv'd 1/6  incr GLV  dieting        Date 3/5 3/12 3/20 4/2 4/17 5/7 5/28 6/16 6/29 7/17 8/14 8/28   Total Weekly Dose 41mg 41mg 41mg 41mg 41mg 41mg 41mg 39mg 39mg 39mg 39mg 39mg   INR 3.07 3.01 2.95 2.66 2.9 3.03 3.36 2.48 2.42 3.08 2.78 2.28   Notes 1x decr dose 1x decr dose       recv'd 6/30  recv'd 8/13 recv'd 8/31     Date 9/28 10/29  12/4 1/8/21 2/9 3/31 5/12 6/9  8/3 8/31   Total Weekly Dose 39mg 39mg non-  compliant 39mg 39mg 39mg 39mg 39mg 39mg non-  compliant 39mg 39mg   INR 2.33 2.39  2.46 2.45 2.85 2.48 2.52 2.51  2.88 2.77   Notes recv'd 9/29 recv'd 10/30   recv'd 1/11  recv'd 4/1           Date 10/4 11/1 12/6  3/8 4/5 5/9 6/10 6/23 7/5 7/20 8/5   Total Weekly Dose 39mg 39mg 39mg  non- compliant  39mg 39mg 39 mg 39 mg 39 mg 39 mg 39 mg 39mg   INR 2.50 2.54 2.2  2.40 2.40 2.98 2.26 2.96 2.84 2.88 3.48   Notes  Rec 11/2 Rec 12/7       rec'd 7/6  rec 8/8     Date 8/17 8/25 9/19 9/22 10/13          Total Weekly Dose 37mg 35mg 39 mg  37mg 35 mg          INR 3.24 2.6 3.13 3.39 2.46          Notes  rec 8/26  Inc activity Rec 10/14          *takes warfarin in AM*    Phone Interview:  Verbal Release Authorization: please contact Mr. Hall directly - if unable to reach patient may contact brotherMarko  Tablet Strength: 2mg and 5mg tablets  Patient Contact Info: [phone]; [email or web address]  Lab Contact Info: Megan Vu       Patient Findings    Positives:  Change in activity   Negatives:  Signs/symptoms of thrombosis, Signs/symptoms of bleeding, Laboratory test error suspected, Change in health, Change in alcohol use, Upcoming invasive procedure, Emergency department visit, Upcoming dental procedure, Missed doses, Extra doses, Change in medications, Change in diet/appetite, Hospital admission, Bruising, Other complaints   Comments:  Patient states that he recently had an increase in physical activity with ~15 pound weight loss.        Plan:  1. INR was therapeutic at 2.46 (goal 2.0-3.0). Instructed Mr. Hall continue current dose of warfarin 5 mg daily until recheck (35 mg/week).   2. Repeat INR in three weeks, 11/3.  3. Verbal information provided over the phone. Mr. Hall RBV dosing instructions, expresses understanding by teach back, and has no further questions at this time.    Chidi Simon, PharmD  Pharmacy Resident   10/14/2022  08:36 EDT

## 2022-10-17 RX ORDER — WARFARIN SODIUM 5 MG/1
TABLET ORAL
Qty: 90 TABLET | Refills: 0 | Status: SHIPPED | OUTPATIENT
Start: 2022-10-17 | End: 2023-01-19

## 2022-10-31 ENCOUNTER — OFFICE VISIT (OUTPATIENT)
Dept: FAMILY MEDICINE CLINIC | Facility: CLINIC | Age: 47
End: 2022-10-31

## 2022-10-31 ENCOUNTER — LAB (OUTPATIENT)
Dept: LAB | Facility: HOSPITAL | Age: 47
End: 2022-10-31

## 2022-10-31 ENCOUNTER — ANTICOAGULATION VISIT (OUTPATIENT)
Dept: PHARMACY | Facility: HOSPITAL | Age: 47
End: 2022-10-31

## 2022-10-31 VITALS
TEMPERATURE: 97 F | OXYGEN SATURATION: 97 % | HEART RATE: 78 BPM | BODY MASS INDEX: 33.41 KG/M2 | SYSTOLIC BLOOD PRESSURE: 122 MMHG | HEIGHT: 69 IN | DIASTOLIC BLOOD PRESSURE: 72 MMHG | WEIGHT: 225.6 LBS

## 2022-10-31 DIAGNOSIS — Z95.2 HX OF AORTIC VALVE REPLACEMENT, MECHANICAL: ICD-10-CM

## 2022-10-31 DIAGNOSIS — Z79.01 WARFARIN ANTICOAGULATION: ICD-10-CM

## 2022-10-31 DIAGNOSIS — K21.9 GASTROESOPHAGEAL REFLUX DISEASE, UNSPECIFIED WHETHER ESOPHAGITIS PRESENT: ICD-10-CM

## 2022-10-31 DIAGNOSIS — I10 PRIMARY HYPERTENSION: Primary | ICD-10-CM

## 2022-10-31 DIAGNOSIS — Z95.2 HX OF AORTIC VALVE REPLACEMENT, MECHANICAL: Primary | ICD-10-CM

## 2022-10-31 LAB
INR PPP: 2.75 (ref 0.84–1.13)
PROTHROMBIN TIME: 29.2 SECONDS (ref 11.4–14.4)

## 2022-10-31 PROCEDURE — 99214 OFFICE O/P EST MOD 30 MIN: CPT | Performed by: FAMILY MEDICINE

## 2022-10-31 PROCEDURE — 91312 COVID-19 (PFIZER) BIVALENT BOOSTER 12+YRS: CPT | Performed by: FAMILY MEDICINE

## 2022-10-31 PROCEDURE — 36415 COLL VENOUS BLD VENIPUNCTURE: CPT

## 2022-10-31 PROCEDURE — 85610 PROTHROMBIN TIME: CPT

## 2022-10-31 PROCEDURE — 0124A COVID-19 (PFIZER) BIVALENT BOOSTER 12+YRS: CPT | Performed by: FAMILY MEDICINE

## 2022-10-31 RX ORDER — LANSOPRAZOLE 30 MG/1
30 CAPSULE, DELAYED RELEASE ORAL DAILY
Qty: 30 CAPSULE | Refills: 5 | Status: SHIPPED | OUTPATIENT
Start: 2022-10-31

## 2022-10-31 RX ORDER — LEVOTHYROXINE SODIUM 0.12 MG/1
125 TABLET ORAL DAILY
Qty: 90 TABLET | Refills: 0 | Status: SHIPPED | OUTPATIENT
Start: 2022-10-31 | End: 2023-01-26

## 2022-10-31 NOTE — PROGRESS NOTES
Anticoagulation Clinic - Remote Progress Note  REMOTE LAB    Indication: On-X Mechanical valve #25 and a 30mm hemashield; stroke  Referring Provider: Alvaro (Last seen:  1/31/21)  Initial Warfarin Start Date: ~ Sept 2018  Goal INR: 2.0-3.0 per referral  Current Drug Interactions: duloxetine, lansoprazole, levothyroxine, Trintellix, valproic acid  Bleed Risk: ascending aortic aneurysm   Other: Hodgkins Lymphoma (hx of chemo and radiation); h/o CVA + hemorrhagic pancreatitis (during hospitalization for mAVR)    Diet: avoids GLV (6/13/22)  Alcohol: socially  Tobacco: none  OTC Pain Medication: APAP PRN pain    INR History:  Date 9/27 9/28 10/1 10/4 10/8 10/11 10/17 10/19 10/23 11/8 11/16 12/21   Total Weekly Dose 8mg 12mg 19mg 29mg 31mg 34mg 43mg 41mg 45mg 49mg 49mg 49mg   INR 1.3 1.22 1.3 1.8 1.85 1.70 2.91 2.82 3.79 3.00 2.96 3.44   Notes clinic enox enox enox; clinic enox enox      desvenla     Date 1/9/19 1/16 2/4 3/1 3/22 5/16 6/13 6/24 7/10 7/22 8/5 8/26   Total Weekly Dose 49mg 47mg 49mg 49mg 49mg 49mg 49mg 49mg 49mg 49mg 49mg 49mg   INR 4.40 2.36 2.62 3.02 2.15 2.60 2.25 2.98 2.83 2.97 2.90 3.20   Notes   self adj              Date 9/26 10/18 10/28 11/19 12/3 12/17 1/3/20 1/21 2/3 2/12 2/19 2/27   Total Weekly Dose 49mg 49mg 49mg 49mg 49mg 45mg 45mg 45mg 47mg 43mg 43mg 43mg   INR 3.57 3.12 2.88 3.33 4.06 2.59 3.21 3.85 3.11 2.34 3.01 3.20   Notes less GLV Cymbalta start 9/25; Depakote start 9/5 1x decr dose  depakote inc; trintellix dcd ceftin x10 days Recv'd 1/6  incr GLV  dieting        Date 3/5 3/12 3/20 4/2 4/17 5/7 5/28 6/16 6/29 7/17 8/14 8/28   Total Weekly Dose 41mg 41mg 41mg 41mg 41mg 41mg 41mg 39mg 39mg 39mg 39mg 39mg   INR 3.07 3.01 2.95 2.66 2.9 3.03 3.36 2.48 2.42 3.08 2.78 2.28   Notes 1x decr dose 1x decr dose       recv'd 6/30  recv'd 8/13 recv'd 8/31     Date 9/28 10/29  12/4 1/8/21 2/9 3/31 5/12 6/9  8/3 8/31   Total Weekly Dose 39mg 39mg non-  compliant 39mg 39mg 39mg 39mg 39mg 39mg non-  compliant 39mg 39mg   INR 2.33 2.39  2.46 2.45 2.85 2.48 2.52 2.51  2.88 2.77   Notes recv'd 9/29 recv'd 10/30   recv'd 1/11  recv'd 4/1           Date 10/4 11/1 12/6  3/8 4/5 5/9 6/10 6/23 7/5 7/20 8/5   Total Weekly Dose 39mg 39mg 39mg  non- compliant  39mg 39mg 39 mg 39 mg 39 mg 39 mg 39 mg 39mg   INR 2.50 2.54 2.2  2.40 2.40 2.98 2.26 2.96 2.84 2.88 3.48   Notes  Rec 11/2 Rec 12/7       rec'd 7/6  rec 8/8     Date 8/17 8/25 9/19 9/22 10/13 10/31         Total Weekly Dose 37mg 35mg 39 mg  37mg 35 mg 35 mg         INR 3.24 2.6 3.13 3.39 2.46 2.75         Notes  rec 8/26  Inc activity Rec 10/14          *takes warfarin in AM*    Phone Interview:  Verbal Release Authorization: please contact Mr. Hall directly - if unable to reach patient may contact [email or web address]  Tablet Strength: 2mg and 5mg tablets  Patient Contact Info: [phone]; [email or web address]  Lab Contact Info: Megan Vu       Patient Findings  Negatives:  Signs/symptoms of thrombosis, Signs/symptoms of bleeding, Laboratory test error suspected, Change in health, Change in alcohol use, Change in activity, Upcoming invasive procedure, Emergency department visit, Upcoming dental procedure, Missed doses, Extra doses, Change in medications, Change in diet/appetite, Hospital admission, Bruising, Other complaints   Comments:  Denies findings       Plan:  1. INR was therapeutic at 2.75 (goal 2.0-3.0). Instructed Mr. Hall continue current dose of warfarin 5 mg daily until recheck (35 mg/week).   2. Repeat INR in two weeks, 11/14.  3. Verbal information provided over the phone. Mr. Hall RBV dosing instructions, expresses understanding by teach back, and has no further questions at this time.      Abdi Vides, PharmD  10/31/2022  15:01 EDT

## 2022-11-10 ENCOUNTER — LAB (OUTPATIENT)
Dept: LAB | Facility: HOSPITAL | Age: 47
End: 2022-11-10

## 2022-11-10 DIAGNOSIS — F31.81 BIPOLAR II DISORDER: Primary | ICD-10-CM

## 2022-11-10 DIAGNOSIS — Z95.2 HX OF AORTIC VALVE REPLACEMENT, MECHANICAL: Primary | ICD-10-CM

## 2022-11-10 DIAGNOSIS — Z95.2 HX OF AORTIC VALVE REPLACEMENT, MECHANICAL: ICD-10-CM

## 2022-11-10 LAB
AMPHET+METHAMPHET UR QL: NEGATIVE
AMPHETAMINES UR QL: NEGATIVE
BARBITURATES UR QL SCN: NEGATIVE
BENZODIAZ UR QL SCN: NEGATIVE
BUPRENORPHINE SERPL-MCNC: NEGATIVE NG/ML
CANNABINOIDS SERPL QL: NEGATIVE
COCAINE UR QL: NEGATIVE
INR PPP: 2.71 (ref 0.84–1.13)
METHADONE UR QL SCN: NEGATIVE
OPIATES UR QL: NEGATIVE
OXYCODONE UR QL SCN: NEGATIVE
PCP UR QL SCN: NEGATIVE
PROPOXYPH UR QL: NEGATIVE
PROTHROMBIN TIME: 28.9 SECONDS (ref 11.4–14.4)
TRICYCLICS UR QL SCN: NEGATIVE

## 2022-11-10 PROCEDURE — 85610 PROTHROMBIN TIME: CPT

## 2022-11-10 PROCEDURE — 80164 ASSAY DIPROPYLACETIC ACD TOT: CPT

## 2022-11-10 PROCEDURE — 80306 DRUG TEST PRSMV INSTRMNT: CPT

## 2022-11-10 PROCEDURE — 36415 COLL VENOUS BLD VENIPUNCTURE: CPT

## 2022-11-11 ENCOUNTER — ANTICOAGULATION VISIT (OUTPATIENT)
Dept: PHARMACY | Facility: HOSPITAL | Age: 47
End: 2022-11-11

## 2022-11-11 DIAGNOSIS — Z95.2 HX OF AORTIC VALVE REPLACEMENT, MECHANICAL: Primary | ICD-10-CM

## 2022-11-11 LAB — VALPROATE SERPL-MCNC: 33 MCG/ML (ref 50–125)

## 2022-11-11 NOTE — PROGRESS NOTES
Anticoagulation Clinic - Remote Progress Note  REMOTE LAB    Indication: On-X Mechanical valve #25 and a 30mm hemashield; stroke  Referring Provider: Alvaro (Last seen:  1/31/21)  Initial Warfarin Start Date: ~ Sept 2018  Goal INR: 2.0-3.0 per referral  Current Drug Interactions: duloxetine, lansoprazole, levothyroxine, Trintellix, valproic acid  Bleed Risk: ascending aortic aneurysm   Other: Hodgkins Lymphoma (hx of chemo and radiation); h/o CVA + hemorrhagic pancreatitis (during hospitalization for mAVR)    Diet: avoids GLV (6/13/22)  Alcohol: socially  Tobacco: none  OTC Pain Medication: APAP PRN pain    INR History:  Date 9/27 9/28 10/1 10/4 10/8 10/11 10/17 10/19 10/23 11/8 11/16 12/21   Total Weekly Dose 8mg 12mg 19mg 29mg 31mg 34mg 43mg 41mg 45mg 49mg 49mg 49mg   INR 1.3 1.22 1.3 1.8 1.85 1.70 2.91 2.82 3.79 3.00 2.96 3.44   Notes clinic enox enox enox; clinic enox enox      desvenla     Date 1/9/19 1/16 2/4 3/1 3/22 5/16 6/13 6/24 7/10 7/22 8/5 8/26   Total Weekly Dose 49mg 47mg 49mg 49mg 49mg 49mg 49mg 49mg 49mg 49mg 49mg 49mg   INR 4.40 2.36 2.62 3.02 2.15 2.60 2.25 2.98 2.83 2.97 2.90 3.20   Notes   self adj              Date 9/26 10/18 10/28 11/19 12/3 12/17 1/3/20 1/21 2/3 2/12 2/19 2/27   Total Weekly Dose 49mg 49mg 49mg 49mg 49mg 45mg 45mg 45mg 47mg 43mg 43mg 43mg   INR 3.57 3.12 2.88 3.33 4.06 2.59 3.21 3.85 3.11 2.34 3.01 3.20   Notes less GLV Cymbalta start 9/25; Depakote start 9/5 1x decr dose  depakote inc; trintellix dcd ceftin x10 days Recv'd 1/6  incr GLV  dieting        Date 3/5 3/12 3/20 4/2 4/17 5/7 5/28 6/16 6/29 7/17 8/14 8/28   Total Weekly Dose 41mg 41mg 41mg 41mg 41mg 41mg 41mg 39mg 39mg 39mg 39mg 39mg   INR 3.07 3.01 2.95 2.66 2.9 3.03 3.36 2.48 2.42 3.08 2.78 2.28   Notes 1x decr dose 1x decr dose       recv'd 6/30  recv'd 8/13 recv'd 8/31     Date 9/28 10/29  12/4 1/8/21 2/9 3/31 5/12 6/9  8/3 8/31   Total Weekly Dose 39mg 39mg non-  compliant 39mg 39mg 39mg 39mg 39mg 39mg non-  compliant 39mg 39mg   INR 2.33 2.39  2.46 2.45 2.85 2.48 2.52 2.51  2.88 2.77   Notes recv'd 9/29 recv'd 10/30   recv'd 1/11  recv'd 4/1           Date 10/4 11/1 12/6  3/8 4/5 5/9 6/10 6/23 7/5 7/20 8/5   Total Weekly Dose 39mg 39mg 39mg  non- compliant  39mg 39mg 39 mg 39 mg 39 mg 39 mg 39 mg 39mg   INR 2.50 2.54 2.2  2.40 2.40 2.98 2.26 2.96 2.84 2.88 3.48   Notes  Rec 11/2 Rec 12/7       rec'd 7/6  rec 8/8     Date 8/17 8/25 9/19 9/22 10/13 10/31 11/10        Total Weekly Dose 37mg 35mg 39 mg  37mg 35 mg 35 mg 35 mg        INR 3.24 2.6 3.13 3.39 2.46 2.75 2.71        Notes  rec 8/26  Inc activity Rec 10/14  rec 11/11        *takes warfarin in AM*    Phone Interview:  Verbal Release Authorization: please contact Mr. Hall directly - if unable to reach patient may contact [email or web address]  Tablet Strength: 2mg and 5mg tablets  Patient Contact Info: [phone]; [email or web address]  Lab Contact Info: Megan Vu     Patient Findings    Negatives:  Signs/symptoms of thrombosis, Signs/symptoms of bleeding, Laboratory test error suspected, Change in health, Change in alcohol use, Change in activity, Upcoming invasive procedure, Emergency department visit, Upcoming dental procedure, Missed doses, Extra doses, Change in medications, Change in diet/appetite, Hospital admission, Bruising, Other complaints   Comments:  All findings negative per pt.      Plan:  1. INR was therapeutic yesterday at 2.71 (goal 2.0-3.0). Instructed Mr. Hall continue of warfarin 5 mg daily until recheck (35 mg/week).   2. Repeat INR in three weeks, 12/1/22; d/t holidays.  3. Verbal information provided over the phone. Mr. Hall RBV dosing instructions, expresses understanding by teach back, and has no further questions at this time.    Parth Fagan CPhT  11/11/2022  09:19 EST     Kayla LOZANO, PharmD, have reviewed the note in full and agree with the assessment and plan.  11/14/22  12:02 EST

## 2022-12-02 ENCOUNTER — LAB (OUTPATIENT)
Dept: LAB | Facility: HOSPITAL | Age: 47
End: 2022-12-02

## 2022-12-02 DIAGNOSIS — Z95.2 HX OF AORTIC VALVE REPLACEMENT, MECHANICAL: ICD-10-CM

## 2022-12-02 LAB
INR PPP: 2.77 (ref 0.84–1.13)
PROTHROMBIN TIME: 29.4 SECONDS (ref 11.4–14.4)

## 2022-12-02 PROCEDURE — 36415 COLL VENOUS BLD VENIPUNCTURE: CPT

## 2022-12-02 PROCEDURE — 85610 PROTHROMBIN TIME: CPT

## 2022-12-05 ENCOUNTER — ANTICOAGULATION VISIT (OUTPATIENT)
Dept: PHARMACY | Facility: HOSPITAL | Age: 47
End: 2022-12-05

## 2022-12-05 DIAGNOSIS — Z95.2 HX OF AORTIC VALVE REPLACEMENT, MECHANICAL: Primary | ICD-10-CM

## 2022-12-05 NOTE — PROGRESS NOTES
Anticoagulation Clinic - Remote Progress Note  REMOTE LAB    Indication: On-X Mechanical valve #25 and a 30mm hemashield; stroke  Referring Provider: Alvaro (Last seen:  1/31/21)  Initial Warfarin Start Date: ~ Sept 2018  Goal INR: 2.0-3.0 per referral  Current Drug Interactions: duloxetine, lansoprazole, levothyroxine, Trintellix, valproic acid  Bleed Risk: ascending aortic aneurysm   Other: Hodgkins Lymphoma (hx of chemo and radiation); h/o CVA + hemorrhagic pancreatitis (during hospitalization for mAVR)    Diet: avoids GLV (6/13/22)  Alcohol: socially  Tobacco: none  OTC Pain Medication: APAP PRN pain    INR History:  Date 9/27 9/28 10/1 10/4 10/8 10/11 10/17 10/19 10/23 11/8 11/16 12/21   Total Weekly Dose 8mg 12mg 19mg 29mg 31mg 34mg 43mg 41mg 45mg 49mg 49mg 49mg   INR 1.3 1.22 1.3 1.8 1.85 1.70 2.91 2.82 3.79 3.00 2.96 3.44   Notes clinic enox enox enox; clinic enox enox      desvenla     Date 1/9/19 1/16 2/4 3/1 3/22 5/16 6/13 6/24 7/10 7/22 8/5 8/26   Total Weekly Dose 49mg 47mg 49mg 49mg 49mg 49mg 49mg 49mg 49mg 49mg 49mg 49mg   INR 4.40 2.36 2.62 3.02 2.15 2.60 2.25 2.98 2.83 2.97 2.90 3.20   Notes   self adj              Date 9/26 10/18 10/28 11/19 12/3 12/17 1/3/20 1/21 2/3 2/12 2/19 2/27   Total Weekly Dose 49mg 49mg 49mg 49mg 49mg 45mg 45mg 45mg 47mg 43mg 43mg 43mg   INR 3.57 3.12 2.88 3.33 4.06 2.59 3.21 3.85 3.11 2.34 3.01 3.20   Notes less GLV Cymbalta start 9/25; Depakote start 9/5 1x decr dose  depakote inc; trintellix dcd ceftin x10 days Recv'd 1/6  incr GLV  dieting        Date 3/5 3/12 3/20 4/2 4/17 5/7 5/28 6/16 6/29 7/17 8/14 8/28   Total Weekly Dose 41mg 41mg 41mg 41mg 41mg 41mg 41mg 39mg 39mg 39mg 39mg 39mg   INR 3.07 3.01 2.95 2.66 2.9 3.03 3.36 2.48 2.42 3.08 2.78 2.28   Notes 1x decr dose 1x decr dose       recv'd 6/30  recv'd 8/13 recv'd 8/31     Date 9/28 10/29  12/4 1/8/21 2/9 3/31 5/12 6/9  8/3 8/31   Total Weekly Dose 39mg 39mg non-  compliant 39mg 39mg 39mg 39mg 39mg 39mg non-  compliant 39mg 39mg   INR 2.33 2.39  2.46 2.45 2.85 2.48 2.52 2.51  2.88 2.77   Notes recv'd 9/29 recv'd 10/30   recv'd 1/11  recv'd 4/1           Date 10/4 11/1 12/6  3/8 4/5 5/9 6/10 6/23 7/5 7/20 8/5   Total Weekly Dose 39mg 39mg 39mg  non- compliant  39mg 39mg 39 mg 39 mg 39 mg 39 mg 39 mg 39mg   INR 2.50 2.54 2.2  2.40 2.40 2.98 2.26 2.96 2.84 2.88 3.48   Notes  Rec 11/2 Rec 12/7       rec'd 7/6  rec 8/8     Date 8/17 8/25 9/19 9/22 10/13 10/31 11/10 12/2       Total Weekly Dose 37mg 35mg 39 mg  37mg 35 mg 35 mg 35 mg 35 mg       INR 3.24 2.6 3.13 3.39 2.46 2.75 2.71 2.77       Notes  rec 8/26  Inc activity Rec 10/14  rec 11/11 rec 12/5       *takes warfarin in AM*    Phone Interview:  Verbal Release Authorization: please contact Mr. Hall directly - if unable to reach patient may contact [email or web address]  Tablet Strength: 2mg and 5mg tablets  Patient Contact Info: [phone]; [email or web address]  Lab Contact Info: Megan Vu     Patient Findings  Negatives:  Signs/symptoms of thrombosis, Signs/symptoms of bleeding, Laboratory test error suspected, Change in health, Change in alcohol use, Change in activity, Upcoming invasive procedure, Emergency department visit, Upcoming dental procedure, Missed doses, Extra doses, Change in medications, Change in diet/appetite, Hospital admission, Bruising, Other complaints   Comments:  All findings negative per pt.      Plan:  1. INR was therapeutic 12/2 at 2.77 (goal 2.0-3.0). Instructed Mr. Hall continue of warfarin 5 mg daily until recheck (35 mg/week).   2. Repeat INR in two weeks, 12/16/22.  3. Verbal information provided over the phone. Mr. Hall RBV dosing instructions, expresses understanding by teach back, and has no further questions at this time.    Parth Fagan, Laine  12/5/2022  10:40 EST     I, Sondra Brooks, PharmD, have reviewed the note in full and agree with the assessment and plan.  12/05/22  12:10 EST

## 2022-12-19 RX ORDER — ROSUVASTATIN CALCIUM 20 MG/1
TABLET, COATED ORAL
Qty: 90 TABLET | Refills: 0 | Status: SHIPPED | OUTPATIENT
Start: 2022-12-19 | End: 2023-03-13

## 2022-12-19 NOTE — TELEPHONE ENCOUNTER
Lab Results   Component Value Date    GLUCOSE 121 (H) 06/22/2022    BUN 12 06/22/2022    CREATININE 0.97 06/22/2022    EGFRIFNONA 75 07/26/2021    EGFRIFAFRI >60 09/17/2018    BCR 12.4 06/22/2022    K 4.3 06/22/2022    CO2 23.0 06/22/2022    CALCIUM 9.0 06/22/2022    ALBUMIN 4.30 06/22/2022    AST 20 06/22/2022    ALT 21 06/22/2022     Lab Results   Component Value Date    CHOL 238 (H) 06/22/2022    CHLPL 259 (H) 07/17/2015    TRIG 247 (H) 06/22/2022    HDL 44 06/22/2022     (H) 06/22/2022

## 2022-12-28 ENCOUNTER — OFFICE VISIT (OUTPATIENT)
Dept: FAMILY MEDICINE CLINIC | Facility: CLINIC | Age: 47
End: 2022-12-28

## 2022-12-28 ENCOUNTER — LAB (OUTPATIENT)
Dept: LAB | Facility: HOSPITAL | Age: 47
End: 2022-12-28
Payer: MEDICARE

## 2022-12-28 VITALS
OXYGEN SATURATION: 98 % | TEMPERATURE: 98.4 F | DIASTOLIC BLOOD PRESSURE: 76 MMHG | WEIGHT: 227.2 LBS | HEIGHT: 69 IN | BODY MASS INDEX: 33.65 KG/M2 | SYSTOLIC BLOOD PRESSURE: 116 MMHG | HEART RATE: 84 BPM

## 2022-12-28 DIAGNOSIS — Z00.00 MEDICARE ANNUAL WELLNESS VISIT, SUBSEQUENT: Primary | ICD-10-CM

## 2022-12-28 DIAGNOSIS — Z95.2 HX OF AORTIC VALVE REPLACEMENT, MECHANICAL: ICD-10-CM

## 2022-12-28 DIAGNOSIS — Z00.00 MEDICARE ANNUAL WELLNESS VISIT, SUBSEQUENT: ICD-10-CM

## 2022-12-28 DIAGNOSIS — F41.1 GENERALIZED ANXIETY DISORDER: ICD-10-CM

## 2022-12-28 DIAGNOSIS — E03.9 ACQUIRED HYPOTHYROIDISM: ICD-10-CM

## 2022-12-28 DIAGNOSIS — Z12.5 PROSTATE CANCER SCREENING: ICD-10-CM

## 2022-12-28 DIAGNOSIS — I10 PRIMARY HYPERTENSION: ICD-10-CM

## 2022-12-28 LAB
ALBUMIN SERPL-MCNC: 4.9 G/DL (ref 3.5–5.2)
ALBUMIN/GLOB SERPL: 1.5 G/DL
ALP SERPL-CCNC: 76 U/L (ref 39–117)
ALT SERPL W P-5'-P-CCNC: 19 U/L (ref 1–41)
ANION GAP SERPL CALCULATED.3IONS-SCNC: 10.6 MMOL/L (ref 5–15)
AST SERPL-CCNC: 25 U/L (ref 1–40)
BILIRUB SERPL-MCNC: 0.4 MG/DL (ref 0–1.2)
BILIRUB UR QL STRIP: NEGATIVE
BUN SERPL-MCNC: 13 MG/DL (ref 6–20)
BUN/CREAT SERPL: 11.3 (ref 7–25)
CALCIUM SPEC-SCNC: 9.7 MG/DL (ref 8.6–10.5)
CHLORIDE SERPL-SCNC: 101 MMOL/L (ref 98–107)
CHOLEST SERPL-MCNC: 196 MG/DL (ref 0–200)
CLARITY UR: ABNORMAL
CO2 SERPL-SCNC: 29.4 MMOL/L (ref 22–29)
COLOR UR: ABNORMAL
CREAT SERPL-MCNC: 1.15 MG/DL (ref 0.76–1.27)
DEPRECATED RDW RBC AUTO: 46.5 FL (ref 37–54)
EGFRCR SERPLBLD CKD-EPI 2021: 79 ML/MIN/1.73
ERYTHROCYTE [DISTWIDTH] IN BLOOD BY AUTOMATED COUNT: 15.7 % (ref 12.3–15.4)
GLOBULIN UR ELPH-MCNC: 3.3 GM/DL
GLUCOSE SERPL-MCNC: 111 MG/DL (ref 65–99)
GLUCOSE UR STRIP-MCNC: NEGATIVE MG/DL
HCT VFR BLD AUTO: 45.7 % (ref 37.5–51)
HCV AB SER DONR QL: NORMAL
HDLC SERPL-MCNC: 52 MG/DL (ref 40–60)
HGB BLD-MCNC: 15.3 G/DL (ref 13–17.7)
HGB UR QL STRIP.AUTO: NEGATIVE
INR PPP: 1.1 (ref 0.84–1.13)
KETONES UR QL STRIP: ABNORMAL
LDLC SERPL CALC-MCNC: 108 MG/DL (ref 0–100)
LDLC/HDLC SERPL: 1.97 {RATIO}
LEUKOCYTE ESTERASE UR QL STRIP.AUTO: NEGATIVE
MCH RBC QN AUTO: 27.4 PG (ref 26.6–33)
MCHC RBC AUTO-ENTMCNC: 33.5 G/DL (ref 31.5–35.7)
MCV RBC AUTO: 81.8 FL (ref 79–97)
NITRITE UR QL STRIP: NEGATIVE
PH UR STRIP.AUTO: 5.5 [PH] (ref 5–8)
PLATELET # BLD AUTO: 223 10*3/MM3 (ref 140–450)
PMV BLD AUTO: 11.6 FL (ref 6–12)
POTASSIUM SERPL-SCNC: 4.5 MMOL/L (ref 3.5–5.2)
PROT SERPL-MCNC: 8.2 G/DL (ref 6–8.5)
PROT UR QL STRIP: ABNORMAL
PROTHROMBIN TIME: 14.1 SECONDS (ref 11.4–14.4)
RBC # BLD AUTO: 5.59 10*6/MM3 (ref 4.14–5.8)
SODIUM SERPL-SCNC: 141 MMOL/L (ref 136–145)
SP GR UR STRIP: 1.03 (ref 1–1.03)
TRIGL SERPL-MCNC: 208 MG/DL (ref 0–150)
TSH SERPL DL<=0.05 MIU/L-ACNC: 0.98 UIU/ML (ref 0.27–4.2)
UROBILINOGEN UR QL STRIP: ABNORMAL
VLDLC SERPL-MCNC: 36 MG/DL (ref 5–40)
WBC NRBC COR # BLD: 11.41 10*3/MM3 (ref 3.4–10.8)

## 2022-12-28 PROCEDURE — 80053 COMPREHEN METABOLIC PANEL: CPT

## 2022-12-28 PROCEDURE — G0438 PPPS, INITIAL VISIT: HCPCS | Performed by: FAMILY MEDICINE

## 2022-12-28 PROCEDURE — 85027 COMPLETE CBC AUTOMATED: CPT

## 2022-12-28 PROCEDURE — 1159F MED LIST DOCD IN RCRD: CPT | Performed by: FAMILY MEDICINE

## 2022-12-28 PROCEDURE — 36415 COLL VENOUS BLD VENIPUNCTURE: CPT

## 2022-12-28 PROCEDURE — 80061 LIPID PANEL: CPT

## 2022-12-28 PROCEDURE — 84443 ASSAY THYROID STIM HORMONE: CPT

## 2022-12-28 PROCEDURE — 1126F AMNT PAIN NOTED NONE PRSNT: CPT | Performed by: FAMILY MEDICINE

## 2022-12-28 PROCEDURE — 81001 URINALYSIS AUTO W/SCOPE: CPT

## 2022-12-28 PROCEDURE — 85610 PROTHROMBIN TIME: CPT

## 2022-12-28 PROCEDURE — 1170F FXNL STATUS ASSESSED: CPT | Performed by: FAMILY MEDICINE

## 2022-12-28 PROCEDURE — 86803 HEPATITIS C AB TEST: CPT

## 2022-12-28 NOTE — PROGRESS NOTES
The ABCs of the Annual Wellness Visit  Initial Medicare Wellness Visit    Chief Complaint   Patient presents with   • Medicare Wellness-subsequent   • Shaking     More in hands     Subjective     History of Present Illness:  Manjeet Hall is a 47 y.o. male who presents for an Initial Medicare Wellness Visit.      The following portions of the patient's history were reviewed and   updated as appropriate: allergies, current medications, past family history, past medical history, past social history, past surgical history and problem list.      Compared to one year ago, the patient feels his physical   health is the same.    Compared to one year ago, the patient feels his mental   health is the same.    Recent Hospitalizations:  He was not admitted to the hospital during the last year.       Current Medical Providers:  Patient Care Team:  Nile Faust MD as PCP - General (Family Medicine)    Outpatient Medications Prior to Visit   Medication Sig Dispense Refill   • divalproex (DEPAKOTE ER) 500 MG 24 hr tablet Take 1,000 mg by mouth every night at bedtime.     • DULoxetine (CYMBALTA) 60 MG capsule Take 60 mg by mouth Daily.  2   • gabapentin (NEURONTIN) 300 MG capsule Take 300 mg by mouth 2 (Two) Times a Day.     • lansoprazole (PREVACID) 30 MG capsule Take 1 capsule by mouth Daily. 30 capsule 5   • levothyroxine (SYNTHROID, LEVOTHROID) 125 MCG tablet TAKE 1 TABLET BY MOUTH DAILY. FURTHER REFILLS PER PCP WITH REPEAT LAB WORK. 90 tablet 0   • propranolol LA (INDERAL LA) 60 MG 24 hr capsule TAKE 1 CAPSULE BY MOUTH EVERY DAY 90 capsule 1   • rosuvastatin (CRESTOR) 20 MG tablet TAKE 1 TABLET BY MOUTH EVERY DAY 90 tablet 0   • warfarin (COUMADIN) 2 MG tablet TAKE 1 TABLET BY MOUTH EVERY DAY AS DIRECTED 12 tablet 0   • warfarin (COUMADIN) 5 MG tablet TAKE 1 TABLET BY MOUTH DAILY AS DIRECTED BY ANTICOAGULATION CLINIC. 90 tablet 0   • buPROPion XL (WELLBUTRIN XL) 150 MG 24 hr tablet Take 150 mg by mouth Every  Morning.       No facility-administered medications prior to visit.       No opioid medication identified on active medication list. I have reviewed chart for other potential  high risk medication/s and harmful drug interactions in the elderly.          Aspirin is not on active medication list.  Aspirin use is contraindicated for this patient due to: current use of warfarin.  .    Patient Active Problem List   Diagnosis   • Depression   • Generalized anxiety disorder   • Hypothyroidism   • Gastroesophageal reflux disease   • Congenital heart disease   • Thoracic aortic aneurysm   • Hx of aortic valve replacement, mechanical [Z95.2]   • Sequelae, post-stroke   • Warfarin anticoagulation   • Hypertension   • Ischemic stroke (HCC)   • Pulmonic stenosis     Advance Care Planning   Advance Directive is not on file.  ACP discussion was held with the patient during this visit. Patient has an advance directive (not in EMR), copy requested.    Review of Systems   Constitutional: Negative for activity change and unexpected weight change.   HENT: Negative for congestion and sore throat.    Eyes: Negative for pain and visual disturbance.        Eye exam is due   Respiratory: Negative for cough and shortness of breath.    Cardiovascular: Positive for palpitations. Negative for chest pain and leg swelling.   Gastrointestinal: Negative for diarrhea, nausea and vomiting.   Genitourinary: Negative for dysuria and hematuria.   Musculoskeletal: Negative for arthralgias and joint swelling.   Skin: Negative for color change and rash.   Allergic/Immunologic: Negative for environmental allergies and food allergies.   Neurological: Negative for syncope and headaches.        Speech has slowly improved since his stroke   Hematological: Negative for adenopathy. Does not bruise/bleed easily.   Psychiatric/Behavioral: Negative for dysphoric mood and sleep disturbance. The patient is not nervous/anxious.          Objective       Vitals:     "12/28/22 1303   BP: 116/76   Pulse: 84   Temp: 98.4 °F (36.9 °C)   SpO2: 98%   Weight: 103 kg (227 lb 3.2 oz)   Height: 175.3 cm (69\")   PainSc: 0-No pain     BMI Readings from Last 1 Encounters:   12/28/22 33.55 kg/m²   BMI is above normal parameters. Recommendations include: educational material    Does the patient have evidence of cognitive impairment? Yes    Physical Exam  Vitals and nursing note reviewed.   Constitutional:       Appearance: Normal appearance. He is well-developed.   HENT:      Head: Normocephalic and atraumatic.      Right Ear: Tympanic membrane normal.      Left Ear: Tympanic membrane normal.      Nose: Nose normal.   Eyes:      General: No scleral icterus.     Conjunctiva/sclera: Conjunctivae normal.      Pupils: Pupils are equal, round, and reactive to light.   Neck:      Thyroid: No thyromegaly.      Vascular: No carotid bruit.   Cardiovascular:      Rate and Rhythm: Normal rate and regular rhythm.      Heart sounds: Murmur heard.      Comments: Mild murmur and click from artificial valve  Pulmonary:      Effort: Pulmonary effort is normal.      Breath sounds: Normal breath sounds.   Abdominal:      General: Bowel sounds are normal.      Palpations: Abdomen is soft.   Musculoskeletal:         General: Normal range of motion.      Cervical back: Neck supple.   Skin:     General: Skin is warm and dry.      Findings: No rash.   Neurological:      Mental Status: He is alert and oriented to person, place, and time. Mental status is at baseline.      Comments: Patient does have a mild tremor in all extremities   Psychiatric:         Mood and Affect: Mood normal.         Behavior: Behavior is cooperative.                 HEALTH RISK ASSESSMENT    Smoking Status:  Social History     Tobacco Use   Smoking Status Never   Smokeless Tobacco Never     Alcohol Consumption:  Social History     Substance and Sexual Activity   Alcohol Use Yes    Comment: social     Fall Risk Screen:    STEADI Fall Risk " Assessment was completed, and patient is at LOW risk for falls.Assessment completed on:12/28/2022    Depression Screen:   PHQ-2/PHQ-9 Depression Screening 12/28/2022   Retired PHQ-9 Total Score -   Retired Total Score -   Little Interest or Pleasure in Doing Things 0-->not at all   Feeling Down, Depressed or Hopeless 0-->not at all   PHQ-9: Brief Depression Severity Measure Score 0       Health Habits and Functional and Cognitive Screening:  Functional & Cognitive Status 12/28/2022   Do you have difficulty preparing food and eating? No   Do you have difficulty bathing yourself, getting dressed or grooming yourself? No   Do you have difficulty using the toilet? No   Do you have difficulty moving around from place to place? No   Do you have trouble with steps or getting out of a bed or a chair? No   Current Diet Well Balanced Diet   Dental Exam Not up to date   Eye Exam Not up to date   Exercise (times per week) 7 times per week   Current Exercises Include Walking   Do you need help using the phone?  No   Are you deaf or do you have serious difficulty hearing?  No   Do you need help with transportation? No   Do you need help shopping? No   Do you need help preparing meals?  No   Do you need help with housework?  No   Do you need help with laundry? No   Do you need help taking your medications? No   Do you need help managing money? No   Do you ever drive or ride in a car without wearing a seat belt? No       Age-appropriate Screening Schedule:  Refer to the list below for future screening recommendations based on patient's age, sex and/or medical conditions. Orders for these recommended tests are listed in the plan section. The patient has been provided with a written plan.    Health Maintenance   Topic Date Due   • TDAP/TD VACCINES (1 - Tdap) Never done   • LIPID PANEL  06/22/2023   • INFLUENZA VACCINE  Completed            Assessment & Plan      CMS Preventative Services Quick Reference  Risk Factors Identified  During Encounter  Immunizations Discussed/Encouraged: Tdap  Vision Screening Recommended  The above risks/problems have been discussed with the patient.  Follow up actions/plans if indicated are seen below in the Assessment/Plan Section.  Pertinent information has been shared with the patient in the After Visit Summary.    Diagnoses and all orders for this visit:    1. Medicare annual wellness visit, subsequent (Primary)  -     Comprehensive Metabolic Panel; Future  -     Lipid Panel; Future  -     TSH; Future  -     CBC (No Diff); Future  -     Urinalysis With Culture If Indicated -; Future  -     Hepatitis C Antibody; Future    2. Acquired hypothyroidism  -     TSH; Future    3. Primary hypertension  -     Comprehensive Metabolic Panel; Future  -     CBC (No Diff); Future    4. Generalized anxiety disorder    5. Prostate cancer screening    see form  Procedures   Keep appointment with cardiology soon, discontinue Wellbutrin may consider reducing Cymbalta in the future    Follow Up:   Return in about 4 months (around 4/28/2023) for Recheck.     An After Visit Summary and PPPS were given to the patient.

## 2022-12-29 ENCOUNTER — ANTICOAGULATION VISIT (OUTPATIENT)
Dept: PHARMACY | Facility: HOSPITAL | Age: 47
End: 2022-12-29

## 2022-12-29 DIAGNOSIS — Z95.2 HX OF AORTIC VALVE REPLACEMENT, MECHANICAL: Primary | ICD-10-CM

## 2022-12-29 LAB
BACTERIA UR QL AUTO: ABNORMAL /HPF
HYALINE CASTS UR QL AUTO: ABNORMAL /LPF
RBC # UR STRIP: ABNORMAL /HPF
REF LAB TEST METHOD: ABNORMAL
SQUAMOUS #/AREA URNS HPF: ABNORMAL /HPF
WBC # UR STRIP: ABNORMAL /HPF

## 2022-12-29 NOTE — PROGRESS NOTES
Anticoagulation Clinic - Remote Progress Note  REMOTE LAB    Indication: On-X Mechanical valve #25 and a 30mm hemashield; stroke  Referring Provider: Alvaro (Last seen:  1/31/21)  Initial Warfarin Start Date: ~ Sept 2018  Goal INR: 2.0-3.0 per referral  Current Drug Interactions: duloxetine, lansoprazole, levothyroxine, Trintellix, valproic acid  Bleed Risk: ascending aortic aneurysm   Other: Hodgkins Lymphoma (hx of chemo and radiation); h/o CVA + hemorrhagic pancreatitis (during hospitalization for mAVR)    Diet: avoids GLV (6/13/22)  Alcohol: socially  Tobacco: none  OTC Pain Medication: APAP PRN pain    INR History:  Date 9/27 9/28 10/1 10/4 10/8 10/11 10/17 10/19 10/23 11/8 11/16 12/21   Total Weekly Dose 8mg 12mg 19mg 29mg 31mg 34mg 43mg 41mg 45mg 49mg 49mg 49mg   INR 1.3 1.22 1.3 1.8 1.85 1.70 2.91 2.82 3.79 3.00 2.96 3.44   Notes clinic enox enox enox; clinic enox enox      desvenla     Date 1/9/19 1/16 2/4 3/1 3/22 5/16 6/13 6/24 7/10 7/22 8/5 8/26   Total Weekly Dose 49mg 47mg 49mg 49mg 49mg 49mg 49mg 49mg 49mg 49mg 49mg 49mg   INR 4.40 2.36 2.62 3.02 2.15 2.60 2.25 2.98 2.83 2.97 2.90 3.20   Notes   self adj              Date 9/26 10/18 10/28 11/19 12/3 12/17 1/3/20 1/21 2/3 2/12 2/19 2/27   Total Weekly Dose 49mg 49mg 49mg 49mg 49mg 45mg 45mg 45mg 47mg 43mg 43mg 43mg   INR 3.57 3.12 2.88 3.33 4.06 2.59 3.21 3.85 3.11 2.34 3.01 3.20   Notes less GLV Cymbalta start 9/25; Depakote start 9/5 1x decr dose  depakote inc; trintellix dcd ceftin x10 days Recv'd 1/6  incr GLV  dieting        Date 3/5 3/12 3/20 4/2 4/17 5/7 5/28 6/16 6/29 7/17 8/14 8/28   Total Weekly Dose 41mg 41mg 41mg 41mg 41mg 41mg 41mg 39mg 39mg 39mg 39mg 39mg   INR 3.07 3.01 2.95 2.66 2.9 3.03 3.36 2.48 2.42 3.08 2.78 2.28   Notes 1x decr dose 1x decr dose       recv'd 6/30  recv'd 8/13 recv'd 8/31     Date 9/28 10/29  12/4 1/8/21 2/9 3/31 5/12 6/9  8/3 8/31   Total Weekly Dose 39mg 39mg non-  compliant 39mg 39mg 39mg 39mg 39mg 39mg non-  compliant 39mg 39mg   INR 2.33 2.39  2.46 2.45 2.85 2.48 2.52 2.51  2.88 2.77   Notes recv'd 9/29 recv'd 10/30   recv'd 1/11  recv'd 4/1           Date 10/4 11/1 12/6  3/8 4/5 5/9 6/10 6/23 7/5 7/20 8/5   Total Weekly Dose 39mg 39mg 39mg  non- compliant  39mg 39mg 39 mg 39 mg 39 mg 39 mg 39 mg 39mg   INR 2.50 2.54 2.2  2.40 2.40 2.98 2.26 2.96 2.84 2.88 3.48   Notes  Rec 11/2 Rec 12/7       rec'd 7/6  rec 8/8     Date 8/17 8/25 9/19 9/22 10/13 10/31 11/10 12/2 12/29      Total Weekly Dose 37mg 35mg 39 mg  37mg 35 mg 35 mg 35 mg 35 mg 35mg      INR 3.24 2.6 3.13 3.39 2.46 2.75 2.71 2.77 1.1      Notes  rec 8/26  Inc activity Rec 10/14  rec 11/11 rec 12/5       *takes warfarin in AM*    Phone Interview:  Verbal Release Authorization: please contact Mr. Hall directly - if unable to reach patient may contact [email or web address]  Tablet Strength: 2mg and 5mg tablets  Patient Contact Info: [phone]; [email or web address]  Lab Contact Info: Megan Vu     Patient Findings  Negatives:  Signs/symptoms of thrombosis, Signs/symptoms of bleeding, Laboratory test error suspected, Change in health, Change in alcohol use, Change in activity, Upcoming invasive procedure, Emergency department visit, Upcoming dental procedure, Missed doses, Extra doses, Change in medications, Change in diet/appetite, Hospital admission, Bruising, Other complaints   Comments:  Pt was seen at his PCP yesterday with INR result of 1.1     Plan:  1. INR was baseline at 1.1 (goal 2.0-3.0). Pt uncertain why INR was low. Pt aware we may not receive results until tomorrow. He will take 7.5mg tonight if we do not receive an INR. Pt uncertain cause of baseline result.     Patient was to get repeat INR on 12/29, however, is not there. Called on 12/30 at 0923 and patient had not gone, but is planning to go today. Again re-emphasized that if it is not a morning draw we do not receive it the same day. Advised patient to come to [facility]  lab in [facility] to ensure gets the same day.    12/30 addendum: Will not receive result that was drawn today. Patient has already taken a 2mg of warfarin today, will take 5mg tonight, Saturday 5mg, Sunday 7mg, Monday 5mg repeat INR on Tuesday. He voices understanding of risks associated with SUBtherapeutic INR results. Discussed to monitor for s/sx of bleeding including epistaxis, hematuria, unusual bruising, hemoptysis, hematochezia as well as s/sx of stroke including impaired speech, unilateral paralysis, blurry vision as well as s/sx of a blood clot including severe pain, warmth or redness in calf or leg that worsens when you walk, SOA or blood tinged sputum and when to seek medical attention.    Instructed Mr. Hall continue of warfarin 5 mg daily until recheck (35 mg/week).   2. Repeat INR in on 1/3/23.  3. Verbal information provided over the phone. Mr. Hall RBV dosing instructions, expresses understanding by teach back, and has no further questions at this time.    Sondra Brooks, PharmD  12/29/2022  10:47 EST

## 2022-12-30 ENCOUNTER — LAB (OUTPATIENT)
Dept: LAB | Facility: HOSPITAL | Age: 47
End: 2022-12-30
Payer: MEDICARE

## 2022-12-30 DIAGNOSIS — Z95.2 HX OF AORTIC VALVE REPLACEMENT, MECHANICAL: ICD-10-CM

## 2022-12-30 LAB
INR PPP: 2.42 (ref 0.84–1.13)
PROTHROMBIN TIME: 26.4 SECONDS (ref 11.4–14.4)

## 2022-12-30 PROCEDURE — 36415 COLL VENOUS BLD VENIPUNCTURE: CPT

## 2022-12-30 PROCEDURE — 85610 PROTHROMBIN TIME: CPT

## 2023-01-03 ENCOUNTER — LAB (OUTPATIENT)
Dept: LAB | Facility: HOSPITAL | Age: 48
End: 2023-01-03
Payer: MEDICARE

## 2023-01-03 ENCOUNTER — ANTICOAGULATION VISIT (OUTPATIENT)
Dept: PHARMACY | Facility: HOSPITAL | Age: 48
End: 2023-01-03
Payer: MEDICARE

## 2023-01-03 DIAGNOSIS — Z95.2 HX OF AORTIC VALVE REPLACEMENT, MECHANICAL: Primary | ICD-10-CM

## 2023-01-03 DIAGNOSIS — Z95.2 HX OF AORTIC VALVE REPLACEMENT, MECHANICAL: ICD-10-CM

## 2023-01-03 LAB
INR PPP: 4.14 (ref 0.84–1.13)
PROTHROMBIN TIME: 40.4 SECONDS (ref 11.4–14.4)

## 2023-01-03 PROCEDURE — 85610 PROTHROMBIN TIME: CPT

## 2023-01-03 PROCEDURE — 36415 COLL VENOUS BLD VENIPUNCTURE: CPT

## 2023-01-03 NOTE — PROGRESS NOTES
Anticoagulation Clinic - Remote Progress Note  REMOTE LAB    Indication: On-X Mechanical valve #25 and a 30mm hemashield; stroke  Referring Provider: Alvaro (Last seen:  1/31/21)  Initial Warfarin Start Date: ~ Sept 2018  Goal INR: 2.0-3.0 per referral  Current Drug Interactions: duloxetine, lansoprazole, levothyroxine, Trintellix, valproic acid  Bleed Risk: ascending aortic aneurysm   Other: Hodgkins Lymphoma (hx of chemo and radiation); h/o CVA + hemorrhagic pancreatitis (during hospitalization for mAVR)    Diet: avoids GLV (6/13/22)  Alcohol: socially  Tobacco: none  OTC Pain Medication: APAP PRN pain    INR History:  Date 9/27 9/28 10/1 10/4 10/8 10/11 10/17 10/19 10/23 11/8 11/16 12/21   Total Weekly Dose 8mg 12mg 19mg 29mg 31mg 34mg 43mg 41mg 45mg 49mg 49mg 49mg   INR 1.3 1.22 1.3 1.8 1.85 1.70 2.91 2.82 3.79 3.00 2.96 3.44   Notes clinic enox enox enox; clinic enox enox      desvenla     Date 1/9/19 1/16 2/4 3/1 3/22 5/16 6/13 6/24 7/10 7/22 8/5 8/26   Total Weekly Dose 49mg 47mg 49mg 49mg 49mg 49mg 49mg 49mg 49mg 49mg 49mg 49mg   INR 4.40 2.36 2.62 3.02 2.15 2.60 2.25 2.98 2.83 2.97 2.90 3.20   Notes   self adj              Date 9/26 10/18 10/28 11/19 12/3 12/17 1/3/20 1/21 2/3 2/12 2/19 2/27   Total Weekly Dose 49mg 49mg 49mg 49mg 49mg 45mg 45mg 45mg 47mg 43mg 43mg 43mg   INR 3.57 3.12 2.88 3.33 4.06 2.59 3.21 3.85 3.11 2.34 3.01 3.20   Notes less GLV Cymbalta start 9/25; Depakote start 9/5 1x decr dose  depakote inc; trintellix dcd ceftin x10 days Recv'd 1/6  incr GLV  dieting        Date 3/5 3/12 3/20 4/2 4/17 5/7 5/28 6/16 6/29 7/17 8/14 8/28   Total Weekly Dose 41mg 41mg 41mg 41mg 41mg 41mg 41mg 39mg 39mg 39mg 39mg 39mg   INR 3.07 3.01 2.95 2.66 2.9 3.03 3.36 2.48 2.42 3.08 2.78 2.28   Notes 1x decr dose 1x decr dose       recv'd 6/30  recv'd 8/13 recv'd 8/31     Date 9/28 10/29  12/4 1/8/21 2/9 3/31 5/12 6/9  8/3 8/31   Total Weekly Dose 39mg 39mg non-  compliant 39mg 39mg 39mg 39mg 39mg 39mg non-  compliant 39mg 39mg   INR 2.33 2.39  2.46 2.45 2.85 2.48 2.52 2.51  2.88 2.77   Notes recv'd 9/29 recv'd 10/30   recv'd 1/11  recv'd 4/1           Date 10/4 11/1 12/6  3/8 4/5 5/9 6/10 6/23 7/5 7/20 8/5   Total Weekly Dose 39mg 39mg 39mg  non- compliant  39mg 39mg 39 mg 39 mg 39 mg 39 mg 39 mg 39mg   INR 2.50 2.54 2.2  2.40 2.40 2.98 2.26 2.96 2.84 2.88 3.48   Notes  Rec 11/2 Rec 12/7       rec'd 7/6  rec 8/8     Date 8/17 8/25 9/19 9/22 10/13 10/31 11/10 12/2 12/29 12/30 1/3/2023    Total Weekly Dose 37mg 35mg 39 mg  37mg 35 mg 35 mg 35 mg 35 mg 35mg 37.5mg 41.5mg    INR 3.24 2.6 3.13 3.39 2.46 2.75 2.71 2.77 1.1 2.42 4.14    Notes  rec 8/26  Inc activity Rec 10/14  rec 11/11 rec 12/5  rcv'd 1/3     *takes warfarin in AM*    Phone Interview:  Verbal Release Authorization: please contact Mr. Hall directly - if unable to reach patient may contact [email or web address]  Tablet Strength: 2mg and 5mg tablets  Patient Contact Info: [phone]; [email or web address]  Lab Contact Info: Megan Vu     Patient Findings  Positives:  Extra doses   Negatives:  Signs/symptoms of thrombosis, Signs/symptoms of bleeding, Laboratory test error suspected, Change in health, Change in alcohol use, Change in activity, Upcoming invasive procedure, Emergency department visit, Upcoming dental procedure, Missed doses, Change in medications, Change in diet/appetite, Hospital admission, Bruising, Other complaints   Comments:  With baseline INR draw on Wednesday, patient was advised to get INR drawn on Thursday. Patient did not go to the lab as discussed with him and his daughter, and went on Friday after carrier picked up blood sample. Did not receive INR results until 1/3/2023 and dosing directions had to be based off of baseline result. Pt reports that he already took 7mg of warfarin today... he also reports taking 7mg daily since we spoke     Plan:  1. INR was baseline at 1.1 (goal 2.0-3.0). Pt uncertain why INR was low. Pt aware  we may not receive results until tomorrow. He will take 7.5mg tonight if we do not receive an INR. Pt uncertain cause of baseline result.     Patient was to get repeat INR on 12/29, however, is not there. Called on 12/30 at 0923 and patient had not gone, but is planning to go today. Again re-emphasized that if it is not a morning draw we do not receive it the same day. Advised patient to come to [facility] lab in [facility] to ensure gets the same day.    12/30 addendum: Will not receive result that was drawn today. Patient has already taken a 2mg of warfarin today, will take 5mg tonight, Saturday 5mg, Sunday 7mg, Monday 5mg repeat INR on Tuesday. He voices understanding of risks associated with SUBtherapeutic INR results. Discussed to monitor for s/sx of bleeding including epistaxis, hematuria, unusual bruising, hemoptysis, hematochezia as well as s/sx of stroke including impaired speech, unilateral paralysis, blurry vision as well as s/sx of a blood clot including severe pain, warmth or redness in calf or leg that worsens when you walk, SOA or blood tinged sputum and when to seek medical attention.      1/3/2023: received both results today. Please refer to patient findings above. Instructed Mr. Hall to HOLD warfarin tomorrow (given he already taken 7mg today) then continue warfarin 5 mg daily until recheck (35 mg/week).   2. Repeat INR in on 1/9/23.  3. Verbal information provided over the phone. Mr. Hall RBV dosing instructions, expresses understanding by teach back, and has no further questions at this time.    Sondra Brooks, PharmD  1/3/2023  15:09 EST

## 2023-01-11 ENCOUNTER — OFFICE VISIT (OUTPATIENT)
Dept: CARDIOLOGY | Facility: CLINIC | Age: 48
End: 2023-01-11
Payer: MEDICARE

## 2023-01-11 VITALS
HEIGHT: 69 IN | OXYGEN SATURATION: 98 % | DIASTOLIC BLOOD PRESSURE: 80 MMHG | BODY MASS INDEX: 34.51 KG/M2 | RESPIRATION RATE: 18 BRPM | WEIGHT: 233 LBS | HEART RATE: 84 BPM | SYSTOLIC BLOOD PRESSURE: 126 MMHG

## 2023-01-11 DIAGNOSIS — Z95.2 HX OF AORTIC VALVE REPLACEMENT, MECHANICAL: Primary | ICD-10-CM

## 2023-01-11 DIAGNOSIS — I10 PRIMARY HYPERTENSION: ICD-10-CM

## 2023-01-11 DIAGNOSIS — I37.0 NONRHEUMATIC PULMONARY VALVE STENOSIS: ICD-10-CM

## 2023-01-11 DIAGNOSIS — Z12.11 COLON CANCER SCREENING: ICD-10-CM

## 2023-01-11 DIAGNOSIS — I71.20 THORACIC AORTIC ANEURYSM WITHOUT RUPTURE, UNSPECIFIED PART: ICD-10-CM

## 2023-01-11 PROCEDURE — 99214 OFFICE O/P EST MOD 30 MIN: CPT | Performed by: INTERNAL MEDICINE

## 2023-01-11 NOTE — PROGRESS NOTES
[facility] at [facility]  Office visit  Manjeet Hall  1975      VISIT DATE:  1/11/2023      PCP: Nile Faust MD  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  -Developed subvalvular aortic stenosis which required surgery, VSD closed as well at 15yo  -Ross procedure for increasing aortic insufficiency with moderate aortic stenosis at 19yo  -Treated for Hodgkin's lymphoma, age 21 - CTX and chest XRT    -May 2018 Echo  · Estimated EF appears to be in the range of 56 - 60%.  · Left ventricular wall thickness is consistent with mild concentric hypertrophy.  · Mild to moderate aortic valve regurgitation is present.  · Right ventricular cavity is borderline dilated.  · Left atrial cavity size is mildly dilated.  · Moderate dilation of the aortic root is present. 4.8 cm. Moderate dilation of the ascending aorta is present. 5.4 cm.    -CT angiography chest June 2018:aortic root at the upper portion of the valve largest AP dimension is 5.4 cm. Approximately 2 cm above the aortic root the ascending thoracic aorta largest oblique dimension is 7.0 cm and AP dimension 6.2 cm.    Cardiac catheterization in July 2018 [facility]  LMT: - The LMT has mild luminal irregularities.  LAD:  - There was moderate diffuse disease.  - The mid LAD is narrowed 30 % - focal disease.   Additional Comment: very large vessel, wraps around the apex to perfuse the   entire mid and apical inferior wall.  LCX: - There was mild diffuse disease.  - The circ AV groove continuation circumflex is narrowed 80 % - focal disease.  - The 1st obtuse marginal circumflex is narrowed 60 % - focal disease.   Additional Comment: AV LCx perfuses small left PDA.  RAMUS: - The Ramus is Absent.  RCA:  - The RCA has mild  luminal irregularities.     August 13, 2018  Reoperation, third open-heart surgery.  Reversed Ross procedure  including aortic valve and root re-replacement with a composite graft including an On-X mechanical valve #25 and a 30-mm Hemashield graft, right ventricular outflow  tract reconstruction with the re-repaired autograft.    Echo September 2018 [facility]  EF = 60 ± 5% . There is no evidence of LV apical thrombus.  - The right ventricle is normal in size. Right ventricular systolic function is   low normal.  - On-X prosthetic aortic valve (size #25). There is no aortic valve regurgitation.   Transprosthetic gradients could not be obtained on the current study. Valve   leaflets not well visualized. If clinically indicated, consider ZACHARY to assess   further.  - There is a small pericardial effusion with organization. It is smaller when   compared to that documented on the prior echocardiogram performed 8/23/18. There   is no RV/RA collapse. There is no obvious evidence of cardiac tamponade.  - Status post reverse Ross procedure including aortic root, ascending aorta and   aortic valve replacement, RVOT reconstruction with rerepaired autograft. No   significant PI. The peak/mean gradients 19/10 mmHg [similar to the prior study]    August 2019 echo  · Left ventricular systolic function is normal.  · Estimated EF appears to be in the range of 56 - 60%  · Left ventricular diastolic dysfunction (grade II) consistent with pseudonormalization.  · Left ventricular wall thickness is consistent with mild concentric hypertrophy.  · Normal mechanical aortic valve.    August 2021 TTE  · Left ventricular ejection fraction appears to be 56 - 60%. Left ventricular systolic function is normal.  · Left ventricular wall thickness is consistent with mild concentric hypertrophy.  · Left ventricular diastolic function is consistent with (grade II w/high LAP) pseudonormalization.  · Left atrial volume is mildly increased.  · The  right atrial cavity is mildly dilated.  · There is a normally functioning mechanical aortic valve prosthesis present.      ASSESSMENT:   Diagnosis Plan   1. Hx of aortic valve replacement, mechanical [Z95.2]        2. Primary hypertension        3. Nonrheumatic pulmonary valve stenosis        4. Thoracic aortic aneurysm without rupture, unspecified part            PLAN:  Congenital heart disease: Status post reversed Ross procedure  including aortic valve and root re-replacement with a composite graft including an On-X mechanical valve #25 and a 30-mm Hemashield graft, right ventricular outflow  tract reconstruction with the re-repaired autograft in 2018.  Postoperative course complicated by stroke and pancreatitis.  Afterload currently well-controlled.  Continue anticoagulation with goal INR of 2-3.  surveillance echocardiographic assessment every 3 years throughout the first 10 years post valve implant. CTA chest reveals stable repair.  Repeat CTA chest every 24 months.      Coronary artery disease: Currently stable and asymptomatic.  Afterload well controlled.  Currently off statin.  We will continue to trend fasting lipid panel and restart if LDL trends higher than 100.    Tremors: Neurology evaluation ongoing.  Continue propranolol LA.    Encouraged dietary and lifestyle modifications to achieve weight loss.    Bright red blood per rectum: Limited.  Suspect due to internal hemorrhoids in the setting of anticoagulation and constipation.  No history of colon cancer screening.  Recommend GI evaluation.    Subjective  History of stroke with persistent word finding difficulties.  History of postoperative pancreatitis.  Denies chest discomfort.  Blood pressures running less than 120/80 mmHg.  compliant with medical therapy.  Stable shortness of breath and a mild class II pattern.  Resting tremors have improved.  He does describe an episode of limited bright red blood per rectum 1 to 2 weeks ago.  Was at a time and  "his INR was 4.1 and he was having some constipation issues.  He has not had previous colon cancer screening.    PHYSICAL EXAMINATION:  Vitals:    01/11/23 1413   BP: 126/80   Pulse: 84   Resp: 18   SpO2: 98%   Weight: 106 kg (233 lb)   Height: 175.3 cm (69\")     General Appearance:    Alert, cooperative, no distress, appears stated age   Head:    Normocephalic, without obvious abnormality, atraumatic   Eyes:    conjunctiva/corneas clear   Nose:   Nares normal, septum midline, mucosa normal, no drainage   Throat:   Lips, teeth and gums normal   Neck:   Supple, symmetrical, trachea midline, no carotid    bruit or JVD   Lungs:     Clear to auscultation bilaterally, respirations unlabored   Chest Wall:    No tenderness or deformity    Heart:    Regular rate and rhythm, mechanical A2,, III/6 early peaking systolic murmur left upper sternal border, rub   or gallop, normal carotid impulse bilaterally without bruit.   Abdomen:     Soft, non-tender   Extremities:   Extremities normal, atraumatic, no cyanosis or edema   Pulses:   2+ and symmetric all extremities   Skin:   Skin color, texture, turgor normal, no rashes or lesions       Diagnostic Data:  Procedures  Lab Results   Component Value Date    CHLPL 259 (H) 07/17/2015    TRIG 208 (H) 12/28/2022    HDL 52 12/28/2022     Lab Results   Component Value Date    GLUCOSE 111 (H) 12/28/2022    BUN 13 12/28/2022    CREATININE 1.15 12/28/2022     12/28/2022    K 4.5 12/28/2022     12/28/2022    CO2 29.4 (H) 12/28/2022     Lab Results   Component Value Date    HGBA1C 6.4 03/21/2022     Lab Results   Component Value Date    WBC 11.41 (H) 12/28/2022    HGB 15.3 12/28/2022    HCT 45.7 12/28/2022     12/28/2022       Allergies  No Known Allergies    Current Medications    Current Outpatient Medications:   •  divalproex (DEPAKOTE ER) 500 MG 24 hr tablet, Take 1,000 mg by mouth every night at bedtime., Disp: , Rfl:   •  DULoxetine (CYMBALTA) 60 MG capsule, Take 60 mg " by mouth Daily., Disp: , Rfl: 2  •  gabapentin (NEURONTIN) 300 MG capsule, Take 300 mg by mouth 2 (Two) Times a Day., Disp: , Rfl:   •  lansoprazole (PREVACID) 30 MG capsule, Take 1 capsule by mouth Daily., Disp: 30 capsule, Rfl: 5  •  levothyroxine (SYNTHROID, LEVOTHROID) 125 MCG tablet, TAKE 1 TABLET BY MOUTH DAILY. FURTHER REFILLS PER PCP WITH REPEAT LAB WORK., Disp: 90 tablet, Rfl: 0  •  propranolol LA (INDERAL LA) 60 MG 24 hr capsule, TAKE 1 CAPSULE BY MOUTH EVERY DAY, Disp: 90 capsule, Rfl: 1  •  rosuvastatin (CRESTOR) 20 MG tablet, TAKE 1 TABLET BY MOUTH EVERY DAY, Disp: 90 tablet, Rfl: 0  •  warfarin (COUMADIN) 2 MG tablet, TAKE 1 TABLET BY MOUTH EVERY DAY AS DIRECTED, Disp: 12 tablet, Rfl: 0  •  warfarin (COUMADIN) 5 MG tablet, TAKE 1 TABLET BY MOUTH DAILY AS DIRECTED BY ANTICOAGULATION CLINIC., Disp: 90 tablet, Rfl: 0          ROS  Review of Systems   Cardiovascular: Negative for chest pain, dyspnea on exertion, irregular heartbeat and syncope.   Respiratory: Negative for cough, shortness of breath and wheezing.          SOCIAL HX  Social History     Socioeconomic History   • Marital status: Legally    Tobacco Use   • Smoking status: Never   • Smokeless tobacco: Never   Vaping Use   • Vaping Use: Never used   Substance and Sexual Activity   • Alcohol use: Yes     Comment: social   • Drug use: No   • Sexual activity: Defer       FAMILY HX  Family History   Problem Relation Age of Onset   • COPD Mother    • Cancer Mother    • Hypertension Father    • Stroke Father    • Stroke Paternal Grandmother    • Hypertension Paternal Grandmother    • Cancer Paternal Grandmother    • Cancer Paternal Grandfather    • Heart disease Paternal Grandfather    • No Known Problems Brother              Vimal John III, MD, [facility]

## 2023-01-13 ENCOUNTER — LAB (OUTPATIENT)
Dept: LAB | Facility: HOSPITAL | Age: 48
End: 2023-01-13
Payer: MEDICARE

## 2023-01-13 DIAGNOSIS — Z95.2 HX OF AORTIC VALVE REPLACEMENT, MECHANICAL: ICD-10-CM

## 2023-01-13 LAB
INR PPP: 3.59 (ref 0.84–1.13)
PROTHROMBIN TIME: 36.1 SECONDS (ref 11.4–14.4)

## 2023-01-13 PROCEDURE — 85610 PROTHROMBIN TIME: CPT

## 2023-01-13 PROCEDURE — 36415 COLL VENOUS BLD VENIPUNCTURE: CPT

## 2023-01-16 ENCOUNTER — ANTICOAGULATION VISIT (OUTPATIENT)
Dept: PHARMACY | Facility: HOSPITAL | Age: 48
End: 2023-01-16
Payer: MEDICARE

## 2023-01-16 DIAGNOSIS — Z95.2 HX OF AORTIC VALVE REPLACEMENT, MECHANICAL: Primary | ICD-10-CM

## 2023-01-16 NOTE — PROGRESS NOTES
Anticoagulation Clinic - Remote Progress Note  REMOTE LAB    Indication: On-X Mechanical valve #25 and a 30mm hemashield; stroke  Referring Provider: Alvaro (Last seen:  1/31/21)  Initial Warfarin Start Date: ~ Sept 2018  Goal INR: 2.0-3.0 per referral  Current Drug Interactions: duloxetine, lansoprazole, levothyroxine, Trintellix, valproic acid  Bleed Risk: ascending aortic aneurysm   Other: Hodgkins Lymphoma (hx of chemo and radiation); h/o CVA + hemorrhagic pancreatitis (during hospitalization for mAVR)    Diet: avoids GLV (6/13/22)  Alcohol: socially  Tobacco: none  OTC Pain Medication: APAP PRN pain    INR History:  Date 9/27 9/28 10/1 10/4 10/8 10/11 10/17 10/19 10/23 11/8 11/16 12/21   Total Weekly Dose 8mg 12mg 19mg 29mg 31mg 34mg 43mg 41mg 45mg 49mg 49mg 49mg   INR 1.3 1.22 1.3 1.8 1.85 1.70 2.91 2.82 3.79 3.00 2.96 3.44   Notes clinic enox enox enox; clinic enox enox      desvenla     Date 1/9/19 1/16 2/4 3/1 3/22 5/16 6/13 6/24 7/10 7/22 8/5 8/26   Total Weekly Dose 49mg 47mg 49mg 49mg 49mg 49mg 49mg 49mg 49mg 49mg 49mg 49mg   INR 4.40 2.36 2.62 3.02 2.15 2.60 2.25 2.98 2.83 2.97 2.90 3.20   Notes   self adj              Date 9/26 10/18 10/28 11/19 12/3 12/17 1/3/20 1/21 2/3 2/12 2/19 2/27   Total Weekly Dose 49mg 49mg 49mg 49mg 49mg 45mg 45mg 45mg 47mg 43mg 43mg 43mg   INR 3.57 3.12 2.88 3.33 4.06 2.59 3.21 3.85 3.11 2.34 3.01 3.20   Notes less GLV Cymbalta start 9/25; Depakote start 9/5 1x decr dose  depakote inc; trintellix dcd ceftin x10 days Recv'd 1/6  incr GLV  dieting        Date 3/5 3/12 3/20 4/2 4/17 5/7 5/28 6/16 6/29 7/17 8/14 8/28   Total Weekly Dose 41mg 41mg 41mg 41mg 41mg 41mg 41mg 39mg 39mg 39mg 39mg 39mg   INR 3.07 3.01 2.95 2.66 2.9 3.03 3.36 2.48 2.42 3.08 2.78 2.28   Notes 1x decr dose 1x decr dose       recv'd 6/30  recv'd 8/13 recv'd 8/31     Date 9/28 10/29  12/4 1/8/21 2/9 3/31 5/12 6/9  8/3 8/31   Total Weekly Dose 39mg 39mg non-  compliant 39mg 39mg 39mg 39mg 39mg 39mg non-  compliant 39mg 39mg   INR 2.33 2.39  2.46 2.45 2.85 2.48 2.52 2.51  2.88 2.77   Notes recv'd 9/29 recv'd 10/30   recv'd 1/11  recv'd 4/1           Date 10/4 11/1 12/6  3/8 4/5 5/9 6/10 6/23 7/5 7/20 8/5   Total Weekly Dose 39mg 39mg 39mg  non- compliant  39mg 39mg 39 mg 39 mg 39 mg 39 mg 39 mg 39mg   INR 2.50 2.54 2.2  2.40 2.40 2.98 2.26 2.96 2.84 2.88 3.48   Notes  Rec 11/2 Rec 12/7       rec'd 7/6  rec 8/8     Date 8/17 8/25 9/19 9/22 10/13 10/31 11/10 12/2 12/29 12/30 1/3/2023 1/13   Total Weekly Dose 37mg 35mg 39 mg  37mg 35 mg 35 mg 35 mg 35 mg 35mg 37.5mg 41.5mg    INR 3.24 2.6 3.13 3.39 2.46 2.75 2.71 2.77 1.1 2.42 4.14 3.59   Notes  rec 8/26  Inc activity Rec 10/14  rec 11/11 rec 12/5 ??? rcv'd 1/3     *takes warfarin in AM*    Phone Interview:  Verbal Release Authorization: please contact Mr. Hall directly - if unable to reach patient may contact brotherMarko  Tablet Strength: 2mg and 5mg tablets  Patient Contact Info: [phone]; [email or web address]  Lab Contact Info: Megan Vu     Patient Findings    Negatives:  Signs/symptoms of thrombosis, Signs/symptoms of bleeding, Laboratory test error suspected, Change in health, Change in alcohol use, Change in activity, Upcoming invasive procedure, Emergency department visit, Upcoming dental procedure, Missed doses, Extra doses, Change in medications, Change in diet/appetite, Hospital admission, Bruising, Other complaints         Plan:  1. INR was SUPRAtherapeutic 1/13 at 3.59 (goal 2.0-3.0). Instructed patient to reduce today's dose to 2.5mg then resume warfarin 5mg daily  2. Repeat INR in on 1/19  3. Verbal information provided over the phone. Mr. Hall RBV dosing instructions, expresses understanding by teach back, and has no further questions at this time.    Kayla Stockton, PharmD.  01/16/23   09:56 EST

## 2023-01-19 RX ORDER — WARFARIN SODIUM 5 MG/1
TABLET ORAL
Qty: 90 TABLET | Refills: 0 | Status: SHIPPED | OUTPATIENT
Start: 2023-01-19

## 2023-01-19 NOTE — TELEPHONE ENCOUNTER
Refill approved for 90 tablets with 0 refills. Rx sent to [facility] in [place], [place].

## 2023-01-23 RX ORDER — PROPRANOLOL HCL 60 MG
CAPSULE, EXTENDED RELEASE 24HR ORAL
Qty: 90 CAPSULE | Refills: 1 | Status: SHIPPED | OUTPATIENT
Start: 2023-01-23

## 2023-01-26 RX ORDER — LEVOTHYROXINE SODIUM 0.12 MG/1
125 TABLET ORAL DAILY
Qty: 90 TABLET | Refills: 0 | Status: SHIPPED | OUTPATIENT
Start: 2023-01-26 | End: 2023-03-06

## 2023-01-27 ENCOUNTER — LAB (OUTPATIENT)
Dept: LAB | Facility: HOSPITAL | Age: 48
End: 2023-01-27
Payer: MEDICARE

## 2023-01-27 DIAGNOSIS — Z95.2 HX OF AORTIC VALVE REPLACEMENT, MECHANICAL: ICD-10-CM

## 2023-01-27 LAB
INR PPP: 2.94 (ref 0.84–1.13)
PROTHROMBIN TIME: 30.8 SECONDS (ref 11.4–14.4)

## 2023-01-27 PROCEDURE — 36415 COLL VENOUS BLD VENIPUNCTURE: CPT

## 2023-01-27 PROCEDURE — 85610 PROTHROMBIN TIME: CPT

## 2023-01-30 ENCOUNTER — ANTICOAGULATION VISIT (OUTPATIENT)
Dept: PHARMACY | Facility: HOSPITAL | Age: 48
End: 2023-01-30
Payer: MEDICARE

## 2023-01-30 DIAGNOSIS — Z95.2 HX OF AORTIC VALVE REPLACEMENT, MECHANICAL: Primary | ICD-10-CM

## 2023-01-30 NOTE — PROGRESS NOTES
Anticoagulation Clinic - Remote Progress Note  REMOTE LAB    Indication: On-X Mechanical valve #25 and a 30mm hemashield; stroke  Referring Provider: Alvaro (Last seen:  1/31/21)  Initial Warfarin Start Date: ~ Sept 2018  Goal INR: 2.0-3.0 per referral  Current Drug Interactions: duloxetine, lansoprazole, levothyroxine, Trintellix, valproic acid  Bleed Risk: ascending aortic aneurysm   Other: Hodgkins Lymphoma (hx of chemo and radiation); h/o CVA + hemorrhagic pancreatitis (during hospitalization for mAVR)    Diet: avoids GLV (6/13/22)  Alcohol: socially  Tobacco: none  OTC Pain Medication: APAP PRN pain    INR History:  Date 9/27 9/28 10/1 10/4 10/8 10/11 10/17 10/19 10/23 11/8 11/16 12/21   Total Weekly Dose 8mg 12mg 19mg 29mg 31mg 34mg 43mg 41mg 45mg 49mg 49mg 49mg   INR 1.3 1.22 1.3 1.8 1.85 1.70 2.91 2.82 3.79 3.00 2.96 3.44   Notes clinic enox enox enox; clinic enox enox      desvenla     Date 1/9/19 1/16 2/4 3/1 3/22 5/16 6/13 6/24 7/10 7/22 8/5 8/26   Total Weekly Dose 49mg 47mg 49mg 49mg 49mg 49mg 49mg 49mg 49mg 49mg 49mg 49mg   INR 4.40 2.36 2.62 3.02 2.15 2.60 2.25 2.98 2.83 2.97 2.90 3.20   Notes   self adj              Date 9/26 10/18 10/28 11/19 12/3 12/17 1/3/20 1/21 2/3 2/12 2/19 2/27   Total Weekly Dose 49mg 49mg 49mg 49mg 49mg 45mg 45mg 45mg 47mg 43mg 43mg 43mg   INR 3.57 3.12 2.88 3.33 4.06 2.59 3.21 3.85 3.11 2.34 3.01 3.20   Notes less GLV Cymbalta start 9/25; Depakote start 9/5 1x decr dose  depakote inc; trintellix dcd ceftin x10 days Recv'd 1/6  incr GLV  dieting        Date 3/5 3/12 3/20 4/2 4/17 5/7 5/28 6/16 6/29 7/17 8/14 8/28   Total Weekly Dose 41mg 41mg 41mg 41mg 41mg 41mg 41mg 39mg 39mg 39mg 39mg 39mg   INR 3.07 3.01 2.95 2.66 2.9 3.03 3.36 2.48 2.42 3.08 2.78 2.28   Notes 1x decr dose 1x decr dose       recv'd 6/30  recv'd 8/13 recv'd 8/31     Date 9/28 10/29  12/4 1/8/21 2/9 3/31 5/12 6/9  8/3 8/31   Total Weekly Dose 39mg 39mg non-  compliant 39mg 39mg 39mg 39mg 39mg 39mg non-  compliant 39mg 39mg   INR 2.33 2.39  2.46 2.45 2.85 2.48 2.52 2.51  2.88 2.77   Notes recv'd 9/29 recv'd 10/30   recv'd 1/11  recv'd 4/1           Date 10/4 11/1 12/6  3/8 4/5 5/9 6/10 6/23 7/5 7/20 8/5   Total Weekly Dose 39mg 39mg 39mg  non- compliant  39mg 39mg 39 mg 39 mg 39 mg 39 mg 39 mg 39mg   INR 2.50 2.54 2.2  2.40 2.40 2.98 2.26 2.96 2.84 2.88 3.48   Notes  Rec 11/2 Rec 12/7       rec'd 7/6  rec 8/8     Date 8/17 8/25 9/19 9/22 10/13 10/31 11/10 12/2 12/29 12/30 1/3/2023 1/13   Total Weekly Dose 37mg 35mg 39 mg  37mg 35 mg 35 mg 35 mg 35 mg 35mg 37.5mg 41.5mg 35mg   INR 3.24 2.6 3.13 3.39 2.46 2.75 2.71 2.77 1.1 2.42 4.14 3.59   Notes  rec 8/26  Inc activity Rec 10/14  rec 11/11 rec 12/5 ??? rcv'd 1/3       Date 1/27              Total Weekly Dose 35mg              INR 2.94              Notes rcv'd 1/30                *takes warfarin in AM*    Phone Interview:  Verbal Release Authorization: please contact Mr. Hall directly - if unable to reach patient may contact [email or web address]  Tablet Strength: 2mg and 5mg tablets  Patient Contact Info: [phone]; [email or web address]  Lab Contact Info: Megan Vu     Patient Findings  Negatives:  Signs/symptoms of thrombosis, Signs/symptoms of bleeding, Laboratory test error suspected, Change in health, Change in alcohol use, Change in activity, Upcoming invasive procedure, Emergency department visit, Upcoming dental procedure, Missed doses, Extra doses, Change in medications, Change in diet/appetite, Hospital admission, Bruising, Other complaints     Plan:  1. INR is therapeutic 1/27 at 2.94 (goal 2.0-3.0). Instructed patient to resume warfarin 5mg daily  2. Repeat INR in on 2/27.  3. Verbal information provided over the phone. Mr. Hall RBV dosing instructions, expresses understanding by teach back, and has no further questions at this time.    Sondra Brooks, PharmD  01/30/23   09:09 EST

## 2023-02-17 ENCOUNTER — LAB (OUTPATIENT)
Dept: LAB | Facility: HOSPITAL | Age: 48
End: 2023-02-17
Payer: MEDICARE

## 2023-02-17 DIAGNOSIS — Z95.2 HX OF AORTIC VALVE REPLACEMENT, MECHANICAL: ICD-10-CM

## 2023-02-17 LAB
INR PPP: 2.58 (ref 0.84–1.13)
PROTHROMBIN TIME: 27.8 SECONDS (ref 11.4–14.4)

## 2023-02-17 PROCEDURE — 36415 COLL VENOUS BLD VENIPUNCTURE: CPT

## 2023-02-17 PROCEDURE — 85610 PROTHROMBIN TIME: CPT

## 2023-02-20 ENCOUNTER — ANTICOAGULATION VISIT (OUTPATIENT)
Dept: PHARMACY | Facility: HOSPITAL | Age: 48
End: 2023-02-20
Payer: MEDICARE

## 2023-02-20 DIAGNOSIS — Z95.2 HX OF AORTIC VALVE REPLACEMENT, MECHANICAL: Primary | ICD-10-CM

## 2023-02-20 NOTE — PROGRESS NOTES
Anticoagulation Clinic - Remote Progress Note  REMOTE LAB    Indication: On-X Mechanical valve #25 and a 30mm hemashield; stroke  Referring Provider: Alvaro (Last seen:  1/31/21)  Initial Warfarin Start Date: ~ Sept 2018  Goal INR: 2.0-3.0 per referral  Current Drug Interactions: duloxetine, lansoprazole, levothyroxine, Trintellix, valproic acid  Bleed Risk: ascending aortic aneurysm   Other: Hodgkins Lymphoma (hx of chemo and radiation); h/o CVA + hemorrhagic pancreatitis (during hospitalization for mAVR)    Diet: avoids GLV (2/20/23)  Alcohol: socially  Tobacco: none  OTC Pain Medication: APAP PRN pain    INR History:  Date 9/27 9/28 10/1 10/4 10/8 10/11 10/17 10/19 10/23 11/8 11/16 12/21   Total Weekly Dose 8mg 12mg 19mg 29mg 31mg 34mg 43mg 41mg 45mg 49mg 49mg 49mg   INR 1.3 1.22 1.3 1.8 1.85 1.70 2.91 2.82 3.79 3.00 2.96 3.44   Notes clinic enox enox enox; clinic enox enox      desvenla     Date 1/9/19 1/16 2/4 3/1 3/22 5/16 6/13 6/24 7/10 7/22 8/5 8/26   Total Weekly Dose 49mg 47mg 49mg 49mg 49mg 49mg 49mg 49mg 49mg 49mg 49mg 49mg   INR 4.40 2.36 2.62 3.02 2.15 2.60 2.25 2.98 2.83 2.97 2.90 3.20   Notes   self adj              Date 9/26 10/18 10/28 11/19 12/3 12/17 1/3/20 1/21 2/3 2/12 2/19 2/27   Total Weekly Dose 49mg 49mg 49mg 49mg 49mg 45mg 45mg 45mg 47mg 43mg 43mg 43mg   INR 3.57 3.12 2.88 3.33 4.06 2.59 3.21 3.85 3.11 2.34 3.01 3.20   Notes less GLV Cymbalta start 9/25; Depakote start 9/5 1x decr dose  depakote inc; trintellix dcd ceftin x10 days Recv'd 1/6  incr GLV  dieting        Date 3/5 3/12 3/20 4/2 4/17 5/7 5/28 6/16 6/29 7/17 8/14 8/28   Total Weekly Dose 41mg 41mg 41mg 41mg 41mg 41mg 41mg 39mg 39mg 39mg 39mg 39mg   INR 3.07 3.01 2.95 2.66 2.9 3.03 3.36 2.48 2.42 3.08 2.78 2.28   Notes 1x decr dose 1x decr dose       recv'd 6/30  recv'd 8/13 recv'd 8/31     Date 9/28 10/29  12/4 1/8/21 2/9 3/31 5/12 6/9  8/3 8/31   Total Weekly Dose 39mg 39mg non-  compliant 39mg 39mg 39mg 39mg 39mg 39mg non-  compliant 39mg 39mg   INR 2.33 2.39  2.46 2.45 2.85 2.48 2.52 2.51  2.88 2.77   Notes recv'd 9/29 recv'd 10/30   recv'd 1/11  recv'd 4/1           Date 10/4 11/1 12/6  3/8 4/5 5/9 6/10 6/23 7/5 7/20 8/5   Total Weekly Dose 39mg 39mg 39mg  non- compliant  39mg 39mg 39 mg 39 mg 39 mg 39 mg 39 mg 39mg   INR 2.50 2.54 2.2  2.40 2.40 2.98 2.26 2.96 2.84 2.88 3.48   Notes  Rec 11/2 Rec 12/7       rec'd 7/6  rec 8/8     Date 8/17 8/25 9/19 9/22 10/13 10/31 11/10 12/2 12/29 12/30 1/3/2023 1/13   Total Weekly Dose 37mg 35mg 39 mg  37mg 35 mg 35 mg 35 mg 35 mg 35mg 37.5mg 41.5mg 35mg   INR 3.24 2.6 3.13 3.39 2.46 2.75 2.71 2.77 1.1 2.42 4.14 3.59   Notes  rec 8/26  Inc activity Rec 10/14  rec 11/11 rec 12/5 ??? rcv'd 1/3       Date 1/27 2/17             Total Weekly Dose 35mg 35mg             INR 2.94 2.58             Notes rcv'd 1/30 rcv'd 2/20               *takes warfarin in AM*    Phone Interview:  Verbal Release Authorization: please contact Mr. Hall directly - if unable to reach patient may contact [email or web address]  Tablet Strength: 2mg and 5mg tablets  Patient Contact Info: [phone]; [email or web address]  Lab Contact Info: Megan Vu     Patient Findings    Negatives:  Signs/symptoms of thrombosis, Signs/symptoms of bleeding, Laboratory test error suspected, Change in health, Change in alcohol use, Change in activity, Upcoming invasive procedure, Emergency department visit, Upcoming dental procedure, Missed doses, Extra doses, Change in medications, Change in diet/appetite, Hospital admission, Bruising, Other complaints       Plan:  1. INR is therapeutic 2/20 at 2.94 (goal 2.0-3.0). Noncompliant with previously discussed follow up date of 1/27. At this time, instructed patient to continue warfarin 5mg daily  2. Repeat INR in on 3/17.  3. Verbal information provided over the phone. Mr. Hall RBV dosing instructions, expresses understanding by teach back, and has no further questions at this  time.    Sondra Brooks, PharmD  02/20/23   09:10 EST

## 2023-03-06 RX ORDER — LEVOTHYROXINE SODIUM 0.12 MG/1
125 TABLET ORAL DAILY
Qty: 90 TABLET | Refills: 0 | Status: SHIPPED | OUTPATIENT
Start: 2023-03-06

## 2023-03-10 ENCOUNTER — LAB (OUTPATIENT)
Dept: LAB | Facility: HOSPITAL | Age: 48
End: 2023-03-10
Payer: MEDICARE

## 2023-03-10 DIAGNOSIS — Z95.2 HX OF AORTIC VALVE REPLACEMENT, MECHANICAL: ICD-10-CM

## 2023-03-10 LAB
INR PPP: 2.5 (ref 0.84–1.13)
PROTHROMBIN TIME: 27.1 SECONDS (ref 11.4–14.4)

## 2023-03-10 PROCEDURE — 85610 PROTHROMBIN TIME: CPT

## 2023-03-10 PROCEDURE — 36415 COLL VENOUS BLD VENIPUNCTURE: CPT

## 2023-03-13 ENCOUNTER — ANTICOAGULATION VISIT (OUTPATIENT)
Dept: PHARMACY | Facility: HOSPITAL | Age: 48
End: 2023-03-13
Payer: MEDICARE

## 2023-03-13 DIAGNOSIS — Z95.2 HX OF AORTIC VALVE REPLACEMENT, MECHANICAL: Primary | ICD-10-CM

## 2023-03-13 RX ORDER — ROSUVASTATIN CALCIUM 20 MG/1
TABLET, COATED ORAL
Qty: 90 TABLET | Refills: 1 | Status: SHIPPED | OUTPATIENT
Start: 2023-03-13

## 2023-03-13 NOTE — PROGRESS NOTES
Anticoagulation Clinic - Remote Progress Note  REMOTE LAB    Indication: On-X Mechanical valve #25 and a 30mm hemashield; stroke  Referring Provider: Alvaro (Last seen:  1/31/21)  Initial Warfarin Start Date: ~ Sept 2018  Goal INR: 2.0-3.0 per referral  Current Drug Interactions: duloxetine, lansoprazole, levothyroxine, Trintellix, valproic acid, rosuvastatin  Bleed Risk: ascending aortic aneurysm   Other: Hodgkins Lymphoma (hx of chemo and radiation); h/o CVA + hemorrhagic pancreatitis (during hospitalization for mAVR)    Diet: avoids GLV (2/20/23)  Alcohol: socially  Tobacco: none  OTC Pain Medication: APAP PRN pain    INR History:  Date 9/27 9/28 10/1 10/4 10/8 10/11 10/17 10/19 10/23 11/8 11/16 12/21   Total Weekly Dose 8mg 12mg 19mg 29mg 31mg 34mg 43mg 41mg 45mg 49mg 49mg 49mg   INR 1.3 1.22 1.3 1.8 1.85 1.70 2.91 2.82 3.79 3.00 2.96 3.44   Notes clinic enox enox enox; clinic enox enox      desvenla     Date 1/9/19 1/16 2/4 3/1 3/22 5/16 6/13 6/24 7/10 7/22 8/5 8/26   Total Weekly Dose 49mg 47mg 49mg 49mg 49mg 49mg 49mg 49mg 49mg 49mg 49mg 49mg   INR 4.40 2.36 2.62 3.02 2.15 2.60 2.25 2.98 2.83 2.97 2.90 3.20   Notes   self adj              Date 9/26 10/18 10/28 11/19 12/3 12/17 1/3/20 1/21 2/3 2/12 2/19 2/27   Total Weekly Dose 49mg 49mg 49mg 49mg 49mg 45mg 45mg 45mg 47mg 43mg 43mg 43mg   INR 3.57 3.12 2.88 3.33 4.06 2.59 3.21 3.85 3.11 2.34 3.01 3.20   Notes less GLV Cymbalta start 9/25; Depakote start 9/5 1x decr dose  depakote inc; trintellix dcd ceftin x10 days Recv'd 1/6  incr GLV  dieting        Date 3/5 3/12 3/20 4/2 4/17 5/7 5/28 6/16 6/29 7/17 8/14 8/28   Total Weekly Dose 41mg 41mg 41mg 41mg 41mg 41mg 41mg 39mg 39mg 39mg 39mg 39mg   INR 3.07 3.01 2.95 2.66 2.9 3.03 3.36 2.48 2.42 3.08 2.78 2.28   Notes 1x decr   1x decr         recv'd 6/30  recv'd 8/13 recv'd 8/31     Date 9/28 10/29  12/4 1/8/21 2/9 3/31 5/12 6/9  8/3 8/31   Total Weekly Dose 39mg 39mg non-  compliant 39mg 39mg 39mg 39mg 39mg 39mg  non- compliant 39mg 39mg   INR 2.33 2.39  2.46 2.45 2.85 2.48 2.52 2.51  2.88 2.77   Notes recv'd 9/29 recv'd 10/30   recv'd 1/11  recv'd 4/1           Date 10/4 11/1 12/6  3/8 4/5 5/9 6/10 6/23 7/5 7/20 8/5   Total Weekly Dose 39mg 39mg 39mg  non- compliant  39mg 39mg 39 mg 39 mg 39 mg 39 mg 39 mg 39mg   INR 2.50 2.54 2.2  2.40 2.40 2.98 2.26 2.96 2.84 2.88 3.48   Notes  Rec 11/2 Rec 12/7       rec'd 7/6  rec 8/8     Date 8/17 8/25 9/19 9/22 10/13 10/31 11/10 12/2 12/29 12/30 1/3/2023 1/13   Total Weekly Dose 37mg 35mg 39 mg  37mg 35 mg 35 mg 35 mg 35 mg 35mg 37.5mg 41.5mg 35mg   INR 3.24 2.6 3.13 3.39 2.46 2.75 2.71 2.77 1.1 2.42 4.14 3.59   Notes  rec 8/26  Inc activity Rec 10/14  rec 11/11 rec 12/5 ??? rcv'd 1/3       Date 1/27 2/17 3/10            Total Weekly Dose 35mg 35mg 35 mg            INR 2.94 2.58 2.5            Notes rcv'd 1/30 rcv'd 2/20 rec 3/13 crestor              *takes warfarin in AM*    Phone Interview:  Verbal Release Authorization: please contact Mr. Hall directly - if unable to reach patient may contact [email or web address]  Tablet Strength: 2mg and 5mg tablets  Patient Contact Info: [phone]; [email or web address]  Lab Contact Info: Megan Vu     Patient Findings  Negatives:  Signs/symptoms of thrombosis, Signs/symptoms of bleeding, Laboratory test error suspected, Change in health, Change in alcohol use, Change in activity, Upcoming invasive procedure, Emergency department visit, Upcoming dental procedure, Missed doses, Extra doses, Change in medications, Change in diet/appetite, Hospital admission, Bruising, Other complaints   Comments:  All findings negative per pt.   Noted Rx for rosuvastatin; may result in an increased INR.  Will need to discuss with Mr. Hall at next encounter     Plan:  1. INR was therapeutic on 3/10 at 2.50 (goal 2.0-3.0). Unable to reach Mr Hall until today. Instructed Mr Hall to continue warfarin 5mg oral daily until recheck.  2. Repeat INR in four  weeks 4/7/23.  3. Verbal information provided over the phone. Mr. Hall RBV dosing instructions, expresses understanding by teach back, and has no further questions at this time.    Parth Fagan CPhT  3/15/2023  14:46 EDT     I, Adeline Bearden, PharmD, have reviewed the note in full and agree with the assessment and plan.  03/15/23  15:26 EDT

## 2023-04-05 ENCOUNTER — LAB (OUTPATIENT)
Dept: LAB | Facility: HOSPITAL | Age: 48
End: 2023-04-05
Payer: MEDICARE

## 2023-04-05 ENCOUNTER — ANTICOAGULATION VISIT (OUTPATIENT)
Dept: PHARMACY | Facility: HOSPITAL | Age: 48
End: 2023-04-05
Payer: MEDICARE

## 2023-04-05 DIAGNOSIS — Z95.2 HX OF AORTIC VALVE REPLACEMENT, MECHANICAL: ICD-10-CM

## 2023-04-05 DIAGNOSIS — Z95.2 HX OF AORTIC VALVE REPLACEMENT, MECHANICAL: Primary | ICD-10-CM

## 2023-04-05 LAB
INR PPP: 2.44 (ref 0.84–1.13)
PROTHROMBIN TIME: 26.6 SECONDS (ref 11.4–14.4)

## 2023-04-05 PROCEDURE — 36415 COLL VENOUS BLD VENIPUNCTURE: CPT

## 2023-04-05 PROCEDURE — 85610 PROTHROMBIN TIME: CPT

## 2023-04-05 NOTE — PROGRESS NOTES
Anticoagulation Clinic - Remote Progress Note  REMOTE LAB    Indication: On-X Mechanical valve #25 and a 30mm hemashield; stroke  Referring Provider: Alvaro (Last seen:  1/31/21)  Initial Warfarin Start Date: ~ Sept 2018  Goal INR: 2.0-3.0 per referral  Current Drug Interactions: duloxetine, lansoprazole, levothyroxine, Trintellix, valproic acid, rosuvastatin  Bleed Risk: ascending aortic aneurysm   Other: Hodgkins Lymphoma (hx of chemo and radiation); h/o CVA + hemorrhagic pancreatitis (during hospitalization for mAVR)    Diet: avoids GLV (2/20/23)  Alcohol: socially  Tobacco: none  OTC Pain Medication: APAP PRN pain    INR History:  Date 9/27 9/28 10/1 10/4 10/8 10/11 10/17 10/19 10/23 11/8 11/16 12/21   Total Weekly Dose 8mg 12mg 19mg 29mg 31mg 34mg 43mg 41mg 45mg 49mg 49mg 49mg   INR 1.3 1.22 1.3 1.8 1.85 1.70 2.91 2.82 3.79 3.00 2.96 3.44   Notes clinic enox enox enox; clinic enox enox      desvenla     Date 1/9/19 1/16 2/4 3/1 3/22 5/16 6/13 6/24 7/10 7/22 8/5 8/26   Total Weekly Dose 49mg 47mg 49mg 49mg 49mg 49mg 49mg 49mg 49mg 49mg 49mg 49mg   INR 4.40 2.36 2.62 3.02 2.15 2.60 2.25 2.98 2.83 2.97 2.90 3.20   Notes   self adj              Date 9/26 10/18 10/28 11/19 12/3 12/17 1/3/20 1/21 2/3 2/12 2/19 2/27   Total Weekly Dose 49mg 49mg 49mg 49mg 49mg 45mg 45mg 45mg 47mg 43mg 43mg 43mg   INR 3.57 3.12 2.88 3.33 4.06 2.59 3.21 3.85 3.11 2.34 3.01 3.20   Notes less GLV Cymbalta start 9/25; Depakote start 9/5 1x decr dose  depakote inc; trintellix dcd ceftin x10 days Recv'd 1/6  incr GLV  dieting        Date 3/5 3/12 3/20 4/2 4/17 5/7 5/28 6/16 6/29 7/17 8/14 8/28   Total Weekly Dose 41mg 41mg 41mg 41mg 41mg 41mg 41mg 39mg 39mg 39mg 39mg 39mg   INR 3.07 3.01 2.95 2.66 2.9 3.03 3.36 2.48 2.42 3.08 2.78 2.28   Notes 1x decr   1x decr         recv'd 6/30  recv'd 8/13 recv'd 8/31     Date 9/28 10/29  12/4 1/8/21 2/9 3/31 5/12 6/9  8/3 8/31   Total Weekly Dose 39mg 39mg non-  compliant 39mg 39mg 39mg 39mg 39mg 39mg  non- compliant 39mg 39mg   INR 2.33 2.39  2.46 2.45 2.85 2.48 2.52 2.51  2.88 2.77   Notes recv'd 9/29 recv'd 10/30   recv'd 1/11  recv'd 4/1           Date 10/4 11/1 12/6  3/8 4/5 5/9 6/10 6/23 7/5 7/20 8/5   Total Weekly Dose 39mg 39mg 39mg  non- compliant  39mg 39mg 39 mg 39 mg 39 mg 39 mg 39 mg 39mg   INR 2.50 2.54 2.2  2.40 2.40 2.98 2.26 2.96 2.84 2.88 3.48   Notes  Rec 11/2 Rec 12/7       rec'd 7/6  rec 8/8     Date 8/17 8/25 9/19 9/22 10/13 10/31 11/10 12/2 12/29 12/30 1/3/2023 1/13   Total Weekly Dose 37mg 35mg 39 mg  37mg 35 mg 35 mg 35 mg 35 mg 35mg 37.5mg 41.5mg 35mg   INR 3.24 2.6 3.13 3.39 2.46 2.75 2.71 2.77 1.1 2.42 4.14 3.59   Notes  rec 8/26  Inc activity Rec 10/14  rec 11/11 rec 12/5 ??? rcv'd 1/3       Date 1/27 2/17 3/10 4/5           Total Weekly Dose 35mg 35mg 35 mg 35 mg           INR 2.94 2.58 2.5 2.44           Notes rcv'd 1/30 rcv'd 2/20 rec 3/13 crestor              *takes warfarin in AM*    Phone Interview:  Verbal Release Authorization: please contact Mr. Hall directly - if unable to reach patient may contact brotherMarko  Tablet Strength: 2mg and 5mg tablets  Patient Contact Info: [phone]; [email or web address]  Lab Contact Info: Megan Vu     Patient Findings  Negatives:  Signs/symptoms of thrombosis, Signs/symptoms of bleeding, Laboratory test error suspected, Change in health, Change in alcohol use, Change in activity, Upcoming invasive procedure, Emergency department visit, Upcoming dental procedure, Missed doses, Extra doses, Change in medications, Change in diet/appetite, Hospital admission, Bruising, Other complaints   Comments:  All findings negative per pt.      Plan:  1. INR was therapeutic today at 2.44 (goal 2.0-3.0).  Instructed Mr Hall to continue warfarin 5mg oral daily until recheck.  2. Repeat INR in four weeks, 5/3/23.  3. Verbal information provided over the phone. Mr. Hall RBV dosing instructions, expresses understanding by teach back, and  has no further questions at this time.    Keyona Fagan, Laine  4/5/2023  15:19 EDT    I, Adeline Bearden, PharmD, have reviewed the note in full and agree with the assessment and plan.  04/05/23  15:19 EDT

## 2023-04-12 ENCOUNTER — OFFICE VISIT (OUTPATIENT)
Dept: FAMILY MEDICINE CLINIC | Facility: CLINIC | Age: 48
End: 2023-04-12
Payer: MEDICARE

## 2023-04-12 VITALS
SYSTOLIC BLOOD PRESSURE: 148 MMHG | WEIGHT: 240 LBS | OXYGEN SATURATION: 96 % | TEMPERATURE: 97.8 F | HEIGHT: 69 IN | HEART RATE: 92 BPM | BODY MASS INDEX: 35.55 KG/M2 | DIASTOLIC BLOOD PRESSURE: 94 MMHG

## 2023-04-12 DIAGNOSIS — I10 PRIMARY HYPERTENSION: Primary | ICD-10-CM

## 2023-04-12 DIAGNOSIS — Z79.01 WARFARIN ANTICOAGULATION: ICD-10-CM

## 2023-04-12 DIAGNOSIS — F41.1 GENERALIZED ANXIETY DISORDER: ICD-10-CM

## 2023-04-12 RX ORDER — DIVALPROEX SODIUM 250 MG/1
250 TABLET, EXTENDED RELEASE ORAL
COMMUNITY
Start: 2023-03-14

## 2023-04-12 RX ORDER — BUPROPION HYDROCHLORIDE 150 MG/1
150 TABLET ORAL EVERY MORNING
COMMUNITY
Start: 2023-03-14

## 2023-04-13 NOTE — PROGRESS NOTES
"Chief Complaint  Tremors (Would also like to discuss weight loss)    Subjective        Manjeet Hall presents to [facility]  History of Present Illness  Generally doing well concerned about weight gain is now walking 2 miles a day  Hypertension  This is a chronic problem. The problem is unchanged. The problem is controlled. Associated symptoms include anxiety. Risk factors for coronary artery disease include male gender and sedentary lifestyle. Current antihypertension treatment includes beta blockers and lifestyle changes. The current treatment provides significant improvement. Compliance problems include diet.  heart surgery.   Hyperlipidemia  This is a chronic problem. The problem is controlled. Exacerbating diseases include hypothyroidism. Current antihyperlipidemic treatment includes statins and diet change. The current treatment provides significant improvement of lipids. There are no compliance problems.        Objective   Vital Signs:  /94   Pulse 92   Temp 97.8 °F (36.6 °C) (Infrared)   Ht 175.3 cm (69.02\")   Wt 109 kg (240 lb)   SpO2 96%   BMI 35.43 kg/m²   Estimated body mass index is 35.43 kg/m² as calculated from the following:    Height as of this encounter: 175.3 cm (69.02\").    Weight as of this encounter: 109 kg (240 lb).             Physical Exam  Vitals and nursing note reviewed.   Constitutional:       Appearance: Normal appearance. He is well-developed.   HENT:      Head: Normocephalic and atraumatic.      Right Ear: External ear normal.      Left Ear: External ear normal.      Nose: Nose normal.   Eyes:      General: No scleral icterus.     Conjunctiva/sclera: Conjunctivae normal.      Pupils: Pupils are equal, round, and reactive to light.   Neck:      Thyroid: No thyromegaly.   Cardiovascular:      Rate and Rhythm: Normal rate and regular rhythm.      Heart sounds: Murmur heard.   Pulmonary:      Effort: Pulmonary effort is normal.      " Breath sounds: Normal breath sounds.   Musculoskeletal:         General: Normal range of motion.      Cervical back: Neck supple.      Right lower leg: No edema.      Left lower leg: No edema.   Skin:     General: Skin is warm and dry.      Findings: No rash.   Neurological:      Mental Status: He is alert and oriented to person, place, and time.      Comments: No focal deficits no lateralizing signs   Psychiatric:         Behavior: Behavior is cooperative.        Result Review :                   Assessment and Plan   Diagnoses and all orders for this visit:    1. Primary hypertension (Primary)    2. Warfarin anticoagulation    3. Generalized anxiety disorder             Follow Up   Return in about 4 months (around 8/12/2023) for Recheck.  Patient was given instructions and counseling regarding his condition or for health maintenance advice. Please see specific information pulled into the AVS if appropriate.

## 2023-04-14 ENCOUNTER — LAB (OUTPATIENT)
Dept: LAB | Facility: HOSPITAL | Age: 48
End: 2023-04-14
Payer: MEDICARE

## 2023-04-14 ENCOUNTER — TRANSCRIBE ORDERS (OUTPATIENT)
Dept: LAB | Facility: HOSPITAL | Age: 48
End: 2023-04-14
Payer: MEDICARE

## 2023-04-14 DIAGNOSIS — F31.81 BIPOLAR 2 DISORDER: Primary | ICD-10-CM

## 2023-04-14 DIAGNOSIS — Z95.2 HX OF AORTIC VALVE REPLACEMENT, MECHANICAL: ICD-10-CM

## 2023-04-14 DIAGNOSIS — F31.81 BIPOLAR 2 DISORDER: ICD-10-CM

## 2023-04-14 LAB
AMPHET+METHAMPHET UR QL: NEGATIVE
AMPHETAMINES UR QL: NEGATIVE
BARBITURATES UR QL SCN: NEGATIVE
BENZODIAZ UR QL SCN: NEGATIVE
BUPRENORPHINE SERPL-MCNC: NEGATIVE NG/ML
CANNABINOIDS SERPL QL: NEGATIVE
COCAINE UR QL: NEGATIVE
INR PPP: 3.11 (ref 0.84–1.13)
METHADONE UR QL SCN: NEGATIVE
OPIATES UR QL: NEGATIVE
OXYCODONE UR QL SCN: NEGATIVE
PCP UR QL SCN: NEGATIVE
PROPOXYPH UR QL: NEGATIVE
PROTHROMBIN TIME: 32.3 SECONDS (ref 11.4–14.4)
TRICYCLICS UR QL SCN: NEGATIVE

## 2023-04-14 PROCEDURE — 80306 DRUG TEST PRSMV INSTRMNT: CPT

## 2023-04-14 PROCEDURE — 80164 ASSAY DIPROPYLACETIC ACD TOT: CPT

## 2023-04-14 PROCEDURE — 36415 COLL VENOUS BLD VENIPUNCTURE: CPT

## 2023-04-14 PROCEDURE — 85610 PROTHROMBIN TIME: CPT

## 2023-04-15 LAB — VALPROATE SERPL-MCNC: 61 MCG/ML (ref 50–125)

## 2023-04-17 ENCOUNTER — ANTICOAGULATION VISIT (OUTPATIENT)
Dept: PHARMACY | Facility: HOSPITAL | Age: 48
End: 2023-04-17
Payer: MEDICARE

## 2023-04-17 DIAGNOSIS — Z95.2 HX OF AORTIC VALVE REPLACEMENT, MECHANICAL: Primary | ICD-10-CM

## 2023-04-17 NOTE — PROGRESS NOTES
Anticoagulation Clinic - Remote Progress Note  REMOTE LAB    Indication: On-X Mechanical valve #25 and a 30mm hemashield; stroke  Referring Provider: Alvaro (Last seen:  1/31/21)  Initial Warfarin Start Date: ~ Sept 2018  Goal INR: 2.0-3.0 per referral  Current Drug Interactions: duloxetine, lansoprazole, levothyroxine, Trintellix, valproic acid, rosuvastatin  Bleed Risk: ascending aortic aneurysm   Other: Hodgkins Lymphoma (hx of chemo and radiation); h/o CVA + hemorrhagic pancreatitis (during hospitalization for mAVR)    Diet: avoids GLV (2/20/23)  Alcohol: socially  Tobacco: none  OTC Pain Medication: APAP PRN pain    INR History:  Date 9/27 9/28 10/1 10/4 10/8 10/11 10/17 10/19 10/23 11/8 11/16 12/21   Total Weekly Dose 8mg 12mg 19mg 29mg 31mg 34mg 43mg 41mg 45mg 49mg 49mg 49mg   INR 1.3 1.22 1.3 1.8 1.85 1.70 2.91 2.82 3.79 3.00 2.96 3.44   Notes clinic enox enox enox; clinic enox enox      desvenla     Date 1/9/19 1/16 2/4 3/1 3/22 5/16 6/13 6/24 7/10 7/22 8/5 8/26   Total Weekly Dose 49mg 47mg 49mg 49mg 49mg 49mg 49mg 49mg 49mg 49mg 49mg 49mg   INR 4.40 2.36 2.62 3.02 2.15 2.60 2.25 2.98 2.83 2.97 2.90 3.20   Notes   self adj              Date 9/26 10/18 10/28 11/19 12/3 12/17 1/3/20 1/21 2/3 2/12 2/19 2/27   Total Weekly Dose 49mg 49mg 49mg 49mg 49mg 45mg 45mg 45mg 47mg 43mg 43mg 43mg   INR 3.57 3.12 2.88 3.33 4.06 2.59 3.21 3.85 3.11 2.34 3.01 3.20   Notes less GLV Cymbalta start 9/25; Depakote start 9/5 1x decr dose  depakote inc; trintellix dcd ceftin x10 days Recv'd 1/6  incr GLV  dieting        Date 3/5 3/12 3/20 4/2 4/17 5/7 5/28 6/16 6/29 7/17 8/14 8/28   Total Weekly Dose 41mg 41mg 41mg 41mg 41mg 41mg 41mg 39mg 39mg 39mg 39mg 39mg   INR 3.07 3.01 2.95 2.66 2.9 3.03 3.36 2.48 2.42 3.08 2.78 2.28   Notes 1x decr   1x decr         recv'd 6/30  recv'd 8/13 recv'd 8/31     Date 9/28 10/29  12/4 1/8/21 2/9 3/31 5/12 6/9  8/3 8/31   Total Weekly Dose 39mg 39mg non-  compliant 39mg 39mg 39mg 39mg 39mg 39mg  non- compliant 39mg 39mg   INR 2.33 2.39  2.46 2.45 2.85 2.48 2.52 2.51  2.88 2.77   Notes recv'd 9/29 recv'd 10/30   recv'd 1/11  recv'd 4/1           Date 10/4 11/1 12/6  3/8 4/5 5/9 6/10 6/23 7/5 7/20 8/5   Total Weekly Dose 39mg 39mg 39mg  non- compliant  39mg 39mg 39 mg 39 mg 39 mg 39 mg 39 mg 39mg   INR 2.50 2.54 2.2  2.40 2.40 2.98 2.26 2.96 2.84 2.88 3.48   Notes  Rec 11/2 Rec 12/7       rec'd 7/6  rec 8/8     Date 8/17 8/25 9/19 9/22 10/13 10/31 11/10 12/2 12/29 12/30 1/3/2023 1/13   Total Weekly Dose 37mg 35mg 39 mg  37mg 35 mg 35 mg 35 mg 35 mg 35mg 37.5mg 41.5mg 35mg   INR 3.24 2.6 3.13 3.39 2.46 2.75 2.71 2.77 1.1 2.42 4.14 3.59   Notes  rec 8/26  Inc activity Rec 10/14  rec 11/11 rec 12/5 ??? rcv'd 1/3       Date 1/27 2/17 3/10 4/5 4/14          Total Weekly Dose 35mg 35mg 35 mg 35 mg 35mg          INR 2.94 2.58 2.5 2.44 3.11          Notes rcv'd 1/30 rcv'd 2/20 rec 3/13 crestor  rcv'd 4/17          *takes warfarin in AM*    Phone Interview:  Verbal Release Authorization: please contact Mr. Hall directly - if unable to reach patient may contact [email or web address]  Tablet Strength: 2mg and 5mg tablets  Patient Contact Info: [phone]; [email or web address]  Lab Contact Info: Megan Vu     Patient Findings  Negatives:  Signs/symptoms of thrombosis, Signs/symptoms of bleeding, Laboratory test error suspected, Change in health, Change in alcohol use, Change in activity, Upcoming invasive procedure, Emergency department visit, Upcoming dental procedure, Missed doses, Extra doses, Change in medications, Change in diet/appetite, Hospital admission, Bruising, Other complaints   Comments:  Patient is walking a lot every day and is decreasing food intake. He is attempting to lose weight     Plan:  1. INR was SUPRAtherapeutic on 4/14 at 3.11 (goal 2.0-3.0). Instructed Mr Hall to eat a serving of green beans or peas and continue warfarin 5mg oral daily until recheck.  2. Repeat INR in 4/27 to  ensure.  3. Verbal information provided over the phone. Mr. Hall RBV dosing instructions, expresses understanding by teach back, and has no further questions at this time.    Sondra Brooks, PharmD  4/17/2023  12:13 EDT

## 2023-04-26 RX ORDER — WARFARIN SODIUM 5 MG/1
TABLET ORAL
Qty: 90 TABLET | Refills: 1 | Status: SHIPPED | OUTPATIENT
Start: 2023-04-26

## 2023-05-04 ENCOUNTER — OUTSIDE FACILITY SERVICE (OUTPATIENT)
Dept: GASTROENTEROLOGY | Facility: CLINIC | Age: 48
End: 2023-05-04
Payer: MEDICARE

## 2023-05-04 ENCOUNTER — LAB REQUISITION (OUTPATIENT)
Dept: LAB | Facility: HOSPITAL | Age: 48
End: 2023-05-04
Payer: MEDICARE

## 2023-05-04 DIAGNOSIS — Z12.11 ENCOUNTER FOR SCREENING FOR MALIGNANT NEOPLASM OF COLON: ICD-10-CM

## 2023-05-04 PROCEDURE — 45385 COLONOSCOPY W/LESION REMOVAL: CPT | Performed by: INTERNAL MEDICINE

## 2023-05-04 PROCEDURE — 88305 TISSUE EXAM BY PATHOLOGIST: CPT | Performed by: INTERNAL MEDICINE

## 2023-05-08 ENCOUNTER — TELEPHONE (OUTPATIENT)
Dept: GASTROENTEROLOGY | Facility: CLINIC | Age: 48
End: 2023-05-08
Payer: MEDICARE

## 2023-05-08 LAB
CYTO UR: NORMAL
LAB AP CASE REPORT: NORMAL
LAB AP CLINICAL INFORMATION: NORMAL
PATH REPORT.FINAL DX SPEC: NORMAL
PATH REPORT.GROSS SPEC: NORMAL

## 2023-05-08 NOTE — TELEPHONE ENCOUNTER
----- Message from Abram Holt MD sent at 5/8/2023  9:53 AM EDT -----  Please let Manjeet know he had an adenoma. Follow up in 5 years is recommended

## 2023-05-18 ENCOUNTER — LAB (OUTPATIENT)
Dept: LAB | Facility: HOSPITAL | Age: 48
End: 2023-05-18
Payer: MEDICARE

## 2023-05-18 DIAGNOSIS — Z95.2 HX OF AORTIC VALVE REPLACEMENT, MECHANICAL: ICD-10-CM

## 2023-05-18 LAB
INR PPP: 2.43 (ref 0.89–1.12)
PROTHROMBIN TIME: 26.6 SECONDS (ref 12.2–14.5)

## 2023-05-18 PROCEDURE — 85610 PROTHROMBIN TIME: CPT

## 2023-05-18 PROCEDURE — 36415 COLL VENOUS BLD VENIPUNCTURE: CPT

## 2023-05-19 ENCOUNTER — ANTICOAGULATION VISIT (OUTPATIENT)
Dept: PHARMACY | Facility: HOSPITAL | Age: 48
End: 2023-05-19
Payer: MEDICARE

## 2023-05-19 DIAGNOSIS — Z95.2 HX OF AORTIC VALVE REPLACEMENT, MECHANICAL: Primary | ICD-10-CM

## 2023-05-19 NOTE — PROGRESS NOTES
Anticoagulation Clinic - Remote Progress Note  REMOTE LAB    Indication: On-X Mechanical valve #25 and a 30mm hemashield; stroke  Referring Provider: Alvaro (Last seen:  1/31/21)  Initial Warfarin Start Date: ~ Sept 2018  Goal INR: 2.0-3.0 per referral  Current Drug Interactions: duloxetine, lansoprazole, levothyroxine, Trintellix, valproic acid, rosuvastatin  Bleed Risk: ascending aortic aneurysm   Other: Hodgkins Lymphoma (hx of chemo and radiation); h/o CVA + hemorrhagic pancreatitis (during hospitalization for mAVR)    Diet: avoids GLV (2/20/23)  Alcohol: socially  Tobacco: none  OTC Pain Medication: APAP PRN pain    INR History:  Date 9/27 9/28 10/1 10/4 10/8 10/11 10/17 10/19 10/23 11/8 11/16 12/21   Total Weekly Dose 8mg 12mg 19mg 29mg 31mg 34mg 43mg 41mg 45mg 49mg 49mg 49mg   INR 1.3 1.22 1.3 1.8 1.85 1.70 2.91 2.82 3.79 3.00 2.96 3.44   Notes clinic enox enox enox; clinic enox enox      desvenla     Date 1/9/19 1/16 2/4 3/1 3/22 5/16 6/13 6/24 7/10 7/22 8/5 8/26   Total Weekly Dose 49mg 47mg 49mg 49mg 49mg 49mg 49mg 49mg 49mg 49mg 49mg 49mg   INR 4.40 2.36 2.62 3.02 2.15 2.60 2.25 2.98 2.83 2.97 2.90 3.20   Notes   self adj              Date 9/26 10/18 10/28 11/19 12/3 12/17 1/3/20 1/21 2/3 2/12 2/19 2/27   Total Weekly Dose 49mg 49mg 49mg 49mg 49mg 45mg 45mg 45mg 47mg 43mg 43mg 43mg   INR 3.57 3.12 2.88 3.33 4.06 2.59 3.21 3.85 3.11 2.34 3.01 3.20   Notes less GLV Cymbalta start 9/25; Depakote start 9/5 1x decr dose  depakote inc; trintellix dcd ceftin x10 days Recv'd 1/6  incr GLV  dieting        Date 3/5 3/12 3/20 4/2 4/17 5/7 5/28 6/16 6/29 7/17 8/14 8/28   Total Weekly Dose 41mg 41mg 41mg 41mg 41mg 41mg 41mg 39mg 39mg 39mg 39mg 39mg   INR 3.07 3.01 2.95 2.66 2.9 3.03 3.36 2.48 2.42 3.08 2.78 2.28   Notes 1x decr   1x decr         recv'd 6/30  recv'd 8/13 recv'd 8/31     Date 9/28 10/29  12/4 1/8/21 2/9 3/31 5/12 6/9  8/3 8/31   Total Weekly Dose 39mg 39mg non-  compliant 39mg 39mg 39mg 39mg 39mg 39mg  non- compliant 39mg 39mg   INR 2.33 2.39  2.46 2.45 2.85 2.48 2.52 2.51  2.88 2.77   Notes recv'd 9/29 recv'd 10/30   recv'd 1/11  recv'd 4/1           Date 10/4 11/1 12/6  3/8 4/5 5/9 6/10 6/23 7/5 7/20 8/5   Total Weekly Dose 39mg 39mg 39mg  non- compliant  39mg 39mg 39 mg 39 mg 39 mg 39 mg 39 mg 39mg   INR 2.50 2.54 2.2  2.40 2.40 2.98 2.26 2.96 2.84 2.88 3.48   Notes  Rec 11/2 Rec 12/7       rec'd 7/6  rec 8/8     Date 8/17 8/25 9/19 9/22 10/13 10/31 11/10 12/2 12/29 12/30 1/3/2023 1/13   Total Weekly Dose 37mg 35mg 39 mg  37mg 35 mg 35 mg 35 mg 35 mg 35mg 37.5mg 41.5mg 35mg   INR 3.24 2.6 3.13 3.39 2.46 2.75 2.71 2.77 1.1 2.42 4.14 3.59   Notes  rec 8/26  Inc activity Rec 10/14  rec 11/11 rec 12/5 ??? rcv'd 1/3       Date 1/27 2/17 3/10 4/5 4/14 5/18         Total Weekly Dose 35mg 35mg 35 mg 35 mg 35mg 35 mg         INR 2.94 2.58 2.5 2.44 3.11 2.43         Notes rcv'd 1/30 rcv'd 2/20 rec 3/13 crestor  rcv'd 4/17 rec 4/19         *takes warfarin in AM*    Phone Interview:  Verbal Release Authorization: please contact Mr. Hall directly - if unable to reach patient may contact brotherMarko  Tablet Strength: 2mg and 5mg tablets  Patient Contact Info: [phone]; [email or web address]  Lab Contact Info: Megan Vu     Patient Findings  Negatives:  Signs/symptoms of thrombosis, Signs/symptoms of bleeding, Laboratory test error suspected, Change in health, Change in alcohol use, Change in activity, Upcoming invasive procedure, Emergency department visit, Upcoming dental procedure, Missed doses, Extra doses, Change in medications, Change in diet/appetite, Hospital admission, Bruising, Other complaints   Comments:  All findings negative per pt.      Plan:  1. INR was therapeutic yesterday at 2.43 (goal 2.0-3.0). Instructed Mr Hall to continue warfarin 5mg oral daily until recheck.  2. Repeat INR in four weeks, 6/15/23.  3. Verbal information provided over the phone. Mr. Hall RBV dosing instructions,  expresses understanding by teach back, and has no further questions at this time.    Parth Fagan, Laine  5/19/2023  14:09 EDT     I, Sondra Brooks, PharmD, have reviewed the note in full and agree with the assessment and plan.  05/19/23  15:00 EDT

## 2023-05-26 ENCOUNTER — APPOINTMENT (OUTPATIENT)
Dept: CT IMAGING | Facility: HOSPITAL | Age: 48
DRG: 020 | End: 2023-05-26
Payer: MEDICARE

## 2023-05-26 ENCOUNTER — ANESTHESIA EVENT (OUTPATIENT)
Dept: PERIOP | Facility: HOSPITAL | Age: 48
DRG: 020 | End: 2023-05-26
Payer: MEDICARE

## 2023-05-26 ENCOUNTER — ANESTHESIA (OUTPATIENT)
Dept: PERIOP | Facility: HOSPITAL | Age: 48
DRG: 020 | End: 2023-05-26
Payer: MEDICARE

## 2023-05-26 ENCOUNTER — HOSPITAL ENCOUNTER (INPATIENT)
Facility: HOSPITAL | Age: 48
LOS: 10 days | Discharge: HOME | DRG: 020 | End: 2023-06-05
Attending: EMERGENCY MEDICINE | Admitting: FAMILY MEDICINE
Payer: MEDICARE

## 2023-05-26 ENCOUNTER — APPOINTMENT (OUTPATIENT)
Dept: GENERAL RADIOLOGY | Facility: HOSPITAL | Age: 48
DRG: 020 | End: 2023-05-26
Payer: MEDICARE

## 2023-05-26 ENCOUNTER — ANESTHESIA EVENT CONVERTED (OUTPATIENT)
Dept: ANESTHESIOLOGY | Facility: HOSPITAL | Age: 48
DRG: 020 | End: 2023-05-26
Payer: MEDICARE

## 2023-05-26 ENCOUNTER — HOSPITAL ENCOUNTER (INPATIENT)
Facility: HOSPITAL | Age: 48
DRG: 020 | End: 2023-05-26
Attending: STUDENT IN AN ORGANIZED HEALTH CARE EDUCATION/TRAINING PROGRAM | Admitting: STUDENT IN AN ORGANIZED HEALTH CARE EDUCATION/TRAINING PROGRAM
Payer: MEDICARE

## 2023-05-26 VITALS
DIASTOLIC BLOOD PRESSURE: 79 MMHG | RESPIRATION RATE: 13 BRPM | TEMPERATURE: 97.2 F | SYSTOLIC BLOOD PRESSURE: 127 MMHG | OXYGEN SATURATION: 96 % | HEART RATE: 84 BPM

## 2023-05-26 DIAGNOSIS — I62.00 SUBDURAL HEMATOMA, NONTRAUMATIC: ICD-10-CM

## 2023-05-26 DIAGNOSIS — R79.1 ELEVATED INR: ICD-10-CM

## 2023-05-26 DIAGNOSIS — Z86.79 HISTORY OF AORTIC REGURGITATION: Chronic | ICD-10-CM

## 2023-05-26 DIAGNOSIS — R13.10 DYSPHAGIA, UNSPECIFIED TYPE: ICD-10-CM

## 2023-05-26 DIAGNOSIS — E78.5 HYPERLIPIDEMIA LDL GOAL <100: Chronic | ICD-10-CM

## 2023-05-26 DIAGNOSIS — E11.9 TYPE 2 DIABETES MELLITUS WITHOUT COMPLICATION, WITHOUT LONG-TERM CURRENT USE OF INSULIN: ICD-10-CM

## 2023-05-26 DIAGNOSIS — I10 ESSENTIAL HYPERTENSION: Chronic | ICD-10-CM

## 2023-05-26 DIAGNOSIS — E03.9 HYPOTHYROIDISM (ACQUIRED): Chronic | ICD-10-CM

## 2023-05-26 DIAGNOSIS — S06.5XAA SUBDURAL HEMATOMA: ICD-10-CM

## 2023-05-26 DIAGNOSIS — I16.0 HYPERTENSIVE URGENCY: ICD-10-CM

## 2023-05-26 DIAGNOSIS — R40.0 SOMNOLENCE: Primary | ICD-10-CM

## 2023-05-26 DIAGNOSIS — Z95.2 H/O HEART VALVE REPLACEMENT WITH MECHANICAL VALVE: Chronic | ICD-10-CM

## 2023-05-26 DIAGNOSIS — R47.01 APHASIA: ICD-10-CM

## 2023-05-26 DIAGNOSIS — Z95.2 H/O MECHANICAL AORTIC VALVE REPLACEMENT: ICD-10-CM

## 2023-05-26 DIAGNOSIS — Q21.0 VSD (VENTRICULAR SEPTAL DEFECT): Chronic | ICD-10-CM

## 2023-05-26 PROBLEM — F41.9 ANXIETY DISORDER: Chronic | Status: ACTIVE | Noted: 2023-05-26

## 2023-05-26 PROBLEM — C81.90 HODGKIN'S DISEASE: Chronic | Status: ACTIVE | Noted: 2023-05-26

## 2023-05-26 PROBLEM — R73.03 PREDIABETES: Status: ACTIVE | Noted: 2023-05-26

## 2023-05-26 LAB
ABO GROUP BLD: NORMAL
ALBUMIN SERPL-MCNC: 4.7 G/DL (ref 3.5–5.2)
ALBUMIN SERPL-MCNC: 4.7 G/DL (ref 3.5–5.2)
ALBUMIN/GLOB SERPL: 1.3 G/DL
ALBUMIN/GLOB SERPL: 1.3 G/DL
ALP SERPL-CCNC: 86 U/L (ref 39–117)
ALP SERPL-CCNC: 86 U/L (ref 39–117)
ALT SERPL W P-5'-P-CCNC: 14 U/L (ref 1–41)
ALT SERPL W P-5'-P-CCNC: 14 U/L (ref 1–41)
ANION GAP SERPL CALCULATED.3IONS-SCNC: 14 MMOL/L (ref 5–15)
ANION GAP SERPL CALCULATED.3IONS-SCNC: 14 MMOL/L (ref 5–15)
AST SERPL-CCNC: 18 U/L (ref 1–40)
AST SERPL-CCNC: 18 U/L (ref 1–40)
BASOPHILS # BLD AUTO: 0.06 10*3/MM3 (ref 0–0.2)
BASOPHILS # BLD AUTO: 0.06 10*3/MM3 (ref 0–0.2)
BASOPHILS NFR BLD AUTO: 0.5 % (ref 0–1.5)
BASOPHILS NFR BLD AUTO: 0.5 % (ref 0–1.5)
BILIRUB SERPL-MCNC: 0.6 MG/DL (ref 0–1.2)
BILIRUB SERPL-MCNC: 0.6 MG/DL (ref 0–1.2)
BLD GP AB SCN SERPL QL: NEGATIVE
BLD GP AB SCN SERPL QL: NEGATIVE
BUN BLDA-MCNC: 15 MG/DL (ref 8–26)
BUN BLDA-MCNC: 15 MG/DL (ref 8–26)
BUN SERPL-MCNC: 14 MG/DL (ref 6–20)
BUN SERPL-MCNC: 14 MG/DL (ref 6–20)
BUN/CREAT SERPL: 19.4 (ref 7–25)
BUN/CREAT SERPL: 19.4 (ref 7–25)
CA-I BLDA-SCNC: 1.08 MMOL/L (ref 1.2–1.32)
CA-I BLDA-SCNC: 1.08 MMOL/L (ref 1.2–1.32)
CALCIUM SPEC-SCNC: 9.7 MG/DL (ref 8.6–10.5)
CALCIUM SPEC-SCNC: 9.7 MG/DL (ref 8.6–10.5)
CHLORIDE BLDA-SCNC: 102 MMOL/L (ref 98–109)
CHLORIDE BLDA-SCNC: 102 MMOL/L (ref 98–109)
CHLORIDE SERPL-SCNC: 101 MMOL/L (ref 98–107)
CHLORIDE SERPL-SCNC: 101 MMOL/L (ref 98–107)
CO2 BLDA-SCNC: 27 MMOL/L (ref 24–29)
CO2 BLDA-SCNC: 27 MMOL/L (ref 24–29)
CO2 SERPL-SCNC: 24 MMOL/L (ref 22–29)
CO2 SERPL-SCNC: 24 MMOL/L (ref 22–29)
CREAT BLDA-MCNC: 0.7 MG/DL (ref 0.6–1.3)
CREAT BLDA-MCNC: 0.7 MG/DL (ref 0.6–1.3)
CREAT SERPL-MCNC: 0.72 MG/DL (ref 0.76–1.27)
CREAT SERPL-MCNC: 0.72 MG/DL (ref 0.76–1.27)
DEPRECATED RDW RBC AUTO: 46.2 FL (ref 37–54)
DEPRECATED RDW RBC AUTO: 46.2 FL (ref 37–54)
EGFRCR SERPLBLD CKD-EPI 2021: 113.4 ML/MIN/1.73
EGFRCR SERPLBLD CKD-EPI 2021: 113.4 ML/MIN/1.73
EGFRCR SERPLBLD CKD-EPI 2021: 114.4 ML/MIN/1.73
EGFRCR SERPLBLD CKD-EPI 2021: 114.4 ML/MIN/1.73
EOSINOPHIL # BLD AUTO: 0.26 10*3/MM3 (ref 0–0.4)
EOSINOPHIL # BLD AUTO: 0.26 10*3/MM3 (ref 0–0.4)
EOSINOPHIL NFR BLD AUTO: 2.3 % (ref 0.3–6.2)
EOSINOPHIL NFR BLD AUTO: 2.3 % (ref 0.3–6.2)
ERYTHROCYTE [DISTWIDTH] IN BLOOD BY AUTOMATED COUNT: 14.8 % (ref 12.3–15.4)
ERYTHROCYTE [DISTWIDTH] IN BLOOD BY AUTOMATED COUNT: 14.8 % (ref 12.3–15.4)
GLOBULIN UR ELPH-MCNC: 3.7 GM/DL
GLOBULIN UR ELPH-MCNC: 3.7 GM/DL
GLUCOSE BLDC GLUCOMTR-MCNC: 209 MG/DL (ref 70–130)
GLUCOSE BLDC GLUCOMTR-MCNC: 209 MG/DL (ref 70–130)
GLUCOSE BLDC GLUCOMTR-MCNC: 212 MG/DL (ref 70–130)
GLUCOSE BLDC GLUCOMTR-MCNC: 212 MG/DL (ref 70–130)
GLUCOSE BLDC GLUCOMTR-MCNC: 215 MG/DL (ref 70–130)
GLUCOSE BLDC GLUCOMTR-MCNC: 215 MG/DL (ref 70–130)
GLUCOSE SERPL-MCNC: 220 MG/DL (ref 65–99)
GLUCOSE SERPL-MCNC: 220 MG/DL (ref 65–99)
HBA1C MFR BLD: 8.9 % (ref 4.8–5.6)
HBA1C MFR BLD: 8.9 % (ref 4.8–5.6)
HCT VFR BLD AUTO: 45.8 % (ref 37.5–51)
HCT VFR BLD AUTO: 45.8 % (ref 37.5–51)
HCT VFR BLDA CALC: 45 % (ref 38–51)
HCT VFR BLDA CALC: 45 % (ref 38–51)
HGB BLD-MCNC: 14.6 G/DL (ref 13–17.7)
HGB BLD-MCNC: 14.6 G/DL (ref 13–17.7)
HGB BLDA-MCNC: 15.3 G/DL (ref 12–17)
HGB BLDA-MCNC: 15.3 G/DL (ref 12–17)
IMM GRANULOCYTES # BLD AUTO: 0.16 10*3/MM3 (ref 0–0.05)
IMM GRANULOCYTES # BLD AUTO: 0.16 10*3/MM3 (ref 0–0.05)
IMM GRANULOCYTES NFR BLD AUTO: 1.4 % (ref 0–0.5)
IMM GRANULOCYTES NFR BLD AUTO: 1.4 % (ref 0–0.5)
INR PPP: 1.07 (ref 0.89–1.12)
INR PPP: 1.07 (ref 0.89–1.12)
INR PPP: 1.1 (ref 0.8–1.2)
INR PPP: 1.1 (ref 0.8–1.2)
INR PPP: 2.08 (ref 0.89–1.12)
INR PPP: 2.08 (ref 0.89–1.12)
INR PPP: 3.2 (ref 0.8–1.2)
INR PPP: 3.2 (ref 0.8–1.2)
LYMPHOCYTES # BLD AUTO: 1.63 10*3/MM3 (ref 0.7–3.1)
LYMPHOCYTES # BLD AUTO: 1.63 10*3/MM3 (ref 0.7–3.1)
LYMPHOCYTES NFR BLD AUTO: 14.2 % (ref 19.6–45.3)
LYMPHOCYTES NFR BLD AUTO: 14.2 % (ref 19.6–45.3)
MAGNESIUM SERPL-MCNC: 2.2 MG/DL (ref 1.6–2.6)
MAGNESIUM SERPL-MCNC: 2.2 MG/DL (ref 1.6–2.6)
MCH RBC QN AUTO: 27.1 PG (ref 26.6–33)
MCH RBC QN AUTO: 27.1 PG (ref 26.6–33)
MCHC RBC AUTO-ENTMCNC: 31.9 G/DL (ref 31.5–35.7)
MCHC RBC AUTO-ENTMCNC: 31.9 G/DL (ref 31.5–35.7)
MCV RBC AUTO: 85.1 FL (ref 79–97)
MCV RBC AUTO: 85.1 FL (ref 79–97)
MONOCYTES # BLD AUTO: 0.71 10*3/MM3 (ref 0.1–0.9)
MONOCYTES # BLD AUTO: 0.71 10*3/MM3 (ref 0.1–0.9)
MONOCYTES NFR BLD AUTO: 6.2 % (ref 5–12)
MONOCYTES NFR BLD AUTO: 6.2 % (ref 5–12)
NEUTROPHILS NFR BLD AUTO: 75.4 % (ref 42.7–76)
NEUTROPHILS NFR BLD AUTO: 75.4 % (ref 42.7–76)
NEUTROPHILS NFR BLD AUTO: 8.68 10*3/MM3 (ref 1.7–7)
NEUTROPHILS NFR BLD AUTO: 8.68 10*3/MM3 (ref 1.7–7)
NRBC BLD AUTO-RTO: 0 /100 WBC (ref 0–0.2)
NRBC BLD AUTO-RTO: 0 /100 WBC (ref 0–0.2)
PLATELET # BLD AUTO: 193 10*3/MM3 (ref 140–450)
PLATELET # BLD AUTO: 193 10*3/MM3 (ref 140–450)
PMV BLD AUTO: 11.8 FL (ref 6–12)
PMV BLD AUTO: 11.8 FL (ref 6–12)
POTASSIUM BLDA-SCNC: 4.3 MMOL/L (ref 3.5–4.9)
POTASSIUM BLDA-SCNC: 4.3 MMOL/L (ref 3.5–4.9)
POTASSIUM SERPL-SCNC: 4.4 MMOL/L (ref 3.5–5.2)
POTASSIUM SERPL-SCNC: 4.4 MMOL/L (ref 3.5–5.2)
PROT SERPL-MCNC: 8.4 G/DL (ref 6–8.5)
PROT SERPL-MCNC: 8.4 G/DL (ref 6–8.5)
PROTHROMBIN TIME: 13.3 SECONDS (ref 12.8–15.2)
PROTHROMBIN TIME: 13.3 SECONDS (ref 12.8–15.2)
PROTHROMBIN TIME: 14 SECONDS (ref 12.2–14.5)
PROTHROMBIN TIME: 14 SECONDS (ref 12.2–14.5)
PROTHROMBIN TIME: 23.6 SECONDS (ref 12.2–14.5)
PROTHROMBIN TIME: 23.6 SECONDS (ref 12.2–14.5)
PROTHROMBIN TIME: 35.9 SECONDS (ref 12.8–15.2)
PROTHROMBIN TIME: 35.9 SECONDS (ref 12.8–15.2)
QT INTERVAL: 400 MS
QT INTERVAL: 400 MS
QTC INTERVAL: 464 MS
QTC INTERVAL: 464 MS
RBC # BLD AUTO: 5.38 10*6/MM3 (ref 4.14–5.8)
RBC # BLD AUTO: 5.38 10*6/MM3 (ref 4.14–5.8)
RH BLD: POSITIVE
SODIUM BLD-SCNC: 139 MMOL/L (ref 138–146)
SODIUM BLD-SCNC: 139 MMOL/L (ref 138–146)
SODIUM SERPL-SCNC: 139 MMOL/L (ref 136–145)
SODIUM SERPL-SCNC: 139 MMOL/L (ref 136–145)
T&S EXPIRATION DATE: NORMAL
T&S EXPIRATION DATE: NORMAL
TSH SERPL DL<=0.05 MIU/L-ACNC: 1.61 UIU/ML (ref 0.27–4.2)
TSH SERPL DL<=0.05 MIU/L-ACNC: 1.61 UIU/ML (ref 0.27–4.2)
WBC NRBC COR # BLD: 11.5 10*3/MM3 (ref 3.4–10.8)
WBC NRBC COR # BLD: 11.5 10*3/MM3 (ref 3.4–10.8)

## 2023-05-26 PROCEDURE — 63710000001 INSULIN LISPRO (HUMAN) PER 5 UNITS: Performed by: INTERNAL MEDICINE

## 2023-05-26 PROCEDURE — 99285 EMERGENCY DEPT VISIT HI MDM: CPT

## 2023-05-26 PROCEDURE — 80047 BASIC METABLC PNL IONIZED CA: CPT

## 2023-05-26 PROCEDURE — 0 PHYTONADIONE 10 MG/ML SOLUTION 1 ML AMPULE: Performed by: EMERGENCY MEDICINE

## 2023-05-26 PROCEDURE — 0 LEVETIRACETAM IN NACL 0.75% 1000 MG/100ML SOLUTION: Performed by: STUDENT IN AN ORGANIZED HEALTH CARE EDUCATION/TRAINING PROGRAM

## 2023-05-26 PROCEDURE — 25010000002 PHENYLEPHRINE 10 MG/ML SOLUTION 1 ML VIAL: Performed by: NURSE ANESTHETIST, CERTIFIED REGISTERED

## 2023-05-26 PROCEDURE — 82948 REAGENT STRIP/BLOOD GLUCOSE: CPT

## 2023-05-26 PROCEDURE — 85025 COMPLETE CBC W/AUTO DIFF WBC: CPT | Performed by: EMERGENCY MEDICINE

## 2023-05-26 PROCEDURE — 99284 EMERGENCY DEPT VISIT MOD MDM: CPT | Performed by: STUDENT IN AN ORGANIZED HEALTH CARE EDUCATION/TRAINING PROGRAM

## 2023-05-26 PROCEDURE — C1713 ANCHOR/SCREW BN/BN,TIS/BN: HCPCS | Performed by: STUDENT IN AN ORGANIZED HEALTH CARE EDUCATION/TRAINING PROGRAM

## 2023-05-26 PROCEDURE — 25010000002 PROTHROMBIN COMPLEX CONC HUMAN 500 UNITS KIT: Performed by: EMERGENCY MEDICINE

## 2023-05-26 PROCEDURE — 00C40ZZ EXTIRPATION OF MATTER FROM INTRACRANIAL SUBDURAL SPACE, OPEN APPROACH: ICD-10-PCS | Performed by: STUDENT IN AN ORGANIZED HEALTH CARE EDUCATION/TRAINING PROGRAM

## 2023-05-26 PROCEDURE — 00C00ZZ EXTIRPATION OF MATTER FROM BRAIN, OPEN APPROACH: ICD-10-PCS | Performed by: STUDENT IN AN ORGANIZED HEALTH CARE EDUCATION/TRAINING PROGRAM

## 2023-05-26 PROCEDURE — 36415 COLL VENOUS BLD VENIPUNCTURE: CPT

## 2023-05-26 PROCEDURE — 25010000002 CEFAZOLIN IN DEXTROSE 2-4 GM/100ML-% SOLUTION: Performed by: STUDENT IN AN ORGANIZED HEALTH CARE EDUCATION/TRAINING PROGRAM

## 2023-05-26 PROCEDURE — 85610 PROTHROMBIN TIME: CPT

## 2023-05-26 PROCEDURE — 83036 HEMOGLOBIN GLYCOSYLATED A1C: CPT

## 2023-05-26 PROCEDURE — 80053 COMPREHEN METABOLIC PANEL: CPT | Performed by: EMERGENCY MEDICINE

## 2023-05-26 PROCEDURE — 0 LEVETIRACETAM IN NACL 0.75% 1000 MG/100ML SOLUTION: Performed by: NURSE PRACTITIONER

## 2023-05-26 PROCEDURE — 99222 1ST HOSP IP/OBS MODERATE 55: CPT | Performed by: INTERNAL MEDICINE

## 2023-05-26 PROCEDURE — 83735 ASSAY OF MAGNESIUM: CPT | Performed by: EMERGENCY MEDICINE

## 2023-05-26 PROCEDURE — 86901 BLOOD TYPING SEROLOGIC RH(D): CPT

## 2023-05-26 PROCEDURE — 25010000002 DEXAMETHASONE PER 1 MG: Performed by: STUDENT IN AN ORGANIZED HEALTH CARE EDUCATION/TRAINING PROGRAM

## 2023-05-26 PROCEDURE — 25010000002 PROTHROMBIN COMPLEX CONC HUMAN 1000 UNITS KIT: Performed by: EMERGENCY MEDICINE

## 2023-05-26 PROCEDURE — 85610 PROTHROMBIN TIME: CPT | Performed by: EMERGENCY MEDICINE

## 2023-05-26 PROCEDURE — 61312 CRNEC/CRNOT STTL XDRL/SDRL: CPT | Performed by: STUDENT IN AN ORGANIZED HEALTH CARE EDUCATION/TRAINING PROGRAM

## 2023-05-26 PROCEDURE — 86850 RBC ANTIBODY SCREEN: CPT | Performed by: EMERGENCY MEDICINE

## 2023-05-26 PROCEDURE — 86900 BLOOD TYPING SEROLOGIC ABO: CPT

## 2023-05-26 PROCEDURE — 93005 ELECTROCARDIOGRAM TRACING: CPT | Performed by: EMERGENCY MEDICINE

## 2023-05-26 PROCEDURE — 86901 BLOOD TYPING SEROLOGIC RH(D): CPT | Performed by: EMERGENCY MEDICINE

## 2023-05-26 PROCEDURE — 25010000002 DEXAMETHASONE PER 1 MG: Performed by: NURSE ANESTHETIST, CERTIFIED REGISTERED

## 2023-05-26 PROCEDURE — 70450 CT HEAD/BRAIN W/O DYE: CPT

## 2023-05-26 PROCEDURE — 25010000002 SUGAMMADEX 200 MG/2ML SOLUTION: Performed by: NURSE ANESTHETIST, CERTIFIED REGISTERED

## 2023-05-26 PROCEDURE — 86900 BLOOD TYPING SEROLOGIC ABO: CPT | Performed by: EMERGENCY MEDICINE

## 2023-05-26 PROCEDURE — 84443 ASSAY THYROID STIM HORMONE: CPT

## 2023-05-26 PROCEDURE — 85014 HEMATOCRIT: CPT

## 2023-05-26 PROCEDURE — 25010000002 CEFAZOLIN PER 500 MG: Performed by: NURSE ANESTHETIST, CERTIFIED REGISTERED

## 2023-05-26 PROCEDURE — 61312 CRNEC/CRNOT STTL XDRL/SDRL: CPT

## 2023-05-26 PROCEDURE — 71045 X-RAY EXAM CHEST 1 VIEW: CPT

## 2023-05-26 PROCEDURE — 25010000002 PROPOFOL 10 MG/ML EMULSION: Performed by: NURSE ANESTHETIST, CERTIFIED REGISTERED

## 2023-05-26 DEVICE — IMPLANTABLE DEVICE
Type: IMPLANTABLE DEVICE | Site: BRAIN | Status: FUNCTIONAL
Brand: SURGIFOAM® ABSORBABLE GELATIN SPONGE, U.S.P.

## 2023-05-26 DEVICE — SCRW MATRIXNEURO SD TI 4MM: Type: IMPLANTABLE DEVICE | Site: CRANIAL | Status: FUNCTIONAL

## 2023-05-26 DEVICE — PLT CVR BURHL MATRIXNEURO 12MM: Type: IMPLANTABLE DEVICE | Site: CRANIAL | Status: FUNCTIONAL

## 2023-05-26 DEVICE — PLT MATRIXNEURO STR TI 2HL 12MM: Type: IMPLANTABLE DEVICE | Site: CRANIAL | Status: FUNCTIONAL

## 2023-05-26 DEVICE — PLT CVR BURHL MATRIXNEURO TI 17MM: Type: IMPLANTABLE DEVICE | Site: CRANIAL | Status: FUNCTIONAL

## 2023-05-26 DEVICE — FLOSEAL HEMOSTATIC MATRIX, 10ML
Type: IMPLANTABLE DEVICE | Site: BRAIN | Status: FUNCTIONAL
Brand: FLOSEAL HEMOSTATIC MATRIX

## 2023-05-26 RX ORDER — LEVETIRACETAM 5 MG/ML
500 INJECTION INTRAVASCULAR EVERY 12 HOURS SCHEDULED
Status: DISCONTINUED | OUTPATIENT
Start: 2023-05-26 | End: 2023-05-26

## 2023-05-26 RX ORDER — LANSOPRAZOLE 30 MG/1
30 CAPSULE, DELAYED RELEASE ORAL DAILY
COMMUNITY

## 2023-05-26 RX ORDER — PROPOFOL 10 MG/ML
VIAL (ML) INTRAVENOUS AS NEEDED
Status: DISCONTINUED | OUTPATIENT
Start: 2023-05-26 | End: 2023-05-26 | Stop reason: SURG

## 2023-05-26 RX ORDER — DOCUSATE SODIUM 100 MG/1
100 CAPSULE, LIQUID FILLED ORAL 2 TIMES DAILY PRN
Status: DISCONTINUED | OUTPATIENT
Start: 2023-05-26 | End: 2023-06-05 | Stop reason: HOSPADM

## 2023-05-26 RX ORDER — ROSUVASTATIN CALCIUM 20 MG/1
20 TABLET, COATED ORAL DAILY
COMMUNITY
End: 2023-06-07 | Stop reason: SDUPTHER

## 2023-05-26 RX ORDER — ROCURONIUM BROMIDE 10 MG/ML
INJECTION, SOLUTION INTRAVENOUS AS NEEDED
Status: DISCONTINUED | OUTPATIENT
Start: 2023-05-26 | End: 2023-05-26 | Stop reason: SURG

## 2023-05-26 RX ORDER — LEVOTHYROXINE SODIUM 0.12 MG/1
125 TABLET ORAL DAILY
Status: DISCONTINUED | OUTPATIENT
Start: 2023-05-26 | End: 2023-06-05 | Stop reason: HOSPADM

## 2023-05-26 RX ORDER — DEXAMETHASONE SODIUM PHOSPHATE 4 MG/ML
INJECTION, SOLUTION INTRA-ARTICULAR; INTRALESIONAL; INTRAMUSCULAR; INTRAVENOUS; SOFT TISSUE AS NEEDED
Status: DISCONTINUED | OUTPATIENT
Start: 2023-05-26 | End: 2023-05-26 | Stop reason: SURG

## 2023-05-26 RX ORDER — INSULIN LISPRO 100 [IU]/ML
2-7 INJECTION, SOLUTION INTRAVENOUS; SUBCUTANEOUS
Status: DISCONTINUED | OUTPATIENT
Start: 2023-05-26 | End: 2023-05-27

## 2023-05-26 RX ORDER — DEXTROSE MONOHYDRATE 25 G/50ML
25 INJECTION, SOLUTION INTRAVENOUS
Status: DISCONTINUED | OUTPATIENT
Start: 2023-05-26 | End: 2023-05-27 | Stop reason: SDUPTHER

## 2023-05-26 RX ORDER — ESMOLOL HYDROCHLORIDE 10 MG/ML
INJECTION INTRAVENOUS AS NEEDED
Status: DISCONTINUED | OUTPATIENT
Start: 2023-05-26 | End: 2023-05-26 | Stop reason: SURG

## 2023-05-26 RX ORDER — NALOXONE HCL 0.4 MG/ML
0.4 VIAL (ML) INJECTION
Status: DISCONTINUED | OUTPATIENT
Start: 2023-05-26 | End: 2023-05-26

## 2023-05-26 RX ORDER — HYDROMORPHONE HYDROCHLORIDE 1 MG/ML
0.5 INJECTION, SOLUTION INTRAMUSCULAR; INTRAVENOUS; SUBCUTANEOUS
Status: DISCONTINUED | OUTPATIENT
Start: 2023-05-26 | End: 2023-06-02

## 2023-05-26 RX ORDER — POLYETHYLENE GLYCOL 3350 17 G/17G
17 POWDER, FOR SOLUTION ORAL DAILY PRN
Status: DISCONTINUED | OUTPATIENT
Start: 2023-05-26 | End: 2023-05-26

## 2023-05-26 RX ORDER — DULOXETIN HYDROCHLORIDE 60 MG/1
60 CAPSULE, DELAYED RELEASE ORAL DAILY
COMMUNITY
End: 2023-06-07

## 2023-05-26 RX ORDER — HYDROMORPHONE HCL 110MG/55ML
0.5 PATIENT CONTROLLED ANALGESIA SYRINGE INTRAVENOUS
Status: DISCONTINUED | OUTPATIENT
Start: 2023-05-26 | End: 2023-05-26

## 2023-05-26 RX ORDER — SODIUM CHLORIDE, SODIUM LACTATE, POTASSIUM CHLORIDE, CALCIUM CHLORIDE 600; 310; 30; 20 MG/100ML; MG/100ML; MG/100ML; MG/100ML
INJECTION, SOLUTION INTRAVENOUS CONTINUOUS PRN
Status: DISCONTINUED | OUTPATIENT
Start: 2023-05-26 | End: 2023-05-26 | Stop reason: SURG

## 2023-05-26 RX ORDER — MAGNESIUM HYDROXIDE 1200 MG/15ML
LIQUID ORAL AS NEEDED
Status: DISCONTINUED | OUTPATIENT
Start: 2023-05-26 | End: 2023-05-26 | Stop reason: HOSPADM

## 2023-05-26 RX ORDER — LIDOCAINE HYDROCHLORIDE 10 MG/ML
INJECTION, SOLUTION EPIDURAL; INFILTRATION; INTRACAUDAL; PERINEURAL AS NEEDED
Status: DISCONTINUED | OUTPATIENT
Start: 2023-05-26 | End: 2023-05-26 | Stop reason: SURG

## 2023-05-26 RX ORDER — FAMOTIDINE 10 MG/ML
20 INJECTION, SOLUTION INTRAVENOUS EVERY 12 HOURS SCHEDULED
Status: DISCONTINUED | OUTPATIENT
Start: 2023-05-26 | End: 2023-05-29

## 2023-05-26 RX ORDER — FENTANYL CITRATE 50 UG/ML
50 INJECTION, SOLUTION INTRAMUSCULAR; INTRAVENOUS
Status: ACTIVE | OUTPATIENT
Start: 2023-05-26

## 2023-05-26 RX ORDER — AMOXICILLIN 250 MG
1 CAPSULE ORAL NIGHTLY PRN
Status: DISCONTINUED | OUTPATIENT
Start: 2023-05-26 | End: 2023-05-26

## 2023-05-26 RX ORDER — POLYETHYLENE GLYCOL 3350 17 G/17G
17 POWDER, FOR SOLUTION ORAL DAILY PRN
Status: DISCONTINUED | OUTPATIENT
Start: 2023-05-26 | End: 2023-05-29

## 2023-05-26 RX ORDER — ONDANSETRON 4 MG/1
4 TABLET, FILM COATED ORAL EVERY 6 HOURS PRN
Status: DISCONTINUED | OUTPATIENT
Start: 2023-05-26 | End: 2023-06-05 | Stop reason: HOSPADM

## 2023-05-26 RX ORDER — ONDANSETRON 2 MG/ML
4 INJECTION INTRAMUSCULAR; INTRAVENOUS ONCE AS NEEDED
Status: ACTIVE | OUTPATIENT
Start: 2023-05-26

## 2023-05-26 RX ORDER — EPHEDRINE SULFATE 50 MG/ML
5 INJECTION, SOLUTION INTRAVENOUS ONCE AS NEEDED
Status: ACTIVE | OUTPATIENT
Start: 2023-05-26

## 2023-05-26 RX ORDER — DEXAMETHASONE SODIUM PHOSPHATE 4 MG/ML
4 INJECTION, SOLUTION INTRA-ARTICULAR; INTRALESIONAL; INTRAMUSCULAR; INTRAVENOUS; SOFT TISSUE EVERY 6 HOURS
Status: DISCONTINUED | OUTPATIENT
Start: 2023-05-27 | End: 2023-05-28

## 2023-05-26 RX ORDER — DIVALPROEX SODIUM 250 MG/1
250 TABLET, EXTENDED RELEASE ORAL NIGHTLY
Status: DISCONTINUED | OUTPATIENT
Start: 2023-05-26 | End: 2023-06-05 | Stop reason: HOSPADM

## 2023-05-26 RX ORDER — METOPROLOL TARTRATE 5 MG/5ML
INJECTION INTRAVENOUS AS NEEDED
Status: DISCONTINUED | OUTPATIENT
Start: 2023-05-26 | End: 2023-05-26 | Stop reason: SURG

## 2023-05-26 RX ORDER — FAMOTIDINE 10 MG/ML
20 INJECTION, SOLUTION INTRAVENOUS EVERY 12 HOURS SCHEDULED
Status: DISCONTINUED | OUTPATIENT
Start: 2023-05-26 | End: 2023-05-26

## 2023-05-26 RX ORDER — ACETAMINOPHEN 325 MG/1
650 TABLET ORAL EVERY 6 HOURS PRN
Status: DISCONTINUED | OUTPATIENT
Start: 2023-05-26 | End: 2023-06-05 | Stop reason: HOSPADM

## 2023-05-26 RX ORDER — NICOTINE POLACRILEX 4 MG
15 LOZENGE BUCCAL
Status: DISCONTINUED | OUTPATIENT
Start: 2023-05-26 | End: 2023-05-27 | Stop reason: SDUPTHER

## 2023-05-26 RX ORDER — IBUPROFEN 600 MG/1
1 TABLET ORAL
Status: DISCONTINUED | OUTPATIENT
Start: 2023-05-26 | End: 2023-05-27 | Stop reason: SDUPTHER

## 2023-05-26 RX ORDER — SODIUM CHLORIDE 9 MG/ML
100 INJECTION, SOLUTION INTRAVENOUS CONTINUOUS
Status: DISCONTINUED | OUTPATIENT
Start: 2023-05-26 | End: 2023-05-28

## 2023-05-26 RX ORDER — LEVETIRACETAM 10 MG/ML
1000 INJECTION INTRAVASCULAR EVERY 12 HOURS SCHEDULED
Status: DISCONTINUED | OUTPATIENT
Start: 2023-05-26 | End: 2023-05-27

## 2023-05-26 RX ORDER — GABAPENTIN 100 MG/1
200 CAPSULE ORAL 2 TIMES DAILY
COMMUNITY

## 2023-05-26 RX ORDER — CEFAZOLIN SODIUM 2 G/100ML
2 INJECTION, SOLUTION INTRAVENOUS EVERY 8 HOURS
Status: DISCONTINUED | OUTPATIENT
Start: 2023-05-26 | End: 2023-05-26

## 2023-05-26 RX ORDER — AMOXICILLIN 250 MG
1 CAPSULE ORAL NIGHTLY PRN
Status: DISCONTINUED | OUTPATIENT
Start: 2023-05-26 | End: 2023-05-29

## 2023-05-26 RX ORDER — PROPRANOLOL HCL 60 MG
60 CAPSULE, EXTENDED RELEASE 24HR ORAL DAILY
COMMUNITY
End: 2023-06-05 | Stop reason: HOSPADM

## 2023-05-26 RX ORDER — LIDOCAINE HYDROCHLORIDE AND EPINEPHRINE 5; 5 MG/ML; UG/ML
INJECTION, SOLUTION INFILTRATION; PERINEURAL AS NEEDED
Status: DISCONTINUED | OUTPATIENT
Start: 2023-05-26 | End: 2023-05-26 | Stop reason: HOSPADM

## 2023-05-26 RX ORDER — WARFARIN SODIUM 5 MG/1
5 TABLET ORAL
COMMUNITY
End: 2023-06-07

## 2023-05-26 RX ORDER — ONDANSETRON 2 MG/ML
4 INJECTION INTRAMUSCULAR; INTRAVENOUS EVERY 6 HOURS PRN
Status: DISCONTINUED | OUTPATIENT
Start: 2023-05-26 | End: 2023-06-05 | Stop reason: HOSPADM

## 2023-05-26 RX ORDER — PANTOPRAZOLE SODIUM 40 MG/1
40 TABLET, DELAYED RELEASE ORAL
Status: DISCONTINUED | OUTPATIENT
Start: 2023-05-27 | End: 2023-06-05 | Stop reason: HOSPADM

## 2023-05-26 RX ORDER — GABAPENTIN 100 MG/1
200 CAPSULE ORAL 2 TIMES DAILY
Status: DISCONTINUED | OUTPATIENT
Start: 2023-05-26 | End: 2023-06-05 | Stop reason: HOSPADM

## 2023-05-26 RX ORDER — DEXAMETHASONE SODIUM PHOSPHATE 4 MG/ML
4 INJECTION, SOLUTION INTRA-ARTICULAR; INTRALESIONAL; INTRAMUSCULAR; INTRAVENOUS; SOFT TISSUE EVERY 6 HOURS
Status: DISCONTINUED | OUTPATIENT
Start: 2023-05-26 | End: 2023-05-26

## 2023-05-26 RX ORDER — DULOXETIN HYDROCHLORIDE 60 MG/1
60 CAPSULE, DELAYED RELEASE ORAL DAILY
Status: DISCONTINUED | OUTPATIENT
Start: 2023-05-26 | End: 2023-06-05 | Stop reason: HOSPADM

## 2023-05-26 RX ORDER — HYDROCODONE BITARTRATE AND ACETAMINOPHEN 5; 325 MG/1; MG/1
1 TABLET ORAL EVERY 6 HOURS PRN
Status: DISCONTINUED | OUTPATIENT
Start: 2023-05-26 | End: 2023-06-02

## 2023-05-26 RX ORDER — CEFAZOLIN SODIUM 2 G/100ML
2 INJECTION, SOLUTION INTRAVENOUS EVERY 8 HOURS
Status: COMPLETED | OUTPATIENT
Start: 2023-05-26 | End: 2023-05-27

## 2023-05-26 RX ORDER — SODIUM CHLORIDE 9 MG/ML
40 INJECTION, SOLUTION INTRAVENOUS AS NEEDED
Status: DISCONTINUED | OUTPATIENT
Start: 2023-05-26 | End: 2023-06-05 | Stop reason: HOSPADM

## 2023-05-26 RX ORDER — ONDANSETRON 4 MG/1
4 TABLET, FILM COATED ORAL EVERY 6 HOURS PRN
Status: DISCONTINUED | OUTPATIENT
Start: 2023-05-26 | End: 2023-05-26

## 2023-05-26 RX ORDER — SODIUM CHLORIDE 0.9 % (FLUSH) 0.9 %
10 SYRINGE (ML) INJECTION EVERY 12 HOURS SCHEDULED
Status: DISCONTINUED | OUTPATIENT
Start: 2023-05-26 | End: 2023-06-05 | Stop reason: HOSPADM

## 2023-05-26 RX ORDER — ONDANSETRON 2 MG/ML
4 INJECTION INTRAMUSCULAR; INTRAVENOUS EVERY 6 HOURS PRN
Status: DISCONTINUED | OUTPATIENT
Start: 2023-05-26 | End: 2023-05-26

## 2023-05-26 RX ORDER — LEVETIRACETAM 10 MG/ML
1000 INJECTION INTRAVASCULAR ONCE
Status: COMPLETED | OUTPATIENT
Start: 2023-05-26 | End: 2023-05-26

## 2023-05-26 RX ORDER — ROSUVASTATIN CALCIUM 20 MG/1
20 TABLET, COATED ORAL DAILY
Status: DISCONTINUED | OUTPATIENT
Start: 2023-05-26 | End: 2023-06-05 | Stop reason: HOSPADM

## 2023-05-26 RX ORDER — SODIUM CHLORIDE 0.9 % (FLUSH) 0.9 %
10 SYRINGE (ML) INJECTION AS NEEDED
Status: DISCONTINUED | OUTPATIENT
Start: 2023-05-26 | End: 2023-06-05

## 2023-05-26 RX ORDER — HYDROXYZINE HYDROCHLORIDE 10 MG/1
TABLET, FILM COATED ORAL
COMMUNITY

## 2023-05-26 RX ORDER — DIVALPROEX SODIUM 250 MG/1
250 TABLET, EXTENDED RELEASE ORAL NIGHTLY
COMMUNITY

## 2023-05-26 RX ORDER — NALOXONE HCL 0.4 MG/ML
0.4 VIAL (ML) INJECTION
Status: DISCONTINUED | OUTPATIENT
Start: 2023-05-26 | End: 2023-06-02

## 2023-05-26 RX ORDER — LEVOTHYROXINE SODIUM 0.12 MG/1
125 TABLET ORAL DAILY
COMMUNITY
End: 2023-06-07 | Stop reason: SDUPTHER

## 2023-05-26 RX ORDER — PANTOPRAZOLE SODIUM 40 MG/10ML
40 INJECTION, POWDER, LYOPHILIZED, FOR SOLUTION INTRAVENOUS
Status: DISCONTINUED | OUTPATIENT
Start: 2023-05-27 | End: 2023-05-26

## 2023-05-26 RX ORDER — MANNITOL 20 G/100ML
INJECTION, SOLUTION INTRAVENOUS CONTINUOUS PRN
Status: DISCONTINUED | OUTPATIENT
Start: 2023-05-26 | End: 2023-05-26 | Stop reason: SURG

## 2023-05-26 RX ORDER — DOCUSATE SODIUM 100 MG/1
100 CAPSULE, LIQUID FILLED ORAL 2 TIMES DAILY PRN
Status: DISCONTINUED | OUTPATIENT
Start: 2023-05-26 | End: 2023-05-26

## 2023-05-26 RX ORDER — CEFAZOLIN SODIUM 1 G/3ML
INJECTION, POWDER, FOR SOLUTION INTRAMUSCULAR; INTRAVENOUS AS NEEDED
Status: DISCONTINUED | OUTPATIENT
Start: 2023-05-26 | End: 2023-05-26 | Stop reason: SURG

## 2023-05-26 RX ADMIN — DIVALPROEX SODIUM 250 MG: 250 TABLET, EXTENDED RELEASE ORAL at 21:41

## 2023-05-26 RX ADMIN — NICARDIPINE HYDROCHLORIDE 5 MG/HR: 25 INJECTION, SOLUTION INTRAVENOUS at 21:56

## 2023-05-26 RX ADMIN — CEFAZOLIN 2 G: 1 INJECTION, POWDER, FOR SOLUTION INTRAMUSCULAR; INTRAVENOUS at 15:40

## 2023-05-26 RX ADMIN — SODIUM CHLORIDE, POTASSIUM CHLORIDE, SODIUM LACTATE AND CALCIUM CHLORIDE: 600; 310; 30; 20 INJECTION, SOLUTION INTRAVENOUS at 15:23

## 2023-05-26 RX ADMIN — METOPROLOL TARTRATE 2 MG: 5 INJECTION INTRAVENOUS at 16:00

## 2023-05-26 RX ADMIN — GABAPENTIN 200 MG: 100 CAPSULE ORAL at 21:56

## 2023-05-26 RX ADMIN — INSULIN LISPRO 3 UNITS: 100 INJECTION, SOLUTION INTRAVENOUS; SUBCUTANEOUS at 13:47

## 2023-05-26 RX ADMIN — NICARDIPINE HYDROCHLORIDE 10 MG/HR: 25 INJECTION, SOLUTION INTRAVENOUS at 14:05

## 2023-05-26 RX ADMIN — PROPOFOL 200 MG: 10 INJECTION, EMULSION INTRAVENOUS at 15:30

## 2023-05-26 RX ADMIN — DEXAMETHASONE SODIUM PHOSPHATE 8 MG: 4 INJECTION, SOLUTION INTRAMUSCULAR; INTRAVENOUS at 15:37

## 2023-05-26 RX ADMIN — NICARDIPINE HYDROCHLORIDE 5 MG/HR: 25 INJECTION, SOLUTION INTRAVENOUS at 10:10

## 2023-05-26 RX ADMIN — LEVETIRACETAM 1000 MG: 10 INJECTION INTRAVASCULAR at 10:36

## 2023-05-26 RX ADMIN — FAMOTIDINE 20 MG: 10 INJECTION INTRAVENOUS at 23:17

## 2023-05-26 RX ADMIN — ROCURONIUM BROMIDE 50 MG: 10 SOLUTION INTRAVENOUS at 15:30

## 2023-05-26 RX ADMIN — LEVETIRACETAM 1000 MG: 10 INJECTION INTRAVASCULAR at 21:41

## 2023-05-26 RX ADMIN — PHENYLEPHRINE HYDROCHLORIDE 0.5 MCG/KG/MIN: 10 INJECTION INTRAVENOUS at 16:11

## 2023-05-26 RX ADMIN — SUGAMMADEX 200 MG: 100 INJECTION, SOLUTION INTRAVENOUS at 17:28

## 2023-05-26 RX ADMIN — ESMOLOL HYDROCHLORIDE 50 MG: 10 INJECTION, SOLUTION INTRAVENOUS at 15:30

## 2023-05-26 RX ADMIN — METOPROLOL TARTRATE 3 MG: 5 INJECTION INTRAVENOUS at 15:51

## 2023-05-26 RX ADMIN — ESMOLOL HYDROCHLORIDE 50 MG: 10 INJECTION, SOLUTION INTRAVENOUS at 15:37

## 2023-05-26 RX ADMIN — CEFAZOLIN SODIUM 2 G: 2 INJECTION, SOLUTION INTRAVENOUS at 23:17

## 2023-05-26 RX ADMIN — LIDOCAINE HYDROCHLORIDE 50 MG: 10 INJECTION, SOLUTION EPIDURAL; INFILTRATION; INTRACAUDAL; PERINEURAL at 15:30

## 2023-05-26 RX ADMIN — PHYTONADIONE 10 MG: 10 INJECTION, EMULSION INTRAMUSCULAR; INTRAVENOUS; SUBCUTANEOUS at 10:28

## 2023-05-26 RX ADMIN — SODIUM CHLORIDE 125 ML/HR: 9 INJECTION, SOLUTION INTRAVENOUS at 10:06

## 2023-05-26 RX ADMIN — Medication 2500 UNITS: at 10:29

## 2023-05-26 RX ADMIN — ROCURONIUM BROMIDE 30 MG: 10 SOLUTION INTRAVENOUS at 16:44

## 2023-05-26 RX ADMIN — LEVETIRACETAM 1000 MG: 10 INJECTION INTRAVASCULAR at 15:38

## 2023-05-26 RX ADMIN — DEXAMETHASONE SODIUM PHOSPHATE 4 MG: 4 INJECTION INTRA-ARTICULAR; INTRALESIONAL; INTRAMUSCULAR; INTRAVENOUS; SOFT TISSUE at 23:17

## 2023-05-26 RX ADMIN — Medication 10 ML: at 21:42

## 2023-05-26 RX ADMIN — ROCURONIUM BROMIDE 20 MG: 10 SOLUTION INTRAVENOUS at 16:28

## 2023-05-26 RX ADMIN — MANNITOL: 20 INJECTION, SOLUTION INTRAVENOUS at 15:36

## 2023-05-26 RX ADMIN — HYDROCODONE BITARTRATE AND ACETAMINOPHEN 1 TABLET: 5; 325 TABLET ORAL at 22:08

## 2023-05-26 RX ADMIN — INSULIN LISPRO 3 UNITS: 100 INJECTION, SOLUTION INTRAVENOUS; SUBCUTANEOUS at 21:41

## 2023-05-26 NOTE — ANESTHESIA PREPROCEDURE EVALUATION
Anesthesia Evaluation     Patient summary reviewed and Nursing notes reviewed   no history of anesthetic complications:  NPO Solid Status: > 8 hours  NPO Liquid Status: > 2 hours           Airway   Mallampati: II  TM distance: >3 FB  Neck ROM: full  No difficulty expected  Dental - normal exam     Pulmonary - negative pulmonary ROS and normal exam    breath sounds clear to auscultation  Cardiovascular - normal exam    ECG reviewed  PT is on anticoagulation therapy  Rhythm: regular  Rate: normal    (+) hypertension, valvular problems/murmurs (s/p AVR), PVD (Thoracic aortic aneurysm),     ROS comment: 2021 Echo:  ? Left ventricular ejection fraction appears to be 56 - 60%. Left ventricular systolic function is normal.  ? Left ventricular wall thickness is consistent with mild concentric hypertrophy.  ? Left ventricular diastolic function is consistent with (grade II w/high LAP) pseudonormalization.  ? Left atrial volume is mildly increased.  ? The right atrial cavity is mildly dilated.  ? There is a normally functioning mechanical aortic valve prosthesis present.      Neuro/Psych  (+) CVA,    GI/Hepatic/Renal/Endo    (+)  GERD,      Musculoskeletal     Abdominal    Substance History      OB/GYN          Other      history of cancer (Hodgkin's lymphoma)                    Anesthesia Plan    ASA 3     general and [place]     intravenous induction     Anesthetic plan, risks, benefits, and alternatives have been provided, discussed and informed consent has been obtained with: patient.    Plan discussed with CRNA.        CODE STATUS:

## 2023-05-26 NOTE — OP NOTE
Date of operation: May 26th 2023    Attending surgeon: Dr. Stephane Womack MD  Surgical Assistance: YULIA Rdoríguez    Preoperative diagnosis: Bilateral subacute subdural hematomas    Postoperative diagnosis: The same    Operations performed: 1.  Right-sided craniotomy for evacuation of subdural hematoma  2.  Left-sided craniotomy for evacuation of subdural hematoma    Findings: Significant hematoma bilaterally    Specimens: None    Indications: This is a 47-year-old male who presented with progressively worsening altered mental status, found to have subacute subdural hematomas with mass effect.    Procedure in detail: The patient was brought back to the operating room placed under general anesthesia.  He was endotracheally intubated.  The patient's entire head was then shaved.  We then rotated his head to the left and delaney out a linear incision marking in the frontoparietal region on the right side.  This area was then prepped and draped in a sterile fashion.  A timeout was performed, antibiotics were given and local anesthetic was injected into the incision marking.  At this time, we incised the incision with a knife and using Bovie cautery reflected our myocutaneous flap and placed retractors.  We then made 2 bur holes and used a craniotome to turn a bone flap in the area that been exposed.  We then achieved complete hemostasis and then opened up the dura in a stellate fashion.  We immediately encountered subdural hematoma with significant membranes.  Using a combination of suction and irrigation we began circumferentially removing subdural hematoma.  We used a brain ribbon to retract brain and each direction to gain access to more hematoma.  We continued to irrigate and suction until we had removed a good amount of hematoma.  At this time, we were satisfied with the evacuation.  We then closed the dura with interrupted Nurolon stitches.  We tunneled a Hemovac drain posteriorly and secured the bone flap down  with 1 bur hole and 2 dog bones.  At this time, we further irrigated the field and then turned our attention to the skin closure.  We closed the galea with interrupted inverted 2-0 Vicryl stitches and the skin with staples.  Drain was secured with a nylon stitch.    At this time, we then took off all the drapes and rotated the patient's head to the his right to expose the left frontoparietal region.  A similar incision marking was drawn out on this side and the patient was then prepped and draped in a sterile fashion.  Local anesthetic was injected into the incision marking. At this time, we incised the incision with a knife and using Bovie cautery reflected our myocutaneous flap and placed retractors.  We then made 2 bur holes and used a craniotome to turn a bone flap in the area that been exposed.  We then achieved complete hemostasis and then opened up the dura in a stellate fashion.  We immediately encountered subdural hematoma with significant membranes.  Using a combination of suction and irrigation we began circumferentially removing subdural hematoma.  We used a brain ribbon to retract brain and each direction to gain access to more hematoma.  We continued to irrigate and suction until we had removed a good amount of hematoma.  At this time, we were satisfied with the evacuation.  We then closed the dura with interrupted Nurolon stitches.  We tunneled a Hemovac drain posteriorly and secured the bone flap down with 1 bur hole and 2 dog bones.  At this time, we further irrigated the field and then turned our attention to the skin closure.  We closed the galea with interrupted inverted 2-0 Vicryl stitches and the skin with staples.  Drain was secured with a nylon stitch.    At this time, both incisions were then dressed sterilely. Chanell Rock was present for all portions of the surgery and assisted with patient positioning, opening, retraction, suturing, and closing.  At the end of the case all counts were  correct x2 and there were no complications noted.

## 2023-05-26 NOTE — ED PROVIDER NOTES
Subjective   History of Present Illness  This is a 47-year-old male who sees Roby Faust.  He is brought to the emergency room today by his brother for altered mental status of unclear duration and etiology.  Per his brother who is a fair historian it sounds like he had a stroke after having perhaps aortic valve replacement at [facility] 6 years ago.  This left him with expressive aphasia.  He is on chronic Coumadin therapy for his aortic valve.  His brother last talked to him a week ago but he really does not speak any saw him at that time he looked his normal self is unclear how long he has been in his current state though his girlfriend who he lives with is post to be coming in shortly and will be able to provide more information.    I really cannot get any other history or review of systems.  His chart is not merged as he initially came in as a Vimal Carl.        Review of Systems   Unable to perform ROS: Mental status change       No past medical history on file.    Not on File    No past surgical history on file.    No family history on file.    Social History     Socioeconomic History   • Marital status: Single   Tobacco Use   • Smoking status: Never   • Smokeless tobacco: Never   Substance and Sexual Activity   • Alcohol use: Never           Objective   Physical Exam  Vitals and nursing note reviewed.   Constitutional:       Comments: This is a 47-year-old who is awake he is alert he is sitting upright in a wheelchair getting little bit CT scan when I first see him.  He is able with minimal assistance to stand up and transfer to the CT gurney.  He does not seem to be focally weak.  When I ask him questions he mumbles and I really cannot understand him I do not know how close this is to his baseline.   HENT:      Head: Normocephalic and atraumatic.      Right Ear: External ear normal.      Left Ear: External ear normal.      Nose: Nose normal.      Mouth/Throat:      Mouth: Mucous membranes are moist.       Pharynx: Oropharynx is clear.   Eyes:      Comments: Pupils are large about 6 mm they react bilaterally   Neck:      Vascular: No carotid bruit.   Cardiovascular:      Rate and Rhythm: Normal rate and regular rhythm.      Pulses: Normal pulses.      Heart sounds: Normal heart sounds.      Comments: There is a loud click consistent with mechanical valve in aortic area with a 2/6 systolic murmur.  Pulmonary:      Effort: Pulmonary effort is normal.      Breath sounds: Normal breath sounds.   Abdominal:      Comments: Modestly obese soft and nontender without organomegaly masses or guarding   Musculoskeletal:      Cervical back: Normal range of motion and neck supple. No rigidity or tenderness.      Comments: Equal pulses without edema, synovitis, rash, or venous cords.   Lymphadenopathy:      Cervical: No cervical adenopathy.   Skin:     General: Skin is warm and dry.      Capillary Refill: Capillary refill takes less than 2 seconds.   Neurological:      Comments: Face symmetric he mumbles GCS 14 he is able to squeeze both my hands but is weak bilaterally.  With minimal assistance he is able to stand up and scoot to the CT gurney.  Does not seem to be focally weak.  His knee jerks are 2+ bilaterally.         Critical Care  Performed by: Cameron Daugherty MD  Authorized by: Cameron Daugherty MD     Critical care provider statement:     Critical care time (minutes):  60    Critical care was necessary to treat or prevent imminent or life-threatening deterioration of the following conditions:  CNS failure or compromise    Critical care was time spent personally by me on the following activities:  Development of treatment plan with patient or surrogate, discussions with consultants, evaluation of patient's response to treatment, examination of patient, obtaining history from patient or surrogate, review of old charts, re-evaluation of patient's condition, pulse oximetry, ordering and review of radiographic studies,  ordering and review of laboratory studies and ordering and performing treatments and interventions               ED Course            Recent Results (from the past 24 hour(s))   POC CHEM 8    Collection Time: 05/26/23  9:50 AM    Specimen: Blood   Result Value Ref Range    Glucose 215 (H) 70 - 130 mg/dL    BUN 15 8 - 26 mg/dL    Creatinine 0.70 0.60 - 1.30 mg/dL    Sodium 139 138 - 146 mmol/L    POC Potassium 4.3 3.5 - 4.9 mmol/L    Chloride 102 98 - 109 mmol/L    Total CO2 27 24 - 29 mmol/L    Hemoglobin 15.3 12.0 - 17.0 g/dL    Hematocrit 45 38 - 51 %    Ionized Calcium 1.08 (L) 1.20 - 1.32 mmol/L    eGFR 114.4 >60.0 mL/min/1.73   POC Protime / INR    Collection Time: 05/26/23  9:58 AM    Specimen: Blood   Result Value Ref Range    Protime 35.9 (H) 12.8 - 15.2 seconds    INR 3.2 (H) 0.8 - 1.2   Comprehensive Metabolic Panel    Collection Time: 05/26/23 10:08 AM    Specimen: Blood   Result Value Ref Range    Glucose 220 (H) 65 - 99 mg/dL    BUN 14 6 - 20 mg/dL    Creatinine 0.72 (L) 0.76 - 1.27 mg/dL    Sodium 139 136 - 145 mmol/L    Potassium 4.4 3.5 - 5.2 mmol/L    Chloride 101 98 - 107 mmol/L    CO2 24.0 22.0 - 29.0 mmol/L    Calcium 9.7 8.6 - 10.5 mg/dL    Total Protein 8.4 6.0 - 8.5 g/dL    Albumin 4.7 3.5 - 5.2 g/dL    ALT (SGPT) 14 1 - 41 U/L    AST (SGOT) 18 1 - 40 U/L    Alkaline Phosphatase 86 39 - 117 U/L    Total Bilirubin 0.6 0.0 - 1.2 mg/dL    Globulin 3.7 gm/dL    A/G Ratio 1.3 g/dL    BUN/Creatinine Ratio 19.4 7.0 - 25.0    Anion Gap 14.0 5.0 - 15.0 mmol/L    eGFR 113.4 >60.0 mL/min/1.73   Protime-INR    Collection Time: 05/26/23 10:08 AM    Specimen: Blood   Result Value Ref Range    Protime 23.6 (H) 12.2 - 14.5 Seconds    INR 2.08 (H) 0.89 - 1.12   Magnesium    Collection Time: 05/26/23 10:08 AM    Specimen: Blood   Result Value Ref Range    Magnesium 2.2 1.6 - 2.6 mg/dL   CBC Auto Differential    Collection Time: 05/26/23 10:08 AM    Specimen: Blood   Result Value Ref Range    WBC 11.50 (H) 3.40  - 10.80 10*3/mm3    RBC 5.38 4.14 - 5.80 10*6/mm3    Hemoglobin 14.6 13.0 - 17.7 g/dL    Hematocrit 45.8 37.5 - 51.0 %    MCV 85.1 79.0 - 97.0 fL    MCH 27.1 26.6 - 33.0 pg    MCHC 31.9 31.5 - 35.7 g/dL    RDW 14.8 12.3 - 15.4 %    RDW-SD 46.2 37.0 - 54.0 fl    MPV 11.8 6.0 - 12.0 fL    Platelets 193 140 - 450 10*3/mm3    Neutrophil % 75.4 42.7 - 76.0 %    Lymphocyte % 14.2 (L) 19.6 - 45.3 %    Monocyte % 6.2 5.0 - 12.0 %    Eosinophil % 2.3 0.3 - 6.2 %    Basophil % 0.5 0.0 - 1.5 %    Immature Grans % 1.4 (H) 0.0 - 0.5 %    Neutrophils, Absolute 8.68 (H) 1.70 - 7.00 10*3/mm3    Lymphocytes, Absolute 1.63 0.70 - 3.10 10*3/mm3    Monocytes, Absolute 0.71 0.10 - 0.90 10*3/mm3    Eosinophils, Absolute 0.26 0.00 - 0.40 10*3/mm3    Basophils, Absolute 0.06 0.00 - 0.20 10*3/mm3    Immature Grans, Absolute 0.16 (H) 0.00 - 0.05 10*3/mm3    nRBC 0.0 0.0 - 0.2 /100 WBC   Hemoglobin A1c    Collection Time: 05/26/23 10:08 AM    Specimen: Blood   Result Value Ref Range    Hemoglobin A1C 8.90 (H) 4.80 - 5.60 %   TSH    Collection Time: 05/26/23 10:08 AM    Specimen: Blood   Result Value Ref Range    TSH 1.610 0.270 - 4.200 uIU/mL   Type & Screen    Collection Time: 05/26/23 10:14 AM    Specimen: Blood   Result Value Ref Range    ABO Type A     RH type Positive     Antibody Screen Negative     T&S Expiration Date 5/29/2023 11:59:59 PM    ECG 12 Lead Altered Mental Status    Collection Time: 05/26/23 10:18 AM   Result Value Ref Range    QT Interval 400 ms    QTC Interval 464 ms   ABO RH Specimen Verification    Collection Time: 05/26/23 11:25 AM    Specimen: Blood   Result Value Ref Range    ABO Type A     RH type Positive    POC Glucose Once    Collection Time: 05/26/23  1:27 PM    Specimen: Blood   Result Value Ref Range    Glucose 212 (H) 70 - 130 mg/dL   Protime-INR    Collection Time: 05/26/23  1:48 PM    Specimen: Arm, Right; Blood   Result Value Ref Range    Protime 14.0 12.2 - 14.5 Seconds    INR 1.07 0.89 - 1.12   POC  Protime / INR    Collection Time: 05/26/23  1:49 PM    Specimen: Blood   Result Value Ref Range    Protime 13.3 12.8 - 15.2 seconds    INR 1.1 0.8 - 1.2     Note: In addition to lab results from this visit, the labs listed above may include labs taken at another facility or during a different encounter within the last 24 hours. Please correlate lab times with ED admission and discharge times for further clarification of the services performed during this visit.    XR Chest 1 View   Final Result   Impression:   Low lung volumes with no acute process demonstrated         Electronically Signed: Mateusz Hare     5/26/2023 10:21 AM EDT     Workstation ID: [id number]      CT Head Without Contrast Stroke Protocol   Final Result   Impression:   Bilateral 13 to 14 mm acute subdural hematomas are present. There is evidence of diffuse cerebral edema, with bilateral uncal medialization, crowding of the basal cisterns and concern for impending herniation. There is 2 to 3 mm of leftward midline shift    with effacement of the right lateral and third ventricles, with prominence of the left temporal horn concerning for early entrapment.      Scan performed at 9:31 a.m. 5/26/2023. Results discussed with the stroke team and ER physician by Elia Morris in person at time of scanning.            Electronically Signed: Ruben Morris     5/26/2023 9:55 AM EDT     Workstation ID: [id number]        Vitals:    05/26/23 1100 05/26/23 1115 05/26/23 1130 05/26/23 1135   BP: 138/93 151/88 155/82 147/85   BP Location:       Patient Position:       Pulse: 73 85 87 90   Resp:       SpO2: 97% 98% 98% 96%   Weight:       Height:         Medications   sodium chloride 0.9 % infusion (125 mL/hr Intravenous New Bag 5/26/23 1006)   niCARdipine (CARDENE) 25mg in 250mL NS infusion (10 mg/hr Intravenous New Bag 5/26/23 1405)   levETIRAcetam in NaCl 0.75% (KEPPRA) IVPB 1,000 mg (0 mg Intravenous Stopped 5/26/23 1053)   dextrose (GLUTOSE) oral gel 15 g (has  no administration in time range)   dextrose (D50W) (25 g/50 mL) IV injection 25 g (has no administration in time range)   glucagon (GLUCAGEN) injection 1 mg (has no administration in time range)   insulin regular (humuLIN R,novoLIN R) injection 2-7 Units (0 Units Subcutaneous Hold 5/26/23 1347)   pantoprazole (PROTONIX) injection 40 mg (has no administration in time range)   dextrose (GLUTOSE) oral gel 15 g (has no administration in time range)   dextrose (D50W) (25 g/50 mL) IV injection 25 g (has no administration in time range)   glucagon (GLUCAGEN) injection 1 mg (has no administration in time range)   Insulin Lispro (humaLOG) injection 2-7 Units (3 Units Subcutaneous Given 5/26/23 1347)   divalproex (DEPAKOTE ER) 24 hr tablet 250 mg (has no administration in time range)   DULoxetine (CYMBALTA) DR capsule 60 mg (60 mg Oral Not Given 5/26/23 1405)   gabapentin (NEURONTIN) capsule 200 mg (200 mg Oral Not Given 5/26/23 1416)   levothyroxine (SYNTHROID, LEVOTHROID) tablet 125 mcg (125 mcg Oral Not Given 5/26/23 1419)   rosuvastatin (CRESTOR) tablet 20 mg (20 mg Oral Not Given 5/26/23 1416)   prothrombin complex conc human (KCentra) IV solution 2,500 Units (2,500 Units Intravenous Given 5/26/23 1029)     And   phytonadione (AQUA-MEPHYTON, VITAMIN K) 10 mg in sodium chloride 0.9 % 50 mL IVPB (0 mg Intravenous Stopped 5/26/23 1125)     ECG/EMG Results (last 24 hours)     ** No results found for the last 24 hours. **        ECG 12 Lead Altered Mental Status   Preliminary Result   Test Reason : Altered Mental Status   Blood Pressure :   */*   mmHG   Vent. Rate :  81 BPM     Atrial Rate :  81 BPM      P-R Int : 154 ms          QRS Dur :  88 ms       QT Int : 400 ms       P-R-T Axes :  71  28  82 degrees      QTc Int : 464 ms      Normal sinus rhythm   Possible Left atrial enlargement   ST & T wave abnormality, consider lateral ischemia   Prolonged QT   Abnormal ECG   No previous ECGs available      Referred By: ED MD            Confirmed By:                                         Medical Decision Making        Reviewed all available studies at bedside with the patient and his girlfriend who is now at the bedside.  She actually reports 2 to 3 weeks of altered mental status and increasing sleepiness no history of trauma.    On recheck patient maintaining his airway.    Dr. Holloway neurosurgery is down to see the patient anticipates going to the OR for emergent bur hole placement subdural drainage.    Discussed the risk and benefit of reversal of the patient's chronic anticoagulation due to his mechanical aortic valve with the patient's girlfriend and family and understand the necessary part of this even a increases his risk of stroke.    This was accomplished.  Patient also started on hypertension therapy and antiepileptic drugs by consulting services.  Please see their note.    I spoke Dr. Townsend, on-call critical care medicine he will admit the patient.    All are agreeable with the plan    Elevated INR: chronic illness or injury with exacerbation, progression, or side effects of treatment that poses a threat to life or bodily functions  H/O mechanical aortic valve replacement: chronic illness or injury with exacerbation, progression, or side effects of treatment that poses a threat to life or bodily functions  Hypertensive urgency: complicated acute illness or injury with systemic symptoms that poses a threat to life or bodily functions  Somnolence: complicated acute illness or injury with systemic symptoms that poses a threat to life or bodily functions  Subdural hematoma: complicated acute illness or injury with systemic symptoms that poses a threat to life or bodily functions  Amount and/or Complexity of Data Reviewed  Independent Historian: caregiver and spouse  External Data Reviewed: notes.  Labs: ordered. Decision-making details documented in ED Course.  Radiology: ordered and independent interpretation performed.  Decision-making details documented in ED Course.  ECG/medicine tests: ordered and independent interpretation performed. Decision-making details documented in ED Course.      Risk  Prescription drug management.  Decision regarding hospitalization.  Emergency major surgery.          Final diagnoses:   Somnolence   Subdural hematoma   Elevated INR   H/O mechanical aortic valve replacement   Hypertensive urgency       ED Disposition  ED Disposition     ED Disposition   Decision to Admit    Condition   --    Comment   Level of Care: Critical Care [6]   Admitting Physician: NUSRAT MICHAEL [[id number]]   Attending Physician: NUSRAT MICHAEL [[id number]]               No follow-up provider specified.       Medication List      No changes were made to your prescriptions during this visit.          Cameron Daugherty MD  05/26/23 9263

## 2023-05-26 NOTE — SIGNIFICANT NOTE
05/26/23 1605   SLP Deferred Reason   SLP Deferred Reason Patient unavailable for evaluation  (Consults for cognitive-communication eval & swallow eval received. Noted pt moving ITZ for SDH evacuation. Will f/u tomorrow, as appropriate.)

## 2023-05-26 NOTE — CONSULTS
Patient: Manjeet Hall  : 1975    Primary Care Provider: Provider, No Known    Requesting Provider: As above      Chief Complaint: Altered mental status    History of Present Illness: This is a 47 y.o. 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  He is status post cardiac surgery and is currently on Coumadin.  He presented to the emergency room by himself with altered mental status.  He was not speaking very well and a history was unable to be obtained.  He underwent a CT scan of his head which showed subacute bilateral subdural hematomas.  He has been more awake since the CT scan.    PMHX  Allergies:  Not on File  Medications    Current Facility-Administered Medications:   •  prothrombin complex conc human (KCentra) IV solution 2,500 Units, 2,500 Units, Intravenous, Once **AND** phytonadione (AQUA-MEPHYTON, VITAMIN K) 10 mg in sodium chloride 0.9 % 50 mL IVPB, 10 mg, Intravenous, Once **AND** Protime-INR, , , Once **AND** [CANCELED] Protime-INR, , , Once **AND** [START ON 2023] Protime-INR, , , Once **AND** Monitor for signs for thromboembolic events, , , Per Order Details, Cameron Daugherty MD  •  sodium chloride 0.9 % infusion, 125 mL/hr, Intravenous, Continuous, Cameron Daugherty MD  No current outpatient medications on file.  Past Medical History:  No past medical history on file.  Past Surgical History:  No past surgical history on file.  Social Hx:     Family Hx:  No family history on file.  Review of Systems:        Unable to be obtained given patient's status and lack of family present    Physical Exam:   BP (!) 169/103   Pulse 79   Resp 16   Wt 104 kg (229 lb 0.9 oz)   SpO2 97%   Patient appears comfortable, resting  Eyes open spontaneously  Able to say name, does not know year  Pupils 3 mm reactive bilaterally  Face symmetric  Follows commands briskly x4    Intake/Output: No intake or output data in the 24 hours ending 23 1005    Current  Medications:   Current Facility-Administered Medications:   •  prothrombin complex conc human (KCentra) IV solution 2,500 Units, 2,500 Units, Intravenous, Once **AND** phytonadione (AQUA-MEPHYTON, VITAMIN K) 10 mg in sodium chloride 0.9 % 50 mL IVPB, 10 mg, Intravenous, Once **AND** Protime-INR, , , Once **AND** [CANCELED] Protime-INR, , , Once **AND** [START ON 5/27/2023] Protime-INR, , , Once **AND** Monitor for signs for thromboembolic events, , , Per Order Details, Cameron Daugherty MD  •  sodium chloride 0.9 % infusion, 125 mL/hr, Intravenous, Continuous, Cameron Daugherty MD  No current outpatient medications on file.     Laboratory Results:      Lab 05/26/23  0958   PROTIME 35.9*                 Lab 05/26/23  0958   PROTIME 35.9*   INR 3.2*                 Brief Urine Lab Results     None        Microbiology Results (last 10 days)     ** No results found for the last 240 hours. **           Diagnostic Imaging: The patient's diagnostic imaging was independently reviewed and interpreted by myself.    Assessment/Plan:  This is a 47-year-old male with a history of left hemispheric infarct and cardiac surgery, currently on Coumadin.  He presented to the emergency room with altered mental status.  CT scan of the head shows subacute bilateral subdural hematomas.  He is clinically stable and is following commands briskly.  We will obtain a full set of labs and reverse his Coumadin.  We will obtain a brain MRI with and without contrast.  The patient will likely need drainage of the subdural hematomas.  I will follow-up with the family and patient after his tests are completed.  Would recommend admitting him to ICU.      Stephane Womack MD  05/26/23  10:05 EDT

## 2023-05-26 NOTE — BRIEF OP NOTE
Progress Note    Manjeet Hall      Pre-op Diagnosis:   Bilateral subacute subdural hematoma       Post-Op Diagnosis Codes:  Preoperative    Procedure/CPT® Codes:    Right-sided craniotomy for evacuation of subdural hematoma  Left-sided craniotomy for evacuation of subdural hematoma                  * Surgery not found *    Anesthesia: * No surgery found *    Staff:   * Surgery not found *  * Surgery not found *      Estimated Blood Loss: 175 ml    Urine Voided: * No surgery found *    Specimens:                None          Drains: * No LDAs found *    Findings: Speculation of bilateral subdural hematomas.        Complications: None apparent          Stephane Womack MD     Date: 5/26/2023  Time: 17:30 EDT

## 2023-05-26 NOTE — H&P
Intensive Care Admission Note     Acute subdural hematoma    History of Present Illness     Manjeet Hall is a 47 year-old male that presented to [facility] ED on 5/26/23 with complaints of AMS concerning for stroke.     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 He follows with our Cardiology Team and has received most CT Surgery care at [facility]. Recent CTA notable for ascending thoracic aorta dilation. Recent ECHO notable for HFpEF.      Mr. Hall presented to the ED this morning with unknown duration of AMS. Stroke Team following. CT head demonstrated bilateral subdural hematomas with diffuse cerebral edema, 2-3 mm leftward midline shift with effacement of the right lateral and third ventricles, bilateral uncal medialization, crowding of the basal cisterns. There is concern for impending herniation, per the radiology read. Initial /97. Labs notable for hyperglycemia and PT / INR 23.6 & 2.08.    Neurosurgery has been consulted and plans to potentially take him for intervention. In the meantime, he has received KCentra and Vitamin K for Coumadin reversal. He has also received Keppra and was started on Cardene drip for BP control. He will be admitted to the ICU for further work-up and monitoring.     Time spent: 10 minutes  Electronically signed by BETHANIE Edward, 05/26/23, 11:25 AM EDT.    Problem List, Surgical  History, Family, Social History, and ROS     Patient Active Problem List    Diagnosis    • *Bilateral Subdural Hematoma [I62.00]    • HTN [I10]    • Hyperlipidemia (Diet controlled, off statin) [E78.5]    • Hypothyroidism [E03.9]    • Anxiety [F41.9]    • Hodgkin's Lymphoma s/p XRT & Chemo [C81.90]    • VSD s/p closure (age 14) [Q21.0]    • History of aortic regurgitation / stenosis s/p ROSS procedure with subsequent reversal with Mechanical AVR [Z86.79]    • H/O aortic mechanical valve replacement on Coumadin [Z95.2]    • Prediabetes [R73.03]      No past surgical history on file.    Not on File  No current facility-administered medications on file prior to encounter.     Current Outpatient Medications on File Prior to Encounter   Medication Sig   • divalproex (DEPAKOTE ER) 250 MG 24 hr tablet Take 1 tablet by mouth Every Night.   • DULoxetine (CYMBALTA) 60 MG capsule Take 1 capsule by mouth Daily.   • gabapentin (NEURONTIN) 100 MG capsule Take 2 capsules by mouth 2 (Two) Times a Day.   • hydrOXYzine (ATARAX) 10 MG tablet Take 1 to 2 tablets by mouth twice a day as needed for anxiety and sleep.   • lansoprazole (PREVACID) 30 MG capsule Take 1 capsule by mouth Daily.   • levothyroxine (SYNTHROID, LEVOTHROID) 125 MCG tablet Take 1 tablet by mouth Daily. Further Refills per PCP with Repeat Lab Work   • propranolol LA (INDERAL LA) 60 MG 24 hr capsule Take 1 capsule by mouth Daily.   • rosuvastatin (CRESTOR) 20 MG tablet Take 1 tablet by mouth Daily.   • warfarin (COUMADIN) 5 MG tablet Take 1 tablet by mouth Daily. As directed by Anticoagulation Clinic     MEDICATION LIST AND ALLERGIES REVIEWED.    No family history on file.  Social History     Tobacco Use   • Smoking status: Never   • Smokeless tobacco: Never   Substance Use Topics   • Alcohol use: Never     Social History     Social History Narrative   • Not on file     FAMILY AND SOCIAL HISTORY REVIEWED.    Review of Systems  Unable to perform review of system due to  "patient factors    Physical Exam and Clinical Information   /85   Pulse 90   Resp 16   Ht 172.7 cm (68\")   Wt 104 kg (229 lb 0.9 oz)   SpO2 96%   BMI 34.83 kg/m²   Physical Exam  General Appearance: Awake moving around, aphasia and not following commands very consistently   head:  Right eye deviated outwards, right pupil about 5 mm and left 4 mm and sluggish reaction  Neck:   Supple.   Lungs:   B/L Breath sounds present with decreased breath sounds on bases, no wheezing heard, no crackles.   Heart: S1 and S2 present, no murmur  Abdomen: Soft, nontender, no guarding or rigidity, bowel sounds positive.  Extremities:  no cyanosis or clubbing,  no edema, warm to touch.  Pulses: Positive and symmetric.  Neurologic:  Moving all four extremities. Good strength bilaterally.     Results from last 7 days   Lab Units 05/26/23  1008 05/26/23  0950   WBC 10*3/mm3 11.50*  --    HEMOGLOBIN g/dL 14.6  --    HEMOGLOBIN, POC g/dL  --  15.3   PLATELETS 10*3/mm3 193  --      Results from last 7 days   Lab Units 05/26/23  1008 05/26/23  0950   SODIUM mmol/L 139  --    POTASSIUM mmol/L 4.4  --    CO2 mmol/L 24.0  --    BUN mg/dL 14  --    CREATININE mg/dL 0.72* 0.70   MAGNESIUM mg/dL 2.2  --    GLUCOSE mg/dL 220*  --      Estimated Creatinine Clearance: 148.2 mL/min (A) (by C-G formula based on SCr of 0.72 mg/dL (L)).  Results from last 7 days   Lab Units 05/26/23  1008   HEMOGLOBIN A1C % 8.90*         No results found for: LACTATE     Images: Chest x-ray reviewed personally and showed previous sternotomy wires in place.  Poor inspiratory effort but without any definite consolidation, pleural effusion or infiltrate noted.    I reviewed the patient's results and images.     CT head reviewed  IMPRESSION:  Impression:  Bilateral 13 to 14 mm acute subdural hematomas are present. There is evidence of diffuse cerebral edema, with bilateral uncal medialization, crowding of the basal cisterns and concern for impending herniation. " There is 2 to 3 mm of leftward midline shift   with effacement of the right lateral and third ventricles, with prominence of the left temporal horn concerning for early entrapment.     Scan performed at 9:31 a.m. 5/26/2023. Results discussed with the stroke team and ER physician by Elia Morris in person at time of scanning.           Electronically Signed: Ruben Morris    5/26/2023 9:55 AM EDT    Workstation ID: [id number]      EKG reviewed and showed normal sinus rhythm.  Slightly prolonged QTc.  No acute ST changes noted.    Impression     Bilateral Subdural Hematoma    HTN    Hyperlipidemia (Diet controlled, off statin)    Hypothyroidism    Anxiety    H/O aortic mechanical valve replacement on Coumadin    Prediabetes    Plan/Recommendations     47-year-old male with past medical history of hypertension, dyslipidemia, hypothyroidism, prediabetes, anxiety, Hodgkin's lymphoma status postchemotherapy and radiation treatment at age 21, history of PDA, VSD s/p closure at age 14, s/p Ross procedure with later reversal at [facility], mechanical AVR on Coumadin and right ventricular outflow tract obstruction.  Patient has previous history of stroke after taken off Coumadin back in 2018 and subsequently developed residual aphasia.  Patient presented to emergency room today after significant other found patient to be weak and lethargic and going on for about a week.  He was also complaining of headache and has medications apparently were being adjusted including gabapentin dosing.  He was not feeling well today morning and more lethargic so decision was made to bring patient to emergency room where work-up thus far have revealed acute bilateral subdural hematoma with diffuse cerebral edema with bilateral uncal medialization and crowding of basilar cistern.  2 to 3 mm leftward midline shift noted.  There is effacement of right lateral and third ventricle.  Neurosurgery team has evaluated the patient.  Patient has  been on Coumadin and INR was therapeutic so has received vitamin K and Kcentra in preparation for surgical evacuation of these subdural hematomas.  Patient currently is awake.  He is following commands.  Slight deviation of right eye noted along with slightly enlarged pupil.  Patient denies any pain currently.  Denies any chest pain or shortness of breath.  On nicardipine infusion and also started on Keppra prophylactically.    1.  Admit patient to intensive care unit with acute encephalopathy and bilateral subdural hematoma for closer neurologic and hemodynamic monitoring.  2.  Nicardipine drip as needed to keep systolic blood pressure less than 140.  3.  Neurosurgery team managing acute subdural hematoma and plan is for bur hole evacuation later on today.  4.  Reversed anticoagulation with Kcentra.  Patient also received vitamin K as well.  Will need resumption of anticoagulation when okay with neurosurgery team after procedure.  5.  Keppra load given prophylactically.  6.  Resume some of the home medications including Crestor, levothyroxine, Gabapentin and Depakote.  7.  Protonix for history of GERD.  8.  Levothyroxine with history of hypothyroidism.  9.  N.p.o.  May need Keofeed placement for medication administration.  10.  Sliding scale insulin coverage for hyperglycemia management.  11.  Normal saline at 125 mill per hour.  Avoid hyponatremia.  12.  Monitor electrolytes and replace as needed per ICU protocol.    Guarded prognosis.  Will admit to intensive care unit.  Family updated at bedside    Time spent 35 min (exclusive of procedure time)  including high complexity decision making to assess, manipulate, and support vital organ system failure in this individual who has impairment of one or more vital organ systems such that there is a high probability of imminent or life threatening deterioration in the patient’s condition.      Jose Maria Townsend MD, [facility]  Pulmonary and Critical Care Medicine  05/26/23  13:11 EDT     CC: Provider, No Known

## 2023-05-26 NOTE — PLAN OF CARE
Patient more lethargic on my re-evaluation this afternoon. I talked with his girlfriend and recommend we take him for bilateral evacuation of his SDH. I discussed the risks, benefits and alternatives. She was in agreement with the plan.  Will go to ICU post-op.

## 2023-05-26 NOTE — ANESTHESIA POSTPROCEDURE EVALUATION
Patient: Manjeet Hall    Procedure Summary     Date: 05/26/23 Room / Location:  [facility]  /  [facility]    Anesthesia Start: 1523 Anesthesia Stop: 1757    Procedure: CRANIOTOMY FOR BILATERAL SUBDURAL HEMATOMAS (Bilateral: Head) Diagnosis:     Surgeons: Stephane Womack MD Provider: Mikey Wyman MD    Anesthesia Type: general, Crawford ASA Status: 3          Anesthesia Type: general, Dalia    Vitals  No vitals data found for the desired time range.          Post Anesthesia Care and Evaluation    Patient location during evaluation: PACU  Patient participation: complete - patient participated  Level of consciousness: awake and alert  Pain management: adequate    Airway patency: patent  Anesthetic complications: No anesthetic complications  PONV Status: none  Cardiovascular status: hemodynamically stable and acceptable  Respiratory status: nonlabored ventilation, acceptable and nasal cannula  Hydration status: acceptable

## 2023-05-26 NOTE — BRIEF OP NOTE
CRANIOTOMY FOR SUBDURAL HEMATOMA  Progress Note    Manjeet Hall  5/26/2023    Pre-op Diagnosis:   Bilateral subacute subdural hematoma       Post-Op Diagnosis Codes:  Same as preoperative    Procedure/CPT® Codes:        Procedure(s):  CRANIOTOMY FOR BILATERAL SUBDURAL HEMATOMAS              Surgeon(s):  Stephane Womack MD    Anesthesia: General    Staff:   Circulator: Daysi Mueller RN; Falguni Polk, RN; Lisha Britt RN  Physician Assistant: Chanell Rock PA-C  Scrub Person: Abdi Bower  Nursing Assistant: Shell Ty PCT         Estimated Blood Loss: 150 ml    Urine Voided: 900 mL    Specimens:                None          Drains:   Closed/Suction Drain 1 Lateral;Right Scalp Accordion (Active)       Closed/Suction Drain 2 Left Scalp Accordion 10 Fr. (Active)       Urethral Catheter Double-lumen;Silicone 16 Fr. (Active)       Findings: Evacuation of bilateral subdural hematomas        Complications: None apparent          Stephane Womack MD     Date: 5/26/2023  Time: 17:39 EDT

## 2023-05-26 NOTE — ANESTHESIA PROCEDURE NOTES
Arterial Line      Patient reassessed immediately prior to procedure    Patient location during procedure: OR  Start time: 5/26/2023 2:26 PM   Line placed for hemodynamic monitoring, ABGs/Labs/ISTAT and MD/Surgeon request.  Performed By   Anesthesiologist: Radha Stanley MD   Preanesthetic Checklist  Completed: patient identified, IV checked, site marked, risks and benefits discussed, surgical consent, monitors and equipment checked, pre-op evaluation and timeout performed  Arterial Line Prep    Sterile Tech: cap, gloves, mask and sterile barriers  Prep: ChloraPrep  Patient monitoring: blood pressure monitoring, continuous pulse oximetry and EKG  Arterial Line Procedure   Laterality:right  Location:  radial artery  Catheter size: 20 G   Guidance: landmark technique and palpation technique  Number of attempts: 1  Successful placement: yes Images: still images not obtained  Post Assessment   Dressing Type: biopatch applied.   Complications no  Circ/Move/Sens Assessment: normal.   Patient Tolerance: patient tolerated the procedure well with no apparent complications

## 2023-05-26 NOTE — ANESTHESIA PROCEDURE NOTES
Airway  Urgency: elective    Date/Time: 5/26/2023 3:31 PM  Airway not difficult    General Information and Staff    Patient location during procedure: OR  CRNA/CAA: Vimal Pringle CRNA    Indications and Patient Condition  Indications for airway management: airway protection    Preoxygenated: yes  MILS not maintained throughout  Mask difficulty assessment: 1 - vent by mask    Final Airway Details  Final airway type: endotracheal airway      Successful airway: ETT  Cuffed: yes   Successful intubation technique: direct laryngoscopy  Facilitating devices/methods: cricoid pressure  Endotracheal tube insertion site: oral  Blade: Clarisa  Blade size: 3  ETT size (mm): 7.5  Cormack-Lehane Classification: grade I - full view of glottis  Placement verified by: chest auscultation and capnometry   Cuff volume (mL): 8  Measured from: lips  ETT/EBT  to lips (cm): 20  Number of attempts at approach: 1  Assessment: lips, teeth, and gum same as pre-op and atraumatic intubation    Additional Comments  Negative epigastric sounds, Breath sound equal bilaterally with symmetric chest rise and fall

## 2023-05-27 ENCOUNTER — APPOINTMENT (OUTPATIENT)
Dept: CT IMAGING | Facility: HOSPITAL | Age: 48
DRG: 020 | End: 2023-05-27
Payer: MEDICARE

## 2023-05-27 LAB
ANION GAP SERPL CALCULATED.3IONS-SCNC: 13 MMOL/L (ref 5–15)
ANION GAP SERPL CALCULATED.3IONS-SCNC: 13 MMOL/L (ref 5–15)
BUN SERPL-MCNC: 15 MG/DL (ref 6–20)
BUN SERPL-MCNC: 15 MG/DL (ref 6–20)
BUN/CREAT SERPL: 21.1 (ref 7–25)
BUN/CREAT SERPL: 21.1 (ref 7–25)
CALCIUM SPEC-SCNC: 9.2 MG/DL (ref 8.6–10.5)
CALCIUM SPEC-SCNC: 9.2 MG/DL (ref 8.6–10.5)
CHLORIDE SERPL-SCNC: 103 MMOL/L (ref 98–107)
CHLORIDE SERPL-SCNC: 103 MMOL/L (ref 98–107)
CHOLEST SERPL-MCNC: 181 MG/DL (ref 0–200)
CHOLEST SERPL-MCNC: 181 MG/DL (ref 0–200)
CO2 SERPL-SCNC: 24 MMOL/L (ref 22–29)
CO2 SERPL-SCNC: 24 MMOL/L (ref 22–29)
CREAT SERPL-MCNC: 0.71 MG/DL (ref 0.76–1.27)
CREAT SERPL-MCNC: 0.71 MG/DL (ref 0.76–1.27)
DEPRECATED RDW RBC AUTO: 45.4 FL (ref 37–54)
DEPRECATED RDW RBC AUTO: 45.4 FL (ref 37–54)
EGFRCR SERPLBLD CKD-EPI 2021: 113.9 ML/MIN/1.73
EGFRCR SERPLBLD CKD-EPI 2021: 113.9 ML/MIN/1.73
ERYTHROCYTE [DISTWIDTH] IN BLOOD BY AUTOMATED COUNT: 14.8 % (ref 12.3–15.4)
ERYTHROCYTE [DISTWIDTH] IN BLOOD BY AUTOMATED COUNT: 14.8 % (ref 12.3–15.4)
GLUCOSE BLDC GLUCOMTR-MCNC: 239 MG/DL (ref 70–130)
GLUCOSE BLDC GLUCOMTR-MCNC: 239 MG/DL (ref 70–130)
GLUCOSE BLDC GLUCOMTR-MCNC: 267 MG/DL (ref 70–130)
GLUCOSE BLDC GLUCOMTR-MCNC: 267 MG/DL (ref 70–130)
GLUCOSE BLDC GLUCOMTR-MCNC: 268 MG/DL (ref 70–130)
GLUCOSE BLDC GLUCOMTR-MCNC: 268 MG/DL (ref 70–130)
GLUCOSE BLDC GLUCOMTR-MCNC: 269 MG/DL (ref 70–130)
GLUCOSE BLDC GLUCOMTR-MCNC: 269 MG/DL (ref 70–130)
GLUCOSE SERPL-MCNC: 234 MG/DL (ref 65–99)
GLUCOSE SERPL-MCNC: 234 MG/DL (ref 65–99)
HCT VFR BLD AUTO: 35.4 % (ref 37.5–51)
HCT VFR BLD AUTO: 35.4 % (ref 37.5–51)
HDLC SERPL-MCNC: 48 MG/DL (ref 40–60)
HDLC SERPL-MCNC: 48 MG/DL (ref 40–60)
HGB BLD-MCNC: 11.3 G/DL (ref 13–17.7)
HGB BLD-MCNC: 11.3 G/DL (ref 13–17.7)
INR PPP: 1.13 (ref 0.89–1.12)
INR PPP: 1.13 (ref 0.89–1.12)
LDLC SERPL CALC-MCNC: 107 MG/DL (ref 0–100)
LDLC SERPL CALC-MCNC: 107 MG/DL (ref 0–100)
LDLC/HDLC SERPL: 2.16 {RATIO}
LDLC/HDLC SERPL: 2.16 {RATIO}
MAGNESIUM SERPL-MCNC: 1.8 MG/DL (ref 1.6–2.6)
MAGNESIUM SERPL-MCNC: 1.8 MG/DL (ref 1.6–2.6)
MCH RBC QN AUTO: 27 PG (ref 26.6–33)
MCH RBC QN AUTO: 27 PG (ref 26.6–33)
MCHC RBC AUTO-ENTMCNC: 31.9 G/DL (ref 31.5–35.7)
MCHC RBC AUTO-ENTMCNC: 31.9 G/DL (ref 31.5–35.7)
MCV RBC AUTO: 84.7 FL (ref 79–97)
MCV RBC AUTO: 84.7 FL (ref 79–97)
PHOSPHATE SERPL-MCNC: 4 MG/DL (ref 2.5–4.5)
PHOSPHATE SERPL-MCNC: 4 MG/DL (ref 2.5–4.5)
PLATELET # BLD AUTO: 200 10*3/MM3 (ref 140–450)
PLATELET # BLD AUTO: 200 10*3/MM3 (ref 140–450)
PMV BLD AUTO: 11.7 FL (ref 6–12)
PMV BLD AUTO: 11.7 FL (ref 6–12)
POTASSIUM SERPL-SCNC: 4.2 MMOL/L (ref 3.5–5.2)
POTASSIUM SERPL-SCNC: 4.2 MMOL/L (ref 3.5–5.2)
PROTHROMBIN TIME: 14.7 SECONDS (ref 12.2–14.5)
PROTHROMBIN TIME: 14.7 SECONDS (ref 12.2–14.5)
RBC # BLD AUTO: 4.18 10*6/MM3 (ref 4.14–5.8)
RBC # BLD AUTO: 4.18 10*6/MM3 (ref 4.14–5.8)
SODIUM SERPL-SCNC: 140 MMOL/L (ref 136–145)
SODIUM SERPL-SCNC: 140 MMOL/L (ref 136–145)
TRIGL SERPL-MCNC: 147 MG/DL (ref 0–150)
TRIGL SERPL-MCNC: 147 MG/DL (ref 0–150)
VLDLC SERPL-MCNC: 26 MG/DL (ref 5–40)
VLDLC SERPL-MCNC: 26 MG/DL (ref 5–40)
WBC NRBC COR # BLD: 13.78 10*3/MM3 (ref 3.4–10.8)
WBC NRBC COR # BLD: 13.78 10*3/MM3 (ref 3.4–10.8)

## 2023-05-27 PROCEDURE — 83735 ASSAY OF MAGNESIUM: CPT

## 2023-05-27 PROCEDURE — 25010000002 CEFAZOLIN IN DEXTROSE 2-4 GM/100ML-% SOLUTION: Performed by: STUDENT IN AN ORGANIZED HEALTH CARE EDUCATION/TRAINING PROGRAM

## 2023-05-27 PROCEDURE — 97166 OT EVAL MOD COMPLEX 45 MIN: CPT

## 2023-05-27 PROCEDURE — 70450 CT HEAD/BRAIN W/O DYE: CPT

## 2023-05-27 PROCEDURE — 92610 EVALUATE SWALLOWING FUNCTION: CPT

## 2023-05-27 PROCEDURE — 25010000002 LEVETIRACETAM IN NACL 0.82% 500 MG/100ML SOLUTION: Performed by: STUDENT IN AN ORGANIZED HEALTH CARE EDUCATION/TRAINING PROGRAM

## 2023-05-27 PROCEDURE — 92523 SPEECH SOUND LANG COMPREHEN: CPT

## 2023-05-27 PROCEDURE — 85027 COMPLETE CBC AUTOMATED: CPT

## 2023-05-27 PROCEDURE — 80061 LIPID PANEL: CPT | Performed by: NURSE PRACTITIONER

## 2023-05-27 PROCEDURE — 63710000001 INSULIN REGULAR HUMAN PER 5 UNITS: Performed by: INTERNAL MEDICINE

## 2023-05-27 PROCEDURE — 80048 BASIC METABOLIC PNL TOTAL CA: CPT

## 2023-05-27 PROCEDURE — 82948 REAGENT STRIP/BLOOD GLUCOSE: CPT

## 2023-05-27 PROCEDURE — 99233 SBSQ HOSP IP/OBS HIGH 50: CPT | Performed by: INTERNAL MEDICINE

## 2023-05-27 PROCEDURE — 63710000001 INSULIN LISPRO (HUMAN) PER 5 UNITS: Performed by: INTERNAL MEDICINE

## 2023-05-27 PROCEDURE — 25010000002 DEXAMETHASONE PER 1 MG: Performed by: STUDENT IN AN ORGANIZED HEALTH CARE EDUCATION/TRAINING PROGRAM

## 2023-05-27 PROCEDURE — 84100 ASSAY OF PHOSPHORUS: CPT

## 2023-05-27 PROCEDURE — 97162 PT EVAL MOD COMPLEX 30 MIN: CPT

## 2023-05-27 PROCEDURE — 97535 SELF CARE MNGMENT TRAINING: CPT

## 2023-05-27 PROCEDURE — 85610 PROTHROMBIN TIME: CPT | Performed by: EMERGENCY MEDICINE

## 2023-05-27 RX ORDER — NICOTINE POLACRILEX 4 MG
15 LOZENGE BUCCAL
Status: DISCONTINUED | OUTPATIENT
Start: 2023-05-27 | End: 2023-06-05 | Stop reason: HOSPADM

## 2023-05-27 RX ORDER — IBUPROFEN 600 MG/1
1 TABLET ORAL
Status: DISCONTINUED | OUTPATIENT
Start: 2023-05-27 | End: 2023-06-05 | Stop reason: HOSPADM

## 2023-05-27 RX ORDER — DEXTROSE MONOHYDRATE 25 G/50ML
25 INJECTION, SOLUTION INTRAVENOUS
Status: DISCONTINUED | OUTPATIENT
Start: 2023-05-27 | End: 2023-06-05 | Stop reason: HOSPADM

## 2023-05-27 RX ORDER — LEVETIRACETAM 5 MG/ML
500 INJECTION INTRAVASCULAR EVERY 12 HOURS SCHEDULED
Status: DISCONTINUED | OUTPATIENT
Start: 2023-05-27 | End: 2023-05-28

## 2023-05-27 RX ADMIN — GABAPENTIN 200 MG: 100 CAPSULE ORAL at 08:35

## 2023-05-27 RX ADMIN — DIVALPROEX SODIUM 250 MG: 250 TABLET, EXTENDED RELEASE ORAL at 21:55

## 2023-05-27 RX ADMIN — Medication 10 ML: at 21:55

## 2023-05-27 RX ADMIN — LEVOTHYROXINE SODIUM 125 MCG: 125 TABLET ORAL at 08:35

## 2023-05-27 RX ADMIN — INSULIN HUMAN 8 UNITS: 100 INJECTION, SOLUTION PARENTERAL at 11:47

## 2023-05-27 RX ADMIN — INSULIN HUMAN 8 UNITS: 100 INJECTION, SOLUTION PARENTERAL at 17:52

## 2023-05-27 RX ADMIN — DULOXETINE 60 MG: 60 CAPSULE, DELAYED RELEASE ORAL at 08:35

## 2023-05-27 RX ADMIN — ACETAMINOPHEN 650 MG: 325 TABLET ORAL at 00:51

## 2023-05-27 RX ADMIN — Medication 10 ML: at 08:36

## 2023-05-27 RX ADMIN — DEXAMETHASONE SODIUM PHOSPHATE 4 MG: 4 INJECTION INTRA-ARTICULAR; INTRALESIONAL; INTRAMUSCULAR; INTRAVENOUS; SOFT TISSUE at 11:47

## 2023-05-27 RX ADMIN — PANTOPRAZOLE SODIUM 40 MG: 40 TABLET, DELAYED RELEASE ORAL at 06:17

## 2023-05-27 RX ADMIN — HYDROCODONE BITARTRATE AND ACETAMINOPHEN 1 TABLET: 5; 325 TABLET ORAL at 19:37

## 2023-05-27 RX ADMIN — GABAPENTIN 200 MG: 100 CAPSULE ORAL at 21:54

## 2023-05-27 RX ADMIN — FAMOTIDINE 20 MG: 10 INJECTION INTRAVENOUS at 08:35

## 2023-05-27 RX ADMIN — ROSUVASTATIN 20 MG: 20 TABLET, FILM COATED ORAL at 08:35

## 2023-05-27 RX ADMIN — DEXAMETHASONE SODIUM PHOSPHATE 4 MG: 4 INJECTION INTRA-ARTICULAR; INTRALESIONAL; INTRAMUSCULAR; INTRAVENOUS; SOFT TISSUE at 17:52

## 2023-05-27 RX ADMIN — LEVETIRACETAM 500 MG: 5 INJECTION INTRAVASCULAR at 21:55

## 2023-05-27 RX ADMIN — INSULIN LISPRO 3 UNITS: 100 INJECTION, SOLUTION INTRAVENOUS; SUBCUTANEOUS at 08:35

## 2023-05-27 RX ADMIN — CEFAZOLIN SODIUM 2 G: 2 INJECTION, SOLUTION INTRAVENOUS at 06:17

## 2023-05-27 RX ADMIN — DEXAMETHASONE SODIUM PHOSPHATE 4 MG: 4 INJECTION INTRA-ARTICULAR; INTRALESIONAL; INTRAMUSCULAR; INTRAVENOUS; SOFT TISSUE at 06:17

## 2023-05-27 RX ADMIN — LEVETIRACETAM 500 MG: 5 INJECTION INTRAVASCULAR at 08:55

## 2023-05-27 RX ADMIN — FAMOTIDINE 20 MG: 10 INJECTION INTRAVENOUS at 21:55

## 2023-05-27 NOTE — PLAN OF CARE
Goal Outcome Evaluation:  Plan of Care Reviewed With: patient, significant other        Progress: no change          SLP evaluation completed. Will address aphasia in tx and f/u to assess diet tolerance. Please see note for further details and recommendations.

## 2023-05-27 NOTE — PROGRESS NOTES
"Critical Care Note     LOS: 1 day   Patient Care Team:  Provider, No Known as PCP - General    Chief Complaint/Reason for visit:    Chief Complaint   Patient presents with   • Stroke   Bilateral subdural hematomas  History of aortic valve replacement on Coumadin    Subjective     Interval History:     He remains afebrile.  On room air saturation is 94%.  He is in sinus rhythm.  Lopez catheter in place with 1 L of urine.  Left subdural drain 90 mL, right subdural drain 50 mL    Review of Systems:    All systems were reviewed and negative except as noted in subjective.    Medical history, surgical history, social history, family history reviewed    Objective     Intake/Output:    Intake/Output Summary (Last 24 hours) at 5/27/2023 0817  Last data filed at 5/27/2023 0617  Gross per 24 hour   Intake 1550 ml   Output 1140 ml   Net 410 ml       Nutrition:  NPO Diet NPO Type: Strict NPO    Infusions:  niCARdipine, 5-15 mg/hr, Last Rate: Stopped (05/26/23 7052)  sodium chloride, 100 mL/hr, Last Rate: 100 mL/hr (05/27/23 0571)        Mechanical Ventilator Settings:                                                Telemetry: Sinus rhythm             Vital Signs  Blood pressure 101/63, pulse 86, temperature 98.5 °F (36.9 °C), temperature source Axillary, resp. rate 16, height 172.7 cm (68\"), weight 104 kg (229 lb 0.9 oz), SpO2 91 %.    Physical Exam:  General Appearance:   Middle-aged gentleman supine in bed in no distress   Head:   Bilateral craniotomies with drains, scant bloody output   Eyes:          Gaze is conjugate   Ears:     Throat:  Oral mucosa moist   Neck:  Trachea midline, no crepitus   Back:      Lungs:    Symmetric chest expansion without wheeze or rhonchi    Heart:   Regular rhythm, mechanical S2   Abdomen:    Bowel sounds present, nontender   Rectal:   Deferred   Extremities:  Right radial arterial line, right hand pink   Pulses:    Skin:  Warm and dry   Lymph nodes:  No cervical adenopathy   Neurologic:  Awake, " oriented to person, follows commands with all 4 extremities      Results Review:     I reviewed the patient's new clinical results.   Results from last 7 days   Lab Units 05/27/23  0519 05/26/23  1008 05/26/23  0950   SODIUM mmol/L 140 139  --    POTASSIUM mmol/L 4.2 4.4  --    CHLORIDE mmol/L 103 101  --    CO2 mmol/L 24.0 24.0  --    BUN mg/dL 15 14  --    CREATININE mg/dL 0.71* 0.72* 0.70   CALCIUM mg/dL 9.2 9.7  --    BILIRUBIN mg/dL  --  0.6  --    ALK PHOS U/L  --  86  --    ALT (SGPT) U/L  --  14  --    AST (SGOT) U/L  --  18  --    GLUCOSE mg/dL 234* 220*  --      Results from last 7 days   Lab Units 05/27/23  0519 05/26/23  1008 05/26/23  0950   WBC 10*3/mm3 13.78* 11.50*  --    HEMOGLOBIN g/dL 11.3* 14.6  --    HEMOGLOBIN, POC g/dL  --   --  15.3   HEMATOCRIT % 35.4* 45.8  --    HEMATOCRIT POC %  --   --  45   PLATELETS 10*3/mm3 200 193  --          No results found for: BLOODCX  No results found for: URINECX    I reviewed the patient's new imaging including images and reports.      Findings:   Postoperative changes are noted from interval bilateral craniotomy with evacuation of previously noted acute subdural hematomas. Subdural drains are noted in place. Small bilateral persistent extra-axial mixed fluid collections are present with   low-density fluid, gas and minimal persisting hyperdense blood products, 7 mm maximal thickness on the left, 6 mm on the right. There is improved diffuse cerebral edema, with restored patency of the basal cisterns and decreased effacement of the   ventricles. Some underlying parenchymal hypoattenuation is noted involving temporal lobes bilaterally, greater on the left, concerning for underlying contusion/ischemia. There is also evident trace acute left parafalcine subdural hematoma, fairly   localized, measuring 4 mm in maximal thickness without significant associated mass effect. The orbits are normal. The paranasal sinuses are grossly clear.    Impression:     Impression:    Expected postoperative changes following bilateral craniotomy for evacuation of acute subdural hematomas. Cerebral edema is improved and there is restored patency of the basal cisterns and decreased effacement of the ventricles. Bilateral subdural drains    are noted in place.     4 mm small and somewhat localized left parafalcine subdural hematoma.     Increase in conspicuity, there is somewhat heterogeneous hypoattenuation present involving the left greater than right temporal lobes, concerning for underlying component of either persisting edema, contusion or ischemia.         Electronically Signed: Ruben Morris    5/27/2023 6:07 AM EDT          All medications reviewed.   dexamethasone, 4 mg, Intravenous, Q6H  divalproex, 250 mg, Oral, Nightly  DULoxetine, 60 mg, Oral, Daily  famotidine, 20 mg, Intravenous, Q12H  gabapentin, 200 mg, Oral, BID  insulin lispro, 2-7 Units, Subcutaneous, 4x Daily AC & at Bedtime  levETIRAcetam, 500 mg, Intravenous, Q12H  levothyroxine, 125 mcg, Oral, Daily  pantoprazole, 40 mg, Oral, Q AM  rosuvastatin, 20 mg, Oral, Daily  sodium chloride, 10 mL, Intravenous, Q12H          Assessment & Plan       Bilateral Subdural Hematoma    HTN    Hyperlipidemia (Diet controlled, off statin)    Hypothyroidism    Anxiety    H/O aortic mechanical valve replacement on Coumadin    Prediabetes    47-year-old gentleman with hypertension, dyslipidemia, hypothyroidism, diabetes, history of Hodgkin's lymphoma, 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  Postoperatively he developed pancreatitis and anticoagulation was held and he suffered a stroke resulting in some aphasia in 2018.  He presented May 26 with altered mentation and bilateral subdural hematomas with edema and some leftward midline shift and effacement of the right lateral and third  ventricles and bilateral uncal medialization.  He was hypertensive and hyperglycemic on admission.  His INR was subtherapeutic at 2.08.  He received Kcentra and vitamin K and was started on Keppra, Cardene for hypertension and taken to surgery for emergent bilateral craniotomy and evacuation of subdural hematomas.    Neurosurgery reports that he is significantly more awake today.  He is oriented to person only.  He is moving all extremities equally.  Currently his blood pressure is 108 systolic, and he is off Cardene.      PLAN:    -Monitor surgical drainage  -Monitor neurologic exam  -Emergent CT scan for any decline in neurologic status  -Maintain systolic blood pressure less than 140  -Increase sliding scale insulin  -Speech therapy to evaluate for possible swallowing  -Add long-acting insulin if he tolerated p.o. diet  -Continue to hold anticoagulation  -Dexamethasone  -Keppra  -Thyroid replacement  -With mechanical valve he will need anticoagulation restarted, will likely restart at 72 hours  -Neurosurgery plans middle meningeal artery embolization next week      VTE Prophylaxis:SCDS    Stress Ulcer Prophylaxis: Protonix    Meryl Ashby MD  05/27/23  08:47 EDT      Time: 25min

## 2023-05-27 NOTE — PLAN OF CARE
Goal Outcome Evaluation:  Plan of Care Reviewed With: patient, significant other           Outcome Evaluation: Patient presents with deficits primarily in speech and cognition. He completed mobility without difficulty ambulating 300' in [place] unsupported with CGA. Appears to be close to his baseline regarding mobility. Will follow up for stair training and recommend he discharge home with assistance, he may need 24/7 assist pending improvements in cognition.

## 2023-05-27 NOTE — PLAN OF CARE
Goal Outcome Evaluation:                      Patient experienced neuro changes at 0415 nsx called and updated on changes, Mariaa gave no new orders. Plan to assess upon rounding this am. Patient MORGAN and follows commands. Pupils unequal and patient experiencing aphasia. All VSS

## 2023-05-27 NOTE — PROGRESS NOTES
"NEUROSURGERY PROGRESS NOTE    Chief Complaint: Bilateral subdural hematomas    Subjective: Patient much more awake this morning.    Objective    Vital Signs: Blood pressure 101/63, pulse 86, temperature 98.5 °F (36.9 °C), temperature source Axillary, resp. rate 16, height 172.7 cm (68\"), weight 104 kg (229 lb 0.9 oz), SpO2 91 %.    Physical Exam  Awake, alert and oriented x 1, says incorrect year and city, but much more conversive  Opens eyes spont  Pupils 3 mm rx bilat  Extraocular muscles intact bilaterally  Face symmetric bilaterally  Tongue midline  5/5 in all 4 ext  Dressing dry    Intake/Output:     Intake/Output Summary (Last 24 hours) at 5/27/2023 0825  Last data filed at 5/27/2023 0617  Gross per 24 hour   Intake 1550 ml   Output 1140 ml   Net 410 ml       Current Medications:   Current Facility-Administered Medications:   •  acetaminophen (TYLENOL) tablet 650 mg, 650 mg, Oral, Q6H PRN, Robbi Yang, APRN, 650 mg at 05/27/23 0051  •  dexamethasone (DECADRON) injection 4 mg, 4 mg, Intravenous, Q6H, Stephane Womack MD, 4 mg at 05/27/23 0617  •  dextrose (D50W) (25 g/50 mL) IV injection 25 g, 25 g, Intravenous, Q15 Min PRN, Navdeep Paz APRN  •  dextrose (D50W) (25 g/50 mL) IV injection 25 g, 25 g, Intravenous, Q15 Min PRN, Jose Maria Townsend MD  •  dextrose (GLUTOSE) oral gel 15 g, 15 g, Oral, Q15 Min PRN, Navdeep Paz, APRN  •  dextrose (GLUTOSE) oral gel 15 g, 15 g, Oral, Q15 Min PRN, Jose Maria Townsend MD  •  divalproex (DEPAKOTE ER) 24 hr tablet 250 mg, 250 mg, Oral, Nightly, Jose Maria Townsend MD, 250 mg at 05/26/23 2141  •  docusate sodium (COLACE) capsule 100 mg, 100 mg, Oral, BID PRN, Stephane Womack MD  •  DULoxetine (CYMBALTA) DR capsule 60 mg, 60 mg, Oral, Daily, Jose Maria Townsend MD  •  famotidine (PEPCID) injection 20 mg, 20 mg, Intravenous, Q12H, Stephane Womack MD, 20 mg at 05/26/23 2317  •  gabapentin (NEURONTIN) capsule 200 mg, 200 mg, Oral, BID, Jose Maria Townsend, " MD, 200 mg at 05/26/23 2156  •  glucagon (GLUCAGEN) injection 1 mg, 1 mg, Intramuscular, Q15 Min PRN, Navdeep Paz, APRN  •  glucagon (GLUCAGEN) injection 1 mg, 1 mg, Intramuscular, Q15 Min PRN, Jose Maria Townsend MD  •  HYDROcodone-acetaminophen (NORCO) 5-325 MG per tablet 1 tablet, 1 tablet, Oral, Q6H PRN, Robbi Yang, APRTHALIA, 1 tablet at 05/26/23 2208  •  HYDROmorphone (DILAUDID) injection 0.5 mg, 0.5 mg, Intravenous, Q2H PRN **AND** naloxone (NARCAN) injection 0.4 mg, 0.4 mg, Intravenous, Q5 Min PRN, Stephane Womack MD  •  Insulin Lispro (humaLOG) injection 2-7 Units, 2-7 Units, Subcutaneous, 4x Daily AC & at Bedtime, Jose Maria Townsend MD, 3 Units at 05/26/23 2141  •  levETIRAcetam in NaCl 0.75% (KEPPRA) IVPB 1,000 mg, 1,000 mg, Intravenous, Q12H, Kayla Toure APRN, Last Rate: 0 mL/hr at 05/26/23 1053, 1,000 mg at 05/26/23 2141  •  levothyroxine (SYNTHROID, LEVOTHROID) tablet 125 mcg, 125 mcg, Oral, Daily, Jose Maria Townsend MD  •  magnesium hydroxide (MILK OF MAGNESIA) 400 MG/5ML suspension 15 mL, 15 mL, Oral, Daily PRN, Stephane Womack MD  •  niCARdipine (CARDENE) 25mg in 250mL NS infusion, 5-15 mg/hr, Intravenous, Titrated, Kayla Toure APRTHALIA, Stopped at 05/26/23 2318  •  ondansetron (ZOFRAN) injection 4 mg, 4 mg, Intravenous, Q6H PRN **OR** ondansetron (ZOFRAN) tablet 4 mg, 4 mg, Oral, Q6H PRN, Stephane Womack MD  •  pantoprazole (PROTONIX) EC tablet 40 mg, 40 mg, Oral, Q AM, Robbi Yang, APRN, 40 mg at 05/27/23 0617  •  polyethylene glycol (MIRALAX) packet 17 g, 17 g, Oral, Daily PRN, Stephane Womack MD  •  rosuvastatin (CRESTOR) tablet 20 mg, 20 mg, Oral, Daily, Jose Maria Townsend MD  •  sennosides-docusate (PERICOLACE) 8.6-50 MG per tablet 1 tablet, 1 tablet, Oral, Nightly PRN, Stephane Womack MD  •  sodium chloride 0.9 % flush 10 mL, 10 mL, Intravenous, Q12H, Kayla Toure APRN, 10 mL at 05/26/23 0058  •  sodium chloride 0.9 % flush 10 mL, 10 mL,  Intravenous, PRN, Kayla Toure APRN  •  sodium chloride 0.9 % infusion 40 mL, 40 mL, Intravenous, PRN, Kayla Toure APRN  •  sodium chloride 0.9 % infusion, 100 mL/hr, Intravenous, Continuous, Chanell Rock PA-C, Last Rate: 100 mL/hr at 05/27/23 0510, 100 mL/hr at 05/27/23 0510     Laboratory Results:       Lab 05/27/23  0519 05/26/23  1349 05/26/23  1348 05/26/23  1008 05/26/23  0958 05/26/23  0950   WBC 13.78*  --   --  11.50*  --   --    HEMOGLOBIN 11.3*  --   --  14.6  --   --    HEMOGLOBIN, POC  --   --   --   --   --  15.3   HEMATOCRIT 35.4*  --   --  45.8  --   --    HEMATOCRIT POC  --   --   --   --   --  45   PLATELETS 200  --   --  193  --   --    NEUTROS ABS  --   --   --  8.68*  --   --    IMMATURE GRANS (ABS)  --   --   --  0.16*  --   --    LYMPHS ABS  --   --   --  1.63  --   --    MONOS ABS  --   --   --  0.71  --   --    EOS ABS  --   --   --  0.26  --   --    MCV 84.7  --   --  85.1  --   --    PROTIME  --  13.3 14.0 23.6* 35.9*  --          Lab 05/27/23  0519 05/26/23  1008 05/26/23  0950   SODIUM 140 139  --    POTASSIUM 4.2 4.4  --    CHLORIDE 103 101  --    CO2 24.0 24.0  --    ANION GAP 13.0 14.0  --    BUN 15 14  --    CREATININE 0.71* 0.72* 0.70   EGFR 113.9 113.4 114.4   GLUCOSE 234* 220*  --    CALCIUM 9.2 9.7  --    MAGNESIUM 1.8 2.2  --    PHOSPHORUS 4.0  --   --    HEMOGLOBIN A1C  --  8.90*  --    TSH  --  1.610  --          Lab 05/26/23  1008   TOTAL PROTEIN 8.4   ALBUMIN 4.7   GLOBULIN 3.7   ALT (SGPT) 14   AST (SGOT) 18   BILIRUBIN 0.6   ALK PHOS 86         Lab 05/26/23  1349 05/26/23  1348 05/26/23  1008 05/26/23  0958   PROTIME 13.3 14.0 23.6* 35.9*   INR 1.1 1.07 2.08* 3.2*         Lab 05/27/23  0519   CHOLESTEROL 181   LDL CHOL 107*   HDL CHOL 48   TRIGLYCERIDES 147         Lab 05/26/23  1125 05/26/23  1014   ABO TYPING A A   RH TYPING Positive Positive   ANTIBODY SCREEN  --  Negative         Brief Urine Lab Results     None        Microbiology Results (last  10 days)     ** No results found for the last 240 hours. **           Diagnostic Imaging: I reviewed and independently interpreted the new imaging.     Assessment/Plan:  This is a 47-year-old male status post bilateral craniotomies for evacuation of subacute subdural hematomas on 5/26.  Neurologically, the patient is much more awake and interactive.  His postoperative head CT shows good evacuation of hematomas with significant improvement in mass effect.  We will continue his Hemovac drains 1 more day.  Okay for patient to mobilize and get out of bed today.  Continue Keppra and Decadron.  We will hold off on DVT prophylaxis 1 more day.  Patient's glucoses are elevated.  Would prefer to have them around 150.  Defer to ICU for management of insulin.  Appreciate ICU assistance.    Any copied data from previous notes included in the (1) History of Present Illness, (2) Physical Examination and (3) Medical Decision Making and/or Assessment and Plan has been reviewed and is accurate as of 05/27/23      Stephane Womack MD  05/27/23  08:25 EDT

## 2023-05-27 NOTE — THERAPY EVALUATION
Patient Name: Manjeet Hall  : 1975    MRN: [id number]                              Today's Date: 2023       Admit Date: 2023    Visit Dx:     ICD-10-CM ICD-9-CM   1. Somnolence  R40.0 780.09   2. Subdural hematoma  S06.5XAA 432.1   3. Elevated INR  R79.1 790.92   4. H/O mechanical aortic valve replacement  Z95.2 V43.3   5. Hypertensive urgency  I16.0 401.9     Patient Active Problem List   Diagnosis   • Bilateral Subdural Hematoma   • HTN   • Hyperlipidemia (Diet controlled, off statin)   • Hypothyroidism   • Anxiety   • Hodgkin's Lymphoma s/p XRT & Chemo   • VSD s/p closure (age 14)   • History of aortic regurgitation / stenosis s/p ROSS procedure with subsequent reversal with Mechanical AVR   • H/O aortic mechanical valve replacement on Coumadin   • Prediabetes     Past Medical History:   Diagnosis Date   • Cancer    • Diabetes mellitus    • Elevated cholesterol    • Stroke      History reviewed. No pertinent surgical history.   General Information     Row Name 23 0950          OT Time and Intention    Document Type evaluation  -JR     Mode of Treatment occupational therapy  -     Row Name 23 0950          General Information    Patient Profile Reviewed yes  -JR     Prior Level of Function independent:;gait;transfer;bed mobility;ADL's  -JR     Existing Precautions/Restrictions fall;other (see comments)  B cranial drains, h/o CVA with residual aphasia, receptive aphasia  -JR     Barriers to Rehab medically complex;previous functional deficit  receptive aphasia  -     Row Name 23 0950          Living Environment    People in Home alone  SO says she has been staying more recently  -     Row Name 23 0950          Home Main Entrance    Number of Stairs, Main Entrance two  -     Row Name 2350          Stairs Within Home, Primary    Number of Stairs, Within Home, Primary --  has basement and upstairs, can stay on one level  -     Row Name 23 0935           Cognition    Orientation Status (Cognition) oriented to;person  Pt with difficulty stating place, able to state he was in [facility], able to answer yes/no with hospital. No further orientation.  -     Row Name 05/27/23 0950          Safety Issues, Functional Mobility    Safety Issues Affecting Function (Mobility) ability to follow commands;awareness of need for assistance;safety precaution awareness;safety precautions follow-through/compliance;insight into deficits/self-awareness  -     Impairments Affecting Function (Mobility) cognition;motor planning;visual/perceptual;coordination  -     Cognitive Impairments, Mobility Safety/Performance awareness, need for assistance;insight into deficits/self-awareness;problem-solving/reasoning;judgment;impulsivity;safety precaution awareness  -           User Key  (r) = Recorded By, (t) = Taken By, (c) = Cosigned By    Initials Name Provider Type    Katheryn Ross, OT Occupational Therapist                 Mobility/ADL's     Row Name 05/27/23 1010          Bed Mobility    Bed Mobility supine-sit  -     Supine-Sit [place] (Bed Mobility) standby assist  -     Assistive Device (Bed Mobility) head of bed elevated  -     Row Name 05/27/23 1010          Transfers    Transfers sit-stand transfer  -     Row Name 05/27/23 1010          Sit-Stand Transfer    Sit-Stand [place] (Transfers) contact guard  -     Row Name 05/27/23 1010          Functional Mobility    Functional Mobility- Ind. Level contact guard assist  -     Functional Mobility- Device other (see comments)  none  -     Functional Mobility-Distance (Feet) --  in hallway  -           User Key  (r) = Recorded By, (t) = Taken By, (c) = Cosigned By    Initials Name Provider Type    Katheryn Ross OT Occupational Therapist               Obj/Interventions     Row Name 05/27/23 1011          Sensory Assessment (Somatosensory)    Sensory Assessment (Somatosensory) sensation  intact  -JR     Row Name 05/27/23 1011          Vision Assessment/Intervention    Vision Assessment Comment Difficult to assess this date due to aphasia. Pt able to ID # of fingers, unable to track, unclear regarding diplopia, blurry vision  -JR     Row Name 05/27/23 1011          Range of Motion Comprehensive    General Range of Motion no range of motion deficits identified  -JR     Row Name 05/27/23 1011          Strength Comprehensive (MMT)    General Manual Muscle Testing (MMT) Assessment no strength deficits identified  -JR     Row Name 05/27/23 1011          Motor Skills    Motor Skills coordination;neuro-muscular function;muscle tone  -     Coordination WFL;finger to nose  Difficulty following commands with coordination testing this date  -     Muscle Tone WNL  -     Neuromuscular Function bilateral;upper extremity;minimal impairment;tremor, intention  -JR     Row Name 05/27/23 1011          Balance    Balance Assessment sitting static balance;standing dynamic balance  -JR     Static Sitting Balance independent  -     Dynamic Standing Balance contact guard  -           User Key  (r) = Recorded By, (t) = Taken By, (c) = Cosigned By    Initials Name Provider Type    JR Katheryn Henriquez, OT Occupational Therapist               Goals/Plan     Row Name 05/27/23 1018          Dressing Goal 1 (OT)    Activity/Device (Dressing Goal 1, OT) dressing skills, all  -JR     [place]/Cues Needed (Dressing Goal 1, OT) set-up required;verbal cues required  -JR     Time Frame (Dressing Goal 1, OT) long term goal (LTG);by discharge  -JR     Row Name 05/27/23 1018          Toileting Goal 1 (OT)    Activity/Device (Toileting Goal 1, OT) toileting skills, all  -JR     [place] Level/Cues Needed (Toileting Goal 1, OT) modified independence;verbal cues required  -     Time Frame (Toileting Goal 1, OT) long term goal (LTG);by discharge  -JR     Row Name 05/27/23 1018          Problem Specific Goal 1 (OT)     Problem Specific Goal 1 (OT) Pt to demo independence with [facility] HEP to support ADL independence.  -JR     Time Frame (Problem Specific Goal 1, OT) long term goal (LTG);by discharge  -JR     Progress/Outcome (Problem Specific Goal 1, OT) goal ongoing  -     Row Name 05/27/23 1018          Therapy Assessment/Plan (OT)    Planned Therapy Interventions (OT) activity tolerance training;adaptive equipment training;BADL retraining;functional balance retraining;occupation/activity based interventions;ROM/therapeutic exercise;strengthening exercise;cognitive/visual perception retraining;neuromuscular control/coordination retraining  -           User Key  (r) = Recorded By, (t) = Taken By, (c) = Cosigned By    Initials Name Provider Type    JR Katheryn Henriquez, OT Occupational Therapist               Clinical Impression     Row Name 05/27/23 1013          Pain Assessment    Pretreatment Pain Rating 0/10 - no pain  -JR     Posttreatment Pain Rating 0/10 - no pain  -     Row Name 05/27/23 1013          Plan of Care Review    Plan of Care Reviewed With patient;significant other  -     Outcome Evaluation OT initial eval and expanded chart review completed. Pt presents with multiple comorbidities affecting independence with ADL's and mobility from baseline. Recommend continued skilled OT services and f/u with OP as needed.  -     Row Name 05/27/23 1013          Therapy Assessment/Plan (OT)    Patient/Family Therapy Goal Statement (OT) go home  -     Rehab Potential (OT) good, to achieve stated therapy goals  -     Criteria for Skilled Therapeutic Interventions Met (OT) yes;meets criteria;skilled treatment is necessary  -     Therapy Frequency (OT) daily  -     Row Name 05/27/23 1013          Therapy Plan Review/Discharge Plan (OT)    Anticipated Discharge Disposition (OT) home with assist;home with outpatient therapy services  -     Row Name 05/27/23 1013          Vital Signs    Pre Systolic BP Rehab 122   -JR     Pre Treatment Diastolic BP 73  -JR     Post Systolic BP Rehab 132  -JR     Post Treatment Diastolic BP 81  -JR     Pretreatment Heart Rate (beats/min) 89  -JR     Posttreatment Heart Rate (beats/min) 90  -JR     Pre SpO2 (%) 94  -JR     O2 Delivery Pre Treatment room air  -JR     Post SpO2 (%) 97  -JR     O2 Delivery Post Treatment room air  -JR     Pre Patient Position Supine  -JR     Intra Patient Position Standing  -JR     Post Patient Position Sitting  -JR     Row Name 05/27/23 1013          Positioning and Restraints    Pre-Treatment Position in bed  -JR     Post Treatment Position chair  -JR     In Chair notified nsg;reclined;call light within reach;encouraged to call for assist;waffle cushion;exit alarm on;with family/caregiver  -JR           User Key  (r) = Recorded By, (t) = Taken By, (c) = Cosigned By    Initials Name Provider Type    Katheryn Ross OT Occupational Therapist               Outcome Measures     Row Name 05/27/23 1019          How much help from another is currently needed...    Putting on and taking off regular lower body clothing? 2  -JR     Bathing (including washing, rinsing, and drying) 2  -JR     Toileting (which includes using toilet bed pan or urinal) 2  -JR     Putting on and taking off regular upper body clothing 3  -JR     Taking care of personal grooming (such as brushing teeth) 3  -JR     Eating meals 4  -JR     AM-PAC 6 Clicks Score (OT) 16  -JR     Row Name 05/27/23 1019          Modified Mar Scale    Modified [place] Scale 3 - Moderate disability.  Requiring some help, but able to walk without assistance.  -     Row Name 05/27/23 1019          Functional Assessment    Outcome Measure Options AM-PAC 6 Clicks Daily Activity (OT);Modified [place]  -JR           User Key  (r) = Recorded By, (t) = Taken By, (c) = Cosigned By    Initials Name Provider Type    Katheryn Ross OT Occupational Therapist                Occupational Therapy Education     Title: PT  OT SLP Therapies (In Progress)     Topic: Occupational Therapy (In Progress)     Point: ADL training (In Progress)     Description:   Instruct learner(s) on proper safety adaptation and remediation techniques during self care or transfers.   Instruct in proper use of assistive devices.              Learning Progress Summary           Patient Acceptance, E, NR by  at 5/27/2023 0909    Comment: Educated pt regarding role of therapy   Significant Other Acceptance, E, NR by  at 5/27/2023 0909    Comment: Educated pt regarding role of therapy                   Point: Home exercise program (Not Started)     Description:   Instruct learner(s) on appropriate technique for monitoring, assisting and/or progressing therapeutic exercises/activities.              Learner Progress:  Not documented in this visit.          Point: Precautions (Not Started)     Description:   Instruct learner(s) on prescribed precautions during self-care and functional transfers.              Learner Progress:  Not documented in this visit.          Point: Body mechanics (Not Started)     Description:   Instruct learner(s) on proper positioning and spine alignment during self-care, functional mobility activities and/or exercises.              Learner Progress:  Not documented in this visit.                      User Key     Initials Effective Dates Name Provider Type Discipline     02/03/23 -  Katheryn Henriquez OT Occupational Therapist OT              OT Recommendation and Plan  Planned Therapy Interventions (OT): activity tolerance training, adaptive equipment training, BADL retraining, functional balance retraining, occupation/activity based interventions, ROM/therapeutic exercise, strengthening exercise, cognitive/visual perception retraining, neuromuscular control/coordination retraining  Therapy Frequency (OT): daily  Plan of Care Review  Plan of Care Reviewed With: patient, significant other  Outcome Evaluation: OT initial eval and  expanded chart review completed. Pt presents with multiple comorbidities affecting independence with ADL's and mobility from baseline. Recommend continued skilled OT services and f/u with OP as needed.     Time Calculation:    Time Calculation- OT     Row Name 05/27/23 1021             Time Calculation- OT    OT Start Time 0909  -JR      OT Received On 05/27/23  -      OT Goal Re-Cert Due Date 06/06/23  -         Timed Charges    30368 - OT Self Care/Mgmt Minutes 8  -JR         Untimed Charges    OT Eval/Re-eval Minutes 46  -JR         Total Minutes    Timed Charges Total Minutes 8  -JR      Untimed Charges Total Minutes 46  -JR       Total Minutes 54  -JR            User Key  (r) = Recorded By, (t) = Taken By, (c) = Cosigned By    Initials Name Provider Type     Katheryn Henriquez OT Occupational Therapist              Therapy Charges for Today     Code Description Service Date Service Provider Modifiers Qty    [id number]  OT SELF CARE/MGMT/TRAIN EA 15 MIN 5/27/2023 Katheryn Henriquez OT GO 1    [id number]  OT EVAL MOD COMPLEXITY 4 5/27/2023 Katheryn Henriquez OT GO 1               Katheryn Henriquez, OT  5/27/2023

## 2023-05-27 NOTE — THERAPY EVALUATION
Patient Name: Manjeet Hlal  : 1975    MRN: [id number]                              Today's Date: 2023       Admit Date: 2023    Visit Dx:     ICD-10-CM ICD-9-CM   1. Somnolence  R40.0 780.09   2. Subdural hematoma  S06.5XAA 432.1   3. Elevated INR  R79.1 790.92   4. H/O mechanical aortic valve replacement  Z95.2 V43.3   5. Hypertensive urgency  I16.0 401.9     Patient Active Problem List   Diagnosis   • Bilateral Subdural Hematoma   • HTN   • Hyperlipidemia (Diet controlled, off statin)   • Hypothyroidism   • Anxiety   • Hodgkin's Lymphoma s/p XRT & Chemo   • VSD s/p closure (age 14)   • History of aortic regurgitation / stenosis s/p ROSS procedure with subsequent reversal with Mechanical AVR   • H/O aortic mechanical valve replacement on Coumadin   • Prediabetes     Past Medical History:   Diagnosis Date   • Cancer    • Diabetes mellitus    • Elevated cholesterol    • Stroke      History reviewed. No pertinent surgical history.   General Information     Row Name 23 1013          Physical Therapy Time and Intention    Document Type evaluation  -CM     Mode of Treatment physical therapy  -CM     Row Name 23 1013          General Information    Patient Profile Reviewed yes  -CM     Prior Level of Function independent:;all household mobility;ADL's  ind no AD, baseline aphasia  -CM     Existing Precautions/Restrictions fall;other (see comments)  cranial hemovac x2, h/o CVA with residual aphasia, receptive aphasia  -CM     Barriers to Rehab medically complex;previous functional deficit  -CM     Row Name 23 1013          Living Environment    People in Home alone;other (see comments)  SO present stating she stays with him multiple days per week and he has a daughter that lives close  -CM     Row Name 23 1013          Home Main Entrance    Number of Stairs, Main Entrance two  -CM     Stair Railings, Main Entrance none  -CM     Row Name 23 1013          Stairs Within  Home, Primary    Stairs, Within Home, Primary 2 story home with basement, patient can stay on main level of home  -CM     Number of Stairs, Within Home, Primary twelve  -CM     Row Name 05/27/23 1013          Cognition    Orientation Status (Cognition) oriented to;person;disoriented to;place;situation;time;other (see comments)  states his name, that he is in [place], and chooses correctly yes/no that he is in the hospital, disoriented otherwise  -CM     Row Name 05/27/23 1013          Safety Issues, Functional Mobility    Safety Issues Affecting Function (Mobility) awareness of need for assistance;insight into deficits/self-awareness;safety precaution awareness;safety precautions follow-through/compliance  -CM     Impairments Affecting Function (Mobility) cognition;motor planning;visual/perceptual  -CM           User Key  (r) = Recorded By, (t) = Taken By, (c) = Cosigned By    Initials Name Provider Type    CM Belkys Wilks, PT Physical Therapist               Mobility     Row Name 05/27/23 1021          Bed Mobility    Bed Mobility supine-sit  -CM     Supine-Sit [place] (Bed Mobility) standby assist  -CM     Assistive Device (Bed Mobility) head of bed elevated  -CM     Comment, (Bed Mobility) patient performed without difficulty  -CM     Row Name 05/27/23 1021          Sit-Stand Transfer    Sit-Stand [place] (Transfers) contact guard  -CM     Comment, (Sit-Stand Transfer) no difficulty with no AD  -CM     Row Name 05/27/23 1021          Gait/Stairs (Locomotion)    [place] Level (Gait) contact guard  -CM     Distance in Feet (Gait) 300  -CM     Deviations/Abnormal Patterns (Gait) gait speed decreased  -CM     Bilateral Gait Deviations decreased arm swing  -CM     Comment, (Gait/Stairs) Patient ambulated in [place] unsupported with a step through gait pattern. He demonstrates adequate gait mechanics and is steady on his feet with no LOB.  -CM           User Key  (r) = Recorded By, (t) = Taken By,  (c) = Cosigned By    Initials Name Provider Type    Belkys Will PT Physical Therapist               Obj/Interventions     Row Name 05/27/23 1026          Range of Motion Comprehensive    General Range of Motion bilateral lower extremity ROM WFL  -CM     Row Name 05/27/23 1026          Strength Comprehensive (MMT)    General Manual Muscle Testing (MMT) Assessment no strength deficits identified  -CM     [facility] Name 05/27/23 1026          Motor Skills    Motor Skills coordination  -CM     Coordination WFL;heel to shin;bimanual skills  -CM     Row Name 05/27/23 1026          Balance    Balance Assessment sitting static balance;standing static balance;standing dynamic balance  -CM     Static Sitting Balance independent  -CM     Position, Sitting Balance unsupported;sitting edge of bed  -CM     Static Standing Balance supervision  -CM     Dynamic Standing Balance contact guard  -CM     Position/Device Used, Standing Balance unsupported  -CM     Comment, Balance no overt LOB  -CM     [facility] Name 05/27/23 1026          Sensory Assessment (Somatosensory)    Sensory Assessment (Somatosensory) LE sensation intact  -CM           User Key  (r) = Recorded By, (t) = Taken By, (c) = Cosigned By    Initials Name Provider Type    Belkys Will PT Physical Therapist               Goals/Plan     Row Name 05/27/23 1032          Gait Training Goal 1 (PT)    Activity/Assistive Device (Gait Training Goal 1, PT) gait (walking locomotion)  -CM     [place] Level (Gait Training Goal 1, PT) standby assist  -CM     Distance (Gait Training Goal 1, PT) 500'  -CM     Time Frame (Gait Training Goal 1, PT) long term goal (LTG);10 days  -CM     Progress/Outcome (Gait Training Goal 1, PT) new goal  -CM     Row Name 05/27/23 1032          Stairs Goal 1 (PT)    Activity/Assistive Device (Stairs Goal 1, PT) ascending stairs;descending stairs  -CM     [place] Level/Cues Needed (Stairs Goal 1, PT) contact guard required  -CM      Number of Stairs (Stairs Goal 1, PT) 2  -CM     Time Frame (Stairs Goal 1, PT) long term goal (LTG);10 days  -CM     Strategies/Barriers (Stairs Goal 1, PT) patient does have additional stairs in his home, may benefit from full flight if appropriate  -CM     Progress/Outcome (Stairs Goal 1, PT) new goal  -CM           User Key  (r) = Recorded By, (t) = Taken By, (c) = Cosigned By    Initials Name Provider Type    CM Belkys Wilks, PT Physical Therapist               Clinical Impression     Row Name 05/27/23 1027          Pain    Pretreatment Pain Rating 0/10 - no pain  -CM     Posttreatment Pain Rating 0/10 - no pain  -CM     Row Name 05/27/23 1027          Plan of Care Review    Plan of Care Reviewed With patient;significant other  -CM     Outcome Evaluation Patient presents with deficits primarily in speech and cognition. He completed mobility without difficulty ambulating 300' in [place] unsupported with CGA. Appears to be close to his baseline regarding mobility. Will follow up for stair training and recommend he discharge home with assistance, he may need 24/7 assist pending improvements in cognition.  -CM     Row Name 05/27/23 1027          Therapy Assessment/Plan (PT)    Rehab Potential (PT) good, to achieve stated therapy goals  -CM     Criteria for Skilled Interventions Met (PT) yes;meets criteria  -CM     Therapy Frequency (PT) daily  -CM     Row Name 05/27/23 1027          Vital Signs    Pre Systolic BP Rehab 154  -CM     Pre Treatment Diastolic BP 78  -CM     Post Systolic BP Rehab 132  -CM     Post Treatment Diastolic BP 81  -CM     Pretreatment Heart Rate (beats/min) 88  -CM     Posttreatment Heart Rate (beats/min) 90  -CM     Pre SpO2 (%) 94  -CM     O2 Delivery Pre Treatment room air  -CM     O2 Delivery Intra Treatment room air  -CM     Post SpO2 (%) 99  -CM     O2 Delivery Post Treatment room air  -CM     Pre Patient Position Supine  -CM     Intra Patient Position Standing  -CM     Post Patient  Position Sitting  -CM     Row Name 05/27/23 1027          Positioning and Restraints    Pre-Treatment Position in bed  -CM     Post Treatment Position chair  -CM     In Chair reclined;call light within reach;encouraged to call for assist;exit alarm on;with family/caregiver;waffle cushion;notified nsg  -CM           User Key  (r) = Recorded By, (t) = Taken By, (c) = Cosigned By    Initials Name Provider Type    Belkys Will, GERARDO Physical Therapist               Outcome Measures     Row Name 05/27/23 1035          How much help from another person do you currently need...    Turning from your back to your side while in flat bed without using bedrails? 4  -CM     Moving from lying on back to sitting on the side of a flat bed without bedrails? 4  -CM     Moving to and from a bed to a chair (including a wheelchair)? 3  -CM     Standing up from a chair using your arms (e.g., wheelchair, bedside chair)? 3  -CM     Climbing 3-5 steps with a railing? 3  -CM     To walk in hospital room? 3  -CM     AM-PAC 6 Clicks Score (PT) 20  -CM     Highest level of mobility 6 --> Walked 10 steps or more  -CM     Row Name 05/27/23 1035 05/27/23 1019       Modified Pepin Scale    Pre-Stroke Modified Pepin Scale 6 - Unable to determine (UTD) from the medical record documentation  -CM --    Modified [place] Scale 3 - Moderate disability.  Requiring some help, but able to walk without assistance.  -CM 3 - Moderate disability.  Requiring some help, but able to walk without assistance.  -    Row Name 05/27/23 1035 05/27/23 1019       Functional Assessment    Outcome Measure Options AM-PAC 6 Clicks Basic Mobility (PT);Modified Pepin  -CM AM-PAC 6 Clicks Daily Activity (OT);Modified [place]in  -JR          User Key  (r) = Recorded By, (t) = Taken By, (c) = Cosigned By    Initials Name Provider Type    Katheryn Ross, OT Occupational Therapist    Belkys Will PT Physical Therapist                             Physical  Therapy Education     Title: PT OT SLP Therapies (In Progress)     Topic: Physical Therapy (In Progress)     Point: Mobility training (Done)     Learning Progress Summary           Patient Acceptance, E, VU by CM at 5/27/2023 1035   Significant Other Acceptance, E, VU by CM at 5/27/2023 1035                   Point: Home exercise program (Not Started)     Learner Progress:  Not documented in this visit.          Point: Body mechanics (Done)     Learning Progress Summary           Patient Acceptance, E, VU by CM at 5/27/2023 1035   Significant Other Acceptance, E, VU by CM at 5/27/2023 1035                   Point: Precautions (Done)     Learning Progress Summary           Patient Acceptance, E, VU by CM at 5/27/2023 1035   Significant Other Acceptance, E, VU by CM at 5/27/2023 1035                               User Key     Initials Effective Dates Name Provider Type Discipline    CM 09/22/22 -  Belkys Wilks PT Physical Therapist PT              PT Recommendation and Plan     Plan of Care Reviewed With: patient, significant other  Outcome Evaluation: Patient presents with deficits primarily in speech and cognition. He completed mobility without difficulty ambulating 300' in [place] unsupported with CGA. Appears to be close to his baseline regarding mobility. Will follow up for stair training and recommend he discharge home with assistance, he may need 24/7 assist pending improvements in cognition.     Time Calculation:    PT Charges     Row Name 05/27/23 1036             Time Calculation    Start Time 0906  -CM      PT Received On 05/27/23  -CM      PT Goal Re-Cert Due Date 06/06/23  -CM         Untimed Charges    PT Eval/Re-eval Minutes 55  -CM         Total Minutes    Untimed Charges Total Minutes 55  -CM       Total Minutes 55  -CM            User Key  (r) = Recorded By, (t) = Taken By, (c) = Cosigned By    Initials Name Provider Type    Belkys Will, PT Physical Therapist              Therapy  Charges for Today     Code Description Service Date Service Provider Modifiers Qty    27500719204 HC PT EVAL MOD COMPLEXITY 4 5/27/2023 Belkys Wilks, PT GP 1          PT G-Codes  Outcome Measure Options: AM-PAC 6 Clicks Basic Mobility (PT), Modified Mar  AM-PAC 6 Clicks Score (PT): 20  AM-PAC 6 Clicks Score (OT): 16  Modified Hamilton Scale: 3 - Moderate disability.  Requiring some help, but able to walk without assistance.  PT Discharge Summary  Anticipated Discharge Disposition (PT): home with assist, other (see comments) (may need 24/7 observation pending cognition)    Belkys Wilks, PT  5/27/2023

## 2023-05-27 NOTE — SIGNIFICANT NOTE
Patient seen in ICU upon return from OR where Dr. Womack performed bilateral craniotomy for SDH evacuation.  .      On exam patient c/o severe head pain, but otherwise w/o complaint.  Conversant, MORGAN =, PERTL, follows commands, no numbness/tingling/focal weakness.  Mild photophobia  Lungs BCTA  CV:  RRR, s1s2  Abdomen: soft, NT, ND  Skin:  R radial arterial line drsg c/d/i, head drsg c/d/i w/ bilateral drains w/ sanguinous output.    Will start w/ oral options for pain control as has passed dysphagia.

## 2023-05-27 NOTE — PLAN OF CARE
Goal Outcome Evaluation:  Plan of Care Reviewed With: patient, significant other           Outcome Evaluation: OT initial eval and expanded chart review completed. Pt presents with multiple comorbidities affecting independence with ADL's and mobility from baseline. Recommend continued skilled OT services and f/u with OP as needed.

## 2023-05-27 NOTE — THERAPY EVALUATION
Acute Care - Speech Language Pathology Initial Evaluation  [facility]  Cognitive-Communication Evaluation  & Clinical Swallow Evaluation     Patient Name: Manjeet Hall  : 1975  MRN: [id number]  Today's Date: 2023               Admit Date: 2023     Visit Dx:    ICD-10-CM ICD-9-CM   1. Somnolence  R40.0 780.09   2. Subdural hematoma  S06.5XAA 432.1   3. Elevated INR  R79.1 790.92   4. H/O mechanical aortic valve replacement  Z95.2 V43.3   5. Hypertensive urgency  I16.0 401.9   6. Dysphagia, unspecified type  R13.10 787.20   7. Aphasia  R47.01 784.3     Patient Active Problem List   Diagnosis    Bilateral Subdural Hematoma    HTN    Hyperlipidemia (Diet controlled, off statin)    Hypothyroidism    Anxiety    Hodgkin's Lymphoma s/p XRT & Chemo    VSD s/p closure (age 14)    History of aortic regurgitation / stenosis s/p ROSS procedure with subsequent reversal with Mechanical AVR    H/O aortic mechanical valve replacement on Coumadin    Prediabetes     Past Medical History:   Diagnosis Date    Cancer     Diabetes mellitus     Elevated cholesterol     Stroke      History reviewed. No pertinent surgical history.    SLP Recommendation and Plan  SLP Diagnosis: moderate, aphasia (23 1000)  SLP Diagnosis Comments: Significantly affecting all domains of language, though most impaired was auditory comprehension & least impaired was reading comprehension. (23 1000)        Swallow Criteria for Skilled Therapeutic Interventions Met: demonstrates skilled criteria (23 1030)  SLC Criteria for Skilled Therapy Interventions Met: yes (23 1000)  Anticipated Discharge Disposition (SLP): anticipate therapy at next level of care, inpatient rehabilitation facility (23 1030)     Therapy Frequency (Swallow): PRN (23 1030)  Predicted Duration Therapy Intervention (Days): until discharge (23 1030)              Communication Strategy Suggestions: avoid open-ended questions,  avoid multi-step instructions, other (see comments) (may try supplementing verbal instructions/questions w/ single word/short phrase written instructions/questions for improved comprehension) (05/27/23 1000)           Plan of Care Reviewed With: patient, significant other (05/27/23 1119)  Progress: no change (05/27/23 1119)      SLP EVALUATION (last 72 hours)       SLP SLC Evaluation       Row Name 05/27/23 1000                   Communication Assessment/Intervention    Document Type evaluation  -AC        Subjective Information no complaints  -AC        Patient Observations alert;cooperative  -AC        Patient/Family/Caregiver Comments/Observations SO present.  -AC        Patient Effort good  -AC           General Information    Patient Profile Reviewed yes  -AC        Pertinent History Of Current Problem B SDH s/p B crani/evacuation. Hx s/p AVR w/ post-op CVA 5 yrs ago w/ mild residual aphasia (reading/writing language skills stronger than verbal/auditory language prior), Hodgkin's lymphoma s/p CXRT, VSD s/p closure as teen.  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  -AC        Family Goals for Discharge family did not state  -AC           Pain Scale: Numbers Pre/Post-Treatment    Pretreatment Pain Rating 0/10 - no pain  -AC        Posttreatment Pain Rating 0/10 - no pain  -AC           Comprehension Assessment/Intervention    Comprehension Assessment/Intervention Auditory Comprehension;Reading Comprehension  -AC           Auditory  Comprehension Assessment/Intervention    Auditory Comprehension (Communication) moderate impairment;severe impairment  -AC        Answers Questions (Communication) moderate impairment;severe impairment;simple;yes/no;wh questions  -AC        Able to Follow Commands (Communication) moderate impairment;severe impairment;1-step  -AC        Successful Auditory Strategies (Communication) visual cues  -AC           Reading Comprehension Assessment/Intervention    Reading Comprehension (Communication) mild impairment;moderate impairment  -AC        Single Word Level WFL  -AC        Phrase Level mild impairment;moderate impairment  -AC           Expression Assessment/Intervention    Expression Assessment/Intervention verbal expression;graphic expression  -AC           Verbal Expression Assessment/Intervention    Verbal Expression moderate impairment  -AC        Automatic Speech (Communication) mild impairment;moderate impairment;response to greeting;counting 1-20  -AC        Repetition mild impairment;moderate impairment;words  -AC        Phrase Completion moderate impairment;severe impairment;automatic/predictable  -AC        Confrontational Naming moderate impairment;high frequency;delayed responses;phonemic paraphasias;perseverations  -        Spontaneous/Functional Words moderate impairment;simple;semantic paraphasias;perseverations  -AC           Graphic Expression Assessment/Intervention    Graphic Expression moderate impairment;dominant hand  -        Graphic Expression to Dictation WFL;words  -        Biographical Information WFL;name  -AC        Sentence Formulation moderate impairment;simple  -AC           Motor Speech Assessment/Intervention    Motor Speech Function other (see comments)  possible apraxia of speech--further assessment needed; no concerns for dysarthria @ this point  -           Cognitive Assessment Intervention- SLP    Cognitive Function (Cognition) unable/difficult to assess;other (see  comments)  2' aphasia  -AC           SLP Evaluation Clinical Impressions    SLP Diagnosis moderate;aphasia  -AC        SLP Diagnosis Comments Significantly affecting all domains of language, though most impaired was auditory comprehension & least impaired was reading comprehension.  -AC        Rehab Potential/Prognosis good  -AC        SLC Criteria for Skilled Therapy Interventions Met yes  -AC        Functional Impact difficulty communicating wants, needs;difficulty communicating in an emergency  -AC           Recommendations    Therapy Frequency (SLP SLC) 5 days per week  -AC        Predicted Duration Therapy Intervention (Days) until discharge  -AC        Anticipated Discharge Disposition (SLP) anticipate therapy at next level of care;inpatient rehabilitation facility  -AC        Communication Strategy Suggestions avoid open-ended questions;avoid multi-step instructions;other (see comments)  may try supplementing verbal instructions/questions w/ single word/short phrase written instructions/questions for improved comprehension  -AC                  User Key  (r) = Recorded By, (t) = Taken By, (c) = Cosigned By      Initials Name Effective Dates    AC Niesha Bee, MS [facility]-SLP 02/03/23 -                        EDUCATION  The patient has been educated in the following areas:     Communication Impairment.           SLP GOALS       Row Name 05/27/23 1030             (LTG) Patient will demonstrate functional swallow for    Diet Texture (Demonstrate functional swallow) regular textures  -AC      Liquid viscosity (Demonstrate functional swallow) thin liquids  -AC      [place] (Demonstrate functional swallow) independently (over 90% accuracy)  -AC      Time Frame (Demonstrate functional swallow) by discharge  -AC         (STG) Patient will tolerate trials of    Consistencies Trialed (Tolerate trials) regular textures;thin liquids  -AC      Desired Outcome (Tolerate trials) without signs/symptoms of aspiration;with  adequate oral prep/transit/clearance  -AC      [place] (Tolerate trials) independently (over 90% accuracy)  -AC      Time Frame (Tolerate trials) by discharge  -AC         Patient will demonstrate functional language skills for return to discharge environment     [place] with minimal cues  -AC      Time frame by discharge  -AC         Comprehend Questions Goal 1 (SLP)    Improve Ability to Comprehend Questions Goal 1 (SLP) simple yes/no questions;70%;with minimal cues (75-90%)  -AC      Time Frame (Comprehend Questions Goal 1, SLP) short term goal (STG)  -AC         Follow Directions Goal 2 (SLP)    Improve Ability to Follow Directions Goal 1 (SLP) 1 step direction without objects;70%;with minimal cues (75-90%)  -AC      Time Frame (Follow Directions Goal 1, SLP) short term goal (STG)  -AC         Comprehension at Phrase and Sentence Level Goal 1 (SLP)    Improve Reading Comprehension at Phrase and Sentence Level Goal 1 (SLP) answer simple written y/n questions;follow simple written directions;80%;with minimal cues (75-90%)  -AC      Time Frame (Reading Comprehension at Phrase and Sentence Level Goal 1, SLP) short term goal (STG)  -AC         Word Retrieval Skills Goal 1 (SLP)    Improve Word Retrieval Skills By Goal 1 (SLP) completing functional word finding tasks;repeating words;confrontational naming task;high frequency;80%;with minimal cues (75-90%)  -AC      Time Frame (Word Retrieval Goal 1, SLP) short term goal (STG)  -AC                User Key  (r) = Recorded By, (t) = Taken By, (c) = Cosigned By      Initials Name Provider Type    AC Niesha Bee MS CCC-SLP Speech and Language Pathologist                            Time Calculation:      Time Calculation- SLP       Row Name 05/27/23 1119             Time Calculation- SLP    SLP Start Time 1000  -      SLP Received On 05/27/23  -         Untimed Charges    78206-YY Eval Speech and Production w/ Language Minutes 40  -AC      60452-EC Eval  Oral Pharyng Swallow Minutes 30  -AC         Total Minutes    Untimed Charges Total Minutes 70  -AC       Total Minutes 70  -AC                User Key  (r) = Recorded By, (t) = Taken By, (c) = Cosigned By      Initials Name Provider Type    Niesha Garcia MS CCC-SLP Speech and Language Pathologist                    Therapy Charges for Today       Code Description Service Date Service Provider Modifiers Qty    94163293859 HC ST EVAL SPEECH AND PROD W LANG  3 2023 Niesha Bee MS CCC-SLP GN 1    [id number] HC ST EVAL ORAL PHARYNG SWALLOW 2 2023 Rohti Niesha S, MS CCC-SLP GN 1                       Niesha Bee MS CCC-SLP  2023   and Acute Care - Speech Language Pathology   Swallow Initial Evaluation [facility]     Patient Name: Manjeet Hall  : 1975  MRN: [id number]  Today's Date: 2023               Admit Date: 2023    Visit Dx:     ICD-10-CM ICD-9-CM   1. Somnolence  R40.0 780.09   2. Subdural hematoma  S06.5XAA 432.1   3. Elevated INR  R79.1 790.92   4. H/O mechanical aortic valve replacement  Z95.2 V43.3   5. Hypertensive urgency  I16.0 401.9   6. Dysphagia, unspecified type  R13.10 787.20   7. Aphasia  R47.01 784.3     Patient Active Problem List   Diagnosis    Bilateral Subdural Hematoma    HTN    Hyperlipidemia (Diet controlled, off statin)    Hypothyroidism    Anxiety    Hodgkin's Lymphoma s/p XRT & Chemo    VSD s/p closure (age 14)    History of aortic regurgitation / stenosis s/p ROSS procedure with subsequent reversal with Mechanical AVR    H/O aortic mechanical valve replacement on Coumadin    Prediabetes     Past Medical History:   Diagnosis Date    Cancer     Diabetes mellitus     Elevated cholesterol     Stroke      History reviewed. No pertinent surgical history.    SLP Recommendation and Plan  SLP Swallowing Diagnosis: R/O pharyngeal dysphagia (23 1030)  SLP Diet Recommendation: regular textures, thin liquids (23 1030)  Recommended  Precautions and Strategies: upright posture during/after eating, general aspiration precautions (05/27/23 1030)  SLP Rec. for Method of Medication Administration: meds whole, with thin liquids, with puree, as tolerated (05/27/23 1030)     Monitor for Signs of Aspiration: yes, notify SLP if any concerns (05/27/23 1030)     Swallow Criteria for Skilled Therapeutic Interventions Met: demonstrates skilled criteria (05/27/23 1030)  Anticipated Discharge Disposition (SLP): anticipate therapy at next level of care, inpatient rehabilitation facility (05/27/23 1030)  Rehab Potential/Prognosis, Swallowing: good, to achieve stated therapy goals (05/27/23 1030)  Therapy Frequency (Swallow): PRN (05/27/23 1030)  Predicted Duration Therapy Intervention (Days): until discharge (05/27/23 1030)                     Communication Strategy Suggestions: avoid open-ended questions, avoid multi-step instructions, other (see comments) (may try supplementing verbal instructions/questions w/ single word/short phrase written instructions/questions for improved comprehension) (05/27/23 1000)                  Plan of Care Reviewed With: patient, significant other  Progress: no change      SWALLOW EVALUATION (last 72 hours)       SLP Adult Swallow Evaluation       Row Name 05/27/23 1030                   Rehab Evaluation    Document Type evaluation  -AC        Patient Effort good  -AC           General Information    Patient Profile Reviewed yes  -AC        Current Method of Nutrition NPO  -AC        Prior Level of Function-Swallowing no diet consistency restrictions  -AC        Plans/Goals Discussed with patient;spouse/S.O.;agreed upon  -AC        Barriers to Rehab none identified  -AC        Patient's Goals for Discharge patient did not state  -AC        Family Goals for Discharge family did not state  -AC           Pain Scale: Numbers Pre/Post-Treatment    Pretreatment Pain Rating 0/10 - no pain  -AC        Posttreatment Pain Rating 0/10 - no  pain  -           Oral Motor Structure and Function    Dentition Assessment natural, present and adequate  -        Secretion Management WNL/WFL  -AC        Volitional Cough WFL  -AC           Oral Musculature and Cranial Nerve Assessment    Oral Motor General Assessment oral labial or buccal impairment  -        Oral Labial or Buccal Impairment, Detail, Cranial Nerve VII (Facial): left labial droop;other (see comments)  at rest  -           General Eating/Swallowing Observations    Eating/Swallowing Skills fed by SLP;self-fed;appropriate self-feeding skills observed  -        Positioning During Eating upright 90 degree;upright in chair  -        Utensils Used spoon;cup;straw  -        Consistencies Trialed thin liquids;pureed;regular textures  -           Clinical Swallow Eval    Oral Prep Phase WFL  -AC        Oral Transit WFL  -AC        Oral Residue WFL  -        Clinical Swallow Evaluation Summary Oral prep/transit and general feeding rate was somewhat slowed, but appeared adequate. No overt clinical s/sxs aspiration w/ any consistency until noted delayed throat clearing following 3oz H2O test (did not appear to be r/t swallowing). Passed repeat 3oz H2O test w/o concerns.  -           SLP Evaluation Clinical Impression    SLP Swallowing Diagnosis R/O pharyngeal dysphagia  -        Functional Impact risk of aspiration/pneumonia  -        Rehab Potential/Prognosis, Swallowing good, to achieve stated therapy goals  -        Swallow Criteria for Skilled Therapeutic Interventions Met demonstrates skilled criteria  -           Recommendations    Therapy Frequency (Swallow) PRN  -        Predicted Duration Therapy Intervention (Days) until discharge  -        SLP Diet Recommendation regular textures;thin liquids  -        Recommended Precautions and Strategies upright posture during/after eating;general aspiration precautions  -        Oral Care Recommendations Oral Care BID/PRN   -AC        SLP Rec. for Method of Medication Administration meds whole;with thin liquids;with puree;as tolerated  -AC        Monitor for Signs of Aspiration yes;notify SLP if any concerns  -AC        Anticipated Discharge Disposition (SLP) anticipate therapy at next level of care;inpatient rehabilitation facility  -AC                  User Key  (r) = Recorded By, (t) = Taken By, (c) = Cosigned By      Initials Name Effective Dates     Niesha Bee, MS [facility]-SLP 02/03/23 -                     EDUCATION  The patient has been educated in the following areas:   Dysphagia (Swallowing Impairment).        SLP GOALS       Row Name 05/27/23 1030             (LTG) Patient will demonstrate functional swallow for    Diet Texture (Demonstrate functional swallow) regular textures  -AC      Liquid viscosity (Demonstrate functional swallow) thin liquids  -AC      [place] (Demonstrate functional swallow) independently (over 90% accuracy)  -AC      Time Frame (Demonstrate functional swallow) by discharge  -AC         (STG) Patient will tolerate trials of    Consistencies Trialed (Tolerate trials) regular textures;thin liquids  -AC      Desired Outcome (Tolerate trials) without signs/symptoms of aspiration;with adequate oral prep/transit/clearance  -AC      [place] (Tolerate trials) independently (over 90% accuracy)  -AC      Time Frame (Tolerate trials) by discharge  -AC         Patient will demonstrate functional language skills for return to discharge environment     [place] with minimal cues  -AC      Time frame by discharge  -AC         Comprehend Questions Goal 1 (SLP)    Improve Ability to Comprehend Questions Goal 1 (SLP) simple yes/no questions;70%;with minimal cues (75-90%)  -AC      Time Frame (Comprehend Questions Goal 1, SLP) short term goal (STG)  -AC         Follow Directions Goal 2 (SLP)    Improve Ability to Follow Directions Goal 1 (SLP) 1 step direction without objects;70%;with minimal cues (75-90%)   -AC      Time Frame (Follow Directions Goal 1, SLP) short term goal (STG)  -AC         Comprehension at Phrase and Sentence Level Goal 1 (SLP)    Improve Reading Comprehension at Phrase and Sentence Level Goal 1 (SLP) answer simple written y/n questions;follow simple written directions;80%;with minimal cues (75-90%)  -AC      Time Frame (Reading Comprehension at Phrase and Sentence Level Goal 1, SLP) short term goal (STG)  -AC         Word Retrieval Skills Goal 1 (SLP)    Improve Word Retrieval Skills By Goal 1 (SLP) completing functional word finding tasks;repeating words;confrontational naming task;high frequency;80%;with minimal cues (75-90%)  -AC      Time Frame (Word Retrieval Goal 1, SLP) short term goal (STG)  -AC                User Key  (r) = Recorded By, (t) = Taken By, (c) = Cosigned By      Initials Name Provider Type    Niesha Garcia MS CCC-SLP Speech and Language Pathologist                       Time Calculation:    Time Calculation- SLP       Row Name 05/27/23 1119             Time Calculation- SLP    SLP Start Time 1000  -AC      SLP Received On 05/27/23  -AC         Untimed Charges    96414-DT Eval Speech and Production w/ Language Minutes 40  -AC      06445-LM Eval Oral Pharyng Swallow Minutes 30  -AC         Total Minutes    Untimed Charges Total Minutes 70  -AC       Total Minutes 70  -AC                User Key  (r) = Recorded By, (t) = Taken By, (c) = Cosigned By      Initials Name Provider Type    Niesha Garcia MS CCC-SLP Speech and Language Pathologist                    Therapy Charges for Today       Code Description Service Date Service Provider Modifiers Qty    13564199871 HC ST EVAL SPEECH AND PROD W LANG  3 5/27/2023 Niesha Bee MS CCC-SLP GN 1    [id number] HC ST EVAL ORAL PHARYNG SWALLOW 2 5/27/2023 Niesha Bee MS CCC-OSCAR GN 1                 Niesha Bee MS CCC-OSCAR  5/27/2023

## 2023-05-28 PROBLEM — R40.0 SOMNOLENCE: Status: ACTIVE | Noted: 2023-05-28

## 2023-05-28 LAB
GLUCOSE BLDC GLUCOMTR-MCNC: 246 MG/DL (ref 70–130)
GLUCOSE BLDC GLUCOMTR-MCNC: 246 MG/DL (ref 70–130)
GLUCOSE BLDC GLUCOMTR-MCNC: 250 MG/DL (ref 70–130)
GLUCOSE BLDC GLUCOMTR-MCNC: 250 MG/DL (ref 70–130)
GLUCOSE BLDC GLUCOMTR-MCNC: 280 MG/DL (ref 70–130)
GLUCOSE BLDC GLUCOMTR-MCNC: 280 MG/DL (ref 70–130)
GLUCOSE BLDC GLUCOMTR-MCNC: 288 MG/DL (ref 70–130)
GLUCOSE BLDC GLUCOMTR-MCNC: 288 MG/DL (ref 70–130)

## 2023-05-28 PROCEDURE — 63710000001 INSULIN DETEMIR PER 5 UNITS: Performed by: INTERNAL MEDICINE

## 2023-05-28 PROCEDURE — 25010000002 DEXAMETHASONE PER 1 MG: Performed by: STUDENT IN AN ORGANIZED HEALTH CARE EDUCATION/TRAINING PROGRAM

## 2023-05-28 PROCEDURE — 25010000002 ENOXAPARIN PER 10 MG: Performed by: STUDENT IN AN ORGANIZED HEALTH CARE EDUCATION/TRAINING PROGRAM

## 2023-05-28 PROCEDURE — 63710000001 INSULIN REGULAR HUMAN PER 5 UNITS: Performed by: INTERNAL MEDICINE

## 2023-05-28 PROCEDURE — 63710000001 INSULIN LISPRO (HUMAN) PER 5 UNITS: Performed by: INTERNAL MEDICINE

## 2023-05-28 PROCEDURE — 82948 REAGENT STRIP/BLOOD GLUCOSE: CPT

## 2023-05-28 PROCEDURE — 25010000002 LEVETIRACETAM IN NACL 0.82% 500 MG/100ML SOLUTION: Performed by: STUDENT IN AN ORGANIZED HEALTH CARE EDUCATION/TRAINING PROGRAM

## 2023-05-28 PROCEDURE — 99232 SBSQ HOSP IP/OBS MODERATE 35: CPT | Performed by: INTERNAL MEDICINE

## 2023-05-28 RX ORDER — ENOXAPARIN SODIUM 100 MG/ML
40 INJECTION SUBCUTANEOUS EVERY 24 HOURS
Status: DISCONTINUED | OUTPATIENT
Start: 2023-05-28 | End: 2023-05-30

## 2023-05-28 RX ORDER — INSULIN LISPRO 100 [IU]/ML
3-14 INJECTION, SOLUTION INTRAVENOUS; SUBCUTANEOUS
Status: DISCONTINUED | OUTPATIENT
Start: 2023-05-28 | End: 2023-06-05 | Stop reason: HOSPADM

## 2023-05-28 RX ORDER — INSULIN LISPRO 100 [IU]/ML
4 INJECTION, SOLUTION INTRAVENOUS; SUBCUTANEOUS
Status: DISCONTINUED | OUTPATIENT
Start: 2023-05-28 | End: 2023-06-05 | Stop reason: HOSPADM

## 2023-05-28 RX ADMIN — SODIUM CHLORIDE 100 ML/HR: 9 INJECTION, SOLUTION INTRAVENOUS at 00:25

## 2023-05-28 RX ADMIN — Medication 10 ML: at 21:25

## 2023-05-28 RX ADMIN — HYDROCODONE BITARTRATE AND ACETAMINOPHEN 1 TABLET: 5; 325 TABLET ORAL at 14:37

## 2023-05-28 RX ADMIN — INSULIN LISPRO 8 UNITS: 100 INJECTION, SOLUTION INTRAVENOUS; SUBCUTANEOUS at 21:24

## 2023-05-28 RX ADMIN — ENOXAPARIN SODIUM 40 MG: 40 INJECTION SUBCUTANEOUS at 10:41

## 2023-05-28 RX ADMIN — Medication 12.5 MG: at 10:37

## 2023-05-28 RX ADMIN — GABAPENTIN 200 MG: 100 CAPSULE ORAL at 08:44

## 2023-05-28 RX ADMIN — DULOXETINE 60 MG: 60 CAPSULE, DELAYED RELEASE ORAL at 08:44

## 2023-05-28 RX ADMIN — DEXAMETHASONE SODIUM PHOSPHATE 4 MG: 4 INJECTION INTRA-ARTICULAR; INTRALESIONAL; INTRAMUSCULAR; INTRAVENOUS; SOFT TISSUE at 00:25

## 2023-05-28 RX ADMIN — LEVETIRACETAM 500 MG: 5 INJECTION INTRAVASCULAR at 08:44

## 2023-05-28 RX ADMIN — INSULIN LISPRO 4 UNITS: 100 INJECTION, SOLUTION INTRAVENOUS; SUBCUTANEOUS at 11:45

## 2023-05-28 RX ADMIN — FAMOTIDINE 20 MG: 10 INJECTION INTRAVENOUS at 08:44

## 2023-05-28 RX ADMIN — GABAPENTIN 200 MG: 100 CAPSULE ORAL at 21:23

## 2023-05-28 RX ADMIN — HYDROCODONE BITARTRATE AND ACETAMINOPHEN 1 TABLET: 5; 325 TABLET ORAL at 21:23

## 2023-05-28 RX ADMIN — INSULIN DETEMIR 10 UNITS: 100 INJECTION, SOLUTION SUBCUTANEOUS at 21:24

## 2023-05-28 RX ADMIN — DEXAMETHASONE SODIUM PHOSPHATE 4 MG: 4 INJECTION INTRA-ARTICULAR; INTRALESIONAL; INTRAMUSCULAR; INTRAVENOUS; SOFT TISSUE at 06:33

## 2023-05-28 RX ADMIN — INSULIN HUMAN 8 UNITS: 100 INJECTION, SOLUTION PARENTERAL at 00:25

## 2023-05-28 RX ADMIN — Medication 12.5 MG: at 21:24

## 2023-05-28 RX ADMIN — Medication 10 ML: at 08:44

## 2023-05-28 RX ADMIN — HYDROCODONE BITARTRATE AND ACETAMINOPHEN 1 TABLET: 5; 325 TABLET ORAL at 07:23

## 2023-05-28 RX ADMIN — DIVALPROEX SODIUM 250 MG: 250 TABLET, EXTENDED RELEASE ORAL at 21:24

## 2023-05-28 RX ADMIN — INSULIN LISPRO 8 UNITS: 100 INJECTION, SOLUTION INTRAVENOUS; SUBCUTANEOUS at 18:23

## 2023-05-28 RX ADMIN — ROSUVASTATIN 20 MG: 20 TABLET, FILM COATED ORAL at 08:44

## 2023-05-28 RX ADMIN — INSULIN LISPRO 8 UNITS: 100 INJECTION, SOLUTION INTRAVENOUS; SUBCUTANEOUS at 11:44

## 2023-05-28 RX ADMIN — INSULIN LISPRO 4 UNITS: 100 INJECTION, SOLUTION INTRAVENOUS; SUBCUTANEOUS at 18:23

## 2023-05-28 RX ADMIN — INSULIN HUMAN 6 UNITS: 100 INJECTION, SOLUTION PARENTERAL at 06:32

## 2023-05-28 RX ADMIN — FAMOTIDINE 20 MG: 10 INJECTION INTRAVENOUS at 21:24

## 2023-05-28 RX ADMIN — PANTOPRAZOLE SODIUM 40 MG: 40 TABLET, DELAYED RELEASE ORAL at 06:40

## 2023-05-28 RX ADMIN — LEVOTHYROXINE SODIUM 125 MCG: 125 TABLET ORAL at 08:44

## 2023-05-28 NOTE — PLAN OF CARE
Goal Outcome Evaluation:                      No changes, patient has maintained neuro exam. All VSS. No needs noted.

## 2023-05-28 NOTE — PROGRESS NOTES
"Critical Care Note     LOS: 2 days   Patient Care Team:  Provider, No Known as PCP - General    Chief Complaint/Reason for visit:    Chief Complaint   Patient presents with   • Stroke   Bilateral subdural hematomas  History of mechanical aortic valve replacement on Coumadin    Subjective     Interval History:     He is afebrile.  Room air saturation 95%.  Up in the chair tolerating p.o.  Surgical drains removed this morning.    Review of Systems:    All systems were reviewed and negative except as noted in subjective.    Medical history, surgical history, social history, family history reviewed    Objective     Intake/Output:    Intake/Output Summary (Last 24 hours) at 5/28/2023 0939  Last data filed at 5/28/2023 0600  Gross per 24 hour   Intake 1949.8 ml   Output 495 ml   Net 1454.8 ml       Nutrition:  Diet: Cardiac Diets, Diabetic Diets; Healthy Heart (2-3 Na+); Consistent Carbohydrate; Texture: Regular Texture (IDDSI 7); Fluid Consistency: Thin (IDDSI 0)  NPO Diet NPO Type: Sips with Meds    Infusions:  niCARdipine, 5-15 mg/hr, Last Rate: Stopped (05/26/23 2318)        Telemetry: Sinus rhythm             Vital Signs  Blood pressure 136/74, pulse 85, temperature 97.7 °F (36.5 °C), temperature source Oral, resp. rate 14, height 172.7 cm (68\"), weight 104 kg (229 lb 0.9 oz), SpO2 95 %.    Physical Exam:  General Appearance:   Middle-aged gentleman  upright in chair in no distress   Head:   Bilateral craniotomies with incisions intact, no erythema or drainage   Eyes:          Conjunctiva pink, pupils equal   Ears:     Throat:  Oral mucosa moist   Neck:  Trachea midline, no crepitus   Back:      Lungs:    Symmetric chest expansion without wheeze or rhonchi    Heart:   Regular rhythm, mechanical S2   Abdomen:    Bowel sounds present, nontender   Rectal:   Deferred   Extremities:  Right radial arterial line.  No pretibial edema   Pulses:  Palpable pedal pulses   Skin:  Warm and dry   Lymph nodes:  No cervical " adenopathy   Neurologic:  Awake, oriented x3.   symmetric      Results Review:     I reviewed the patient's new clinical results.   Results from last 7 days   Lab Units 05/27/23  0519 05/26/23  1008 05/26/23  0950   SODIUM mmol/L 140 139  --    POTASSIUM mmol/L 4.2 4.4  --    CHLORIDE mmol/L 103 101  --    CO2 mmol/L 24.0 24.0  --    BUN mg/dL 15 14  --    CREATININE mg/dL 0.71* 0.72* 0.70   CALCIUM mg/dL 9.2 9.7  --    BILIRUBIN mg/dL  --  0.6  --    ALK PHOS U/L  --  86  --    ALT (SGPT) U/L  --  14  --    AST (SGOT) U/L  --  18  --    GLUCOSE mg/dL 234* 220*  --      Results from last 7 days   Lab Units 05/27/23  0519 05/26/23  1008 05/26/23  0950   WBC 10*3/mm3 13.78* 11.50*  --    HEMOGLOBIN g/dL 11.3* 14.6  --    HEMOGLOBIN, POC g/dL  --   --  15.3   HEMATOCRIT % 35.4* 45.8  --    HEMATOCRIT POC %  --   --  45   PLATELETS 10*3/mm3 200 193  --          No results found for: BLOODCX  No results found for: URINECX    I reviewed the patient's new imaging including images and reports.      Findings:   Postoperative changes are noted from interval bilateral craniotomy with evacuation of previously noted acute subdural hematomas. Subdural drains are noted in place. Small bilateral persistent extra-axial mixed fluid collections are present with   low-density fluid, gas and minimal persisting hyperdense blood products, 7 mm maximal thickness on the left, 6 mm on the right. There is improved diffuse cerebral edema, with restored patency of the basal cisterns and decreased effacement of the   ventricles. Some underlying parenchymal hypoattenuation is noted involving temporal lobes bilaterally, greater on the left, concerning for underlying contusion/ischemia. There is also evident trace acute left parafalcine subdural hematoma, fairly   localized, measuring 4 mm in maximal thickness without significant associated mass effect. The orbits are normal. The paranasal sinuses are grossly clear.    Impression:      Impression:   Expected postoperative changes following bilateral craniotomy for evacuation of acute subdural hematomas. Cerebral edema is improved and there is restored patency of the basal cisterns and decreased effacement of the ventricles. Bilateral subdural drains    are noted in place.     4 mm small and somewhat localized left parafalcine subdural hematoma.     Increase in conspicuity, there is somewhat heterogeneous hypoattenuation present involving the left greater than right temporal lobes, concerning for underlying component of either persisting edema, contusion or ischemia.         Electronically Signed: Ruben Morris    5/27/2023 6:07 AM EDT          All medications reviewed.   divalproex, 250 mg, Oral, Nightly  DULoxetine, 60 mg, Oral, Daily  enoxaparin, 40 mg, Subcutaneous, Q24H  famotidine, 20 mg, Intravenous, Q12H  gabapentin, 200 mg, Oral, BID  insulin detemir, 10 Units, Subcutaneous, Nightly  Insulin Lispro, 4 Units, Subcutaneous, TID With Meals  insulin regular, 3-14 Units, Subcutaneous, Q6H  levothyroxine, 125 mcg, Oral, Daily  pantoprazole, 40 mg, Oral, Q AM  rosuvastatin, 20 mg, Oral, Daily  sodium chloride, 10 mL, Intravenous, Q12H          Assessment & Plan       Bilateral Subdural Hematoma    HTN    Hyperlipidemia (Diet controlled, off statin)    Hypothyroidism    Anxiety    H/O aortic mechanical valve replacement on Coumadin    Prediabetes    Somnolence    47-year-old gentleman with hypertension, dyslipidemia, hypothyroidism, diabetes, history of Hodgkin's lymphoma, 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  Postoperatively he developed pancreatitis and anticoagulation was held and he suffered a stroke resulting in some aphasia in 2018.  He presented May 26 with altered mentation and bilateral subdural hematomas with edema and  "some leftward midline shift and effacement of the right lateral and third ventricles and bilateral uncal medialization.  He was hypertensive and hyperglycemic on admission.  His INR was subtherapeutic at 2.08.  He received Kcentra and vitamin K and was started on Keppra, Cardene for hypertension and taken to surgery for emergent bilateral craniotomy and evacuation of subdural hematomas.    Today he is significantly more alert with no focal neurologic deficits.  Systolic blood pressures are 110-136 on no medications.  As an outpatient he was only on propranolol and Crestor for cardiac.  He remains off anticoagulation.  Risk of valve thrombosis with aortic valve is low at 1.3%.  There is a risk of embolic events.  However risk of rebleeding outweighs risk of embolic events currently.    Blood sugars remain in the mid 200s in part from dexamethasone which was stopped today.  His admission A1c is 8.9 consistent with new onset diabetes.  He had been previously told that he was \"prediabetic\".  He will need additional insulin coverage to improve glucose management with goal of blood sugar 140-180    He has a known history of hypothyroidism and is on replacement therapy.  His TSH is 1.6    PLAN:    -Monitor surgical drainage  -Monitor neurologic exam  -Emergent CT scan for any decline in neurologic status  -Maintain systolic blood pressure less than 140  -Restart a beta-blocker, I will use metoprolol  -Add Levemir 10 units nightly, 4 units of Humalog with meals and continue sliding scale  -Neurosurgery discontinued dexamethasone and Keppra  -He remains on his home dose of Depakote, gabapentin  -Continue thyroid replacement  -Add DVT prophylaxis, Lovenox      VTE Prophylaxis:SCDS    Stress Ulcer Prophylaxis: Protonix    Meryl Ashby MD  05/28/23  09:39 EDT      Time: 25min     "

## 2023-05-28 NOTE — PROGRESS NOTES
"NEUROSURGERY PROGRESS NOTE    Chief Complaint: Bilateral subdural hematomas    Subjective: Continues to improve.  Patient even more awake and interactive this morning.    Objective    Vital Signs: Blood pressure 110/75, pulse 75, temperature 99.7 °F (37.6 °C), temperature source Oral, resp. rate 14, height 172.7 cm (68\"), weight 104 kg (229 lb 0.9 oz), SpO2 95 %.    Physical Exam  Awake, alert and conversive  Opens eyes spont  Pupils 3 mm rx bilat  Extraocular muscles intact bilaterally  Face symmetric bilaterally  Tongue midline  5/5 in all 4 ext  Incisions clean dry and intact    Intake/Output:     Intake/Output Summary (Last 24 hours) at 5/28/2023 0857  Last data filed at 5/28/2023 0600  Gross per 24 hour   Intake 1949.8 ml   Output 495 ml   Net 1454.8 ml       Current Medications:   Current Facility-Administered Medications:   •  acetaminophen (TYLENOL) tablet 650 mg, 650 mg, Oral, Q6H PRN, Robbi Yang, APRN, 650 mg at 05/27/23 0051  •  dexamethasone (DECADRON) injection 4 mg, 4 mg, Intravenous, Q6H, Stephane Womack MD, 4 mg at 05/28/23 0633  •  dextrose (D50W) (25 g/50 mL) IV injection 25 g, 25 g, Intravenous, Q15 Min PRN, Meryl Ashby MD  •  dextrose (GLUTOSE) oral gel 15 g, 15 g, Oral, Q15 Min PRN, Meryl Ashby MD  •  divalproex (DEPAKOTE ER) 24 hr tablet 250 mg, 250 mg, Oral, Nightly, Jose Maria Townsend MD, 250 mg at 05/27/23 2155  •  docusate sodium (COLACE) capsule 100 mg, 100 mg, Oral, BID PRN, Stephane Womack MD  •  DULoxetine (CYMBALTA) DR capsule 60 mg, 60 mg, Oral, Daily, Jose Maria Townsend MD, 60 mg at 05/28/23 0844  •  famotidine (PEPCID) injection 20 mg, 20 mg, Intravenous, Q12H, Stephane Womack MD, 20 mg at 05/28/23 0844  •  gabapentin (NEURONTIN) capsule 200 mg, 200 mg, Oral, BID, Jose Maria Townsend MD, 200 mg at 05/28/23 0844  •  glucagon (GLUCAGEN) injection 1 mg, 1 mg, Intramuscular, Q15 Min PRN, Meryl Ashby MD  •  HYDROcodone-acetaminophen (NORCO) " 5-325 MG per tablet 1 tablet, 1 tablet, Oral, Q6H PRN, Robbi Yang, APRN, 1 tablet at 05/28/23 0723  •  HYDROmorphone (DILAUDID) injection 0.5 mg, 0.5 mg, Intravenous, Q2H PRN **AND** naloxone (NARCAN) injection 0.4 mg, 0.4 mg, Intravenous, Q5 Min PRN, Stephane Womack MD  •  insulin regular (humuLIN R,novoLIN R) injection 3-14 Units, 3-14 Units, Subcutaneous, Q6H, Meryl Ashby MD, 6 Units at 05/28/23 0632  •  levETIRAcetam in NaCl 0.82% (KEPPRA) IVPB 500 mg, 500 mg, Intravenous, Q12H, Stephane Womack MD, 500 mg at 05/28/23 0844  •  levothyroxine (SYNTHROID, LEVOTHROID) tablet 125 mcg, 125 mcg, Oral, Daily, Jose Maria Townsend MD, 125 mcg at 05/28/23 0844  •  magnesium hydroxide (MILK OF MAGNESIA) 400 MG/5ML suspension 15 mL, 15 mL, Oral, Daily PRN, Stephane Womack MD  •  niCARdipine (CARDENE) 25mg in 250mL NS infusion, 5-15 mg/hr, Intravenous, Titrated, Kayla Toure APRN, Stopped at 05/26/23 2318  •  ondansetron (ZOFRAN) injection 4 mg, 4 mg, Intravenous, Q6H PRN **OR** ondansetron (ZOFRAN) tablet 4 mg, 4 mg, Oral, Q6H PRN, Stephane Womack MD  •  pantoprazole (PROTONIX) EC tablet 40 mg, 40 mg, Oral, Q AM, Robbi Yang APRN, 40 mg at 05/28/23 0640  •  polyethylene glycol (MIRALAX) packet 17 g, 17 g, Oral, Daily PRN, Stephane Womack MD  •  rosuvastatin (CRESTOR) tablet 20 mg, 20 mg, Oral, Daily, Jose Maria Townsend MD, 20 mg at 05/28/23 0844  •  sennosides-docusate (PERICOLACE) 8.6-50 MG per tablet 1 tablet, 1 tablet, Oral, Nightly PRN, Stephane Womack MD  •  sodium chloride 0.9 % flush 10 mL, 10 mL, Intravenous, Q12H, Kayla Toure APRN, 10 mL at 05/28/23 0844  •  sodium chloride 0.9 % flush 10 mL, 10 mL, Intravenous, PRN, Kayla Toure APRN  •  sodium chloride 0.9 % infusion 40 mL, 40 mL, Intravenous, PRN, Kayla Toure APRN  •  sodium chloride 0.9 % infusion, 100 mL/hr, Intravenous, Continuous, Chanell Rock, PAMarthaC, Last Rate: 100 mL/hr at  05/28/23 0600, 100 mL/hr at 05/28/23 0600     Laboratory Results:       Lab 05/27/23  1153 05/27/23  0519 05/26/23  1349 05/26/23  1348 05/26/23  1008 05/26/23  0958 05/26/23  0950   WBC  --  13.78*  --   --  11.50*  --   --    HEMOGLOBIN  --  11.3*  --   --  14.6  --   --    HEMOGLOBIN, POC  --   --   --   --   --   --  15.3   HEMATOCRIT  --  35.4*  --   --  45.8  --   --    HEMATOCRIT POC  --   --   --   --   --   --  45   PLATELETS  --  200  --   --  193  --   --    NEUTROS ABS  --   --   --   --  8.68*  --   --    IMMATURE GRANS (ABS)  --   --   --   --  0.16*  --   --    LYMPHS ABS  --   --   --   --  1.63  --   --    MONOS ABS  --   --   --   --  0.71  --   --    EOS ABS  --   --   --   --  0.26  --   --    MCV  --  84.7  --   --  85.1  --   --    PROTIME 14.7*  --  13.3 14.0 23.6* 35.9*  --          Lab 05/27/23  0519 05/26/23  1008 05/26/23  0950   SODIUM 140 139  --    POTASSIUM 4.2 4.4  --    CHLORIDE 103 101  --    CO2 24.0 24.0  --    ANION GAP 13.0 14.0  --    BUN 15 14  --    CREATININE 0.71* 0.72* 0.70   EGFR 113.9 113.4 114.4   GLUCOSE 234* 220*  --    CALCIUM 9.2 9.7  --    MAGNESIUM 1.8 2.2  --    PHOSPHORUS 4.0  --   --    HEMOGLOBIN A1C  --  8.90*  --    TSH  --  1.610  --          Lab 05/26/23  1008   TOTAL PROTEIN 8.4   ALBUMIN 4.7   GLOBULIN 3.7   ALT (SGPT) 14   AST (SGOT) 18   BILIRUBIN 0.6   ALK PHOS 86         Lab 05/27/23  1153 05/26/23  1349 05/26/23  1348 05/26/23  1008 05/26/23  0958   PROTIME 14.7* 13.3 14.0 23.6* 35.9*   INR 1.13* 1.1 1.07 2.08* 3.2*         Lab 05/27/23  0519   CHOLESTEROL 181   LDL CHOL 107*   HDL CHOL 48   TRIGLYCERIDES 147         Lab 05/26/23  1125 05/26/23  1014   ABO TYPING A A   RH TYPING Positive Positive   ANTIBODY SCREEN  --  Negative         Brief Urine Lab Results     None        Microbiology Results (last 10 days)     ** No results found for the last 240 hours. **           Diagnostic Imaging: I reviewed and independently interpreted the new imaging.      Assessment/Plan:  This is a 47-year-old male status post bilateral craniotomies for evacuation of subacute subdural hematomas on 5/26.  Neurologically, the patient continues to improve.  We will remove his Hemovac drains today.    Continue to mobilize patient. We will d/c keppra and Decadron.   We will start Lovenox for DVT prophylaxis today..  Patient's glucoses continue to be elevated.  Would prefer to have them around 150.  Defer to ICU for management of insulin.  We will plan for an MMA embolization on Tuesday.  Would hold off on heparin drip or oral anticoagulation until after procedure.  Appreciate ICU assistance.      Any copied data from previous notes included in the (1) History of Present Illness, (2) Physical Examination and (3) Medical Decision Making and/or Assessment and Plan has been reviewed and is accurate as of 05/28/23      Stephane Womack MD  05/28/23  08:57 EDT

## 2023-05-29 PROBLEM — E11.9 TYPE 2 DIABETES MELLITUS: Status: ACTIVE | Noted: 2023-05-26

## 2023-05-29 LAB
ANION GAP SERPL CALCULATED.3IONS-SCNC: 10 MMOL/L (ref 5–15)
ANION GAP SERPL CALCULATED.3IONS-SCNC: 10 MMOL/L (ref 5–15)
BUN SERPL-MCNC: 20 MG/DL (ref 6–20)
BUN SERPL-MCNC: 20 MG/DL (ref 6–20)
BUN/CREAT SERPL: 31.3 (ref 7–25)
BUN/CREAT SERPL: 31.3 (ref 7–25)
CALCIUM SPEC-SCNC: 9 MG/DL (ref 8.6–10.5)
CALCIUM SPEC-SCNC: 9 MG/DL (ref 8.6–10.5)
CHLORIDE SERPL-SCNC: 105 MMOL/L (ref 98–107)
CHLORIDE SERPL-SCNC: 105 MMOL/L (ref 98–107)
CO2 SERPL-SCNC: 28 MMOL/L (ref 22–29)
CO2 SERPL-SCNC: 28 MMOL/L (ref 22–29)
CREAT SERPL-MCNC: 0.64 MG/DL (ref 0.76–1.27)
CREAT SERPL-MCNC: 0.64 MG/DL (ref 0.76–1.27)
DEPRECATED RDW RBC AUTO: 46.9 FL (ref 37–54)
DEPRECATED RDW RBC AUTO: 46.9 FL (ref 37–54)
EGFRCR SERPLBLD CKD-EPI 2021: 117.5 ML/MIN/1.73
EGFRCR SERPLBLD CKD-EPI 2021: 117.5 ML/MIN/1.73
ERYTHROCYTE [DISTWIDTH] IN BLOOD BY AUTOMATED COUNT: 15.1 % (ref 12.3–15.4)
ERYTHROCYTE [DISTWIDTH] IN BLOOD BY AUTOMATED COUNT: 15.1 % (ref 12.3–15.4)
GLUCOSE BLDC GLUCOMTR-MCNC: 115 MG/DL (ref 70–130)
GLUCOSE BLDC GLUCOMTR-MCNC: 115 MG/DL (ref 70–130)
GLUCOSE BLDC GLUCOMTR-MCNC: 155 MG/DL (ref 70–130)
GLUCOSE BLDC GLUCOMTR-MCNC: 155 MG/DL (ref 70–130)
GLUCOSE BLDC GLUCOMTR-MCNC: 212 MG/DL (ref 70–130)
GLUCOSE BLDC GLUCOMTR-MCNC: 212 MG/DL (ref 70–130)
GLUCOSE BLDC GLUCOMTR-MCNC: 221 MG/DL (ref 70–130)
GLUCOSE BLDC GLUCOMTR-MCNC: 221 MG/DL (ref 70–130)
GLUCOSE SERPL-MCNC: 151 MG/DL (ref 65–99)
GLUCOSE SERPL-MCNC: 151 MG/DL (ref 65–99)
HCT VFR BLD AUTO: 34.6 % (ref 37.5–51)
HCT VFR BLD AUTO: 34.6 % (ref 37.5–51)
HGB BLD-MCNC: 10.9 G/DL (ref 13–17.7)
HGB BLD-MCNC: 10.9 G/DL (ref 13–17.7)
MAGNESIUM SERPL-MCNC: 2.1 MG/DL (ref 1.6–2.6)
MAGNESIUM SERPL-MCNC: 2.1 MG/DL (ref 1.6–2.6)
MCH RBC QN AUTO: 26.8 PG (ref 26.6–33)
MCH RBC QN AUTO: 26.8 PG (ref 26.6–33)
MCHC RBC AUTO-ENTMCNC: 31.5 G/DL (ref 31.5–35.7)
MCHC RBC AUTO-ENTMCNC: 31.5 G/DL (ref 31.5–35.7)
MCV RBC AUTO: 85.2 FL (ref 79–97)
MCV RBC AUTO: 85.2 FL (ref 79–97)
PHOSPHATE SERPL-MCNC: 2.8 MG/DL (ref 2.5–4.5)
PHOSPHATE SERPL-MCNC: 2.8 MG/DL (ref 2.5–4.5)
PLATELET # BLD AUTO: 173 10*3/MM3 (ref 140–450)
PLATELET # BLD AUTO: 173 10*3/MM3 (ref 140–450)
PMV BLD AUTO: 11.8 FL (ref 6–12)
PMV BLD AUTO: 11.8 FL (ref 6–12)
POTASSIUM SERPL-SCNC: 3.8 MMOL/L (ref 3.5–5.2)
POTASSIUM SERPL-SCNC: 3.8 MMOL/L (ref 3.5–5.2)
RBC # BLD AUTO: 4.06 10*6/MM3 (ref 4.14–5.8)
RBC # BLD AUTO: 4.06 10*6/MM3 (ref 4.14–5.8)
SODIUM SERPL-SCNC: 143 MMOL/L (ref 136–145)
SODIUM SERPL-SCNC: 143 MMOL/L (ref 136–145)
WBC NRBC COR # BLD: 13.76 10*3/MM3 (ref 3.4–10.8)
WBC NRBC COR # BLD: 13.76 10*3/MM3 (ref 3.4–10.8)

## 2023-05-29 PROCEDURE — 83735 ASSAY OF MAGNESIUM: CPT | Performed by: NURSE PRACTITIONER

## 2023-05-29 PROCEDURE — 63710000001 INSULIN DETEMIR PER 5 UNITS: Performed by: INTERNAL MEDICINE

## 2023-05-29 PROCEDURE — 25010000002 ENOXAPARIN PER 10 MG: Performed by: STUDENT IN AN ORGANIZED HEALTH CARE EDUCATION/TRAINING PROGRAM

## 2023-05-29 PROCEDURE — 99024 POSTOP FOLLOW-UP VISIT: CPT | Performed by: PHYSICIAN ASSISTANT

## 2023-05-29 PROCEDURE — 84100 ASSAY OF PHOSPHORUS: CPT | Performed by: NURSE PRACTITIONER

## 2023-05-29 PROCEDURE — 63710000001 INSULIN LISPRO (HUMAN) PER 5 UNITS: Performed by: INTERNAL MEDICINE

## 2023-05-29 PROCEDURE — 99232 SBSQ HOSP IP/OBS MODERATE 35: CPT | Performed by: NURSE PRACTITIONER

## 2023-05-29 PROCEDURE — 85027 COMPLETE CBC AUTOMATED: CPT | Performed by: NURSE PRACTITIONER

## 2023-05-29 PROCEDURE — 80048 BASIC METABOLIC PNL TOTAL CA: CPT | Performed by: NURSE PRACTITIONER

## 2023-05-29 PROCEDURE — 82948 REAGENT STRIP/BLOOD GLUCOSE: CPT

## 2023-05-29 RX ORDER — AMOXICILLIN 250 MG
2 CAPSULE ORAL 2 TIMES DAILY
Status: DISCONTINUED | OUTPATIENT
Start: 2023-05-29 | End: 2023-06-05 | Stop reason: HOSPADM

## 2023-05-29 RX ORDER — POLYETHYLENE GLYCOL 3350 17 G/17G
17 POWDER, FOR SOLUTION ORAL DAILY
Status: DISCONTINUED | OUTPATIENT
Start: 2023-05-29 | End: 2023-06-05 | Stop reason: HOSPADM

## 2023-05-29 RX ADMIN — INSULIN LISPRO 4 UNITS: 100 INJECTION, SOLUTION INTRAVENOUS; SUBCUTANEOUS at 18:03

## 2023-05-29 RX ADMIN — Medication 10 ML: at 20:37

## 2023-05-29 RX ADMIN — ENOXAPARIN SODIUM 40 MG: 40 INJECTION SUBCUTANEOUS at 08:45

## 2023-05-29 RX ADMIN — ACETAMINOPHEN 650 MG: 325 TABLET ORAL at 22:09

## 2023-05-29 RX ADMIN — DOCUSATE SODIUM 50 MG AND SENNOSIDES 8.6 MG 2 TABLET: 8.6; 5 TABLET, FILM COATED ORAL at 11:53

## 2023-05-29 RX ADMIN — HYDROCODONE BITARTRATE AND ACETAMINOPHEN 1 TABLET: 5; 325 TABLET ORAL at 11:59

## 2023-05-29 RX ADMIN — INSULIN DETEMIR 10 UNITS: 100 INJECTION, SOLUTION SUBCUTANEOUS at 20:37

## 2023-05-29 RX ADMIN — GABAPENTIN 200 MG: 100 CAPSULE ORAL at 08:45

## 2023-05-29 RX ADMIN — INSULIN LISPRO 5 UNITS: 100 INJECTION, SOLUTION INTRAVENOUS; SUBCUTANEOUS at 18:03

## 2023-05-29 RX ADMIN — HYDROCODONE BITARTRATE AND ACETAMINOPHEN 1 TABLET: 5; 325 TABLET ORAL at 18:59

## 2023-05-29 RX ADMIN — INSULIN LISPRO 3 UNITS: 100 INJECTION, SOLUTION INTRAVENOUS; SUBCUTANEOUS at 08:46

## 2023-05-29 RX ADMIN — POLYETHYLENE GLYCOL 3350 17 G: 17 POWDER, FOR SOLUTION ORAL at 11:53

## 2023-05-29 RX ADMIN — LEVOTHYROXINE SODIUM 125 MCG: 125 TABLET ORAL at 08:46

## 2023-05-29 RX ADMIN — FAMOTIDINE 20 MG: 10 INJECTION INTRAVENOUS at 08:45

## 2023-05-29 RX ADMIN — Medication 10 ML: at 08:46

## 2023-05-29 RX ADMIN — DOCUSATE SODIUM 50 MG AND SENNOSIDES 8.6 MG 2 TABLET: 8.6; 5 TABLET, FILM COATED ORAL at 20:36

## 2023-05-29 RX ADMIN — ROSUVASTATIN 20 MG: 20 TABLET, FILM COATED ORAL at 08:45

## 2023-05-29 RX ADMIN — INSULIN LISPRO 4 UNITS: 100 INJECTION, SOLUTION INTRAVENOUS; SUBCUTANEOUS at 08:46

## 2023-05-29 RX ADMIN — DIVALPROEX SODIUM 250 MG: 250 TABLET, EXTENDED RELEASE ORAL at 20:37

## 2023-05-29 RX ADMIN — PANTOPRAZOLE SODIUM 40 MG: 40 TABLET, DELAYED RELEASE ORAL at 05:58

## 2023-05-29 RX ADMIN — Medication 12.5 MG: at 20:36

## 2023-05-29 RX ADMIN — HYDROCODONE BITARTRATE AND ACETAMINOPHEN 1 TABLET: 5; 325 TABLET ORAL at 04:09

## 2023-05-29 RX ADMIN — GABAPENTIN 200 MG: 100 CAPSULE ORAL at 20:36

## 2023-05-29 RX ADMIN — INSULIN LISPRO 4 UNITS: 100 INJECTION, SOLUTION INTRAVENOUS; SUBCUTANEOUS at 11:53

## 2023-05-29 RX ADMIN — Medication 12.5 MG: at 08:45

## 2023-05-29 RX ADMIN — INSULIN LISPRO 5 UNITS: 100 INJECTION, SOLUTION INTRAVENOUS; SUBCUTANEOUS at 20:37

## 2023-05-29 RX ADMIN — DULOXETINE 60 MG: 60 CAPSULE, DELAYED RELEASE ORAL at 08:45

## 2023-05-29 NOTE — PROGRESS NOTES
Intensive Care Follow-up     Hospital:  LOS: 3 days   Mr. Manjeet Hall, 47 y.o. male is followed for:   Subdural hematoma, nontraumatic          History of present illness:   Manjeet Hall is a 47 y.o. 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  He was hypertensive and hyperglycemic on admission.  His INR was subtherapeutic at 2.08.  He received Kcentra and vitamin K and was started on Keppra for seizure prophylaxis, Cardene for hypertension and taken to OR for emergent bilateral craniotomy and evacuation of subdural hematomas.      Subjective   Interval History:  Patient is resting in bed this morning.  Girlfriend is at bedside.  Good oxygenation on RA.  VSS. States that he is scared about his hospitalization and just current medical illness in general given his medical history since 2018.  At baseline, he has mild receptive aphasia and word finding difficulties.  He is requesting outpatient SLP therapy for his receptive aphasia and word finding difficulties.  He states that they have progressively worsened since he last had therapy 2018.    States that when he is started on Warfarin, he would like to stay in the hospital until therapeutic.  He does not want Lovenox injections.   He has not had a BM in several days.         The patient's past medical, surgical and social history were reviewed and updated in Epic as  "appropriate.    Review of Systems - General ROS: negative for - chills, fatigue, fever or night sweats  Respiratory ROS: negative for - cough, shortness of breath or wheezing  Cardiovascular ROS: negative for - chest pain, edema or palpitations  Gastrointestinal ROS: positive for constipation       Objective     Infusions:        Medications:  divalproex, 250 mg, Oral, Nightly  DULoxetine, 60 mg, Oral, Daily  enoxaparin, 40 mg, Subcutaneous, Q24H  famotidine, 20 mg, Intravenous, Q12H  gabapentin, 200 mg, Oral, BID  insulin detemir, 10 Units, Subcutaneous, Nightly  insulin lispro, 3-14 Units, Subcutaneous, 4x Daily AC & at Bedtime  Insulin Lispro, 4 Units, Subcutaneous, TID With Meals  levothyroxine, 125 mcg, Oral, Daily  metoprolol tartrate, 12.5 mg, Oral, Q12H  pantoprazole, 40 mg, Oral, Q AM  rosuvastatin, 20 mg, Oral, Daily  sodium chloride, 10 mL, Intravenous, Q12H        Vital Sign Min/Max for last 24 hours  Temp  Min: 97.7 °F (36.5 °C)  Max: 97.9 °F (36.6 °C)   BP  Min: 104/87  Max: 137/73   Pulse  Min: 59  Max: 96   Resp  Min: 14  Max: 18   SpO2  Min: 84 %  Max: 99 %   No data recorded       Input/Output for last 24 hour shift  05/28 0701 - 05/29 0700  In: 910 [I.V.:810]  Out: -          Objective:  General Appearance:  In no acute distress and comfortable.    Vital signs: (most recent): Blood pressure 137/73, pulse 81, temperature 97.9 °F (36.6 °C), temperature source Oral, resp. rate 14, height 172.7 cm (68\"), weight 104 kg (229 lb 0.9 oz), SpO2 97 %.  Vital signs are normal.    HEENT: (Rt sided incision approximated with staples.  No drainage, redness.)    Lungs:  Normal effort and normal respiratory rate.  Breath sounds clear to auscultation.  He is not in respiratory distress.  No rales, wheezes or rhonchi.    Heart: Normal rate.  Regular rhythm.  S1 normal and S2 normal.  No murmur or friction rub. (+ click)  Abdomen: Abdomen is rigid and non-distended.  Hypoactive bowel sounds.   There is no abdominal " tenderness.     Extremities: There is no dependent edema.    Pulses: Distal pulses are intact.    Neurological: Patient is alert and oriented to person, place and time.  GCS score is 15.    Pupils:  Pupils are equal, round, and reactive to light.    Skin:  Warm and dry.  No rash.             Results from last 7 days   Lab Units 05/29/23  1004 05/27/23  0519 05/26/23  1008   WBC 10*3/mm3 13.76* 13.78* 11.50*   HEMOGLOBIN g/dL 10.9* 11.3* 14.6   PLATELETS 10*3/mm3 173 200 193     Results from last 7 days   Lab Units 05/29/23  1004 05/27/23  0519 05/26/23  1008   SODIUM mmol/L 143 140 139   POTASSIUM mmol/L 3.8 4.2 4.4   CO2 mmol/L 28.0 24.0 24.0   BUN mg/dL 20 15 14   CREATININE mg/dL 0.64* 0.71* 0.72*   MAGNESIUM mg/dL 2.1 1.8 2.2   PHOSPHORUS mg/dL 2.8 4.0  --    GLUCOSE mg/dL 151* 234* 220*     Estimated Creatinine Clearance: 166.7 mL/min (A) (by C-G formula based on SCr of 0.64 mg/dL (L)).          Images:   None new    I reviewed the patient's results and images.     Assessment & Plan   Impression        Bilateral Subdural Hematoma    HTN    Hyperlipidemia (Diet controlled, off statin)    New Onset Type 2 diabetes mellitus    Hypothyroidism    H/O aortic mechanical valve replacement on Coumadin    Anxiety       Plan        Bilateral Subdural Hematomas s/p bilateral crani for evacuation of subdural hematomas, drains d/c'd 5/28  H/O Stroke post op AVR with residual receptive aphasia and word finding difficulty  · Post op care per NS  · Plan for embolization tomorrow 5/30  · NPO after MN  · Continue PT/OT/SLP  · Patient would like outpatient SLP to help with receptive aphasia and word finding difficulty upon discharge  · Resume anticoagulation when ok with NS  · AM labs    HTN  HLP  H/O mechanical AVR 2018 on Warfarin  · Continue Metoprolol  · Continue Crestor  · Will need anticoagulation restarted soon per NS with bridge to Warfarin    New onset T2DM, Hgb A1c 8.9   Neuropathy  · Keep glucose <= 150 per  NS  · Continue basal  · Continue correction SSI  · Continue prandial  · Glucose has improved since basal added yesterday.  Glucose ~ 150 today.  May need insulins adjusted more later.  · Steroids discontinued yesterday  · Diabetes education  · Continue Gabapentin    Hypothyroidism  · Continue Levothyroxine    Anxiety  Depression   · Continue Cymbalta  · Continue Depakote ER  · Hold home Atarax for now    Constipation  · Change BM regimen from as needed to scheduled      DVT prophylaxis:  Lovenox SQ / SCDs  GI prophylaxis:  PPI  Disposition:  Keep in ICU    Plan of care and goals reviewed with multidisciplinary/antibiotic stewardship team during rounds.   I discussed the patient's findings and my recommendations with patient, family, nursing staff and primary care team     Time: 35 minutes, face to face, with the patient and family or on the [place] coordinating care with other health care providers.     I spent > 50% percent of this time, counseling and discussing evaluation, current status and management.      BETHANIE Markham, AGACNP-BC, FNP-BC  Pulmonary and Critical Care Service

## 2023-05-29 NOTE — PLAN OF CARE
Goal Outcome Evaluation:                      No changes in assessment and neuro status throughout shift. No needs noted. All VSS

## 2023-05-29 NOTE — PROGRESS NOTES
"NEUROSURGERY PROGRESS NOTE    Interval History:   Bilateral SDH, s/p bilateral craniotomy for evacuation 5/26/23. POD 3  Patient awake and conversant. Has been up to chair and walking on floor with assistance. Mild headache, no other complaints. Drains removed,     Vital Signs  Blood pressure 137/73, pulse 81, temperature 97.9 °F (36.6 °C), temperature source Oral, resp. rate 14, height 172.7 cm (68\"), weight 104 kg (229 lb 0.9 oz), SpO2 97 %.    Physical Exam:  Awake, alert, oriented  Pupils equal bilaterally  Extraocular movements and facial expression intact bilaterally  5/5 all 4 extremities  Incisions clean, dry, intact. Drains were removed yesterday     Results Review:    WBC   Date Value Ref Range Status   05/29/2023 13.76 (H) 3.40 - 10.80 10*3/mm3 Final     RBC   Date Value Ref Range Status   05/29/2023 4.06 (L) 4.14 - 5.80 10*6/mm3 Final     Hemoglobin   Date Value Ref Range Status   05/29/2023 10.9 (L) 13.0 - 17.7 g/dL Final     Hematocrit   Date Value Ref Range Status   05/29/2023 34.6 (L) 37.5 - 51.0 % Final     MCV   Date Value Ref Range Status   05/29/2023 85.2 79.0 - 97.0 fL Final     MCH   Date Value Ref Range Status   05/29/2023 26.8 26.6 - 33.0 pg Final     MCHC   Date Value Ref Range Status   05/29/2023 31.5 31.5 - 35.7 g/dL Final     RDW   Date Value Ref Range Status   05/29/2023 15.1 12.3 - 15.4 % Final     RDW-SD   Date Value Ref Range Status   05/29/2023 46.9 37.0 - 54.0 fl Final     MPV   Date Value Ref Range Status   05/29/2023 11.8 6.0 - 12.0 fL Final     Platelets   Date Value Ref Range Status   05/29/2023 173 140 - 450 10*3/mm3 Final     Basic Metabolic Panel    Sodium Sodium   Date Value Ref Range Status   05/29/2023 143 136 - 145 mmol/L Final   05/27/2023 140 136 - 145 mmol/L Final      Potassium Potassium   Date Value Ref Range Status   05/29/2023 3.8 3.5 - 5.2 mmol/L Final     Comment:     Slight hemolysis detected by analyzer. Results may be affected.   05/27/2023 4.2 3.5 - 5.2 " mmol/L Final      Chloride Chloride   Date Value Ref Range Status   05/29/2023 105 98 - 107 mmol/L Final   05/27/2023 103 98 - 107 mmol/L Final      Bicarbonate No results found for: PLASMABICARB   BUN BUN   Date Value Ref Range Status   05/29/2023 20 6 - 20 mg/dL Final   05/27/2023 15 6 - 20 mg/dL Final      Creatinine Creatinine   Date Value Ref Range Status   05/29/2023 0.64 (L) 0.76 - 1.27 mg/dL Final   05/27/2023 0.71 (L) 0.76 - 1.27 mg/dL Final      Calcium Calcium   Date Value Ref Range Status   05/29/2023 9.0 8.6 - 10.5 mg/dL Final   05/27/2023 9.2 8.6 - 10.5 mg/dL Final      Glucose      No components found for: GLUCOSE.*     I/O last 3 completed shifts:  In: 2110 [I.V.:1910; IV Piggyback:200]  Out: 25 [Drains:25]  No intake/output data recorded.      ASSESSMENT/Plan:   46 yo M POD 3 from bilateral craniotomies for SDH evacuation.Significant neurologic improvement. Drains removed yesterday: incisions intact.   Keppra/decadron d/c yesterday as well (patient on depakote and gabapentin at home and this is continued); blood sugars improving. Goal 150. ICU managing insulin.   Lovenox for DVT prophylaxis. Patient has history of mechanical aortic valve replacement and was previously on coumadin. Plan is for MMA embolization tomorrow 5/30. NPO after midnight. Further adjustment of anticoagulation after this procedure.        Haylee Domingo PA-C  05/29/23  10:51 EDT

## 2023-05-29 NOTE — PROGRESS NOTES
"                    Clinical Nutrition       Patient Name: Manjeet Hall  YOB: 1975  MRN: [id number]  Date of Encounter: 05/29/23 11:59 EDT  Admission date: 5/26/2023      Reason for Visit   RN     EMR Reviewed     Pertinent Medical Datat Reviewed: yes     Admission Diagnosis:  Somnolence [R40.0]    Problem List:    Bilateral Subdural Hematoma    HTN    Hyperlipidemia (Diet controlled, off statin)    Hypothyroidism    Anxiety    H/O aortic mechanical valve replacement on Coumadin    New Onset Type 2 diabetes mellitus    Labs:  Hbg A1C = 8.9%  LDL Chol 107    Anthropometric      Admission Height 172.7 cm (68\") Documented at 05/26/2023 1048   Admission Weight 104 kg (229 lb 0.9 oz) Documented at 05/26/2023 0954        Height: 172.7 cm (68\")  Last Filed Weight: Weight: 104 kg (229 lb 0.9 oz) (05/26/23 0954)     BMI:  34.83  BMI classification: Obese Class I: 30-34.9kg/m2     UBW:   Weight change:      Reported/Observed/Food/Nutrition Related - Comments   RN reports pt w elev A1C , pt does not realize he is diabetic, DM educator consult placed, he needs menu assistance , also has expressive aphasia. Intake reported as good but no documentation.    Spoke w/ pt and wife, discussed Consistent carbohydrate  Diet, wife is diabetic and familiar w/ this diet. Pt w/ limited food acceptance, alternate selections reviewed.     Current Nutrition Prescription     Diet: Cardiac Diets, Diabetic Diets; Healthy Heart (2-3 Na+); Consistent Carbohydrate; Texture: Regular Texture (IDDSI 7); Fluid Consistency: Thin (IDDSI 0)  NPO Diet NPO Type: Sips with Meds  Orders Placed This Encounter      DIET MESSAGE Pt does not eat: beef tips/noodles, chicken salad, green salads, oranges, desserts Send fresh fruit cup w/ all meals. For lunch send turkey sandwich, baked lays, soup, fruit cup      Average Intake from Charting: Insufficient data none since adm , pt is reported to be eating well today.     Nutrition Diagnosis "   5/29  Problem Food and nutrition knowledge deficit   Etiology DM hx pre-DM   Signs/Symptoms Hgb A1C 8.9%    Status:    Actions     Follow treatment progress, Care plan reviewed, Advise alternate selection, Interview for preferences, Menu provided, brief education on Conistent Carb diet     Monitor Per Protocol      Mackenzie Grady, MS,RD,LD,   Time Spent: 15 mins

## 2023-05-30 ENCOUNTER — ANESTHESIA EVENT (OUTPATIENT)
Dept: CARDIOLOGY | Facility: HOSPITAL | Age: 48
DRG: 020 | End: 2023-05-30
Payer: MEDICARE

## 2023-05-30 ENCOUNTER — ANESTHESIA (OUTPATIENT)
Dept: CARDIOLOGY | Facility: HOSPITAL | Age: 48
DRG: 020 | End: 2023-05-30
Payer: MEDICARE

## 2023-05-30 LAB
ANION GAP SERPL CALCULATED.3IONS-SCNC: 10 MMOL/L (ref 5–15)
ANION GAP SERPL CALCULATED.3IONS-SCNC: 10 MMOL/L (ref 5–15)
APTT PPP: 29.8 SECONDS (ref 60–90)
APTT PPP: 29.8 SECONDS (ref 60–90)
BUN SERPL-MCNC: 20 MG/DL (ref 6–20)
BUN SERPL-MCNC: 20 MG/DL (ref 6–20)
BUN/CREAT SERPL: 29.4 (ref 7–25)
BUN/CREAT SERPL: 29.4 (ref 7–25)
CALCIUM SPEC-SCNC: 8.6 MG/DL (ref 8.6–10.5)
CALCIUM SPEC-SCNC: 8.6 MG/DL (ref 8.6–10.5)
CHLORIDE SERPL-SCNC: 106 MMOL/L (ref 98–107)
CHLORIDE SERPL-SCNC: 106 MMOL/L (ref 98–107)
CO2 SERPL-SCNC: 28 MMOL/L (ref 22–29)
CO2 SERPL-SCNC: 28 MMOL/L (ref 22–29)
CREAT SERPL-MCNC: 0.68 MG/DL (ref 0.76–1.27)
CREAT SERPL-MCNC: 0.68 MG/DL (ref 0.76–1.27)
DEPRECATED RDW RBC AUTO: 47.9 FL (ref 37–54)
DEPRECATED RDW RBC AUTO: 47.9 FL (ref 37–54)
EGFRCR SERPLBLD CKD-EPI 2021: 115.4 ML/MIN/1.73
EGFRCR SERPLBLD CKD-EPI 2021: 115.4 ML/MIN/1.73
ERYTHROCYTE [DISTWIDTH] IN BLOOD BY AUTOMATED COUNT: 15.1 % (ref 12.3–15.4)
ERYTHROCYTE [DISTWIDTH] IN BLOOD BY AUTOMATED COUNT: 15.1 % (ref 12.3–15.4)
GLUCOSE BLDC GLUCOMTR-MCNC: 128 MG/DL (ref 70–130)
GLUCOSE BLDC GLUCOMTR-MCNC: 128 MG/DL (ref 70–130)
GLUCOSE BLDC GLUCOMTR-MCNC: 131 MG/DL (ref 70–130)
GLUCOSE BLDC GLUCOMTR-MCNC: 131 MG/DL (ref 70–130)
GLUCOSE BLDC GLUCOMTR-MCNC: 135 MG/DL (ref 70–130)
GLUCOSE BLDC GLUCOMTR-MCNC: 135 MG/DL (ref 70–130)
GLUCOSE BLDC GLUCOMTR-MCNC: 137 MG/DL (ref 70–130)
GLUCOSE BLDC GLUCOMTR-MCNC: 137 MG/DL (ref 70–130)
GLUCOSE SERPL-MCNC: 123 MG/DL (ref 65–99)
GLUCOSE SERPL-MCNC: 123 MG/DL (ref 65–99)
HCT VFR BLD AUTO: 36 % (ref 37.5–51)
HCT VFR BLD AUTO: 36 % (ref 37.5–51)
HGB BLD-MCNC: 11.3 G/DL (ref 13–17.7)
HGB BLD-MCNC: 11.3 G/DL (ref 13–17.7)
INR PPP: 1.02 (ref 0.89–1.12)
INR PPP: 1.02 (ref 0.89–1.12)
MAGNESIUM SERPL-MCNC: 2.1 MG/DL (ref 1.6–2.6)
MAGNESIUM SERPL-MCNC: 2.1 MG/DL (ref 1.6–2.6)
MCH RBC QN AUTO: 27.4 PG (ref 26.6–33)
MCH RBC QN AUTO: 27.4 PG (ref 26.6–33)
MCHC RBC AUTO-ENTMCNC: 31.4 G/DL (ref 31.5–35.7)
MCHC RBC AUTO-ENTMCNC: 31.4 G/DL (ref 31.5–35.7)
MCV RBC AUTO: 87.4 FL (ref 79–97)
MCV RBC AUTO: 87.4 FL (ref 79–97)
PHOSPHATE SERPL-MCNC: 5 MG/DL (ref 2.5–4.5)
PHOSPHATE SERPL-MCNC: 5 MG/DL (ref 2.5–4.5)
PLATELET # BLD AUTO: 183 10*3/MM3 (ref 140–450)
PLATELET # BLD AUTO: 183 10*3/MM3 (ref 140–450)
PMV BLD AUTO: 11.7 FL (ref 6–12)
PMV BLD AUTO: 11.7 FL (ref 6–12)
POTASSIUM SERPL-SCNC: 4 MMOL/L (ref 3.5–5.2)
POTASSIUM SERPL-SCNC: 4 MMOL/L (ref 3.5–5.2)
PROTHROMBIN TIME: 13.5 SECONDS (ref 12.2–14.5)
PROTHROMBIN TIME: 13.5 SECONDS (ref 12.2–14.5)
RBC # BLD AUTO: 4.12 10*6/MM3 (ref 4.14–5.8)
RBC # BLD AUTO: 4.12 10*6/MM3 (ref 4.14–5.8)
SODIUM SERPL-SCNC: 144 MMOL/L (ref 136–145)
SODIUM SERPL-SCNC: 144 MMOL/L (ref 136–145)
UFH PPP CHRO-ACNC: 0.32 IU/ML (ref 0.3–0.7)
UFH PPP CHRO-ACNC: 0.32 IU/ML (ref 0.3–0.7)
UFH PPP CHRO-ACNC: 0.35 IU/ML (ref 0.3–0.7)
UFH PPP CHRO-ACNC: 0.35 IU/ML (ref 0.3–0.7)
WBC NRBC COR # BLD: 10.05 10*3/MM3 (ref 3.4–10.8)
WBC NRBC COR # BLD: 10.05 10*3/MM3 (ref 3.4–10.8)

## 2023-05-30 PROCEDURE — 85520 HEPARIN ASSAY: CPT

## 2023-05-30 PROCEDURE — 05HY33Z INSERTION OF INFUSION DEVICE INTO UPPER VEIN, PERCUTANEOUS APPROACH: ICD-10-PCS | Performed by: STUDENT IN AN ORGANIZED HEALTH CARE EDUCATION/TRAINING PROGRAM

## 2023-05-30 PROCEDURE — 63710000001 INSULIN LISPRO (HUMAN) PER 5 UNITS: Performed by: STUDENT IN AN ORGANIZED HEALTH CARE EDUCATION/TRAINING PROGRAM

## 2023-05-30 PROCEDURE — C1894 INTRO/SHEATH, NON-LASER: HCPCS | Performed by: STUDENT IN AN ORGANIZED HEALTH CARE EDUCATION/TRAINING PROGRAM

## 2023-05-30 PROCEDURE — C1751 CATH, INF, PER/CENT/MIDLINE: HCPCS

## 2023-05-30 PROCEDURE — 83735 ASSAY OF MAGNESIUM: CPT | Performed by: NURSE PRACTITIONER

## 2023-05-30 PROCEDURE — 25010000002 ENOXAPARIN PER 10 MG: Performed by: STUDENT IN AN ORGANIZED HEALTH CARE EDUCATION/TRAINING PROGRAM

## 2023-05-30 PROCEDURE — 84100 ASSAY OF PHOSPHORUS: CPT | Performed by: NURSE PRACTITIONER

## 2023-05-30 PROCEDURE — 63710000001 INSULIN DETEMIR PER 5 UNITS

## 2023-05-30 PROCEDURE — 85610 PROTHROMBIN TIME: CPT | Performed by: NURSE PRACTITIONER

## 2023-05-30 PROCEDURE — 99232 SBSQ HOSP IP/OBS MODERATE 35: CPT | Performed by: NURSE PRACTITIONER

## 2023-05-30 PROCEDURE — 85027 COMPLETE CBC AUTOMATED: CPT | Performed by: NURSE PRACTITIONER

## 2023-05-30 PROCEDURE — 97530 THERAPEUTIC ACTIVITIES: CPT

## 2023-05-30 PROCEDURE — 85520 HEPARIN ASSAY: CPT | Performed by: NURSE PRACTITIONER

## 2023-05-30 PROCEDURE — 63710000001 INSULIN LISPRO (HUMAN) PER 5 UNITS: Performed by: INTERNAL MEDICINE

## 2023-05-30 PROCEDURE — 85730 THROMBOPLASTIN TIME PARTIAL: CPT | Performed by: NURSE PRACTITIONER

## 2023-05-30 PROCEDURE — 25010000002 HEPARIN (PORCINE) 25000-0.45 UT/250ML-% SOLUTION: Performed by: NURSE PRACTITIONER

## 2023-05-30 PROCEDURE — 25010000002 PROPOFOL 10 MG/ML EMULSION: Performed by: NURSE ANESTHETIST, CERTIFIED REGISTERED

## 2023-05-30 PROCEDURE — C1894 INTRO/SHEATH, NON-LASER: HCPCS

## 2023-05-30 PROCEDURE — 80048 BASIC METABOLIC PNL TOTAL CA: CPT | Performed by: NURSE PRACTITIONER

## 2023-05-30 PROCEDURE — 82948 REAGENT STRIP/BLOOD GLUCOSE: CPT

## 2023-05-30 RX ORDER — HEPARIN SODIUM 10000 [USP'U]/100ML
9.5 INJECTION, SOLUTION INTRAVENOUS
Status: DISPENSED | OUTPATIENT
Start: 2023-05-30 | End: 2023-05-31

## 2023-05-30 RX ORDER — PROPOFOL 10 MG/ML
VIAL (ML) INTRAVENOUS AS NEEDED
Status: DISCONTINUED | OUTPATIENT
Start: 2023-05-30 | End: 2023-05-30 | Stop reason: SURG

## 2023-05-30 RX ORDER — HEPARIN SODIUM 1000 [USP'U]/ML
4000 INJECTION, SOLUTION INTRAVENOUS; SUBCUTANEOUS AS NEEDED
Status: DISCONTINUED | OUTPATIENT
Start: 2023-05-30 | End: 2023-05-30

## 2023-05-30 RX ORDER — SODIUM CHLORIDE, SODIUM LACTATE, POTASSIUM CHLORIDE, CALCIUM CHLORIDE 600; 310; 30; 20 MG/100ML; MG/100ML; MG/100ML; MG/100ML
INJECTION, SOLUTION INTRAVENOUS CONTINUOUS PRN
Status: DISCONTINUED | OUTPATIENT
Start: 2023-05-30 | End: 2023-05-30 | Stop reason: SURG

## 2023-05-30 RX ORDER — SODIUM CHLORIDE 0.9 % (FLUSH) 0.9 %
10 SYRINGE (ML) INJECTION AS NEEDED
Status: DISCONTINUED | OUTPATIENT
Start: 2023-05-30 | End: 2023-06-05 | Stop reason: HOSPADM

## 2023-05-30 RX ORDER — SODIUM CHLORIDE 0.9 % (FLUSH) 0.9 %
10 SYRINGE (ML) INJECTION EVERY 12 HOURS SCHEDULED
Status: DISCONTINUED | OUTPATIENT
Start: 2023-05-30 | End: 2023-06-05

## 2023-05-30 RX ORDER — HEPARIN SODIUM 1000 [USP'U]/ML
2000 INJECTION, SOLUTION INTRAVENOUS; SUBCUTANEOUS AS NEEDED
Status: DISCONTINUED | OUTPATIENT
Start: 2023-05-30 | End: 2023-05-30

## 2023-05-30 RX ORDER — LIDOCAINE HYDROCHLORIDE 10 MG/ML
INJECTION, SOLUTION EPIDURAL; INFILTRATION; INTRACAUDAL; PERINEURAL AS NEEDED
Status: DISCONTINUED | OUTPATIENT
Start: 2023-05-30 | End: 2023-05-30 | Stop reason: SURG

## 2023-05-30 RX ADMIN — ROSUVASTATIN 20 MG: 20 TABLET, FILM COATED ORAL at 08:34

## 2023-05-30 RX ADMIN — Medication 12.5 MG: at 08:34

## 2023-05-30 RX ADMIN — LEVOTHYROXINE SODIUM 125 MCG: 125 TABLET ORAL at 08:34

## 2023-05-30 RX ADMIN — INSULIN LISPRO 4 UNITS: 100 INJECTION, SOLUTION INTRAVENOUS; SUBCUTANEOUS at 18:06

## 2023-05-30 RX ADMIN — GABAPENTIN 200 MG: 100 CAPSULE ORAL at 20:22

## 2023-05-30 RX ADMIN — HYDROCODONE BITARTRATE AND ACETAMINOPHEN 1 TABLET: 5; 325 TABLET ORAL at 22:15

## 2023-05-30 RX ADMIN — DOCUSATE SODIUM 50 MG AND SENNOSIDES 8.6 MG 2 TABLET: 8.6; 5 TABLET, FILM COATED ORAL at 20:22

## 2023-05-30 RX ADMIN — Medication 10 ML: at 08:46

## 2023-05-30 RX ADMIN — HEPARIN SODIUM 9.5 UNITS/KG/HR: 10000 INJECTION, SOLUTION INTRAVENOUS at 14:12

## 2023-05-30 RX ADMIN — HYDROCODONE BITARTRATE AND ACETAMINOPHEN 1 TABLET: 5; 325 TABLET ORAL at 08:34

## 2023-05-30 RX ADMIN — DIVALPROEX SODIUM 250 MG: 250 TABLET, EXTENDED RELEASE ORAL at 20:22

## 2023-05-30 RX ADMIN — Medication 10 ML: at 20:23

## 2023-05-30 RX ADMIN — INSULIN LISPRO 4 UNITS: 100 INJECTION, SOLUTION INTRAVENOUS; SUBCUTANEOUS at 08:34

## 2023-05-30 RX ADMIN — SODIUM CHLORIDE, POTASSIUM CHLORIDE, SODIUM LACTATE AND CALCIUM CHLORIDE: 600; 310; 30; 20 INJECTION, SOLUTION INTRAVENOUS at 10:06

## 2023-05-30 RX ADMIN — ACETAMINOPHEN 650 MG: 325 TABLET ORAL at 18:11

## 2023-05-30 RX ADMIN — Medication 12.5 MG: at 20:22

## 2023-05-30 RX ADMIN — PROPOFOL 200 MG: 10 INJECTION, EMULSION INTRAVENOUS at 10:12

## 2023-05-30 RX ADMIN — INSULIN DETEMIR 8 UNITS: 100 INJECTION, SOLUTION SUBCUTANEOUS at 14:17

## 2023-05-30 RX ADMIN — DULOXETINE 60 MG: 60 CAPSULE, DELAYED RELEASE ORAL at 08:34

## 2023-05-30 RX ADMIN — DOCUSATE SODIUM 50 MG AND SENNOSIDES 8.6 MG 2 TABLET: 8.6; 5 TABLET, FILM COATED ORAL at 08:34

## 2023-05-30 RX ADMIN — ACETAMINOPHEN 650 MG: 325 TABLET ORAL at 11:36

## 2023-05-30 RX ADMIN — ENOXAPARIN SODIUM 40 MG: 40 INJECTION SUBCUTANEOUS at 08:37

## 2023-05-30 RX ADMIN — LIDOCAINE HYDROCHLORIDE 30 MG: 10 INJECTION, SOLUTION EPIDURAL; INFILTRATION; INTRACAUDAL; PERINEURAL at 10:12

## 2023-05-30 RX ADMIN — INSULIN DETEMIR 8 UNITS: 100 INJECTION, SOLUTION SUBCUTANEOUS at 20:22

## 2023-05-30 RX ADMIN — HYDROCODONE BITARTRATE AND ACETAMINOPHEN 1 TABLET: 5; 325 TABLET ORAL at 15:52

## 2023-05-30 RX ADMIN — GABAPENTIN 200 MG: 100 CAPSULE ORAL at 08:34

## 2023-05-30 RX ADMIN — PANTOPRAZOLE SODIUM 40 MG: 40 TABLET, DELAYED RELEASE ORAL at 06:27

## 2023-05-30 RX ADMIN — INSULIN LISPRO 4 UNITS: 100 INJECTION, SOLUTION INTRAVENOUS; SUBCUTANEOUS at 11:36

## 2023-05-30 NOTE — PLAN OF CARE
Pt MMA embolization held due to poor venous access. PICC established and okay to use. Heparin drip infusing and adjusted per pharmacy recommendations. NPO at midnight for procedure on 5/31. NIH 2, GCS 15. MORGAN's equally. VSS/NAD.

## 2023-05-30 NOTE — PAYOR COMM NOTE
"AVAILITY NOT WORKING    ID: [id number]  B-Day: 1975    Utilization Review  Phone [phone]  Fax [phone]    [facility]  [address] [place] [address]        Marisabel Hall (47 y.o. Male)     Date of Birth   1975    Social Security Number       Address   [address]  [facility] [address]    Home Phone   [phone]    MRN   [id number]       Restoration   None    Marital Status   Single                            Admission Date   5/26/23    Admission Type   Emergency    Admitting Provider   Jose Maria Townsend MD    Attending Provider   Jose Maria Townsend MD    Department, Room/Bed   [facility] 2B ICU, N239/1       Discharge Date       Discharge Disposition       Discharge Destination                               Attending Provider: Jose Maria Townsend MD    Allergies: Not on File    Isolation: None   Infection: None   Code Status: CPR    Ht: 172.7 cm (68\")   Wt: 104 kg (229 lb 0.9 oz)    Admission Cmt: None   Principal Problem: Bilateral Subdural Hematoma [I62.00]                 Active Insurance as of 5/26/2023     Primary Coverage     Payor Plan Insurance Group Employer/Plan Group    ANTHEM MEDICARE REPLACEMENT ANTHEM MEDICARE ADVANTAGE [id number]     Payor Plan Address Payor Plan Phone Number Payor Plan Fax Number Effective Dates    [address] [phone]  1/1/2023 - None Entered    [facility] [address]       Subscriber Name Subscriber Birth Date Member ID       MARISABEL HALL 1975 [id number]           Secondary Coverage     Payor Plan Insurance Group Employer/Plan Group    ANTHEM MEDICAID ANTHEM MEDICAID [id number]     Payor Plan Address Payor Plan Phone Number Payor Plan Fax Number Effective Dates    [address] [address] [phone]  1/1/2023 - None Entered    [facility] [address]       Subscriber Name Subscriber Birth Date Member ID       MARISABEL HALL 1975 [id number]                 Emergency " Contacts      (Rel.) Home Phone Work Phone Mobile Phone    Errol Hall (Brother) [phone] -- --    Faina Hall (Daughter) -- -- [phone]    Payton Diallo (Significant Other) -- -- [phone]            Dinora MENDOZA [id number] Tax ID [id number]  Insurance Information                ANTHEM MEDICARE REPLACEMENT/ANTHEM MEDICARE ADVANTAGE Phone: [phone]    Subscriber: Manjeet Hall Subscriber#: [id number]    Group#: [id number] Precert#: --        ANTHEM MEDICAID/ANTHEM MEDICAID Phone: [phone]    Subscriber: Manjeet Hall Subscriber#: [id number]    Group#: [id number] Precert#: --          Problem List           Codes Noted - Resolved       Hospital    * (Principal) Bilateral Subdural Hematoma ICD-10-CM: I62.00  ICD-9-CM: 432.1 5/26/2023 - Present    HTN (Chronic) ICD-10-CM: I10  ICD-9-CM: 401.9 5/26/2023 - Present    Hyperlipidemia (Diet controlled, off statin) (Chronic) ICD-10-CM: E78.5  ICD-9-CM: 272.4 5/26/2023 - Present    Hypothyroidism (Chronic) ICD-10-CM: E03.9  ICD-9-CM: 244.9 5/26/2023 - Present    Anxiety (Chronic) ICD-10-CM: F41.9  ICD-9-CM: 300.00 5/26/2023 - Present    H/O aortic mechanical valve replacement on Coumadin (Chronic) ICD-10-CM: Z95.2  ICD-9-CM: V43.3 5/26/2023 - Present    New Onset Type 2 diabetes mellitus ICD-10-CM: E11.9  ICD-9-CM: 250.00 5/26/2023 - Present       Non-Hospital    Somnolence ICD-10-CM: R40.0  ICD-9-CM: 780.09 5/28/2023 - Present    Hodgkin's Lymphoma s/p XRT & Chemo (Chronic) ICD-10-CM: C81.90  ICD-9-CM: 201.90 5/26/2023 - Present    VSD s/p closure (age 14) (Chronic) ICD-10-CM: Q21.0  ICD-9-CM: 745.4 5/26/2023 - Present    History of aortic regurgitation / stenosis s/p ROSS procedure with subsequent reversal with Mechanical AVR (Chronic) ICD-10-CM: Z86.79  ICD-9-CM: V12.59 5/26/2023 - Present        Emergency Department Notes      Cameron Daugherty MD at 05/26/23 0947      Procedure Orders    1. Critical Care [[id number]] ordered  by Cameron Daugherty MD               Subjective   History of Present Illness  This is a 47-year-old male who sees Roby Faust.  He is brought to the emergency room today by his brother for altered mental status of unclear duration and etiology.  Per his brother who is a fair historian it sounds like he had a stroke after having perhaps aortic valve replacement at [facility] 6 years ago.  This left him with expressive aphasia.  He is on chronic Coumadin therapy for his aortic valve.  His brother last talked to him a week ago but he really does not speak any saw him at that time he looked his normal self is unclear how long he has been in his current state though his girlfriend who he lives with is post to be coming in shortly and will be able to provide more information.    I really cannot get any other history or review of systems.  His chart is not merged as he initially came in as a Vimal Carl.        Review of Systems   Unable to perform ROS: Mental status change       No past medical history on file.    Not on File    No past surgical history on file.    No family history on file.    Social History     Socioeconomic History   • Marital status: Single   Tobacco Use   • Smoking status: Never   • Smokeless tobacco: Never   Substance and Sexual Activity   • Alcohol use: Never           Objective   Physical Exam  Vitals and nursing note reviewed.   Constitutional:       Comments: This is a 47-year-old who is awake he is alert he is sitting upright in a wheelchair getting little bit CT scan when I first see him.  He is able with minimal assistance to stand up and transfer to the CT gurney.  He does not seem to be focally weak.  When I ask him questions he mumbles and I really cannot understand him I do not know how close this is to his baseline.   HENT:      Head: Normocephalic and atraumatic.      Right Ear: External ear normal.      Left Ear: External ear normal.      Nose: Nose normal.      Mouth/Throat:       Mouth: Mucous membranes are moist.      Pharynx: Oropharynx is clear.   Eyes:      Comments: Pupils are large about 6 mm they react bilaterally   Neck:      Vascular: No carotid bruit.   Cardiovascular:      Rate and Rhythm: Normal rate and regular rhythm.      Pulses: Normal pulses.      Heart sounds: Normal heart sounds.      Comments: There is a loud click consistent with mechanical valve in aortic area with a 2/6 systolic murmur.  Pulmonary:      Effort: Pulmonary effort is normal.      Breath sounds: Normal breath sounds.   Abdominal:      Comments: Modestly obese soft and nontender without organomegaly masses or guarding   Musculoskeletal:      Cervical back: Normal range of motion and neck supple. No rigidity or tenderness.      Comments: Equal pulses without edema, synovitis, rash, or venous cords.   Lymphadenopathy:      Cervical: No cervical adenopathy.   Skin:     General: Skin is warm and dry.      Capillary Refill: Capillary refill takes less than 2 seconds.   Neurological:      Comments: Face symmetric he mumbles GCS 14 he is able to squeeze both my hands but is weak bilaterally.  With minimal assistance he is able to stand up and scoot to the CT gurney.  Does not seem to be focally weak.  His knee jerks are 2+ bilaterally.         Critical Care  Performed by: Cameron Daugherty MD  Authorized by: Cameron Daugherty MD     Critical care provider statement:     Critical care time (minutes):  60    Critical care was necessary to treat or prevent imminent or life-threatening deterioration of the following conditions:  CNS failure or compromise    Critical care was time spent personally by me on the following activities:  Development of treatment plan with patient or surrogate, discussions with consultants, evaluation of patient's response to treatment, examination of patient, obtaining history from patient or surrogate, review of old charts, re-evaluation of patient's condition, pulse oximetry, ordering and  review of radiographic studies, ordering and review of laboratory studies and ordering and performing treatments and interventions              ED Course            Recent Results (from the past 24 hour(s))   POC CHEM 8    Collection Time: 05/26/23  9:50 AM    Specimen: Blood   Result Value Ref Range    Glucose 215 (H) 70 - 130 mg/dL    BUN 15 8 - 26 mg/dL    Creatinine 0.70 0.60 - 1.30 mg/dL    Sodium 139 138 - 146 mmol/L    POC Potassium 4.3 3.5 - 4.9 mmol/L    Chloride 102 98 - 109 mmol/L    Total CO2 27 24 - 29 mmol/L    Hemoglobin 15.3 12.0 - 17.0 g/dL    Hematocrit 45 38 - 51 %    Ionized Calcium 1.08 (L) 1.20 - 1.32 mmol/L    eGFR 114.4 >60.0 mL/min/1.73   POC Protime / INR    Collection Time: 05/26/23  9:58 AM    Specimen: Blood   Result Value Ref Range    Protime 35.9 (H) 12.8 - 15.2 seconds    INR 3.2 (H) 0.8 - 1.2   Comprehensive Metabolic Panel    Collection Time: 05/26/23 10:08 AM    Specimen: Blood   Result Value Ref Range    Glucose 220 (H) 65 - 99 mg/dL    BUN 14 6 - 20 mg/dL    Creatinine 0.72 (L) 0.76 - 1.27 mg/dL    Sodium 139 136 - 145 mmol/L    Potassium 4.4 3.5 - 5.2 mmol/L    Chloride 101 98 - 107 mmol/L    CO2 24.0 22.0 - 29.0 mmol/L    Calcium 9.7 8.6 - 10.5 mg/dL    Total Protein 8.4 6.0 - 8.5 g/dL    Albumin 4.7 3.5 - 5.2 g/dL    ALT (SGPT) 14 1 - 41 U/L    AST (SGOT) 18 1 - 40 U/L    Alkaline Phosphatase 86 39 - 117 U/L    Total Bilirubin 0.6 0.0 - 1.2 mg/dL    Globulin 3.7 gm/dL    A/G Ratio 1.3 g/dL    BUN/Creatinine Ratio 19.4 7.0 - 25.0    Anion Gap 14.0 5.0 - 15.0 mmol/L    eGFR 113.4 >60.0 mL/min/1.73   Protime-INR    Collection Time: 05/26/23 10:08 AM    Specimen: Blood   Result Value Ref Range    Protime 23.6 (H) 12.2 - 14.5 Seconds    INR 2.08 (H) 0.89 - 1.12   Magnesium    Collection Time: 05/26/23 10:08 AM    Specimen: Blood   Result Value Ref Range    Magnesium 2.2 1.6 - 2.6 mg/dL   CBC Auto Differential    Collection Time: 05/26/23 10:08 AM    Specimen: Blood   Result Value  Ref Range    WBC 11.50 (H) 3.40 - 10.80 10*3/mm3    RBC 5.38 4.14 - 5.80 10*6/mm3    Hemoglobin 14.6 13.0 - 17.7 g/dL    Hematocrit 45.8 37.5 - 51.0 %    MCV 85.1 79.0 - 97.0 fL    MCH 27.1 26.6 - 33.0 pg    MCHC 31.9 31.5 - 35.7 g/dL    RDW 14.8 12.3 - 15.4 %    RDW-SD 46.2 37.0 - 54.0 fl    MPV 11.8 6.0 - 12.0 fL    Platelets 193 140 - 450 10*3/mm3    Neutrophil % 75.4 42.7 - 76.0 %    Lymphocyte % 14.2 (L) 19.6 - 45.3 %    Monocyte % 6.2 5.0 - 12.0 %    Eosinophil % 2.3 0.3 - 6.2 %    Basophil % 0.5 0.0 - 1.5 %    Immature Grans % 1.4 (H) 0.0 - 0.5 %    Neutrophils, Absolute 8.68 (H) 1.70 - 7.00 10*3/mm3    Lymphocytes, Absolute 1.63 0.70 - 3.10 10*3/mm3    Monocytes, Absolute 0.71 0.10 - 0.90 10*3/mm3    Eosinophils, Absolute 0.26 0.00 - 0.40 10*3/mm3    Basophils, Absolute 0.06 0.00 - 0.20 10*3/mm3    Immature Grans, Absolute 0.16 (H) 0.00 - 0.05 10*3/mm3    nRBC 0.0 0.0 - 0.2 /100 WBC   Hemoglobin A1c    Collection Time: 05/26/23 10:08 AM    Specimen: Blood   Result Value Ref Range    Hemoglobin A1C 8.90 (H) 4.80 - 5.60 %   TSH    Collection Time: 05/26/23 10:08 AM    Specimen: Blood   Result Value Ref Range    TSH 1.610 0.270 - 4.200 uIU/mL   Type & Screen    Collection Time: 05/26/23 10:14 AM    Specimen: Blood   Result Value Ref Range    ABO Type A     RH type Positive     Antibody Screen Negative     T&S Expiration Date 5/29/2023 11:59:59 PM    ECG 12 Lead Altered Mental Status    Collection Time: 05/26/23 10:18 AM   Result Value Ref Range    QT Interval 400 ms    QTC Interval 464 ms   ABO RH Specimen Verification    Collection Time: 05/26/23 11:25 AM    Specimen: Blood   Result Value Ref Range    ABO Type A     RH type Positive    POC Glucose Once    Collection Time: 05/26/23  1:27 PM    Specimen: Blood   Result Value Ref Range    Glucose 212 (H) 70 - 130 mg/dL   Protime-INR    Collection Time: 05/26/23  1:48 PM    Specimen: Arm, Right; Blood   Result Value Ref Range    Protime 14.0 12.2 - 14.5 Seconds     INR 1.07 0.89 - 1.12   POC Protime / INR    Collection Time: 05/26/23  1:49 PM    Specimen: Blood   Result Value Ref Range    Protime 13.3 12.8 - 15.2 seconds    INR 1.1 0.8 - 1.2     Note: In addition to lab results from this visit, the labs listed above may include labs taken at another facility or during a different encounter within the last 24 hours. Please correlate lab times with ED admission and discharge times for further clarification of the services performed during this visit.    XR Chest 1 View   Final Result   Impression:   Low lung volumes with no acute process demonstrated         Electronically Signed: Mateusz Hare     5/26/2023 10:21 AM EDT     Workstation ID: [id number]      CT Head Without Contrast Stroke Protocol   Final Result   Impression:   Bilateral 13 to 14 mm acute subdural hematomas are present. There is evidence of diffuse cerebral edema, with bilateral uncal medialization, crowding of the basal cisterns and concern for impending herniation. There is 2 to 3 mm of leftward midline shift    with effacement of the right lateral and third ventricles, with prominence of the left temporal horn concerning for early entrapment.      Scan performed at 9:31 a.m. 5/26/2023. Results discussed with the stroke team and ER physician by Elia Morris in person at time of scanning.            Electronically Signed: Ruben Morris     5/26/2023 9:55 AM EDT     Workstation ID: [id number]        Vitals:    05/26/23 1100 05/26/23 1115 05/26/23 1130 05/26/23 1135   BP: 138/93 151/88 155/82 147/85   BP Location:       Patient Position:       Pulse: 73 85 87 90   Resp:       SpO2: 97% 98% 98% 96%   Weight:       Height:         Medications   sodium chloride 0.9 % infusion (125 mL/hr Intravenous New Bag 5/26/23 1006)   niCARdipine (CARDENE) 25mg in 250mL NS infusion (10 mg/hr Intravenous New Bag 5/26/23 1405)   levETIRAcetam in NaCl 0.75% (KEPPRA) IVPB 1,000 mg (0 mg Intravenous Stopped 5/26/23 1053)   dextrose  (GLUTOSE) oral gel 15 g (has no administration in time range)   dextrose (D50W) (25 g/50 mL) IV injection 25 g (has no administration in time range)   glucagon (GLUCAGEN) injection 1 mg (has no administration in time range)   insulin regular (humuLIN R,novoLIN R) injection 2-7 Units (0 Units Subcutaneous Hold 5/26/23 1347)   pantoprazole (PROTONIX) injection 40 mg (has no administration in time range)   dextrose (GLUTOSE) oral gel 15 g (has no administration in time range)   dextrose (D50W) (25 g/50 mL) IV injection 25 g (has no administration in time range)   glucagon (GLUCAGEN) injection 1 mg (has no administration in time range)   Insulin Lispro (humaLOG) injection 2-7 Units (3 Units Subcutaneous Given 5/26/23 1347)   divalproex (DEPAKOTE ER) 24 hr tablet 250 mg (has no administration in time range)   DULoxetine (CYMBALTA) DR capsule 60 mg (60 mg Oral Not Given 5/26/23 1405)   gabapentin (NEURONTIN) capsule 200 mg (200 mg Oral Not Given 5/26/23 1416)   levothyroxine (SYNTHROID, LEVOTHROID) tablet 125 mcg (125 mcg Oral Not Given 5/26/23 1419)   rosuvastatin (CRESTOR) tablet 20 mg (20 mg Oral Not Given 5/26/23 1416)   prothrombin complex conc human (KCentra) IV solution 2,500 Units (2,500 Units Intravenous Given 5/26/23 1029)     And   phytonadione (AQUA-MEPHYTON, VITAMIN K) 10 mg in sodium chloride 0.9 % 50 mL IVPB (0 mg Intravenous Stopped 5/26/23 1125)     ECG/EMG Results (last 24 hours)     ** No results found for the last 24 hours. **        ECG 12 Lead Altered Mental Status   Preliminary Result   Test Reason : Altered Mental Status   Blood Pressure :   */*   mmHG   Vent. Rate :  81 BPM     Atrial Rate :  81 BPM      P-R Int : 154 ms          QRS Dur :  88 ms       QT Int : 400 ms       P-R-T Axes :  71  28  82 degrees      QTc Int : 464 ms      Normal sinus rhythm   Possible Left atrial enlargement   ST & T wave abnormality, consider lateral ischemia   Prolonged QT   Abnormal ECG   No previous ECGs available       Referred By: ED MD           Confirmed By:                                         Medical Decision Making        Reviewed all available studies at bedside with the patient and his girlfriend who is now at the bedside.  She actually reports 2 to 3 weeks of altered mental status and increasing sleepiness no history of trauma.    On recheck patient maintaining his airway.    Dr. Holloway neurosurgery is down to see the patient anticipates going to the OR for emergent bur hole placement subdural drainage.    Discussed the risk and benefit of reversal of the patient's chronic anticoagulation due to his mechanical aortic valve with the patient's girlfriend and family and understand the necessary part of this even a increases his risk of stroke.    This was accomplished.  Patient also started on hypertension therapy and antiepileptic drugs by consulting services.  Please see their note.    I spoke Dr. Townsend, on-call critical care medicine he will admit the patient.    All are agreeable with the plan    Elevated INR: chronic illness or injury with exacerbation, progression, or side effects of treatment that poses a threat to life or bodily functions  H/O mechanical aortic valve replacement: chronic illness or injury with exacerbation, progression, or side effects of treatment that poses a threat to life or bodily functions  Hypertensive urgency: complicated acute illness or injury with systemic symptoms that poses a threat to life or bodily functions  Somnolence: complicated acute illness or injury with systemic symptoms that poses a threat to life or bodily functions  Subdural hematoma: complicated acute illness or injury with systemic symptoms that poses a threat to life or bodily functions  Amount and/or Complexity of Data Reviewed  Independent Historian: caregiver and spouse  External Data Reviewed: notes.  Labs: ordered. Decision-making details documented in ED Course.  Radiology: ordered and independent  interpretation performed. Decision-making details documented in ED Course.  ECG/medicine tests: ordered and independent interpretation performed. Decision-making details documented in ED Course.      Risk  Prescription drug management.  Decision regarding hospitalization.  Emergency major surgery.          Final diagnoses:   Somnolence   Subdural hematoma   Elevated INR   H/O mechanical aortic valve replacement   Hypertensive urgency       ED Disposition  ED Disposition     ED Disposition   Decision to Admit    Condition   --    Comment   Level of Care: Critical Care [6]   Admitting Physician: NUSRAT MICHAEL [[id number]]   Attending Physician: NUSRAT MICHAEL [[id number]]               No follow-up provider specified.       Medication List      No changes were made to your prescriptions during this visit.          Cameron Daugherty MD  05/26/23 7251      Electronically signed by Cameron Daugherty MD at 05/26/23 7970          Operative/Procedure Notes (all)      Stephane Womack MD at 05/26/23 8801  Version 1 of 1         Progress Note    Manjeet Hall      Pre-op Diagnosis:   Bilateral subacute subdural hematoma       Post-Op Diagnosis Codes:  Preoperative    Procedure/CPT® Codes:    Right-sided craniotomy for evacuation of subdural hematoma  Left-sided craniotomy for evacuation of subdural hematoma                  * Surgery not found *    Anesthesia: * No surgery found *    Staff:   * Surgery not found *  * Surgery not found *      Estimated Blood Loss: 175 ml    Urine Voided: * No surgery found *    Specimens:                None          Drains: * No LDAs found *    Findings: Speculation of bilateral subdural hematomas.        Complications: None apparent          Stephane Womack MD     Date: 5/26/2023  Time: 17:30 EDT        Electronically signed by Stephane Womack MD at 05/26/23 6679          Physician Progress Notes (all)      Tracy Morales APRN at 05/29/23 1145          Intensive  Care Follow-up     Hospital:  LOS: 3 days   Mr. Manjeet Hall, 47 y.o. male is followed for:   Subdural hematoma, nontraumatic     Subjective     History of present illness:   Manjeet Hall is a 47 y.o. 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  He was hypertensive and hyperglycemic on admission.  His INR was subtherapeutic at 2.08.  He received Kcentra and vitamin K and was started on Keppra for seizure prophylaxis, Cardene for hypertension and taken to OR for emergent bilateral craniotomy and evacuation of subdural hematomas.      Subjective   Interval History:  Patient is resting in bed this morning.  Girlfriend is at bedside.  Good oxygenation on RA.  VSS. States that he is scared about his hospitalization and just current medical illness in general given his medical history since 2018.  At baseline, he has mild receptive aphasia and word finding difficulties.  He is requesting outpatient SLP therapy for his receptive aphasia and word finding difficulties.  He states that they have progressively worsened since he last had therapy 2018.    States that when he is started on Warfarin, he would like to stay in the hospital until therapeutic.  He does not want Lovenox injections.   He has not had a BM in several days.         The patient's past medical, surgical and social history were reviewed and updated in Epic as  "appropriate.    Review of Systems - General ROS: negative for - chills, fatigue, fever or night sweats  Respiratory ROS: negative for - cough, shortness of breath or wheezing  Cardiovascular ROS: negative for - chest pain, edema or palpitations  Gastrointestinal ROS: positive for constipation    Objective   Objective     Infusions:        Medications:  divalproex, 250 mg, Oral, Nightly  DULoxetine, 60 mg, Oral, Daily  enoxaparin, 40 mg, Subcutaneous, Q24H  famotidine, 20 mg, Intravenous, Q12H  gabapentin, 200 mg, Oral, BID  insulin detemir, 10 Units, Subcutaneous, Nightly  insulin lispro, 3-14 Units, Subcutaneous, 4x Daily AC & at Bedtime  Insulin Lispro, 4 Units, Subcutaneous, TID With Meals  levothyroxine, 125 mcg, Oral, Daily  metoprolol tartrate, 12.5 mg, Oral, Q12H  pantoprazole, 40 mg, Oral, Q AM  rosuvastatin, 20 mg, Oral, Daily  sodium chloride, 10 mL, Intravenous, Q12H        Vital Sign Min/Max for last 24 hours  Temp  Min: 97.7 °F (36.5 °C)  Max: 97.9 °F (36.6 °C)   BP  Min: 104/87  Max: 137/73   Pulse  Min: 59  Max: 96   Resp  Min: 14  Max: 18   SpO2  Min: 84 %  Max: 99 %   No data recorded       Input/Output for last 24 hour shift  05/28 0701 - 05/29 0700  In: 910 [I.V.:810]  Out: -          Objective:  General Appearance:  In no acute distress and comfortable.    Vital signs: (most recent): Blood pressure 137/73, pulse 81, temperature 97.9 °F (36.6 °C), temperature source Oral, resp. rate 14, height 172.7 cm (68\"), weight 104 kg (229 lb 0.9 oz), SpO2 97 %.  Vital signs are normal.    HEENT: (Rt sided incision approximated with staples.  No drainage, redness.)    Lungs:  Normal effort and normal respiratory rate.  Breath sounds clear to auscultation.  He is not in respiratory distress.  No rales, wheezes or rhonchi.    Heart: Normal rate.  Regular rhythm.  S1 normal and S2 normal.  No murmur or friction rub. (+ click)  Abdomen: Abdomen is rigid and non-distended.  Hypoactive bowel sounds.   There is no " abdominal tenderness.     Extremities: There is no dependent edema.    Pulses: Distal pulses are intact.    Neurological: Patient is alert and oriented to person, place and time.  GCS score is 15.    Pupils:  Pupils are equal, round, and reactive to light.    Skin:  Warm and dry.  No rash.             Results from last 7 days   Lab Units 05/29/23  1004 05/27/23  0519 05/26/23  1008   WBC 10*3/mm3 13.76* 13.78* 11.50*   HEMOGLOBIN g/dL 10.9* 11.3* 14.6   PLATELETS 10*3/mm3 173 200 193     Results from last 7 days   Lab Units 05/29/23  1004 05/27/23  0519 05/26/23  1008   SODIUM mmol/L 143 140 139   POTASSIUM mmol/L 3.8 4.2 4.4   CO2 mmol/L 28.0 24.0 24.0   BUN mg/dL 20 15 14   CREATININE mg/dL 0.64* 0.71* 0.72*   MAGNESIUM mg/dL 2.1 1.8 2.2   PHOSPHORUS mg/dL 2.8 4.0  --    GLUCOSE mg/dL 151* 234* 220*     Estimated Creatinine Clearance: 166.7 mL/min (A) (by C-G formula based on SCr of 0.64 mg/dL (L)).          Images:   None new    I reviewed the patient's results and images.     Assessment & Plan   Impression        Bilateral Subdural Hematoma    HTN    Hyperlipidemia (Diet controlled, off statin)    New Onset Type 2 diabetes mellitus    Hypothyroidism    H/O aortic mechanical valve replacement on Coumadin    Anxiety       Plan        Bilateral Subdural Hematomas s/p bilateral crani for evacuation of subdural hematomas, drains d/c'd 5/28  H/O Stroke post op AVR with residual receptive aphasia and word finding difficulty  · Post op care per NS  · Plan for embolization tomorrow 5/30  · NPO after MN  · Continue PT/OT/SLP  · Patient would like outpatient SLP to help with receptive aphasia and word finding difficulty upon discharge  · Resume anticoagulation when ok with NS  · AM labs    HTN  HLP  H/O mechanical AVR 2018 on Warfarin  · Continue Metoprolol  · Continue Crestor  · Will need anticoagulation restarted soon per NS with bridge to Warfarin    New onset T2DM, Hgb A1c 8.9   Neuropathy  · Keep glucose <= 150 per  "NS  · Continue basal  · Continue correction SSI  · Continue prandial  · Glucose has improved since basal added yesterday.  Glucose ~ 150 today.  May need insulins adjusted more later.  · Steroids discontinued yesterday  · Diabetes education  · Continue Gabapentin    Hypothyroidism  · Continue Levothyroxine    Anxiety  Depression   · Continue Cymbalta  · Continue Depakote ER  · Hold home Atarax for now    Constipation  · Change BM regimen from as needed to scheduled      DVT prophylaxis:  Lovenox SQ / SCDs  GI prophylaxis:  PPI  Disposition:  Keep in ICU    Plan of care and goals reviewed with multidisciplinary/antibiotic stewardship team during rounds.   I discussed the patient's findings and my recommendations with patient, family, nursing staff and primary care team     Time: 35 minutes, face to face, with the patient and family or on the [place] coordinating care with other health care providers.     I spent > 50% percent of this time, counseling and discussing evaluation, current status and management.      BETHANIE Markham, AGACNP-BC, FNP-BC  Pulmonary and Critical Care Service       Electronically signed by Tracy Morales APRN at 05/29/23 1223     Haylee Domingo PA-C at 05/29/23 1051          NEUROSURGERY PROGRESS NOTE    Interval History:   Bilateral SDH, s/p bilateral craniotomy for evacuation 5/26/23. POD 3  Patient awake and conversant. Has been up to chair and walking on floor with assistance. Mild headache, no other complaints. Drains removed,     Vital Signs  Blood pressure 137/73, pulse 81, temperature 97.9 °F (36.6 °C), temperature source Oral, resp. rate 14, height 172.7 cm (68\"), weight 104 kg (229 lb 0.9 oz), SpO2 97 %.    Physical Exam:  Awake, alert, oriented  Pupils equal bilaterally  Extraocular movements and facial expression intact bilaterally  5/5 all 4 extremities  Incisions clean, dry, intact. Drains were removed yesterday     Results Review:    WBC   Date " Value Ref Range Status   05/29/2023 13.76 (H) 3.40 - 10.80 10*3/mm3 Final     RBC   Date Value Ref Range Status   05/29/2023 4.06 (L) 4.14 - 5.80 10*6/mm3 Final     Hemoglobin   Date Value Ref Range Status   05/29/2023 10.9 (L) 13.0 - 17.7 g/dL Final     Hematocrit   Date Value Ref Range Status   05/29/2023 34.6 (L) 37.5 - 51.0 % Final     MCV   Date Value Ref Range Status   05/29/2023 85.2 79.0 - 97.0 fL Final     MCH   Date Value Ref Range Status   05/29/2023 26.8 26.6 - 33.0 pg Final     MCHC   Date Value Ref Range Status   05/29/2023 31.5 31.5 - 35.7 g/dL Final     RDW   Date Value Ref Range Status   05/29/2023 15.1 12.3 - 15.4 % Final     RDW-SD   Date Value Ref Range Status   05/29/2023 46.9 37.0 - 54.0 fl Final     MPV   Date Value Ref Range Status   05/29/2023 11.8 6.0 - 12.0 fL Final     Platelets   Date Value Ref Range Status   05/29/2023 173 140 - 450 10*3/mm3 Final     Basic Metabolic Panel    Sodium Sodium   Date Value Ref Range Status   05/29/2023 143 136 - 145 mmol/L Final   05/27/2023 140 136 - 145 mmol/L Final      Potassium Potassium   Date Value Ref Range Status   05/29/2023 3.8 3.5 - 5.2 mmol/L Final     Comment:     Slight hemolysis detected by analyzer. Results may be affected.   05/27/2023 4.2 3.5 - 5.2 mmol/L Final      Chloride Chloride   Date Value Ref Range Status   05/29/2023 105 98 - 107 mmol/L Final   05/27/2023 103 98 - 107 mmol/L Final      Bicarbonate No results found for: PLASMABICARB   BUN BUN   Date Value Ref Range Status   05/29/2023 20 6 - 20 mg/dL Final   05/27/2023 15 6 - 20 mg/dL Final      Creatinine Creatinine   Date Value Ref Range Status   05/29/2023 0.64 (L) 0.76 - 1.27 mg/dL Final   05/27/2023 0.71 (L) 0.76 - 1.27 mg/dL Final      Calcium Calcium   Date Value Ref Range Status   05/29/2023 9.0 8.6 - 10.5 mg/dL Final   05/27/2023 9.2 8.6 - 10.5 mg/dL Final      Glucose      No components found for: GLUCOSE.*     I/O last 3 completed shifts:  In: 2110 [I.V.:1910; IV  Piggyback:200]  Out: 25 [Drains:25]  No intake/output data recorded.      ASSESSMENT/Plan:   46 yo M POD 3 from bilateral craniotomies for SDH evacuation.Significant neurologic improvement. Drains removed yesterday: incisions intact.   Keppra/decadron d/c yesterday as well (patient on depakote and gabapentin at home and this is continued); blood sugars improving. Goal 150. ICU managing insulin.   Lovenox for DVT prophylaxis. Patient has history of mechanical aortic valve replacement and was previously on coumadin. Plan is for MMA embolization tomorrow 5/30. NPO after midnight. Further adjustment of anticoagulation after this procedure.        Haylee Domingo PA-C  05/29/23  10:51 EDT    Electronically signed by Haylee Domingo PA-C at 05/29/23 1108     Meryl Ashby MD at 05/28/23 0995          Critical Care Note     LOS: 2 days   Patient Care Team:  Provider, No Known as PCP - General    Chief Complaint/Reason for visit:    Chief Complaint   Patient presents with   • Stroke   Bilateral subdural hematomas  History of mechanical aortic valve replacement on Coumadin    Subjective     Interval History:     He is afebrile.  Room air saturation 95%.  Up in the chair tolerating p.o.  Surgical drains removed this morning.    Review of Systems:    All systems were reviewed and negative except as noted in subjective.    Medical history, surgical history, social history, family history reviewed    Objective     Intake/Output:    Intake/Output Summary (Last 24 hours) at 5/28/2023 0939  Last data filed at 5/28/2023 0600  Gross per 24 hour   Intake 1949.8 ml   Output 495 ml   Net 1454.8 ml       Nutrition:  Diet: Cardiac Diets, Diabetic Diets; Healthy Heart (2-3 Na+); Consistent Carbohydrate; Texture: Regular Texture (IDDSI 7); Fluid Consistency: Thin (IDDSI 0)  NPO Diet NPO Type: Sips with Meds    Infusions:  niCARdipine, 5-15 mg/hr, Last Rate: Stopped (05/26/23 2875)        Telemetry: Sinus rhythm             Vital  "Signs  Blood pressure 136/74, pulse 85, temperature 97.7 °F (36.5 °C), temperature source Oral, resp. rate 14, height 172.7 cm (68\"), weight 104 kg (229 lb 0.9 oz), SpO2 95 %.    Physical Exam:  General Appearance:   Middle-aged gentleman  upright in chair in no distress   Head:   Bilateral craniotomies with incisions intact, no erythema or drainage   Eyes:          Conjunctiva pink, pupils equal   Ears:     Throat:  Oral mucosa moist   Neck:  Trachea midline, no crepitus   Back:      Lungs:    Symmetric chest expansion without wheeze or rhonchi    Heart:   Regular rhythm, mechanical S2   Abdomen:    Bowel sounds present, nontender   Rectal:   Deferred   Extremities:  Right radial arterial line.  No pretibial edema   Pulses:  Palpable pedal pulses   Skin:  Warm and dry   Lymph nodes:  No cervical adenopathy   Neurologic:  Awake, oriented x3.   symmetric      Results Review:     I reviewed the patient's new clinical results.   Results from last 7 days   Lab Units 05/27/23 0519 05/26/23  1008 05/26/23  0950   SODIUM mmol/L 140 139  --    POTASSIUM mmol/L 4.2 4.4  --    CHLORIDE mmol/L 103 101  --    CO2 mmol/L 24.0 24.0  --    BUN mg/dL 15 14  --    CREATININE mg/dL 0.71* 0.72* 0.70   CALCIUM mg/dL 9.2 9.7  --    BILIRUBIN mg/dL  --  0.6  --    ALK PHOS U/L  --  86  --    ALT (SGPT) U/L  --  14  --    AST (SGOT) U/L  --  18  --    GLUCOSE mg/dL 234* 220*  --      Results from last 7 days   Lab Units 05/27/23 0519 05/26/23  1008 05/26/23  0950   WBC 10*3/mm3 13.78* 11.50*  --    HEMOGLOBIN g/dL 11.3* 14.6  --    HEMOGLOBIN, POC g/dL  --   --  15.3   HEMATOCRIT % 35.4* 45.8  --    HEMATOCRIT POC %  --   --  45   PLATELETS 10*3/mm3 200 193  --          No results found for: BLOODCX  No results found for: URINECX    I reviewed the patient's new imaging including images and reports.      Findings:   Postoperative changes are noted from interval bilateral craniotomy with evacuation of previously noted acute subdural " hematomas. Subdural drains are noted in place. Small bilateral persistent extra-axial mixed fluid collections are present with   low-density fluid, gas and minimal persisting hyperdense blood products, 7 mm maximal thickness on the left, 6 mm on the right. There is improved diffuse cerebral edema, with restored patency of the basal cisterns and decreased effacement of the   ventricles. Some underlying parenchymal hypoattenuation is noted involving temporal lobes bilaterally, greater on the left, concerning for underlying contusion/ischemia. There is also evident trace acute left parafalcine subdural hematoma, fairly   localized, measuring 4 mm in maximal thickness without significant associated mass effect. The orbits are normal. The paranasal sinuses are grossly clear.    Impression:     Impression:   Expected postoperative changes following bilateral craniotomy for evacuation of acute subdural hematomas. Cerebral edema is improved and there is restored patency of the basal cisterns and decreased effacement of the ventricles. Bilateral subdural drains    are noted in place.     4 mm small and somewhat localized left parafalcine subdural hematoma.     Increase in conspicuity, there is somewhat heterogeneous hypoattenuation present involving the left greater than right temporal lobes, concerning for underlying component of either persisting edema, contusion or ischemia.         Electronically Signed: Ruben Morris    5/27/2023 6:07 AM EDT          All medications reviewed.   divalproex, 250 mg, Oral, Nightly  DULoxetine, 60 mg, Oral, Daily  enoxaparin, 40 mg, Subcutaneous, Q24H  famotidine, 20 mg, Intravenous, Q12H  gabapentin, 200 mg, Oral, BID  insulin detemir, 10 Units, Subcutaneous, Nightly  Insulin Lispro, 4 Units, Subcutaneous, TID With Meals  insulin regular, 3-14 Units, Subcutaneous, Q6H  levothyroxine, 125 mcg, Oral, Daily  pantoprazole, 40 mg, Oral, Q AM  rosuvastatin, 20 mg, Oral, Daily  sodium chloride,  "10 mL, Intravenous, Q12H          Assessment & Plan       Bilateral Subdural Hematoma    HTN    Hyperlipidemia (Diet controlled, off statin)    Hypothyroidism    Anxiety    H/O aortic mechanical valve replacement on Coumadin    Prediabetes    Somnolence    47-year-old gentleman with hypertension, dyslipidemia, hypothyroidism, diabetes, history of Hodgkin's lymphoma, 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  Postoperatively he developed pancreatitis and anticoagulation was held and he suffered a stroke resulting in some aphasia in 2018.  He presented May 26 with altered mentation and bilateral subdural hematomas with edema and some leftward midline shift and effacement of the right lateral and third ventricles and bilateral uncal medialization.  He was hypertensive and hyperglycemic on admission.  His INR was subtherapeutic at 2.08.  He received Kcentra and vitamin K and was started on Keppra, Cardene for hypertension and taken to surgery for emergent bilateral craniotomy and evacuation of subdural hematomas.    Today he is significantly more alert with no focal neurologic deficits.  Systolic blood pressures are 110-136 on no medications.  As an outpatient he was only on propranolol and Crestor for cardiac.  He remains off anticoagulation.  Risk of valve thrombosis with aortic valve is low at 1.3%.  There is a risk of embolic events.  However risk of rebleeding outweighs risk of embolic events currently.    Blood sugars remain in the mid 200s in part from dexamethasone which was stopped today.  His admission A1c is 8.9 consistent with new onset diabetes.  He had been previously told that he was \"prediabetic\".  He will need additional insulin coverage to improve glucose management with goal of blood sugar 140-180    He has a known history of hypothyroidism " "and is on replacement therapy.  His TSH is 1.6    PLAN:    -Monitor surgical drainage  -Monitor neurologic exam  -Emergent CT scan for any decline in neurologic status  -Maintain systolic blood pressure less than 140  -Restart a beta-blocker, I will use metoprolol  -Add Levemir 10 units nightly, 4 units of Humalog with meals and continue sliding scale  -Neurosurgery discontinued dexamethasone and Keppra  -He remains on his home dose of Depakote, gabapentin  -Continue thyroid replacement  -Add DVT prophylaxis, Lovenox      VTE Prophylaxis:SCDS    Stress Ulcer Prophylaxis: Protonix    Meryl Ashby MD  05/28/23  09:39 EDT      Time: 25min       Electronically signed by Meryl Ashby MD at 05/28/23 0950     Stephane Womack MD at 05/28/23 0857          NEUROSURGERY PROGRESS NOTE    Chief Complaint: Bilateral subdural hematomas    Subjective: Continues to improve.  Patient even more awake and interactive this morning.    Objective    Vital Signs: Blood pressure 110/75, pulse 75, temperature 99.7 °F (37.6 °C), temperature source Oral, resp. rate 14, height 172.7 cm (68\"), weight 104 kg (229 lb 0.9 oz), SpO2 95 %.    Physical Exam  Awake, alert and conversive  Opens eyes spont  Pupils 3 mm rx bilat  Extraocular muscles intact bilaterally  Face symmetric bilaterally  Tongue midline  5/5 in all 4 ext  Incisions clean dry and intact    Intake/Output:     Intake/Output Summary (Last 24 hours) at 5/28/2023 0857  Last data filed at 5/28/2023 0600  Gross per 24 hour   Intake 1949.8 ml   Output 495 ml   Net 1454.8 ml       Current Medications:   Current Facility-Administered Medications:   •  acetaminophen (TYLENOL) tablet 650 mg, 650 mg, Oral, Q6H PRN, Robbi Yang, APRN, 650 mg at 05/27/23 0051  •  dexamethasone (DECADRON) injection 4 mg, 4 mg, Intravenous, Q6H, Stephane Womack MD, 4 mg at 05/28/23 0633  •  dextrose (D50W) (25 g/50 mL) IV injection 25 g, 25 g, Intravenous, Q15 Min PRN, Isma, " Meryl MERCADO MD  •  dextrose (GLUTOSE) oral gel 15 g, 15 g, Oral, Q15 Min PRN, Meryl Ashby MD  •  divalproex (DEPAKOTE ER) 24 hr tablet 250 mg, 250 mg, Oral, Nightly, Jose Maria Townsend MD, 250 mg at 05/27/23 2155  •  docusate sodium (COLACE) capsule 100 mg, 100 mg, Oral, BID PRN, Stephane Womack MD  •  DULoxetine (CYMBALTA) DR capsule 60 mg, 60 mg, Oral, Daily, Jose Maria Townsend MD, 60 mg at 05/28/23 0844  •  famotidine (PEPCID) injection 20 mg, 20 mg, Intravenous, Q12H, Stephane Womack MD, 20 mg at 05/28/23 0844  •  gabapentin (NEURONTIN) capsule 200 mg, 200 mg, Oral, BID, Jose Maria Townsend MD, 200 mg at 05/28/23 0844  •  glucagon (GLUCAGEN) injection 1 mg, 1 mg, Intramuscular, Q15 Min PRN, Meryl Ashby MD  •  HYDROcodone-acetaminophen (NORCO) 5-325 MG per tablet 1 tablet, 1 tablet, Oral, Q6H PRN, Robbi Yang APRN, 1 tablet at 05/28/23 0723  •  HYDROmorphone (DILAUDID) injection 0.5 mg, 0.5 mg, Intravenous, Q2H PRN **AND** naloxone (NARCAN) injection 0.4 mg, 0.4 mg, Intravenous, Q5 Min PRN, Stephane Womack MD  •  insulin regular (humuLIN R,novoLIN R) injection 3-14 Units, 3-14 Units, Subcutaneous, Q6H, Meryl Ashby MD, 6 Units at 05/28/23 0632  •  levETIRAcetam in NaCl 0.82% (KEPPRA) IVPB 500 mg, 500 mg, Intravenous, Q12H, Stephane Womack MD, 500 mg at 05/28/23 0844  •  levothyroxine (SYNTHROID, LEVOTHROID) tablet 125 mcg, 125 mcg, Oral, Daily, Jose Maria Townsend MD, 125 mcg at 05/28/23 0844  •  magnesium hydroxide (MILK OF MAGNESIA) 400 MG/5ML suspension 15 mL, 15 mL, Oral, Daily PRN, Stephane Womack MD  •  niCARdipine (CARDENE) 25mg in 250mL NS infusion, 5-15 mg/hr, Intravenous, Titrated, Kayla Toure, APRN, Stopped at 05/26/23 2318  •  ondansetron (ZOFRAN) injection 4 mg, 4 mg, Intravenous, Q6H PRN **OR** ondansetron (ZOFRAN) tablet 4 mg, 4 mg, Oral, Q6H PRN, Stephane Womack MD  •  pantoprazole (PROTONIX) EC tablet 40 mg, 40 mg, Oral, Q AM, Trey  BETHANIE Marino, 40 mg at 05/28/23 0640  •  polyethylene glycol (MIRALAX) packet 17 g, 17 g, Oral, Daily PRN, Stephane Womack MD  •  rosuvastatin (CRESTOR) tablet 20 mg, 20 mg, Oral, Daily, KristianJose Maria pagan MD, 20 mg at 05/28/23 0844  •  sennosides-docusate (PERICOLACE) 8.6-50 MG per tablet 1 tablet, 1 tablet, Oral, Nightly PRN, Stephane Womack MD  •  sodium chloride 0.9 % flush 10 mL, 10 mL, Intravenous, Q12H, Kayla Toure APRN, 10 mL at 05/28/23 0844  •  sodium chloride 0.9 % flush 10 mL, 10 mL, Intravenous, PRN, Kayla Toure APRN  •  sodium chloride 0.9 % infusion 40 mL, 40 mL, Intravenous, PRN, Kayla Toure APRN  •  sodium chloride 0.9 % infusion, 100 mL/hr, Intravenous, Continuous, Chanell Rock PA-C, Last Rate: 100 mL/hr at 05/28/23 0600, 100 mL/hr at 05/28/23 0600     Laboratory Results:       Lab 05/27/23  1153 05/27/23  0519 05/26/23  1349 05/26/23  1348 05/26/23  1008 05/26/23  0958 05/26/23  0950   WBC  --  13.78*  --   --  11.50*  --   --    HEMOGLOBIN  --  11.3*  --   --  14.6  --   --    HEMOGLOBIN, POC  --   --   --   --   --   --  15.3   HEMATOCRIT  --  35.4*  --   --  45.8  --   --    HEMATOCRIT POC  --   --   --   --   --   --  45   PLATELETS  --  200  --   --  193  --   --    NEUTROS ABS  --   --   --   --  8.68*  --   --    IMMATURE GRANS (ABS)  --   --   --   --  0.16*  --   --    LYMPHS ABS  --   --   --   --  1.63  --   --    MONOS ABS  --   --   --   --  0.71  --   --    EOS ABS  --   --   --   --  0.26  --   --    MCV  --  84.7  --   --  85.1  --   --    PROTIME 14.7*  --  13.3 14.0 23.6* 35.9*  --          Lab 05/27/23  0519 05/26/23  1008 05/26/23  0950   SODIUM 140 139  --    POTASSIUM 4.2 4.4  --    CHLORIDE 103 101  --    CO2 24.0 24.0  --    ANION GAP 13.0 14.0  --    BUN 15 14  --    CREATININE 0.71* 0.72* 0.70   EGFR 113.9 113.4 114.4   GLUCOSE 234* 220*  --    CALCIUM 9.2 9.7  --    MAGNESIUM 1.8 2.2  --    PHOSPHORUS 4.0  --   --    HEMOGLOBIN  A1C  --  8.90*  --    TSH  --  1.610  --          Lab 05/26/23  1008   TOTAL PROTEIN 8.4   ALBUMIN 4.7   GLOBULIN 3.7   ALT (SGPT) 14   AST (SGOT) 18   BILIRUBIN 0.6   ALK PHOS 86         Lab 05/27/23  1153 05/26/23  1349 05/26/23  1348 05/26/23  1008 05/26/23  0958   PROTIME 14.7* 13.3 14.0 23.6* 35.9*   INR 1.13* 1.1 1.07 2.08* 3.2*         Lab 05/27/23  0519   CHOLESTEROL 181   LDL CHOL 107*   HDL CHOL 48   TRIGLYCERIDES 147         Lab 05/26/23  1125 05/26/23  1014   ABO TYPING A A   RH TYPING Positive Positive   ANTIBODY SCREEN  --  Negative         Brief Urine Lab Results     None        Microbiology Results (last 10 days)     ** No results found for the last 240 hours. **           Diagnostic Imaging: I reviewed and independently interpreted the new imaging.     Assessment/Plan:  This is a 47-year-old male status post bilateral craniotomies for evacuation of subacute subdural hematomas on 5/26.  Neurologically, the patient continues to improve.  We will remove his Hemovac drains today.    Continue to mobilize patient. We will d/c keppra and Decadron.   We will start Lovenox for DVT prophylaxis today..  Patient's glucoses continue to be elevated.  Would prefer to have them around 150.  Defer to ICU for management of insulin.  We will plan for an MMA embolization on Tuesday.  Would hold off on heparin drip or oral anticoagulation until after procedure.  Appreciate ICU assistance.      Any copied data from previous notes included in the (1) History of Present Illness, (2) Physical Examination and (3) Medical Decision Making and/or Assessment and Plan has been reviewed and is accurate as of 05/28/23      Stephane Womack MD  05/28/23  08:57 EDT        Electronically signed by Stephane Womack MD at 05/28/23 0911     Meryl Ashby MD at 05/27/23 0846          Critical Care Note     LOS: 1 day   Patient Care Team:  Provider, No Known as PCP - General    Chief Complaint/Reason for visit:    Chief  "Complaint   Patient presents with   • Stroke   Bilateral subdural hematomas  History of aortic valve replacement on Coumadin    Subjective     Interval History:     He remains afebrile.  On room air saturation is 94%.  He is in sinus rhythm.  Lopez catheter in place with 1 L of urine.  Left subdural drain 90 mL, right subdural drain 50 mL    Review of Systems:    All systems were reviewed and negative except as noted in subjective.    Medical history, surgical history, social history, family history reviewed    Objective     Intake/Output:    Intake/Output Summary (Last 24 hours) at 5/27/2023 0816  Last data filed at 5/27/2023 0617  Gross per 24 hour   Intake 1550 ml   Output 1140 ml   Net 410 ml       Nutrition:  NPO Diet NPO Type: Strict NPO    Infusions:  niCARdipine, 5-15 mg/hr, Last Rate: Stopped (05/26/23 5362)  sodium chloride, 100 mL/hr, Last Rate: 100 mL/hr (05/27/23 0510)        Mechanical Ventilator Settings:                                                Telemetry: Sinus rhythm             Vital Signs  Blood pressure 101/63, pulse 86, temperature 98.5 °F (36.9 °C), temperature source Axillary, resp. rate 16, height 172.7 cm (68\"), weight 104 kg (229 lb 0.9 oz), SpO2 91 %.    Physical Exam:  General Appearance:   Middle-aged gentleman supine in bed in no distress   Head:   Bilateral craniotomies with drains, scant bloody output   Eyes:          Gaze is conjugate   Ears:     Throat:  Oral mucosa moist   Neck:  Trachea midline, no crepitus   Back:      Lungs:    Symmetric chest expansion without wheeze or rhonchi    Heart:   Regular rhythm, mechanical S2   Abdomen:    Bowel sounds present, nontender   Rectal:   Deferred   Extremities:  Right radial arterial line, right hand pink   Pulses:    Skin:  Warm and dry   Lymph nodes:  No cervical adenopathy   Neurologic:  Awake, oriented to person, follows commands with all 4 extremities      Results Review:     I reviewed the patient's new clinical results. "   Results from last 7 days   Lab Units 05/27/23  0519 05/26/23  1008 05/26/23  0950   SODIUM mmol/L 140 139  --    POTASSIUM mmol/L 4.2 4.4  --    CHLORIDE mmol/L 103 101  --    CO2 mmol/L 24.0 24.0  --    BUN mg/dL 15 14  --    CREATININE mg/dL 0.71* 0.72* 0.70   CALCIUM mg/dL 9.2 9.7  --    BILIRUBIN mg/dL  --  0.6  --    ALK PHOS U/L  --  86  --    ALT (SGPT) U/L  --  14  --    AST (SGOT) U/L  --  18  --    GLUCOSE mg/dL 234* 220*  --      Results from last 7 days   Lab Units 05/27/23  0519 05/26/23  1008 05/26/23  0950   WBC 10*3/mm3 13.78* 11.50*  --    HEMOGLOBIN g/dL 11.3* 14.6  --    HEMOGLOBIN, POC g/dL  --   --  15.3   HEMATOCRIT % 35.4* 45.8  --    HEMATOCRIT POC %  --   --  45   PLATELETS 10*3/mm3 200 193  --          No results found for: BLOODCX  No results found for: URINECX    I reviewed the patient's new imaging including images and reports.      Findings:   Postoperative changes are noted from interval bilateral craniotomy with evacuation of previously noted acute subdural hematomas. Subdural drains are noted in place. Small bilateral persistent extra-axial mixed fluid collections are present with   low-density fluid, gas and minimal persisting hyperdense blood products, 7 mm maximal thickness on the left, 6 mm on the right. There is improved diffuse cerebral edema, with restored patency of the basal cisterns and decreased effacement of the   ventricles. Some underlying parenchymal hypoattenuation is noted involving temporal lobes bilaterally, greater on the left, concerning for underlying contusion/ischemia. There is also evident trace acute left parafalcine subdural hematoma, fairly   localized, measuring 4 mm in maximal thickness without significant associated mass effect. The orbits are normal. The paranasal sinuses are grossly clear.    Impression:     Impression:   Expected postoperative changes following bilateral craniotomy for evacuation of acute subdural hematomas. Cerebral edema is  improved and there is restored patency of the basal cisterns and decreased effacement of the ventricles. Bilateral subdural drains    are noted in place.     4 mm small and somewhat localized left parafalcine subdural hematoma.     Increase in conspicuity, there is somewhat heterogeneous hypoattenuation present involving the left greater than right temporal lobes, concerning for underlying component of either persisting edema, contusion or ischemia.         Electronically Signed: Ruben Morris    5/27/2023 6:07 AM EDT          All medications reviewed.   dexamethasone, 4 mg, Intravenous, Q6H  divalproex, 250 mg, Oral, Nightly  DULoxetine, 60 mg, Oral, Daily  famotidine, 20 mg, Intravenous, Q12H  gabapentin, 200 mg, Oral, BID  insulin lispro, 2-7 Units, Subcutaneous, 4x Daily AC & at Bedtime  levETIRAcetam, 500 mg, Intravenous, Q12H  levothyroxine, 125 mcg, Oral, Daily  pantoprazole, 40 mg, Oral, Q AM  rosuvastatin, 20 mg, Oral, Daily  sodium chloride, 10 mL, Intravenous, Q12H          Assessment & Plan       Bilateral Subdural Hematoma    HTN    Hyperlipidemia (Diet controlled, off statin)    Hypothyroidism    Anxiety    H/O aortic mechanical valve replacement on Coumadin    Prediabetes    47-year-old gentleman with hypertension, dyslipidemia, hypothyroidism, diabetes, history of Hodgkin's lymphoma, 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  Postoperatively he developed pancreatitis and anticoagulation was held and he suffered a stroke resulting in some aphasia in 2018.  He presented May 26 with altered mentation and bilateral subdural hematomas with edema and some leftward midline shift and effacement of the right lateral and third ventricles and bilateral uncal medialization.  He was hypertensive and hyperglycemic on admission.  His INR was subtherapeutic  "at 2.08.  He received Kcentra and vitamin K and was started on Keppra, Cardene for hypertension and taken to surgery for emergent bilateral craniotomy and evacuation of subdural hematomas.    Neurosurgery reports that he is significantly more awake today.  He is oriented to person only.  He is moving all extremities equally.  Currently his blood pressure is 108 systolic, and he is off Cardene.      PLAN:    -Monitor surgical drainage  -Monitor neurologic exam  -Emergent CT scan for any decline in neurologic status  -Maintain systolic blood pressure less than 140  -Increase sliding scale insulin  -Speech therapy to evaluate for possible swallowing  -Add long-acting insulin if he tolerated p.o. diet  -Continue to hold anticoagulation  -Dexamethasone  -Keppra  -Thyroid replacement  -With mechanical valve he will need anticoagulation restarted, will likely restart at 72 hours  -Neurosurgery plans middle meningeal artery embolization next week      VTE Prophylaxis:SCDS    Stress Ulcer Prophylaxis: Protonix    Meryl Ashby MD  05/27/23  08:47 EDT      Time: 25min       Electronically signed by Meryl Ashby MD at 05/27/23 0901     Stephane Womack MD at 05/27/23 0825          NEUROSURGERY PROGRESS NOTE    Chief Complaint: Bilateral subdural hematomas    Subjective: Patient much more awake this morning.    Objective    Vital Signs: Blood pressure 101/63, pulse 86, temperature 98.5 °F (36.9 °C), temperature source Axillary, resp. rate 16, height 172.7 cm (68\"), weight 104 kg (229 lb 0.9 oz), SpO2 91 %.    Physical Exam  Awake, alert and oriented x 1, says incorrect year and city, but much more conversive  Opens eyes spont  Pupils 3 mm rx bilat  Extraocular muscles intact bilaterally  Face symmetric bilaterally  Tongue midline  5/5 in all 4 ext  Dressing dry    Intake/Output:     Intake/Output Summary (Last 24 hours) at 5/27/2023 0825  Last data filed at 5/27/2023 0617  Gross per 24 hour   Intake 1550 ml "   Output 1140 ml   Net 410 ml       Current Medications:   Current Facility-Administered Medications:   •  acetaminophen (TYLENOL) tablet 650 mg, 650 mg, Oral, Q6H PRN, Robbi Yang APRN, 650 mg at 05/27/23 0051  •  dexamethasone (DECADRON) injection 4 mg, 4 mg, Intravenous, Q6H, Stephane Womack MD, 4 mg at 05/27/23 0617  •  dextrose (D50W) (25 g/50 mL) IV injection 25 g, 25 g, Intravenous, Q15 Min PRN, Navdeep Paz, APRN  •  dextrose (D50W) (25 g/50 mL) IV injection 25 g, 25 g, Intravenous, Q15 Min PRN, Jose Maria Townsend MD  •  dextrose (GLUTOSE) oral gel 15 g, 15 g, Oral, Q15 Min PRN, Navdeep Paz, APRN  •  dextrose (GLUTOSE) oral gel 15 g, 15 g, Oral, Q15 Min PRN, Jose Maria Townsend MD  •  divalproex (DEPAKOTE ER) 24 hr tablet 250 mg, 250 mg, Oral, Nightly, Jose Maria Townsend MD, 250 mg at 05/26/23 2141  •  docusate sodium (COLACE) capsule 100 mg, 100 mg, Oral, BID PRN, Stephane Womack MD  •  DULoxetine (CYMBALTA) DR capsule 60 mg, 60 mg, Oral, Daily, Jose Maria Townsend MD  •  famotidine (PEPCID) injection 20 mg, 20 mg, Intravenous, Q12H, Stephane Womack MD, 20 mg at 05/26/23 2317  •  gabapentin (NEURONTIN) capsule 200 mg, 200 mg, Oral, BID, Jose Maria Townsend MD, 200 mg at 05/26/23 2156  •  glucagon (GLUCAGEN) injection 1 mg, 1 mg, Intramuscular, Q15 Min PRN, Navdeep Paz, APRN  •  glucagon (GLUCAGEN) injection 1 mg, 1 mg, Intramuscular, Q15 Min PRN, Jose Maria Townsend MD  •  HYDROcodone-acetaminophen (NORCO) 5-325 MG per tablet 1 tablet, 1 tablet, Oral, Q6H PRN, Robbi Yang APRN, 1 tablet at 05/26/23 2208  •  HYDROmorphone (DILAUDID) injection 0.5 mg, 0.5 mg, Intravenous, Q2H PRN **AND** naloxone (NARCAN) injection 0.4 mg, 0.4 mg, Intravenous, Q5 Min PRN, Stephane Womack MD  •  Insulin Lispro (humaLOG) injection 2-7 Units, 2-7 Units, Subcutaneous, 4x Daily AC & at Bedtime, Jose Maria Townsend MD, 3 Units at 05/26/23 2141  •  levETIRAcetam in NaCl 0.75% (KEPPRA) IVPB  1,000 mg, 1,000 mg, Intravenous, Q12H, Kayla Toure APRN, Last Rate: 0 mL/hr at 05/26/23 1053, 1,000 mg at 05/26/23 2141  •  levothyroxine (SYNTHROID, LEVOTHROID) tablet 125 mcg, 125 mcg, Oral, Daily, Jose Maria Townsend MD  •  magnesium hydroxide (MILK OF MAGNESIA) 400 MG/5ML suspension 15 mL, 15 mL, Oral, Daily PRN, Stephane Womack MD  •  niCARdipine (CARDENE) 25mg in 250mL NS infusion, 5-15 mg/hr, Intravenous, Titrated, Kayla Toure APRN, Stopped at 05/26/23 2318  •  ondansetron (ZOFRAN) injection 4 mg, 4 mg, Intravenous, Q6H PRN **OR** ondansetron (ZOFRAN) tablet 4 mg, 4 mg, Oral, Q6H PRN, Stephane Womack MD  •  pantoprazole (PROTONIX) EC tablet 40 mg, 40 mg, Oral, Q AM, Robbi Yang APRN, 40 mg at 05/27/23 0617  •  polyethylene glycol (MIRALAX) packet 17 g, 17 g, Oral, Daily PRN, Stephane Womack MD  •  rosuvastatin (CRESTOR) tablet 20 mg, 20 mg, Oral, Daily, Jose Maria Townsend MD  •  sennosides-docusate (PERICOLACE) 8.6-50 MG per tablet 1 tablet, 1 tablet, Oral, Nightly PRN, Stephane Womack MD  •  sodium chloride 0.9 % flush 10 mL, 10 mL, Intravenous, Q12H, Kayla Toure APRN, 10 mL at 05/26/23 2142  •  sodium chloride 0.9 % flush 10 mL, 10 mL, Intravenous, PRN, Kayla Toure APRN  •  sodium chloride 0.9 % infusion 40 mL, 40 mL, Intravenous, PRN, Kayla Toure APRN  •  sodium chloride 0.9 % infusion, 100 mL/hr, Intravenous, Continuous, Pranav, Chanell M, PA-C, Last Rate: 100 mL/hr at 05/27/23 0510, 100 mL/hr at 05/27/23 0510     Laboratory Results:       Lab 05/27/23  0519 05/26/23  1349 05/26/23  1348 05/26/23  1008 05/26/23  0958 05/26/23  0950   WBC 13.78*  --   --  11.50*  --   --    HEMOGLOBIN 11.3*  --   --  14.6  --   --    HEMOGLOBIN, POC  --   --   --   --   --  15.3   HEMATOCRIT 35.4*  --   --  45.8  --   --    HEMATOCRIT POC  --   --   --   --   --  45   PLATELETS 200  --   --  193  --   --    NEUTROS ABS  --   --   --  8.68*  --   --    IMMATURE  GRANS (ABS)  --   --   --  0.16*  --   --    LYMPHS ABS  --   --   --  1.63  --   --    MONOS ABS  --   --   --  0.71  --   --    EOS ABS  --   --   --  0.26  --   --    MCV 84.7  --   --  85.1  --   --    PROTIME  --  13.3 14.0 23.6* 35.9*  --          Lab 05/27/23  0519 05/26/23  1008 05/26/23  0950   SODIUM 140 139  --    POTASSIUM 4.2 4.4  --    CHLORIDE 103 101  --    CO2 24.0 24.0  --    ANION GAP 13.0 14.0  --    BUN 15 14  --    CREATININE 0.71* 0.72* 0.70   EGFR 113.9 113.4 114.4   GLUCOSE 234* 220*  --    CALCIUM 9.2 9.7  --    MAGNESIUM 1.8 2.2  --    PHOSPHORUS 4.0  --   --    HEMOGLOBIN A1C  --  8.90*  --    TSH  --  1.610  --          Lab 05/26/23  1008   TOTAL PROTEIN 8.4   ALBUMIN 4.7   GLOBULIN 3.7   ALT (SGPT) 14   AST (SGOT) 18   BILIRUBIN 0.6   ALK PHOS 86         Lab 05/26/23  1349 05/26/23  1348 05/26/23  1008 05/26/23  0958   PROTIME 13.3 14.0 23.6* 35.9*   INR 1.1 1.07 2.08* 3.2*         Lab 05/27/23  0519   CHOLESTEROL 181   LDL CHOL 107*   HDL CHOL 48   TRIGLYCERIDES 147         Lab 05/26/23  1125 05/26/23  1014   ABO TYPING A A   RH TYPING Positive Positive   ANTIBODY SCREEN  --  Negative         Brief Urine Lab Results     None        Microbiology Results (last 10 days)     ** No results found for the last 240 hours. **           Diagnostic Imaging: I reviewed and independently interpreted the new imaging.     Assessment/Plan:  This is a 47-year-old male status post bilateral craniotomies for evacuation of subacute subdural hematomas on 5/26.  Neurologically, the patient is much more awake and interactive.  His postoperative head CT shows good evacuation of hematomas with significant improvement in mass effect.  We will continue his Hemovac drains 1 more day.  Okay for patient to mobilize and get out of bed today.  Continue Keppra and Decadron.  We will hold off on DVT prophylaxis 1 more day.  Patient's glucoses are elevated.  Would prefer to have them around 150.  Defer to ICU for  management of insulin.  Appreciate ICU assistance.    Any copied data from previous notes included in the (1) History of Present Illness, (2) Physical Examination and (3) Medical Decision Making and/or Assessment and Plan has been reviewed and is accurate as of 23      Stephane Womack MD  23  08:25 EDT        Electronically signed by Stephane Womack MD at 23 0831          Consult Notes (all)      Stephane Womack MD at 23 1005          Patient: Manjeet Hall  : 1975    Primary Care Provider: Provider, No Known    Requesting Provider: As above      Chief Complaint: Altered mental status    History of Present Illness: This is a 47 y.o. 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  He is status post cardiac surgery and is currently on Coumadin.  He presented to the emergency room by himself with altered mental status.  He was not speaking very well and a history was unable to be obtained.  He underwent a CT scan of his head which showed subacute bilateral subdural hematomas.  He has been more awake since the CT scan.    PMHX  Allergies:  Not on File  Medications    Current Facility-Administered Medications:   •  prothrombin complex conc human (KCentra) IV solution 2,500 Units, 2,500 Units, Intravenous, Once **AND** phytonadione (AQUA-MEPHYTON, VITAMIN K) 10 mg in sodium chloride 0.9 % 50 mL IVPB, 10 mg, Intravenous, Once **AND** Protime-INR, , , Once **AND** [CANCELED] Protime-INR, , , Once **AND** [START ON 2023] Protime-INR, , , Once **AND** Monitor for signs for thromboembolic events, , , Per Order Details, Cameron Daugherty MD  •  sodium chloride 0.9 % infusion, 125 mL/hr, Intravenous, Continuous, Cameron Daugherty MD  No current outpatient medications on file.  Past Medical History:  No past medical history on file.  Past Surgical History:  No past surgical history on file.  Social Hx:     Family Hx:  No family history on  file.  Review of Systems:        Unable to be obtained given patient's status and lack of family present    Physical Exam:   BP (!) 169/103   Pulse 79   Resp 16   Wt 104 kg (229 lb 0.9 oz)   SpO2 97%   Patient appears comfortable, resting  Eyes open spontaneously  Able to say name, does not know year  Pupils 3 mm reactive bilaterally  Face symmetric  Follows commands briskly x4    Intake/Output: No intake or output data in the 24 hours ending 05/26/23 1005    Current Medications:   Current Facility-Administered Medications:   •  prothrombin complex conc human (KCentra) IV solution 2,500 Units, 2,500 Units, Intravenous, Once **AND** phytonadione (AQUA-MEPHYTON, VITAMIN K) 10 mg in sodium chloride 0.9 % 50 mL IVPB, 10 mg, Intravenous, Once **AND** Protime-INR, , , Once **AND** [CANCELED] Protime-INR, , , Once **AND** [START ON 5/27/2023] Protime-INR, , , Once **AND** Monitor for signs for thromboembolic events, , , Per Order Details, Cameron Daugherty MD  •  sodium chloride 0.9 % infusion, 125 mL/hr, Intravenous, Continuous, Cameron Daugherty MD  No current outpatient medications on file.     Laboratory Results:      Lab 05/26/23  0958   PROTIME 35.9*                 Lab 05/26/23  0958   PROTIME 35.9*   INR 3.2*                 Brief Urine Lab Results     None        Microbiology Results (last 10 days)     ** No results found for the last 240 hours. **           Diagnostic Imaging: The patient's diagnostic imaging was independently reviewed and interpreted by myself.    Assessment/Plan:  This is a 47-year-old male with a history of left hemispheric infarct and cardiac surgery, currently on Coumadin.  He presented to the emergency room with altered mental status.  CT scan of the head shows subacute bilateral subdural hematomas.  He is clinically stable and is following commands briskly.  We will obtain a full set of labs and reverse his Coumadin.  We will obtain a brain MRI with and without contrast.  The patient  will likely need drainage of the subdural hematomas.  I will follow-up with the family and patient after his tests are completed.  Would recommend admitting him to ICU.      Stephane Womack MD  05/26/23  10:05 EDT          Electronically signed by Stephane Womack MD at 05/26/23 1006

## 2023-05-30 NOTE — CONSULTS
Order noted for diabetes education per stroke protocol. GCS 12 per chart review and inappropriate for education at this time. Will follow.

## 2023-05-30 NOTE — ANESTHESIA POSTPROCEDURE EVALUATION
Patient: Manjeet Hall    Procedure Summary     Date: 05/30/23 Room / Location: [facility] /  [facility]    Anesthesia Start: 1006 Anesthesia Stop: 1033    Procedure: CASE ABORT Diagnosis:     Providers: Stephane Womack MD Provider: Franck Herrera MD    Anesthesia Type: general ASA Status: 3          Anesthesia Type: general    Vitals  Vitals Value Taken Time   /96 05/30/23 1040   Temp     Pulse 77 05/30/23 1100   Resp     SpO2 95 % 05/30/23 1100   Vitals shown include unvalidated device data.        Post Anesthesia Care and Evaluation    Patient location during evaluation: PACU  Patient participation: complete - patient participated  Level of consciousness: anxious  Pain management: adequate    Airway patency: patent  Anesthetic complications: No anesthetic complications  PONV Status: none  Cardiovascular status: hemodynamically stable and acceptable  Respiratory status: nonlabored ventilation, acceptable and nasal cannula  Hydration status: acceptable

## 2023-05-30 NOTE — PLAN OF CARE
Goal Outcome Evaluation:  Plan of Care Reviewed With: patient, significant other        Progress: improving  Outcome Evaluation: Pt continues to present below baseline functional mobility. Pt ambulated 350ft with CGA and LUE pushing tele monitor. Pt has instances of mild balance deficits but otherwise has good overall safety awareness while ambulating. Continue to progress per pt tolerance.

## 2023-05-30 NOTE — PLAN OF CARE
Patient unable to undergo MMA embolization today due to lack of IV access.  Anesthesia attempted multiple IVs but was unsuccessful.  We are going to have the patient get a PICC line placed this afternoon and will plan for embolization tomorrow.  He can eat until midnight where he should be n.p.o. I am okay with starting a heparin drip today, no bolus.  We will need to turn the heparin drip off at 6 AM tomorrow morning and preparation for the embolization.

## 2023-05-30 NOTE — PAYOR COMM NOTE
"AVAILITY NOT WORKING    ID: [id number]  : 1975    Utilization Review  Phone [phone]  Fax [phone]    [facility]  [address] [place] [address]    Marisabel Hall (47 y.o. Male)     Date of Birth   1975    Social Security Number       Address   [address]  [facility] [address]    Home Phone   [phone]    MRN   [id number]       Congregation   None    Marital Status   Single                            Admission Date   23    Admission Type   Emergency    Admitting Provider   Jose Maria Townsend MD    Attending Provider   Jose Maria Townsend MD    Department, Room/Bed   [facility] 2B ICU, N239/1       Discharge Date       Discharge Disposition       Discharge Destination                               Attending Provider: Jose Maria Townsend MD    Allergies: Not on File    Isolation: None   Infection: None   Code Status: CPR    Ht: 172.7 cm (68\")   Wt: 104 kg (229 lb 0.9 oz)    Admission Cmt: None   Principal Problem: Bilateral Subdural Hematoma [I62.00]                 Active Insurance as of 2023     Primary Coverage     Payor Plan Insurance Group Employer/Plan Group    ANTHEM MEDICARE REPLACEMENT ANTHEM MEDICARE ADVANTAGE [id number]     Payor Plan Address Payor Plan Phone Number Payor Plan Fax Number Effective Dates    [address] [phone]  2023 - None Entered    [facility] [id number]       Subscriber Name Subscriber Birth Date Member ID       MARISABEL HALL 1975 [id number]           Secondary Coverage     Payor Plan Insurance Group Employer/Plan Group    ANTHEM MEDICAID ANTHEM MEDICAID [id number]     Payor Plan Address Payor Plan Phone Number Payor Plan Fax Number Effective Dates    [address] [address] [phone]  2023 - None Entered    [facility] [phone]       Subscriber Name Subscriber Birth Date Member ID       MARISABEL HALL 1975 [id number]                 Emergency Contacts     "  (Rel.) Home Phone Work Phone Mobile Phone    Errol Hall (Brother) [phone] -- --    Faina Hall (Daughter) -- -- [phone]    Payton Diallo (Significant Other) -- -- [phone]            Dinora MENDOZA [id number] Tax ID [id number]  Insurance Information                ANTHEM MEDICARE REPLACEMENT/ANTHEM MEDICARE ADVANTAGE Phone: [phone]    Subscriber: Manjeet Hall Subscriber#: [id number]    Group#: [id number] Precert#: --        ANTHEM MEDICAID/ANTHEM MEDICAID Phone: [phone]    Subscriber: Manjeet Hall Subscriber#: [id number]    Group#: [id number] Precert#: --             History & Physical      Jose Maria Townsend MD at 05/26/23 1028          Intensive Care Admission Note     Acute subdural hematoma    History of Present Illness     Manjeet Hall is a 47 year-old male that presented to [facility] ED on 5/26/23 with complaints of AMS concerning for stroke.     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 He follows with our Cardiology Team and has received most CT Surgery care at [facility]. Recent CTA notable for ascending thoracic aorta dilation. Recent ECHO notable for HFpEF.      Mr. Hall presented to the ED this morning with unknown duration of AMS. Stroke Team following. CT head demonstrated bilateral subdural hematomas with diffuse cerebral edema, 2-3 mm leftward midline shift with  effacement of the right lateral and third ventricles, bilateral uncal medialization, crowding of the basal cisterns. There is concern for impending herniation, per the radiology read. Initial /97. Labs notable for hyperglycemia and PT / INR 23.6 & 2.08.    Neurosurgery has been consulted and plans to potentially take him for intervention. In the meantime, he has received KCentra and Vitamin K for Coumadin reversal. He has also received Keppra and was started on Cardene drip for BP control. He will be admitted to the ICU for further work-up and monitoring.     Time spent: 10 minutes  Electronically signed by BETHANIE Edward, 05/26/23, 11:25 AM EDT.    Problem List, Surgical History, Family, Social History, and ROS     Patient Active Problem List    Diagnosis    • *Bilateral Subdural Hematoma [I62.00]    • HTN [I10]    • Hyperlipidemia (Diet controlled, off statin) [E78.5]    • Hypothyroidism [E03.9]    • Anxiety [F41.9]    • Hodgkin's Lymphoma s/p XRT & Chemo [C81.90]    • VSD s/p closure (age 14) [Q21.0]    • History of aortic regurgitation / stenosis s/p ROSS procedure with subsequent reversal with Mechanical AVR [Z86.79]    • H/O aortic mechanical valve replacement on Coumadin [Z95.2]    • Prediabetes [R73.03]      No past surgical history on file.    Not on File  No current facility-administered medications on file prior to encounter.     Current Outpatient Medications on File Prior to Encounter   Medication Sig   • divalproex (DEPAKOTE ER) 250 MG 24 hr tablet Take 1 tablet by mouth Every Night.   • DULoxetine (CYMBALTA) 60 MG capsule Take 1 capsule by mouth Daily.   • gabapentin (NEURONTIN) 100 MG capsule Take 2 capsules by mouth 2 (Two) Times a Day.   • hydrOXYzine (ATARAX) 10 MG tablet Take 1 to 2 tablets by mouth twice a day as needed for anxiety and sleep.   • lansoprazole (PREVACID) 30 MG capsule Take 1 capsule by mouth Daily.   • levothyroxine (SYNTHROID, LEVOTHROID) 125 MCG tablet Take 1  "tablet by mouth Daily. Further Refills per PCP with Repeat Lab Work   • propranolol LA (INDERAL LA) 60 MG 24 hr capsule Take 1 capsule by mouth Daily.   • rosuvastatin (CRESTOR) 20 MG tablet Take 1 tablet by mouth Daily.   • warfarin (COUMADIN) 5 MG tablet Take 1 tablet by mouth Daily. As directed by Anticoagulation Clinic     MEDICATION LIST AND ALLERGIES REVIEWED.    No family history on file.  Social History     Tobacco Use   • Smoking status: Never   • Smokeless tobacco: Never   Substance Use Topics   • Alcohol use: Never     Social History     Social History Narrative   • Not on file     FAMILY AND SOCIAL HISTORY REVIEWED.    Review of Systems  Unable to perform review of system due to patient factors    Physical Exam and Clinical Information   /85   Pulse 90   Resp 16   Ht 172.7 cm (68\")   Wt 104 kg (229 lb 0.9 oz)   SpO2 96%   BMI 34.83 kg/m²   Physical Exam  General Appearance: Awake moving around, aphasia and not following commands very consistently   head:  Right eye deviated outwards, right pupil about 5 mm and left 4 mm and sluggish reaction  Neck:   Supple.   Lungs:   B/L Breath sounds present with decreased breath sounds on bases, no wheezing heard, no crackles.   Heart: S1 and S2 present, no murmur  Abdomen: Soft, nontender, no guarding or rigidity, bowel sounds positive.  Extremities:  no cyanosis or clubbing,  no edema, warm to touch.  Pulses: Positive and symmetric.  Neurologic:  Moving all four extremities. Good strength bilaterally.     Results from last 7 days   Lab Units 05/26/23  1008 05/26/23  0950   WBC 10*3/mm3 11.50*  --    HEMOGLOBIN g/dL 14.6  --    HEMOGLOBIN, POC g/dL  --  15.3   PLATELETS 10*3/mm3 193  --      Results from last 7 days   Lab Units 05/26/23  1008 05/26/23  0950   SODIUM mmol/L 139  --    POTASSIUM mmol/L 4.4  --    CO2 mmol/L 24.0  --    BUN mg/dL 14  --    CREATININE mg/dL 0.72* 0.70   MAGNESIUM mg/dL 2.2  --    GLUCOSE mg/dL 220*  --      Estimated " Creatinine Clearance: 148.2 mL/min (A) (by C-G formula based on SCr of 0.72 mg/dL (L)).  Results from last 7 days   Lab Units 05/26/23  1008   HEMOGLOBIN A1C % 8.90*         No results found for: LACTATE     Images: Chest x-ray reviewed personally and showed previous sternotomy wires in place.  Poor inspiratory effort but without any definite consolidation, pleural effusion or infiltrate noted.    I reviewed the patient's results and images.     CT head reviewed  IMPRESSION:  Impression:  Bilateral 13 to 14 mm acute subdural hematomas are present. There is evidence of diffuse cerebral edema, with bilateral uncal medialization, crowding of the basal cisterns and concern for impending herniation. There is 2 to 3 mm of leftward midline shift   with effacement of the right lateral and third ventricles, with prominence of the left temporal horn concerning for early entrapment.     Scan performed at 9:31 a.m. 5/26/2023. Results discussed with the stroke team and ER physician by Elia Morris in person at time of scanning.           Electronically Signed: Ruben Morris    5/26/2023 9:55 AM EDT    Workstation ID: [id number]      EKG reviewed and showed normal sinus rhythm.  Slightly prolonged QTc.  No acute ST changes noted.    Impression     Bilateral Subdural Hematoma    HTN    Hyperlipidemia (Diet controlled, off statin)    Hypothyroidism    Anxiety    H/O aortic mechanical valve replacement on Coumadin    Prediabetes    Plan/Recommendations     47-year-old male with past medical history of hypertension, dyslipidemia, hypothyroidism, prediabetes, anxiety, Hodgkin's lymphoma status postchemotherapy and radiation treatment at age 21, history of PDA, VSD s/p closure at age 14, s/p Ross procedure with later reversal at [facility], mechanical AVR on Coumadin and right ventricular outflow tract obstruction.  Patient has previous history of stroke after taken off Coumadin back in 2018 and subsequently developed residual  aphasia.  Patient presented to emergency room today after significant other found patient to be weak and lethargic and going on for about a week.  He was also complaining of headache and has medications apparently were being adjusted including gabapentin dosing.  He was not feeling well today morning and more lethargic so decision was made to bring patient to emergency room where work-up thus far have revealed acute bilateral subdural hematoma with diffuse cerebral edema with bilateral uncal medialization and crowding of basilar cistern.  2 to 3 mm leftward midline shift noted.  There is effacement of right lateral and third ventricle.  Neurosurgery team has evaluated the patient.  Patient has been on Coumadin and INR was therapeutic so has received vitamin K and Kcentra in preparation for surgical evacuation of these subdural hematomas.  Patient currently is awake.  He is following commands.  Slight deviation of right eye noted along with slightly enlarged pupil.  Patient denies any pain currently.  Denies any chest pain or shortness of breath.  On nicardipine infusion and also started on Keppra prophylactically.    1.  Admit patient to intensive care unit with acute encephalopathy and bilateral subdural hematoma for closer neurologic and hemodynamic monitoring.  2.  Nicardipine drip as needed to keep systolic blood pressure less than 140.  3.  Neurosurgery team managing acute subdural hematoma and plan is for bur hole evacuation later on today.  4.  Reversed anticoagulation with Kcentra.  Patient also received vitamin K as well.  Will need resumption of anticoagulation when okay with neurosurgery team after procedure.  5.  Keppra load given prophylactically.  6.  Resume some of the home medications including Crestor, levothyroxine, Gabapentin and Depakote.  7.  Protonix for history of GERD.  8.  Levothyroxine with history of hypothyroidism.  9.  N.p.o.  May need Keofeed placement for medication  administration.  10.  Sliding scale insulin coverage for hyperglycemia management.  11.  Normal saline at 125 mill per hour.  Avoid hyponatremia.  12.  Monitor electrolytes and replace as needed per ICU protocol.    Guarded prognosis.  Will admit to intensive care unit.  Family updated at bedside    Time spent 35 min (exclusive of procedure time)  including high complexity decision making to assess, manipulate, and support vital organ system failure in this individual who has impairment of one or more vital organ systems such that there is a high probability of imminent or life threatening deterioration in the patient’s condition.      Jose Maria Townsend MD, [facility]  Pulmonary and Critical Care Medicine  05/26/23 13:11 EDT     CC: Provider, No Known    Electronically signed by Jose Maria Townsend MD at 05/26/23 1319          Emergency Department Notes      Cameron Daugherty MD at 05/26/23 0991      Procedure Orders    1. Critical Care [[id number]] ordered by Cameron Daugherty MD               Subjective   History of Present Illness  This is a 47-year-old male who sees Roby Faust.  He is brought to the emergency room today by his brother for altered mental status of unclear duration and etiology.  Per his brother who is a fair historian it sounds like he had a stroke after having perhaps aortic valve replacement at [facility] 6 years ago.  This left him with expressive aphasia.  He is on chronic Coumadin therapy for his aortic valve.  His brother last talked to him a week ago but he really does not speak any saw him at that time he looked his normal self is unclear how long he has been in his current state though his girlfriend who he lives with is post to be coming in shortly and will be able to provide more information.    I really cannot get any other history or review of systems.  His chart is not merged as he initially came in as a Vimal Fraziere.        Review of Systems   Unable to perform ROS: Mental status change        No past medical history on file.    Not on File    No past surgical history on file.    No family history on file.    Social History     Socioeconomic History   • Marital status: Single   Tobacco Use   • Smoking status: Never   • Smokeless tobacco: Never   Substance and Sexual Activity   • Alcohol use: Never           Objective   Physical Exam  Vitals and nursing note reviewed.   Constitutional:       Comments: This is a 47-year-old who is awake he is alert he is sitting upright in a wheelchair getting little bit CT scan when I first see him.  He is able with minimal assistance to stand up and transfer to the CT gurney.  He does not seem to be focally weak.  When I ask him questions he mumbles and I really cannot understand him I do not know how close this is to his baseline.   HENT:      Head: Normocephalic and atraumatic.      Right Ear: External ear normal.      Left Ear: External ear normal.      Nose: Nose normal.      Mouth/Throat:      Mouth: Mucous membranes are moist.      Pharynx: Oropharynx is clear.   Eyes:      Comments: Pupils are large about 6 mm they react bilaterally   Neck:      Vascular: No carotid bruit.   Cardiovascular:      Rate and Rhythm: Normal rate and regular rhythm.      Pulses: Normal pulses.      Heart sounds: Normal heart sounds.      Comments: There is a loud click consistent with mechanical valve in aortic area with a 2/6 systolic murmur.  Pulmonary:      Effort: Pulmonary effort is normal.      Breath sounds: Normal breath sounds.   Abdominal:      Comments: Modestly obese soft and nontender without organomegaly masses or guarding   Musculoskeletal:      Cervical back: Normal range of motion and neck supple. No rigidity or tenderness.      Comments: Equal pulses without edema, synovitis, rash, or venous cords.   Lymphadenopathy:      Cervical: No cervical adenopathy.   Skin:     General: Skin is warm and dry.      Capillary Refill: Capillary refill takes less than 2 seconds.    Neurological:      Comments: Face symmetric he mumbles GCS 14 he is able to squeeze both my hands but is weak bilaterally.  With minimal assistance he is able to stand up and scoot to the CT gurney.  Does not seem to be focally weak.  His knee jerks are 2+ bilaterally.         Critical Care  Performed by: Cameron Daugherty MD  Authorized by: Cameron Daugherty MD     Critical care provider statement:     Critical care time (minutes):  60    Critical care was necessary to treat or prevent imminent or life-threatening deterioration of the following conditions:  CNS failure or compromise    Critical care was time spent personally by me on the following activities:  Development of treatment plan with patient or surrogate, discussions with consultants, evaluation of patient's response to treatment, examination of patient, obtaining history from patient or surrogate, review of old charts, re-evaluation of patient's condition, pulse oximetry, ordering and review of radiographic studies, ordering and review of laboratory studies and ordering and performing treatments and interventions              ED Course            Recent Results (from the past 24 hour(s))   POC CHEM 8    Collection Time: 05/26/23  9:50 AM    Specimen: Blood   Result Value Ref Range    Glucose 215 (H) 70 - 130 mg/dL    BUN 15 8 - 26 mg/dL    Creatinine 0.70 0.60 - 1.30 mg/dL    Sodium 139 138 - 146 mmol/L    POC Potassium 4.3 3.5 - 4.9 mmol/L    Chloride 102 98 - 109 mmol/L    Total CO2 27 24 - 29 mmol/L    Hemoglobin 15.3 12.0 - 17.0 g/dL    Hematocrit 45 38 - 51 %    Ionized Calcium 1.08 (L) 1.20 - 1.32 mmol/L    eGFR 114.4 >60.0 mL/min/1.73   POC Protime / INR    Collection Time: 05/26/23  9:58 AM    Specimen: Blood   Result Value Ref Range    Protime 35.9 (H) 12.8 - 15.2 seconds    INR 3.2 (H) 0.8 - 1.2   Comprehensive Metabolic Panel    Collection Time: 05/26/23 10:08 AM    Specimen: Blood   Result Value Ref Range    Glucose 220 (H) 65 - 99 mg/dL     BUN 14 6 - 20 mg/dL    Creatinine 0.72 (L) 0.76 - 1.27 mg/dL    Sodium 139 136 - 145 mmol/L    Potassium 4.4 3.5 - 5.2 mmol/L    Chloride 101 98 - 107 mmol/L    CO2 24.0 22.0 - 29.0 mmol/L    Calcium 9.7 8.6 - 10.5 mg/dL    Total Protein 8.4 6.0 - 8.5 g/dL    Albumin 4.7 3.5 - 5.2 g/dL    ALT (SGPT) 14 1 - 41 U/L    AST (SGOT) 18 1 - 40 U/L    Alkaline Phosphatase 86 39 - 117 U/L    Total Bilirubin 0.6 0.0 - 1.2 mg/dL    Globulin 3.7 gm/dL    A/G Ratio 1.3 g/dL    BUN/Creatinine Ratio 19.4 7.0 - 25.0    Anion Gap 14.0 5.0 - 15.0 mmol/L    eGFR 113.4 >60.0 mL/min/1.73   Protime-INR    Collection Time: 05/26/23 10:08 AM    Specimen: Blood   Result Value Ref Range    Protime 23.6 (H) 12.2 - 14.5 Seconds    INR 2.08 (H) 0.89 - 1.12   Magnesium    Collection Time: 05/26/23 10:08 AM    Specimen: Blood   Result Value Ref Range    Magnesium 2.2 1.6 - 2.6 mg/dL   CBC Auto Differential    Collection Time: 05/26/23 10:08 AM    Specimen: Blood   Result Value Ref Range    WBC 11.50 (H) 3.40 - 10.80 10*3/mm3    RBC 5.38 4.14 - 5.80 10*6/mm3    Hemoglobin 14.6 13.0 - 17.7 g/dL    Hematocrit 45.8 37.5 - 51.0 %    MCV 85.1 79.0 - 97.0 fL    MCH 27.1 26.6 - 33.0 pg    MCHC 31.9 31.5 - 35.7 g/dL    RDW 14.8 12.3 - 15.4 %    RDW-SD 46.2 37.0 - 54.0 fl    MPV 11.8 6.0 - 12.0 fL    Platelets 193 140 - 450 10*3/mm3    Neutrophil % 75.4 42.7 - 76.0 %    Lymphocyte % 14.2 (L) 19.6 - 45.3 %    Monocyte % 6.2 5.0 - 12.0 %    Eosinophil % 2.3 0.3 - 6.2 %    Basophil % 0.5 0.0 - 1.5 %    Immature Grans % 1.4 (H) 0.0 - 0.5 %    Neutrophils, Absolute 8.68 (H) 1.70 - 7.00 10*3/mm3    Lymphocytes, Absolute 1.63 0.70 - 3.10 10*3/mm3    Monocytes, Absolute 0.71 0.10 - 0.90 10*3/mm3    Eosinophils, Absolute 0.26 0.00 - 0.40 10*3/mm3    Basophils, Absolute 0.06 0.00 - 0.20 10*3/mm3    Immature Grans, Absolute 0.16 (H) 0.00 - 0.05 10*3/mm3    nRBC 0.0 0.0 - 0.2 /100 WBC   Hemoglobin A1c    Collection Time: 05/26/23 10:08 AM    Specimen: Blood    Result Value Ref Range    Hemoglobin A1C 8.90 (H) 4.80 - 5.60 %   TSH    Collection Time: 05/26/23 10:08 AM    Specimen: Blood   Result Value Ref Range    TSH 1.610 0.270 - 4.200 uIU/mL   Type & Screen    Collection Time: 05/26/23 10:14 AM    Specimen: Blood   Result Value Ref Range    ABO Type A     RH type Positive     Antibody Screen Negative     T&S Expiration Date 5/29/2023 11:59:59 PM    ECG 12 Lead Altered Mental Status    Collection Time: 05/26/23 10:18 AM   Result Value Ref Range    QT Interval 400 ms    QTC Interval 464 ms   ABO RH Specimen Verification    Collection Time: 05/26/23 11:25 AM    Specimen: Blood   Result Value Ref Range    ABO Type A     RH type Positive    POC Glucose Once    Collection Time: 05/26/23  1:27 PM    Specimen: Blood   Result Value Ref Range    Glucose 212 (H) 70 - 130 mg/dL   Protime-INR    Collection Time: 05/26/23  1:48 PM    Specimen: Arm, Right; Blood   Result Value Ref Range    Protime 14.0 12.2 - 14.5 Seconds    INR 1.07 0.89 - 1.12   POC Protime / INR    Collection Time: 05/26/23  1:49 PM    Specimen: Blood   Result Value Ref Range    Protime 13.3 12.8 - 15.2 seconds    INR 1.1 0.8 - 1.2     Note: In addition to lab results from this visit, the labs listed above may include labs taken at another facility or during a different encounter within the last 24 hours. Please correlate lab times with ED admission and discharge times for further clarification of the services performed during this visit.    XR Chest 1 View   Final Result   Impression:   Low lung volumes with no acute process demonstrated         Electronically Signed: Mateusz Hare     5/26/2023 10:21 AM EDT     Workstation ID: [id number]      CT Head Without Contrast Stroke Protocol   Final Result   Impression:   Bilateral 13 to 14 mm acute subdural hematomas are present. There is evidence of diffuse cerebral edema, with bilateral uncal medialization, crowding of the basal cisterns and concern for impending  herniation. There is 2 to 3 mm of leftward midline shift    with effacement of the right lateral and third ventricles, with prominence of the left temporal horn concerning for early entrapment.      Scan performed at 9:31 a.m. 5/26/2023. Results discussed with the stroke team and ER physician by Elia Morris in person at time of scanning.            Electronically Signed: Ruben Morris     5/26/2023 9:55 AM EDT     Workstation ID: [id number]        Vitals:    05/26/23 1100 05/26/23 1115 05/26/23 1130 05/26/23 1135   BP: 138/93 151/88 155/82 147/85   BP Location:       Patient Position:       Pulse: 73 85 87 90   Resp:       SpO2: 97% 98% 98% 96%   Weight:       Height:         Medications   sodium chloride 0.9 % infusion (125 mL/hr Intravenous New Bag 5/26/23 1006)   niCARdipine (CARDENE) 25mg in 250mL NS infusion (10 mg/hr Intravenous New Bag 5/26/23 1405)   levETIRAcetam in NaCl 0.75% (KEPPRA) IVPB 1,000 mg (0 mg Intravenous Stopped 5/26/23 1053)   dextrose (GLUTOSE) oral gel 15 g (has no administration in time range)   dextrose (D50W) (25 g/50 mL) IV injection 25 g (has no administration in time range)   glucagon (GLUCAGEN) injection 1 mg (has no administration in time range)   insulin regular (humuLIN R,novoLIN R) injection 2-7 Units (0 Units Subcutaneous Hold 5/26/23 1347)   pantoprazole (PROTONIX) injection 40 mg (has no administration in time range)   dextrose (GLUTOSE) oral gel 15 g (has no administration in time range)   dextrose (D50W) (25 g/50 mL) IV injection 25 g (has no administration in time range)   glucagon (GLUCAGEN) injection 1 mg (has no administration in time range)   Insulin Lispro (humaLOG) injection 2-7 Units (3 Units Subcutaneous Given 5/26/23 1347)   divalproex (DEPAKOTE ER) 24 hr tablet 250 mg (has no administration in time range)   DULoxetine (CYMBALTA) DR capsule 60 mg (60 mg Oral Not Given 5/26/23 1405)   gabapentin (NEURONTIN) capsule 200 mg (200 mg Oral Not Given 5/26/23 1418)    levothyroxine (SYNTHROID, LEVOTHROID) tablet 125 mcg (125 mcg Oral Not Given 5/26/23 1419)   rosuvastatin (CRESTOR) tablet 20 mg (20 mg Oral Not Given 5/26/23 1416)   prothrombin complex conc human (KCentra) IV solution 2,500 Units (2,500 Units Intravenous Given 5/26/23 1029)     And   phytonadione (AQUA-MEPHYTON, VITAMIN K) 10 mg in sodium chloride 0.9 % 50 mL IVPB (0 mg Intravenous Stopped 5/26/23 1125)     ECG/EMG Results (last 24 hours)     ** No results found for the last 24 hours. **        ECG 12 Lead Altered Mental Status   Preliminary Result   Test Reason : Altered Mental Status   Blood Pressure :   */*   mmHG   Vent. Rate :  81 BPM     Atrial Rate :  81 BPM      P-R Int : 154 ms          QRS Dur :  88 ms       QT Int : 400 ms       P-R-T Axes :  71  28  82 degrees      QTc Int : 464 ms      Normal sinus rhythm   Possible Left atrial enlargement   ST & T wave abnormality, consider lateral ischemia   Prolonged QT   Abnormal ECG   No previous ECGs available      Referred By: ED MD           Confirmed By:                                         Medical Decision Making        Reviewed all available studies at bedside with the patient and his girlfriend who is now at the bedside.  She actually reports 2 to 3 weeks of altered mental status and increasing sleepiness no history of trauma.    On recheck patient maintaining his airway.    Dr. Holloway neurosurgery is down to see the patient anticipates going to the OR for emergent bur hole placement subdural drainage.    Discussed the risk and benefit of reversal of the patient's chronic anticoagulation due to his mechanical aortic valve with the patient's girlfriend and family and understand the necessary part of this even a increases his risk of stroke.    This was accomplished.  Patient also started on hypertension therapy and antiepileptic drugs by consulting services.  Please see their note.    I spoke Dr. Townsend, on-call critical care medicine he will  admit the patient.    All are agreeable with the plan    Elevated INR: chronic illness or injury with exacerbation, progression, or side effects of treatment that poses a threat to life or bodily functions  H/O mechanical aortic valve replacement: chronic illness or injury with exacerbation, progression, or side effects of treatment that poses a threat to life or bodily functions  Hypertensive urgency: complicated acute illness or injury with systemic symptoms that poses a threat to life or bodily functions  Somnolence: complicated acute illness or injury with systemic symptoms that poses a threat to life or bodily functions  Subdural hematoma: complicated acute illness or injury with systemic symptoms that poses a threat to life or bodily functions  Amount and/or Complexity of Data Reviewed  Independent Historian: caregiver and spouse  External Data Reviewed: notes.  Labs: ordered. Decision-making details documented in ED Course.  Radiology: ordered and independent interpretation performed. Decision-making details documented in ED Course.  ECG/medicine tests: ordered and independent interpretation performed. Decision-making details documented in ED Course.      Risk  Prescription drug management.  Decision regarding hospitalization.  Emergency major surgery.          Final diagnoses:   Somnolence   Subdural hematoma   Elevated INR   H/O mechanical aortic valve replacement   Hypertensive urgency       ED Disposition  ED Disposition     ED Disposition   Decision to Admit    Condition   --    Comment   Level of Care: Critical Care [6]   Admitting Physician: NUSRAT MICHAEL [[id number]]   Attending Physician: NUSRAT MICHAEL [[id number]]               No follow-up provider specified.       Medication List      No changes were made to your prescriptions during this visit.          Cameron Daugherty MD  05/26/23 1450      Electronically signed by Cameron Daugherty MD at 05/26/23 5412          Operative/Procedure Notes (all)       Stephane Womack MD at 05/26/23 1730  Version 1 of 1         Progress Note    Manjeet Hall      Pre-op Diagnosis:   Bilateral subacute subdural hematoma       Post-Op Diagnosis Codes:  Preoperative    Procedure/CPT® Codes:    Right-sided craniotomy for evacuation of subdural hematoma  Left-sided craniotomy for evacuation of subdural hematoma                  * Surgery not found *    Anesthesia: * No surgery found *    Staff:   * Surgery not found *  * Surgery not found *      Estimated Blood Loss: 175 ml    Urine Voided: * No surgery found *    Specimens:                None          Drains: * No LDAs found *    Findings: Speculation of bilateral subdural hematomas.        Complications: None apparent          Stephane Womack MD     Date: 5/26/2023  Time: 17:30 EDT        Electronically signed by Stephane Womack MD at 05/26/23 1731          Physician Progress Notes (all)      Tracy Morales APRN at 05/29/23 1145          Intensive Care Follow-up     Hospital:  LOS: 3 days   Mr. Manjeet Hall, 47 y.o. male is followed for:   Subdural hematoma, nontraumatic     Subjective     History of present illness:   Manjeet Hall is a 47 y.o. 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  He was hypertensive and hyperglycemic on admission.  His INR was subtherapeutic at 2.08.  He received Kcentra and  vitamin K and was started on Keppra for seizure prophylaxis, Cardene for hypertension and taken to OR for emergent bilateral craniotomy and evacuation of subdural hematomas.      Subjective   Interval History:  Patient is resting in bed this morning.  Girlfriend is at bedside.  Good oxygenation on RA.  VSS. States that he is scared about his hospitalization and just current medical illness in general given his medical history since 2018.  At baseline, he has mild receptive aphasia and word finding difficulties.  He is requesting outpatient SLP therapy for his receptive aphasia and word finding difficulties.  He states that they have progressively worsened since he last had therapy 2018.    States that when he is started on Warfarin, he would like to stay in the hospital until therapeutic.  He does not want Lovenox injections.   He has not had a BM in several days.         The patient's past medical, surgical and social history were reviewed and updated in Epic as appropriate.    Review of Systems - General ROS: negative for - chills, fatigue, fever or night sweats  Respiratory ROS: negative for - cough, shortness of breath or wheezing  Cardiovascular ROS: negative for - chest pain, edema or palpitations  Gastrointestinal ROS: positive for constipation    Objective   Objective     Infusions:        Medications:  divalproex, 250 mg, Oral, Nightly  DULoxetine, 60 mg, Oral, Daily  enoxaparin, 40 mg, Subcutaneous, Q24H  famotidine, 20 mg, Intravenous, Q12H  gabapentin, 200 mg, Oral, BID  insulin detemir, 10 Units, Subcutaneous, Nightly  insulin lispro, 3-14 Units, Subcutaneous, 4x Daily AC & at Bedtime  Insulin Lispro, 4 Units, Subcutaneous, TID With Meals  levothyroxine, 125 mcg, Oral, Daily  metoprolol tartrate, 12.5 mg, Oral, Q12H  pantoprazole, 40 mg, Oral, Q AM  rosuvastatin, 20 mg, Oral, Daily  sodium chloride, 10 mL, Intravenous, Q12H        Vital Sign Min/Max for last 24 hours  Temp  Min: 97.7 °F (36.5 °C)  Max:  "97.9 °F (36.6 °C)   BP  Min: 104/87  Max: 137/73   Pulse  Min: 59  Max: 96   Resp  Min: 14  Max: 18   SpO2  Min: 84 %  Max: 99 %   No data recorded       Input/Output for last 24 hour shift  05/28 0701 - 05/29 0700  In: 910 [I.V.:810]  Out: -          Objective:  General Appearance:  In no acute distress and comfortable.    Vital signs: (most recent): Blood pressure 137/73, pulse 81, temperature 97.9 °F (36.6 °C), temperature source Oral, resp. rate 14, height 172.7 cm (68\"), weight 104 kg (229 lb 0.9 oz), SpO2 97 %.  Vital signs are normal.    HEENT: (Rt sided incision approximated with staples.  No drainage, redness.)    Lungs:  Normal effort and normal respiratory rate.  Breath sounds clear to auscultation.  He is not in respiratory distress.  No rales, wheezes or rhonchi.    Heart: Normal rate.  Regular rhythm.  S1 normal and S2 normal.  No murmur or friction rub. (+ click)  Abdomen: Abdomen is rigid and non-distended.  Hypoactive bowel sounds.   There is no abdominal tenderness.     Extremities: There is no dependent edema.    Pulses: Distal pulses are intact.    Neurological: Patient is alert and oriented to person, place and time.  GCS score is 15.    Pupils:  Pupils are equal, round, and reactive to light.    Skin:  Warm and dry.  No rash.             Results from last 7 days   Lab Units 05/29/23  1004 05/27/23  0519 05/26/23  1008   WBC 10*3/mm3 13.76* 13.78* 11.50*   HEMOGLOBIN g/dL 10.9* 11.3* 14.6   PLATELETS 10*3/mm3 173 200 193     Results from last 7 days   Lab Units 05/29/23  1004 05/27/23  0519 05/26/23  1008   SODIUM mmol/L 143 140 139   POTASSIUM mmol/L 3.8 4.2 4.4   CO2 mmol/L 28.0 24.0 24.0   BUN mg/dL 20 15 14   CREATININE mg/dL 0.64* 0.71* 0.72*   MAGNESIUM mg/dL 2.1 1.8 2.2   PHOSPHORUS mg/dL 2.8 4.0  --    GLUCOSE mg/dL 151* 234* 220*     Estimated Creatinine Clearance: 166.7 mL/min (A) (by C-G formula based on SCr of 0.64 mg/dL (L)).          Images:   None new    I reviewed the patient's " results and images.     Assessment & Plan   Impression        Bilateral Subdural Hematoma    HTN    Hyperlipidemia (Diet controlled, off statin)    New Onset Type 2 diabetes mellitus    Hypothyroidism    H/O aortic mechanical valve replacement on Coumadin    Anxiety       Plan        Bilateral Subdural Hematomas s/p bilateral crani for evacuation of subdural hematomas, drains d/c'd 5/28  H/O Stroke post op AVR with residual receptive aphasia and word finding difficulty  · Post op care per NS  · Plan for embolization tomorrow 5/30  · NPO after MN  · Continue PT/OT/SLP  · Patient would like outpatient SLP to help with receptive aphasia and word finding difficulty upon discharge  · Resume anticoagulation when ok with NS  · AM labs    HTN  HLP  H/O mechanical AVR 2018 on Warfarin  · Continue Metoprolol  · Continue Crestor  · Will need anticoagulation restarted soon per NS with bridge to Warfarin    New onset T2DM, Hgb A1c 8.9   Neuropathy  · Keep glucose <= 150 per NS  · Continue basal  · Continue correction SSI  · Continue prandial  · Glucose has improved since basal added yesterday.  Glucose ~ 150 today.  May need insulins adjusted more later.  · Steroids discontinued yesterday  · Diabetes education  · Continue Gabapentin    Hypothyroidism  · Continue Levothyroxine    Anxiety  Depression   · Continue Cymbalta  · Continue Depakote ER  · Hold home Atarax for now    Constipation  · Change BM regimen from as needed to scheduled      DVT prophylaxis:  Lovenox SQ / SCDs  GI prophylaxis:  PPI  Disposition:  Keep in ICU    Plan of care and goals reviewed with multidisciplinary/antibiotic stewardship team during rounds.   I discussed the patient's findings and my recommendations with patient, family, nursing staff and primary care team     Time: 35 minutes, face to face, with the patient and family or on the [place] coordinating care with other health care providers.     I spent > 50% percent of this time, counseling and  "discussing evaluation, current status and management.      BETHANIE Markham, AGACNP-BC, FNP-BC  Pulmonary and Critical Care Service       Electronically signed by Tracy Morales APRN at 05/29/23 1223     Haylee Domingo PA-C at 05/29/23 1051     Attestation signed by Milind Leblanc MD at 05/30/23 1006    I have reviewed this documentation and agree.                  NEUROSURGERY PROGRESS NOTE    Interval History:   Bilateral SDH, s/p bilateral craniotomy for evacuation 5/26/23. POD 3  Patient awake and conversant. Has been up to chair and walking on floor with assistance. Mild headache, no other complaints. Drains removed,     Vital Signs  Blood pressure 137/73, pulse 81, temperature 97.9 °F (36.6 °C), temperature source Oral, resp. rate 14, height 172.7 cm (68\"), weight 104 kg (229 lb 0.9 oz), SpO2 97 %.    Physical Exam:  Awake, alert, oriented  Pupils equal bilaterally  Extraocular movements and facial expression intact bilaterally  5/5 all 4 extremities  Incisions clean, dry, intact. Drains were removed yesterday     Results Review:    WBC   Date Value Ref Range Status   05/29/2023 13.76 (H) 3.40 - 10.80 10*3/mm3 Final     RBC   Date Value Ref Range Status   05/29/2023 4.06 (L) 4.14 - 5.80 10*6/mm3 Final     Hemoglobin   Date Value Ref Range Status   05/29/2023 10.9 (L) 13.0 - 17.7 g/dL Final     Hematocrit   Date Value Ref Range Status   05/29/2023 34.6 (L) 37.5 - 51.0 % Final     MCV   Date Value Ref Range Status   05/29/2023 85.2 79.0 - 97.0 fL Final     MCH   Date Value Ref Range Status   05/29/2023 26.8 26.6 - 33.0 pg Final     MCHC   Date Value Ref Range Status   05/29/2023 31.5 31.5 - 35.7 g/dL Final     RDW   Date Value Ref Range Status   05/29/2023 15.1 12.3 - 15.4 % Final     RDW-SD   Date Value Ref Range Status   05/29/2023 46.9 37.0 - 54.0 fl Final     MPV   Date Value Ref Range Status   05/29/2023 11.8 6.0 - 12.0 fL Final     Platelets   Date Value Ref " Range Status   05/29/2023 173 140 - 450 10*3/mm3 Final     Basic Metabolic Panel    Sodium Sodium   Date Value Ref Range Status   05/29/2023 143 136 - 145 mmol/L Final   05/27/2023 140 136 - 145 mmol/L Final      Potassium Potassium   Date Value Ref Range Status   05/29/2023 3.8 3.5 - 5.2 mmol/L Final     Comment:     Slight hemolysis detected by analyzer. Results may be affected.   05/27/2023 4.2 3.5 - 5.2 mmol/L Final      Chloride Chloride   Date Value Ref Range Status   05/29/2023 105 98 - 107 mmol/L Final   05/27/2023 103 98 - 107 mmol/L Final      Bicarbonate No results found for: PLASMABICARB   BUN BUN   Date Value Ref Range Status   05/29/2023 20 6 - 20 mg/dL Final   05/27/2023 15 6 - 20 mg/dL Final      Creatinine Creatinine   Date Value Ref Range Status   05/29/2023 0.64 (L) 0.76 - 1.27 mg/dL Final   05/27/2023 0.71 (L) 0.76 - 1.27 mg/dL Final      Calcium Calcium   Date Value Ref Range Status   05/29/2023 9.0 8.6 - 10.5 mg/dL Final   05/27/2023 9.2 8.6 - 10.5 mg/dL Final      Glucose      No components found for: GLUCOSE.*     I/O last 3 completed shifts:  In: 2110 [I.V.:1910; IV Piggyback:200]  Out: 25 [Drains:25]  No intake/output data recorded.      ASSESSMENT/Plan:   46 yo M POD 3 from bilateral craniotomies for SDH evacuation.Significant neurologic improvement. Drains removed yesterday: incisions intact.   Keppra/decadron d/c yesterday as well (patient on depakote and gabapentin at home and this is continued); blood sugars improving. Goal 150. ICU managing insulin.   Lovenox for DVT prophylaxis. Patient has history of mechanical aortic valve replacement and was previously on coumadin. Plan is for MMA embolization tomorrow 5/30. NPO after midnight. Further adjustment of anticoagulation after this procedure.        Haylee Domingo PA-C  05/29/23  10:51 EDT    Electronically signed by Milind Leblanc MD at 05/30/23 1006     Meryl Ashby MD at 05/28/23 0935          Critical Care  "Note     LOS: 2 days   Patient Care Team:  Provider, No Known as PCP - General    Chief Complaint/Reason for visit:    Chief Complaint   Patient presents with   • Stroke   Bilateral subdural hematomas  History of mechanical aortic valve replacement on Coumadin    Subjective     Interval History:     He is afebrile.  Room air saturation 95%.  Up in the chair tolerating p.o.  Surgical drains removed this morning.    Review of Systems:    All systems were reviewed and negative except as noted in subjective.    Medical history, surgical history, social history, family history reviewed    Objective     Intake/Output:    Intake/Output Summary (Last 24 hours) at 5/28/2023 0939  Last data filed at 5/28/2023 0600  Gross per 24 hour   Intake 1949.8 ml   Output 495 ml   Net 1454.8 ml       Nutrition:  Diet: Cardiac Diets, Diabetic Diets; Healthy Heart (2-3 Na+); Consistent Carbohydrate; Texture: Regular Texture (IDDSI 7); Fluid Consistency: Thin (IDDSI 0)  NPO Diet NPO Type: Sips with Meds    Infusions:  niCARdipine, 5-15 mg/hr, Last Rate: Stopped (05/26/23 8260)        Telemetry: Sinus rhythm             Vital Signs  Blood pressure 136/74, pulse 85, temperature 97.7 °F (36.5 °C), temperature source Oral, resp. rate 14, height 172.7 cm (68\"), weight 104 kg (229 lb 0.9 oz), SpO2 95 %.    Physical Exam:  General Appearance:   Middle-aged gentleman  upright in chair in no distress   Head:   Bilateral craniotomies with incisions intact, no erythema or drainage   Eyes:          Conjunctiva pink, pupils equal   Ears:     Throat:  Oral mucosa moist   Neck:  Trachea midline, no crepitus   Back:      Lungs:    Symmetric chest expansion without wheeze or rhonchi    Heart:   Regular rhythm, mechanical S2   Abdomen:    Bowel sounds present, nontender   Rectal:   Deferred   Extremities:  Right radial arterial line.  No pretibial edema   Pulses:  Palpable pedal pulses   Skin:  Warm and dry   Lymph nodes:  No cervical adenopathy "   Neurologic:  Awake, oriented x3.   symmetric      Results Review:     I reviewed the patient's new clinical results.   Results from last 7 days   Lab Units 05/27/23  0519 05/26/23  1008 05/26/23  0950   SODIUM mmol/L 140 139  --    POTASSIUM mmol/L 4.2 4.4  --    CHLORIDE mmol/L 103 101  --    CO2 mmol/L 24.0 24.0  --    BUN mg/dL 15 14  --    CREATININE mg/dL 0.71* 0.72* 0.70   CALCIUM mg/dL 9.2 9.7  --    BILIRUBIN mg/dL  --  0.6  --    ALK PHOS U/L  --  86  --    ALT (SGPT) U/L  --  14  --    AST (SGOT) U/L  --  18  --    GLUCOSE mg/dL 234* 220*  --      Results from last 7 days   Lab Units 05/27/23  0519 05/26/23  1008 05/26/23  0950   WBC 10*3/mm3 13.78* 11.50*  --    HEMOGLOBIN g/dL 11.3* 14.6  --    HEMOGLOBIN, POC g/dL  --   --  15.3   HEMATOCRIT % 35.4* 45.8  --    HEMATOCRIT POC %  --   --  45   PLATELETS 10*3/mm3 200 193  --          No results found for: BLOODCX  No results found for: URINECX    I reviewed the patient's new imaging including images and reports.      Findings:   Postoperative changes are noted from interval bilateral craniotomy with evacuation of previously noted acute subdural hematomas. Subdural drains are noted in place. Small bilateral persistent extra-axial mixed fluid collections are present with   low-density fluid, gas and minimal persisting hyperdense blood products, 7 mm maximal thickness on the left, 6 mm on the right. There is improved diffuse cerebral edema, with restored patency of the basal cisterns and decreased effacement of the   ventricles. Some underlying parenchymal hypoattenuation is noted involving temporal lobes bilaterally, greater on the left, concerning for underlying contusion/ischemia. There is also evident trace acute left parafalcine subdural hematoma, fairly   localized, measuring 4 mm in maximal thickness without significant associated mass effect. The orbits are normal. The paranasal sinuses are grossly clear.    Impression:     Impression:    Expected postoperative changes following bilateral craniotomy for evacuation of acute subdural hematomas. Cerebral edema is improved and there is restored patency of the basal cisterns and decreased effacement of the ventricles. Bilateral subdural drains    are noted in place.     4 mm small and somewhat localized left parafalcine subdural hematoma.     Increase in conspicuity, there is somewhat heterogeneous hypoattenuation present involving the left greater than right temporal lobes, concerning for underlying component of either persisting edema, contusion or ischemia.         Electronically Signed: Ruben Morris    5/27/2023 6:07 AM EDT          All medications reviewed.   divalproex, 250 mg, Oral, Nightly  DULoxetine, 60 mg, Oral, Daily  enoxaparin, 40 mg, Subcutaneous, Q24H  famotidine, 20 mg, Intravenous, Q12H  gabapentin, 200 mg, Oral, BID  insulin detemir, 10 Units, Subcutaneous, Nightly  Insulin Lispro, 4 Units, Subcutaneous, TID With Meals  insulin regular, 3-14 Units, Subcutaneous, Q6H  levothyroxine, 125 mcg, Oral, Daily  pantoprazole, 40 mg, Oral, Q AM  rosuvastatin, 20 mg, Oral, Daily  sodium chloride, 10 mL, Intravenous, Q12H          Assessment & Plan       Bilateral Subdural Hematoma    HTN    Hyperlipidemia (Diet controlled, off statin)    Hypothyroidism    Anxiety    H/O aortic mechanical valve replacement on Coumadin    Prediabetes    Somnolence    47-year-old gentleman with hypertension, dyslipidemia, hypothyroidism, diabetes, history of Hodgkin's lymphoma, 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  Postoperatively he developed pancreatitis and anticoagulation was held and he suffered a stroke resulting in some aphasia in 2018.  He presented May 26 with altered mentation and bilateral subdural hematomas with edema and some leftward  "midline shift and effacement of the right lateral and third ventricles and bilateral uncal medialization.  He was hypertensive and hyperglycemic on admission.  His INR was subtherapeutic at 2.08.  He received Kcentra and vitamin K and was started on Keppra, Cardene for hypertension and taken to surgery for emergent bilateral craniotomy and evacuation of subdural hematomas.    Today he is significantly more alert with no focal neurologic deficits.  Systolic blood pressures are 110-136 on no medications.  As an outpatient he was only on propranolol and Crestor for cardiac.  He remains off anticoagulation.  Risk of valve thrombosis with aortic valve is low at 1.3%.  There is a risk of embolic events.  However risk of rebleeding outweighs risk of embolic events currently.    Blood sugars remain in the mid 200s in part from dexamethasone which was stopped today.  His admission A1c is 8.9 consistent with new onset diabetes.  He had been previously told that he was \"prediabetic\".  He will need additional insulin coverage to improve glucose management with goal of blood sugar 140-180    He has a known history of hypothyroidism and is on replacement therapy.  His TSH is 1.6    PLAN:    -Monitor surgical drainage  -Monitor neurologic exam  -Emergent CT scan for any decline in neurologic status  -Maintain systolic blood pressure less than 140  -Restart a beta-blocker, I will use metoprolol  -Add Levemir 10 units nightly, 4 units of Humalog with meals and continue sliding scale  -Neurosurgery discontinued dexamethasone and Keppra  -He remains on his home dose of Depakote, gabapentin  -Continue thyroid replacement  -Add DVT prophylaxis, Lovenox      VTE Prophylaxis:SCDS    Stress Ulcer Prophylaxis: Protonix    Meryl Ashby MD  05/28/23  09:39 EDT      Time: 25min       Electronically signed by Meryl Ashby MD at 05/28/23 0989     Stephane Womack MD at 05/28/23 0842          NEUROSURGERY PROGRESS " "NOTE    Chief Complaint: Bilateral subdural hematomas    Subjective: Continues to improve.  Patient even more awake and interactive this morning.    Objective    Vital Signs: Blood pressure 110/75, pulse 75, temperature 99.7 °F (37.6 °C), temperature source Oral, resp. rate 14, height 172.7 cm (68\"), weight 104 kg (229 lb 0.9 oz), SpO2 95 %.    Physical Exam  Awake, alert and conversive  Opens eyes spont  Pupils 3 mm rx bilat  Extraocular muscles intact bilaterally  Face symmetric bilaterally  Tongue midline  5/5 in all 4 ext  Incisions clean dry and intact    Intake/Output:     Intake/Output Summary (Last 24 hours) at 5/28/2023 0857  Last data filed at 5/28/2023 0600  Gross per 24 hour   Intake 1949.8 ml   Output 495 ml   Net 1454.8 ml       Current Medications:   Current Facility-Administered Medications:   •  acetaminophen (TYLENOL) tablet 650 mg, 650 mg, Oral, Q6H PRN, Robbi Yang APRN, 650 mg at 05/27/23 0051  •  dexamethasone (DECADRON) injection 4 mg, 4 mg, Intravenous, Q6H, Stephane Womack MD, 4 mg at 05/28/23 0633  •  dextrose (D50W) (25 g/50 mL) IV injection 25 g, 25 g, Intravenous, Q15 Min PRN, Meryl Ashby MD  •  dextrose (GLUTOSE) oral gel 15 g, 15 g, Oral, Q15 Min PRN, Meryl Ashby MD  •  divalproex (DEPAKOTE ER) 24 hr tablet 250 mg, 250 mg, Oral, Nightly, Jose Maria Townsend MD, 250 mg at 05/27/23 2155  •  docusate sodium (COLACE) capsule 100 mg, 100 mg, Oral, BID PRN, Stephane Womack MD  •  DULoxetine (CYMBALTA) DR capsule 60 mg, 60 mg, Oral, Daily, Jose Maria Townsend MD, 60 mg at 05/28/23 0844  •  famotidine (PEPCID) injection 20 mg, 20 mg, Intravenous, Q12H, Stephane Womack MD, 20 mg at 05/28/23 0844  •  gabapentin (NEURONTIN) capsule 200 mg, 200 mg, Oral, BID, Jose Maria Townsend MD, 200 mg at 05/28/23 0844  •  glucagon (GLUCAGEN) injection 1 mg, 1 mg, Intramuscular, Q15 Min PRN, Meryl Ashby MD  •  HYDROcodone-acetaminophen (NORCO) 5-325 MG per tablet 1 " tablet, 1 tablet, Oral, Q6H PRN, Robbi Yang, APRN, 1 tablet at 05/28/23 0723  •  HYDROmorphone (DILAUDID) injection 0.5 mg, 0.5 mg, Intravenous, Q2H PRN **AND** naloxone (NARCAN) injection 0.4 mg, 0.4 mg, Intravenous, Q5 Min PRN, Stephane Womack MD  •  insulin regular (humuLIN R,novoLIN R) injection 3-14 Units, 3-14 Units, Subcutaneous, Q6H, Meryl Ashby MD, 6 Units at 05/28/23 0632  •  levETIRAcetam in NaCl 0.82% (KEPPRA) IVPB 500 mg, 500 mg, Intravenous, Q12H, Stephane Womack MD, 500 mg at 05/28/23 0844  •  levothyroxine (SYNTHROID, LEVOTHROID) tablet 125 mcg, 125 mcg, Oral, Daily, Jose Maria Townsend MD, 125 mcg at 05/28/23 0844  •  magnesium hydroxide (MILK OF MAGNESIA) 400 MG/5ML suspension 15 mL, 15 mL, Oral, Daily PRN, Stephane Womack MD  •  niCARdipine (CARDENE) 25mg in 250mL NS infusion, 5-15 mg/hr, Intravenous, Titrated, Kayla Toure APRN, Stopped at 05/26/23 2318  •  ondansetron (ZOFRAN) injection 4 mg, 4 mg, Intravenous, Q6H PRN **OR** ondansetron (ZOFRAN) tablet 4 mg, 4 mg, Oral, Q6H PRN, Stephane Womack MD  •  pantoprazole (PROTONIX) EC tablet 40 mg, 40 mg, Oral, Q AM, Robbi Yang, APRN, 40 mg at 05/28/23 0640  •  polyethylene glycol (MIRALAX) packet 17 g, 17 g, Oral, Daily PRN, Stephane Womack MD  •  rosuvastatin (CRESTOR) tablet 20 mg, 20 mg, Oral, Daily, Jose Maria Townsend MD, 20 mg at 05/28/23 0844  •  sennosides-docusate (PERICOLACE) 8.6-50 MG per tablet 1 tablet, 1 tablet, Oral, Nightly PRN, Stephane Womack MD  •  sodium chloride 0.9 % flush 10 mL, 10 mL, Intravenous, Q12H, Kayla Toure APRN, 10 mL at 05/28/23 0844  •  sodium chloride 0.9 % flush 10 mL, 10 mL, Intravenous, PRN, Kayla Toure APRN  •  sodium chloride 0.9 % infusion 40 mL, 40 mL, Intravenous, PRN, Kayla Toure APRN  •  sodium chloride 0.9 % infusion, 100 mL/hr, Intravenous, Continuous, Chanell Rock, PAMarthaC, Last Rate: 100 mL/hr at 05/28/23 0600, 100 mL/hr at  05/28/23 0600     Laboratory Results:       Lab 05/27/23  1153 05/27/23  0519 05/26/23  1349 05/26/23  1348 05/26/23  1008 05/26/23  0958 05/26/23  0950   WBC  --  13.78*  --   --  11.50*  --   --    HEMOGLOBIN  --  11.3*  --   --  14.6  --   --    HEMOGLOBIN, POC  --   --   --   --   --   --  15.3   HEMATOCRIT  --  35.4*  --   --  45.8  --   --    HEMATOCRIT POC  --   --   --   --   --   --  45   PLATELETS  --  200  --   --  193  --   --    NEUTROS ABS  --   --   --   --  8.68*  --   --    IMMATURE GRANS (ABS)  --   --   --   --  0.16*  --   --    LYMPHS ABS  --   --   --   --  1.63  --   --    MONOS ABS  --   --   --   --  0.71  --   --    EOS ABS  --   --   --   --  0.26  --   --    MCV  --  84.7  --   --  85.1  --   --    PROTIME 14.7*  --  13.3 14.0 23.6* 35.9*  --          Lab 05/27/23  0519 05/26/23  1008 05/26/23  0950   SODIUM 140 139  --    POTASSIUM 4.2 4.4  --    CHLORIDE 103 101  --    CO2 24.0 24.0  --    ANION GAP 13.0 14.0  --    BUN 15 14  --    CREATININE 0.71* 0.72* 0.70   EGFR 113.9 113.4 114.4   GLUCOSE 234* 220*  --    CALCIUM 9.2 9.7  --    MAGNESIUM 1.8 2.2  --    PHOSPHORUS 4.0  --   --    HEMOGLOBIN A1C  --  8.90*  --    TSH  --  1.610  --          Lab 05/26/23  1008   TOTAL PROTEIN 8.4   ALBUMIN 4.7   GLOBULIN 3.7   ALT (SGPT) 14   AST (SGOT) 18   BILIRUBIN 0.6   ALK PHOS 86         Lab 05/27/23  1153 05/26/23  1349 05/26/23  1348 05/26/23  1008 05/26/23  0958   PROTIME 14.7* 13.3 14.0 23.6* 35.9*   INR 1.13* 1.1 1.07 2.08* 3.2*         Lab 05/27/23  0519   CHOLESTEROL 181   LDL CHOL 107*   HDL CHOL 48   TRIGLYCERIDES 147         Lab 05/26/23  1125 05/26/23  1014   ABO TYPING A A   RH TYPING Positive Positive   ANTIBODY SCREEN  --  Negative         Brief Urine Lab Results     None        Microbiology Results (last 10 days)     ** No results found for the last 240 hours. **           Diagnostic Imaging: I reviewed and independently interpreted the new imaging.     Assessment/Plan:  This is  a 47-year-old male status post bilateral craniotomies for evacuation of subacute subdural hematomas on 5/26.  Neurologically, the patient continues to improve.  We will remove his Hemovac drains today.    Continue to mobilize patient. We will d/c keppra and Decadron.   We will start Lovenox for DVT prophylaxis today..  Patient's glucoses continue to be elevated.  Would prefer to have them around 150.  Defer to ICU for management of insulin.  We will plan for an MMA embolization on Tuesday.  Would hold off on heparin drip or oral anticoagulation until after procedure.  Appreciate ICU assistance.      Any copied data from previous notes included in the (1) History of Present Illness, (2) Physical Examination and (3) Medical Decision Making and/or Assessment and Plan has been reviewed and is accurate as of 05/28/23      Stephane Womack MD  05/28/23  08:57 EDT        Electronically signed by Stephane Womack MD at 05/28/23 0911     Meryl Ashby MD at 05/27/23 0846          Critical Care Note     LOS: 1 day   Patient Care Team:  Provider, No Known as PCP - General    Chief Complaint/Reason for visit:    Chief Complaint   Patient presents with   • Stroke   Bilateral subdural hematomas  History of aortic valve replacement on Coumadin    Subjective     Interval History:     He remains afebrile.  On room air saturation is 94%.  He is in sinus rhythm.  Lopez catheter in place with 1 L of urine.  Left subdural drain 90 mL, right subdural drain 50 mL    Review of Systems:    All systems were reviewed and negative except as noted in subjective.    Medical history, surgical history, social history, family history reviewed    Objective     Intake/Output:    Intake/Output Summary (Last 24 hours) at 5/27/2023 0847  Last data filed at 5/27/2023 0617  Gross per 24 hour   Intake 1550 ml   Output 1140 ml   Net 410 ml       Nutrition:  NPO Diet NPO Type: Strict NPO    Infusions:  niCARdipine, 5-15 mg/hr, Last Rate: Stopped  "(05/26/23 2318)  sodium chloride, 100 mL/hr, Last Rate: 100 mL/hr (05/27/23 0510)        Mechanical Ventilator Settings:                                                Telemetry: Sinus rhythm             Vital Signs  Blood pressure 101/63, pulse 86, temperature 98.5 °F (36.9 °C), temperature source Axillary, resp. rate 16, height 172.7 cm (68\"), weight 104 kg (229 lb 0.9 oz), SpO2 91 %.    Physical Exam:  General Appearance:   Middle-aged gentleman supine in bed in no distress   Head:   Bilateral craniotomies with drains, scant bloody output   Eyes:          Gaze is conjugate   Ears:     Throat:  Oral mucosa moist   Neck:  Trachea midline, no crepitus   Back:      Lungs:    Symmetric chest expansion without wheeze or rhonchi    Heart:   Regular rhythm, mechanical S2   Abdomen:    Bowel sounds present, nontender   Rectal:   Deferred   Extremities:  Right radial arterial line, right hand pink   Pulses:    Skin:  Warm and dry   Lymph nodes:  No cervical adenopathy   Neurologic:  Awake, oriented to person, follows commands with all 4 extremities      Results Review:     I reviewed the patient's new clinical results.   Results from last 7 days   Lab Units 05/27/23 0519 05/26/23  1008 05/26/23  0950   SODIUM mmol/L 140 139  --    POTASSIUM mmol/L 4.2 4.4  --    CHLORIDE mmol/L 103 101  --    CO2 mmol/L 24.0 24.0  --    BUN mg/dL 15 14  --    CREATININE mg/dL 0.71* 0.72* 0.70   CALCIUM mg/dL 9.2 9.7  --    BILIRUBIN mg/dL  --  0.6  --    ALK PHOS U/L  --  86  --    ALT (SGPT) U/L  --  14  --    AST (SGOT) U/L  --  18  --    GLUCOSE mg/dL 234* 220*  --      Results from last 7 days   Lab Units 05/27/23 0519 05/26/23  1008 05/26/23  0950   WBC 10*3/mm3 13.78* 11.50*  --    HEMOGLOBIN g/dL 11.3* 14.6  --    HEMOGLOBIN, POC g/dL  --   --  15.3   HEMATOCRIT % 35.4* 45.8  --    HEMATOCRIT POC %  --   --  45   PLATELETS 10*3/mm3 200 193  --          No results found for: BLOODCX  No results found for: URINECX    I reviewed the " patient's new imaging including images and reports.      Findings:   Postoperative changes are noted from interval bilateral craniotomy with evacuation of previously noted acute subdural hematomas. Subdural drains are noted in place. Small bilateral persistent extra-axial mixed fluid collections are present with   low-density fluid, gas and minimal persisting hyperdense blood products, 7 mm maximal thickness on the left, 6 mm on the right. There is improved diffuse cerebral edema, with restored patency of the basal cisterns and decreased effacement of the   ventricles. Some underlying parenchymal hypoattenuation is noted involving temporal lobes bilaterally, greater on the left, concerning for underlying contusion/ischemia. There is also evident trace acute left parafalcine subdural hematoma, fairly   localized, measuring 4 mm in maximal thickness without significant associated mass effect. The orbits are normal. The paranasal sinuses are grossly clear.    Impression:     Impression:   Expected postoperative changes following bilateral craniotomy for evacuation of acute subdural hematomas. Cerebral edema is improved and there is restored patency of the basal cisterns and decreased effacement of the ventricles. Bilateral subdural drains    are noted in place.     4 mm small and somewhat localized left parafalcine subdural hematoma.     Increase in conspicuity, there is somewhat heterogeneous hypoattenuation present involving the left greater than right temporal lobes, concerning for underlying component of either persisting edema, contusion or ischemia.         Electronically Signed: Ruben Morris    5/27/2023 6:07 AM EDT          All medications reviewed.   dexamethasone, 4 mg, Intravenous, Q6H  divalproex, 250 mg, Oral, Nightly  DULoxetine, 60 mg, Oral, Daily  famotidine, 20 mg, Intravenous, Q12H  gabapentin, 200 mg, Oral, BID  insulin lispro, 2-7 Units, Subcutaneous, 4x Daily AC & at Bedtime  levETIRAcetam, 500  mg, Intravenous, Q12H  levothyroxine, 125 mcg, Oral, Daily  pantoprazole, 40 mg, Oral, Q AM  rosuvastatin, 20 mg, Oral, Daily  sodium chloride, 10 mL, Intravenous, Q12H          Assessment & Plan       Bilateral Subdural Hematoma    HTN    Hyperlipidemia (Diet controlled, off statin)    Hypothyroidism    Anxiety    H/O aortic mechanical valve replacement on Coumadin    Prediabetes    47-year-old gentleman with hypertension, dyslipidemia, hypothyroidism, diabetes, history of Hodgkin's lymphoma, 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  Postoperatively he developed pancreatitis and anticoagulation was held and he suffered a stroke resulting in some aphasia in 2018.  He presented May 26 with altered mentation and bilateral subdural hematomas with edema and some leftward midline shift and effacement of the right lateral and third ventricles and bilateral uncal medialization.  He was hypertensive and hyperglycemic on admission.  His INR was subtherapeutic at 2.08.  He received Kcentra and vitamin K and was started on Keppra, Cardene for hypertension and taken to surgery for emergent bilateral craniotomy and evacuation of subdural hematomas.    Neurosurgery reports that he is significantly more awake today.  He is oriented to person only.  He is moving all extremities equally.  Currently his blood pressure is 108 systolic, and he is off Cardene.      PLAN:    -Monitor surgical drainage  -Monitor neurologic exam  -Emergent CT scan for any decline in neurologic status  -Maintain systolic blood pressure less than 140  -Increase sliding scale insulin  -Speech therapy to evaluate for possible swallowing  -Add long-acting insulin if he tolerated p.o. diet  -Continue to hold anticoagulation  -Dexamethasone  -Keppra  -Thyroid replacement  -With mechanical valve he will  "need anticoagulation restarted, will likely restart at 72 hours  -Neurosurgery plans middle meningeal artery embolization next week      VTE Prophylaxis:SCDS    Stress Ulcer Prophylaxis: Protonix    Meryl Ashby MD  05/27/23  08:47 EDT      Time: 25min       Electronically signed by Meryl Ashby MD at 05/27/23 0901     Stephane oWmack MD at 05/27/23 0825          NEUROSURGERY PROGRESS NOTE    Chief Complaint: Bilateral subdural hematomas    Subjective: Patient much more awake this morning.    Objective    Vital Signs: Blood pressure 101/63, pulse 86, temperature 98.5 °F (36.9 °C), temperature source Axillary, resp. rate 16, height 172.7 cm (68\"), weight 104 kg (229 lb 0.9 oz), SpO2 91 %.    Physical Exam  Awake, alert and oriented x 1, says incorrect year and city, but much more conversive  Opens eyes spont  Pupils 3 mm rx bilat  Extraocular muscles intact bilaterally  Face symmetric bilaterally  Tongue midline  5/5 in all 4 ext  Dressing dry    Intake/Output:     Intake/Output Summary (Last 24 hours) at 5/27/2023 0825  Last data filed at 5/27/2023 0617  Gross per 24 hour   Intake 1550 ml   Output 1140 ml   Net 410 ml       Current Medications:   Current Facility-Administered Medications:   •  acetaminophen (TYLENOL) tablet 650 mg, 650 mg, Oral, Q6H PRN, Robbi Yang, APRN, 650 mg at 05/27/23 0051  •  dexamethasone (DECADRON) injection 4 mg, 4 mg, Intravenous, Q6H, Stephane Womack MD, 4 mg at 05/27/23 0617  •  dextrose (D50W) (25 g/50 mL) IV injection 25 g, 25 g, Intravenous, Q15 Min PRN, Navdeep Paz, APRN  •  dextrose (D50W) (25 g/50 mL) IV injection 25 g, 25 g, Intravenous, Q15 Min PRN, Jose Maria Townsend MD  •  dextrose (GLUTOSE) oral gel 15 g, 15 g, Oral, Q15 Min PRN, Navdeep Paz C, APRN  •  dextrose (GLUTOSE) oral gel 15 g, 15 g, Oral, Q15 Min PRN, Jose Maria Townsend MD  •  divalproex (DEPAKOTE ER) 24 hr tablet 250 mg, 250 mg, Oral, Nightly, Jose Maria Townsend MD, 250 " mg at 05/26/23 2141  •  docusate sodium (COLACE) capsule 100 mg, 100 mg, Oral, BID PRN, Stephane Womack MD  •  DULoxetine (CYMBALTA) DR capsule 60 mg, 60 mg, Oral, Daily, Jose Maria Townsend MD  •  famotidine (PEPCID) injection 20 mg, 20 mg, Intravenous, Q12H, Stephane Womack MD, 20 mg at 05/26/23 2317  •  gabapentin (NEURONTIN) capsule 200 mg, 200 mg, Oral, BID, Jose Maria Townsend MD, 200 mg at 05/26/23 2156  •  glucagon (GLUCAGEN) injection 1 mg, 1 mg, Intramuscular, Q15 Min PRN, Navdeep Paz, APRN  •  glucagon (GLUCAGEN) injection 1 mg, 1 mg, Intramuscular, Q15 Min PRN, Jose Maria Townsend MD  •  HYDROcodone-acetaminophen (NORCO) 5-325 MG per tablet 1 tablet, 1 tablet, Oral, Q6H PRN, Robbi Yang, APRN, 1 tablet at 05/26/23 2208  •  HYDROmorphone (DILAUDID) injection 0.5 mg, 0.5 mg, Intravenous, Q2H PRN **AND** naloxone (NARCAN) injection 0.4 mg, 0.4 mg, Intravenous, Q5 Min PRN, Stephane Womack MD  •  Insulin Lispro (humaLOG) injection 2-7 Units, 2-7 Units, Subcutaneous, 4x Daily AC & at Bedtime, Jose Maria Townsend MD, 3 Units at 05/26/23 2141  •  levETIRAcetam in NaCl 0.75% (KEPPRA) IVPB 1,000 mg, 1,000 mg, Intravenous, Q12H, Kayla Toure, APRN, Last Rate: 0 mL/hr at 05/26/23 1053, 1,000 mg at 05/26/23 2141  •  levothyroxine (SYNTHROID, LEVOTHROID) tablet 125 mcg, 125 mcg, Oral, Daily, Jose Maria Townsend MD  •  magnesium hydroxide (MILK OF MAGNESIA) 400 MG/5ML suspension 15 mL, 15 mL, Oral, Daily PRN, Stephane Womack MD  •  niCARdipine (CARDENE) 25mg in 250mL NS infusion, 5-15 mg/hr, Intravenous, Titrated, Kayla Toure APRN, Stopped at 05/26/23 2318  •  ondansetron (ZOFRAN) injection 4 mg, 4 mg, Intravenous, Q6H PRN **OR** ondansetron (ZOFRAN) tablet 4 mg, 4 mg, Oral, Q6H PRN, Stephane Womack MD  •  pantoprazole (PROTONIX) EC tablet 40 mg, 40 mg, Oral, Q AM, Robbi Yang APRN, 40 mg at 05/27/23 0617  •  polyethylene glycol (MIRALAX) packet 17 g, 17 g, Oral, Daily PRN,  Stephane Womack MD  •  rosuvastatin (CRESTOR) tablet 20 mg, 20 mg, Oral, Daily, Jose Maria Townsend MD  •  sennosides-docusate (PERICOLACE) 8.6-50 MG per tablet 1 tablet, 1 tablet, Oral, Nightly PRN, Stephane Womack MD  •  sodium chloride 0.9 % flush 10 mL, 10 mL, Intravenous, Q12H, Kayla Toure APRN, 10 mL at 05/26/23 2142  •  sodium chloride 0.9 % flush 10 mL, 10 mL, Intravenous, PRN, Kayla Toure APRN  •  sodium chloride 0.9 % infusion 40 mL, 40 mL, Intravenous, PRN, Kayla Toure APRN  •  sodium chloride 0.9 % infusion, 100 mL/hr, Intravenous, Continuous, Chanell Rock PA-C, Last Rate: 100 mL/hr at 05/27/23 0510, 100 mL/hr at 05/27/23 0510     Laboratory Results:       Lab 05/27/23  0519 05/26/23  1349 05/26/23  1348 05/26/23  1008 05/26/23  0958 05/26/23  0950   WBC 13.78*  --   --  11.50*  --   --    HEMOGLOBIN 11.3*  --   --  14.6  --   --    HEMOGLOBIN, POC  --   --   --   --   --  15.3   HEMATOCRIT 35.4*  --   --  45.8  --   --    HEMATOCRIT POC  --   --   --   --   --  45   PLATELETS 200  --   --  193  --   --    NEUTROS ABS  --   --   --  8.68*  --   --    IMMATURE GRANS (ABS)  --   --   --  0.16*  --   --    LYMPHS ABS  --   --   --  1.63  --   --    MONOS ABS  --   --   --  0.71  --   --    EOS ABS  --   --   --  0.26  --   --    MCV 84.7  --   --  85.1  --   --    PROTIME  --  13.3 14.0 23.6* 35.9*  --          Lab 05/27/23  0519 05/26/23  1008 05/26/23  0950   SODIUM 140 139  --    POTASSIUM 4.2 4.4  --    CHLORIDE 103 101  --    CO2 24.0 24.0  --    ANION GAP 13.0 14.0  --    BUN 15 14  --    CREATININE 0.71* 0.72* 0.70   EGFR 113.9 113.4 114.4   GLUCOSE 234* 220*  --    CALCIUM 9.2 9.7  --    MAGNESIUM 1.8 2.2  --    PHOSPHORUS 4.0  --   --    HEMOGLOBIN A1C  --  8.90*  --    TSH  --  1.610  --          Lab 05/26/23  1008   TOTAL PROTEIN 8.4   ALBUMIN 4.7   GLOBULIN 3.7   ALT (SGPT) 14   AST (SGOT) 18   BILIRUBIN 0.6   ALK PHOS 86         Lab 05/26/23  7798  23  1348 23  1008 23  0958   PROTIME 13.3 14.0 23.6* 35.9*   INR 1.1 1.07 2.08* 3.2*         Lab 23  0519   CHOLESTEROL 181   LDL CHOL 107*   HDL CHOL 48   TRIGLYCERIDES 147         Lab 23  1125 23  1014   ABO TYPING A A   RH TYPING Positive Positive   ANTIBODY SCREEN  --  Negative         Brief Urine Lab Results     None        Microbiology Results (last 10 days)     ** No results found for the last 240 hours. **           Diagnostic Imaging: I reviewed and independently interpreted the new imaging.     Assessment/Plan:  This is a 47-year-old male status post bilateral craniotomies for evacuation of subacute subdural hematomas on .  Neurologically, the patient is much more awake and interactive.  His postoperative head CT shows good evacuation of hematomas with significant improvement in mass effect.  We will continue his Hemovac drains 1 more day.  Okay for patient to mobilize and get out of bed today.  Continue Keppra and Decadron.  We will hold off on DVT prophylaxis 1 more day.  Patient's glucoses are elevated.  Would prefer to have them around 150.  Defer to ICU for management of insulin.  Appreciate ICU assistance.    Any copied data from previous notes included in the (1) History of Present Illness, (2) Physical Examination and (3) Medical Decision Making and/or Assessment and Plan has been reviewed and is accurate as of 23      Stephane Womack MD  23  08:25 EDT        Electronically signed by Stephane Womack MD at 23 0831          Consult Notes (all)      Stephane Womack MD at 23 1005          Patient: Manjeet Hall  : 1975    Primary Care Provider: Provider, No Known    Requesting Provider: As above      Chief Complaint: Altered mental status    History of Present Illness: This is a 47 y.o. 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  He is status post cardiac surgery and is currently  on Coumadin.  He presented to the emergency room by himself with altered mental status.  He was not speaking very well and a history was unable to be obtained.  He underwent a CT scan of his head which showed subacute bilateral subdural hematomas.  He has been more awake since the CT scan.    PMHX  Allergies:  Not on File  Medications    Current Facility-Administered Medications:   •  prothrombin complex conc human (KCentra) IV solution 2,500 Units, 2,500 Units, Intravenous, Once **AND** phytonadione (AQUA-MEPHYTON, VITAMIN K) 10 mg in sodium chloride 0.9 % 50 mL IVPB, 10 mg, Intravenous, Once **AND** Protime-INR, , , Once **AND** [CANCELED] Protime-INR, , , Once **AND** [START ON 5/27/2023] Protime-INR, , , Once **AND** Monitor for signs for thromboembolic events, , , Per Order Details, Cameron Daugherty MD  •  sodium chloride 0.9 % infusion, 125 mL/hr, Intravenous, Continuous, Cameron Daugherty MD  No current outpatient medications on file.  Past Medical History:  No past medical history on file.  Past Surgical History:  No past surgical history on file.  Social Hx:     Family Hx:  No family history on file.  Review of Systems:        Unable to be obtained given patient's status and lack of family present    Physical Exam:   BP (!) 169/103   Pulse 79   Resp 16   Wt 104 kg (229 lb 0.9 oz)   SpO2 97%   Patient appears comfortable, resting  Eyes open spontaneously  Able to say name, does not know year  Pupils 3 mm reactive bilaterally  Face symmetric  Follows commands briskly x4    Intake/Output: No intake or output data in the 24 hours ending 05/26/23 1005    Current Medications:   Current Facility-Administered Medications:   •  prothrombin complex conc human (KCentra) IV solution 2,500 Units, 2,500 Units, Intravenous, Once **AND** phytonadione (AQUA-MEPHYTON, VITAMIN K) 10 mg in sodium chloride 0.9 % 50 mL IVPB, 10 mg, Intravenous, Once **AND** Protime-INR, , , Once **AND** [CANCELED] Protime-INR, , , Once  **AND** [START ON 5/27/2023] Protime-INR, , , Once **AND** Monitor for signs for thromboembolic events, , , Per Order Details, Cameron Daugherty MD  •  sodium chloride 0.9 % infusion, 125 mL/hr, Intravenous, Continuous, Cameron Daugherty MD  No current outpatient medications on file.     Laboratory Results:      Lab 05/26/23  0958   PROTIME 35.9*                 Lab 05/26/23  0958   PROTIME 35.9*   INR 3.2*                 Brief Urine Lab Results     None        Microbiology Results (last 10 days)     ** No results found for the last 240 hours. **           Diagnostic Imaging: The patient's diagnostic imaging was independently reviewed and interpreted by myself.    Assessment/Plan:  This is a 47-year-old male with a history of left hemispheric infarct and cardiac surgery, currently on Coumadin.  He presented to the emergency room with altered mental status.  CT scan of the head shows subacute bilateral subdural hematomas.  He is clinically stable and is following commands briskly.  We will obtain a full set of labs and reverse his Coumadin.  We will obtain a brain MRI with and without contrast.  The patient will likely need drainage of the subdural hematomas.  I will follow-up with the family and patient after his tests are completed.  Would recommend admitting him to ICU.      Stephane Womack MD  05/26/23  10:05 EDT          Electronically signed by Stephane Womack MD at 05/26/23 1004

## 2023-05-30 NOTE — THERAPY TREATMENT NOTE
"Patient Name: Manjeet Hall  : 1975    MRN: [id number]                              Today's Date: 2023       Admit Date: 2023    Visit Dx:     ICD-10-CM ICD-9-CM   1. Somnolence  R40.0 780.09   2. Subdural hematoma  S06.5XAA 432.1   3. Elevated INR  R79.1 790.92   4. H/O mechanical aortic valve replacement  Z95.2 V43.3   5. Hypertensive urgency  I16.0 401.9   6. Dysphagia, unspecified type  R13.10 787.20   7. Aphasia  R47.01 784.3     Patient Active Problem List   Diagnosis   • Bilateral Subdural Hematoma   • HTN   • Hyperlipidemia (Diet controlled, off statin)   • Hypothyroidism   • Anxiety   • Hodgkin's Lymphoma s/p XRT & Chemo   • VSD s/p closure (age 14)   • History of aortic regurgitation / stenosis s/p ROSS procedure with subsequent reversal with Mechanical AVR   • H/O aortic mechanical valve replacement on Coumadin   • New Onset Type 2 diabetes mellitus   • Somnolence     Past Medical History:   Diagnosis Date   • Cancer    • Diabetes mellitus    • Elevated cholesterol    • Stroke      History reviewed. No pertinent surgical history.   General Information     Row Name 23 1547          Physical Therapy Time and Intention    Document Type therapy note (daily note)  -AE     Mode of Treatment physical therapy  -AE     Row Name 23 1547          General Information    Patient Profile Reviewed yes  -AE     Existing Precautions/Restrictions fall;other (see comments)  h/o CVA with residual aphasia, receptive aphasia  -AE     Barriers to Rehab medically complex;previous functional deficit;cognitive status  -AE     Row Name 23 1547          Cognition    Orientation Status (Cognition) oriented to;person;place;time  When asked where he is, pt states \"May 30th\" (correct date). Increased time required to state \"[facility]\".  -AE     Row Name 23 1547          Safety Issues, Functional Mobility    Safety Issues Affecting Function (Mobility) awareness of need for " assistance;insight into deficits/self-awareness;safety precaution awareness;safety precautions follow-through/compliance  -AE     Impairments Affecting Function (Mobility) cognition;coordination;balance;motor planning  -AE     Cognitive Impairments, Mobility Safety/Performance safety precaution awareness;safety precaution follow-through;sequencing abilities;insight into deficits/self-awareness  -AE           User Key  (r) = Recorded By, (t) = Taken By, (c) = Cosigned By    Initials Name Provider Type    AE Sajan Garcia, PT Physical Therapist               Mobility     Row Name 05/30/23 1555          Bed Mobility    Bed Mobility supine-sit  -AE     Supine-Sit [place] (Bed Mobility) contact guard;1 person assist;verbal cues  -AE     Assistive Device (Bed Mobility) head of bed elevated  -AE     Comment, (Bed Mobility) VCs for hand placement. Pt impulsive with most mobility, requires cues to improve and reduce risk of dislodgement of IV.  -AE     Row Name 05/30/23 1555          Transfers    Comment, (Transfers) VCs for hand placement. No LOB noted with STS.  -AE     Row Name 05/30/23 1555          Sit-Stand Transfer    Sit-Stand [place] (Transfers) contact guard;1 person assist  -AE     Row Name 05/30/23 1555          Gait/Stairs (Locomotion)    [place] Level (Gait) contact guard;1 person assist;verbal cues  -AE     Assistive Device (Gait) other (see comments)  LUE pushing tele monitor  -AE     Distance in Feet (Gait) 350  -AE     Deviations/Abnormal Patterns (Gait) bilateral deviations;etienne decreased;gait speed decreased  -AE     Comment, (Gait/Stairs) Pt demo step through gait pattern with LUE pushing tele monitor. Pt demo no overt LOB with mobility but has instances of mild balance deficits. Further distance limited by fatigue.  -AE           User Key  (r) = Recorded By, (t) = Taken By, (c) = Cosigned By    Initials Name Provider Type    AE Sajan Garcia PT Physical Therapist                Obj/Interventions     Row Name 05/30/23 1600          Balance    Balance Assessment sitting static balance;sitting dynamic balance;sit to stand dynamic balance;standing static balance;standing dynamic balance  -AE     Static Sitting Balance standby assist  -AE     Dynamic Sitting Balance contact guard  -AE     Position, Sitting Balance unsupported;sitting edge of bed  -AE     Sit to Stand Dynamic Balance contact guard;1-person assist;verbal cues  -AE     Static Standing Balance contact guard  -AE     Dynamic Standing Balance contact guard  -AE     Position/Device Used, Standing Balance supported  -AE           User Key  (r) = Recorded By, (t) = Taken By, (c) = Cosigned By    Initials Name Provider Type    AE Sajan Garcia, PT Physical Therapist               Goals/Plan    No documentation.                Clinical Impression     Row Name 05/30/23 1601          Pain    Pretreatment Pain Rating 0/10 - no pain  -AE     Posttreatment Pain Rating 0/10 - no pain  -AE     Row Name 05/30/23 1601          Plan of Care Review    Plan of Care Reviewed With patient;significant other  -AE     Progress improving  -AE     Outcome Evaluation Pt continues to present below baseline functional mobility. Pt ambulated 350ft with CGA and LUE pushing tele monitor. Pt has instances of mild balance deficits but otherwise has good overall safety awareness while ambulating. Continue to progress per pt tolerance.  -AE     Row Name 05/30/23 1601          Vital Signs    Pre Systolic BP Rehab 122  -AE     Pre Treatment Diastolic BP 81  -AE     Post Systolic BP Rehab 149  -AE     Post Treatment Diastolic BP 90  -AE     Pretreatment Heart Rate (beats/min) 90  -AE     Posttreatment Heart Rate (beats/min) 84  -AE     Pre SpO2 (%) 96  -AE     O2 Delivery Pre Treatment room air  -AE     O2 Delivery Intra Treatment room air  -AE     Post SpO2 (%) 97  -AE     O2 Delivery Post Treatment room air  -AE     Pre Patient Position Supine  -AE     Intra  Patient Position Standing  -AE     Post Patient Position Sitting  -AE     Row Name 05/30/23 1601          Positioning and Restraints    Pre-Treatment Position in bed  -AE     Post Treatment Position chair  -AE     In Chair notified nsg;sitting;call light within reach;encouraged to call for assist;exit alarm on;with family/caregiver;waffle cushion  -AE           User Key  (r) = Recorded By, (t) = Taken By, (c) = Cosigned By    Initials Name Provider Type    AE Sajan Garcia, GERARDO Physical Therapist               Outcome Measures     Row Name 05/30/23 1604 05/30/23 0800       How much help from another person do you currently need...    Turning from your back to your side while in flat bed without using bedrails? 4  -AE 4  -CD    Moving from lying on back to sitting on the side of a flat bed without bedrails? 3  -AE 4  -CD    Moving to and from a bed to a chair (including a wheelchair)? 4  -AE 4  -CD    Standing up from a chair using your arms (e.g., wheelchair, bedside chair)? 4  -AE 3  -CD    Climbing 3-5 steps with a railing? 3  -AE 3  -CD    To walk in hospital room? 3  -AE 3  -CD    AM-PAC 6 Clicks Score (PT) 21  -AE 21  -CD    Highest level of mobility 6 --> Walked 10 steps or more  -AE 6 --> Walked 10 steps or more  -CD    Row Name 05/30/23 1604          Functional Assessment    Outcome Measure Options AM-PAC 6 Clicks Basic Mobility (PT)  -AE           User Key  (r) = Recorded By, (t) = Taken By, (c) = Cosigned By    Initials Name Provider Type    Sajan Chadwick, PT Physical Therapist    Kishore Cooper, RN Registered Nurse                             Physical Therapy Education     Title: PT OT SLP Therapies (In Progress)     Topic: Physical Therapy (In Progress)     Point: Mobility training (Done)     Learning Progress Summary           Patient Acceptance, E, VU by AE at 5/30/2023 1454    Acceptance, E, VU by CM at 5/27/2023 1035   Significant Other Acceptance, E, VU by CM at 5/27/2023 1035                    Point: Home exercise program (Not Started)     Learner Progress:  Not documented in this visit.          Point: Body mechanics (Done)     Learning Progress Summary           Patient Acceptance, E, VU by AE at 5/30/2023 1454    Acceptance, E, VU by CM at 5/27/2023 1035   Significant Other Acceptance, E, VU by CM at 5/27/2023 1035                   Point: Precautions (Done)     Learning Progress Summary           Patient Acceptance, E, VU by AE at 5/30/2023 1454    Acceptance, E, VU by CM at 5/27/2023 1035   Significant Other Acceptance, E, VU by CM at 5/27/2023 1035                               User Key     Initials Effective Dates Name Provider Type Discipline    AE 09/21/21 -  Sajan Garcia, PT Physical Therapist PT    CM 09/22/22 -  Belkys Wilks PT Physical Therapist PT              PT Recommendation and Plan     Plan of Care Reviewed With: patient, significant other  Progress: improving  Outcome Evaluation: Pt continues to present below baseline functional mobility. Pt ambulated 350ft with CGA and LUE pushing tele monitor. Pt has instances of mild balance deficits but otherwise has good overall safety awareness while ambulating. Continue to progress per pt tolerance.     Time Calculation:    PT Charges     Row Name 05/30/23 1605             Time Calculation    Start Time 1454  -AE      PT Received On 05/30/23  -AE      PT Goal Re-Cert Due Date 06/06/23  -AE         Time Calculation- PT    Total Timed Code Minutes- PT 23 minute(s)  -AE         Timed Charges    60572 - PT Therapeutic Activity Minutes 23  -AE         Total Minutes    Timed Charges Total Minutes 23  -AE       Total Minutes 23  -AE            User Key  (r) = Recorded By, (t) = Taken By, (c) = Cosigned By    Initials Name Provider Type    AE Sajan Garcia PT Physical Therapist              Therapy Charges for Today     Code Description Service Date Service Provider Modifiers Qty    [id number]  PT THERAPEUTIC ACT EA 15 MIN  5/30/2023 Sajan Garcia, PT GP 2          PT G-Codes  Outcome Measure Options: AM-PAC 6 Clicks Basic Mobility (PT)  AM-PAC 6 Clicks Score (PT): 21  AM-PAC 6 Clicks Score (OT): 16  Modified Grundy Scale: 3 - Moderate disability.  Requiring some help, but able to walk without assistance.  PT Discharge Summary  Anticipated Discharge Disposition (PT): home with 24/7 care    Sajan Garcia, PT  5/30/2023

## 2023-05-30 NOTE — PROGRESS NOTES
Intensive Care Follow-up     Hospital:  LOS: 4 days   Mr. Manjeet Hall, 47 y.o. male is followed for:   Subdural hematoma, nontraumatic          History of present illness:   Manjeet Hall is a 47 y.o. male with PMH HTN, HLP, hypothyroidism, T2DM, history of Hodgkin's lymphoma 2020, PDA closure at birth, VSD closure at age 14, AR / AS s/p Ross procedure and subsequent mechanical aortic valve replacement with right ventricular outflow tract reconstruction ([facility]) that was complicated by pancreatitis and anticoagulation was held, he then suffered a stroke resulting in some expressive aphasia and word finding difficulty in 2018 who presented 5/26 with AMS and bilateral subdural hematomas with edema and some leftward midline shift and effacement of the right lateral and third ventricles and bilateral uncal medialization.  He was hypertensive and hyperglycemic on admission.  His INR was subtherapeutic at 2.08.  He received Kcentra and vitamin K and was started on Keppra for seizure prophylaxis, Cardene for hypertension and taken to OR for emergent bilateral craniotomy and evacuation of subdural hematomas.      Subjective   Interval History:  Patient went to cath lab for embolization today, but it was cancelled due to poor IV access.  Needs PICC line today.  Plan for embolization tomorrow.  NS ok with heparin drip today and needs to be discontinued at 6 am in the morning prior to procedure.    Patient has no complaints.  He is upset about being a newly diagnosed diabetic.  He is requesting an Endocrinologist appt at discharge.  He would also like to establish care with a new PCP as his is retiring soon.         The patient's past medical, surgical and social history were reviewed and updated in Epic as appropriate.    Review of Systems - General ROS: negative for - chills, fatigue, fever or night sweats  Respiratory ROS: negative for - cough, shortness of breath or wheezing  Cardiovascular ROS:  "negative for - chest pain, edema or palpitations  Gastrointestinal ROS: positive for constipation       Objective     Infusions:  heparin, 9.5 Units/kg/hr  Pharmacy to Dose Heparin,          Medications:  divalproex, 250 mg, Oral, Nightly  DULoxetine, 60 mg, Oral, Daily  gabapentin, 200 mg, Oral, BID  insulin detemir, 10 Units, Subcutaneous, Nightly  insulin lispro, 3-14 Units, Subcutaneous, 4x Daily AC & at Bedtime  Insulin Lispro, 4 Units, Subcutaneous, TID With Meals  levothyroxine, 125 mcg, Oral, Daily  metoprolol tartrate, 12.5 mg, Oral, Q12H  pantoprazole, 40 mg, Oral, Q AM  polyethylene glycol, 17 g, Oral, Daily  rosuvastatin, 20 mg, Oral, Daily  senna-docusate sodium, 2 tablet, Oral, BID  sodium chloride, 10 mL, Intravenous, Q12H        Vital Sign Min/Max for last 24 hours  Temp  Min: 97.3 °F (36.3 °C)  Max: 98.7 °F (37.1 °C)   BP  Min: 96/74  Max: 146/83   Pulse  Min: 58  Max: 82   Resp  Min: 14  Max: 16   SpO2  Min: 86 %  Max: 100 %   Flow (L/min)  Min: 2  Max: 2       Input/Output for last 24 hour shift  No intake/output data recorded.         Objective:  General Appearance:  In no acute distress and comfortable.    Vital signs: (most recent): Blood pressure 120/85, pulse 73, temperature 97.4 °F (36.3 °C), temperature source Axillary, resp. rate 16, height 172.7 cm (68\"), weight 104 kg (229 lb 0.9 oz), SpO2 (!) 88 %.  Vital signs are normal.    HEENT: (Bilateral incisions approximated with staples.  No drainage, redness.)    Lungs:  Normal effort and normal respiratory rate.  Breath sounds clear to auscultation.  He is not in respiratory distress.  No rales, wheezes or rhonchi.    Heart: Normal rate.  Regular rhythm.  S1 normal and S2 normal.  No murmur or friction rub. (+ click)  Abdomen: Abdomen is soft and non-distended.  Hypoactive bowel sounds.   There is no abdominal tenderness.     Extremities: There is no dependent edema.    Pulses: Distal pulses are intact.    Neurological: Patient is alert and " oriented to person, place and time.  GCS score is 15.  (Expressive aphasia and word finding difficulty at baseline).    Pupils:  Pupils are equal, round, and reactive to light.    Skin:  Warm and dry.  No rash.           Results from last 7 days   Lab Units 05/30/23  0441 05/29/23  1004 05/27/23  0519   WBC 10*3/mm3 10.05 13.76* 13.78*   HEMOGLOBIN g/dL 11.3* 10.9* 11.3*   PLATELETS 10*3/mm3 183 173 200     Results from last 7 days   Lab Units 05/30/23  0441 05/29/23  1004 05/27/23  0519   SODIUM mmol/L 144 143 140   POTASSIUM mmol/L 4.0 3.8 4.2   CO2 mmol/L 28.0 28.0 24.0   BUN mg/dL 20 20 15   CREATININE mg/dL 0.68* 0.64* 0.71*   MAGNESIUM mg/dL 2.1 2.1 1.8   PHOSPHORUS mg/dL 5.0* 2.8 4.0   GLUCOSE mg/dL 123* 151* 234*     Estimated Creatinine Clearance: 156.9 mL/min (A) (by C-G formula based on SCr of 0.68 mg/dL (L)).          Images:   None new    I reviewed the patient's results and images.     Assessment & Plan   Impression        Bilateral Subdural Hematoma    HTN    Hyperlipidemia (Diet controlled, off statin)    New Onset Type 2 diabetes mellitus    Hypothyroidism    H/O aortic mechanical valve replacement on Coumadin    Anxiety       Plan        Bilateral Subdural Hematomas s/p bilateral crani for evacuation of subdural hematomas, drains d/c'd 5/28  H/O Stroke post op AVR with residual receptive aphasia and word finding difficulty  · Post op care per NS  · Plan to return to cath lab for embolization tomorrow 5/31  · NPO after MN  · NS ok with heparin drip started today with no initial bolus.  Needs to be discontinued at 6 am on 5/31  · Continue PT/OT/SLP  · CM ordered outpatient SLP to help with expressive aphasia and word finding difficulty upon discharge  · PICC line today  · Patient wants to establish care with new PCP at discharge  · AM labs    HTN  HLP  H/O mechanical AVR 2018 on Warfarin  · Continue Metoprolol  · Continue Crestor  · Heparin drip to be started today.  Discontinue in am.  Resume when  ok with NS to bridge to Warfarin    New onset T2DM, Hgb A1c 8.9   Neuropathy  · Keep glucose <= 150 per NS  · Change basal to every 12 hours  · Continue correction SSI  · Continue prandial  · Steroids discontinued 5/28  · Diabetes education  · Continue Gabapentin  · Patient would like to establish care with Endocrinologist at discharge    Hypothyroidism  · Continue Levothyroxine    Anxiety  Depression   · Continue Cymbalta  · Continue Depakote ER  · Hold home Atarax for now    Constipation  · Change BM regimen from as needed to scheduled      DVT prophylaxis:  Heparin drip / SCDs  GI prophylaxis:  PPI  Disposition:  Keep in ICU    Plan of care and goals reviewed with multidisciplinary/antibiotic stewardship team during rounds.   I discussed the patient's findings and my recommendations with patient, family, nursing staff and primary care team     Time: 35 minutes, face to face, with the patient and family or on the [place]sey coordinating care with other health care providers.     I spent > 50% percent of this time, counseling and discussing evaluation, current status and management.      BETHANIE Markham, AGACNP-BC, FNP-BC  Pulmonary and Critical Care Service

## 2023-05-30 NOTE — CASE MANAGEMENT/SOCIAL WORK
Discharge Planning Assessment  [facility]     Patient Name: Manjeet Hall  MRN: [id number]  Today's Date: 5/30/2023    Admit Date: 5/26/2023    Plan: Home   Discharge Needs Assessment     Row Name 05/30/23 1632       Living Environment    People in Home alone    Current Living Arrangements home    Potentially Unsafe Housing Conditions none    Primary Care Provided by self    Provides Primary Care For no one    Family Caregiver if Needed child(amparo), adult;significant other    Family Caregiver Names Daughter Faina Hall    Quality of Family Relationships helpful;involved;supportive    Able to Return to Prior Arrangements yes       Transition Planning    Patient/Family Anticipates Transition to home    Patient/Family Anticipated Services at Transition     Transportation Anticipated family or friend will provide       Discharge Needs Assessment    Readmission Within the Last 30 Days no previous admission in last 30 days    Equipment Currently Used at Home none    Concerns to be Addressed discharge planning    Anticipated Changes Related to Illness none    Equipment Needed After Discharge none    Current Discharge Risk chronically ill               Discharge Plan     Row Name 05/30/23 1634       Plan    Plan Home    Patient/Family in Agreement with Plan yes    Plan Comments I met with Mr. Hall and his family today to initiate discharge planning. He lives alone in [facility]. He is independent, uses no dme, and does not participate with therapy. He has requested outpatient speech at discharge. It is required to have a primary provider for this to occur. He currently has one, but he will be retiring in a few months. An order for outpatient therapy will be provided to Mr. Hall with his current PCP. The number for the [facility] for [facility] physicians will be provided for him to arrange for a new primary physician. He is to have an embolization tomorrow. Case management will continue to follow  Mr. Hall's progress.    Final Discharge Disposition Code 01 - home or self-care              Continued Care and Services - Admitted Since 5/26/2023    Coordination has not been started for this encounter.          Demographic Summary     Row Name 05/30/23 6923       General Information    General Information Comments Primary provider is Nile Faust               Functional Status     Row Name 05/30/23 1631       Functional Status, IADL    Medications independent    Meal Preparation independent    Housekeeping independent    Laundry independent    Shopping independent       Mental Status    General Appearance WDL WDL       Mental Status Summary    Recent Changes in Mental Status/Cognitive Functioning no changes       Employment/    Employment Status disabled               Psychosocial    No documentation.                Abuse/Neglect    No documentation.                Legal    No documentation.                Substance Abuse    No documentation.                Patient Forms    No documentation.                   Juliet Dias RN

## 2023-05-30 NOTE — ANESTHESIA PREPROCEDURE EVALUATION
Anesthesia Evaluation     Patient summary reviewed and Nursing notes reviewed   NPO Solid Status: > 8 hours  NPO Liquid Status: > 2 hours           Airway   Mallampati: I  TM distance: >3 FB  Neck ROM: full  No difficulty expected  Dental - normal exam     Pulmonary     breath sounds clear to auscultation  Cardiovascular   Exercise tolerance: good (4-7 METS)    ECG reviewed  PT is on anticoagulation therapy  Patient on routine beta blocker  Rhythm: regular  Rate: normal    (+) hypertension, valvular problems/murmurs AS, systolic click, hyperlipidemia,       Neuro/Psych  (+) CVA, psychiatric history,    GI/Hepatic/Renal/Endo    (+) obesity,   diabetes mellitus type 2 well controlled, thyroid problem hypothyroidism    Musculoskeletal     Abdominal   (+) obese,     Abdomen: soft.   Substance History      OB/GYN          Other   blood dyscrasia anemia,   history of cancer                  Anesthesia Plan    ASA 3     general     intravenous induction     Anesthetic plan, risks, benefits, and alternatives have been provided, discussed and informed consent has been obtained with: patient.    Plan discussed with CRNA.        CODE STATUS:    Code Status (Patient has no pulse and is not breathing): CPR (Attempt to Resuscitate)  Medical Interventions (Patient has pulse or is breathing): Full Support  Release to patient: Routine Release

## 2023-05-31 ENCOUNTER — ANESTHESIA EVENT CONVERTED (OUTPATIENT)
Dept: ANESTHESIOLOGY | Facility: HOSPITAL | Age: 48
DRG: 020 | End: 2023-05-31
Payer: MEDICARE

## 2023-05-31 ENCOUNTER — ANESTHESIA (OUTPATIENT)
Dept: CARDIOLOGY | Facility: HOSPITAL | Age: 48
DRG: 020 | End: 2023-05-31
Payer: MEDICARE

## 2023-05-31 ENCOUNTER — ANESTHESIA EVENT (OUTPATIENT)
Dept: CARDIOLOGY | Facility: HOSPITAL | Age: 48
DRG: 020 | End: 2023-05-31
Payer: MEDICARE

## 2023-05-31 LAB
ANION GAP SERPL CALCULATED.3IONS-SCNC: 10 MMOL/L (ref 5–15)
ANION GAP SERPL CALCULATED.3IONS-SCNC: 10 MMOL/L (ref 5–15)
BASOPHILS # BLD AUTO: 0.07 10*3/MM3 (ref 0–0.2)
BASOPHILS # BLD AUTO: 0.07 10*3/MM3 (ref 0–0.2)
BASOPHILS NFR BLD AUTO: 0.8 % (ref 0–1.5)
BASOPHILS NFR BLD AUTO: 0.8 % (ref 0–1.5)
BUN SERPL-MCNC: 16 MG/DL (ref 6–20)
BUN SERPL-MCNC: 16 MG/DL (ref 6–20)
BUN/CREAT SERPL: 22.9 (ref 7–25)
BUN/CREAT SERPL: 22.9 (ref 7–25)
CALCIUM SPEC-SCNC: 8.8 MG/DL (ref 8.6–10.5)
CALCIUM SPEC-SCNC: 8.8 MG/DL (ref 8.6–10.5)
CHLORIDE SERPL-SCNC: 106 MMOL/L (ref 98–107)
CHLORIDE SERPL-SCNC: 106 MMOL/L (ref 98–107)
CO2 SERPL-SCNC: 27 MMOL/L (ref 22–29)
CO2 SERPL-SCNC: 27 MMOL/L (ref 22–29)
CREAT SERPL-MCNC: 0.7 MG/DL (ref 0.76–1.27)
CREAT SERPL-MCNC: 0.7 MG/DL (ref 0.76–1.27)
DEPRECATED RDW RBC AUTO: 46.2 FL (ref 37–54)
DEPRECATED RDW RBC AUTO: 46.2 FL (ref 37–54)
EGFRCR SERPLBLD CKD-EPI 2021: 114.4 ML/MIN/1.73
EGFRCR SERPLBLD CKD-EPI 2021: 114.4 ML/MIN/1.73
EOSINOPHIL # BLD AUTO: 0.47 10*3/MM3 (ref 0–0.4)
EOSINOPHIL # BLD AUTO: 0.47 10*3/MM3 (ref 0–0.4)
EOSINOPHIL NFR BLD AUTO: 5 % (ref 0.3–6.2)
EOSINOPHIL NFR BLD AUTO: 5 % (ref 0.3–6.2)
ERYTHROCYTE [DISTWIDTH] IN BLOOD BY AUTOMATED COUNT: 15.1 % (ref 12.3–15.4)
ERYTHROCYTE [DISTWIDTH] IN BLOOD BY AUTOMATED COUNT: 15.1 % (ref 12.3–15.4)
GLUCOSE BLDC GLUCOMTR-MCNC: 106 MG/DL (ref 70–130)
GLUCOSE BLDC GLUCOMTR-MCNC: 106 MG/DL (ref 70–130)
GLUCOSE BLDC GLUCOMTR-MCNC: 148 MG/DL (ref 70–130)
GLUCOSE BLDC GLUCOMTR-MCNC: 148 MG/DL (ref 70–130)
GLUCOSE BLDC GLUCOMTR-MCNC: 91 MG/DL (ref 70–130)
GLUCOSE BLDC GLUCOMTR-MCNC: 91 MG/DL (ref 70–130)
GLUCOSE BLDC GLUCOMTR-MCNC: 98 MG/DL (ref 70–130)
GLUCOSE BLDC GLUCOMTR-MCNC: 98 MG/DL (ref 70–130)
GLUCOSE SERPL-MCNC: 134 MG/DL (ref 65–99)
GLUCOSE SERPL-MCNC: 134 MG/DL (ref 65–99)
HCT VFR BLD AUTO: 36.8 % (ref 37.5–51)
HCT VFR BLD AUTO: 36.8 % (ref 37.5–51)
HGB BLD-MCNC: 11.9 G/DL (ref 13–17.7)
HGB BLD-MCNC: 11.9 G/DL (ref 13–17.7)
IMM GRANULOCYTES # BLD AUTO: 0.37 10*3/MM3 (ref 0–0.05)
IMM GRANULOCYTES # BLD AUTO: 0.37 10*3/MM3 (ref 0–0.05)
IMM GRANULOCYTES NFR BLD AUTO: 4 % (ref 0–0.5)
IMM GRANULOCYTES NFR BLD AUTO: 4 % (ref 0–0.5)
LYMPHOCYTES # BLD AUTO: 2.25 10*3/MM3 (ref 0.7–3.1)
LYMPHOCYTES # BLD AUTO: 2.25 10*3/MM3 (ref 0.7–3.1)
LYMPHOCYTES NFR BLD AUTO: 24.1 % (ref 19.6–45.3)
LYMPHOCYTES NFR BLD AUTO: 24.1 % (ref 19.6–45.3)
MAGNESIUM SERPL-MCNC: 2.3 MG/DL (ref 1.6–2.6)
MAGNESIUM SERPL-MCNC: 2.3 MG/DL (ref 1.6–2.6)
MCH RBC QN AUTO: 27.7 PG (ref 26.6–33)
MCH RBC QN AUTO: 27.7 PG (ref 26.6–33)
MCHC RBC AUTO-ENTMCNC: 32.3 G/DL (ref 31.5–35.7)
MCHC RBC AUTO-ENTMCNC: 32.3 G/DL (ref 31.5–35.7)
MCV RBC AUTO: 85.6 FL (ref 79–97)
MCV RBC AUTO: 85.6 FL (ref 79–97)
MONOCYTES # BLD AUTO: 0.7 10*3/MM3 (ref 0.1–0.9)
MONOCYTES # BLD AUTO: 0.7 10*3/MM3 (ref 0.1–0.9)
MONOCYTES NFR BLD AUTO: 7.5 % (ref 5–12)
MONOCYTES NFR BLD AUTO: 7.5 % (ref 5–12)
NEUTROPHILS NFR BLD AUTO: 5.47 10*3/MM3 (ref 1.7–7)
NEUTROPHILS NFR BLD AUTO: 5.47 10*3/MM3 (ref 1.7–7)
NEUTROPHILS NFR BLD AUTO: 58.6 % (ref 42.7–76)
NEUTROPHILS NFR BLD AUTO: 58.6 % (ref 42.7–76)
NRBC BLD AUTO-RTO: 0.3 /100 WBC (ref 0–0.2)
NRBC BLD AUTO-RTO: 0.3 /100 WBC (ref 0–0.2)
PHOSPHATE SERPL-MCNC: 4.4 MG/DL (ref 2.5–4.5)
PHOSPHATE SERPL-MCNC: 4.4 MG/DL (ref 2.5–4.5)
PLATELET # BLD AUTO: 200 10*3/MM3 (ref 140–450)
PLATELET # BLD AUTO: 200 10*3/MM3 (ref 140–450)
PMV BLD AUTO: 11.3 FL (ref 6–12)
PMV BLD AUTO: 11.3 FL (ref 6–12)
POTASSIUM SERPL-SCNC: 3.8 MMOL/L (ref 3.5–5.2)
POTASSIUM SERPL-SCNC: 3.8 MMOL/L (ref 3.5–5.2)
RBC # BLD AUTO: 4.3 10*6/MM3 (ref 4.14–5.8)
RBC # BLD AUTO: 4.3 10*6/MM3 (ref 4.14–5.8)
SODIUM SERPL-SCNC: 143 MMOL/L (ref 136–145)
SODIUM SERPL-SCNC: 143 MMOL/L (ref 136–145)
WBC NRBC COR # BLD: 9.33 10*3/MM3 (ref 3.4–10.8)
WBC NRBC COR # BLD: 9.33 10*3/MM3 (ref 3.4–10.8)

## 2023-05-31 PROCEDURE — B3181ZZ FLUOROSCOPY OF BILATERAL INTERNAL CAROTID ARTERIES USING LOW OSMOLAR CONTRAST: ICD-10-PCS | Performed by: STUDENT IN AN ORGANIZED HEALTH CARE EDUCATION/TRAINING PROGRAM

## 2023-05-31 PROCEDURE — 75894 X-RAYS TRANSCATH THERAPY: CPT | Performed by: STUDENT IN AN ORGANIZED HEALTH CARE EDUCATION/TRAINING PROGRAM

## 2023-05-31 PROCEDURE — 99232 SBSQ HOSP IP/OBS MODERATE 35: CPT | Performed by: NURSE PRACTITIONER

## 2023-05-31 PROCEDURE — 61624 TCAT PERM OCCLS/EMBOLJ CNS: CPT | Performed by: STUDENT IN AN ORGANIZED HEALTH CARE EDUCATION/TRAINING PROGRAM

## 2023-05-31 PROCEDURE — 25010000002 PROPOFOL 10 MG/ML EMULSION: Performed by: ANESTHESIOLOGY

## 2023-05-31 PROCEDURE — C1760 CLOSURE DEV, VASC: HCPCS | Performed by: STUDENT IN AN ORGANIZED HEALTH CARE EDUCATION/TRAINING PROGRAM

## 2023-05-31 PROCEDURE — B31B1ZZ FLUOROSCOPY OF LEFT EXTERNAL CAROTID ARTERY USING LOW OSMOLAR CONTRAST: ICD-10-PCS | Performed by: STUDENT IN AN ORGANIZED HEALTH CARE EDUCATION/TRAINING PROGRAM

## 2023-05-31 PROCEDURE — C1887 CATHETER, GUIDING: HCPCS | Performed by: STUDENT IN AN ORGANIZED HEALTH CARE EDUCATION/TRAINING PROGRAM

## 2023-05-31 PROCEDURE — 83735 ASSAY OF MAGNESIUM: CPT | Performed by: NURSE PRACTITIONER

## 2023-05-31 PROCEDURE — 75898 FOLLOW-UP ANGIOGRAPHY: CPT | Performed by: STUDENT IN AN ORGANIZED HEALTH CARE EDUCATION/TRAINING PROGRAM

## 2023-05-31 PROCEDURE — 25010000002 DEXAMETHASONE PER 1 MG: Performed by: ANESTHESIOLOGY

## 2023-05-31 PROCEDURE — 63710000001 INSULIN LISPRO (HUMAN) PER 5 UNITS: Performed by: STUDENT IN AN ORGANIZED HEALTH CARE EDUCATION/TRAINING PROGRAM

## 2023-05-31 PROCEDURE — 80048 BASIC METABOLIC PNL TOTAL CA: CPT | Performed by: NURSE PRACTITIONER

## 2023-05-31 PROCEDURE — 0 IODIXANOL PER 1 ML: Performed by: STUDENT IN AN ORGANIZED HEALTH CARE EDUCATION/TRAINING PROGRAM

## 2023-05-31 PROCEDURE — C1894 INTRO/SHEATH, NON-LASER: HCPCS | Performed by: STUDENT IN AN ORGANIZED HEALTH CARE EDUCATION/TRAINING PROGRAM

## 2023-05-31 PROCEDURE — 63710000001 INSULIN DETEMIR PER 5 UNITS

## 2023-05-31 PROCEDURE — 25010000002 PHENYLEPHRINE 10 MG/ML SOLUTION 1 ML VIAL: Performed by: ANESTHESIOLOGY

## 2023-05-31 PROCEDURE — B3131ZZ FLUOROSCOPY OF RIGHT COMMON CAROTID ARTERY USING LOW OSMOLAR CONTRAST: ICD-10-PCS | Performed by: STUDENT IN AN ORGANIZED HEALTH CARE EDUCATION/TRAINING PROGRAM

## 2023-05-31 PROCEDURE — 82948 REAGENT STRIP/BLOOD GLUCOSE: CPT

## 2023-05-31 PROCEDURE — 36224 PLACE CATH CAROTD ART: CPT | Performed by: STUDENT IN AN ORGANIZED HEALTH CARE EDUCATION/TRAINING PROGRAM

## 2023-05-31 PROCEDURE — 03LG3DZ OCCLUSION OF INTRACRANIAL ARTERY WITH INTRALUMINAL DEVICE, PERCUTANEOUS APPROACH: ICD-10-PCS | Performed by: STUDENT IN AN ORGANIZED HEALTH CARE EDUCATION/TRAINING PROGRAM

## 2023-05-31 PROCEDURE — 36227 PLACE CATH XTRNL CAROTID: CPT | Performed by: STUDENT IN AN ORGANIZED HEALTH CARE EDUCATION/TRAINING PROGRAM

## 2023-05-31 PROCEDURE — 25010000002 CEFAZOLIN PER 500 MG: Performed by: ANESTHESIOLOGY

## 2023-05-31 PROCEDURE — C1769 GUIDE WIRE: HCPCS | Performed by: STUDENT IN AN ORGANIZED HEALTH CARE EDUCATION/TRAINING PROGRAM

## 2023-05-31 PROCEDURE — 92507 TX SP LANG VOICE COMM INDIV: CPT

## 2023-05-31 PROCEDURE — 85025 COMPLETE CBC W/AUTO DIFF WBC: CPT | Performed by: NURSE PRACTITIONER

## 2023-05-31 PROCEDURE — 25010000002 ONDANSETRON PER 1 MG: Performed by: ANESTHESIOLOGY

## 2023-05-31 PROCEDURE — 75774 ARTERY X-RAY EACH VESSEL: CPT | Performed by: STUDENT IN AN ORGANIZED HEALTH CARE EDUCATION/TRAINING PROGRAM

## 2023-05-31 PROCEDURE — C1889 IMPLANT/INSERT DEVICE, NOC: HCPCS | Performed by: STUDENT IN AN ORGANIZED HEALTH CARE EDUCATION/TRAINING PROGRAM

## 2023-05-31 PROCEDURE — 63710000001 INSULIN DETEMIR PER 5 UNITS: Performed by: STUDENT IN AN ORGANIZED HEALTH CARE EDUCATION/TRAINING PROGRAM

## 2023-05-31 PROCEDURE — 84100 ASSAY OF PHOSPHORUS: CPT | Performed by: NURSE PRACTITIONER

## 2023-05-31 PROCEDURE — 25010000002 SUGAMMADEX 200 MG/2ML SOLUTION: Performed by: ANESTHESIOLOGY

## 2023-05-31 PROCEDURE — 25010000002 PHENYLEPHRINE 10 MG/ML SOLUTION: Performed by: ANESTHESIOLOGY

## 2023-05-31 DEVICE — IMPLANTABLE DEVICE: Type: IMPLANTABLE DEVICE | Site: BRAIN | Status: FUNCTIONAL

## 2023-05-31 RX ORDER — PHENYLEPHRINE HYDROCHLORIDE 10 MG/ML
INJECTION INTRAVENOUS AS NEEDED
Status: DISCONTINUED | OUTPATIENT
Start: 2023-05-31 | End: 2023-05-31 | Stop reason: SURG

## 2023-05-31 RX ORDER — HEPARIN SODIUM 10000 [USP'U]/100ML
9.5 INJECTION, SOLUTION INTRAVENOUS
Status: DISCONTINUED | OUTPATIENT
Start: 2023-06-01 | End: 2023-05-31

## 2023-05-31 RX ORDER — ROCURONIUM BROMIDE 10 MG/ML
INJECTION, SOLUTION INTRAVENOUS AS NEEDED
Status: DISCONTINUED | OUTPATIENT
Start: 2023-05-31 | End: 2023-05-31 | Stop reason: SURG

## 2023-05-31 RX ORDER — LIDOCAINE HYDROCHLORIDE 10 MG/ML
INJECTION, SOLUTION EPIDURAL; INFILTRATION; INTRACAUDAL; PERINEURAL AS NEEDED
Status: DISCONTINUED | OUTPATIENT
Start: 2023-05-31 | End: 2023-05-31 | Stop reason: SURG

## 2023-05-31 RX ORDER — HEPARIN SODIUM 10000 [USP'U]/100ML
15 INJECTION, SOLUTION INTRAVENOUS
Status: DISCONTINUED | OUTPATIENT
Start: 2023-06-01 | End: 2023-06-03

## 2023-05-31 RX ORDER — LIDOCAINE HYDROCHLORIDE 10 MG/ML
INJECTION, SOLUTION EPIDURAL; INFILTRATION; INTRACAUDAL; PERINEURAL
Status: DISCONTINUED | OUTPATIENT
Start: 2023-05-31 | End: 2023-05-31 | Stop reason: HOSPADM

## 2023-05-31 RX ORDER — NITROGLYCERIN 0.4 MG/1
0.4 TABLET SUBLINGUAL
Status: DISCONTINUED | OUTPATIENT
Start: 2023-05-31 | End: 2023-06-05 | Stop reason: HOSPADM

## 2023-05-31 RX ORDER — CEFAZOLIN SODIUM 1 G/3ML
INJECTION, POWDER, FOR SOLUTION INTRAMUSCULAR; INTRAVENOUS AS NEEDED
Status: DISCONTINUED | OUTPATIENT
Start: 2023-05-31 | End: 2023-05-31 | Stop reason: SURG

## 2023-05-31 RX ORDER — PROPOFOL 10 MG/ML
VIAL (ML) INTRAVENOUS AS NEEDED
Status: DISCONTINUED | OUTPATIENT
Start: 2023-05-31 | End: 2023-05-31 | Stop reason: SURG

## 2023-05-31 RX ORDER — FENTANYL CITRATE 50 UG/ML
50 INJECTION, SOLUTION INTRAMUSCULAR; INTRAVENOUS
Status: DISCONTINUED | OUTPATIENT
Start: 2023-05-31 | End: 2023-05-31 | Stop reason: HOSPADM

## 2023-05-31 RX ORDER — HYDROMORPHONE HYDROCHLORIDE 1 MG/ML
0.5 INJECTION, SOLUTION INTRAMUSCULAR; INTRAVENOUS; SUBCUTANEOUS
Status: DISCONTINUED | OUTPATIENT
Start: 2023-05-31 | End: 2023-05-31 | Stop reason: HOSPADM

## 2023-05-31 RX ORDER — DEXAMETHASONE SODIUM PHOSPHATE 4 MG/ML
INJECTION, SOLUTION INTRA-ARTICULAR; INTRALESIONAL; INTRAMUSCULAR; INTRAVENOUS; SOFT TISSUE AS NEEDED
Status: DISCONTINUED | OUTPATIENT
Start: 2023-05-31 | End: 2023-05-31 | Stop reason: SURG

## 2023-05-31 RX ORDER — SODIUM CHLORIDE 9 MG/ML
INJECTION, SOLUTION INTRAVENOUS CONTINUOUS PRN
Status: DISCONTINUED | OUTPATIENT
Start: 2023-05-31 | End: 2023-05-31 | Stop reason: SURG

## 2023-05-31 RX ORDER — NICARDIPINE HYDROCHLORIDE 2.5 MG/ML
INJECTION INTRAVENOUS AS NEEDED
Status: DISCONTINUED | OUTPATIENT
Start: 2023-05-31 | End: 2023-05-31 | Stop reason: SURG

## 2023-05-31 RX ORDER — ONDANSETRON 2 MG/ML
INJECTION INTRAMUSCULAR; INTRAVENOUS AS NEEDED
Status: DISCONTINUED | OUTPATIENT
Start: 2023-05-31 | End: 2023-05-31 | Stop reason: SURG

## 2023-05-31 RX ORDER — IODIXANOL 320 MG/ML
INJECTION, SOLUTION INTRAVASCULAR
Status: DISCONTINUED | OUTPATIENT
Start: 2023-05-31 | End: 2023-05-31 | Stop reason: HOSPADM

## 2023-05-31 RX ADMIN — Medication 12.5 MG: at 08:07

## 2023-05-31 RX ADMIN — PHENYLEPHRINE HYDROCHLORIDE 100 MCG: 10 INJECTION INTRAVENOUS at 16:56

## 2023-05-31 RX ADMIN — Medication 12.5 MG: at 20:26

## 2023-05-31 RX ADMIN — HYDROCODONE BITARTRATE AND ACETAMINOPHEN 1 TABLET: 5; 325 TABLET ORAL at 04:25

## 2023-05-31 RX ADMIN — DULOXETINE 60 MG: 60 CAPSULE, DELAYED RELEASE ORAL at 08:07

## 2023-05-31 RX ADMIN — DIVALPROEX SODIUM 250 MG: 250 TABLET, EXTENDED RELEASE ORAL at 21:37

## 2023-05-31 RX ADMIN — INSULIN DETEMIR 8 UNITS: 100 INJECTION, SOLUTION SUBCUTANEOUS at 08:07

## 2023-05-31 RX ADMIN — GABAPENTIN 200 MG: 100 CAPSULE ORAL at 20:26

## 2023-05-31 RX ADMIN — DOCUSATE SODIUM 50 MG AND SENNOSIDES 8.6 MG 2 TABLET: 8.6; 5 TABLET, FILM COATED ORAL at 08:07

## 2023-05-31 RX ADMIN — PHENYLEPHRINE HYDROCHLORIDE 0.5 MCG/KG/MIN: 10 INJECTION INTRAVENOUS at 16:22

## 2023-05-31 RX ADMIN — NICARDIPINE HYDROCHLORIDE 0.15 MG: 25 INJECTION INTRAVENOUS at 16:29

## 2023-05-31 RX ADMIN — SUGAMMADEX 200 MG: 100 INJECTION, SOLUTION INTRAVENOUS at 16:57

## 2023-05-31 RX ADMIN — HYDROCODONE BITARTRATE AND ACETAMINOPHEN 1 TABLET: 5; 325 TABLET ORAL at 20:26

## 2023-05-31 RX ADMIN — PROPOFOL 200 MG: 10 INJECTION, EMULSION INTRAVENOUS at 16:15

## 2023-05-31 RX ADMIN — GABAPENTIN 200 MG: 100 CAPSULE ORAL at 08:06

## 2023-05-31 RX ADMIN — INSULIN DETEMIR 8 UNITS: 100 INJECTION, SOLUTION SUBCUTANEOUS at 20:26

## 2023-05-31 RX ADMIN — INSULIN LISPRO 4 UNITS: 100 INJECTION, SOLUTION INTRAVENOUS; SUBCUTANEOUS at 08:07

## 2023-05-31 RX ADMIN — ONDANSETRON 4 MG: 2 INJECTION INTRAMUSCULAR; INTRAVENOUS at 16:31

## 2023-05-31 RX ADMIN — ROSUVASTATIN 20 MG: 20 TABLET, FILM COATED ORAL at 08:07

## 2023-05-31 RX ADMIN — SODIUM CHLORIDE: 9 INJECTION, SOLUTION INTRAVENOUS at 15:54

## 2023-05-31 RX ADMIN — Medication 10 ML: at 08:07

## 2023-05-31 RX ADMIN — LEVOTHYROXINE SODIUM 125 MCG: 125 TABLET ORAL at 08:07

## 2023-05-31 RX ADMIN — ROCURONIUM BROMIDE 50 MG: 10 INJECTION, SOLUTION INTRAVENOUS at 16:17

## 2023-05-31 RX ADMIN — PANTOPRAZOLE SODIUM 40 MG: 40 TABLET, DELAYED RELEASE ORAL at 06:00

## 2023-05-31 RX ADMIN — ACETAMINOPHEN 650 MG: 325 TABLET ORAL at 08:06

## 2023-05-31 RX ADMIN — DEXAMETHASONE SODIUM PHOSPHATE 8 MG: 4 INJECTION, SOLUTION INTRAMUSCULAR; INTRAVENOUS at 16:22

## 2023-05-31 RX ADMIN — Medication 10 ML: at 08:08

## 2023-05-31 RX ADMIN — HYDROCODONE BITARTRATE AND ACETAMINOPHEN 1 TABLET: 5; 325 TABLET ORAL at 10:59

## 2023-05-31 RX ADMIN — CEFAZOLIN SODIUM 2 G: 1 INJECTION, POWDER, FOR SOLUTION INTRAMUSCULAR; INTRAVENOUS at 16:20

## 2023-05-31 RX ADMIN — LIDOCAINE HYDROCHLORIDE 50 MG: 10 INJECTION, SOLUTION EPIDURAL; INFILTRATION; INTRACAUDAL; PERINEURAL at 16:15

## 2023-05-31 RX ADMIN — PHENYLEPHRINE HYDROCHLORIDE 0.7 MCG/KG/MIN: 10 INJECTION INTRAVENOUS at 16:24

## 2023-05-31 RX ADMIN — ACETAMINOPHEN 650 MG: 325 TABLET ORAL at 14:27

## 2023-05-31 NOTE — ANESTHESIA POSTPROCEDURE EVALUATION
Patient: Manjeet Hall    Procedure Summary     Date: 05/31/23 Room / Location: [facility] /  [facility]    Anesthesia Start: 1554 Anesthesia Stop: 1720    Procedure: Emoblization cerebral Diagnosis:     Providers: Stephane Womack MD Provider: Naila Jewell DO    Anesthesia Type: general ASA Status: 3          Anesthesia Type: general    Vitals  Vitals Value Taken Time   BP     Temp     Pulse 69 05/31/23 1719   Resp     SpO2     Vitals shown include unvalidated device data.        Post Anesthesia Care and Evaluation    Patient location during evaluation: PACU  Patient participation: complete - patient participated  Level of consciousness: awake and alert  Pain management: adequate    Airway patency: patent  Anesthetic complications: No anesthetic complications  PONV Status: none  Cardiovascular status: hemodynamically stable and acceptable  Respiratory status: nonlabored ventilation, acceptable and nasal cannula  Hydration status: acceptable

## 2023-05-31 NOTE — PLAN OF CARE
Goal Outcome Evaluation:  Plan of Care Reviewed With: patient        Progress: improving   SLP treatment completed. Will continue to address language. Please see note for further details and recommendations.

## 2023-05-31 NOTE — PLAN OF CARE
Goal Outcome Evaluation:                      No changes overnight, patient is able to pierre. No acute needs noted. NPO at midnight, heparin gtt off at 0600. VSS

## 2023-05-31 NOTE — PROGRESS NOTES
Intensive Care Follow-up     Hospital:  LOS: 5 days   Mr. Manjeet Hall, 47 y.o. male is followed for:   Subdural hematoma, nontraumatic          History of present illness:   Manjeet Hall is a 47 y.o. 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  He was hypertensive and hyperglycemic on admission.  His INR was subtherapeutic at 2.08.  He received Kcentra and vitamin K and was started on Keppra for seizure prophylaxis, Cardene for hypertension and taken to OR for emergent bilateral craniotomy and evacuation of subdural hematomas.      Subjective   Interval History:  Patient is resting in bed awaiting embolization this afternoon.  He is NPO and heparin drip is off.  He is anxious to get the procedure over.  He has no complaints.       The patient's past medical, surgical and social history were reviewed and updated in Epic as appropriate.    Review of Systems - General ROS: negative for - chills, fatigue, fever or night sweats  Respiratory ROS: negative for - cough, shortness of breath or wheezing  Cardiovascular ROS: negative for - chest pain, edema or palpitations  Gastrointestinal ROS: positive for constipation       Objective     Infusions:  Pharmacy to Dose Heparin,          Medications:  divalproex, 250 mg, Oral, Nightly  DULoxetine, 60 mg, Oral, Daily  gabapentin, 200 mg, Oral, BID  insulin detemir, 8 Units,  "Subcutaneous, Q12H  insulin lispro, 3-14 Units, Subcutaneous, 4x Daily AC & at Bedtime  Insulin Lispro, 4 Units, Subcutaneous, TID With Meals  levothyroxine, 125 mcg, Oral, Daily  metoprolol tartrate, 12.5 mg, Oral, Q12H  pantoprazole, 40 mg, Oral, Q AM  polyethylene glycol, 17 g, Oral, Daily  rosuvastatin, 20 mg, Oral, Daily  senna-docusate sodium, 2 tablet, Oral, BID  sodium chloride, 10 mL, Intravenous, Q12H  sodium chloride, 10 mL, Intravenous, Q12H        Vital Sign Min/Max for last 24 hours  Temp  Min: 97.3 °F (36.3 °C)  Max: 98.4 °F (36.9 °C)   BP  Min: 114/89  Max: 167/87   Pulse  Min: 64  Max: 95   Resp  Min: 14  Max: 16   SpO2  Min: 84 %  Max: 98 %   Flow (L/min)  Min: 2  Max: 2       Input/Output for last 24 hour shift  05/30 0701 - 05/31 0700  In: 346.8 [I.V.:346.8]  Out: -          Objective:  General Appearance:  In no acute distress and comfortable.    Vital signs: (most recent): Blood pressure 119/85, pulse 75, temperature 97.4 °F (36.3 °C), temperature source Axillary, resp. rate 14, height 172.7 cm (68\"), weight 104 kg (229 lb 0.9 oz), SpO2 95 %.  Vital signs are normal.    HEENT: (Bilateral incisions approximated with staples.  No drainage, redness.)    Lungs:  Normal effort and normal respiratory rate.  Breath sounds clear to auscultation.  He is not in respiratory distress.  No rales, wheezes or rhonchi.    Heart: Normal rate.  Regular rhythm.  S1 normal and S2 normal.  No murmur or friction rub. (+ click)  Abdomen: Abdomen is soft and non-distended.  Hypoactive bowel sounds.   There is no abdominal tenderness.     Extremities: There is no dependent edema.    Pulses: Distal pulses are intact.    Neurological: Patient is alert and oriented to person, place and time.  GCS score is 15.  (Expressive aphasia and word finding difficulty at baseline).    Pupils:  Pupils are equal, round, and reactive to light.    Skin:  Warm and dry.  No rash.             Results from last 7 days   Lab Units " 05/31/23  0435 05/30/23  0441 05/29/23  1004   WBC 10*3/mm3 9.33 10.05 13.76*   HEMOGLOBIN g/dL 11.9* 11.3* 10.9*   PLATELETS 10*3/mm3 200 183 173     Results from last 7 days   Lab Units 05/31/23  0435 05/30/23  0441 05/29/23  1004   SODIUM mmol/L 143 144 143   POTASSIUM mmol/L 3.8 4.0 3.8   CO2 mmol/L 27.0 28.0 28.0   BUN mg/dL 16 20 20   CREATININE mg/dL 0.70* 0.68* 0.64*   MAGNESIUM mg/dL 2.3 2.1 2.1   PHOSPHORUS mg/dL 4.4 5.0* 2.8   GLUCOSE mg/dL 134* 123* 151*     Estimated Creatinine Clearance: 152.4 mL/min (A) (by C-G formula based on SCr of 0.7 mg/dL (L)).          Images:   None new    I reviewed the patient's results and images.     Assessment & Plan   Impression        Bilateral Subdural Hematoma    HTN    Hyperlipidemia (Diet controlled, off statin)    New Onset Type 2 diabetes mellitus    Hypothyroidism    H/O aortic mechanical valve replacement on Coumadin    Anxiety       Plan        Bilateral Subdural Hematomas s/p bilateral crani for evacuation of subdural hematomas, drains d/c'd 5/28  H/O Stroke post op AVR with residual receptive aphasia and word finding difficulty  · Post op care per NS  · Embolization today  · Keep NPO until after procedure  · Restart heparin drip when ok with NS  · Continue PT/OT/SLP  · CM assisting with setting up outpatient SLP to help with expressive aphasia and word finding difficulty upon discharge  · Patient wants to establish care with new PCP at discharge  · AM labs    HTN  HLP  H/O mechanical AVR 2018 on Warfarin  · Continue Metoprolol  · Continue Crestor  · Resume heparin drip when ok with NS to bridge to Warfarin    New onset T2DM, Hgb A1c 8.9   Neuropathy  · Keep glucose <= 150 per NS  · Continue basal insulin  · Continue correction SSI  · Continue prandial insulin  · Steroids discontinued 5/28  · Diabetes educator still needs to see patient  · Continue Gabapentin  · Patient would like to establish care with Endocrinologist at  discharge    Hypothyroidism  · Continue Levothyroxine    Anxiety  Depression   · Continue Cymbalta  · Continue Depakote ER  · Hold home Atarax for now    Constipation  · Change BM regimen from as needed to scheduled      DVT prophylaxis:  SCDs  GI prophylaxis:  PPI  Disposition:  Keep in ICU    Plan of care and goals reviewed with multidisciplinary/antibiotic stewardship team during rounds.   I discussed the patient's findings and my recommendations with patient, family, nursing staff and primary care team     Time: 35 minutes, face to face, with the patient and family or on the [place] coordinating care with other health care providers.     I spent > 50% percent of this time, counseling and discussing evaluation, current status and management.      Tracy Soto APRN, AGACNP-BC, FNP-BC  Pulmonary and Critical Care Service

## 2023-05-31 NOTE — THERAPY TREATMENT NOTE
Acute Care - Speech Language Pathology Treatment Note  [facility]     Patient Name: Manjeet Hall  : 1975  MRN: [id number]  Today's Date: 2023               Admit Date: 2023     Visit Dx:    ICD-10-CM ICD-9-CM   1. Somnolence  R40.0 780.09   2. Subdural hematoma  S06.5XAA 432.1   3. Elevated INR  R79.1 790.92   4. H/O mechanical aortic valve replacement  Z95.2 V43.3   5. Hypertensive urgency  I16.0 401.9   6. Dysphagia, unspecified type  R13.10 787.20   7. Aphasia  R47.01 784.3     Patient Active Problem List   Diagnosis   • Bilateral Subdural Hematoma   • HTN   • Hyperlipidemia (Diet controlled, off statin)   • Hypothyroidism   • Anxiety   • Hodgkin's Lymphoma s/p XRT & Chemo   • VSD s/p closure (age 14)   • History of aortic regurgitation / stenosis s/p ROSS procedure with subsequent reversal with Mechanical AVR   • H/O aortic mechanical valve replacement on Coumadin   • New Onset Type 2 diabetes mellitus   • Somnolence     Past Medical History:   Diagnosis Date   • Cancer    • Diabetes mellitus    • Elevated cholesterol    • Stroke      History reviewed. No pertinent surgical history.    SLP Recommendation and Plan  SLP Diagnosis: mild-moderate, aphasia (23)           [facility] [place] Interventions Met: yes (23)  Anticipated Discharge Disposition (SLP): anticipate therapy at next level of care, inpatient rehabilitation facility (23)        Predicted Duration Therapy Intervention (Days): until discharge (23)     Daily Summary of Progress (SLP): progress toward functional goals is good (23)           Treatment Assessment (SLP): improved (23)  Treatment Assessment Comments (SLP): Pt w/ improved expressive and receptive communication. Comprehension of conversational discourse noted and deficits in expression characterized by phonemic and semantic paraphasias as well as word finding difficulty. Pt and family  interested in OP SLP services at d/c. Will update inpatient goals and CM notified of pt interest in [facility] OP provider for services at next level of care. (05/31/23 1430)  Plan for Continued Treatment (SLP): continue treatment per plan of care (05/31/23 1430)  Plan of Care Reviewed With: patient (05/31/23 1529)  Progress: improving (05/31/23 1529)      SLP EVALUATION (last 72 hours)     SLP SLC Evaluation     Row Name 05/31/23 1430                   Communication Assessment/Intervention    Document Type therapy note (daily note)  -EN        Subjective Information no complaints  -EN        Patient Observations alert;cooperative;agree to therapy  -EN        Patient/Family/Caregiver Comments/Observations family member present  -EN        Patient Effort excellent  -EN        Symptoms Noted During/After Treatment none  -EN           General Information    Patient Profile Reviewed yes  -EN           Pain    Additional Documentation Pain Scale: FACES Pre/Post-Treatment (Group)  -EN           Pain Scale: FACES Pre/Post-Treatment    Pain: FACES Scale, Pretreatment 0-->no hurt  -EN        Posttreatment Pain Rating 0-->no hurt  -EN           SLP Evaluation Clinical Impressions    SLP Diagnosis mild-moderate;aphasia  -EN        Rehab Potential/Prognosis good  -EN        [facility] [place] for Skilled Therapy Interventions Met yes  -EN        Functional Impact difficulty communicating wants, needs;difficulty communicating in an emergency  -EN           SLP Treatment Clinical Impressions    Treatment Assessment (SLP) improved  -EN        Treatment Assessment Comments (SLP) Pt w/ improved expressive and receptive communication. Comprehension of conversational discourse noted and deficits in expression characterized by phonemic and semantic paraphasias as well as word finding difficulty. Pt and family interested in OP SLP services at d/c. Will update inpatient goals and CM notified of pt interest in [facility] OP provider for services at next  level of care.  -EN        Daily Summary of Progress (SLP) progress toward functional goals is good  -EN        Barriers to Overall Progress (SLP) Baseline deficits  -EN        Plan for Continued Treatment (SLP) continue treatment per plan of care  -EN        Care Plan Review care plan/treatment goals reviewed  -EN           Recommendations    Therapy Frequency (SLP SLC) 5 days per week  -EN        Predicted Duration Therapy Intervention (Days) until discharge  -EN        Anticipated Discharge Disposition (SLP) anticipate therapy at next level of care;inpatient rehabilitation facility  -EN              User Key  (r) = Recorded By, (t) = Taken By, (c) = Cosigned By    Initials Name Effective Dates    EN NirajGabriella MS CCC-SLP 06/22/22 -                    EDUCATION  The patient has been educated in the following areas:     Communication Impairment.           SLP GOALS     Row Name 05/31/23 1430             (LTG) Patient will demonstrate functional swallow for    Diet Texture (Demonstrate functional swallow) regular textures  -EN      Liquid viscosity (Demonstrate functional swallow) thin liquids  -EN      [place] (Demonstrate functional swallow) independently (over 90% accuracy)  -EN      Time Frame (Demonstrate functional swallow) by discharge  -EN      Progress/Outcomes (Demonstrate functional swallow) goal ongoing  -EN         (STG) Patient will tolerate trials of    Consistencies Trialed (Tolerate trials) regular textures;thin liquids  -EN      Desired Outcome (Tolerate trials) without signs/symptoms of aspiration;with adequate oral prep/transit/clearance  -EN      [place] (Tolerate trials) independently (over 90% accuracy)  -EN      Time Frame (Tolerate trials) by discharge  -EN      Progress/Outcomes (Tolerate trials) goal ongoing  -EN         Patient will demonstrate functional language skills for return to discharge environment     [place] with minimal cues  -EN      Time frame by  discharge  -EN      Progress/Outcomes continuing progress toward goal  -EN         Comprehend Questions Goal 1 (SLP)    Improve Ability to Comprehend Questions Goal 1 (SLP) complex wh questions;abstract questions;80%;with minimal cues (75-90%)  -EN      Time Frame (Comprehend Questions Goal 1, SLP) short term goal (STG)  -EN      Progress (Ability to Comprehend Questions Goal 1, SLP) 60%;with minimal cues (75-90%)  -EN      Progress/Outcomes (Comprehend Questions Goal 1, SLP) goal revised this date  -EN      Comment (Comprehend Questions Goal 1, SLP) previous goal met; revised and progress reflects new goal  -EN         Follow Directions Goal 2 (SLP)    Improve Ability to Follow Directions Goal 1 (SLP) 1 step direction without objects;70%;with minimal cues (75-90%)  -EN      Time Frame (Follow Directions Goal 1, SLP) short term goal (STG)  -EN      Progress (Ability to Follow Directions Goal 1, [facility]) 100%;independently (over 90% accuracy)  -EN      Progress/Outcomes (Follow Directions Goal 1, SLP) goal met  -EN      Comment (Follow Directions Goal 1, SLP) also met for 2 and 3 step  -EN         Comprehension at Phrase and Sentence Level Goal 1 (SLP)    Improve Reading Comprehension at Phrase and Sentence Level Goal 1 (SLP) answer simple written y/n questions;follow simple written directions;80%;with minimal cues (75-90%)  -EN      Time Frame (Reading Comprehension at Phrase and Sentence Level Goal 1, SLP) short term goal (STG)  -EN      Progress/Outcomes (Reading Comprehension at Phrase and Sentence Level Goal 1, SLP) goal ongoing  -EN         Word Retrieval Skills Goal 1 (SLP)    Improve Word Retrieval Skills By Goal 1 (SLP) confrontational naming task;high frequency;low frequency;completing functional word finding tasks;80%;with minimal cues (75-90%)  -EN      Time Frame (Word Retrieval Goal 1, SLP) short term goal (STG)  -EN      Progress/Outcomes (Word Retrieval Goal 1, SLP) goal revised this date  -EN       Comment (Word Retrieval Goal 1, SLP) previous goal met  -EN         Connected Speech to Express Thoughts Goal 1 (SLP)    Improve Narrative Discourse to Express Thoughts By Goal 1 (SLP) giving basic definition of a word;describing a picture;80%;with moderate cues (50-74%)  -EN      Time Frame (Connected Speech Goal 1, SLP) short term goal (STG)  -EN      Progress/Outcomes (Connected Speech Goal 1, SLP) new goal  -EN            User Key  (r) = Recorded By, (t) = Taken By, (c) = Cosigned By    Initials Name Provider Type    Gabriella Claudio MS CCC-SLP Speech and Language Pathologist                        Time Calculation:      Time Calculation- SLP     Row Name 05/31/23 1529             Time Calculation- SLP    SLP Start Time 1430  -EN      SLP Received On 05/31/23  -EN         Untimed Charges    [id number] Treatment Swallow Minutes 38  -EN         Total Minutes    Untimed Charges Total Minutes 38  -EN       Total Minutes 38  -EN            User Key  (r) = Recorded By, (t) = Taken By, (c) = Cosigned By    Initials Name Provider Type    Gabriella Claudio MS CCC-SLP Speech and Language Pathologist                Therapy Charges for Today     Code Description Service Date Service Provider Modifiers Qty    [id number]  ST TREATMENT SPEECH 3 5/31/2023 Gabriella Healy MS CCC-SLP GN 1                     Gabriella Healy MS CCC-OSCAR  5/31/2023

## 2023-05-31 NOTE — ANESTHESIA PREPROCEDURE EVALUATION
Anesthesia Evaluation     Patient summary reviewed and Nursing notes reviewed   no history of anesthetic complications:  NPO Solid Status: > 8 hours  NPO Liquid Status: > 2 hours           Airway   Mallampati: II  TM distance: >3 FB  Neck ROM: full  No difficulty expected  Dental - normal exam     Pulmonary - negative pulmonary ROS and normal exam    breath sounds clear to auscultation  Cardiovascular - normal exam    ECG reviewed  PT is on anticoagulation therapy  Rhythm: regular  Rate: normal    (+) hypertension, valvular problems/murmurs (s/p AVR - on coumadin),       Neuro/Psych  (+) CVA, psychiatric history Anxiety,      ROS Comment: Bilateral SDH  GI/Hepatic/Renal/Endo    (+) obesity,   diabetes mellitus, thyroid problem hypothyroidism    Musculoskeletal     Abdominal    Substance History      OB/GYN          Other      history of cancer (Hodgkin's lymphoma s/p xrt/chemo)                    Anesthesia Plan    ASA 3     general     intravenous induction     Anesthetic plan, risks, benefits, and alternatives have been provided, discussed and informed consent has been obtained with: patient.    Plan discussed with CRNA.        CODE STATUS:    Code Status (Patient has no pulse and is not breathing): CPR (Attempt to Resuscitate)  Medical Interventions (Patient has pulse or is breathing): Full Support  Release to patient: Routine Release

## 2023-05-31 NOTE — ANESTHESIA PROCEDURE NOTES
Airway  Urgency: elective    Date/Time: 5/31/2023 4:19 PM  Airway not difficult    General Information and Staff    Patient location during procedure: OR  Anesthesiologist: Naila Jewell DO    Indications and Patient Condition  Indications for airway management: airway protection    Preoxygenated: yes  MILS not maintained throughout  Mask difficulty assessment: 1 - vent by mask    Final Airway Details  Final airway type: endotracheal airway      Successful airway: ETT  Cuffed: yes   Successful intubation technique: video laryngoscopy  Endotracheal tube insertion site: oral  Blade: Peck  Blade size: 3  ETT size (mm): 8.0  Cormack-Lehane Classification: grade I - full view of glottis  Placement verified by: chest auscultation and capnometry   Measured from: lips  ETT/EBT  to lips (cm): 21  Number of attempts at approach: 1  Assessment: lips, teeth, and gum same as pre-op and atraumatic intubation    Additional Comments  Negative epigastric sounds, Breath sound equal bilaterally with symmetric chest rise and fall.

## 2023-05-31 NOTE — PAYOR COMM NOTE
"Ref# [id number]    Utilization Review  Phone [phone]  Fax [phone]    [facility]  [address] [place] [address]          Marisabel Peña (47 y.o. Male)     Date of Birth   1975    Social Security Number       Address   [address] [place] [address]    Home Phone   [phone]    MRN   [id number]       Jewish   None    Marital Status   Single                            Admission Date   5/26/23    Admission Type   Emergency    Admitting Provider   Jose Maria Townsend MD    Attending Provider   Jose Maria Townsend MD    Department, Room/Bed   [facility] CATH LAB, Pool/CATH       Discharge Date       Discharge Disposition       Discharge Destination                               Attending Provider: Jose Maria Townsend MD    Allergies: Not on File    Isolation: None   Infection: None   Code Status: CPR    Ht: 172.7 cm (68\")   Wt: 104 kg (229 lb 0.9 oz)    Admission Cmt: None   Principal Problem: Bilateral Subdural Hematoma [I62.00]                 Active Insurance as of 5/26/2023     Primary Coverage     Payor Plan Insurance Group Employer/Plan Group    ANTHEM MEDICARE REPLACEMENT ANTHEM MEDICARE ADVANTAGE [id number]     Payor Plan Address Payor Plan Phone Number Payor Plan Fax Number Effective Dates    [address] [phone]  1/1/2023 - None Entered    [facility] [address]       Subscriber Name Subscriber Birth Date Member ID       MARISABEL PEÑA 1975 [id number]           Secondary Coverage     Payor Plan Insurance Group Employer/Plan Group    ANTHEM MEDICAID ANTHEM MEDICAID [id number]     Payor Plan Address Payor Plan Phone Number Payor Plan Fax Number Effective Dates    [address] [address] [phone]  1/1/2023 - None Entered    [employer] [address]       Subscriber Name Subscriber Birth Date Member ID       MARISABEL PEÑA 1975 [id number]                 Emergency Contacts      (Rel.) Home " Phone Work Phone Mobile Phone    Errol Hall (Brother) [phone] -- --    Faina Hall (Daughter) -- -- [phone]    Payton Diallo (Significant Other) -- -- [phone]            Dinora MENDOZA [id number] Tax ID [id number]  Insurance Information                ANTHEM MEDICARE REPLACEMENT/ANTHEM MEDICARE ADVANTAGE Phone: [phone]    Subscriber: Manjeet Hall Subscriber#: [id number]    Group#: [id number] Precert#: [id number]        ANTHEM MEDICAID/ANTHEM MEDICAID Phone: [phone]    Subscriber: Manjeet Hall Subscriber#: [id number]    Group#: [id number] Precert#: [id number]             History & Physical      Jose Maria Townsend MD at 05/26/23 1028          Intensive Care Admission Note     Acute subdural hematoma    History of Present Illness     Manjeet Hall is a 47 year-old male that presented to [facility] ED on 5/26/23 with complaints of AMS concerning for stroke.     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 He follows with our Cardiology Team and has received most CT Surgery care at [facility]. Recent CTA notable for ascending thoracic aorta dilation. Recent ECHO notable for HFpEF.      Mr. Hall presented to the ED this morning with unknown duration of AMS. Stroke Team following. CT head demonstrated bilateral subdural hematomas with diffuse cerebral edema, 2-3 mm leftward midline shift with effacement  of the right lateral and third ventricles, bilateral uncal medialization, crowding of the basal cisterns. There is concern for impending herniation, per the radiology read. Initial /97. Labs notable for hyperglycemia and PT / INR 23.6 & 2.08.    Neurosurgery has been consulted and plans to potentially take him for intervention. In the meantime, he has received KCentra and Vitamin K for Coumadin reversal. He has also received Keppra and was started on Cardene drip for BP control. He will be admitted to the ICU for further work-up and monitoring.     Time spent: 10 minutes  Electronically signed by BETHANIE Edward, 05/26/23, 11:25 AM EDT.    Problem List, Surgical History, Family, Social History, and ROS     Patient Active Problem List    Diagnosis    • *Bilateral Subdural Hematoma [I62.00]    • HTN [I10]    • Hyperlipidemia (Diet controlled, off statin) [E78.5]    • Hypothyroidism [E03.9]    • Anxiety [F41.9]    • Hodgkin's Lymphoma s/p XRT & Chemo [C81.90]    • VSD s/p closure (age 14) [Q21.0]    • History of aortic regurgitation / stenosis s/p ROSS procedure with subsequent reversal with Mechanical AVR [Z86.79]    • H/O aortic mechanical valve replacement on Coumadin [Z95.2]    • Prediabetes [R73.03]      No past surgical history on file.    Not on File  No current facility-administered medications on file prior to encounter.     Current Outpatient Medications on File Prior to Encounter   Medication Sig   • divalproex (DEPAKOTE ER) 250 MG 24 hr tablet Take 1 tablet by mouth Every Night.   • DULoxetine (CYMBALTA) 60 MG capsule Take 1 capsule by mouth Daily.   • gabapentin (NEURONTIN) 100 MG capsule Take 2 capsules by mouth 2 (Two) Times a Day.   • hydrOXYzine (ATARAX) 10 MG tablet Take 1 to 2 tablets by mouth twice a day as needed for anxiety and sleep.   • lansoprazole (PREVACID) 30 MG capsule Take 1 capsule by mouth Daily.   • levothyroxine (SYNTHROID, LEVOTHROID) 125 MCG tablet Take 1 tablet by  "mouth Daily. Further Refills per PCP with Repeat Lab Work   • propranolol LA (INDERAL LA) 60 MG 24 hr capsule Take 1 capsule by mouth Daily.   • rosuvastatin (CRESTOR) 20 MG tablet Take 1 tablet by mouth Daily.   • warfarin (COUMADIN) 5 MG tablet Take 1 tablet by mouth Daily. As directed by Anticoagulation Clinic     MEDICATION LIST AND ALLERGIES REVIEWED.    No family history on file.  Social History     Tobacco Use   • Smoking status: Never   • Smokeless tobacco: Never   Substance Use Topics   • Alcohol use: Never     Social History     Social History Narrative   • Not on file     FAMILY AND SOCIAL HISTORY REVIEWED.    Review of Systems  Unable to perform review of system due to patient factors    Physical Exam and Clinical Information   /85   Pulse 90   Resp 16   Ht 172.7 cm (68\")   Wt 104 kg (229 lb 0.9 oz)   SpO2 96%   BMI 34.83 kg/m²   Physical Exam  General Appearance: Awake moving around, aphasia and not following commands very consistently   head:  Right eye deviated outwards, right pupil about 5 mm and left 4 mm and sluggish reaction  Neck:   Supple.   Lungs:   B/L Breath sounds present with decreased breath sounds on bases, no wheezing heard, no crackles.   Heart: S1 and S2 present, no murmur  Abdomen: Soft, nontender, no guarding or rigidity, bowel sounds positive.  Extremities:  no cyanosis or clubbing,  no edema, warm to touch.  Pulses: Positive and symmetric.  Neurologic:  Moving all four extremities. Good strength bilaterally.     Results from last 7 days   Lab Units 05/26/23  1008 05/26/23  0950   WBC 10*3/mm3 11.50*  --    HEMOGLOBIN g/dL 14.6  --    HEMOGLOBIN, POC g/dL  --  15.3   PLATELETS 10*3/mm3 193  --      Results from last 7 days   Lab Units 05/26/23  1008 05/26/23  0950   SODIUM mmol/L 139  --    POTASSIUM mmol/L 4.4  --    CO2 mmol/L 24.0  --    BUN mg/dL 14  --    CREATININE mg/dL 0.72* 0.70   MAGNESIUM mg/dL 2.2  --    GLUCOSE mg/dL 220*  --      Estimated Creatinine " Clearance: 148.2 mL/min (A) (by C-G formula based on SCr of 0.72 mg/dL (L)).  Results from last 7 days   Lab Units 05/26/23  1008   HEMOGLOBIN A1C % 8.90*         No results found for: LACTATE     Images: Chest x-ray reviewed personally and showed previous sternotomy wires in place.  Poor inspiratory effort but without any definite consolidation, pleural effusion or infiltrate noted.    I reviewed the patient's results and images.     CT head reviewed  IMPRESSION:  Impression:  Bilateral 13 to 14 mm acute subdural hematomas are present. There is evidence of diffuse cerebral edema, with bilateral uncal medialization, crowding of the basal cisterns and concern for impending herniation. There is 2 to 3 mm of leftward midline shift   with effacement of the right lateral and third ventricles, with prominence of the left temporal horn concerning for early entrapment.     Scan performed at 9:31 a.m. 5/26/2023. Results discussed with the stroke team and ER physician by Elia Morris in person at time of scanning.           Electronically Signed: Ruben Morris    5/26/2023 9:55 AM EDT    Workstation ID: [id number]      EKG reviewed and showed normal sinus rhythm.  Slightly prolonged QTc.  No acute ST changes noted.    Impression     Bilateral Subdural Hematoma    HTN    Hyperlipidemia (Diet controlled, off statin)    Hypothyroidism    Anxiety    H/O aortic mechanical valve replacement on Coumadin    Prediabetes    Plan/Recommendations     47-year-old male with past medical history of hypertension, dyslipidemia, hypothyroidism, prediabetes, anxiety, Hodgkin's lymphoma status postchemotherapy and radiation treatment at age 21, history of PDA, VSD s/p closure at age 14, s/p Ross procedure with later reversal at [facility], mechanical AVR on Coumadin and right ventricular outflow tract obstruction.  Patient has previous history of stroke after taken off Coumadin back in 2018 and subsequently developed residual aphasia.   Patient presented to emergency room today after significant other found patient to be weak and lethargic and going on for about a week.  He was also complaining of headache and has medications apparently were being adjusted including gabapentin dosing.  He was not feeling well today morning and more lethargic so decision was made to bring patient to emergency room where work-up thus far have revealed acute bilateral subdural hematoma with diffuse cerebral edema with bilateral uncal medialization and crowding of basilar cistern.  2 to 3 mm leftward midline shift noted.  There is effacement of right lateral and third ventricle.  Neurosurgery team has evaluated the patient.  Patient has been on Coumadin and INR was therapeutic so has received vitamin K and Kcentra in preparation for surgical evacuation of these subdural hematomas.  Patient currently is awake.  He is following commands.  Slight deviation of right eye noted along with slightly enlarged pupil.  Patient denies any pain currently.  Denies any chest pain or shortness of breath.  On nicardipine infusion and also started on Keppra prophylactically.    1.  Admit patient to intensive care unit with acute encephalopathy and bilateral subdural hematoma for closer neurologic and hemodynamic monitoring.  2.  Nicardipine drip as needed to keep systolic blood pressure less than 140.  3.  Neurosurgery team managing acute subdural hematoma and plan is for bur hole evacuation later on today.  4.  Reversed anticoagulation with Kcentra.  Patient also received vitamin K as well.  Will need resumption of anticoagulation when okay with neurosurgery team after procedure.  5.  Keppra load given prophylactically.  6.  Resume some of the home medications including Crestor, levothyroxine, Gabapentin and Depakote.  7.  Protonix for history of GERD.  8.  Levothyroxine with history of hypothyroidism.  9.  N.p.o.  May need Keofeed placement for medication administration.  10.  Sliding  scale insulin coverage for hyperglycemia management.  11.  Normal saline at 125 mill per hour.  Avoid hyponatremia.  12.  Monitor electrolytes and replace as needed per ICU protocol.    Guarded prognosis.  Will admit to intensive care unit.  Family updated at bedside    Time spent 35 min (exclusive of procedure time)  including high complexity decision making to assess, manipulate, and support vital organ system failure in this individual who has impairment of one or more vital organ systems such that there is a high probability of imminent or life threatening deterioration in the patient’s condition.      Jose Maria Townsend MD, [facility]  Pulmonary and Critical Care Medicine  05/26/23 13:11 EDT     CC: Provider, No Known    Electronically signed by Jose Maria Townsend MD at 05/26/23 1319          Emergency Department Notes      Cameron Daugherty MD at 05/26/23 0947      Procedure Orders    1. Critical Care [[id number]] ordered by Cameron Daugherty MD               Subjective   History of Present Illness  This is a 47-year-old male who sees Roby Faust.  He is brought to the emergency room today by his brother for altered mental status of unclear duration and etiology.  Per his brother who is a fair historian it sounds like he had a stroke after having perhaps aortic valve replacement at [facility] 6 years ago.  This left him with expressive aphasia.  He is on chronic Coumadin therapy for his aortic valve.  His brother last talked to him a week ago but he really does not speak any saw him at that time he looked his normal self is unclear how long he has been in his current state though his girlfriend who he lives with is post to be coming in shortly and will be able to provide more information.    I really cannot get any other history or review of systems.  His chart is not merged as he initially came in as a Vimal Fraziere.        Review of Systems   Unable to perform ROS: Mental status change       No past medical history  on file.    Not on File    No past surgical history on file.    No family history on file.    Social History     Socioeconomic History   • Marital status: Single   Tobacco Use   • Smoking status: Never   • Smokeless tobacco: Never   Substance and Sexual Activity   • Alcohol use: Never           Objective   Physical Exam  Vitals and nursing note reviewed.   Constitutional:       Comments: This is a 47-year-old who is awake he is alert he is sitting upright in a wheelchair getting little bit CT scan when I first see him.  He is able with minimal assistance to stand up and transfer to the CT gurney.  He does not seem to be focally weak.  When I ask him questions he mumbles and I really cannot understand him I do not know how close this is to his baseline.   HENT:      Head: Normocephalic and atraumatic.      Right Ear: External ear normal.      Left Ear: External ear normal.      Nose: Nose normal.      Mouth/Throat:      Mouth: Mucous membranes are moist.      Pharynx: Oropharynx is clear.   Eyes:      Comments: Pupils are large about 6 mm they react bilaterally   Neck:      Vascular: No carotid bruit.   Cardiovascular:      Rate and Rhythm: Normal rate and regular rhythm.      Pulses: Normal pulses.      Heart sounds: Normal heart sounds.      Comments: There is a loud click consistent with mechanical valve in aortic area with a 2/6 systolic murmur.  Pulmonary:      Effort: Pulmonary effort is normal.      Breath sounds: Normal breath sounds.   Abdominal:      Comments: Modestly obese soft and nontender without organomegaly masses or guarding   Musculoskeletal:      Cervical back: Normal range of motion and neck supple. No rigidity or tenderness.      Comments: Equal pulses without edema, synovitis, rash, or venous cords.   Lymphadenopathy:      Cervical: No cervical adenopathy.   Skin:     General: Skin is warm and dry.      Capillary Refill: Capillary refill takes less than 2 seconds.   Neurological:      Comments:  Face symmetric he mumbles GCS 14 he is able to squeeze both my hands but is weak bilaterally.  With minimal assistance he is able to stand up and scoot to the CT gurney.  Does not seem to be focally weak.  His knee jerks are 2+ bilaterally.         Critical Care  Performed by: Cameron Daugherty MD  Authorized by: Cameron Daugherty MD     Critical care provider statement:     Critical care time (minutes):  60    Critical care was necessary to treat or prevent imminent or life-threatening deterioration of the following conditions:  CNS failure or compromise    Critical care was time spent personally by me on the following activities:  Development of treatment plan with patient or surrogate, discussions with consultants, evaluation of patient's response to treatment, examination of patient, obtaining history from patient or surrogate, review of old charts, re-evaluation of patient's condition, pulse oximetry, ordering and review of radiographic studies, ordering and review of laboratory studies and ordering and performing treatments and interventions              ED Course            Recent Results (from the past 24 hour(s))   POC CHEM 8    Collection Time: 05/26/23  9:50 AM    Specimen: Blood   Result Value Ref Range    Glucose 215 (H) 70 - 130 mg/dL    BUN 15 8 - 26 mg/dL    Creatinine 0.70 0.60 - 1.30 mg/dL    Sodium 139 138 - 146 mmol/L    POC Potassium 4.3 3.5 - 4.9 mmol/L    Chloride 102 98 - 109 mmol/L    Total CO2 27 24 - 29 mmol/L    Hemoglobin 15.3 12.0 - 17.0 g/dL    Hematocrit 45 38 - 51 %    Ionized Calcium 1.08 (L) 1.20 - 1.32 mmol/L    eGFR 114.4 >60.0 mL/min/1.73   POC Protime / INR    Collection Time: 05/26/23  9:58 AM    Specimen: Blood   Result Value Ref Range    Protime 35.9 (H) 12.8 - 15.2 seconds    INR 3.2 (H) 0.8 - 1.2   Comprehensive Metabolic Panel    Collection Time: 05/26/23 10:08 AM    Specimen: Blood   Result Value Ref Range    Glucose 220 (H) 65 - 99 mg/dL    BUN 14 6 - 20 mg/dL     Creatinine 0.72 (L) 0.76 - 1.27 mg/dL    Sodium 139 136 - 145 mmol/L    Potassium 4.4 3.5 - 5.2 mmol/L    Chloride 101 98 - 107 mmol/L    CO2 24.0 22.0 - 29.0 mmol/L    Calcium 9.7 8.6 - 10.5 mg/dL    Total Protein 8.4 6.0 - 8.5 g/dL    Albumin 4.7 3.5 - 5.2 g/dL    ALT (SGPT) 14 1 - 41 U/L    AST (SGOT) 18 1 - 40 U/L    Alkaline Phosphatase 86 39 - 117 U/L    Total Bilirubin 0.6 0.0 - 1.2 mg/dL    Globulin 3.7 gm/dL    A/G Ratio 1.3 g/dL    BUN/Creatinine Ratio 19.4 7.0 - 25.0    Anion Gap 14.0 5.0 - 15.0 mmol/L    eGFR 113.4 >60.0 mL/min/1.73   Protime-INR    Collection Time: 05/26/23 10:08 AM    Specimen: Blood   Result Value Ref Range    Protime 23.6 (H) 12.2 - 14.5 Seconds    INR 2.08 (H) 0.89 - 1.12   Magnesium    Collection Time: 05/26/23 10:08 AM    Specimen: Blood   Result Value Ref Range    Magnesium 2.2 1.6 - 2.6 mg/dL   CBC Auto Differential    Collection Time: 05/26/23 10:08 AM    Specimen: Blood   Result Value Ref Range    WBC 11.50 (H) 3.40 - 10.80 10*3/mm3    RBC 5.38 4.14 - 5.80 10*6/mm3    Hemoglobin 14.6 13.0 - 17.7 g/dL    Hematocrit 45.8 37.5 - 51.0 %    MCV 85.1 79.0 - 97.0 fL    MCH 27.1 26.6 - 33.0 pg    MCHC 31.9 31.5 - 35.7 g/dL    RDW 14.8 12.3 - 15.4 %    RDW-SD 46.2 37.0 - 54.0 fl    MPV 11.8 6.0 - 12.0 fL    Platelets 193 140 - 450 10*3/mm3    Neutrophil % 75.4 42.7 - 76.0 %    Lymphocyte % 14.2 (L) 19.6 - 45.3 %    Monocyte % 6.2 5.0 - 12.0 %    Eosinophil % 2.3 0.3 - 6.2 %    Basophil % 0.5 0.0 - 1.5 %    Immature Grans % 1.4 (H) 0.0 - 0.5 %    Neutrophils, Absolute 8.68 (H) 1.70 - 7.00 10*3/mm3    Lymphocytes, Absolute 1.63 0.70 - 3.10 10*3/mm3    Monocytes, Absolute 0.71 0.10 - 0.90 10*3/mm3    Eosinophils, Absolute 0.26 0.00 - 0.40 10*3/mm3    Basophils, Absolute 0.06 0.00 - 0.20 10*3/mm3    Immature Grans, Absolute 0.16 (H) 0.00 - 0.05 10*3/mm3    nRBC 0.0 0.0 - 0.2 /100 WBC   Hemoglobin A1c    Collection Time: 05/26/23 10:08 AM    Specimen: Blood   Result Value Ref Range     Hemoglobin A1C 8.90 (H) 4.80 - 5.60 %   TSH    Collection Time: 05/26/23 10:08 AM    Specimen: Blood   Result Value Ref Range    TSH 1.610 0.270 - 4.200 uIU/mL   Type & Screen    Collection Time: 05/26/23 10:14 AM    Specimen: Blood   Result Value Ref Range    ABO Type A     RH type Positive     Antibody Screen Negative     T&S Expiration Date 5/29/2023 11:59:59 PM    ECG 12 Lead Altered Mental Status    Collection Time: 05/26/23 10:18 AM   Result Value Ref Range    QT Interval 400 ms    QTC Interval 464 ms   ABO RH Specimen Verification    Collection Time: 05/26/23 11:25 AM    Specimen: Blood   Result Value Ref Range    ABO Type A     RH type Positive    POC Glucose Once    Collection Time: 05/26/23  1:27 PM    Specimen: Blood   Result Value Ref Range    Glucose 212 (H) 70 - 130 mg/dL   Protime-INR    Collection Time: 05/26/23  1:48 PM    Specimen: Arm, Right; Blood   Result Value Ref Range    Protime 14.0 12.2 - 14.5 Seconds    INR 1.07 0.89 - 1.12   POC Protime / INR    Collection Time: 05/26/23  1:49 PM    Specimen: Blood   Result Value Ref Range    Protime 13.3 12.8 - 15.2 seconds    INR 1.1 0.8 - 1.2     Note: In addition to lab results from this visit, the labs listed above may include labs taken at another facility or during a different encounter within the last 24 hours. Please correlate lab times with ED admission and discharge times for further clarification of the services performed during this visit.    XR Chest 1 View   Final Result   Impression:   Low lung volumes with no acute process demonstrated         Electronically Signed: Mateusz Hare     5/26/2023 10:21 AM EDT     Workstation ID: [id number]      CT Head Without Contrast Stroke Protocol   Final Result   Impression:   Bilateral 13 to 14 mm acute subdural hematomas are present. There is evidence of diffuse cerebral edema, with bilateral uncal medialization, crowding of the basal cisterns and concern for impending herniation. There is 2 to 3 mm of  leftward midline shift    with effacement of the right lateral and third ventricles, with prominence of the left temporal horn concerning for early entrapment.      Scan performed at 9:31 a.m. 5/26/2023. Results discussed with the stroke team and ER physician by Elia Morris in person at time of scanning.            Electronically Signed: Ruben Morris     5/26/2023 9:55 AM EDT     Workstation ID: [id number]        Vitals:    05/26/23 1100 05/26/23 1115 05/26/23 1130 05/26/23 1135   BP: 138/93 151/88 155/82 147/85   BP Location:       Patient Position:       Pulse: 73 85 87 90   Resp:       SpO2: 97% 98% 98% 96%   Weight:       Height:         Medications   sodium chloride 0.9 % infusion (125 mL/hr Intravenous New Bag 5/26/23 1006)   niCARdipine (CARDENE) 25mg in 250mL NS infusion (10 mg/hr Intravenous New Bag 5/26/23 1405)   levETIRAcetam in NaCl 0.75% (KEPPRA) IVPB 1,000 mg (0 mg Intravenous Stopped 5/26/23 1053)   dextrose (GLUTOSE) oral gel 15 g (has no administration in time range)   dextrose (D50W) (25 g/50 mL) IV injection 25 g (has no administration in time range)   glucagon (GLUCAGEN) injection 1 mg (has no administration in time range)   insulin regular (humuLIN R,novoLIN R) injection 2-7 Units (0 Units Subcutaneous Hold 5/26/23 1347)   pantoprazole (PROTONIX) injection 40 mg (has no administration in time range)   dextrose (GLUTOSE) oral gel 15 g (has no administration in time range)   dextrose (D50W) (25 g/50 mL) IV injection 25 g (has no administration in time range)   glucagon (GLUCAGEN) injection 1 mg (has no administration in time range)   Insulin Lispro (humaLOG) injection 2-7 Units (3 Units Subcutaneous Given 5/26/23 1347)   divalproex (DEPAKOTE ER) 24 hr tablet 250 mg (has no administration in time range)   DULoxetine (CYMBALTA) DR capsule 60 mg (60 mg Oral Not Given 5/26/23 1405)   gabapentin (NEURONTIN) capsule 200 mg (200 mg Oral Not Given 5/26/23 1416)   levothyroxine (SYNTHROID, LEVOTHROID)  tablet 125 mcg (125 mcg Oral Not Given 5/26/23 1419)   rosuvastatin (CRESTOR) tablet 20 mg (20 mg Oral Not Given 5/26/23 1416)   prothrombin complex conc human (KCentra) IV solution 2,500 Units (2,500 Units Intravenous Given 5/26/23 1029)     And   phytonadione (AQUA-MEPHYTON, VITAMIN K) 10 mg in sodium chloride 0.9 % 50 mL IVPB (0 mg Intravenous Stopped 5/26/23 1125)     ECG/EMG Results (last 24 hours)     ** No results found for the last 24 hours. **        ECG 12 Lead Altered Mental Status   Preliminary Result   Test Reason : Altered Mental Status   Blood Pressure :   */*   mmHG   Vent. Rate :  81 BPM     Atrial Rate :  81 BPM      P-R Int : 154 ms          QRS Dur :  88 ms       QT Int : 400 ms       P-R-T Axes :  71  28  82 degrees      QTc Int : 464 ms      Normal sinus rhythm   Possible Left atrial enlargement   ST & T wave abnormality, consider lateral ischemia   Prolonged QT   Abnormal ECG   No previous ECGs available      Referred By: ED MD           Confirmed By:                                         Medical Decision Making        Reviewed all available studies at bedside with the patient and his girlfriend who is now at the bedside.  She actually reports 2 to 3 weeks of altered mental status and increasing sleepiness no history of trauma.    On recheck patient maintaining his airway.    Dr. Holloway neurosurgery is down to see the patient anticipates going to the OR for emergent bur hole placement subdural drainage.    Discussed the risk and benefit of reversal of the patient's chronic anticoagulation due to his mechanical aortic valve with the patient's girlfriend and family and understand the necessary part of this even a increases his risk of stroke.    This was accomplished.  Patient also started on hypertension therapy and antiepileptic drugs by consulting services.  Please see their note.    I spoke Dr. Townsend, on-call critical care medicine he will admit the patient.    All are agreeable with  the plan    Elevated INR: chronic illness or injury with exacerbation, progression, or side effects of treatment that poses a threat to life or bodily functions  H/O mechanical aortic valve replacement: chronic illness or injury with exacerbation, progression, or side effects of treatment that poses a threat to life or bodily functions  Hypertensive urgency: complicated acute illness or injury with systemic symptoms that poses a threat to life or bodily functions  Somnolence: complicated acute illness or injury with systemic symptoms that poses a threat to life or bodily functions  Subdural hematoma: complicated acute illness or injury with systemic symptoms that poses a threat to life or bodily functions  Amount and/or Complexity of Data Reviewed  Independent Historian: caregiver and spouse  External Data Reviewed: notes.  Labs: ordered. Decision-making details documented in ED Course.  Radiology: ordered and independent interpretation performed. Decision-making details documented in ED Course.  ECG/medicine tests: ordered and independent interpretation performed. Decision-making details documented in ED Course.      Risk  Prescription drug management.  Decision regarding hospitalization.  Emergency major surgery.          Final diagnoses:   Somnolence   Subdural hematoma   Elevated INR   H/O mechanical aortic valve replacement   Hypertensive urgency       ED Disposition  ED Disposition     ED Disposition   Decision to Admit    Condition   --    Comment   Level of Care: Critical Care [6]   Admitting Physician: NUSRAT MICHAEL [[id number]]   Attending Physician: NUSRAT MICHAEL [[id number]]               No follow-up provider specified.       Medication List      No changes were made to your prescriptions during this visit.          Cameron Daugherty MD  05/26/23 145      Electronically signed by Cameron Daugherty MD at 05/26/23 1457          Operative/Procedure Notes (all)      Stephane Womack MD at 05/26/23 4387   Version 1 of 1         Progress Note    Manjeet Hall      Pre-op Diagnosis:   Bilateral subacute subdural hematoma       Post-Op Diagnosis Codes:  Preoperative    Procedure/CPT® Codes:    Right-sided craniotomy for evacuation of subdural hematoma  Left-sided craniotomy for evacuation of subdural hematoma                  * Surgery not found *    Anesthesia: * No surgery found *    Staff:   * Surgery not found *  * Surgery not found *      Estimated Blood Loss: 175 ml    Urine Voided: * No surgery found *    Specimens:                None          Drains: * No LDAs found *    Findings: Speculation of bilateral subdural hematomas.        Complications: None apparent          Stephane Womack MD     Date: 5/26/2023  Time: 17:30 EDT        Electronically signed by Stephane Womack MD at 05/26/23 1731     Stephane Womack MD at 05/31/23 1556  Version 1 of 1         CV EMBOLIZATION CEREBRAL IR  Progress Note    Manjeet Hall  5/31/2023    Pre-op Diagnosis:   Bilateral subacute subdural hematomas       Post-Op Diagnosis Codes:  Same as preoperative    Procedure/CPT® Codes:        Procedure(s):  Emoblization cerebral              Surgeon(s):  Stephane Womack MD    Anesthesia: General    Staff:   Circulator: Clary Mejia RN  Scrub Person: Kashmir Gonzalez  Documenter: Jonathan, Di, RN  Invasive Nurse: Mehrdad Alexandra RN         Estimated Blood Loss: minimal    Urine Voided: * No values recorded between 5/31/2023  3:56 PM and 5/31/2023  4:56 PM *    Specimens:                None          Drains:   [REMOVED] Closed/Suction Drain Right Scalp (Removed)   Site Description Unable to view 05/28/23 0600   Dressing Status Clean;Dry;Intact 05/28/23 0600   Status To bulb suction 05/28/23 0600   Output (mL) 15 mL 05/28/23 0600       [REMOVED] Closed/Suction Drain Left Scalp (Removed)   Site Description Unable to view 05/28/23 0600   Dressing Status Clean;Dry;Intact 05/28/23 0600   Status To bulb suction  05/28/23 0600   Output (mL) 10 mL 05/28/23 0600       [REMOVED] Urethral Catheter (Removed)   Daily Indications Hourly Output in Critical Unstable Patient requiring Frequent Intervention (hemodynamics, titration or life supportive therapy) 05/27/23 1400   Site Assessment Clean;Skin intact 05/27/23 1400   Collection Container Standard drainage bag 05/27/23 1400   Securement Method Securing device 05/27/23 1400   Catheter care complete Yes 05/27/23 0800   Output (mL) 225 mL 05/27/23 1400       Findings: Successful ablation of the left middle meningeal artery.  Right middle meningeal artery was too small for embolization.        Complications: None apparent          Stephane Womack MD     Date: 5/31/2023  Time: 17:00 EDT        Electronically signed by Stephane Womack MD at 05/31/23 1700          Physician Progress Notes (all)      Tracy Morales APRN at 05/31/23 0933          Intensive Care Follow-up     Hospital:  LOS: 5 days   Mr. Manjeet Hall, 47 y.o. male is followed for:   Subdural hematoma, nontraumatic     Subjective     History of present illness:   Manjeet Hall is a 47 y.o. 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  He was hypertensive and hyperglycemic on admission.  His INR was subtherapeutic at 2.08.  He received Kcentra and vitamin K and was started on Keppra for seizure prophylaxis,  Cardene for hypertension and taken to OR for emergent bilateral craniotomy and evacuation of subdural hematomas.      Subjective   Interval History:  Patient is resting in bed awaiting embolization this afternoon.  He is NPO and heparin drip is off.  He is anxious to get the procedure over.  He has no complaints.       The patient's past medical, surgical and social history were reviewed and updated in Epic as appropriate.    Review of Systems - General ROS: negative for - chills, fatigue, fever or night sweats  Respiratory ROS: negative for - cough, shortness of breath or wheezing  Cardiovascular ROS: negative for - chest pain, edema or palpitations  Gastrointestinal ROS: positive for constipation    Objective   Objective     Infusions:  Pharmacy to Dose Heparin,          Medications:  divalproex, 250 mg, Oral, Nightly  DULoxetine, 60 mg, Oral, Daily  gabapentin, 200 mg, Oral, BID  insulin detemir, 8 Units, Subcutaneous, Q12H  insulin lispro, 3-14 Units, Subcutaneous, 4x Daily AC & at Bedtime  Insulin Lispro, 4 Units, Subcutaneous, TID With Meals  levothyroxine, 125 mcg, Oral, Daily  metoprolol tartrate, 12.5 mg, Oral, Q12H  pantoprazole, 40 mg, Oral, Q AM  polyethylene glycol, 17 g, Oral, Daily  rosuvastatin, 20 mg, Oral, Daily  senna-docusate sodium, 2 tablet, Oral, BID  sodium chloride, 10 mL, Intravenous, Q12H  sodium chloride, 10 mL, Intravenous, Q12H        Vital Sign Min/Max for last 24 hours  Temp  Min: 97.3 °F (36.3 °C)  Max: 98.4 °F (36.9 °C)   BP  Min: 114/89  Max: 167/87   Pulse  Min: 64  Max: 95   Resp  Min: 14  Max: 16   SpO2  Min: 84 %  Max: 98 %   Flow (L/min)  Min: 2  Max: 2       Input/Output for last 24 hour shift  05/30 0701 - 05/31 0700  In: 346.8 [I.V.:346.8]  Out: -          Objective:  General Appearance:  In no acute distress and comfortable.    Vital signs: (most recent): Blood pressure 119/85, pulse 75, temperature 97.4 °F (36.3 °C), temperature source Axillary, resp. rate 14, height  "172.7 cm (68\"), weight 104 kg (229 lb 0.9 oz), SpO2 95 %.  Vital signs are normal.    HEENT: (Bilateral incisions approximated with staples.  No drainage, redness.)    Lungs:  Normal effort and normal respiratory rate.  Breath sounds clear to auscultation.  He is not in respiratory distress.  No rales, wheezes or rhonchi.    Heart: Normal rate.  Regular rhythm.  S1 normal and S2 normal.  No murmur or friction rub. (+ click)  Abdomen: Abdomen is soft and non-distended.  Hypoactive bowel sounds.   There is no abdominal tenderness.     Extremities: There is no dependent edema.    Pulses: Distal pulses are intact.    Neurological: Patient is alert and oriented to person, place and time.  GCS score is 15.  (Expressive aphasia and word finding difficulty at baseline).    Pupils:  Pupils are equal, round, and reactive to light.    Skin:  Warm and dry.  No rash.             Results from last 7 days   Lab Units 05/31/23  0435 05/30/23  0441 05/29/23  1004   WBC 10*3/mm3 9.33 10.05 13.76*   HEMOGLOBIN g/dL 11.9* 11.3* 10.9*   PLATELETS 10*3/mm3 200 183 173     Results from last 7 days   Lab Units 05/31/23  0435 05/30/23  0441 05/29/23  1004   SODIUM mmol/L 143 144 143   POTASSIUM mmol/L 3.8 4.0 3.8   CO2 mmol/L 27.0 28.0 28.0   BUN mg/dL 16 20 20   CREATININE mg/dL 0.70* 0.68* 0.64*   MAGNESIUM mg/dL 2.3 2.1 2.1   PHOSPHORUS mg/dL 4.4 5.0* 2.8   GLUCOSE mg/dL 134* 123* 151*     Estimated Creatinine Clearance: 152.4 mL/min (A) (by C-G formula based on SCr of 0.7 mg/dL (L)).          Images:   None new    I reviewed the patient's results and images.     Assessment & Plan   Impression        Bilateral Subdural Hematoma    HTN    Hyperlipidemia (Diet controlled, off statin)    New Onset Type 2 diabetes mellitus    Hypothyroidism    H/O aortic mechanical valve replacement on Coumadin    Anxiety       Plan        Bilateral Subdural Hematomas s/p bilateral crani for evacuation of subdural hematomas, drains d/c'd 5/28  H/O Stroke " post op AVR with residual receptive aphasia and word finding difficulty  · Post op care per NS  · Embolization today  · Keep NPO until after procedure  · Restart heparin drip when ok with NS  · Continue PT/OT/SLP  · CM assisting with setting up outpatient SLP to help with expressive aphasia and word finding difficulty upon discharge  · Patient wants to establish care with new PCP at discharge  · AM labs    HTN  HLP  H/O mechanical AVR 2018 on Warfarin  · Continue Metoprolol  · Continue Crestor  · Resume heparin drip when ok with NS to bridge to Warfarin    New onset T2DM, Hgb A1c 8.9   Neuropathy  · Keep glucose <= 150 per NS  · Continue basal insulin  · Continue correction SSI  · Continue prandial insulin  · Steroids discontinued 5/28  · Diabetes educator still needs to see patient  · Continue Gabapentin  · Patient would like to establish care with Endocrinologist at discharge    Hypothyroidism  · Continue Levothyroxine    Anxiety  Depression   · Continue Cymbalta  · Continue Depakote ER  · Hold home Atarax for now    Constipation  · Change BM regimen from as needed to scheduled      DVT prophylaxis:  SCDs  GI prophylaxis:  PPI  Disposition:  Keep in ICU    Plan of care and goals reviewed with multidisciplinary/antibiotic stewardship team during rounds.   I discussed the patient's findings and my recommendations with patient, family, nursing staff and primary care team     Time: 35 minutes, face to face, with the patient and family or on the [place]rissey coordinating care with other health care providers.     I spent > 50% percent of this time, counseling and discussing evaluation, current status and management.      BETHANIE Markham, AGACNP-BC, FNP-BC  Pulmonary and Critical Care Service       Electronically signed by Tracy Morales APRN at 05/31/23 1244     Tracy Morales APRN at 05/30/23 1011          Intensive Care Follow-up     Hospital:  LOS: 4 days   Mr. Manjeet Espino  Rafael, 47 y.o. male is followed for:   Subdural hematoma, nontraumatic     Subjective     History of present illness:   Manjeet Hall is a 47 y.o. 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  He was hypertensive and hyperglycemic on admission.  His INR was subtherapeutic at 2.08.  He received Kcentra and vitamin K and was started on Keppra for seizure prophylaxis, Cardene for hypertension and taken to OR for emergent bilateral craniotomy and evacuation of subdural hematomas.      Subjective   Interval History:  Patient went to cath lab for embolization today, but it was cancelled due to poor IV access.  Needs PICC line today.  Plan for embolization tomorrow.  NS ok with heparin drip today and needs to be discontinued at 6 am in the morning prior to procedure.    Patient has no complaints.  He is upset about being a newly diagnosed diabetic.  He is requesting an Endocrinologist appt at discharge.  He would also like to establish care with a new PCP as his is retiring soon.         The patient's past medical, surgical and social history were reviewed and updated in Epic as appropriate.    Review of Systems - General ROS: negative for - chills, fatigue, fever or night sweats  Respiratory ROS: negative for - cough, shortness of breath or wheezing  Cardiovascular ROS: negative for - chest pain, edema or  "palpitations  Gastrointestinal ROS: positive for constipation    Objective   Objective     Infusions:  heparin, 9.5 Units/kg/hr  Pharmacy to Dose Heparin,          Medications:  divalproex, 250 mg, Oral, Nightly  DULoxetine, 60 mg, Oral, Daily  gabapentin, 200 mg, Oral, BID  insulin detemir, 10 Units, Subcutaneous, Nightly  insulin lispro, 3-14 Units, Subcutaneous, 4x Daily AC & at Bedtime  Insulin Lispro, 4 Units, Subcutaneous, TID With Meals  levothyroxine, 125 mcg, Oral, Daily  metoprolol tartrate, 12.5 mg, Oral, Q12H  pantoprazole, 40 mg, Oral, Q AM  polyethylene glycol, 17 g, Oral, Daily  rosuvastatin, 20 mg, Oral, Daily  senna-docusate sodium, 2 tablet, Oral, BID  sodium chloride, 10 mL, Intravenous, Q12H        Vital Sign Min/Max for last 24 hours  Temp  Min: 97.3 °F (36.3 °C)  Max: 98.7 °F (37.1 °C)   BP  Min: 96/74  Max: 146/83   Pulse  Min: 58  Max: 82   Resp  Min: 14  Max: 16   SpO2  Min: 86 %  Max: 100 %   Flow (L/min)  Min: 2  Max: 2       Input/Output for last 24 hour shift  No intake/output data recorded.         Objective:  General Appearance:  In no acute distress and comfortable.    Vital signs: (most recent): Blood pressure 120/85, pulse 73, temperature 97.4 °F (36.3 °C), temperature source Axillary, resp. rate 16, height 172.7 cm (68\"), weight 104 kg (229 lb 0.9 oz), SpO2 (!) 88 %.  Vital signs are normal.    HEENT: (Bilateral incisions approximated with staples.  No drainage, redness.)    Lungs:  Normal effort and normal respiratory rate.  Breath sounds clear to auscultation.  He is not in respiratory distress.  No rales, wheezes or rhonchi.    Heart: Normal rate.  Regular rhythm.  S1 normal and S2 normal.  No murmur or friction rub. (+ click)  Abdomen: Abdomen is soft and non-distended.  Hypoactive bowel sounds.   There is no abdominal tenderness.     Extremities: There is no dependent edema.    Pulses: Distal pulses are intact.    Neurological: Patient is alert and oriented to person, place " and time.  GCS score is 15.  (Expressive aphasia and word finding difficulty at baseline).    Pupils:  Pupils are equal, round, and reactive to light.    Skin:  Warm and dry.  No rash.           Results from last 7 days   Lab Units 05/30/23  0441 05/29/23  1004 05/27/23  0519   WBC 10*3/mm3 10.05 13.76* 13.78*   HEMOGLOBIN g/dL 11.3* 10.9* 11.3*   PLATELETS 10*3/mm3 183 173 200     Results from last 7 days   Lab Units 05/30/23  0441 05/29/23  1004 05/27/23  0519   SODIUM mmol/L 144 143 140   POTASSIUM mmol/L 4.0 3.8 4.2   CO2 mmol/L 28.0 28.0 24.0   BUN mg/dL 20 20 15   CREATININE mg/dL 0.68* 0.64* 0.71*   MAGNESIUM mg/dL 2.1 2.1 1.8   PHOSPHORUS mg/dL 5.0* 2.8 4.0   GLUCOSE mg/dL 123* 151* 234*     Estimated Creatinine Clearance: 156.9 mL/min (A) (by C-G formula based on SCr of 0.68 mg/dL (L)).          Images:   None new    I reviewed the patient's results and images.     Assessment & Plan   Impression        Bilateral Subdural Hematoma    HTN    Hyperlipidemia (Diet controlled, off statin)    New Onset Type 2 diabetes mellitus    Hypothyroidism    H/O aortic mechanical valve replacement on Coumadin    Anxiety       Plan        Bilateral Subdural Hematomas s/p bilateral crani for evacuation of subdural hematomas, drains d/c'd 5/28  H/O Stroke post op AVR with residual receptive aphasia and word finding difficulty  · Post op care per NS  · Plan to return to cath lab for embolization tomorrow 5/31  · NPO after MN  · NS ok with heparin drip started today with no initial bolus.  Needs to be discontinued at 6 am on 5/31  · Continue PT/OT/SLP  · CM ordered outpatient SLP to help with expressive aphasia and word finding difficulty upon discharge  · PICC line today  · Patient wants to establish care with new PCP at discharge  · AM labs    HTN  HLP  H/O mechanical AVR 2018 on Warfarin  · Continue Metoprolol  · Continue Crestor  · Heparin drip to be started today.  Discontinue in am.  Resume when ok with NS to bridge to  Warfarin    New onset T2DM, Hgb A1c 8.9   Neuropathy  · Keep glucose <= 150 per NS  · Change basal to every 12 hours  · Continue correction SSI  · Continue prandial  · Steroids discontinued 5/28  · Diabetes education  · Continue Gabapentin  · Patient would like to establish care with Endocrinologist at discharge    Hypothyroidism  · Continue Levothyroxine    Anxiety  Depression   · Continue Cymbalta  · Continue Depakote ER  · Hold home Atarax for now    Constipation  · Change BM regimen from as needed to scheduled      DVT prophylaxis:  Heparin drip / SCDs  GI prophylaxis:  PPI  Disposition:  Keep in ICU    Plan of care and goals reviewed with multidisciplinary/antibiotic stewardship team during rounds.   I discussed the patient's findings and my recommendations with patient, family, nursing staff and primary care team     Time: 35 minutes, face to face, with the patient and family or on the [place]sey coordinating care with other health care providers.     I spent > 50% percent of this time, counseling and discussing evaluation, current status and management.      BETHANIE Markham, AGACNP-BC, FNP-BC  Pulmonary and Critical Care Service       Electronically signed by Tracy Morales APRN at 05/30/23 1255     Tracy Morales APRN at 05/29/23 1145          Intensive Care Follow-up     Hospital:  LOS: 3 days   Mr. Manjeet Hall, 47 y.o. male is followed for:   Subdural hematoma, nontraumatic     Subjective     History of present illness:   Manjeet Hall is a 47 y.o. 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  He was hypertensive and hyperglycemic on admission.  His INR was subtherapeutic at 2.08.  He received Kcentra and vitamin K and was started on Keppra for seizure prophylaxis, Cardene for hypertension and taken to OR for emergent bilateral craniotomy and evacuation of subdural hematomas.      Subjective   Interval History:  Patient is resting in bed this morning.  Girlfriend is at bedside.  Good oxygenation on RA.  VSS. States that he is scared about his hospitalization and just current medical illness in general given his medical history since 2018.  At baseline, he has mild receptive aphasia and word finding difficulties.  He is requesting outpatient SLP therapy for his receptive aphasia and word finding difficulties.  He states that they have progressively worsened since he last had therapy 2018.    States that when he is started on Warfarin, he would like to stay in the hospital until therapeutic.  He does not want Lovenox injections.   He has not had a BM in several days.         The patient's past medical, surgical and social history were reviewed and updated in Epic as appropriate.    Review of Systems - General ROS: negative for - chills, fatigue, fever or night sweats  Respiratory ROS: negative for - cough, shortness of breath or wheezing  Cardiovascular ROS: negative for - chest pain, edema or palpitations  Gastrointestinal ROS: positive for constipation    Objective   Objective     Infusions:        Medications:  divalproex, 250 mg, Oral, Nightly  DULoxetine, 60 mg, Oral, Daily  enoxaparin, 40 mg, Subcutaneous, Q24H  famotidine, 20 mg, Intravenous, Q12H  gabapentin, 200 mg, Oral, BID  insulin detemir, 10 Units, Subcutaneous, Nightly  insulin lispro, 3-14 Units, Subcutaneous, 4x  "Daily AC & at Bedtime  Insulin Lispro, 4 Units, Subcutaneous, TID With Meals  levothyroxine, 125 mcg, Oral, Daily  metoprolol tartrate, 12.5 mg, Oral, Q12H  pantoprazole, 40 mg, Oral, Q AM  rosuvastatin, 20 mg, Oral, Daily  sodium chloride, 10 mL, Intravenous, Q12H        Vital Sign Min/Max for last 24 hours  Temp  Min: 97.7 °F (36.5 °C)  Max: 97.9 °F (36.6 °C)   BP  Min: 104/87  Max: 137/73   Pulse  Min: 59  Max: 96   Resp  Min: 14  Max: 18   SpO2  Min: 84 %  Max: 99 %   No data recorded       Input/Output for last 24 hour shift  05/28 0701 - 05/29 0700  In: 910 [I.V.:810]  Out: -          Objective:  General Appearance:  In no acute distress and comfortable.    Vital signs: (most recent): Blood pressure 137/73, pulse 81, temperature 97.9 °F (36.6 °C), temperature source Oral, resp. rate 14, height 172.7 cm (68\"), weight 104 kg (229 lb 0.9 oz), SpO2 97 %.  Vital signs are normal.    HEENT: (Rt sided incision approximated with staples.  No drainage, redness.)    Lungs:  Normal effort and normal respiratory rate.  Breath sounds clear to auscultation.  He is not in respiratory distress.  No rales, wheezes or rhonchi.    Heart: Normal rate.  Regular rhythm.  S1 normal and S2 normal.  No murmur or friction rub. (+ click)  Abdomen: Abdomen is rigid and non-distended.  Hypoactive bowel sounds.   There is no abdominal tenderness.     Extremities: There is no dependent edema.    Pulses: Distal pulses are intact.    Neurological: Patient is alert and oriented to person, place and time.  GCS score is 15.    Pupils:  Pupils are equal, round, and reactive to light.    Skin:  Warm and dry.  No rash.             Results from last 7 days   Lab Units 05/29/23  1004 05/27/23  0519 05/26/23  1008   WBC 10*3/mm3 13.76* 13.78* 11.50*   HEMOGLOBIN g/dL 10.9* 11.3* 14.6   PLATELETS 10*3/mm3 173 200 193     Results from last 7 days   Lab Units 05/29/23  1004 05/27/23  0519 05/26/23  1008   SODIUM mmol/L 143 140 139   POTASSIUM mmol/L 3.8 " 4.2 4.4   CO2 mmol/L 28.0 24.0 24.0   BUN mg/dL 20 15 14   CREATININE mg/dL 0.64* 0.71* 0.72*   MAGNESIUM mg/dL 2.1 1.8 2.2   PHOSPHORUS mg/dL 2.8 4.0  --    GLUCOSE mg/dL 151* 234* 220*     Estimated Creatinine Clearance: 166.7 mL/min (A) (by C-G formula based on SCr of 0.64 mg/dL (L)).          Images:   None new    I reviewed the patient's results and images.     Assessment & Plan   Impression        Bilateral Subdural Hematoma    HTN    Hyperlipidemia (Diet controlled, off statin)    New Onset Type 2 diabetes mellitus    Hypothyroidism    H/O aortic mechanical valve replacement on Coumadin    Anxiety       Plan        Bilateral Subdural Hematomas s/p bilateral crani for evacuation of subdural hematomas, drains d/c'd 5/28  H/O Stroke post op AVR with residual receptive aphasia and word finding difficulty  · Post op care per NS  · Plan for embolization tomorrow 5/30  · NPO after MN  · Continue PT/OT/SLP  · Patient would like outpatient SLP to help with receptive aphasia and word finding difficulty upon discharge  · Resume anticoagulation when ok with NS  · AM labs    HTN  HLP  H/O mechanical AVR 2018 on Warfarin  · Continue Metoprolol  · Continue Crestor  · Will need anticoagulation restarted soon per NS with bridge to Warfarin    New onset T2DM, Hgb A1c 8.9   Neuropathy  · Keep glucose <= 150 per NS  · Continue basal  · Continue correction SSI  · Continue prandial  · Glucose has improved since basal added yesterday.  Glucose ~ 150 today.  May need insulins adjusted more later.  · Steroids discontinued yesterday  · Diabetes education  · Continue Gabapentin    Hypothyroidism  · Continue Levothyroxine    Anxiety  Depression   · Continue Cymbalta  · Continue Depakote ER  · Hold home Atarax for now    Constipation  · Change BM regimen from as needed to scheduled      DVT prophylaxis:  Lovenox SQ / SCDs  GI prophylaxis:  PPI  Disposition:  Keep in ICU    Plan of care and goals reviewed with multidisciplinary/antibiotic  "stewardship team during rounds.   I discussed the patient's findings and my recommendations with patient, family, nursing staff and primary care team     Time: 35 minutes, face to face, with the patient and family or on the [place] coordinating care with other health care providers.     I spent > 50% percent of this time, counseling and discussing evaluation, current status and management.      BETHANIE Markham, AGACNP-BC, FNP-BC  Pulmonary and Critical Care Service       Electronically signed by Tracy Morales APRN at 05/29/23 1223     Haylee Domingo PA-C at 05/29/23 1051     Attestation signed by Milind Leblanc MD at 05/30/23 1006    I have reviewed this documentation and agree.                  NEUROSURGERY PROGRESS NOTE    Interval History:   Bilateral SDH, s/p bilateral craniotomy for evacuation 5/26/23. POD 3  Patient awake and conversant. Has been up to chair and walking on floor with assistance. Mild headache, no other complaints. Drains removed,     Vital Signs  Blood pressure 137/73, pulse 81, temperature 97.9 °F (36.6 °C), temperature source Oral, resp. rate 14, height 172.7 cm (68\"), weight 104 kg (229 lb 0.9 oz), SpO2 97 %.    Physical Exam:  Awake, alert, oriented  Pupils equal bilaterally  Extraocular movements and facial expression intact bilaterally  5/5 all 4 extremities  Incisions clean, dry, intact. Drains were removed yesterday     Results Review:    WBC   Date Value Ref Range Status   05/29/2023 13.76 (H) 3.40 - 10.80 10*3/mm3 Final     RBC   Date Value Ref Range Status   05/29/2023 4.06 (L) 4.14 - 5.80 10*6/mm3 Final     Hemoglobin   Date Value Ref Range Status   05/29/2023 10.9 (L) 13.0 - 17.7 g/dL Final     Hematocrit   Date Value Ref Range Status   05/29/2023 34.6 (L) 37.5 - 51.0 % Final     MCV   Date Value Ref Range Status   05/29/2023 85.2 79.0 - 97.0 fL Final     MCH   Date Value Ref Range Status   05/29/2023 26.8 26.6 - 33.0 pg Final     MCHC "   Date Value Ref Range Status   05/29/2023 31.5 31.5 - 35.7 g/dL Final     RDW   Date Value Ref Range Status   05/29/2023 15.1 12.3 - 15.4 % Final     RDW-SD   Date Value Ref Range Status   05/29/2023 46.9 37.0 - 54.0 fl Final     MPV   Date Value Ref Range Status   05/29/2023 11.8 6.0 - 12.0 fL Final     Platelets   Date Value Ref Range Status   05/29/2023 173 140 - 450 10*3/mm3 Final     Basic Metabolic Panel    Sodium Sodium   Date Value Ref Range Status   05/29/2023 143 136 - 145 mmol/L Final   05/27/2023 140 136 - 145 mmol/L Final      Potassium Potassium   Date Value Ref Range Status   05/29/2023 3.8 3.5 - 5.2 mmol/L Final     Comment:     Slight hemolysis detected by analyzer. Results may be affected.   05/27/2023 4.2 3.5 - 5.2 mmol/L Final      Chloride Chloride   Date Value Ref Range Status   05/29/2023 105 98 - 107 mmol/L Final   05/27/2023 103 98 - 107 mmol/L Final      Bicarbonate No results found for: PLASMABICARB   BUN BUN   Date Value Ref Range Status   05/29/2023 20 6 - 20 mg/dL Final   05/27/2023 15 6 - 20 mg/dL Final      Creatinine Creatinine   Date Value Ref Range Status   05/29/2023 0.64 (L) 0.76 - 1.27 mg/dL Final   05/27/2023 0.71 (L) 0.76 - 1.27 mg/dL Final      Calcium Calcium   Date Value Ref Range Status   05/29/2023 9.0 8.6 - 10.5 mg/dL Final   05/27/2023 9.2 8.6 - 10.5 mg/dL Final      Glucose      No components found for: GLUCOSE.*     I/O last 3 completed shifts:  In: 2110 [I.V.:1910; IV Piggyback:200]  Out: 25 [Drains:25]  No intake/output data recorded.      ASSESSMENT/Plan:   46 yo M POD 3 from bilateral craniotomies for SDH evacuation.Significant neurologic improvement. Drains removed yesterday: incisions intact.   Keppra/decadron d/c yesterday as well (patient on depakote and gabapentin at home and this is continued); blood sugars improving. Goal 150. ICU managing insulin.   Lovenox for DVT prophylaxis. Patient has history of mechanical aortic valve replacement and was previously  "on coumadin. Plan is for MMA embolization tomorrow 5/30. NPO after midnight. Further adjustment of anticoagulation after this procedure.        Haylee Domingo PA-C  05/29/23  10:51 EDT    Electronically signed by Milind Leblanc MD at 05/30/23 1006     Meryl Ashby MD at 05/28/23 0939          Critical Care Note     LOS: 2 days   Patient Care Team:  Provider, No Known as PCP - General    Chief Complaint/Reason for visit:    Chief Complaint   Patient presents with   • Stroke   Bilateral subdural hematomas  History of mechanical aortic valve replacement on Coumadin    Subjective     Interval History:     He is afebrile.  Room air saturation 95%.  Up in the chair tolerating p.o.  Surgical drains removed this morning.    Review of Systems:    All systems were reviewed and negative except as noted in subjective.    Medical history, surgical history, social history, family history reviewed    Objective     Intake/Output:    Intake/Output Summary (Last 24 hours) at 5/28/2023 0939  Last data filed at 5/28/2023 0600  Gross per 24 hour   Intake 1949.8 ml   Output 495 ml   Net 1454.8 ml       Nutrition:  Diet: Cardiac Diets, Diabetic Diets; Healthy Heart (2-3 Na+); Consistent Carbohydrate; Texture: Regular Texture (IDDSI 7); Fluid Consistency: Thin (IDDSI 0)  NPO Diet NPO Type: Sips with Meds    Infusions:  niCARdipine, 5-15 mg/hr, Last Rate: Stopped (05/26/23 2318)        Telemetry: Sinus rhythm             Vital Signs  Blood pressure 136/74, pulse 85, temperature 97.7 °F (36.5 °C), temperature source Oral, resp. rate 14, height 172.7 cm (68\"), weight 104 kg (229 lb 0.9 oz), SpO2 95 %.    Physical Exam:  General Appearance:   Middle-aged gentleman  upright in chair in no distress   Head:   Bilateral craniotomies with incisions intact, no erythema or drainage   Eyes:          Conjunctiva pink, pupils equal   Ears:     Throat:  Oral mucosa moist   Neck:  Trachea midline, no crepitus   Back:      Lungs:    " Symmetric chest expansion without wheeze or rhonchi    Heart:   Regular rhythm, mechanical S2   Abdomen:    Bowel sounds present, nontender   Rectal:   Deferred   Extremities:  Right radial arterial line.  No pretibial edema   Pulses:  Palpable pedal pulses   Skin:  Warm and dry   Lymph nodes:  No cervical adenopathy   Neurologic:  Awake, oriented x3.   symmetric      Results Review:     I reviewed the patient's new clinical results.   Results from last 7 days   Lab Units 05/27/23  0519 05/26/23  1008 05/26/23  0950   SODIUM mmol/L 140 139  --    POTASSIUM mmol/L 4.2 4.4  --    CHLORIDE mmol/L 103 101  --    CO2 mmol/L 24.0 24.0  --    BUN mg/dL 15 14  --    CREATININE mg/dL 0.71* 0.72* 0.70   CALCIUM mg/dL 9.2 9.7  --    BILIRUBIN mg/dL  --  0.6  --    ALK PHOS U/L  --  86  --    ALT (SGPT) U/L  --  14  --    AST (SGOT) U/L  --  18  --    GLUCOSE mg/dL 234* 220*  --      Results from last 7 days   Lab Units 05/27/23  0519 05/26/23  1008 05/26/23  0950   WBC 10*3/mm3 13.78* 11.50*  --    HEMOGLOBIN g/dL 11.3* 14.6  --    HEMOGLOBIN, POC g/dL  --   --  15.3   HEMATOCRIT % 35.4* 45.8  --    HEMATOCRIT POC %  --   --  45   PLATELETS 10*3/mm3 200 193  --          No results found for: BLOODCX  No results found for: URINECX    I reviewed the patient's new imaging including images and reports.      Findings:   Postoperative changes are noted from interval bilateral craniotomy with evacuation of previously noted acute subdural hematomas. Subdural drains are noted in place. Small bilateral persistent extra-axial mixed fluid collections are present with   low-density fluid, gas and minimal persisting hyperdense blood products, 7 mm maximal thickness on the left, 6 mm on the right. There is improved diffuse cerebral edema, with restored patency of the basal cisterns and decreased effacement of the   ventricles. Some underlying parenchymal hypoattenuation is noted involving temporal lobes bilaterally, greater on the left,  concerning for underlying contusion/ischemia. There is also evident trace acute left parafalcine subdural hematoma, fairly   localized, measuring 4 mm in maximal thickness without significant associated mass effect. The orbits are normal. The paranasal sinuses are grossly clear.    Impression:     Impression:   Expected postoperative changes following bilateral craniotomy for evacuation of acute subdural hematomas. Cerebral edema is improved and there is restored patency of the basal cisterns and decreased effacement of the ventricles. Bilateral subdural drains    are noted in place.     4 mm small and somewhat localized left parafalcine subdural hematoma.     Increase in conspicuity, there is somewhat heterogeneous hypoattenuation present involving the left greater than right temporal lobes, concerning for underlying component of either persisting edema, contusion or ischemia.         Electronically Signed: Ruben Morris    5/27/2023 6:07 AM EDT          All medications reviewed.   divalproex, 250 mg, Oral, Nightly  DULoxetine, 60 mg, Oral, Daily  enoxaparin, 40 mg, Subcutaneous, Q24H  famotidine, 20 mg, Intravenous, Q12H  gabapentin, 200 mg, Oral, BID  insulin detemir, 10 Units, Subcutaneous, Nightly  Insulin Lispro, 4 Units, Subcutaneous, TID With Meals  insulin regular, 3-14 Units, Subcutaneous, Q6H  levothyroxine, 125 mcg, Oral, Daily  pantoprazole, 40 mg, Oral, Q AM  rosuvastatin, 20 mg, Oral, Daily  sodium chloride, 10 mL, Intravenous, Q12H          Assessment & Plan       Bilateral Subdural Hematoma    HTN    Hyperlipidemia (Diet controlled, off statin)    Hypothyroidism    Anxiety    H/O aortic mechanical valve replacement on Coumadin    Prediabetes    Somnolence    47-year-old gentleman with hypertension, dyslipidemia, hypothyroidism, diabetes, history of Hodgkin's lymphoma, 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  Postoperatively he developed pancreatitis and anticoagulation was held and he suffered a stroke resulting in some aphasia in 2018.  He presented May 26 with altered mentation and bilateral subdural hematomas with edema and some leftward midline shift and effacement of the right lateral and third ventricles and bilateral uncal medialization.  He was hypertensive and hyperglycemic on admission.  His INR was subtherapeutic at 2.08.  He received Kcentra and vitamin K and was started on Keppra, Cardene for hypertension and taken to surgery for emergent bilateral craniotomy and evacuation of subdural hematomas.    Today he is significantly more alert with no focal neurologic deficits.  Systolic blood pressures are 110-136 on no medications.  As an outpatient he was only on propranolol and Crestor for cardiac.  He remains off anticoagulation.  Risk of valve thrombosis with aortic valve is low at 1.3%.  There is a risk of embolic events.  However risk of rebleeding outweighs risk of embolic events currently.    Blood sugars remain in the mid 200s in part from dexamethasone which was stopped today.  His admission A1c is 8.9 consistent with new onset diabetes.  He had been previously told that he was \"prediabetic\".  He will need additional insulin coverage to improve glucose management with goal of blood sugar 140-180    He has a known history of hypothyroidism and is on replacement therapy.  His TSH is 1.6    PLAN:    -Monitor surgical drainage  -Monitor neurologic exam  -Emergent CT scan for any decline in neurologic status  -Maintain systolic blood pressure less than 140  -Restart a beta-blocker, I will use metoprolol  -Add Levemir 10 units nightly, 4 units of Humalog with meals and continue sliding scale  -Neurosurgery discontinued dexamethasone and Keppra  -He remains on his home dose of Depakote, gabapentin  -Continue thyroid replacement  -Add DVT " "prophylaxis, Lovenox      VTE Prophylaxis:SCDS    Stress Ulcer Prophylaxis: Protonix    Meryl MERCADO. MD Isma  05/28/23  09:39 EDT      Time: 25min       Electronically signed by Meryl Ashby MD at 05/28/23 0950     Stephane Womack MD at 05/28/23 0857          NEUROSURGERY PROGRESS NOTE    Chief Complaint: Bilateral subdural hematomas    Subjective: Continues to improve.  Patient even more awake and interactive this morning.    Objective    Vital Signs: Blood pressure 110/75, pulse 75, temperature 99.7 °F (37.6 °C), temperature source Oral, resp. rate 14, height 172.7 cm (68\"), weight 104 kg (229 lb 0.9 oz), SpO2 95 %.    Physical Exam  Awake, alert and conversive  Opens eyes spont  Pupils 3 mm rx bilat  Extraocular muscles intact bilaterally  Face symmetric bilaterally  Tongue midline  5/5 in all 4 ext  Incisions clean dry and intact    Intake/Output:     Intake/Output Summary (Last 24 hours) at 5/28/2023 0857  Last data filed at 5/28/2023 0600  Gross per 24 hour   Intake 1949.8 ml   Output 495 ml   Net 1454.8 ml       Current Medications:   Current Facility-Administered Medications:   •  acetaminophen (TYLENOL) tablet 650 mg, 650 mg, Oral, Q6H PRN, Robbi Yang, APRN, 650 mg at 05/27/23 0051  •  dexamethasone (DECADRON) injection 4 mg, 4 mg, Intravenous, Q6H, Stephane Womack MD, 4 mg at 05/28/23 0633  •  dextrose (D50W) (25 g/50 mL) IV injection 25 g, 25 g, Intravenous, Q15 Min PRN, Meryl Ashby MD  •  dextrose (GLUTOSE) oral gel 15 g, 15 g, Oral, Q15 Min PRN, Meryl Ashby MD  •  divalproex (DEPAKOTE ER) 24 hr tablet 250 mg, 250 mg, Oral, Nightly, Jose Maria Townsend MD, 250 mg at 05/27/23 2094  •  docusate sodium (COLACE) capsule 100 mg, 100 mg, Oral, BID PRN, Stephane Womack MD  •  DULoxetine (CYMBALTA) DR capsule 60 mg, 60 mg, Oral, Daily, Jose Maria Twonsend MD, 60 mg at 05/28/23 0844  •  famotidine (PEPCID) injection 20 mg, 20 mg, Intravenous, Q12H, Vu, " MD Stephane, 20 mg at 05/28/23 0844  •  gabapentin (NEURONTIN) capsule 200 mg, 200 mg, Oral, BID, Jose Maria Townsend MD, 200 mg at 05/28/23 0844  •  glucagon (GLUCAGEN) injection 1 mg, 1 mg, Intramuscular, Q15 Min PRN, Meryl Ashby MD  •  HYDROcodone-acetaminophen (NORCO) 5-325 MG per tablet 1 tablet, 1 tablet, Oral, Q6H PRN, Robbi Yang, APRTHALIA, 1 tablet at 05/28/23 0723  •  HYDROmorphone (DILAUDID) injection 0.5 mg, 0.5 mg, Intravenous, Q2H PRN **AND** naloxone (NARCAN) injection 0.4 mg, 0.4 mg, Intravenous, Q5 Min PRN, Stephane Womack MD  •  insulin regular (humuLIN R,novoLIN R) injection 3-14 Units, 3-14 Units, Subcutaneous, Q6H, Meryl Ashby MD, 6 Units at 05/28/23 0632  •  levETIRAcetam in NaCl 0.82% (KEPPRA) IVPB 500 mg, 500 mg, Intravenous, Q12H, Stephane Womack MD, 500 mg at 05/28/23 0844  •  levothyroxine (SYNTHROID, LEVOTHROID) tablet 125 mcg, 125 mcg, Oral, Daily, Jose Maria Townsend MD, 125 mcg at 05/28/23 0844  •  magnesium hydroxide (MILK OF MAGNESIA) 400 MG/5ML suspension 15 mL, 15 mL, Oral, Daily PRN, Stephane Womack MD  •  niCARdipine (CARDENE) 25mg in 250mL NS infusion, 5-15 mg/hr, Intravenous, Titrated, Kayla Toure APRN, Stopped at 05/26/23 2318  •  ondansetron (ZOFRAN) injection 4 mg, 4 mg, Intravenous, Q6H PRN **OR** ondansetron (ZOFRAN) tablet 4 mg, 4 mg, Oral, Q6H PRN, Stephane Womack MD  •  pantoprazole (PROTONIX) EC tablet 40 mg, 40 mg, Oral, Q AM, Robbi Yang APRN, 40 mg at 05/28/23 0640  •  polyethylene glycol (MIRALAX) packet 17 g, 17 g, Oral, Daily PRN, Stephane Womack MD  •  rosuvastatin (CRESTOR) tablet 20 mg, 20 mg, Oral, Daily, Kristian, Jose Maria MIRANDA MD, 20 mg at 05/28/23 0844  •  sennosides-docusate (PERICOLACE) 8.6-50 MG per tablet 1 tablet, 1 tablet, Oral, Nightly PRN, Stephane Womack MD  •  sodium chloride 0.9 % flush 10 mL, 10 mL, Intravenous, Q12H, Kayla Toure APRN, 10 mL at 05/28/23 0844  •  sodium chloride 0.9 %  flush 10 mL, 10 mL, Intravenous, PRN, Kayla Toure, BETHANIE  •  sodium chloride 0.9 % infusion 40 mL, 40 mL, Intravenous, PRN, Kayla Toure APRN  •  sodium chloride 0.9 % infusion, 100 mL/hr, Intravenous, Continuous, Chanell Rock PA-C, Last Rate: 100 mL/hr at 05/28/23 0600, 100 mL/hr at 05/28/23 0600     Laboratory Results:       Lab 05/27/23  1153 05/27/23  0519 05/26/23  1349 05/26/23  1348 05/26/23  1008 05/26/23  0958 05/26/23  0950   WBC  --  13.78*  --   --  11.50*  --   --    HEMOGLOBIN  --  11.3*  --   --  14.6  --   --    HEMOGLOBIN, POC  --   --   --   --   --   --  15.3   HEMATOCRIT  --  35.4*  --   --  45.8  --   --    HEMATOCRIT POC  --   --   --   --   --   --  45   PLATELETS  --  200  --   --  193  --   --    NEUTROS ABS  --   --   --   --  8.68*  --   --    IMMATURE GRANS (ABS)  --   --   --   --  0.16*  --   --    LYMPHS ABS  --   --   --   --  1.63  --   --    MONOS ABS  --   --   --   --  0.71  --   --    EOS ABS  --   --   --   --  0.26  --   --    MCV  --  84.7  --   --  85.1  --   --    PROTIME 14.7*  --  13.3 14.0 23.6* 35.9*  --          Lab 05/27/23  0519 05/26/23  1008 05/26/23  0950   SODIUM 140 139  --    POTASSIUM 4.2 4.4  --    CHLORIDE 103 101  --    CO2 24.0 24.0  --    ANION GAP 13.0 14.0  --    BUN 15 14  --    CREATININE 0.71* 0.72* 0.70   EGFR 113.9 113.4 114.4   GLUCOSE 234* 220*  --    CALCIUM 9.2 9.7  --    MAGNESIUM 1.8 2.2  --    PHOSPHORUS 4.0  --   --    HEMOGLOBIN A1C  --  8.90*  --    TSH  --  1.610  --          Lab 05/26/23  1008   TOTAL PROTEIN 8.4   ALBUMIN 4.7   GLOBULIN 3.7   ALT (SGPT) 14   AST (SGOT) 18   BILIRUBIN 0.6   ALK PHOS 86         Lab 05/27/23  1153 05/26/23  1349 05/26/23  1348 05/26/23  1008 05/26/23  0958   PROTIME 14.7* 13.3 14.0 23.6* 35.9*   INR 1.13* 1.1 1.07 2.08* 3.2*         Lab 05/27/23  0519   CHOLESTEROL 181   LDL CHOL 107*   HDL CHOL 48   TRIGLYCERIDES 147         Lab 05/26/23  1125 05/26/23  1014   ABO TYPING A A   RH  TYPING Positive Positive   ANTIBODY SCREEN  --  Negative         Brief Urine Lab Results     None        Microbiology Results (last 10 days)     ** No results found for the last 240 hours. **           Diagnostic Imaging: I reviewed and independently interpreted the new imaging.     Assessment/Plan:  This is a 47-year-old male status post bilateral craniotomies for evacuation of subacute subdural hematomas on 5/26.  Neurologically, the patient continues to improve.  We will remove his Hemovac drains today.    Continue to mobilize patient. We will d/c keppra and Decadron.   We will start Lovenox for DVT prophylaxis today..  Patient's glucoses continue to be elevated.  Would prefer to have them around 150.  Defer to ICU for management of insulin.  We will plan for an MMA embolization on Tuesday.  Would hold off on heparin drip or oral anticoagulation until after procedure.  Appreciate ICU assistance.      Any copied data from previous notes included in the (1) History of Present Illness, (2) Physical Examination and (3) Medical Decision Making and/or Assessment and Plan has been reviewed and is accurate as of 05/28/23      Stephane Womack MD  05/28/23  08:57 EDT        Electronically signed by Stephane Womack MD at 05/28/23 0911     Meryl Ashby MD at 05/27/23 0846          Critical Care Note     LOS: 1 day   Patient Care Team:  Provider, No Known as PCP - General    Chief Complaint/Reason for visit:    Chief Complaint   Patient presents with   • Stroke   Bilateral subdural hematomas  History of aortic valve replacement on Coumadin    Subjective     Interval History:     He remains afebrile.  On room air saturation is 94%.  He is in sinus rhythm.  Lopez catheter in place with 1 L of urine.  Left subdural drain 90 mL, right subdural drain 50 mL    Review of Systems:    All systems were reviewed and negative except as noted in subjective.    Medical history, surgical history, social history, family  "history reviewed    Objective     Intake/Output:    Intake/Output Summary (Last 24 hours) at 5/27/2023 0894  Last data filed at 5/27/2023 0617  Gross per 24 hour   Intake 1550 ml   Output 1140 ml   Net 410 ml       Nutrition:  NPO Diet NPO Type: Strict NPO    Infusions:  niCARdipine, 5-15 mg/hr, Last Rate: Stopped (05/26/23 2318)  sodium chloride, 100 mL/hr, Last Rate: 100 mL/hr (05/27/23 0510)        Mechanical Ventilator Settings:                                                Telemetry: Sinus rhythm             Vital Signs  Blood pressure 101/63, pulse 86, temperature 98.5 °F (36.9 °C), temperature source Axillary, resp. rate 16, height 172.7 cm (68\"), weight 104 kg (229 lb 0.9 oz), SpO2 91 %.    Physical Exam:  General Appearance:   Middle-aged gentleman supine in bed in no distress   Head:   Bilateral craniotomies with drains, scant bloody output   Eyes:          Gaze is conjugate   Ears:     Throat:  Oral mucosa moist   Neck:  Trachea midline, no crepitus   Back:      Lungs:    Symmetric chest expansion without wheeze or rhonchi    Heart:   Regular rhythm, mechanical S2   Abdomen:    Bowel sounds present, nontender   Rectal:   Deferred   Extremities:  Right radial arterial line, right hand pink   Pulses:    Skin:  Warm and dry   Lymph nodes:  No cervical adenopathy   Neurologic:  Awake, oriented to person, follows commands with all 4 extremities      Results Review:     I reviewed the patient's new clinical results.   Results from last 7 days   Lab Units 05/27/23  0519 05/26/23  1008 05/26/23  0950   SODIUM mmol/L 140 139  --    POTASSIUM mmol/L 4.2 4.4  --    CHLORIDE mmol/L 103 101  --    CO2 mmol/L 24.0 24.0  --    BUN mg/dL 15 14  --    CREATININE mg/dL 0.71* 0.72* 0.70   CALCIUM mg/dL 9.2 9.7  --    BILIRUBIN mg/dL  --  0.6  --    ALK PHOS U/L  --  86  --    ALT (SGPT) U/L  --  14  --    AST (SGOT) U/L  --  18  --    GLUCOSE mg/dL 234* 220*  --      Results from last 7 days   Lab Units 05/27/23  0519 " 05/26/23  1008 05/26/23  0950   WBC 10*3/mm3 13.78* 11.50*  --    HEMOGLOBIN g/dL 11.3* 14.6  --    HEMOGLOBIN, POC g/dL  --   --  15.3   HEMATOCRIT % 35.4* 45.8  --    HEMATOCRIT POC %  --   --  45   PLATELETS 10*3/mm3 200 193  --          No results found for: BLOODCX  No results found for: URINECX    I reviewed the patient's new imaging including images and reports.      Findings:   Postoperative changes are noted from interval bilateral craniotomy with evacuation of previously noted acute subdural hematomas. Subdural drains are noted in place. Small bilateral persistent extra-axial mixed fluid collections are present with   low-density fluid, gas and minimal persisting hyperdense blood products, 7 mm maximal thickness on the left, 6 mm on the right. There is improved diffuse cerebral edema, with restored patency of the basal cisterns and decreased effacement of the   ventricles. Some underlying parenchymal hypoattenuation is noted involving temporal lobes bilaterally, greater on the left, concerning for underlying contusion/ischemia. There is also evident trace acute left parafalcine subdural hematoma, fairly   localized, measuring 4 mm in maximal thickness without significant associated mass effect. The orbits are normal. The paranasal sinuses are grossly clear.    Impression:     Impression:   Expected postoperative changes following bilateral craniotomy for evacuation of acute subdural hematomas. Cerebral edema is improved and there is restored patency of the basal cisterns and decreased effacement of the ventricles. Bilateral subdural drains    are noted in place.     4 mm small and somewhat localized left parafalcine subdural hematoma.     Increase in conspicuity, there is somewhat heterogeneous hypoattenuation present involving the left greater than right temporal lobes, concerning for underlying component of either persisting edema, contusion or ischemia.         Electronically Signed: Ruben Morris     5/27/2023 6:07 AM EDT          All medications reviewed.   dexamethasone, 4 mg, Intravenous, Q6H  divalproex, 250 mg, Oral, Nightly  DULoxetine, 60 mg, Oral, Daily  famotidine, 20 mg, Intravenous, Q12H  gabapentin, 200 mg, Oral, BID  insulin lispro, 2-7 Units, Subcutaneous, 4x Daily AC & at Bedtime  levETIRAcetam, 500 mg, Intravenous, Q12H  levothyroxine, 125 mcg, Oral, Daily  pantoprazole, 40 mg, Oral, Q AM  rosuvastatin, 20 mg, Oral, Daily  sodium chloride, 10 mL, Intravenous, Q12H          Assessment & Plan       Bilateral Subdural Hematoma    HTN    Hyperlipidemia (Diet controlled, off statin)    Hypothyroidism    Anxiety    H/O aortic mechanical valve replacement on Coumadin    Prediabetes    47-year-old gentleman with hypertension, dyslipidemia, hypothyroidism, diabetes, history of Hodgkin's lymphoma, 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  Postoperatively he developed pancreatitis and anticoagulation was held and he suffered a stroke resulting in some aphasia in 2018.  He presented May 26 with altered mentation and bilateral subdural hematomas with edema and some leftward midline shift and effacement of the right lateral and third ventricles and bilateral uncal medialization.  He was hypertensive and hyperglycemic on admission.  His INR was subtherapeutic at 2.08.  He received Kcentra and vitamin K and was started on Keppra, Cardene for hypertension and taken to surgery for emergent bilateral craniotomy and evacuation of subdural hematomas.    Neurosurgery reports that he is significantly more awake today.  He is oriented to person only.  He is moving all extremities equally.  Currently his blood pressure is 108 systolic, and he is off Cardene.      PLAN:    -Monitor surgical drainage  -Monitor neurologic exam  -Emergent CT scan for any decline  "in neurologic status  -Maintain systolic blood pressure less than 140  -Increase sliding scale insulin  -Speech therapy to evaluate for possible swallowing  -Add long-acting insulin if he tolerated p.o. diet  -Continue to hold anticoagulation  -Dexamethasone  -Keppra  -Thyroid replacement  -With mechanical valve he will need anticoagulation restarted, will likely restart at 72 hours  -Neurosurgery plans middle meningeal artery embolization next week      VTE Prophylaxis:SCDS    Stress Ulcer Prophylaxis: Protonix    Meryl Ashby MD  05/27/23  08:47 EDT      Time: 25min       Electronically signed by Meryl Ashby MD at 05/27/23 0901     Stephane Womack MD at 05/27/23 0825          NEUROSURGERY PROGRESS NOTE    Chief Complaint: Bilateral subdural hematomas    Subjective: Patient much more awake this morning.    Objective    Vital Signs: Blood pressure 101/63, pulse 86, temperature 98.5 °F (36.9 °C), temperature source Axillary, resp. rate 16, height 172.7 cm (68\"), weight 104 kg (229 lb 0.9 oz), SpO2 91 %.    Physical Exam  Awake, alert and oriented x 1, says incorrect year and city, but much more conversive  Opens eyes spont  Pupils 3 mm rx bilat  Extraocular muscles intact bilaterally  Face symmetric bilaterally  Tongue midline  5/5 in all 4 ext  Dressing dry    Intake/Output:     Intake/Output Summary (Last 24 hours) at 5/27/2023 0825  Last data filed at 5/27/2023 0617  Gross per 24 hour   Intake 1550 ml   Output 1140 ml   Net 410 ml       Current Medications:   Current Facility-Administered Medications:   •  acetaminophen (TYLENOL) tablet 650 mg, 650 mg, Oral, Q6H PRN, Robbi Yang APRN, 650 mg at 05/27/23 0051  •  dexamethasone (DECADRON) injection 4 mg, 4 mg, Intravenous, Q6H, Stephane Womack MD, 4 mg at 05/27/23 0617  •  dextrose (D50W) (25 g/50 mL) IV injection 25 g, 25 g, Intravenous, Q15 Min PRN, Navdeep Paz APRTHALIA  •  dextrose (D50W) (25 g/50 mL) IV injection 25 g, 25 " g, Intravenous, Q15 Min PRN, Jose Maria Townsend MD  •  dextrose (GLUTOSE) oral gel 15 g, 15 g, Oral, Q15 Min PRN, Navdeep Paz, APRN  •  dextrose (GLUTOSE) oral gel 15 g, 15 g, Oral, Q15 Min PRN, Jose Maria Townsend MD  •  divalproex (DEPAKOTE ER) 24 hr tablet 250 mg, 250 mg, Oral, Nightly, Jose Maria Townsend MD, 250 mg at 05/26/23 2141  •  docusate sodium (COLACE) capsule 100 mg, 100 mg, Oral, BID PRN, Stephane Womack MD  •  DULoxetine (CYMBALTA) DR capsule 60 mg, 60 mg, Oral, Daily, Jose Maria Townsend MD  •  famotidine (PEPCID) injection 20 mg, 20 mg, Intravenous, Q12H, Stephane Womack MD, 20 mg at 05/26/23 2317  •  gabapentin (NEURONTIN) capsule 200 mg, 200 mg, Oral, BID, Jose Maria Townsend MD, 200 mg at 05/26/23 2156  •  glucagon (GLUCAGEN) injection 1 mg, 1 mg, Intramuscular, Q15 Min PRN, Navdeep Paz, APRN  •  glucagon (GLUCAGEN) injection 1 mg, 1 mg, Intramuscular, Q15 Min PRN, Jose Maria Townsend MD  •  HYDROcodone-acetaminophen (NORCO) 5-325 MG per tablet 1 tablet, 1 tablet, Oral, Q6H PRN, Robbi Yang, APRN, 1 tablet at 05/26/23 2208  •  HYDROmorphone (DILAUDID) injection 0.5 mg, 0.5 mg, Intravenous, Q2H PRN **AND** naloxone (NARCAN) injection 0.4 mg, 0.4 mg, Intravenous, Q5 Min PRN, Stephane Womack MD  •  Insulin Lispro (humaLOG) injection 2-7 Units, 2-7 Units, Subcutaneous, 4x Daily AC & at Bedtime, Jose Maria Townsend MD, 3 Units at 05/26/23 2141  •  levETIRAcetam in NaCl 0.75% (KEPPRA) IVPB 1,000 mg, 1,000 mg, Intravenous, Q12H, Kayla Toure APRN, Last Rate: 0 mL/hr at 05/26/23 1053, 1,000 mg at 05/26/23 2141  •  levothyroxine (SYNTHROID, LEVOTHROID) tablet 125 mcg, 125 mcg, Oral, Daily, Jose Maria Townsend MD  •  magnesium hydroxide (MILK OF MAGNESIA) 400 MG/5ML suspension 15 mL, 15 mL, Oral, Daily PRN, Stephane Womack MD  •  niCARdipine (CARDENE) 25mg in 250mL NS infusion, 5-15 mg/hr, Intravenous, Titrated, Kayla Toure APRN, Stopped at 05/26/23 0196  •   ondansetron (ZOFRAN) injection 4 mg, 4 mg, Intravenous, Q6H PRN **OR** ondansetron (ZOFRAN) tablet 4 mg, 4 mg, Oral, Q6H PRN, Stephane Womack MD  •  pantoprazole (PROTONIX) EC tablet 40 mg, 40 mg, Oral, Q AM, Robbi Yang APRN, 40 mg at 05/27/23 0617  •  polyethylene glycol (MIRALAX) packet 17 g, 17 g, Oral, Daily PRN, Stephane Womack MD  •  rosuvastatin (CRESTOR) tablet 20 mg, 20 mg, Oral, Daily, Jose Maria Townsend MD  •  sennosides-docusate (PERICOLACE) 8.6-50 MG per tablet 1 tablet, 1 tablet, Oral, Nightly PRN, Stephane Womack MD  •  sodium chloride 0.9 % flush 10 mL, 10 mL, Intravenous, Q12H, Kayla Toure APRN, 10 mL at 05/26/23 2142  •  sodium chloride 0.9 % flush 10 mL, 10 mL, Intravenous, PRN, Kayla Toure APRN  •  sodium chloride 0.9 % infusion 40 mL, 40 mL, Intravenous, PRN, Kayla Toure APRN  •  sodium chloride 0.9 % infusion, 100 mL/hr, Intravenous, Continuous, Chanell Rock PA-C, Last Rate: 100 mL/hr at 05/27/23 0510, 100 mL/hr at 05/27/23 0510     Laboratory Results:       Lab 05/27/23  0519 05/26/23  1349 05/26/23  1348 05/26/23  1008 05/26/23  0958 05/26/23  0950   WBC 13.78*  --   --  11.50*  --   --    HEMOGLOBIN 11.3*  --   --  14.6  --   --    HEMOGLOBIN, POC  --   --   --   --   --  15.3   HEMATOCRIT 35.4*  --   --  45.8  --   --    HEMATOCRIT POC  --   --   --   --   --  45   PLATELETS 200  --   --  193  --   --    NEUTROS ABS  --   --   --  8.68*  --   --    IMMATURE GRANS (ABS)  --   --   --  0.16*  --   --    LYMPHS ABS  --   --   --  1.63  --   --    MONOS ABS  --   --   --  0.71  --   --    EOS ABS  --   --   --  0.26  --   --    MCV 84.7  --   --  85.1  --   --    PROTIME  --  13.3 14.0 23.6* 35.9*  --          Lab 05/27/23  0519 05/26/23  1008 05/26/23  0950   SODIUM 140 139  --    POTASSIUM 4.2 4.4  --    CHLORIDE 103 101  --    CO2 24.0 24.0  --    ANION GAP 13.0 14.0  --    BUN 15 14  --    CREATININE 0.71* 0.72* 0.70   EGFR 113.9 113.4 114.4    GLUCOSE 234* 220*  --    CALCIUM 9.2 9.7  --    MAGNESIUM 1.8 2.2  --    PHOSPHORUS 4.0  --   --    HEMOGLOBIN A1C  --  8.90*  --    TSH  --  1.610  --          Lab 05/26/23  1008   TOTAL PROTEIN 8.4   ALBUMIN 4.7   GLOBULIN 3.7   ALT (SGPT) 14   AST (SGOT) 18   BILIRUBIN 0.6   ALK PHOS 86         Lab 05/26/23  1349 05/26/23  1348 05/26/23  1008 05/26/23  0958   PROTIME 13.3 14.0 23.6* 35.9*   INR 1.1 1.07 2.08* 3.2*         Lab 05/27/23  0519   CHOLESTEROL 181   LDL CHOL 107*   HDL CHOL 48   TRIGLYCERIDES 147         Lab 05/26/23  1125 05/26/23  1014   ABO TYPING A A   RH TYPING Positive Positive   ANTIBODY SCREEN  --  Negative         Brief Urine Lab Results     None        Microbiology Results (last 10 days)     ** No results found for the last 240 hours. **           Diagnostic Imaging: I reviewed and independently interpreted the new imaging.     Assessment/Plan:  This is a 47-year-old male status post bilateral craniotomies for evacuation of subacute subdural hematomas on 5/26.  Neurologically, the patient is much more awake and interactive.  His postoperative head CT shows good evacuation of hematomas with significant improvement in mass effect.  We will continue his Hemovac drains 1 more day.  Okay for patient to mobilize and get out of bed today.  Continue Keppra and Decadron.  We will hold off on DVT prophylaxis 1 more day.  Patient's glucoses are elevated.  Would prefer to have them around 150.  Defer to ICU for management of insulin.  Appreciate ICU assistance.    Any copied data from previous notes included in the (1) History of Present Illness, (2) Physical Examination and (3) Medical Decision Making and/or Assessment and Plan has been reviewed and is accurate as of 05/27/23      Stephane Womack MD  05/27/23  08:25 EDT        Electronically signed by Stephane Womack MD at 05/27/23 0831          Consult Notes (all)      Stephane Womack MD at 05/26/23 1005          Patient: Manjeet Espino  Rafael  : 1975    Primary Care Provider: Provider, No Known    Requesting Provider: As above      Chief Complaint: Altered mental status    History of Present Illness: This is a 47 y.o. 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  He is status post cardiac surgery and is currently on Coumadin.  He presented to the emergency room by himself with altered mental status.  He was not speaking very well and a history was unable to be obtained.  He underwent a CT scan of his head which showed subacute bilateral subdural hematomas.  He has been more awake since the CT scan.    PMHX  Allergies:  Not on File  Medications    Current Facility-Administered Medications:   •  prothrombin complex conc human (KCentra) IV solution 2,500 Units, 2,500 Units, Intravenous, Once **AND** phytonadione (AQUA-MEPHYTON, VITAMIN K) 10 mg in sodium chloride 0.9 % 50 mL IVPB, 10 mg, Intravenous, Once **AND** Protime-INR, , , Once **AND** [CANCELED] Protime-INR, , , Once **AND** [START ON 2023] Protime-INR, , , Once **AND** Monitor for signs for thromboembolic events, , , Per Order Details, Cameron Daugherty MD  •  sodium chloride 0.9 % infusion, 125 mL/hr, Intravenous, Continuous, Cameron Daugherty MD  No current outpatient medications on file.  Past Medical History:  No past medical history on file.  Past Surgical History:  No past surgical history on file.  Social Hx:     Family Hx:  No family history on file.  Review of Systems:        Unable to be obtained given patient's status and lack of family present    Physical Exam:   BP (!) 169/103   Pulse 79   Resp 16   Wt 104 kg (229 lb 0.9 oz)   SpO2 97%   Patient appears comfortable, resting  Eyes open spontaneously  Able to say name, does not know year  Pupils 3 mm reactive bilaterally  Face symmetric  Follows commands briskly x4    Intake/Output: No intake or output data in the 24 hours ending 23 1005    Current Medications:   Current  Facility-Administered Medications:   •  prothrombin complex conc human (KCentra) IV solution 2,500 Units, 2,500 Units, Intravenous, Once **AND** phytonadione (AQUA-MEPHYTON, VITAMIN K) 10 mg in sodium chloride 0.9 % 50 mL IVPB, 10 mg, Intravenous, Once **AND** Protime-INR, , , Once **AND** [CANCELED] Protime-INR, , , Once **AND** [START ON 5/27/2023] Protime-INR, , , Once **AND** Monitor for signs for thromboembolic events, , , Per Order Details, Cameron Daugherty MD  •  sodium chloride 0.9 % infusion, 125 mL/hr, Intravenous, Continuous, Cameron Daugherty MD  No current outpatient medications on file.     Laboratory Results:      Lab 05/26/23  0958   PROTIME 35.9*                 Lab 05/26/23  0958   PROTIME 35.9*   INR 3.2*                 Brief Urine Lab Results     None        Microbiology Results (last 10 days)     ** No results found for the last 240 hours. **           Diagnostic Imaging: The patient's diagnostic imaging was independently reviewed and interpreted by myself.    Assessment/Plan:  This is a 47-year-old male with a history of left hemispheric infarct and cardiac surgery, currently on Coumadin.  He presented to the emergency room with altered mental status.  CT scan of the head shows subacute bilateral subdural hematomas.  He is clinically stable and is following commands briskly.  We will obtain a full set of labs and reverse his Coumadin.  We will obtain a brain MRI with and without contrast.  The patient will likely need drainage of the subdural hematomas.  I will follow-up with the family and patient after his tests are completed.  Would recommend admitting him to ICU.      Stephane Womack MD  05/26/23  10:05 EDT          Electronically signed by Stephane Womack MD at 05/26/23 1003

## 2023-05-31 NOTE — BRIEF OP NOTE
[facility] E[facility] CEREBRAL IR  Progress Note    Manjeet Hall  5/31/2023    Pre-op Diagnosis:   Bilateral subacute subdural hematomas       Post-Op Diagnosis Codes:  Same as preoperative    Procedure/CPT® Codes:        Procedure(s):  Emoblization cerebral              Surgeon(s):  Stephane Womack MD    Anesthesia: General    Staff:   Circulator: Clary Mejia RN  Scrub Person: Kashmir Gonzalez  Documenter: Jonathan, Di, RN  Invasive Nurse: Mehrdad Alexandra RN         Estimated Blood Loss: minimal    Urine Voided: * No values recorded between 5/31/2023  3:56 PM and 5/31/2023  4:56 PM *    Specimens:                None          Drains:   [REMOVED] Closed/Suction Drain Right Scalp (Removed)   Site Description Unable to view 05/28/23 0600   Dressing Status Clean;Dry;Intact 05/28/23 0600   Status To bulb suction 05/28/23 0600   Output (mL) 15 mL 05/28/23 0600       [REMOVED] Closed/Suction Drain Left Scalp (Removed)   Site Description Unable to view 05/28/23 0600   Dressing Status Clean;Dry;Intact 05/28/23 0600   Status To bulb suction 05/28/23 0600   Output (mL) 10 mL 05/28/23 0600       [REMOVED] Urethral Catheter (Removed)   Daily Indications Hourly Output in Critical Unstable Patient requiring Frequent Intervention (hemodynamics, titration or life supportive therapy) 05/27/23 1400   Site Assessment Clean;Skin intact 05/27/23 1400   Collection Container Standard drainage bag 05/27/23 1400   Securement Method Securing device 05/27/23 1400   Catheter care complete Yes 05/27/23 0800   Output (mL) 225 mL 05/27/23 1400       Findings: Successful ablation of the left middle meningeal artery.  Right middle meningeal artery was too small for embolization.        Complications: None apparent          Stephane Womack MD     Date: 5/31/2023  Time: 17:00 EDT

## 2023-05-31 NOTE — ANESTHESIA PROCEDURE NOTES
Arterial Line      Patient reassessed immediately prior to procedure    Patient location during procedure: pre-op   Line placed for hemodynamic monitoring.  Performed By   Anesthesiologist: Naila Jewell DO   Preanesthetic Checklist  Completed: patient identified, IV checked, site marked, risks and benefits discussed, surgical consent, monitors and equipment checked, pre-op evaluation and timeout performed  Arterial Line Prep    Sterile Tech: cap, gloves and sterile barriers  Prep: ChloraPrep  Patient monitoring: blood pressure monitoring, continuous pulse oximetry and EKG  Arterial Line Procedure   Laterality:left  Location:  radial artery  Catheter size: 20 G   Guidance: ultrasound guided and palpation technique  Number of attempts: 1  Successful placement: yes   Post Assessment   Dressing Type: line sutured, occlusive dressing applied, secured with tape and wrist guard applied.   Complications no  Circ/Move/Sens Assessment: normal and unchanged.   Patient Tolerance: patient tolerated the procedure well with no apparent complications

## 2023-05-31 NOTE — PLAN OF CARE
Pt returned from procedure A&Ox4. NIH unchanged at 2. Neuro assessment unchanged. HOB flat until 1930. Groin site soft/clean/non-tender.

## 2023-06-01 ENCOUNTER — APPOINTMENT (OUTPATIENT)
Dept: CT IMAGING | Facility: HOSPITAL | Age: 48
DRG: 020 | End: 2023-06-01
Payer: MEDICARE

## 2023-06-01 LAB
ANION GAP SERPL CALCULATED.3IONS-SCNC: 9 MMOL/L (ref 5–15)
ANION GAP SERPL CALCULATED.3IONS-SCNC: 9 MMOL/L (ref 5–15)
BUN SERPL-MCNC: 16 MG/DL (ref 6–20)
BUN SERPL-MCNC: 16 MG/DL (ref 6–20)
BUN/CREAT SERPL: 20.8 (ref 7–25)
BUN/CREAT SERPL: 20.8 (ref 7–25)
CALCIUM SPEC-SCNC: 8.6 MG/DL (ref 8.6–10.5)
CALCIUM SPEC-SCNC: 8.6 MG/DL (ref 8.6–10.5)
CHLORIDE SERPL-SCNC: 105 MMOL/L (ref 98–107)
CHLORIDE SERPL-SCNC: 105 MMOL/L (ref 98–107)
CO2 SERPL-SCNC: 25 MMOL/L (ref 22–29)
CO2 SERPL-SCNC: 25 MMOL/L (ref 22–29)
CREAT SERPL-MCNC: 0.77 MG/DL (ref 0.76–1.27)
CREAT SERPL-MCNC: 0.77 MG/DL (ref 0.76–1.27)
DEPRECATED RDW RBC AUTO: 46.2 FL (ref 37–54)
DEPRECATED RDW RBC AUTO: 46.2 FL (ref 37–54)
EGFRCR SERPLBLD CKD-EPI 2021: 111.1 ML/MIN/1.73
EGFRCR SERPLBLD CKD-EPI 2021: 111.1 ML/MIN/1.73
ERYTHROCYTE [DISTWIDTH] IN BLOOD BY AUTOMATED COUNT: 15.1 % (ref 12.3–15.4)
ERYTHROCYTE [DISTWIDTH] IN BLOOD BY AUTOMATED COUNT: 15.1 % (ref 12.3–15.4)
GLUCOSE BLDC GLUCOMTR-MCNC: 135 MG/DL (ref 70–130)
GLUCOSE BLDC GLUCOMTR-MCNC: 135 MG/DL (ref 70–130)
GLUCOSE BLDC GLUCOMTR-MCNC: 147 MG/DL (ref 70–130)
GLUCOSE BLDC GLUCOMTR-MCNC: 147 MG/DL (ref 70–130)
GLUCOSE BLDC GLUCOMTR-MCNC: 152 MG/DL (ref 70–130)
GLUCOSE BLDC GLUCOMTR-MCNC: 152 MG/DL (ref 70–130)
GLUCOSE BLDC GLUCOMTR-MCNC: 182 MG/DL (ref 70–130)
GLUCOSE BLDC GLUCOMTR-MCNC: 182 MG/DL (ref 70–130)
GLUCOSE BLDC GLUCOMTR-MCNC: 90 MG/DL (ref 70–130)
GLUCOSE BLDC GLUCOMTR-MCNC: 90 MG/DL (ref 70–130)
GLUCOSE SERPL-MCNC: 184 MG/DL (ref 65–99)
GLUCOSE SERPL-MCNC: 184 MG/DL (ref 65–99)
HCT VFR BLD AUTO: 36.7 % (ref 37.5–51)
HCT VFR BLD AUTO: 36.7 % (ref 37.5–51)
HGB BLD-MCNC: 11.8 G/DL (ref 13–17.7)
HGB BLD-MCNC: 11.8 G/DL (ref 13–17.7)
MAGNESIUM SERPL-MCNC: 2.3 MG/DL (ref 1.6–2.6)
MAGNESIUM SERPL-MCNC: 2.3 MG/DL (ref 1.6–2.6)
MCH RBC QN AUTO: 27.4 PG (ref 26.6–33)
MCH RBC QN AUTO: 27.4 PG (ref 26.6–33)
MCHC RBC AUTO-ENTMCNC: 32.2 G/DL (ref 31.5–35.7)
MCHC RBC AUTO-ENTMCNC: 32.2 G/DL (ref 31.5–35.7)
MCV RBC AUTO: 85.2 FL (ref 79–97)
MCV RBC AUTO: 85.2 FL (ref 79–97)
PHOSPHATE SERPL-MCNC: 3.7 MG/DL (ref 2.5–4.5)
PHOSPHATE SERPL-MCNC: 3.7 MG/DL (ref 2.5–4.5)
PLATELET # BLD AUTO: 227 10*3/MM3 (ref 140–450)
PLATELET # BLD AUTO: 227 10*3/MM3 (ref 140–450)
PMV BLD AUTO: 11.7 FL (ref 6–12)
PMV BLD AUTO: 11.7 FL (ref 6–12)
POTASSIUM SERPL-SCNC: 3.8 MMOL/L (ref 3.5–5.2)
POTASSIUM SERPL-SCNC: 3.8 MMOL/L (ref 3.5–5.2)
RBC # BLD AUTO: 4.31 10*6/MM3 (ref 4.14–5.8)
RBC # BLD AUTO: 4.31 10*6/MM3 (ref 4.14–5.8)
SODIUM SERPL-SCNC: 139 MMOL/L (ref 136–145)
SODIUM SERPL-SCNC: 139 MMOL/L (ref 136–145)
UFH PPP CHRO-ACNC: 0.11 IU/ML (ref 0.3–0.7)
UFH PPP CHRO-ACNC: 0.11 IU/ML (ref 0.3–0.7)
UFH PPP CHRO-ACNC: 0.23 IU/ML (ref 0.3–0.7)
UFH PPP CHRO-ACNC: 0.23 IU/ML (ref 0.3–0.7)
UFH PPP CHRO-ACNC: 0.33 IU/ML (ref 0.3–0.7)
UFH PPP CHRO-ACNC: 0.33 IU/ML (ref 0.3–0.7)
UFH PPP CHRO-ACNC: >1.1 IU/ML (ref 0.3–0.7)
UFH PPP CHRO-ACNC: >1.1 IU/ML (ref 0.3–0.7)
WBC NRBC COR # BLD: 9.61 10*3/MM3 (ref 3.4–10.8)
WBC NRBC COR # BLD: 9.61 10*3/MM3 (ref 3.4–10.8)

## 2023-06-01 PROCEDURE — 97164 PT RE-EVAL EST PLAN CARE: CPT

## 2023-06-01 PROCEDURE — 63710000001 INSULIN LISPRO (HUMAN) PER 5 UNITS: Performed by: STUDENT IN AN ORGANIZED HEALTH CARE EDUCATION/TRAINING PROGRAM

## 2023-06-01 PROCEDURE — 83735 ASSAY OF MAGNESIUM: CPT | Performed by: STUDENT IN AN ORGANIZED HEALTH CARE EDUCATION/TRAINING PROGRAM

## 2023-06-01 PROCEDURE — 85520 HEPARIN ASSAY: CPT

## 2023-06-01 PROCEDURE — 97112 NEUROMUSCULAR REEDUCATION: CPT

## 2023-06-01 PROCEDURE — 99233 SBSQ HOSP IP/OBS HIGH 50: CPT | Performed by: NURSE PRACTITIONER

## 2023-06-01 PROCEDURE — 82948 REAGENT STRIP/BLOOD GLUCOSE: CPT

## 2023-06-01 PROCEDURE — 85027 COMPLETE CBC AUTOMATED: CPT | Performed by: STUDENT IN AN ORGANIZED HEALTH CARE EDUCATION/TRAINING PROGRAM

## 2023-06-01 PROCEDURE — 63710000001 INSULIN DETEMIR PER 5 UNITS: Performed by: STUDENT IN AN ORGANIZED HEALTH CARE EDUCATION/TRAINING PROGRAM

## 2023-06-01 PROCEDURE — 25010000002 HEPARIN (PORCINE) 25000-0.45 UT/250ML-% SOLUTION

## 2023-06-01 PROCEDURE — 80048 BASIC METABOLIC PNL TOTAL CA: CPT | Performed by: STUDENT IN AN ORGANIZED HEALTH CARE EDUCATION/TRAINING PROGRAM

## 2023-06-01 PROCEDURE — 84100 ASSAY OF PHOSPHORUS: CPT | Performed by: STUDENT IN AN ORGANIZED HEALTH CARE EDUCATION/TRAINING PROGRAM

## 2023-06-01 PROCEDURE — 70450 CT HEAD/BRAIN W/O DYE: CPT

## 2023-06-01 RX ORDER — CHOLECALCIFEROL (VITAMIN D3) 125 MCG
5 CAPSULE ORAL NIGHTLY PRN
Status: DISCONTINUED | OUTPATIENT
Start: 2023-06-01 | End: 2023-06-05 | Stop reason: HOSPADM

## 2023-06-01 RX ADMIN — INSULIN LISPRO 4 UNITS: 100 INJECTION, SOLUTION INTRAVENOUS; SUBCUTANEOUS at 17:47

## 2023-06-01 RX ADMIN — Medication 10 ML: at 12:19

## 2023-06-01 RX ADMIN — PANTOPRAZOLE SODIUM 40 MG: 40 TABLET, DELAYED RELEASE ORAL at 04:56

## 2023-06-01 RX ADMIN — INSULIN LISPRO 4 UNITS: 100 INJECTION, SOLUTION INTRAVENOUS; SUBCUTANEOUS at 12:18

## 2023-06-01 RX ADMIN — GABAPENTIN 200 MG: 100 CAPSULE ORAL at 21:09

## 2023-06-01 RX ADMIN — HEPARIN SODIUM 11.5 UNITS/KG/HR: 10000 INJECTION, SOLUTION INTRAVENOUS at 09:48

## 2023-06-01 RX ADMIN — INSULIN LISPRO 3 UNITS: 100 INJECTION, SOLUTION INTRAVENOUS; SUBCUTANEOUS at 12:19

## 2023-06-01 RX ADMIN — DULOXETINE 60 MG: 60 CAPSULE, DELAYED RELEASE ORAL at 09:06

## 2023-06-01 RX ADMIN — HYDROCODONE BITARTRATE AND ACETAMINOPHEN 1 TABLET: 5; 325 TABLET ORAL at 12:19

## 2023-06-01 RX ADMIN — ACETAMINOPHEN 650 MG: 325 TABLET ORAL at 09:05

## 2023-06-01 RX ADMIN — INSULIN LISPRO 3 UNITS: 100 INJECTION, SOLUTION INTRAVENOUS; SUBCUTANEOUS at 21:21

## 2023-06-01 RX ADMIN — LEVOTHYROXINE SODIUM 125 MCG: 125 TABLET ORAL at 09:06

## 2023-06-01 RX ADMIN — INSULIN DETEMIR 8 UNITS: 100 INJECTION, SOLUTION SUBCUTANEOUS at 21:08

## 2023-06-01 RX ADMIN — INSULIN DETEMIR 8 UNITS: 100 INJECTION, SOLUTION SUBCUTANEOUS at 09:06

## 2023-06-01 RX ADMIN — HYDROCODONE BITARTRATE AND ACETAMINOPHEN 1 TABLET: 5; 325 TABLET ORAL at 18:04

## 2023-06-01 RX ADMIN — GABAPENTIN 200 MG: 100 CAPSULE ORAL at 09:05

## 2023-06-01 RX ADMIN — ACETAMINOPHEN 650 MG: 325 TABLET ORAL at 15:42

## 2023-06-01 RX ADMIN — INSULIN LISPRO 4 UNITS: 100 INJECTION, SOLUTION INTRAVENOUS; SUBCUTANEOUS at 09:06

## 2023-06-01 RX ADMIN — Medication 12.5 MG: at 21:09

## 2023-06-01 RX ADMIN — Medication 12.5 MG: at 09:06

## 2023-06-01 RX ADMIN — ROSUVASTATIN 20 MG: 20 TABLET, FILM COATED ORAL at 09:06

## 2023-06-01 RX ADMIN — HYDROCODONE BITARTRATE AND ACETAMINOPHEN 1 TABLET: 5; 325 TABLET ORAL at 05:07

## 2023-06-01 RX ADMIN — Medication 5 MG: at 21:09

## 2023-06-01 RX ADMIN — DIVALPROEX SODIUM 250 MG: 250 TABLET, EXTENDED RELEASE ORAL at 21:09

## 2023-06-01 NOTE — PROGRESS NOTES
"    [facility]    NEUROSURGERY PROGRESS NOTE    06/01/23    Chief Complaint: Bilateral subdural hematomas    Subjective: Patient underwent MMA embolization yesterday.  Stable overnight.    1 Day Post-Op    Objective:     /80   Pulse 75   Temp 97.1 °F (36.2 °C) (Axillary)   Resp 14   Ht 172.7 cm (68\")   Wt 110 kg (242 lb 8.1 oz)   SpO2 90%   BMI 36.87 kg/m²        Physical Exam  Awake, alert, oriented  Pupils equal bilaterally  Extraocular movements intact bilaterally  Face symmetric  5 out of 5 all 4 extremities  Incisions clean dry and intact      Intake/Output Summary (Last 24 hours) at 6/1/2023 0847  Last data filed at 5/31/2023 1658  Gross per 24 hour   Intake 300 ml   Output --   Net 300 ml         Current Facility-Administered Medications:   •  acetaminophen (TYLENOL) tablet 650 mg, 650 mg, Oral, Q6H PRN, Stephane Womack MD, 650 mg at 05/31/23 1427  •  dextrose (D50W) (25 g/50 mL) IV injection 25 g, 25 g, Intravenous, Q15 Min PRN, Stephane Womack MD  •  dextrose (GLUTOSE) oral gel 15 g, 15 g, Oral, Q15 Min PRN, Stephane Womack MD  •  divalproex (DEPAKOTE ER) 24 hr tablet 250 mg, 250 mg, Oral, Nightly, Stephane Womack MD, 250 mg at 05/31/23 2137  •  docusate sodium (COLACE) capsule 100 mg, 100 mg, Oral, BID PRN, Stephane Womack MD  •  DULoxetine (CYMBALTA) DR capsule 60 mg, 60 mg, Oral, Daily, Stephane Womack MD, 60 mg at 05/31/23 0807  •  gabapentin (NEURONTIN) capsule 200 mg, 200 mg, Oral, BID, Stephane Womack MD, 200 mg at 05/31/23 2026  •  glucagon (GLUCAGEN) injection 1 mg, 1 mg, Intramuscular, Q15 Min PRN, Stephane Womack MD  •  heparin 20912 units/250 mL (100 units/mL) in 0.45 % NaCl infusion, 11.5 Units/kg/hr, Intravenous, Titrated, Humlong, Kashmir D, PharmD, Last Rate: 11.96 mL/hr at 06/01/23 0747, 11.5 Units/kg/hr at 06/01/23 0747  •  HYDROcodone-acetaminophen (NORCO) 5-325 MG per tablet 1 tablet, 1 tablet, Oral, " Q6H PRN, Stephane Womack MD, 1 tablet at 06/01/23 0507  •  HYDROmorphone (DILAUDID) injection 0.5 mg, 0.5 mg, Intravenous, Q2H PRN **AND** naloxone (NARCAN) injection 0.4 mg, 0.4 mg, Intravenous, Q5 Min PRN, Stephane Womack MD  •  insulin detemir (LEVEMIR) injection 8 Units, 8 Units, Subcutaneous, Q12H, Stephane Womack MD, 8 Units at 05/31/23 2026  •  Insulin Lispro (humaLOG) injection 3-14 Units, 3-14 Units, Subcutaneous, 4x Daily AC & at Bedtime, Stephane Womack MD, 5 Units at 05/29/23 2037  •  Insulin Lispro (humaLOG) injection 4 Units, 4 Units, Subcutaneous, TID With Meals, Stephane Womack MD, 4 Units at 05/31/23 0807  •  levothyroxine (SYNTHROID, LEVOTHROID) tablet 125 mcg, 125 mcg, Oral, Daily, Stephane Womack MD, 125 mcg at 05/31/23 0807  •  magnesium hydroxide (MILK OF MAGNESIA) 400 MG/5ML suspension 15 mL, 15 mL, Oral, Daily PRN, Stephane Womack MD  •  metoprolol tartrate (LOPRESSOR) half tablet 12.5 mg, 12.5 mg, Oral, Q12H, Stephane Womack MD, 12.5 mg at 05/31/23 2026  •  nitroglycerin (NITROSTAT) SL tablet 0.4 mg, 0.4 mg, Sublingual, Q5 Min PRN, Stephane Womack MD  •  ondansetron (ZOFRAN) injection 4 mg, 4 mg, Intravenous, Q6H PRN **OR** ondansetron (ZOFRAN) tablet 4 mg, 4 mg, Oral, Q6H PRN, Stephane Womack MD  •  pantoprazole (PROTONIX) EC tablet 40 mg, 40 mg, Oral, Q AM, Stephane Womack MD, 40 mg at 06/01/23 0456  •  Pharmacy to Dose Heparin, , Does not apply, Continuous PRN, Stephane Womack MD  •  polyethylene glycol (MIRALAX) packet 17 g, 17 g, Oral, Daily, Stephane Womack MD, 17 g at 05/29/23 1153  •  rosuvastatin (CRESTOR) tablet 20 mg, 20 mg, Oral, Daily, Stephane Womack MD, 20 mg at 05/31/23 0807  •  sennosides-docusate (PERICOLACE) 8.6-50 MG per tablet 2 tablet, 2 tablet, Oral, BID, Stephane Womack MD, 2 tablet at 05/31/23 0807  •  sodium chloride 0.9 % flush 10 mL, 10 mL, Intravenous, Q12H, Stephane Womack MD, 10 mL at 05/31/23 0808  •  sodium  chloride 0.9 % flush 10 mL, 10 mL, Intravenous, PRVu VÁSQUEZ Nicolas, MD  •  sodium chloride 0.9 % flush 10 mL, 10 mL, Intravenous, Q12H, Stephane Womack MD, 10 mL at 05/31/23 0807  •  sodium chloride 0.9 % flush 10 mL, 10 mL, Intravenous, Vu PADRON Nicolas, MD  •  sodium chloride 0.9 % infusion 40 mL, 40 mL, Intravenous, Vu PADRON Nicolas, MD    Laboratory Results:        Lab 06/01/23  0447 05/31/23  0435 05/30/23  1801 05/30/23  1222 05/30/23  0441 05/29/23  1004 05/27/23  1153 05/27/23  0519 05/26/23  1349 05/26/23  1348 05/26/23  1008   WBC 9.61 9.33  --   --  10.05 13.76*  --  13.78*  --   --  11.50*   HEMOGLOBIN 11.8* 11.9*  --   --  11.3* 10.9*  --  11.3*  --   --  14.6   HEMATOCRIT 36.7* 36.8*  --   --  36.0* 34.6*  --  35.4*  --   --  45.8   PLATELETS 227 200  --   --  183 173  --  200  --   --  193   NEUTROS ABS  --  5.47  --   --   --   --   --   --   --   --  8.68*   IMMATURE GRANS (ABS)  --  0.37*  --   --   --   --   --   --   --   --  0.16*   LYMPHS ABS  --  2.25  --   --   --   --   --   --   --   --  1.63   MONOS ABS  --  0.70  --   --   --   --   --   --   --   --  0.71   EOS ABS  --  0.47*  --   --   --   --   --   --   --   --  0.26   MCV 85.2 85.6  --   --  87.4 85.2  --  84.7  --   --  85.1   PROTIME  --   --   --  13.5  --   --  14.7*  --  13.3 14.0 23.6*   APTT  --   --   --  29.8*  --   --   --   --   --   --   --    HEPARIN ANTI-XA 0.11*  --  0.32 0.35  --   --   --   --   --   --   --          Lab 06/01/23  0447 05/31/23  0435 05/30/23  0441 05/29/23  1004 05/27/23  0519 05/26/23  1008   SODIUM 139 143 144 143 140 139   POTASSIUM 3.8 3.8 4.0 3.8 4.2 4.4   CHLORIDE 105 106 106 105 103 101   CO2 25.0 27.0 28.0 28.0 24.0 24.0   ANION GAP 9.0 10.0 10.0 10.0 13.0 14.0   BUN 16 16 20 20 15 14   CREATININE 0.77 0.70* 0.68* 0.64* 0.71* 0.72*   EGFR 111.1 114.4 115.4 117.5 113.9 113.4   GLUCOSE 184* 134* 123* 151* 234* 220*   CALCIUM 8.6 8.8 8.6 9.0 9.2 9.7   MAGNESIUM 2.3 2.3 2.1  2.1 1.8 2.2   PHOSPHORUS 3.7 4.4 5.0* 2.8 4.0  --    HEMOGLOBIN A1C  --   --   --   --   --  8.90*   TSH  --   --   --   --   --  1.610         Lab 05/26/23  1008   TOTAL PROTEIN 8.4   ALBUMIN 4.7   GLOBULIN 3.7   ALT (SGPT) 14   AST (SGOT) 18   BILIRUBIN 0.6   ALK PHOS 86         Lab 05/30/23  1222 05/27/23  1153 05/26/23  1349 05/26/23  1348 05/26/23  1008   PROTIME 13.5 14.7* 13.3 14.0 23.6*   INR 1.02 1.13* 1.1 1.07 2.08*         Lab 05/27/23  0519   CHOLESTEROL 181   LDL CHOL 107*   HDL CHOL 48   TRIGLYCERIDES 147         Lab 05/26/23  1125 05/26/23  1014   ABO TYPING A A   RH TYPING Positive Positive   ANTIBODY SCREEN  --  Negative         Brief Urine Lab Results     None        Microbiology Results (last 10 days)     ** No results found for the last 240 hours. **                   Diagnostic Imaging: I personally reviewed and interpreted the new imaging    Assessment and plan:  This is a 47-year-old male status post bilateral craniotomies for evacuation of subdural hematomas on 5/26 as well as MMA embolization on 5/31.  Neurologically patient is stable.  We will continue the heparin drip through the weekend.  We will have discussion of oral anticoagulation early next week.  Blood sugars have been improving.  Goal 150 with ICU managing insulin.  From a neurosurgery perspective, patient can be transferred to the floor.  Appreciate ICU assistance.      Any copied data from previous notes included in the (1) HPI, (2) PE, (3) MDM and/or Assessment and Plan has been reviewed and accurate as of 06/01/23.    Chanell Rock PA-C  06/01/23  08:47 EDT

## 2023-06-01 NOTE — PLAN OF CARE
Goal Outcome Evaluation:  Plan of Care Reviewed With: patient           Outcome Evaluation: Pt presents near baseline level of function for mobility, independent with bed mobility, t/f's, and gait. Pt amb in hallway pushing tele monitor. Pt had no LOB, dem no gait abnormality. VSS on RA. Pt scored as a low fall risk on the Tinetti Balance Assessment. No further PT intervention warranted at this time. PT rec d/c home with assist as needed.

## 2023-06-01 NOTE — PROGRESS NOTES
HEPARIN INFUSION  Manjeet Hall is a  47 y.o. male receiving heparin infusion.     Therapy for (VTE/Cardiac):   Cardiac   Patient Weight: 104  Initial Bolus (Y/N):   No  Any Bolus (Y/N):   Y        Signs or Symptoms of Bleeding: No    Cardiac or Other (Not VTE)   Initial rate: 12 units/kg/hr (Max 1,000 units/hr)   Anti Xa Rebolus Infusion Hold time Change infusion Dose (Units/kg/hr) Next Anti Xa or aPTT Level Due   < 0.11 50 Units/kg  (4000 Units Max) None Increase by  3 Units/kg/hr 6 hours   0.11- 0.19 25 Units/kg  (2000 Units Max) None Increase by  2 Units/kg/hr 6 hours   0.2 - 0.29 0 None Increase by  1 Units/kg/hr 6 hours   0.3 - 0.5 0 None No Change 6 hours (after 2 consecutive levels in range check qAM)   0.51 - 0.6 0 None Decrease by  1 Units/kg/hr 6 hours   0.61 - 0.8 0 30 Minutes Decrease by  2 Units/kg/hr 6 hours   0.81 - 1 0 60 Minutes Decrease by  3 Units/kg/hr 6 hours   >1 0 Hold  After Anti Xa less than 0.5 decrease previous rate by  4 Units/kg/hr  Every 2 hours until Anti Xa  less than 0.5 then when infusion restarts in 6 hours     Recommend Xa every 6 hours.     Results from last 7 days   Lab Units 06/01/23  0447 05/31/23  0435 05/30/23  1222 05/30/23  0441 05/29/23  1004 05/27/23  1153 05/27/23  0519 05/26/23  1349   INR   --   --  1.02  --   --  1.13*  --  1.1   HEMOGLOBIN g/dL 11.8* 11.9*  --  11.3*   < >  --    < >  --    HEMATOCRIT % 36.7* 36.8*  --  36.0*   < >  --    < >  --    PLATELETS 10*3/mm3 227 200  --  183   < >  --    < >  --     < > = values in this interval not displayed.          Date   Time   Anti-Xa Current Rate (Unit/kg/hr) Bolus   (Units) Rate Change   (Unit/kg/hr) New Rate (Unit/kg/hr) Next   Anti-Xa Comments  Pump Check Daily   5/30 1230 0.35 0 No initial bolus  +9.5 9.5 1830 D/W SAMANTA Novak. Stop time entered.    5/30 1801 0.32 9.5 -- -- 9.5 None D/w RN, plan to d/c heparin @0600 for procedure   6/1 0000 -- OFF -- +9.5 9.5 0600 Restart heparin drip 6/1 @ 0000 per   Vu; D/w SAMANTA Novak   6/1 0630 0.11 9.5 -- +2 11.5 1200 D/w RN   6/1 1230 >1.1 11.5 -- -- -- 1330  Dw RN. Unsure if lab was drawn from the line so will redraw.   6/1 1345 0.23 11.5 -- +1 12.5 2000 Dw RN. Drip never held, previous lab likely an error.                                                                                                                                                                           Destiny Jean, PharmD  6/1/2023  14:37 EDT

## 2023-06-01 NOTE — PROGRESS NOTES
Intensive Care Follow-up     Hospital:  LOS: 6 days   Mr. Manjeet Hall, 47 y.o. male is followed for:   Subdural hematoma, nontraumatic   Glycemic, Electrolyte, Respiratory, and Medical management     Subjective   Interval History:  The patient is sitting upright in the chair, pleasant, in good spirits, and family is at bedside. Underwent MMA embolization yesterday by Dr. Womack with stable post-op course. Remains on a Heparin gtt. No complaints.      The patient's past medical, surgical and social history were reviewed and updated in Epic as appropriate.    Review of Systems - General ROS: negative for - chills, fatigue, fever or night sweats  Respiratory ROS: negative for - cough, shortness of breath or wheezing  Cardiovascular ROS: negative for - chest pain, edema or palpitations  Gastrointestinal ROS: positive for constipation       Objective     Infusions:  heparin, 11.5 Units/kg/hr, Last Rate: 11.5 Units/kg/hr (06/01/23 0948)  Pharmacy to Dose Heparin,          Medications:  divalproex, 250 mg, Oral, Nightly  DULoxetine, 60 mg, Oral, Daily  gabapentin, 200 mg, Oral, BID  insulin detemir, 8 Units, Subcutaneous, Q12H  insulin lispro, 3-14 Units, Subcutaneous, 4x Daily AC & at Bedtime  Insulin Lispro, 4 Units, Subcutaneous, TID With Meals  levothyroxine, 125 mcg, Oral, Daily  metoprolol tartrate, 12.5 mg, Oral, Q12H  pantoprazole, 40 mg, Oral, Q AM  polyethylene glycol, 17 g, Oral, Daily  rosuvastatin, 20 mg, Oral, Daily  senna-docusate sodium, 2 tablet, Oral, BID  sodium chloride, 10 mL, Intravenous, Q12H  sodium chloride, 10 mL, Intravenous, Q12H        Vital Sign Min/Max for last 24 hours  Temp  Min: 96.3 °F (35.7 °C)  Max: 97.6 °F (36.4 °C)   BP  Min: 103/72  Max: 154/91   Pulse  Min: 64  Max: 93   Resp  Min: 12  Max: 18   SpO2  Min: 90 %  Max: 100 %   Flow (L/min)  Min: 2  Max: 2       Input/Output for last 24 hour shift  05/31 0701 - 06/01 0700  In: 300 [I.V.:300]  Out: -    S RR:  [2-12] 2  "    Objective:  General Appearance:  In no acute distress, comfortable, well-appearing and not in pain.    Vital signs: (most recent): Blood pressure 138/80, pulse 88, temperature 97.4 °F (36.3 °C), temperature source Oral, resp. rate 16, height 172.7 cm (68\"), weight 110 kg (242 lb 8.1 oz), SpO2 95 %.  Vital signs are normal.    HEENT: (Bilateral incisions CDI and well-approximated with staples.)    Lungs:  Normal effort and normal respiratory rate.  Breath sounds clear to auscultation.  He is not in respiratory distress.  No rales, wheezes or rhonchi.    Heart: Normal rate.  Regular rhythm.  S1 normal and S2 normal.  No murmur or friction rub. (+ click)  Abdomen: Abdomen is soft and non-distended.  Bowel sounds are normal.   There is no abdominal tenderness.     Extremities: There is no dependent edema.    Pulses: Distal pulses are intact.    Neurological: Patient is alert and oriented to person, place and time.  GCS score is 15.  (Expressive aphasia and word finding difficulty at baseline).    Pupils:  Pupils are equal, round, and reactive to light.    Skin:  Warm and dry.  No rash. (JOLLY PICC site CDI )        Interval:  (Return from procedure)  1a. Level of Consciousness: 0-->Alert, keenly responsive  1b. LOC Questions: 0-->Answers both questions correctly  1c. LOC Commands: 0-->Performs both tasks correctly  2. Best Gaze: 0-->Normal  3. Visual: 0-->No visual loss  4. Facial Palsy: 0-->Normal symmetrical movements  5a. Motor Arm, Left: 0-->No drift, limb holds 90 (or 45) degrees for full 10 secs  5b. Motor Arm, Right: 0-->No drift, limb holds 90 (or 45) degrees for full 10 secs  6a. Motor Leg, Left: 0-->No drift, leg holds 30 degree position for full 5 secs  6b. Motor Leg, Right: 0-->No drift, leg holds 30 degree position for full 5 secs  7. Limb Ataxia: 0-->Absent  8. Sensory: 0-->Normal, no sensory loss  9. Best Language: 1-->Mild-to-moderate aphasia, some obvious loss of fluency or facility of comprehension, " without significant limitation on ideas expressed or form of expression. Reduction of speech and/or comprehension, however, makes conversation. . . (see row details)  10. Dysarthria: 0-->Normal  11. Extinction and Inattention (formerly Neglect): 0-->No abnormality    Total (NIH Stroke Scale): 1    Results from last 7 days   Lab Units 06/01/23 0447 05/31/23 0435 05/30/23  0441   WBC 10*3/mm3 9.61 9.33 10.05   HEMOGLOBIN g/dL 11.8* 11.9* 11.3*   PLATELETS 10*3/mm3 227 200 183     Results from last 7 days   Lab Units 06/01/23 0447 05/31/23 0435 05/30/23  0441   SODIUM mmol/L 139 143 144   POTASSIUM mmol/L 3.8 3.8 4.0   CO2 mmol/L 25.0 27.0 28.0   BUN mg/dL 16 16 20   CREATININE mg/dL 0.77 0.70* 0.68*   MAGNESIUM mg/dL 2.3 2.3 2.1   PHOSPHORUS mg/dL 3.7 4.4 5.0*   GLUCOSE mg/dL 184* 134* 123*     Estimated Creatinine Clearance: 142.6 mL/min (by C-G formula based on SCr of 0.77 mg/dL).          Images:   None new    I reviewed the patient's results and images.     Assessment & Plan   Impression        Bilateral Subdural Hematoma    HTN    Hyperlipidemia (Diet controlled, off statin)    Hypothyroidism    Anxiety    H/O aortic mechanical valve replacement on Coumadin    New Onset Type 2 diabetes mellitus       Plan      Manjeet Hall is a 47 y.o. 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     Also had associated edema, some leftward midline shift, right lateral and third ventricular effacement, and bilateral uncal medialization. He was hypertensive requiring Cardene. INR was subtherapeutic at 2.08  given Kcentra and vitamin K.     NS consulted started on Keppra for seizure prophylaxis. Taken to OR on 5/26 for emergent bilateral craniotomy and evacuation of subdural hematomas by Dr. Womack. Subsequent left MMA embolization on 5/31.     Bilateral Subdural Hematomas   H/O Stroke post op AVR with residual receptive aphasia and word finding difficulty  On chronic anticoagulation (warfarin)   · S/P bilateral craniotomies and SDH evacuation on 5/26 by Dr. Womack; drains d/c'd on 5/28   · Underwent L MMA embolization on 5/31 by Dr. Womack; right MMA was too small for embolization   · Post op care per NS  · Continue heparin drip for now with eventual transition to oral AC early next week per NS   · Continue PT/OT/SLP  · CM assisting with setting up outpatient SLP to help with expressive aphasia and word finding difficulty upon discharge  · Patient wants to establish care with new PCP at discharge  · AM labs    HTN  HLP  H/O mechanical AVR 2018 on warfarin  · Continue Metoprolol and Crestor   · Plan to continue Heparin gtt over the weekend and transition to oral anticoagulation next week per NS     New onset T2DM, Hgb A1c 8.9   Neuropathy  · Keep glucose <= 150 per NS  · BGs 90-180s  · Continue 8 units levemir, 4 units prandial, + moderate-high correction SSI  · Steroids discontinued 5/28  · Diabetes educator still needs to see patient for formal evaluation   · Continue Gabapentin  · Patient would like to establish care with Endocrinologist at discharge    Hypothyroidism  · Continue Levothyroxine    Anxiety  Depression   · Continue Cymbalta and Depakote ER  · Hold home Atarax for now    Constipation  · BM noted overnight   · Continue bowel protocol    DVT prophylaxis:  Hep gtt / SCDs  GI prophylaxis:  PPI  Disposition:  OK to downgrade to telemetry per NS     Plan of care and goals reviewed during interdisciplinary rounds.  I discussed the patient's findings and my recommendations with nursing staff     MDM:     Problem(s) High due to: Acute or Chronic illness or injury that may poses a threat to life or bodily function  Risk: High due to: drug(s) requiring intensive monitoring for toxicity    High    Electronically signed by BETHANIE Peterson, 06/01/23, 11:56 AM EDT.

## 2023-06-01 NOTE — PROGRESS NOTES
Clinical Nutrition     Nutrition Education   Reason for Visit:   Criteria noted by RDN during nutrition assessment , pt/so w/ questions, DM education consult      Patient Name: Manjeet Hall  YOB: 1975  MRN: [id number]  Date of Encounter: 06/01/23 13:39 EDT  Admission date: 5/26/2023    Comments: Pt receptive to education and making diet /health changes, awaiting DM educator visit. Discussed availability of OP education svcs for DM/nutrition.    Applicable Diagnoses   Pre-DM , Hgb A1C now 8.9%      Diet/Nutrition Related History:   Pt usually eats 2 meals /day, his S.O. also has DM and was present for education. Together they discussed changes that could be made to improve diet quality and meal pattern.    Labs reviewed   Yes  Hgb A1C- 8.9%    Nutrition Diagnosis   6/1  Problem Food and nutrition knowledge deficit   Related To New Dx of DM w/ exisiting dx of Pre-DM   Signs/Symptoms elev BG, A1C     Goal:     Increase knowledge on diet/nutrition effects on condition/status     Nutrition Intervention     Education provided regarding nutrition therapy for: Diet rationale, Key food habit change, Consistent Carbohydrate Diet, Type 2 Diabetes and Heart Healthy eating    Education provided regarding food habits/behavior related to:Food choices, Eating pattern, Appropriate portions, Label reading, Food preparation and Meal planning     RD provided printed material via [employer] ( [employer])      Monitoring/Evaluation:     Pt acknowledged understanding of material covered      Mackenzie Grady, MS,RD,LD  Time Spent: 30 mins

## 2023-06-01 NOTE — THERAPY DISCHARGE NOTE
Patient Name: Manjeet Hall  : 1975    MRN: [id number]                              Today's Date: 2023       Admit Date: 2023    Visit Dx:     ICD-10-CM ICD-9-CM   1. Somnolence  R40.0 780.09   2. Subdural hematoma  S06.5XAA 432.1   3. Elevated INR  R79.1 790.92   4. H/O mechanical aortic valve replacement  Z95.2 V43.3   5. Hypertensive urgency  I16.0 401.9   6. Dysphagia, unspecified type  R13.10 787.20   7. Aphasia  R47.01 784.3     Patient Active Problem List   Diagnosis   • Bilateral Subdural Hematoma   • HTN   • Hyperlipidemia (Diet controlled, off statin)   • Hypothyroidism   • Anxiety   • Hodgkin's Lymphoma s/p XRT & Chemo   • VSD s/p closure (age 14)   • History of aortic regurgitation / stenosis s/p ROSS procedure with subsequent reversal with Mechanical AVR   • H/O aortic mechanical valve replacement on Coumadin   • New Onset Type 2 diabetes mellitus   • Somnolence     Past Medical History:   Diagnosis Date   • Cancer    • Diabetes mellitus    • Elevated cholesterol    • Stroke      History reviewed. No pertinent surgical history.   General Information     Row Name 23 0855          Physical Therapy Time and Intention    Document Type discharge evaluation/summary  -SD     Mode of Treatment individual therapy;physical therapy  -SD     Row Name 23 0855          General Information    Patient Profile Reviewed yes  -SD     Prior Level of Function independent:;all household mobility;community mobility;gait;transfer;bed mobility;ADL's  -SD     Existing Precautions/Restrictions other (see comments)  cranial incision  -SD     Barriers to Rehab medically complex;previous functional deficit  -SD     Row Name 23 0855          Living Environment    People in Home --  See I.E. for details  -SD     Row Name 23 0855          Cognition    Orientation Status (Cognition) oriented x 4  -SD     Row Name 23 0855          Safety Issues, Functional Mobility    Comment, Safety  Issues/Impairments (Mobility) no impairments affecting mobility  -SD           User Key  (r) = Recorded By, (t) = Taken By, (c) = Cosigned By    Initials Name Provider Type    Tracy Camacho PT Physical Therapist               Mobility     Row Name 06/01/23 0954          Bed Mobility    Supine-Sit Christian (Bed Mobility) modified independence  -SD     Assistive Device (Bed Mobility) head of bed elevated  -SD     Comment, (Bed Mobility) pt able to perform without difficulty  -SD     Row Name 06/01/23 0954          Transfers    Comment, (Transfers) pt able to perform without difficulty  -SD     Row Name 06/01/23 0954          Sit-Stand Transfer    Sit-Stand Christian (Transfers) independent  -SD     Row Name 06/01/23 0954          Gait/Stairs (Locomotion)    Christian Level (Gait) independent  -SD     Distance in Feet (Gait) 400  -SD     Comment, (Gait/Stairs) Pt amb in hallway pushing tele monitor. Pt had no LOB, dem no gait abnormality. VSS on RA.  -SD           User Key  (r) = Recorded By, (t) = Taken By, (c) = Cosigned By    Initials Name Provider Type    Tracy Camacho PT Physical Therapist               Obj/Interventions     Row Name 06/01/23 0956          Range of Motion Comprehensive    General Range of Motion bilateral lower extremity ROM WNL  -SD     [facility] Name 06/01/23 0956          Strength Comprehensive (MMT)    General Manual Muscle Testing (MMT) Assessment no strength deficits identified  -[facility] Name 06/01/23 0956          Balance    Comment, Balance see Tinetti Balance Assessment  -SD           User Key  (r) = Recorded By, (t) = Taken By, (c) = Cosigned By    Initials Name Provider Type    Tracy Camacho PT Physical Therapist               Goals/Plan    No documentation.                Clinical Impression     Row Name 06/01/23 0958          Pain    Pretreatment Pain Rating 0/10 - no pain  -SD     Posttreatment Pain Rating 0/10 - no pain  -SD     Row Name  06/01/23 0958          Plan of Care Review    Plan of Care Reviewed With patient  -SD     Outcome Evaluation Pt presents near baseline level of function for mobility, independent with bed mobility, t/f's, and gait. Pt amb in hallway pushing tele monitor. Pt had no LOB, dem no gait abnormality. VSS on RA. Pt scored as a low fall risk on the Tinetti Balance Assessment. No further PT intervention warranted at this time. PT rec d/c home with assist as needed.  -SD     Row Name 06/01/23 0958          Therapy Assessment/Plan (PT)    Patient/Family Therapy Goals Statement (PT) return home with outpatient speech therapy  -SD     Criteria for Skilled Interventions Met (PT) no problems identified which require skilled intervention  -SD     Therapy Frequency (PT) evaluation only  -SD     Row Name 06/01/23 0958          Vital Signs    Pre Systolic BP Rehab 138  -SD     Pre Treatment Diastolic BP 80  -SD     Post Systolic BP Rehab 130  -SD     Post Treatment Diastolic BP 79  -SD     Pretreatment Heart Rate (beats/min) 80  -SD     Posttreatment Heart Rate (beats/min) 86  -SD     Pre SpO2 (%) 97  -SD     O2 Delivery Pre Treatment room air  -SD     Post SpO2 (%) 98  -SD     O2 Delivery Post Treatment room air  -SD     Pre Patient Position Supine  -SD     Post Patient Position Sitting  -SD     Row Name 06/01/23 0958          Positioning and Restraints    Pre-Treatment Position in bed  -SD     Post Treatment Position chair  -SD     In Chair notified nsg;reclined;call light within reach;encouraged to call for assist;with family/caregiver;waffle cushion  -SD           User Key  (r) = Recorded By, (t) = Taken By, (c) = Cosigned By    Initials Name Provider Type    SD Tracy Ornelas, PT Physical Therapist               Outcome Measures     Row Name 06/01/23 0957          How much help from another person do you currently need...    Turning from your back to your side while in flat bed without using bedrails? 4  -SD     Moving from  "lying on back to sitting on the side of a flat bed without bedrails? 4  -SD     Moving to and from a bed to a chair (including a wheelchair)? 4  -SD     Standing up from a chair using your arms (e.g., wheelchair, bedside chair)? 4  -SD     Climbing 3-5 steps with a railing? 4  -SD     To walk in hospital room? 4  -SD     AM-formerly [facility] 6 Clicks Score (PT) 24  -SD     Highest level of mobility 8 --> Walked 250 feet or more  -SD     Row Name 06/01/23 0957          Modified [place] Scale    Modified [place] Scale 1 - No significant disability despite symptoms.  Able to carry out all usual duties and activities.  -SD     Row Name 06/01/23 0957          Tinetti Assessment    Tinetti Assessment yes  -SD     Sitting Balance 1  -SD     Arises 2  -SD     Attempts to Rise 2  -SD     Immediate Standing Balance (first 5 sec) 2  -SD     Standing Balance 2  -SD     Sternal Nudge (feet close together) 2  -SD     Eyes Closed (feet close together) 1  -SD     Turning 360 Degrees- Steps 1  -SD     Turning 360 Degrees- Steadiness 1  -SD     Sitting Down 2  -SD     Tinetti Balance Score 16  -SD     Gait Initiation (immediate after told \"go\") 1  -SD     Step Length- Right Swing 1  -SD     Step Length- Left Swing 1  -SD     Foot Clearance- Right Foot 1  -SD     Foot Clearance- Left Foot 1  -SD     Step Symmetry 1  -SD     Step Continuity 1  -SD     Path (excursion) 2  -SD     Trunk 2  -SD     Base of Support 1  -SD     Gait Score 12  -SD     Tinetti Total Score 28  -SD     Tinetti Assessment Comments low fall risk  -SD     Row Name 06/01/23 0957          Functional Assessment    Outcome Measure Options AM-formerly [facility] 6 Clicks Basic Mobility (PT);Modified [place];Tinetti  -SD           User Key  (r) = Recorded By, (t) = Taken By, (c) = Cosigned By    Initials Name Provider Type    Tracy Camacho PT Physical Therapist              Physical Therapy Education     Title: PT OT SLP Therapies (In Progress)     Topic: Physical Therapy (Done)     Point: " Mobility training (Done)     Learning Progress Summary           Patient Eager, E, VU,DU by SD at 6/1/2023 1001    Acceptance, E, VU by AE at 5/30/2023 1454    Acceptance, E, VU by CM at 5/27/2023 1035   Significant Other Eager, E, VU,DU by SD at 6/1/2023 1001    Acceptance, E, VU by CM at 5/27/2023 1035                   Point: Home exercise program (Done)     Learning Progress Summary           Patient Eager, E, VU,DU by SD at 6/1/2023 1001   Significant Other Eager, E, VU,DU by SD at 6/1/2023 1001                   Point: Body mechanics (Done)     Learning Progress Summary           Patient Eager, E, VU,DU by SD at 6/1/2023 1001    Acceptance, E, VU by AE at 5/30/2023 1454    Acceptance, E, VU by CM at 5/27/2023 1035   Significant Other Eager, E, VU,DU by SD at 6/1/2023 1001    Acceptance, E, VU by CM at 5/27/2023 1035                   Point: Precautions (Done)     Learning Progress Summary           Patient Eager, E, VU,DU by SD at 6/1/2023 1001    Acceptance, E, VU by AE at 5/30/2023 1454    Acceptance, E, VU by CM at 5/27/2023 1035   Significant Other Eager, E, VU,DU by SD at 6/1/2023 1001    Acceptance, E, VU by CM at 5/27/2023 1035                               User Key     Initials Effective Dates Name Provider Type Discipline    SD 03/13/23 -  Tracy Ornelas, PT Physical Therapist PT    AE 09/21/21 -  Sajan Garcia PT Physical Therapist PT    CM 09/22/22 -  Belkys Wilks, PT Physical Therapist PT              PT Recommendation and Plan     Plan of Care Reviewed With: patient  Outcome Evaluation: Pt presents near baseline level of function for mobility, independent with bed mobility, t/f's, and gait. Pt amb in hallway pushing tele monitor. Pt had no LOB, dem no gait abnormality. VSS on RA. Pt scored as a low fall risk on the Tinetti Balance Assessment. No further PT intervention warranted at this time. PT rec d/c home with assist as needed.     Time Calculation:    PT Charges     Row Name  06/01/23 1002             Time Calculation    Start Time 0855  -SD      PT Non-Billable Time (min) 25 min  -SD      PT Received On 06/01/23  -SD         Time Calculation- PT    Total Timed Code Minutes- PT 30 minute(s)  -SD         Timed Charges    50621 -  PT Neuromuscular Reeducation Minutes 30  -SD         Total Minutes    Timed Charges Total Minutes 30  -SD       Total Minutes 30  -SD            User Key  (r) = Recorded By, (t) = Taken By, (c) = Cosigned By    Initials Name Provider Type    SD Tracy Ornelas, PT Physical Therapist              Therapy Charges for Today     Code Description Service Date Service Provider Modifiers Qty    [id number]  PT NEUROMUSC RE EDUCATION EA 15 MIN 6/1/2023 Tracy Ornelas, PT GP 2    [id number]  PT RE-EVAL ESTABLISHED PLAN 2 6/1/2023 Tracy Ornelas, PT GP 1          PT G-Codes  Outcome Measure Options: AM-PAC 6 Clicks Basic Mobility (PT), Modified Parris Manuel  AM-PAC 6 Clicks Score (PT): 24  AM-PAC 6 Clicks Score (OT): 16  Modified [place] Scale: 1 - No significant disability despite symptoms.  Able to carry out all usual duties and activities.  Tinetti Total Score: 28    PT Discharge Summary  Anticipated Discharge Disposition (PT): home with assist    Tracy Ornelas PT  6/1/2023

## 2023-06-01 NOTE — CONSULTS
Diabetes Education    Patient Name:  Manjeet Hall  YOB: 1975  MRN: [id number]  Admit Date:  5/26/2023    Attempted to see pt for diabetes ed this afternoon. He is sleeping in chair; pts sig other at bedside, states pt has taken pain medicine recently as well.    Will re-attempt to see pt for diabetes education at a later time.    Please call [phone] with any interval needs.    Electronically signed by:  Evelyn Gruber RN  06/01/23 14:10 EDT

## 2023-06-02 LAB
ANION GAP SERPL CALCULATED.3IONS-SCNC: 8 MMOL/L (ref 5–15)
ANION GAP SERPL CALCULATED.3IONS-SCNC: 8 MMOL/L (ref 5–15)
BUN SERPL-MCNC: 15 MG/DL (ref 6–20)
BUN SERPL-MCNC: 15 MG/DL (ref 6–20)
BUN/CREAT SERPL: 21.7 (ref 7–25)
BUN/CREAT SERPL: 21.7 (ref 7–25)
CALCIUM SPEC-SCNC: 8.8 MG/DL (ref 8.6–10.5)
CALCIUM SPEC-SCNC: 8.8 MG/DL (ref 8.6–10.5)
CHLORIDE SERPL-SCNC: 108 MMOL/L (ref 98–107)
CHLORIDE SERPL-SCNC: 108 MMOL/L (ref 98–107)
CO2 SERPL-SCNC: 23 MMOL/L (ref 22–29)
CO2 SERPL-SCNC: 23 MMOL/L (ref 22–29)
CREAT SERPL-MCNC: 0.69 MG/DL (ref 0.76–1.27)
CREAT SERPL-MCNC: 0.69 MG/DL (ref 0.76–1.27)
DEPRECATED RDW RBC AUTO: 48.2 FL (ref 37–54)
DEPRECATED RDW RBC AUTO: 48.2 FL (ref 37–54)
EGFRCR SERPLBLD CKD-EPI 2021: 114.9 ML/MIN/1.73
EGFRCR SERPLBLD CKD-EPI 2021: 114.9 ML/MIN/1.73
ERYTHROCYTE [DISTWIDTH] IN BLOOD BY AUTOMATED COUNT: 15.3 % (ref 12.3–15.4)
ERYTHROCYTE [DISTWIDTH] IN BLOOD BY AUTOMATED COUNT: 15.3 % (ref 12.3–15.4)
GLUCOSE BLDC GLUCOMTR-MCNC: 122 MG/DL (ref 70–130)
GLUCOSE BLDC GLUCOMTR-MCNC: 122 MG/DL (ref 70–130)
GLUCOSE BLDC GLUCOMTR-MCNC: 123 MG/DL (ref 70–130)
GLUCOSE BLDC GLUCOMTR-MCNC: 123 MG/DL (ref 70–130)
GLUCOSE BLDC GLUCOMTR-MCNC: 131 MG/DL (ref 70–130)
GLUCOSE BLDC GLUCOMTR-MCNC: 131 MG/DL (ref 70–130)
GLUCOSE BLDC GLUCOMTR-MCNC: 208 MG/DL (ref 70–130)
GLUCOSE BLDC GLUCOMTR-MCNC: 208 MG/DL (ref 70–130)
GLUCOSE SERPL-MCNC: 114 MG/DL (ref 65–99)
GLUCOSE SERPL-MCNC: 114 MG/DL (ref 65–99)
HCT VFR BLD AUTO: 34.1 % (ref 37.5–51)
HCT VFR BLD AUTO: 34.1 % (ref 37.5–51)
HGB BLD-MCNC: 10.8 G/DL (ref 13–17.7)
HGB BLD-MCNC: 10.8 G/DL (ref 13–17.7)
MAGNESIUM SERPL-MCNC: 2.3 MG/DL (ref 1.6–2.6)
MAGNESIUM SERPL-MCNC: 2.3 MG/DL (ref 1.6–2.6)
MCH RBC QN AUTO: 27.6 PG (ref 26.6–33)
MCH RBC QN AUTO: 27.6 PG (ref 26.6–33)
MCHC RBC AUTO-ENTMCNC: 31.7 G/DL (ref 31.5–35.7)
MCHC RBC AUTO-ENTMCNC: 31.7 G/DL (ref 31.5–35.7)
MCV RBC AUTO: 87.2 FL (ref 79–97)
MCV RBC AUTO: 87.2 FL (ref 79–97)
PHOSPHATE SERPL-MCNC: 3.3 MG/DL (ref 2.5–4.5)
PHOSPHATE SERPL-MCNC: 3.3 MG/DL (ref 2.5–4.5)
PLATELET # BLD AUTO: 192 10*3/MM3 (ref 140–450)
PLATELET # BLD AUTO: 192 10*3/MM3 (ref 140–450)
PMV BLD AUTO: 11.3 FL (ref 6–12)
PMV BLD AUTO: 11.3 FL (ref 6–12)
POTASSIUM SERPL-SCNC: 4.1 MMOL/L (ref 3.5–5.2)
POTASSIUM SERPL-SCNC: 4.1 MMOL/L (ref 3.5–5.2)
RBC # BLD AUTO: 3.91 10*6/MM3 (ref 4.14–5.8)
RBC # BLD AUTO: 3.91 10*6/MM3 (ref 4.14–5.8)
SODIUM SERPL-SCNC: 139 MMOL/L (ref 136–145)
SODIUM SERPL-SCNC: 139 MMOL/L (ref 136–145)
UFH PPP CHRO-ACNC: 0.4 IU/ML (ref 0.3–0.7)
UFH PPP CHRO-ACNC: 0.4 IU/ML (ref 0.3–0.7)
WBC NRBC COR # BLD: 8.6 10*3/MM3 (ref 3.4–10.8)
WBC NRBC COR # BLD: 8.6 10*3/MM3 (ref 3.4–10.8)

## 2023-06-02 PROCEDURE — 97535 SELF CARE MNGMENT TRAINING: CPT

## 2023-06-02 PROCEDURE — 85027 COMPLETE CBC AUTOMATED: CPT | Performed by: NURSE PRACTITIONER

## 2023-06-02 PROCEDURE — 25010000002 HEPARIN (PORCINE) 25000-0.45 UT/250ML-% SOLUTION

## 2023-06-02 PROCEDURE — 63710000001 INSULIN LISPRO (HUMAN) PER 5 UNITS: Performed by: STUDENT IN AN ORGANIZED HEALTH CARE EDUCATION/TRAINING PROGRAM

## 2023-06-02 PROCEDURE — 63710000001 INSULIN LISPRO (HUMAN) PER 5 UNITS: Performed by: NURSE PRACTITIONER

## 2023-06-02 PROCEDURE — 80048 BASIC METABOLIC PNL TOTAL CA: CPT | Performed by: NURSE PRACTITIONER

## 2023-06-02 PROCEDURE — 85520 HEPARIN ASSAY: CPT

## 2023-06-02 PROCEDURE — 82948 REAGENT STRIP/BLOOD GLUCOSE: CPT

## 2023-06-02 PROCEDURE — 99232 SBSQ HOSP IP/OBS MODERATE 35: CPT | Performed by: NURSE PRACTITIONER

## 2023-06-02 PROCEDURE — 63710000001 INSULIN DETEMIR PER 5 UNITS: Performed by: STUDENT IN AN ORGANIZED HEALTH CARE EDUCATION/TRAINING PROGRAM

## 2023-06-02 PROCEDURE — 84100 ASSAY OF PHOSPHORUS: CPT | Performed by: NURSE PRACTITIONER

## 2023-06-02 PROCEDURE — 83735 ASSAY OF MAGNESIUM: CPT | Performed by: NURSE PRACTITIONER

## 2023-06-02 PROCEDURE — 97168 OT RE-EVAL EST PLAN CARE: CPT

## 2023-06-02 PROCEDURE — 63710000001 INSULIN DETEMIR PER 5 UNITS: Performed by: NURSE PRACTITIONER

## 2023-06-02 RX ORDER — HYDROCODONE BITARTRATE AND ACETAMINOPHEN 7.5; 325 MG/1; MG/1
1 TABLET ORAL EVERY 6 HOURS PRN
Status: DISCONTINUED | OUTPATIENT
Start: 2023-06-02 | End: 2023-06-05 | Stop reason: HOSPADM

## 2023-06-02 RX ADMIN — INSULIN LISPRO 4 UNITS: 100 INJECTION, SOLUTION INTRAVENOUS; SUBCUTANEOUS at 17:32

## 2023-06-02 RX ADMIN — HYDROCODONE BITARTRATE AND ACETAMINOPHEN 1 TABLET: 5; 325 TABLET ORAL at 11:27

## 2023-06-02 RX ADMIN — INSULIN LISPRO 3 UNITS: 100 INJECTION, SOLUTION INTRAVENOUS; SUBCUTANEOUS at 12:31

## 2023-06-02 RX ADMIN — GABAPENTIN 200 MG: 100 CAPSULE ORAL at 08:43

## 2023-06-02 RX ADMIN — DIVALPROEX SODIUM 250 MG: 250 TABLET, EXTENDED RELEASE ORAL at 21:12

## 2023-06-02 RX ADMIN — INSULIN DETEMIR 8 UNITS: 100 INJECTION, SOLUTION SUBCUTANEOUS at 21:17

## 2023-06-02 RX ADMIN — INSULIN LISPRO 4 UNITS: 100 INJECTION, SOLUTION INTRAVENOUS; SUBCUTANEOUS at 17:34

## 2023-06-02 RX ADMIN — INSULIN LISPRO 4 UNITS: 100 INJECTION, SOLUTION INTRAVENOUS; SUBCUTANEOUS at 08:43

## 2023-06-02 RX ADMIN — GABAPENTIN 200 MG: 100 CAPSULE ORAL at 21:12

## 2023-06-02 RX ADMIN — ACETAMINOPHEN 650 MG: 325 TABLET ORAL at 21:17

## 2023-06-02 RX ADMIN — Medication 10 ML: at 08:45

## 2023-06-02 RX ADMIN — PANTOPRAZOLE SODIUM 40 MG: 40 TABLET, DELAYED RELEASE ORAL at 08:55

## 2023-06-02 RX ADMIN — LEVOTHYROXINE SODIUM 125 MCG: 125 TABLET ORAL at 08:43

## 2023-06-02 RX ADMIN — DULOXETINE 60 MG: 60 CAPSULE, DELAYED RELEASE ORAL at 08:44

## 2023-06-02 RX ADMIN — HYDROCODONE BITARTRATE AND ACETAMINOPHEN 1 TABLET: 5; 325 TABLET ORAL at 02:03

## 2023-06-02 RX ADMIN — HYDROCODONE BITARTRATE AND ACETAMINOPHEN 1 TABLET: 7.5; 325 TABLET ORAL at 18:36

## 2023-06-02 RX ADMIN — Medication 12.5 MG: at 08:55

## 2023-06-02 RX ADMIN — HYDROCODONE BITARTRATE AND ACETAMINOPHEN 1 TABLET: 7.5; 325 TABLET ORAL at 13:06

## 2023-06-02 RX ADMIN — Medication 5 MG: at 21:12

## 2023-06-02 RX ADMIN — ROSUVASTATIN 20 MG: 20 TABLET, FILM COATED ORAL at 08:43

## 2023-06-02 RX ADMIN — HEPARIN SODIUM 12.5 UNITS/KG/HR: 10000 INJECTION, SOLUTION INTRAVENOUS at 04:20

## 2023-06-02 RX ADMIN — Medication 12.5 MG: at 21:17

## 2023-06-02 RX ADMIN — INSULIN DETEMIR 8 UNITS: 100 INJECTION, SOLUTION SUBCUTANEOUS at 08:43

## 2023-06-02 RX ADMIN — DOCUSATE SODIUM 50 MG AND SENNOSIDES 8.6 MG 2 TABLET: 8.6; 5 TABLET, FILM COATED ORAL at 21:17

## 2023-06-02 NOTE — PROGRESS NOTES
Intensive Care Follow-up     Hospital:  LOS: 7 days   Mr. Manjeet Hall, 47 y.o. male is followed for:   Subdural hematoma, nontraumatic     Subjective   Interval History:  The chart has been reviewed. No acute events overnight. AM labs unremarkable. No imaging today.     Patient sitting up in the chair this AM in NAD. He remains afebrile, hemodynamically stable, and is oxygenating appropriately on RA. He is tolerating a PO diet and is working with PT & OT. No complaints at this time.     He denies current shortness of breath, dizziness, chest pain, nausea, vomiting, diarrhea, abdominal discomfort, or extremity numbness or tingling.      The patient's past medical, surgical and social history were reviewed and updated in Epic as appropriate.    Objective     Infusions:  heparin, 12.5 Units/kg/hr, Last Rate: 12.5 Units/kg/hr (06/02/23 0420)  Pharmacy to Dose Heparin,       Medications:  divalproex, 250 mg, Oral, Nightly  DULoxetine, 60 mg, Oral, Daily  gabapentin, 200 mg, Oral, BID  insulin detemir, 8 Units, Subcutaneous, Q12H  insulin lispro, 3-14 Units, Subcutaneous, 4x Daily AC & at Bedtime  Insulin Lispro, 4 Units, Subcutaneous, TID With Meals  levothyroxine, 125 mcg, Oral, Daily  metoprolol tartrate, 12.5 mg, Oral, Q12H  pantoprazole, 40 mg, Oral, Q AM  polyethylene glycol, 17 g, Oral, Daily  rosuvastatin, 20 mg, Oral, Daily  senna-docusate sodium, 2 tablet, Oral, BID  sodium chloride, 10 mL, Intravenous, Q12H  sodium chloride, 10 mL, Intravenous, Q12H      I reviewed the patient's medications.    Vital Sign Min/Max for last 24 hours  Temp  Min: 97.2 °F (36.2 °C)  Max: 98.1 °F (36.7 °C)   BP  Min: 111/73  Max: 138/80   Pulse  Min: 65  Max: 88   Resp  Min: 14  Max: 16   SpO2  Min: 90 %  Max: 97 %   No data recorded       Input/Output for last 24 hour shift  06/01 0701 - 06/02 0700  In: 303 [I.V.:303]  Out: -       Physical Exam:  GENERAL: Well-appearing, pleasant male sitting up in the chair and  conversant. No acute distress.   HEENT: Normocephalic. Bilateral craniotomy incisions approximated with staples, sites C/D/I. Trachea midline. PER. EOM WNL.   LUNGS: Chest rise of normal depth and symmetric. Lungs clear to auscultation bilaterally. No wheezes, rhonchi, or rales.   HEART: S1,S2 detected. Regular rate and rhythm. No rub, murmur, or gallop.   ABDOMEN: Soft, round, nondistended, and nontender. Bowel sounds present.   EXTREMITIES: No clubbing, edema, or cyanosis. Peripheral pulses present. Skin warm and dry. RUE PICC line in place, dressing C/D/I.    NEURO/PSYCH: Alert and oriented. Follows commands. Moves all extremities. No new focal deficits. Baseline aphasia / word finding-difficulty.      Interval:  (Return from procedure)  1a. Level of Consciousness: 0-->Alert, keenly responsive  1b. LOC Questions: 0-->Answers both questions correctly  1c. LOC Commands: 0-->Performs both tasks correctly  2. Best Gaze: 0-->Normal  3. Visual: 0-->No visual loss  4. Facial Palsy: 0-->Normal symmetrical movements  5a. Motor Arm, Left: 0-->No drift, limb holds 90 (or 45) degrees for full 10 secs  5b. Motor Arm, Right: 0-->No drift, limb holds 90 (or 45) degrees for full 10 secs  6a. Motor Leg, Left: 0-->No drift, leg holds 30 degree position for full 5 secs  6b. Motor Leg, Right: 0-->No drift, leg holds 30 degree position for full 5 secs  7. Limb Ataxia: 0-->Absent  8. Sensory: 0-->Normal, no sensory loss  9. Best Language: 1-->Mild-to-moderate aphasia, some obvious loss of fluency or facility of comprehension, without significant limitation on ideas expressed or form of expression. Reduction of speech and/or comprehension, however, makes conversation. . . (see row details)  10. Dysarthria: 0-->Normal  11. Extinction and Inattention (formerly Neglect): 0-->No abnormality    Total (NIH Stroke Scale): 1    Results from last 7 days   Lab Units 06/02/23  0339 06/01/23  0447 05/31/23  0435   WBC 10*3/mm3 8.60 9.61 9.33    HEMOGLOBIN g/dL 10.8* 11.8* 11.9*   PLATELETS 10*3/mm3 192 227 200     Results from last 7 days   Lab Units 06/02/23  0339 06/01/23  0447 05/31/23  0435   SODIUM mmol/L 139 139 143   POTASSIUM mmol/L 4.1 3.8 3.8   CO2 mmol/L 23.0 25.0 27.0   BUN mg/dL 15 16 16   CREATININE mg/dL 0.69* 0.77 0.70*   MAGNESIUM mg/dL 2.3 2.3 2.3   PHOSPHORUS mg/dL 3.3 3.7 4.4   GLUCOSE mg/dL 114* 184* 134*     Estimated Creatinine Clearance: 157.6 mL/min (A) (by C-G formula based on SCr of 0.69 mg/dL (L)).    I reviewed the patient's new clinical results.  I reviewed the patient's new imaging results/reports including actual images and agree with reports.     Assessment & Plan   Impression      Bilateral Subdural Hematoma    HTN    Hyperlipidemia (Diet controlled, off statin)    Hypothyroidism    Anxiety    H/O aortic mechanical valve replacement on Coumadin    New Onset Type 2 diabetes mellitus    47 yoM with PMH of HTN, T2DM, dyslipidemia, Hodgkin's lymphoma (2020), PDA closure at birth, VSD closure at age 14, AR / AS s/p Ross procedure and subsequent mechanical AVR with right ventricular outflow tract reconstruction ([facility]) that was complicated by pancreatitis and CVA 2* holding anticoagulation with some expressive aphasia and word finding difficulty (2018) who presented on 5/26 with AMS found to have bilateral subdural hematomas.     He also had associated edema with some leftward midline shift, right lateral and third ventricular effacement, and bilateral uncal medialization. He was hypertensive requiring Cardene infusion. INR was 2.08 received KCentra and Vitamin K.     NS was consulted and he was initiated on Keppra for seizure prophylaxis. He was taken to OR on 5/26 for emergent bilateral craniotomy and evacuation of subdural hematomas per Dr. Womack, followed by MMA embolization on 5/31.      Plan        # Bilateral subdural hematomas   # H/O CVA postop AVR with residual receptive aphasia and word finding  difficulty   # On chronic anticoagulation (warfarin)   · S/P bilateral craniotomies with SDH evacuation on 5/26 per Dr. Womack; drains D/C'd on 5/28  · Underwent Lt MMA embolization on 5/31 per Dr. Womack; right MMA too small for embolization   · Postoperative care per Neurosurgery  · Continue PT / OT / SLP; CM to set up outpatient SLP to help with expressive aphasia and word-finding difficulty upon discharge   · Patient wants to establish care with new PCP upon discharge  · AM labs     # HTN  # Dyslipidemia   # H/O mechanical AVR (2018) on warfarin   · Continue BB, statin therapy (Crestor)  · Plan to continue Heparin gtt over the weekend and transition to oral anticoagulation next week per Neurosurgery      # New onset T2DM (HgbA1c 8.9)   # Neuropathy   · Keep FSBG </= 150 mg/dL per Neurosurgery; currently within goal   · Continue current basal, prandial, & SSI correction   · Diabetes education to see prior to discharge   · Patient would like to establish care with Endocrinology upon discharge  · Continue gabapentin     # Hypothyroidism   · Continue levothyroxine     # Anxiety  # Depression   · Continue Cymbalta and Depakote ER  · Hold home Atarax for now     # Constipation   · Continue bowel regimen     DVT Prophylaxis: Heparin gtt / SCDs  GI Prophylaxis: PPI  Dispo: Transfer to telemetry; Hospitalists to assume attending role in AM     Time spent: 37 minutes   Plan of care and goals reviewed with multidisciplinary/antibiotic stewardship team during rounds.   I discussed the patient's findings and my recommendations with patient and nursing staff     Rupali Crespo, DNP, APRN, AGACNP-BC  Pulmonary and Critical Care Medicine

## 2023-06-02 NOTE — CASE MANAGEMENT/SOCIAL WORK
Continued Stay Note  [facility]     Patient Name: Manjeet Hall  MRN: [id number]  Today's Date: 6/2/2023    Admit Date: 5/26/2023    Plan: Home   Discharge Plan     Row Name 06/02/23 1030       Plan    Plan Home      Patient/Family in Agreement with Plan yes    Plan Comments Mr. Hall has been ordered out of the ICU today. He has been provided an order for outpatient speech therapy per his request. He would also like to have a [facility] primary provider arranged at discharge because his current PCP is retiring in six months. No further discharge needs identified.      Final Discharge Disposition Code 01 - home or self-care               Discharge Codes    No documentation.                     Juliet Dias RN

## 2023-06-02 NOTE — PLAN OF CARE
Problem: Adult Inpatient Plan of Care  Goal: Plan of Care Review  6/2/2023 1706 by Gabo Buck RN  Outcome: Ongoing, Progressing  6/2/2023 1704 by Gabo Buck RN  Outcome: Ongoing, Progressing  Goal: Patient-Specific Goal (Individualized)  6/2/2023 1706 by Gabo Buck RN  Outcome: Ongoing, Progressing  6/2/2023 1704 by Gabo Buck RN  Outcome: Ongoing, Progressing  Goal: Absence of Hospital-Acquired Illness or Injury  6/2/2023 1706 by Gabo Buck RN  Outcome: Ongoing, Progressing  6/2/2023 1704 by Gabo Buck RN  Outcome: Ongoing, Progressing  Intervention: Identify and Manage Fall Risk  Recent Flowsheet Documentation  Taken 6/2/2023 1620 by Gabo Buck RN  Safety Promotion/Fall Prevention:   activity supervised   safety round/check completed  Taken 6/2/2023 1433 by Gabo Buck RN  Safety Promotion/Fall Prevention:   activity supervised   safety round/check completed  Taken 6/2/2023 1147 by Gabo Buck RN  Safety Promotion/Fall Prevention:   safety round/check completed   room organization consistent   activity supervised  Intervention: Prevent Skin Injury  Recent Flowsheet Documentation  Taken 6/2/2023 1620 by Gabo Buck RN  Body Position: position changed independently  Skin Protection:   adhesive use limited   tubing/devices free from skin contact  Taken 6/2/2023 1433 by Gabo Buck RN  Body Position:   weight shifting   position changed independently  Skin Protection:   adhesive use limited   tubing/devices free from skin contact  Taken 6/2/2023 1147 by Gabo Buck RN  Body Position: position changed independently  Skin Protection:   adhesive use limited   drying agents applied  Intervention: Prevent and Manage VTE (Venous Thromboembolism) Risk  Recent Flowsheet Documentation  Taken 6/2/2023 1620 by Gabo Buck RN  VTE Prevention/Management:   bilateral   sequential compression devices on  Taken 6/2/2023 1147 by Gabo Buck RN  Activity Management:   ambulated in room   activity  encouraged  VTE Prevention/Management: bilateral  Range of Motion: active ROM (range of motion) encouraged  Intervention: Prevent Infection  Recent Flowsheet Documentation  Taken 6/2/2023 1620 by Gabo Buck RN  Infection Prevention:   single patient room provided   rest/sleep promoted  Taken 6/2/2023 1433 by Gabo Buck RN  Infection Prevention:   single patient room provided   rest/sleep promoted  Taken 6/2/2023 1147 by Gabo Buck RN  Infection Prevention:   rest/sleep promoted   single patient room provided  Goal: Optimal Comfort and Wellbeing  6/2/2023 1706 by Gabo Buck RN  Outcome: Ongoing, Progressing  6/2/2023 1704 by Gabo Buck RN  Outcome: Ongoing, Progressing  Intervention: Monitor Pain and Promote Comfort  Recent Flowsheet Documentation  Taken 6/2/2023 1433 by Gbao Buck RN  Pain Management Interventions:   see MAR   pillow support provided   position adjusted  Taken 6/2/2023 1147 by Gabo Buck RN  Pain Management Interventions: care clustered  Intervention: Provide Person-Centered Care  Recent Flowsheet Documentation  Taken 6/2/2023 1620 by Gabo Buck RN  Trust Relationship/Rapport:   choices provided   care explained  Taken 6/2/2023 1433 by Gabo Buck RN  Trust Relationship/Rapport: care explained  Taken 6/2/2023 1147 by Gabo Buck RN  Trust Relationship/Rapport:   care explained   choices provided  Goal: Readiness for Transition of Care  6/2/2023 1706 by Gabo Buck RN  Outcome: Ongoing, Progressing  6/2/2023 1704 by Gabo Buck RN  Outcome: Ongoing, Progressing     Problem: Skin Injury Risk Increased  Goal: Skin Health and Integrity  6/2/2023 1706 by Gabo Buck RN  Outcome: Ongoing, Progressing  6/2/2023 1704 by Gabo Buck RN  Outcome: Ongoing, Progressing  Intervention: Optimize Skin Protection  Recent Flowsheet Documentation  Taken 6/2/2023 1620 by Gabo Buck RN  Pressure Reduction Techniques:   frequent weight shift encouraged   heels elevated off bed  Head  of Bed (HOB) Positioning: HOB elevated  Pressure Reduction Devices:   alternating pressure pump (ADD)   specialty bed utilized  Skin Protection:   adhesive use limited   tubing/devices free from skin contact  Taken 6/2/2023 1433 by Gabo Buck RN  Pressure Reduction Techniques:   frequent weight shift encouraged   heels elevated off bed  Head of Bed (HOB) Positioning: HOB elevated  Pressure Reduction Devices:   alternating pressure pump (ADD)   specialty bed utilized   pressure-redistributing mattress utilized  Skin Protection:   adhesive use limited   tubing/devices free from skin contact  Taken 6/2/2023 1147 by Gabo Buck RN  Pressure Reduction Techniques:   weight shift assistance provided   frequent weight shift encouraged   heels elevated off bed  Head of Bed (HOB) Positioning: HOB elevated  Pressure Reduction Devices:   alternating pressure pump (ADD)   chair cushion utilized  Skin Protection:   adhesive use limited   drying agents applied     Problem: Fall Injury Risk  Goal: Absence of Fall and Fall-Related Injury  6/2/2023 1706 by Gabo Buck RN  Outcome: Ongoing, Progressing  6/2/2023 1704 by Gabo Buck RN  Outcome: Ongoing, Progressing  Intervention: Identify and Manage Contributors  Recent Flowsheet Documentation  Taken 6/2/2023 1620 by Gabo Buck RN  Medication Review/Management: medications reviewed  Taken 6/2/2023 1433 by Gabo Buck RN  Medication Review/Management: medications reviewed  Taken 6/2/2023 1147 by Gabo Buck RN  Medication Review/Management: medications reviewed  Intervention: Promote Injury-Free Environment  Recent Flowsheet Documentation  Taken 6/2/2023 1620 by Gabo Buck RN  Safety Promotion/Fall Prevention:   activity supervised   safety round/check completed  Taken 6/2/2023 1433 by Gabo Buck RN  Safety Promotion/Fall Prevention:   activity supervised   safety round/check completed  Taken 6/2/2023 1147 by Gabo Buck RN  Safety Promotion/Fall Prevention:    safety round/check completed   room organization consistent   activity supervised   Goal Outcome Evaluation:

## 2023-06-02 NOTE — PROGRESS NOTES
"NEUROSURGERY PROGRESS NOTE    Chief Complaint: Bilateral subacute subdural hematomas    Subjective: Stable overnight.  No issues    Objective    Vital Signs: Blood pressure 126/84, pulse 85, temperature 97.2 °F (36.2 °C), temperature source Axillary, resp. rate 14, height 172.7 cm (68\"), weight 108 kg (238 lb 1.6 oz), SpO2 96 %.    Physical Exam  Awake, alert and oriented x 3  Opens eyes spont  Pupils 3 mm rx bilat  Extraocular muscles intact bilaterally  Face symmetric bilaterally  Tongue midline  5/5 in all 4 ext  No pronator drift  Incisions clean dry and intact    Intake/Output:     Intake/Output Summary (Last 24 hours) at 6/2/2023 0917  Last data filed at 6/2/2023 0900  Gross per 24 hour   Intake 543 ml   Output --   Net 543 ml       Current Medications:   Current Facility-Administered Medications:   •  acetaminophen (TYLENOL) tablet 650 mg, 650 mg, Oral, Q6H PRN, Stephane Womack MD, 650 mg at 06/01/23 1542  •  dextrose (D50W) (25 g/50 mL) IV injection 25 g, 25 g, Intravenous, Q15 Min PRN, Stephane Womack MD  •  dextrose (GLUTOSE) oral gel 15 g, 15 g, Oral, Q15 Min PRN, Stephane Womack MD  •  divalproex (DEPAKOTE ER) 24 hr tablet 250 mg, 250 mg, Oral, Nightly, Stephane Womack MD, 250 mg at 06/01/23 2109  •  docusate sodium (COLACE) capsule 100 mg, 100 mg, Oral, BID PRN, Stephane Womack MD  •  DULoxetine (CYMBALTA) DR capsule 60 mg, 60 mg, Oral, Daily, Stephane Womack MD, 60 mg at 06/02/23 0844  •  gabapentin (NEURONTIN) capsule 200 mg, 200 mg, Oral, BID, Stephane Womack MD, 200 mg at 06/02/23 0843  •  glucagon (GLUCAGEN) injection 1 mg, 1 mg, Intramuscular, Q15 Min PRN, Stephane Womack MD  •  heparin 39463 units/250 mL (100 units/mL) in 0.45 % NaCl infusion, 12.5 Units/kg/hr, Intravenous, Titrated, Destiny Jean, PharmD, Last Rate: 13 mL/hr at 06/02/23 0420, 12.5 Units/kg/hr at 06/02/23 0420  •  HYDROcodone-acetaminophen (NORCO) 5-325 MG per tablet 1 tablet, 1 tablet, Oral, Q6H PRN, " Stephane Womack MD, 1 tablet at 06/02/23 0203  •  HYDROmorphone (DILAUDID) injection 0.5 mg, 0.5 mg, Intravenous, Q2H PRN **AND** naloxone (NARCAN) injection 0.4 mg, 0.4 mg, Intravenous, Q5 Min PRN, Stephane Womack MD  •  insulin detemir (LEVEMIR) injection 8 Units, 8 Units, Subcutaneous, Q12H, Stephane Womack MD, 8 Units at 06/02/23 0843  •  Insulin Lispro (humaLOG) injection 3-14 Units, 3-14 Units, Subcutaneous, 4x Daily AC & at Bedtime, Stephane Womack MD, 3 Units at 06/01/23 2121  •  Insulin Lispro (humaLOG) injection 4 Units, 4 Units, Subcutaneous, TID With Meals, Stephane Womack MD, 4 Units at 06/02/23 0843  •  levothyroxine (SYNTHROID, LEVOTHROID) tablet 125 mcg, 125 mcg, Oral, Daily, Stephane Womack MD, 125 mcg at 06/02/23 0843  •  magnesium hydroxide (MILK OF MAGNESIA) 400 MG/5ML suspension 15 mL, 15 mL, Oral, Daily PRN, Stephane Womack MD  •  melatonin tablet 5 mg, 5 mg, Oral, Nightly PRN, Chanell Garcia, APRN, 5 mg at 06/01/23 2109  •  metoprolol tartrate (LOPRESSOR) half tablet 12.5 mg, 12.5 mg, Oral, Q12H, Stephane Womack MD, 12.5 mg at 06/02/23 0855  •  nitroglycerin (NITROSTAT) SL tablet 0.4 mg, 0.4 mg, Sublingual, Q5 Min PRN, Stephane Womack MD  •  ondansetron (ZOFRAN) injection 4 mg, 4 mg, Intravenous, Q6H PRN **OR** ondansetron (ZOFRAN) tablet 4 mg, 4 mg, Oral, Q6H PRN, Stephane Womack MD  •  pantoprazole (PROTONIX) EC tablet 40 mg, 40 mg, Oral, Q AM, Stephane Womack MD, 40 mg at 06/02/23 0855  •  Pharmacy to Dose Heparin, , Does not apply, Continuous PRN, Stephane Womack MD  •  polyethylene glycol (MIRALAX) packet 17 g, 17 g, Oral, Daily, Stephane Womack MD, 17 g at 05/29/23 1153  •  rosuvastatin (CRESTOR) tablet 20 mg, 20 mg, Oral, Daily, Stephane Womack MD, 20 mg at 06/02/23 0843  •  sennosides-docusate (PERICOLACE) 8.6-50 MG per tablet 2 tablet, 2 tablet, Oral, BID, Stephane Womack MD, 2 tablet at 05/31/23 0807  •  sodium chloride 0.9 % flush 10 mL,  10 mL, Intravenous, Q12H, Stephane Womack MD, 10 mL at 06/02/23 0845  •  sodium chloride 0.9 % flush 10 mL, 10 mL, IntravenousNEREIDA Villelli, Nicolas, MD  •  sodium chloride 0.9 % flush 10 mL, 10 mL, Intravenous, Q12H, Stephane Womack MD, 10 mL at 06/02/23 0845  •  sodium chloride 0.9 % flush 10 mL, 10 mL, IntravenousNEREIDA Villelli, Nicolas, MD  •  sodium chloride 0.9 % infusion 40 mL, 40 mL, Intravenous, Vu PADRON Nicolas, MD     Laboratory Results:       Lab 06/02/23  0339 06/01/23  2006 06/01/23  1345 06/01/23  1230 06/01/23  0447 05/31/23  0435 05/30/23  1801 05/30/23  1222 05/30/23  0441 05/29/23  1004 05/27/23  1153 05/27/23  0519 05/26/23  1349 05/26/23  1348 05/26/23  1008   WBC 8.60  --   --   --  9.61 9.33  --   --  10.05 13.76*  --    < >  --   --  11.50*   HEMOGLOBIN 10.8*  --   --   --  11.8* 11.9*  --   --  11.3* 10.9*  --    < >  --   --  14.6   HEMATOCRIT 34.1*  --   --   --  36.7* 36.8*  --   --  36.0* 34.6*  --    < >  --   --  45.8   PLATELETS 192  --   --   --  227 200  --   --  183 173  --    < >  --   --  193   NEUTROS ABS  --   --   --   --   --  5.47  --   --   --   --   --   --   --   --  8.68*   IMMATURE GRANS (ABS)  --   --   --   --   --  0.37*  --   --   --   --   --   --   --   --  0.16*   LYMPHS ABS  --   --   --   --   --  2.25  --   --   --   --   --   --   --   --  1.63   MONOS ABS  --   --   --   --   --  0.70  --   --   --   --   --   --   --   --  0.71   EOS ABS  --   --   --   --   --  0.47*  --   --   --   --   --   --   --   --  0.26   MCV 87.2  --   --   --  85.2 85.6  --   --  87.4 85.2  --    < >  --   --  85.1   PROTIME  --   --   --   --   --   --   --  13.5  --   --  14.7*  --  13.3 14.0 23.6*   APTT  --   --   --   --   --   --   --  29.8*  --   --   --   --   --   --   --    HEPARIN ANTI-XA 0.40 0.33 0.23* >1.10* 0.11*  --    < > 0.35  --   --   --   --   --   --   --     < > = values in this interval not displayed.         Lab 06/02/23  0339  06/01/23  0447 05/31/23  0435 05/30/23  0441 05/29/23  1004 05/27/23  0519 05/26/23  1008   SODIUM 139 139 143 144 143   < > 139   POTASSIUM 4.1 3.8 3.8 4.0 3.8   < > 4.4   CHLORIDE 108* 105 106 106 105   < > 101   CO2 23.0 25.0 27.0 28.0 28.0   < > 24.0   ANION GAP 8.0 9.0 10.0 10.0 10.0   < > 14.0   BUN 15 16 16 20 20   < > 14   CREATININE 0.69* 0.77 0.70* 0.68* 0.64*   < > 0.72*   EGFR 114.9 111.1 114.4 115.4 117.5   < > 113.4   GLUCOSE 114* 184* 134* 123* 151*   < > 220*   CALCIUM 8.8 8.6 8.8 8.6 9.0   < > 9.7   MAGNESIUM 2.3 2.3 2.3 2.1 2.1   < > 2.2   PHOSPHORUS 3.3 3.7 4.4 5.0* 2.8   < >  --    HEMOGLOBIN A1C  --   --   --   --   --   --  8.90*   TSH  --   --   --   --   --   --  1.610    < > = values in this interval not displayed.         Lab 05/26/23  1008   TOTAL PROTEIN 8.4   ALBUMIN 4.7   GLOBULIN 3.7   ALT (SGPT) 14   AST (SGOT) 18   BILIRUBIN 0.6   ALK PHOS 86         Lab 05/30/23  1222 05/27/23  1153 05/26/23  1349 05/26/23  1348 05/26/23  1008   PROTIME 13.5 14.7* 13.3 14.0 23.6*   INR 1.02 1.13* 1.1 1.07 2.08*         Lab 05/27/23  0519   CHOLESTEROL 181   LDL CHOL 107*   HDL CHOL 48   TRIGLYCERIDES 147         Lab 05/26/23  1125 05/26/23  1014   ABO TYPING A A   RH TYPING Positive Positive   ANTIBODY SCREEN  --  Negative         Brief Urine Lab Results     None        Microbiology Results (last 10 days)     ** No results found for the last 240 hours. **           Diagnostic Imaging: I reviewed and independently interpreted the new imaging.     Assessment/Plan:  This is a 47-year-old male status post bilateral craniotomies for evacuation of subacute subdural hematomas followed by embolization of the left middle meningeal artery.  Patient is doing well neurologically as he is significantly improved from presentation.  Head CT from yesterday shows continued resolution of subdural hematomas.  I would like to keep the patient on a heparin drip over the weekend and then we can discuss transitioning to  oral anticoagulation next week.  From neurosurgical perspective, the patient is cleared to be transferred out of the ICU.  Appreciate ICU assistance.    Any copied data from previous notes included in the (1) History of Present Illness, (2) Physical Examination and (3) Medical Decision Making and/or Assessment and Plan has been reviewed and is accurate as of 06/02/23      Stephane Womack MD  06/02/23  09:17 EDT

## 2023-06-02 NOTE — THERAPY DISCHARGE NOTE
Acute Care - Occupational Therapy Discharge  [facility]    Patient Name: Manjeet Hall  : 1975    MRN: [id number]                              Today's Date: 2023       Admit Date: 2023    Visit Dx:     ICD-10-CM ICD-9-CM   1. Somnolence  R40.0 780.09   2. Subdural hematoma  S06.5XAA 432.1   3. Elevated INR  R79.1 790.92   4. H/O mechanical aortic valve replacement  Z95.2 V43.3   5. Hypertensive urgency  I16.0 401.9   6. Dysphagia, unspecified type  R13.10 787.20   7. Aphasia  R47.01 784.3     Patient Active Problem List   Diagnosis   • Bilateral Subdural Hematoma   • HTN   • Hyperlipidemia (Diet controlled, off statin)   • Hypothyroidism   • Anxiety   • Hodgkin's Lymphoma s/p XRT & Chemo   • VSD s/p closure (age 14)   • History of aortic regurgitation / stenosis s/p ROSS procedure with subsequent reversal with Mechanical AVR   • H/O aortic mechanical valve replacement on Coumadin   • New Onset Type 2 diabetes mellitus   • Somnolence     Past Medical History:   Diagnosis Date   • Cancer    • Diabetes mellitus    • Elevated cholesterol    • Stroke      History reviewed. No pertinent surgical history.   General Information     Row Name 23          OT Time and Intention    Document Type re-evaluation  -AN     Mode of Treatment occupational therapy  -AN     Row Name 23          General Information    Patient Profile Reviewed yes  -AN     Prior Level of Function independent:;all household mobility;community mobility;gait;ADL's;driving;work  -AN     Existing Precautions/Restrictions other (see comments)  crani prec  -AN     Barriers to Rehab none identified  -AN     Row Name 23          Living Environment    People in Home significant other  -AN     Row Name 23          Home Main Entrance    Number of Stairs, Main Entrance two  -AN     Stair Railings, Main Entrance none  -AN     Row Name 23          Stairs Within Home, Primary    Stairs,  Within Home, Primary 2 story home with basement, pt can stay on the main level  -AN     Number of Stairs, Within Home, Primary twelve  -AN     Row Name 06/02/23 0919          Cognition    Orientation Status (Cognition) oriented x 4  -AN     Row Name 06/02/23 0919          Safety Issues, Functional Mobility    Safety Issues Affecting Function (Mobility) safety precautions follow-through/compliance;safety precaution awareness  -AN           User Key  (r) = Recorded By, (t) = Taken By, (c) = Cosigned By    Initials Name Provider Type    AN Lina Lora OT Occupational Therapist               Mobility/ADL's     Row Name 06/02/23 0922          Bed Mobility    Bed Mobility supine-sit  -AN     Supine-Sit [place] (Bed Mobility) modified independence  -AN     Assistive Device (Bed Mobility) head of bed elevated  -AN     Row Name 06/02/23 0922          Transfers    Transfers sit-stand transfer;stand-sit transfer;bed-chair transfer  -AN     Row Name 06/02/23 0922          Bed-Chair Transfer    Bed-Chair [place] (Transfers) supervision  -AN     Row Name 06/02/23 0922          Sit-Stand Transfer    Sit-Stand [place] (Transfers) supervision  -AN     Row Name 06/02/23 0922          Stand-Sit Transfer    Stand-Sit [place] (Transfers) supervision  -AN     Row Name 06/02/23 0922          Functional Mobility    Functional Mobility- Ind. Level supervision required  -AN     Functional Mobility-Distance (Feet) --  >household distance  -AN     Functional Mobility- Comment pt ambulated >household distance with supervision and no LOB  -AN     Row Name 06/02/23 0922          Activities of Daily Living    BADL Assessment/Intervention upper body dressing;lower body dressing  -AN     Row Name 06/02/23 0922          Upper Body Dressing Assessment/Training    [place] Level (Upper Body Dressing) don  gown  -AN     Position (Upper Body Dressing) edge of bed sitting  -AN     Row Name 06/02/23 0922          Lower Body  Dressing Assessment/Training    [place] Level (Lower Body Dressing) don;doff;socks;supervision  -AN     Position (Lower Body Dressing) edge of bed sitting  -AN           User Key  (r) = Recorded By, (t) = Taken By, (c) = Cosigned By    Initials Name Provider Type    Lina Cortez OT Occupational Therapist               Obj/Interventions     Row Name 06/02/23 0924          Sensory Assessment (Somatosensory)    Sensory Assessment (Somatosensory) sensation intact  -AN     Row Name 06/02/23 0924          Vision Assessment/Intervention    Visual Impairment/Limitations WFL  -AN     Row Name 06/02/23 0924          Range of Motion Comprehensive    General Range of Motion no range of motion deficits identified  -AN     Row Name 06/02/23 0924          Strength Comprehensive (MMT)    General Manual Muscle Testing (MMT) Assessment no strength deficits identified  -AN     Row Name 06/02/23 0924          Motor Skills    Motor Skills coordination  -AN     Coordination WFL;fine motor deficit;gross motor deficit;bilateral;upper extremity  -AN     Row Name 06/02/23 0924          Balance    Balance Assessment sitting static balance;sitting dynamic balance;sit to stand dynamic balance;standing static balance;standing dynamic balance  -AN     Static Sitting Balance supervision  -AN     Dynamic Sitting Balance supervision  -AN     Position, Sitting Balance sitting edge of bed  -AN     Sit to Stand Dynamic Balance supervision  -AN     Static Standing Balance supervision  -AN     Dynamic Standing Balance supervision  -AN     Position/Device Used, Standing Balance unsupported  -AN     Comment, Balance no LOB throughout  -AN           User Key  (r) = Recorded By, (t) = Taken By, (c) = Cosigned By    Initials Name Provider Type    Lina Cortez OT Occupational Therapist               Goals/Plan     Row Name 06/02/23 0927          Dressing Goal 1 (OT)    Activity/Device (Dressing Goal 1, OT) dressing skills, all  -AN      [place]/Cues Needed (Dressing Goal 1, OT) set-up required;verbal cues required  -AN     Time Frame (Dressing Goal 1, OT) long term goal (LTG);by discharge  -AN     Progress/Outcome (Dressing Goal 1, OT) goal met  -AN     Row Name 06/02/23 0927          Toileting Goal 1 (OT)    Activity/Device (Toileting Goal 1, OT) toileting skills, all  -AN     [place] Level/Cues Needed (Toileting Goal 1, OT) modified independence;verbal cues required  -AN     Time Frame (Toileting Goal 1, OT) long term goal (LTG);by discharge  -AN     Progress/Outcome (Toileting Goal 1, OT) goal met  -AN     Row Name 06/02/23 0927          Problem Specific Goal 1 (OT)    Problem Specific Goal 1 (OT) Pt to demo independence with [facility] HEP to support ADL independence.  -AN     Time Frame (Problem Specific Goal 1, OT) long term goal (LTG);by discharge  -AN     Progress/Outcome (Problem Specific Goal 1, OT) goal met  -AN           User Key  (r) = Recorded By, (t) = Taken By, (c) = Cosigned By    Initials Name Provider Type    Lina Cortez, OT Occupational Therapist               Clinical Impression     Row Name 06/02/23 0925          Pain Assessment    Pretreatment Pain Rating 0/10 - no pain  -AN     Posttreatment Pain Rating 0/10 - no pain  -AN     Pre/Posttreatment Pain Comment asymptomatic  -AN     Pain Intervention(s) Repositioned;Ambulation/increased activity  -AN     Row Name 06/02/23 0925          Plan of Care Review    Plan of Care Reviewed With patient  -AN     Progress no change  -AN     Outcome Evaluation Pt presents near his functional baseline with all ADLs and mobility at this time. No further skilled OT services warranted. Rec home with assist at OR.  -AN     Row Name 06/02/23 0925          Therapy Plan Review/Discharge Plan (OT)    Anticipated Discharge Disposition (OT) home with assist  -AN     Row Name 06/02/23 0925          Vital Signs    Pre Systolic BP Rehab 122  -AN     Pre Treatment Diastolic BP 76  -AN      Post Systolic BP Rehab 126  -AN     Post Treatment Diastolic BP 84  -AN     O2 Delivery Pre Treatment room air  -AN     O2 Delivery Intra Treatment room air  -AN     O2 Delivery Post Treatment room air  -AN     Pre Patient Position Supine  -AN     Intra Patient Position Standing  -AN     Post Patient Position Sitting  -AN     Row Name 06/02/23 0925          Positioning and Restraints    Pre-Treatment Position in bed  -AN     Post Treatment Position chair  -AN     In Chair notified nsg;reclined;call light within reach;encouraged to call for assist;exit alarm on;legs elevated  -AN           User Key  (r) = Recorded By, (t) = Taken By, (c) = Cosigned By    Initials Name Provider Type    Lina Cortez OT Occupational Therapist               Outcome Measures     Row Name 06/02/23 0928          How much help from another is currently needed...    Putting on and taking off regular lower body clothing? 4  -AN     Bathing (including washing, rinsing, and drying) 4  -AN     Toileting (which includes using toilet bed pan or urinal) 4  -AN     Putting on and taking off regular upper body clothing 4  -AN     Taking care of personal grooming (such as brushing teeth) 4  -AN     Eating meals 4  -AN     AM-PAC 6 Clicks Score (OT) 24  -AN     Row Name 06/02/23 0928          Modified [place] Scale    Pre-Stroke Modified [place] Scale 6 - Unable to determine (UTD) from the medical record documentation  -AN     Modified [place] Scale 1 - No significant disability despite symptoms.  Able to carry out all usual duties and activities.  -AN     Row Name 06/02/23 0928          Functional Assessment    Outcome Measure Options AM-PAC 6 Clicks Daily Activity (OT);Modified [place]  -AN           User Key  (r) = Recorded By, (t) = Taken By, (c) = Cosigned By    Initials Name Provider Type    Lina Cortez OT Occupational Therapist              Occupational Therapy Education     Title: PT OT SLP Therapies (In Progress)     Topic:  Occupational Therapy (In Progress)     Point: ADL training (In Progress)     Description:   Instruct learner(s) on proper safety adaptation and remediation techniques during self care or transfers.   Instruct in proper use of assistive devices.              Learning Progress Summary           Patient Acceptance, E, VU by AN at 6/2/2023 0928    Acceptance, E, NR by  at 5/27/2023 0909    Comment: Educated pt regarding role of therapy   Significant Other Acceptance, E, NR by  at 5/27/2023 0909    Comment: Educated pt regarding role of therapy                   Point: Home exercise program (Done)     Description:   Instruct learner(s) on appropriate technique for monitoring, assisting and/or progressing therapeutic exercises/activities.              Learning Progress Summary           Patient Acceptance, E, VU by AN at 6/2/2023 0928                   Point: Precautions (Done)     Description:   Instruct learner(s) on prescribed precautions during self-care and functional transfers.              Learning Progress Summary           Patient Acceptance, E, VU by AN at 6/2/2023 0928                   Point: Body mechanics (Done)     Description:   Instruct learner(s) on proper positioning and spine alignment during self-care, functional mobility activities and/or exercises.              Learning Progress Summary           Patient Acceptance, E, VU by AN at 6/2/2023 0928                               User Key     Initials Effective Dates Name Provider Type Discipline     02/03/23 -  Katheryn Henriquez, OT Occupational Therapist OT    [place] 09/21/21 -  Lina Lora OT Occupational Therapist OT              OT Recommendation and Plan     Plan of Care Review  Plan of Care Reviewed With: patient  Progress: no change  Outcome Evaluation: Pt presents near his functional baseline with all ADLs and mobility at this time. No further skilled OT services warranted. Rec home with assist at [place].  Plan of Care Reviewed With:  patient  Outcome Evaluation: Pt presents near his functional baseline with all ADLs and mobility at this time. No further skilled OT services warranted. Rec home with assist at [place].     Time Calculation:    Time Calculation- OT     Row Name 06/02/23 0929             Time Calculation- OT    OT Start Time 0825  -AN      OT Received On 06/02/23  -AN         Timed Charges    01560 - OT Self Care/Mgmt Minutes 23  -AN         Untimed Charges    OT Eval/Re-eval Minutes 20  -AN         Total Minutes    Timed Charges Total Minutes 23  -AN      Untimed Charges Total Minutes 20  -AN       Total Minutes 43  -AN            User Key  (r) = Recorded By, (t) = Taken By, (c) = Cosigned By    Initials Name Provider Type    AN Lina Lora OT Occupational Therapist              Therapy Charges for Today     Code Description Service Date Service Provider Modifiers Qty    [id number]  OT SELF CARE/MGMT/TRAIN EA 15 MIN 6/2/2023 Lina Lora OT GO 2    [id number]  OT RE-EVAL 2 6/2/2023 Lina Lora OT GO 1             OT Discharge Summary  Anticipated Discharge Disposition (OT): home with assist  Reason for Discharge: At baseline function  Discharge Destination: Home with assist    Lina Lora OT  6/2/2023

## 2023-06-02 NOTE — CONSULTS
"Diabetes Education    Patient Name:  Manjeet Hall  YOB: 1975  MRN: [id number]  Admit Date:  5/26/2023      Consult for diabetes education received for new diagnosis; per stroke protocol. Chart reviewed. Pt was seen at bedside today. Permission given for visit. Also present at time of encounter was pt's sig other, who participated in discussion.    Discussed and taught pt about type 2 diabetes self-management, risk factors, and importance of blood glucose control to reduce complications. Target blood glucose readings and A1c goals per ADA were reviewed. Reviewed with patient current A1c 8.9 and discussed its significance. Signs, symptoms, and treatment of hyperglycemia and hypoglycemia were discussed.     Pts sig other states pt attended prediabetes classes in the past. She is able to verbalize several of the lifestyle modification concepts such as weight loss, 150 min/week activity, healthy eating, etc. We discussed how these principles apply in the setting of diabetes diagnosis as well. States pt drinks water and diet mountain dew, typically eats two meals per day, lunch and dinner. Pt typically \"not hungry\" upon awakening. We talked about benefits of consistent eating pattern.    Pt provided meter teach on DEMO meter. At this time, there are no donated meters to provide to pt. Pt would benefit from RX from glucose meter and testing supplies at discharge.     Lifestyle changes such as physical activity with MD approval and healthy eating were encouraged. Stressed the importance of strict blood sugar control after surgery to prevent complications such as infection and to promote healing of incision. Encouraged pt to monitor blood sugar at home at least one time per day and to call PCP if blood sugar is trending high. Encouraged to keep record of blood glucose readings to take to follow up appointment with PCP. Provided patient with copy of [employer] \"What is Diabetes\" handout, \"Blood " "Glucose Goals\" handout, and \"What is A1c\" handout. Also provided pamphlet detailing [facility] diabetes ed services.     Pt encouraged to review written materials. Will plan for follow-up if pt is still hospitalized on Monday.     Patient has been scheduled for outpatient diabetes education follow up visit on 6/23 at 2 pm via phone. Outpatient staff will provide reminder call prior to appointment. Patient was given reminder card with date and time of appointment and our contact information.     Total time spent reviewing chart, preparing materials and delivering education at bedside approx 45 min.    Thank you for this consult.     Electronically signed by:  Evelyn Gruber RN  06/02/23 15:07 EDT  "

## 2023-06-02 NOTE — PLAN OF CARE
Problem: Adult Inpatient Plan of Care  Goal: Plan of Care Review  Outcome: Ongoing, Progressing  Goal: Patient-Specific Goal (Individualized)  Outcome: Ongoing, Progressing  Goal: Absence of Hospital-Acquired Illness or Injury  Outcome: Ongoing, Progressing  Intervention: Identify and Manage Fall Risk  Recent Flowsheet Documentation  Taken 6/2/2023 1620 by Gabo Buck RN  Safety Promotion/Fall Prevention:   activity supervised   safety round/check completed  Taken 6/2/2023 1433 by Gabo Buck RN  Safety Promotion/Fall Prevention:   activity supervised   safety round/check completed  Taken 6/2/2023 1147 by Gabo Buck RN  Safety Promotion/Fall Prevention:   safety round/check completed   room organization consistent   activity supervised  Intervention: Prevent Skin Injury  Recent Flowsheet Documentation  Taken 6/2/2023 1620 by Gabo Buck RN  Body Position: position changed independently  Skin Protection:   adhesive use limited   tubing/devices free from skin contact  Taken 6/2/2023 1433 by Gabo Buck RN  Body Position:   weight shifting   position changed independently  Skin Protection:   adhesive use limited   tubing/devices free from skin contact  Taken 6/2/2023 1147 by Gabo Buck RN  Body Position: position changed independently  Skin Protection:   adhesive use limited   drying agents applied  Intervention: Prevent and Manage VTE (Venous Thromboembolism) Risk  Recent Flowsheet Documentation  Taken 6/2/2023 1620 by Gabo Buck RN  VTE Prevention/Management:   bilateral   sequential compression devices on  Taken 6/2/2023 1147 by Gabo Buck RN  Activity Management:   ambulated in room   activity encouraged  VTE Prevention/Management: bilateral  Range of Motion: active ROM (range of motion) encouraged  Intervention: Prevent Infection  Recent Flowsheet Documentation  Taken 6/2/2023 1620 by Gabo Buck RN  Infection Prevention:   single patient room provided   rest/sleep promoted  Taken 6/2/2023  1433 by Buck, Gabo, RN  Infection Prevention:   single patient room provided   rest/sleep promoted  Taken 6/2/2023 1147 by Gabo Buck RN  Infection Prevention:   rest/sleep promoted   single patient room provided  Goal: Optimal Comfort and Wellbeing  Outcome: Ongoing, Progressing  Intervention: Monitor Pain and Promote Comfort  Recent Flowsheet Documentation  Taken 6/2/2023 1433 by Gabo Buck RN  Pain Management Interventions:   see MAR   pillow support provided   position adjusted  Taken 6/2/2023 1147 by Gabo Buck RN  Pain Management Interventions: care clustered  Intervention: Provide Person-Centered Care  Recent Flowsheet Documentation  Taken 6/2/2023 1620 by Gabo Buck RN  Trust Relationship/Rapport:   choices provided   care explained  Taken 6/2/2023 1433 by Gabo Buck RN  Trust Relationship/Rapport: care explained  Taken 6/2/2023 1147 by Gabo Buck RN  Trust Relationship/Rapport:   care explained   choices provided  Goal: Readiness for Transition of Care  Outcome: Ongoing, Progressing     Problem: Skin Injury Risk Increased  Goal: Skin Health and Integrity  Outcome: Ongoing, Progressing  Intervention: Optimize Skin Protection  Recent Flowsheet Documentation  Taken 6/2/2023 1620 by Gabo Buck RN  Pressure Reduction Techniques:   frequent weight shift encouraged   heels elevated off bed  Head of Bed (HOB) Positioning: HOB elevated  Pressure Reduction Devices:   alternating pressure pump (ADD)   specialty bed utilized  Skin Protection:   adhesive use limited   tubing/devices free from skin contact  Taken 6/2/2023 1433 by Gabo Buck RN  Pressure Reduction Techniques:   frequent weight shift encouraged   heels elevated off bed  Head of Bed (HOB) Positioning: HOB elevated  Pressure Reduction Devices:   alternating pressure pump (ADD)   specialty bed utilized   pressure-redistributing mattress utilized  Skin Protection:   adhesive use limited   tubing/devices free from skin contact  Taken  6/2/2023 1147 by Gabo Buck RN  Pressure Reduction Techniques:   weight shift assistance provided   frequent weight shift encouraged   heels elevated off bed  Head of Bed (HOB) Positioning: HOB elevated  Pressure Reduction Devices:   alternating pressure pump (ADD)   chair cushion utilized  Skin Protection:   adhesive use limited   drying agents applied     Problem: Fall Injury Risk  Goal: Absence of Fall and Fall-Related Injury  Outcome: Ongoing, Progressing  Intervention: Identify and Manage Contributors  Recent Flowsheet Documentation  Taken 6/2/2023 1620 by Gabo Buck RN  Medication Review/Management: medications reviewed  Taken 6/2/2023 1433 by Gabo Buck RN  Medication Review/Management: medications reviewed  Taken 6/2/2023 1147 by Gabo Buck RN  Medication Review/Management: medications reviewed  Intervention: Promote Injury-Free Environment  Recent Flowsheet Documentation  Taken 6/2/2023 1620 by Gabo Buck RN  Safety Promotion/Fall Prevention:   activity supervised   safety round/check completed  Taken 6/2/2023 1433 by Gabo Buck RN  Safety Promotion/Fall Prevention:   activity supervised   safety round/check completed  Taken 6/2/2023 1147 by Gabo Buck RN  Safety Promotion/Fall Prevention:   safety round/check completed   room organization consistent   activity supervised

## 2023-06-02 NOTE — PLAN OF CARE
Goal Outcome Evaluation:  Plan of Care Reviewed With: patient        Progress: no change  Outcome Evaluation: Pt presents near his functional baseline with all ADLs and mobility at this time. No further skilled OT services warranted. Rec home with assist at dc.

## 2023-06-03 LAB
ANION GAP SERPL CALCULATED.3IONS-SCNC: 10 MMOL/L (ref 5–15)
ANION GAP SERPL CALCULATED.3IONS-SCNC: 10 MMOL/L (ref 5–15)
BASOPHILS # BLD AUTO: 0.05 10*3/MM3 (ref 0–0.2)
BASOPHILS # BLD AUTO: 0.05 10*3/MM3 (ref 0–0.2)
BASOPHILS NFR BLD AUTO: 0.6 % (ref 0–1.5)
BASOPHILS NFR BLD AUTO: 0.6 % (ref 0–1.5)
BUN SERPL-MCNC: 12 MG/DL (ref 6–20)
BUN SERPL-MCNC: 12 MG/DL (ref 6–20)
BUN/CREAT SERPL: 16.7 (ref 7–25)
BUN/CREAT SERPL: 16.7 (ref 7–25)
CALCIUM SPEC-SCNC: 9.1 MG/DL (ref 8.6–10.5)
CALCIUM SPEC-SCNC: 9.1 MG/DL (ref 8.6–10.5)
CHLORIDE SERPL-SCNC: 106 MMOL/L (ref 98–107)
CHLORIDE SERPL-SCNC: 106 MMOL/L (ref 98–107)
CO2 SERPL-SCNC: 24 MMOL/L (ref 22–29)
CO2 SERPL-SCNC: 24 MMOL/L (ref 22–29)
CREAT SERPL-MCNC: 0.72 MG/DL (ref 0.76–1.27)
CREAT SERPL-MCNC: 0.72 MG/DL (ref 0.76–1.27)
DEPRECATED RDW RBC AUTO: 47.1 FL (ref 37–54)
DEPRECATED RDW RBC AUTO: 47.1 FL (ref 37–54)
EGFRCR SERPLBLD CKD-EPI 2021: 113.4 ML/MIN/1.73
EGFRCR SERPLBLD CKD-EPI 2021: 113.4 ML/MIN/1.73
EOSINOPHIL # BLD AUTO: 0.51 10*3/MM3 (ref 0–0.4)
EOSINOPHIL # BLD AUTO: 0.51 10*3/MM3 (ref 0–0.4)
EOSINOPHIL NFR BLD AUTO: 5.7 % (ref 0.3–6.2)
EOSINOPHIL NFR BLD AUTO: 5.7 % (ref 0.3–6.2)
ERYTHROCYTE [DISTWIDTH] IN BLOOD BY AUTOMATED COUNT: 15.4 % (ref 12.3–15.4)
ERYTHROCYTE [DISTWIDTH] IN BLOOD BY AUTOMATED COUNT: 15.4 % (ref 12.3–15.4)
GLUCOSE BLDC GLUCOMTR-MCNC: 108 MG/DL (ref 70–130)
GLUCOSE BLDC GLUCOMTR-MCNC: 108 MG/DL (ref 70–130)
GLUCOSE BLDC GLUCOMTR-MCNC: 129 MG/DL (ref 70–130)
GLUCOSE BLDC GLUCOMTR-MCNC: 129 MG/DL (ref 70–130)
GLUCOSE BLDC GLUCOMTR-MCNC: 141 MG/DL (ref 70–130)
GLUCOSE BLDC GLUCOMTR-MCNC: 141 MG/DL (ref 70–130)
GLUCOSE BLDC GLUCOMTR-MCNC: 157 MG/DL (ref 70–130)
GLUCOSE BLDC GLUCOMTR-MCNC: 157 MG/DL (ref 70–130)
GLUCOSE SERPL-MCNC: 125 MG/DL (ref 65–99)
GLUCOSE SERPL-MCNC: 125 MG/DL (ref 65–99)
HCT VFR BLD AUTO: 37.2 % (ref 37.5–51)
HCT VFR BLD AUTO: 37.2 % (ref 37.5–51)
HGB BLD-MCNC: 11.7 G/DL (ref 13–17.7)
HGB BLD-MCNC: 11.7 G/DL (ref 13–17.7)
IMM GRANULOCYTES # BLD AUTO: 0.34 10*3/MM3 (ref 0–0.05)
IMM GRANULOCYTES # BLD AUTO: 0.34 10*3/MM3 (ref 0–0.05)
IMM GRANULOCYTES NFR BLD AUTO: 3.8 % (ref 0–0.5)
IMM GRANULOCYTES NFR BLD AUTO: 3.8 % (ref 0–0.5)
LYMPHOCYTES # BLD AUTO: 2.19 10*3/MM3 (ref 0.7–3.1)
LYMPHOCYTES # BLD AUTO: 2.19 10*3/MM3 (ref 0.7–3.1)
LYMPHOCYTES NFR BLD AUTO: 24.4 % (ref 19.6–45.3)
LYMPHOCYTES NFR BLD AUTO: 24.4 % (ref 19.6–45.3)
MAGNESIUM SERPL-MCNC: 2.3 MG/DL (ref 1.6–2.6)
MAGNESIUM SERPL-MCNC: 2.3 MG/DL (ref 1.6–2.6)
MCH RBC QN AUTO: 26.8 PG (ref 26.6–33)
MCH RBC QN AUTO: 26.8 PG (ref 26.6–33)
MCHC RBC AUTO-ENTMCNC: 31.5 G/DL (ref 31.5–35.7)
MCHC RBC AUTO-ENTMCNC: 31.5 G/DL (ref 31.5–35.7)
MCV RBC AUTO: 85.1 FL (ref 79–97)
MCV RBC AUTO: 85.1 FL (ref 79–97)
MONOCYTES # BLD AUTO: 0.76 10*3/MM3 (ref 0.1–0.9)
MONOCYTES # BLD AUTO: 0.76 10*3/MM3 (ref 0.1–0.9)
MONOCYTES NFR BLD AUTO: 8.5 % (ref 5–12)
MONOCYTES NFR BLD AUTO: 8.5 % (ref 5–12)
NEUTROPHILS NFR BLD AUTO: 5.13 10*3/MM3 (ref 1.7–7)
NEUTROPHILS NFR BLD AUTO: 5.13 10*3/MM3 (ref 1.7–7)
NEUTROPHILS NFR BLD AUTO: 57 % (ref 42.7–76)
NEUTROPHILS NFR BLD AUTO: 57 % (ref 42.7–76)
NRBC BLD AUTO-RTO: 0 /100 WBC (ref 0–0.2)
NRBC BLD AUTO-RTO: 0 /100 WBC (ref 0–0.2)
PHOSPHATE SERPL-MCNC: 3.7 MG/DL (ref 2.5–4.5)
PHOSPHATE SERPL-MCNC: 3.7 MG/DL (ref 2.5–4.5)
PLATELET # BLD AUTO: 210 10*3/MM3 (ref 140–450)
PLATELET # BLD AUTO: 210 10*3/MM3 (ref 140–450)
PMV BLD AUTO: 10.8 FL (ref 6–12)
PMV BLD AUTO: 10.8 FL (ref 6–12)
POTASSIUM SERPL-SCNC: 4 MMOL/L (ref 3.5–5.2)
POTASSIUM SERPL-SCNC: 4 MMOL/L (ref 3.5–5.2)
RBC # BLD AUTO: 4.37 10*6/MM3 (ref 4.14–5.8)
RBC # BLD AUTO: 4.37 10*6/MM3 (ref 4.14–5.8)
SODIUM SERPL-SCNC: 140 MMOL/L (ref 136–145)
SODIUM SERPL-SCNC: 140 MMOL/L (ref 136–145)
UFH PPP CHRO-ACNC: 0.24 IU/ML (ref 0.3–0.7)
UFH PPP CHRO-ACNC: 0.24 IU/ML (ref 0.3–0.7)
UFH PPP CHRO-ACNC: 0.26 IU/ML (ref 0.3–0.7)
UFH PPP CHRO-ACNC: 0.26 IU/ML (ref 0.3–0.7)
UFH PPP CHRO-ACNC: 0.31 IU/ML (ref 0.3–0.7)
UFH PPP CHRO-ACNC: 0.31 IU/ML (ref 0.3–0.7)
WBC NRBC COR # BLD: 8.98 10*3/MM3 (ref 3.4–10.8)
WBC NRBC COR # BLD: 8.98 10*3/MM3 (ref 3.4–10.8)

## 2023-06-03 PROCEDURE — 85025 COMPLETE CBC W/AUTO DIFF WBC: CPT | Performed by: NURSE PRACTITIONER

## 2023-06-03 PROCEDURE — 63710000001 INSULIN DETEMIR PER 5 UNITS: Performed by: NURSE PRACTITIONER

## 2023-06-03 PROCEDURE — 85520 HEPARIN ASSAY: CPT

## 2023-06-03 PROCEDURE — 99232 SBSQ HOSP IP/OBS MODERATE 35: CPT | Performed by: INTERNAL MEDICINE

## 2023-06-03 PROCEDURE — 63710000001 INSULIN LISPRO (HUMAN) PER 5 UNITS: Performed by: NURSE PRACTITIONER

## 2023-06-03 PROCEDURE — 80048 BASIC METABOLIC PNL TOTAL CA: CPT | Performed by: NURSE PRACTITIONER

## 2023-06-03 PROCEDURE — 25010000002 HEPARIN (PORCINE) 25000-0.45 UT/250ML-% SOLUTION

## 2023-06-03 PROCEDURE — 25010000002 HEPARIN (PORCINE) 25000-0.45 UT/250ML-% SOLUTION: Performed by: NURSE PRACTITIONER

## 2023-06-03 PROCEDURE — 84100 ASSAY OF PHOSPHORUS: CPT | Performed by: NURSE PRACTITIONER

## 2023-06-03 PROCEDURE — 82948 REAGENT STRIP/BLOOD GLUCOSE: CPT

## 2023-06-03 PROCEDURE — 83735 ASSAY OF MAGNESIUM: CPT | Performed by: NURSE PRACTITIONER

## 2023-06-03 RX ORDER — HEPARIN SODIUM 10000 [USP'U]/100ML
15 INJECTION, SOLUTION INTRAVENOUS
Status: DISCONTINUED | OUTPATIENT
Start: 2023-06-04 | End: 2023-06-05

## 2023-06-03 RX ADMIN — Medication 10 ML: at 21:04

## 2023-06-03 RX ADMIN — DULOXETINE 60 MG: 60 CAPSULE, DELAYED RELEASE ORAL at 09:31

## 2023-06-03 RX ADMIN — LEVOTHYROXINE SODIUM 125 MCG: 125 TABLET ORAL at 09:31

## 2023-06-03 RX ADMIN — HYDROCODONE BITARTRATE AND ACETAMINOPHEN 1 TABLET: 7.5; 325 TABLET ORAL at 14:11

## 2023-06-03 RX ADMIN — INSULIN DETEMIR 8 UNITS: 100 INJECTION, SOLUTION SUBCUTANEOUS at 21:04

## 2023-06-03 RX ADMIN — GABAPENTIN 200 MG: 100 CAPSULE ORAL at 09:31

## 2023-06-03 RX ADMIN — HEPARIN SODIUM 12.5 UNITS/KG/HR: 10000 INJECTION, SOLUTION INTRAVENOUS at 00:35

## 2023-06-03 RX ADMIN — HEPARIN SODIUM 13.5 UNITS/KG/HR: 10000 INJECTION, SOLUTION INTRAVENOUS at 19:24

## 2023-06-03 RX ADMIN — DOCUSATE SODIUM 50 MG AND SENNOSIDES 8.6 MG 2 TABLET: 8.6; 5 TABLET, FILM COATED ORAL at 21:03

## 2023-06-03 RX ADMIN — HYDROCODONE BITARTRATE AND ACETAMINOPHEN 1 TABLET: 7.5; 325 TABLET ORAL at 08:13

## 2023-06-03 RX ADMIN — DIVALPROEX SODIUM 250 MG: 250 TABLET, EXTENDED RELEASE ORAL at 21:04

## 2023-06-03 RX ADMIN — POLYETHYLENE GLYCOL 3350 17 G: 17 POWDER, FOR SOLUTION ORAL at 09:32

## 2023-06-03 RX ADMIN — ROSUVASTATIN 20 MG: 20 TABLET, FILM COATED ORAL at 09:32

## 2023-06-03 RX ADMIN — Medication 10 ML: at 09:33

## 2023-06-03 RX ADMIN — INSULIN DETEMIR 8 UNITS: 100 INJECTION, SOLUTION SUBCUTANEOUS at 09:56

## 2023-06-03 RX ADMIN — HEPARIN SODIUM 13.5 UNITS/KG/HR: 10000 INJECTION, SOLUTION INTRAVENOUS at 09:36

## 2023-06-03 RX ADMIN — PANTOPRAZOLE SODIUM 40 MG: 40 TABLET, DELAYED RELEASE ORAL at 08:12

## 2023-06-03 RX ADMIN — HYDROCODONE BITARTRATE AND ACETAMINOPHEN 1 TABLET: 7.5; 325 TABLET ORAL at 21:03

## 2023-06-03 RX ADMIN — INSULIN LISPRO 4 UNITS: 100 INJECTION, SOLUTION INTRAVENOUS; SUBCUTANEOUS at 12:27

## 2023-06-03 RX ADMIN — Medication 12.5 MG: at 09:31

## 2023-06-03 RX ADMIN — Medication 12.5 MG: at 21:04

## 2023-06-03 RX ADMIN — GABAPENTIN 200 MG: 100 CAPSULE ORAL at 21:03

## 2023-06-03 NOTE — PROGRESS NOTES
[facility] Medicine Services  PROGRESS NOTE    Patient Name: Manjeet Hall  : 1975  MRN: [id number]    Date of Admission: 2023  Primary Care Physician: Provider, No Known    Subjective   Subjective     CC:  SDH    HPI:  Feels fine today. Denies weakness    ROS:  Gen: no fever  Pulm: no cough  CV: no chest pain    Objective   Objective     Vital Signs:   Temp:  [98.3 °F (36.8 °C)-98.5 °F (36.9 °C)] 98.4 °F (36.9 °C)  Heart Rate:  [68-88] 80  Resp:  [16-18] 16  BP: (118-134)/(72-82) 118/76  Flow (L/min):  [0] 0     Physical Exam:  Constitutional - no acute distress, nontoxic, in bed  HEENT-NCAT, mucous membranes moist  CV-RRR, systolic murmur  Resp-CTAB, no wheezes, rhonchi or rales  Abd-soft, nontender, nondistended, normoactive bowel sounds  Ext-No lower extremity cyanosis, clubbing or edema bilaterally  Neuro-alert, speech clear, moves all extremities   Psych-normal affect   Skin- No rash on exposed UE or LE bilaterally      Results Reviewed:  LAB RESULTS:      Lab 23  1605 23  0721 23  0720 23  0339 23  1345 23  1230 23  0447 23  0435 23  1801 23  1222 23  0441   WBC  --  8.98  --  8.60  --   --   --  9.61 9.33  --   --  10.05   HEMOGLOBIN  --  11.7*  --  10.8*  --   --   --  11.8* 11.9*  --   --  11.3*   HEMATOCRIT  --  37.2*  --  34.1*  --   --   --  36.7* 36.8*  --   --  36.0*   PLATELETS  --  210  --  192  --   --   --  227 200  --   --  183   NEUTROS ABS  --  5.13  --   --   --   --   --   --  5.47  --   --   --    IMMATURE GRANS (ABS)  --  0.34*  --   --   --   --   --   --  0.37*  --   --   --    LYMPHS ABS  --  2.19  --   --   --   --   --   --  2.25  --   --   --    MONOS ABS  --  0.76  --   --   --   --   --   --  0.70  --   --   --    EOS ABS  --  0.51*  --   --   --   --   --   --  0.47*  --   --   --    MCV  --  85.1  --  87.2  --   --   --  85.2 85.6  --   --  87.4   PROTIME   --   --   --   --   --   --   --   --   --   --  13.5  --    APTT  --   --   --   --   --   --   --   --   --   --  29.8*  --    HEPARIN ANTI-XA 0.31  --  0.26* 0.40 0.33 0.23*   < > 0.11*  --    < > 0.35  --     < > = values in this interval not displayed.         Lab 06/03/23  0720 06/02/23  0339 06/01/23  0447 05/31/23  0435 05/30/23  0441   SODIUM 140 139 139 143 144   POTASSIUM 4.0 4.1 3.8 3.8 4.0   CHLORIDE 106 108* 105 106 106   CO2 24.0 23.0 25.0 27.0 28.0   ANION GAP 10.0 8.0 9.0 10.0 10.0   BUN 12 15 16 16 20   CREATININE 0.72* 0.69* 0.77 0.70* 0.68*   EGFR 113.4 114.9 111.1 114.4 115.4   GLUCOSE 125* 114* 184* 134* 123*   CALCIUM 9.1 8.8 8.6 8.8 8.6   MAGNESIUM 2.3 2.3 2.3 2.3 2.1   PHOSPHORUS 3.7 3.3 3.7 4.4 5.0*             Lab 05/30/23  1222   PROTIME 13.5   INR 1.02                 Brief Urine Lab Results       None            Microbiology Results Abnormal       None            No radiology results from the last 24 hrs        Current medications:  Scheduled Meds:divalproex, 250 mg, Oral, Nightly  DULoxetine, 60 mg, Oral, Daily  gabapentin, 200 mg, Oral, BID  insulin detemir, 8 Units, Subcutaneous, Q12H  insulin lispro, 3-14 Units, Subcutaneous, 4x Daily AC & at Bedtime  Insulin Lispro, 4 Units, Subcutaneous, TID With Meals  levothyroxine, 125 mcg, Oral, Daily  metoprolol tartrate, 12.5 mg, Oral, Q12H  pantoprazole, 40 mg, Oral, Q AM  polyethylene glycol, 17 g, Oral, Daily  rosuvastatin, 20 mg, Oral, Daily  senna-docusate sodium, 2 tablet, Oral, BID  sodium chloride, 10 mL, Intravenous, Q12H  sodium chloride, 10 mL, Intravenous, Q12H      Continuous Infusions:heparin, 13.5 Units/kg/hr, Last Rate: 13.5 Units/kg/hr (06/03/23 4902)  Pharmacy to Dose Heparin,       PRN Meds:.  acetaminophen    dextrose    dextrose    docusate sodium    glucagon (human recombinant)    HYDROcodone-acetaminophen    magnesium hydroxide    melatonin    nitroglycerin    ondansetron **OR** ondansetron    Pharmacy to Dose  Heparin    sodium chloride    sodium chloride    sodium chloride    Assessment & Plan   Assessment & Plan     Active Hospital Problems    Diagnosis  POA    **Bilateral Subdural Hematoma [I62.00]  Yes    HTN [I10]  Yes    Hyperlipidemia (Diet controlled, off statin) [E78.5]  Yes    Hypothyroidism [E03.9]  Yes    Anxiety [F41.9]  Yes    H/O aortic mechanical valve replacement on Coumadin [Z95.2]  Not Applicable    New Onset Type 2 diabetes mellitus [E11.9]  Yes      Resolved Hospital Problems   No resolved problems to display.        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        SDH, non traumatic  - s/p craniotomy and MMA embolization    Mechanical AoV  - heparin drip, consider transition to oral anticoagulation next week  - consider Cardiology consultation Monday    DMII  - new onset, A1c 8.9  - basal bolus  - diabetes education  - refer to endocrine at discharge    Hypothroid  - levothyroxine    Anxiety  Depression    Constipation      Expected Discharge Location and Transportation: home  Expected Discharge   Expected discharge date/ time has not been documented.     DVT prophylaxis:  Medical and mechanical DVT prophylaxis orders are present.     AM-PAC 6 Clicks Score (PT): 24 (06/03/23 0822)    CODE STATUS:   Code Status and Medical Interventions:   Ordered at: 05/27/23 0900     Code Status (Patient has no pulse and is not breathing):    CPR (Attempt to Resuscitate)     Medical Interventions (Patient has pulse or is breathing):    Full Support     Release to patient:     Routine Release       Alexey Benites MD  06/03/23

## 2023-06-03 NOTE — PLAN OF CARE
Problem: Adult Inpatient Plan of Care  Goal: Plan of Care Review  Outcome: Ongoing, Progressing  Goal: Patient-Specific Goal (Individualized)  Outcome: Ongoing, Progressing  Goal: Absence of Hospital-Acquired Illness or Injury  Outcome: Ongoing, Progressing  Intervention: Identify and Manage Fall Risk  Recent Flowsheet Documentation  Taken 6/3/2023 1806 by Gabo Buck RN  Safety Promotion/Fall Prevention:   activity supervised   safety round/check completed   toileting scheduled  Taken 6/3/2023 1444 by Gabo Buck RN  Safety Promotion/Fall Prevention:   activity supervised   safety round/check completed  Taken 6/3/2023 1216 by Gabo Buck RN  Safety Promotion/Fall Prevention:   activity supervised   toileting scheduled  Taken 6/3/2023 1003 by Gabo Buck RN  Safety Promotion/Fall Prevention:   activity supervised   safety round/check completed  Taken 6/3/2023 0822 by Gabo Buck RN  Safety Promotion/Fall Prevention:   activity supervised   safety round/check completed  Intervention: Prevent Skin Injury  Recent Flowsheet Documentation  Taken 6/3/2023 1806 by Gabo Buck RN  Body Position: position changed independently  Skin Protection: adhesive use limited  Taken 6/3/2023 1623 by Gabo Buck RN  Body Position:   weight shifting   upper extremity elevated  Skin Protection:   adhesive use limited   tubing/devices free from skin contact  Taken 6/3/2023 1444 by Gabo Buck RN  Body Position: weight shifting  Taken 6/3/2023 1216 by Gabo Buck RN  Body Position:   turned   position changed independently  Skin Protection: tubing/devices free from skin contact  Taken 6/3/2023 1003 by Gabo Buck RN  Body Position:   weight shifting   sitting up in bed  Taken 6/3/2023 0822 by Gabo Buck RN  Body Position:   weight shifting   sitting up in bed  Skin Protection: adhesive use limited  Intervention: Prevent and Manage VTE (Venous Thromboembolism) Risk  Recent Flowsheet Documentation  Taken 6/3/2023 1806  by Buck, Gabo, RN  Activity Management:   activity minimized   up in chair  Taken 6/3/2023 1623 by Gabo Buck RN  Activity Management:   activity minimized   up in chair  VTE Prevention/Management: (heprin gtt) other (see comments)  Taken 6/3/2023 1444 by Gabo Buck RN  Activity Management: activity encouraged  Taken 6/3/2023 1216 by Gabo Buck RN  Activity Management:   activity encouraged   up in chair  VTE Prevention/Management:   sequential compression devices on   bilateral  Taken 6/3/2023 1003 by Gabo Buck RN  Activity Management: activity encouraged  VTE Prevention/Management: (heparin gtt)   sequential compression devices off   other (see comments)  Taken 6/3/2023 0822 by Gabo Buck RN  Activity Management: activity encouraged  Intervention: Prevent Infection  Recent Flowsheet Documentation  Taken 6/3/2023 1806 by Gabo Buck RN  Infection Prevention: single patient room provided  Taken 6/3/2023 1623 by Gabo Buck RN  Infection Prevention: single patient room provided  Taken 6/3/2023 1216 by Gabo Buck RN  Infection Prevention:   single patient room provided   visitors restricted/screened  Taken 6/3/2023 1003 by Gabo Buck RN  Infection Prevention:   single patient room provided   rest/sleep promoted  Taken 6/3/2023 0822 by Gabo Buck RN  Infection Prevention: single patient room provided  Goal: Optimal Comfort and Wellbeing  Outcome: Ongoing, Progressing  Intervention: Monitor Pain and Promote Comfort  Recent Flowsheet Documentation  Taken 6/3/2023 1806 by Gabo Buck RN  Pain Management Interventions:   pillow support provided   position adjusted  Taken 6/3/2023 1623 by Gabo Buck RN  Pain Management Interventions:   awakened for pain meds per patient request   see MAR  Taken 6/3/2023 1444 by Gabo Buck RN  Pain Management Interventions: see MAR  Taken 6/3/2023 1216 by Gabo Buck RN  Pain Management Interventions: see MAR  Taken 6/3/2023 1003 by Gabo Buck  RN  Pain Management Interventions: see MAR  Taken 6/3/2023 0822 by Gabo Buck RN  Pain Management Interventions:   pillow support provided   see MAR  Intervention: Provide Person-Centered Care  Recent Flowsheet Documentation  Taken 6/3/2023 1806 by Gabo Buck RN  Trust Relationship/Rapport: care explained  Taken 6/3/2023 1623 by Gabo Buck RN  Trust Relationship/Rapport:   care explained   thoughts/feelings acknowledged  Taken 6/3/2023 1444 by Gabo Buck RN  Trust Relationship/Rapport: care explained  Taken 6/3/2023 1216 by Gabo Buck RN  Trust Relationship/Rapport: care explained  Taken 6/3/2023 1003 by Gabo Buck RN  Trust Relationship/Rapport: care explained  Goal: Readiness for Transition of Care  Outcome: Ongoing, Progressing     Problem: Skin Injury Risk Increased  Goal: Skin Health and Integrity  Outcome: Ongoing, Progressing  Intervention: Optimize Skin Protection  Recent Flowsheet Documentation  Taken 6/3/2023 1806 by Gabo Buck RN  Pressure Reduction Techniques:   heels elevated off bed   frequent weight shift encouraged  Head of Bed (HOB) Positioning: HOB elevated  Pressure Reduction Devices:   alternating pressure pump (ADD)   specialty bed utilized  Skin Protection: adhesive use limited  Taken 6/3/2023 1623 by Gabo Buck RN  Pressure Reduction Techniques:   frequent weight shift encouraged   weight shift assistance provided  Head of Bed (HOB) Positioning: HOB elevated  Pressure Reduction Devices:   specialty bed utilized   alternating pressure pump (ADD)  Skin Protection:   adhesive use limited   tubing/devices free from skin contact  Taken 6/3/2023 1444 by Gabo Buck RN  Head of Bed (HOB) Positioning: HOB elevated  Taken 6/3/2023 1216 by Gabo Buck RN  Pressure Reduction Techniques:   heels elevated off bed   frequent weight shift encouraged  Head of Bed (HOB) Positioning: HOB elevated  Pressure Reduction Devices:   alternating pressure pump (ADD)   specialty bed  utilized  Skin Protection: tubing/devices free from skin contact  Taken 6/3/2023 1003 by Gabo Buck RN  Head of Bed (HOB) Positioning: HOB elevated  Taken 6/3/2023 0822 by Gabo Buck RN  Pressure Reduction Techniques:   frequent weight shift encouraged   heels elevated off bed   weight shift assistance provided  Head of Bed (HOB) Positioning: HOB lowered  Pressure Reduction Devices: pressure-redistributing mattress utilized  Skin Protection: adhesive use limited     Problem: Fall Injury Risk  Goal: Absence of Fall and Fall-Related Injury  Outcome: Ongoing, Progressing  Intervention: Identify and Manage Contributors  Recent Flowsheet Documentation  Taken 6/3/2023 1806 by Gabo Buck RN  Medication Review/Management: medications reviewed  Taken 6/3/2023 1623 by Gabo Buck RN  Self-Care Promotion: independence encouraged  Taken 6/3/2023 1444 by Gabo Buck RN  Medication Review/Management: medications reviewed  Taken 6/3/2023 1216 by Gabo Buck RN  Medication Review/Management: medications reviewed  Taken 6/3/2023 1003 by Gabo Buck RN  Medication Review/Management: medications reviewed  Self-Care Promotion: independence encouraged  Taken 6/3/2023 0822 by Gabo Buck RN  Medication Review/Management: medications reviewed  Self-Care Promotion: independence encouraged  Intervention: Promote Injury-Free Environment  Recent Flowsheet Documentation  Taken 6/3/2023 1806 by Gabo Buck RN  Safety Promotion/Fall Prevention:   activity supervised   safety round/check completed   toileting scheduled  Taken 6/3/2023 1444 by Gabo Buck RN  Safety Promotion/Fall Prevention:   activity supervised   safety round/check completed  Taken 6/3/2023 1216 by Gabo Buck RN  Safety Promotion/Fall Prevention:   activity supervised   toileting scheduled  Taken 6/3/2023 1003 by Gabo Buck RN  Safety Promotion/Fall Prevention:   activity supervised   safety round/check completed  Taken 6/3/2023 0822 by Heber  Gabo RN  Safety Promotion/Fall Prevention:   activity supervised   safety round/check completed

## 2023-06-03 NOTE — PROGRESS NOTES
HEPARIN INFUSION  Manjeet Hall is a  47 y.o. male receiving heparin infusion.     Therapy for (VTE/Cardiac):   Cardiac   Patient Weight: 104 kg  Initial Bolus (Y/N):   No  Any Bolus (Y/N):   Y        Signs or Symptoms of Bleeding: No    Cardiac or Other (Not VTE)   Initial rate: 12 units/kg/hr (Max 1,000 units/hr)   Anti Xa Rebolus Infusion Hold time Change infusion Dose (Units/kg/hr) Next Anti Xa or aPTT Level Due   < 0.11 50 Units/kg  (4000 Units Max) None Increase by  3 Units/kg/hr 6 hours   0.11- 0.19 25 Units/kg  (2000 Units Max) None Increase by  2 Units/kg/hr 6 hours   0.2 - 0.29 0 None Increase by  1 Units/kg/hr 6 hours   0.3 - 0.5 0 None No Change 6 hours (after 2 consecutive levels in range check qAM)   0.51 - 0.6 0 None Decrease by  1 Units/kg/hr 6 hours   0.61 - 0.8 0 30 Minutes Decrease by  2 Units/kg/hr 6 hours   0.81 - 1 0 60 Minutes Decrease by  3 Units/kg/hr 6 hours   >1 0 Hold  After Anti Xa less than 0.5 decrease previous rate by  4 Units/kg/hr  Every 2 hours until Anti Xa  less than 0.5 then when infusion restarts in 6 hours     Recommend Xa every 6 hours.     Results from last 7 days   Lab Units 06/03/23  0721 06/02/23  0339 06/01/23  0447 05/31/23  0435 05/30/23  1222 05/29/23  1004 05/27/23  1153   INR   --   --   --   --  1.02  --  1.13*   HEMOGLOBIN g/dL 11.7* 10.8* 11.8*   < >  --    < >  --    HEMATOCRIT % 37.2* 34.1* 36.7*   < >  --    < >  --    PLATELETS 10*3/mm3 210 192 227   < >  --    < >  --     < > = values in this interval not displayed.          Date   Time   Anti-Xa Current Rate (Unit/kg/hr) Bolus   (Units) Rate Change   (Unit/kg/hr) New Rate (Unit/kg/hr) Next   Anti-Xa Comments  Pump Check Daily   5/30 1230 0.35 0 No initial bolus  +9.5 9.5 1830 D/W SAMANTA Novak. Stop time entered.    5/30 1801 0.32 9.5 -- -- 9.5 None D/w RN, plan to d/c heparin @0600 for procedure   6/1 0000 -- OFF -- +9.5 9.5 0600 Restart heparin drip 6/1 @ 0000 per Dr. Womack; D/w SAMANTA Novak   6/1  0630 0.11 9.5 -- +2 11.5 1200 D/w RN   6/1 1230 >1.1 11.5 -- -- -- 1330  Dw RN. Unsure if lab was drawn from the line so will redraw.   6/1 1345 0.23 11.5 -- +1 12.5 2000 Dw RN. Drip never held, previous lab likely an error.    6/1 2006 0.33 12.5 -- -- 12.5 0200 DW RN   6/2 0430 0.40 12.5 -- -- 12.5 0600 6/3 D/w RN   6/3 0720 0.26 12.5 -- +1 13.5 1500 D/w RN                                                                                                                                         Chidi Simon, [facility]  6/3/2023  08:17 EDT

## 2023-06-04 LAB
ANION GAP SERPL CALCULATED.3IONS-SCNC: 12 MMOL/L (ref 5–15)
ANION GAP SERPL CALCULATED.3IONS-SCNC: 12 MMOL/L (ref 5–15)
BUN SERPL-MCNC: 16 MG/DL (ref 6–20)
BUN SERPL-MCNC: 16 MG/DL (ref 6–20)
BUN/CREAT SERPL: 19.8 (ref 7–25)
BUN/CREAT SERPL: 19.8 (ref 7–25)
CALCIUM SPEC-SCNC: 8.8 MG/DL (ref 8.6–10.5)
CALCIUM SPEC-SCNC: 8.8 MG/DL (ref 8.6–10.5)
CHLORIDE SERPL-SCNC: 106 MMOL/L (ref 98–107)
CHLORIDE SERPL-SCNC: 106 MMOL/L (ref 98–107)
CO2 SERPL-SCNC: 24 MMOL/L (ref 22–29)
CO2 SERPL-SCNC: 24 MMOL/L (ref 22–29)
CREAT SERPL-MCNC: 0.81 MG/DL (ref 0.76–1.27)
CREAT SERPL-MCNC: 0.81 MG/DL (ref 0.76–1.27)
DEPRECATED RDW RBC AUTO: 47.3 FL (ref 37–54)
DEPRECATED RDW RBC AUTO: 47.3 FL (ref 37–54)
EGFRCR SERPLBLD CKD-EPI 2021: 109.4 ML/MIN/1.73
EGFRCR SERPLBLD CKD-EPI 2021: 109.4 ML/MIN/1.73
ERYTHROCYTE [DISTWIDTH] IN BLOOD BY AUTOMATED COUNT: 15.4 % (ref 12.3–15.4)
ERYTHROCYTE [DISTWIDTH] IN BLOOD BY AUTOMATED COUNT: 15.4 % (ref 12.3–15.4)
GLUCOSE BLDC GLUCOMTR-MCNC: 134 MG/DL (ref 70–130)
GLUCOSE BLDC GLUCOMTR-MCNC: 134 MG/DL (ref 70–130)
GLUCOSE BLDC GLUCOMTR-MCNC: 145 MG/DL (ref 70–130)
GLUCOSE BLDC GLUCOMTR-MCNC: 145 MG/DL (ref 70–130)
GLUCOSE BLDC GLUCOMTR-MCNC: 172 MG/DL (ref 70–130)
GLUCOSE BLDC GLUCOMTR-MCNC: 172 MG/DL (ref 70–130)
GLUCOSE BLDC GLUCOMTR-MCNC: 184 MG/DL (ref 70–130)
GLUCOSE BLDC GLUCOMTR-MCNC: 184 MG/DL (ref 70–130)
GLUCOSE SERPL-MCNC: 120 MG/DL (ref 65–99)
GLUCOSE SERPL-MCNC: 120 MG/DL (ref 65–99)
HCT VFR BLD AUTO: 36.2 % (ref 37.5–51)
HCT VFR BLD AUTO: 36.2 % (ref 37.5–51)
HGB BLD-MCNC: 11.4 G/DL (ref 13–17.7)
HGB BLD-MCNC: 11.4 G/DL (ref 13–17.7)
MCH RBC QN AUTO: 26.8 PG (ref 26.6–33)
MCH RBC QN AUTO: 26.8 PG (ref 26.6–33)
MCHC RBC AUTO-ENTMCNC: 31.5 G/DL (ref 31.5–35.7)
MCHC RBC AUTO-ENTMCNC: 31.5 G/DL (ref 31.5–35.7)
MCV RBC AUTO: 85.2 FL (ref 79–97)
MCV RBC AUTO: 85.2 FL (ref 79–97)
PLATELET # BLD AUTO: 203 10*3/MM3 (ref 140–450)
PLATELET # BLD AUTO: 203 10*3/MM3 (ref 140–450)
PMV BLD AUTO: 11.5 FL (ref 6–12)
PMV BLD AUTO: 11.5 FL (ref 6–12)
POTASSIUM SERPL-SCNC: 3.9 MMOL/L (ref 3.5–5.2)
POTASSIUM SERPL-SCNC: 3.9 MMOL/L (ref 3.5–5.2)
RBC # BLD AUTO: 4.25 10*6/MM3 (ref 4.14–5.8)
RBC # BLD AUTO: 4.25 10*6/MM3 (ref 4.14–5.8)
SODIUM SERPL-SCNC: 142 MMOL/L (ref 136–145)
SODIUM SERPL-SCNC: 142 MMOL/L (ref 136–145)
UFH PPP CHRO-ACNC: 0.36 IU/ML (ref 0.3–0.7)
UFH PPP CHRO-ACNC: 0.36 IU/ML (ref 0.3–0.7)
UFH PPP CHRO-ACNC: 0.38 IU/ML (ref 0.3–0.7)
UFH PPP CHRO-ACNC: 0.38 IU/ML (ref 0.3–0.7)
WBC NRBC COR # BLD: 10.46 10*3/MM3 (ref 3.4–10.8)
WBC NRBC COR # BLD: 10.46 10*3/MM3 (ref 3.4–10.8)

## 2023-06-04 PROCEDURE — 63710000001 INSULIN DETEMIR PER 5 UNITS: Performed by: NURSE PRACTITIONER

## 2023-06-04 PROCEDURE — 99232 SBSQ HOSP IP/OBS MODERATE 35: CPT | Performed by: INTERNAL MEDICINE

## 2023-06-04 PROCEDURE — 85520 HEPARIN ASSAY: CPT

## 2023-06-04 PROCEDURE — 63710000001 INSULIN LISPRO (HUMAN) PER 5 UNITS: Performed by: NURSE PRACTITIONER

## 2023-06-04 PROCEDURE — 80048 BASIC METABOLIC PNL TOTAL CA: CPT | Performed by: INTERNAL MEDICINE

## 2023-06-04 PROCEDURE — 85027 COMPLETE CBC AUTOMATED: CPT | Performed by: INTERNAL MEDICINE

## 2023-06-04 PROCEDURE — 25010000002 HEPARIN (PORCINE) 25000-0.45 UT/250ML-% SOLUTION

## 2023-06-04 PROCEDURE — 82948 REAGENT STRIP/BLOOD GLUCOSE: CPT

## 2023-06-04 RX ADMIN — HYDROCODONE BITARTRATE AND ACETAMINOPHEN 1 TABLET: 7.5; 325 TABLET ORAL at 05:56

## 2023-06-04 RX ADMIN — Medication 10 ML: at 08:23

## 2023-06-04 RX ADMIN — INSULIN LISPRO 3 UNITS: 100 INJECTION, SOLUTION INTRAVENOUS; SUBCUTANEOUS at 21:35

## 2023-06-04 RX ADMIN — Medication 12.5 MG: at 08:23

## 2023-06-04 RX ADMIN — PANTOPRAZOLE SODIUM 40 MG: 40 TABLET, DELAYED RELEASE ORAL at 05:56

## 2023-06-04 RX ADMIN — INSULIN LISPRO 4 UNITS: 100 INJECTION, SOLUTION INTRAVENOUS; SUBCUTANEOUS at 12:40

## 2023-06-04 RX ADMIN — ROSUVASTATIN 20 MG: 20 TABLET, FILM COATED ORAL at 08:20

## 2023-06-04 RX ADMIN — PANTOPRAZOLE SODIUM 40 MG: 40 TABLET, DELAYED RELEASE ORAL at 08:23

## 2023-06-04 RX ADMIN — INSULIN DETEMIR 8 UNITS: 100 INJECTION, SOLUTION SUBCUTANEOUS at 08:20

## 2023-06-04 RX ADMIN — DOCUSATE SODIUM 50 MG AND SENNOSIDES 8.6 MG 2 TABLET: 8.6; 5 TABLET, FILM COATED ORAL at 21:35

## 2023-06-04 RX ADMIN — HEPARIN SODIUM 15 UNITS/KG/HR: 10000 INJECTION, SOLUTION INTRAVENOUS at 11:37

## 2023-06-04 RX ADMIN — GABAPENTIN 200 MG: 100 CAPSULE ORAL at 21:35

## 2023-06-04 RX ADMIN — INSULIN LISPRO 4 UNITS: 100 INJECTION, SOLUTION INTRAVENOUS; SUBCUTANEOUS at 08:20

## 2023-06-04 RX ADMIN — INSULIN DETEMIR 8 UNITS: 100 INJECTION, SOLUTION SUBCUTANEOUS at 21:36

## 2023-06-04 RX ADMIN — Medication 10 ML: at 21:36

## 2023-06-04 RX ADMIN — Medication 12.5 MG: at 21:35

## 2023-06-04 RX ADMIN — GABAPENTIN 200 MG: 100 CAPSULE ORAL at 08:20

## 2023-06-04 RX ADMIN — INSULIN LISPRO 3 UNITS: 100 INJECTION, SOLUTION INTRAVENOUS; SUBCUTANEOUS at 12:40

## 2023-06-04 RX ADMIN — LEVOTHYROXINE SODIUM 125 MCG: 125 TABLET ORAL at 08:20

## 2023-06-04 RX ADMIN — DIVALPROEX SODIUM 250 MG: 250 TABLET, EXTENDED RELEASE ORAL at 21:35

## 2023-06-04 RX ADMIN — DULOXETINE 60 MG: 60 CAPSULE, DELAYED RELEASE ORAL at 08:20

## 2023-06-04 RX ADMIN — INSULIN LISPRO 4 UNITS: 100 INJECTION, SOLUTION INTRAVENOUS; SUBCUTANEOUS at 17:28

## 2023-06-04 NOTE — PROGRESS NOTES
[facility] Medicine Services  PROGRESS NOTE    Patient Name: Manjeet Hall  : 1975  MRN: [id number]    Date of Admission: 2023  Primary Care Physician: Provider, No Known    Subjective   Subjective     CC:  SDH    HPI:  Feels ok, no complaints.  Denies weakness.  Hasn't walked yet to today.     ROS:  Gen: no fever  Pulm: no cough    Objective   Objective     Vital Signs:   Temp:  [98.4 °F (36.9 °C)-98.6 °F (37 °C)] 98.4 °F (36.9 °C)  Heart Rate:  [] 77  Resp:  [16-18] 16  BP: (109-118)/(67-76) 109/67  Flow (L/min):  [0] 0     Physical Exam:  Constitutional - no acute distress, nontoxic, in bed  HEENT-mucous membranes moist  CV-RRR, systolic murmur, aortic valve click  Resp-CTAB, no wheezes, rhonchi or rales  Abd-soft, nontender, nondistended, normoactive bowel sounds  Ext-No lower extremity cyanosis, clubbing or edema bilaterally  Neuro-alert and oriented, speech clear, moves all extremities   Psych-normal affect   Skin- No rash on exposed UE or LE bilaterally      Results Reviewed:  LAB RESULTS:      Lab 23  0539 23  2150 23  1605 23  0721 23  0720 23  0339 23  1230 23  0447 23  0435 23  1801 23  1222   WBC 10.46  --   --  8.98  --  8.60  --  9.61 9.33  --   --    HEMOGLOBIN 11.4*  --   --  11.7*  --  10.8*  --  11.8* 11.9*  --   --    HEMATOCRIT 36.2*  --   --  37.2*  --  34.1*  --  36.7* 36.8*  --   --    PLATELETS 203  --   --  210  --  192  --  227 200  --   --    NEUTROS ABS  --   --   --  5.13  --   --   --   --  5.47  --   --    IMMATURE GRANS (ABS)  --   --   --  0.34*  --   --   --   --  0.37*  --   --    LYMPHS ABS  --   --   --  2.19  --   --   --   --  2.25  --   --    MONOS ABS  --   --   --  0.76  --   --   --   --  0.70  --   --    EOS ABS  --   --   --  0.51*  --   --   --   --  0.47*  --   --    MCV 85.2  --   --  85.1  --  87.2  --  85.2 85.6  --   --    PROTIME  --   --   --   --    --   --   --   --   --   --  13.5   APTT  --   --   --   --   --   --   --   --   --   --  29.8*   HEPARIN ANTI-XA 0.36 0.24* 0.31  --  0.26* 0.40   < > 0.11*  --    < > 0.35    < > = values in this interval not displayed.         Lab 06/04/23  0539 06/03/23  0720 06/02/23  0339 06/01/23  0447 05/31/23  0435 05/30/23  0441   SODIUM 142 140 139 139 143 144   POTASSIUM 3.9 4.0 4.1 3.8 3.8 4.0   CHLORIDE 106 106 108* 105 106 106   CO2 24.0 24.0 23.0 25.0 27.0 28.0   ANION GAP 12.0 10.0 8.0 9.0 10.0 10.0   BUN 16 12 15 16 16 20   CREATININE 0.81 0.72* 0.69* 0.77 0.70* 0.68*   EGFR 109.4 113.4 114.9 111.1 114.4 115.4   GLUCOSE 120* 125* 114* 184* 134* 123*   CALCIUM 8.8 9.1 8.8 8.6 8.8 8.6   MAGNESIUM  --  2.3 2.3 2.3 2.3 2.1   PHOSPHORUS  --  3.7 3.3 3.7 4.4 5.0*             Lab 05/30/23  1222   PROTIME 13.5   INR 1.02                 Brief Urine Lab Results       None            Microbiology Results Abnormal       None            No radiology results from the last 24 hrs        Current medications:  Scheduled Meds:divalproex, 250 mg, Oral, Nightly  DULoxetine, 60 mg, Oral, Daily  gabapentin, 200 mg, Oral, BID  insulin detemir, 8 Units, Subcutaneous, Q12H  insulin lispro, 3-14 Units, Subcutaneous, 4x Daily AC & at Bedtime  Insulin Lispro, 4 Units, Subcutaneous, TID With Meals  levothyroxine, 125 mcg, Oral, Daily  metoprolol tartrate, 12.5 mg, Oral, Q12H  pantoprazole, 40 mg, Oral, Q AM  polyethylene glycol, 17 g, Oral, Daily  rosuvastatin, 20 mg, Oral, Daily  senna-docusate sodium, 2 tablet, Oral, BID  sodium chloride, 10 mL, Intravenous, Q12H  sodium chloride, 10 mL, Intravenous, Q12H      Continuous Infusions:heparin, 15 Units/kg/hr, Last Rate: 15 Units/kg/hr (06/03/23 3673)  Pharmacy to Dose Heparin,       PRN Meds:.  acetaminophen    dextrose    dextrose    docusate sodium    glucagon (human recombinant)    HYDROcodone-acetaminophen    magnesium hydroxide    melatonin    nitroglycerin    ondansetron **OR**  ondansetron    Pharmacy to Dose Heparin    sodium chloride    sodium chloride    sodium chloride    Assessment & Plan   Assessment & Plan     Active Hospital Problems    Diagnosis  POA    **Bilateral Subdural Hematoma [I62.00]  Yes    HTN [I10]  Yes    Hyperlipidemia (Diet controlled, off statin) [E78.5]  Yes    Hypothyroidism [E03.9]  Yes    Anxiety [F41.9]  Yes    H/O aortic mechanical valve replacement on Coumadin [Z95.2]  Not Applicable    New Onset Type 2 diabetes mellitus [E11.9]  Yes      Resolved Hospital Problems   No resolved problems to display.        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        SDH, non traumatic  - s/p craniotomy and MMA embolization    Mechanical AoV  - heparin drip, consider transition to oral anticoagulation next week -- consider lovenox bridge  - will need close monitoring of INR as an outpatient -- home INR machine?  - consider Cardiology consultation Monday    DMII  - new onset, A1c 8.9  - basal bolus for now, probable metformin at discharge  - diabetes education Monday  - patient requests referral to endocrine at discharge    Hypothroid  - levothyroxine    Anxiety  Depression    Constipation      Expected Discharge Location and Transportation: home  Expected Discharge   Expected Discharge Date: 6/6/2023; Expected Discharge Time:      DVT prophylaxis:  Medical and mechanical DVT prophylaxis orders are present.     AM-PAC 6 Clicks Score (PT): 24 (06/03/23 0822)    CODE STATUS:   Code Status and Medical Interventions:   Ordered at: 05/27/23  0900     Code Status (Patient has no pulse and is not breathing):    CPR (Attempt to Resuscitate)     Medical Interventions (Patient has pulse or is breathing):    Full Support     Release to patient:    Routine Release       Alexey Benites MD  06/04/23

## 2023-06-04 NOTE — PROGRESS NOTES
[facility]PAR[facility] INFUSION  Manjeet Hall is a  47 y.o. male receiving heparin infusion.     Therapy for (VTE/Cardiac): Cardiac   Patient Weight: 108 kg (changed from 104 kg)  Initial Bolus (Y/N): No  Any Bolus (Y/N): Yes        Signs or Symptoms of Bleeding: No    Cardiac or Other (Not VTE)   Initial rate: 12 units/kg/hr (Max 1,000 units/hr)   Anti Xa Rebolus Infusion Hold time Change infusion Dose (Units/kg/hr) Next Anti Xa or aPTT Level Due   < 0.11 50 Units/kg  (4000 Units Max) None Increase by  3 Units/kg/hr 6 hours   0.11- 0.19 25 Units/kg  (2000 Units Max) None Increase by  2 Units/kg/hr 6 hours   0.2 - 0.29 0 None Increase by  1 Units/kg/hr 6 hours   0.3 - 0.5 0 None No Change 6 hours (after 2 consecutive levels in range check qAM)   0.51 - 0.6 0 None Decrease by  1 Units/kg/hr 6 hours   0.61 - 0.8 0 30 Minutes Decrease by  2 Units/kg/hr 6 hours   0.81 - 1 0 60 Minutes Decrease by  3 Units/kg/hr 6 hours   >1 0 Hold  After Anti Xa less than 0.5 decrease previous rate by  4 Units/kg/hr  Every 2 hours until Anti Xa  less than 0.5 then when infusion restarts in 6 hours       Results from last 7 days   Lab Units 06/04/23  0539 06/03/23  0721 06/02/23  0339 05/31/23  0435 05/30/23  1222   INR   --   --   --   --  1.02   HEMOGLOBIN g/dL 11.4* 11.7* 10.8*   < >  --    HEMATOCRIT % 36.2* 37.2* 34.1*   < >  --    PLATELETS 10*3/mm3 203 210 192   < >  --     < > = values in this interval not displayed.          Date   Time   Anti-Xa Current Rate (Unit/kg/hr) Bolus   (Units) Rate Change   (Unit/kg/hr) New Rate (Unit/kg/hr) Next   Anti-Xa Comments  Pump Check Daily   5/30 1230 0.35 0 No initial bolus  +9.5 9.5 1830 D/W SAMANTA Novak. Stop time entered.    5/30 1801 0.32 9.5 -- -- 9.5 None D/w RN, plan to d/c heparin @0600 for procedure   6/1 0000 -- OFF -- +9.5 9.5 0600 Restart heparin drip 6/1 @ 0000 per Dr. Womack; D/w SAMANTA Novak   6/1 0630 0.11 9.5 -- +2 11.5 1200 D/w RN   6/1 1230 >1.1 11.5 -- -- -- 1330  Dw SAMANTA. Unsure  if lab was drawn from the line so will redraw.   6/1 1345 0.23 11.5 -- +1 12.5 2000 Dw RN. Drip never held, previous lab likely an error.    6/1 2006 0.33 12.5 -- -- 12.5 0200 DW RN   6/2 0430 0.40 12.5 -- -- 12.5 0600 6/3 D/w RN   6/3 0720 0.26 12.5 -- +1 13.5 1500 D/w RN   6/3 1605 0.31 13.5 -- -- 13.5 2200 D/w SAMANTA Amin    6/3 2315 -- -- -- -- 13.8  Based on 108 kg -- Pump set at 110 kg  13.5 units/kg/hr  14.9 mL/hr   6/3 2150 0.24 13.8 -- +1.2 15 0600 Dosing weight 108 kg  Settings confirmed  15 units/kg/hr  16.2 mL/hr   6/4 0539 0.36 15 -- -- 15 1300 D/w SAMANTA Simon, [facility]  6/4/2023  07:31 EDT

## 2023-06-05 ENCOUNTER — READMISSION MANAGEMENT (OUTPATIENT)
Dept: CALL CENTER | Facility: HOSPITAL | Age: 48
End: 2023-06-05
Payer: MEDICARE

## 2023-06-05 ENCOUNTER — TELEPHONE (OUTPATIENT)
Dept: PHARMACY | Facility: HOSPITAL | Age: 48
End: 2023-06-05
Payer: MEDICARE

## 2023-06-05 VITALS
BODY MASS INDEX: 36.09 KG/M2 | BODY MASS INDEX: 36.09 KG/M2 | RESPIRATION RATE: 16 BRPM | DIASTOLIC BLOOD PRESSURE: 76 MMHG | DIASTOLIC BLOOD PRESSURE: 76 MMHG | OXYGEN SATURATION: 94 % | HEIGHT: 68 IN | SYSTOLIC BLOOD PRESSURE: 104 MMHG | HEIGHT: 68 IN | HEART RATE: 78 BPM | WEIGHT: 238.1 LBS | HEART RATE: 78 BPM | SYSTOLIC BLOOD PRESSURE: 104 MMHG | WEIGHT: 238.1 LBS | RESPIRATION RATE: 16 BRPM | TEMPERATURE: 97.9 F | OXYGEN SATURATION: 94 % | TEMPERATURE: 97.9 F

## 2023-06-05 LAB
ANION GAP SERPL CALCULATED.3IONS-SCNC: 11 MMOL/L (ref 5–15)
ANION GAP SERPL CALCULATED.3IONS-SCNC: 11 MMOL/L (ref 5–15)
BUN SERPL-MCNC: 13 MG/DL (ref 6–20)
BUN SERPL-MCNC: 13 MG/DL (ref 6–20)
BUN/CREAT SERPL: 18.3 (ref 7–25)
BUN/CREAT SERPL: 18.3 (ref 7–25)
CALCIUM SPEC-SCNC: 9 MG/DL (ref 8.6–10.5)
CALCIUM SPEC-SCNC: 9 MG/DL (ref 8.6–10.5)
CHLORIDE SERPL-SCNC: 106 MMOL/L (ref 98–107)
CHLORIDE SERPL-SCNC: 106 MMOL/L (ref 98–107)
CO2 SERPL-SCNC: 24 MMOL/L (ref 22–29)
CO2 SERPL-SCNC: 24 MMOL/L (ref 22–29)
CREAT SERPL-MCNC: 0.71 MG/DL (ref 0.76–1.27)
CREAT SERPL-MCNC: 0.71 MG/DL (ref 0.76–1.27)
DEPRECATED RDW RBC AUTO: 47.1 FL (ref 37–54)
DEPRECATED RDW RBC AUTO: 47.1 FL (ref 37–54)
EGFRCR SERPLBLD CKD-EPI 2021: 113.9 ML/MIN/1.73
EGFRCR SERPLBLD CKD-EPI 2021: 113.9 ML/MIN/1.73
ERYTHROCYTE [DISTWIDTH] IN BLOOD BY AUTOMATED COUNT: 15.6 % (ref 12.3–15.4)
ERYTHROCYTE [DISTWIDTH] IN BLOOD BY AUTOMATED COUNT: 15.6 % (ref 12.3–15.4)
GLUCOSE BLDC GLUCOMTR-MCNC: 112 MG/DL (ref 70–130)
GLUCOSE BLDC GLUCOMTR-MCNC: 112 MG/DL (ref 70–130)
GLUCOSE BLDC GLUCOMTR-MCNC: 122 MG/DL (ref 70–130)
GLUCOSE BLDC GLUCOMTR-MCNC: 122 MG/DL (ref 70–130)
GLUCOSE BLDC GLUCOMTR-MCNC: 133 MG/DL (ref 70–130)
GLUCOSE BLDC GLUCOMTR-MCNC: 133 MG/DL (ref 70–130)
GLUCOSE SERPL-MCNC: 130 MG/DL (ref 65–99)
GLUCOSE SERPL-MCNC: 130 MG/DL (ref 65–99)
HCT VFR BLD AUTO: 36.8 % (ref 37.5–51)
HCT VFR BLD AUTO: 36.8 % (ref 37.5–51)
HGB BLD-MCNC: 11.7 G/DL (ref 13–17.7)
HGB BLD-MCNC: 11.7 G/DL (ref 13–17.7)
INR PPP: 1.02 (ref 0.89–1.12)
INR PPP: 1.02 (ref 0.89–1.12)
MCH RBC QN AUTO: 27 PG (ref 26.6–33)
MCH RBC QN AUTO: 27 PG (ref 26.6–33)
MCHC RBC AUTO-ENTMCNC: 31.8 G/DL (ref 31.5–35.7)
MCHC RBC AUTO-ENTMCNC: 31.8 G/DL (ref 31.5–35.7)
MCV RBC AUTO: 85 FL (ref 79–97)
MCV RBC AUTO: 85 FL (ref 79–97)
PLATELET # BLD AUTO: 207 10*3/MM3 (ref 140–450)
PLATELET # BLD AUTO: 207 10*3/MM3 (ref 140–450)
PMV BLD AUTO: 11.9 FL (ref 6–12)
PMV BLD AUTO: 11.9 FL (ref 6–12)
POTASSIUM SERPL-SCNC: 4.1 MMOL/L (ref 3.5–5.2)
POTASSIUM SERPL-SCNC: 4.1 MMOL/L (ref 3.5–5.2)
PROTHROMBIN TIME: 13.5 SECONDS (ref 12.2–14.5)
PROTHROMBIN TIME: 13.5 SECONDS (ref 12.2–14.5)
RBC # BLD AUTO: 4.33 10*6/MM3 (ref 4.14–5.8)
RBC # BLD AUTO: 4.33 10*6/MM3 (ref 4.14–5.8)
SODIUM SERPL-SCNC: 141 MMOL/L (ref 136–145)
SODIUM SERPL-SCNC: 141 MMOL/L (ref 136–145)
UFH PPP CHRO-ACNC: 0.41 IU/ML (ref 0.3–0.7)
UFH PPP CHRO-ACNC: 0.41 IU/ML (ref 0.3–0.7)
WBC NRBC COR # BLD: 10.3 10*3/MM3 (ref 3.4–10.8)
WBC NRBC COR # BLD: 10.3 10*3/MM3 (ref 3.4–10.8)

## 2023-06-05 PROCEDURE — 85027 COMPLETE CBC AUTOMATED: CPT | Performed by: INTERNAL MEDICINE

## 2023-06-05 PROCEDURE — 99239 HOSP IP/OBS DSCHRG MGMT >30: CPT | Performed by: FAMILY MEDICINE

## 2023-06-05 PROCEDURE — 80048 BASIC METABOLIC PNL TOTAL CA: CPT | Performed by: INTERNAL MEDICINE

## 2023-06-05 PROCEDURE — 63710000001 INSULIN LISPRO (HUMAN) PER 5 UNITS: Performed by: NURSE PRACTITIONER

## 2023-06-05 PROCEDURE — 85610 PROTHROMBIN TIME: CPT

## 2023-06-05 PROCEDURE — 99222 1ST HOSP IP/OBS MODERATE 55: CPT | Performed by: INTERNAL MEDICINE

## 2023-06-05 PROCEDURE — 25010000002 ENOXAPARIN PER 10 MG: Performed by: PHYSICIAN ASSISTANT

## 2023-06-05 PROCEDURE — 82948 REAGENT STRIP/BLOOD GLUCOSE: CPT

## 2023-06-05 PROCEDURE — 25010000002 HEPARIN (PORCINE) 25000-0.45 UT/250ML-% SOLUTION

## 2023-06-05 PROCEDURE — 85520 HEPARIN ASSAY: CPT

## 2023-06-05 PROCEDURE — 63710000001 INSULIN DETEMIR PER 5 UNITS: Performed by: NURSE PRACTITIONER

## 2023-06-05 RX ORDER — METFORMIN HYDROCHLORIDE 500 MG/1
500 TABLET, EXTENDED RELEASE ORAL
Qty: 30 TABLET | Refills: 0 | Status: SHIPPED | OUTPATIENT
Start: 2023-06-05

## 2023-06-05 RX ORDER — ASPIRIN 81 MG/1
81 TABLET ORAL DAILY
Status: CANCELLED | OUTPATIENT
Start: 2023-06-05

## 2023-06-05 RX ORDER — ENOXAPARIN SODIUM 100 MG/ML
1.5 INJECTION SUBCUTANEOUS ONCE
Status: COMPLETED | OUTPATIENT
Start: 2023-06-05 | End: 2023-06-05

## 2023-06-05 RX ORDER — WARFARIN SODIUM 5 MG/1
5 TABLET ORAL
Status: DISCONTINUED | OUTPATIENT
Start: 2023-06-05 | End: 2023-06-05 | Stop reason: HOSPADM

## 2023-06-05 RX ADMIN — LEVOTHYROXINE SODIUM 125 MCG: 125 TABLET ORAL at 08:29

## 2023-06-05 RX ADMIN — DULOXETINE 60 MG: 60 CAPSULE, DELAYED RELEASE ORAL at 08:29

## 2023-06-05 RX ADMIN — INSULIN DETEMIR 8 UNITS: 100 INJECTION, SOLUTION SUBCUTANEOUS at 08:29

## 2023-06-05 RX ADMIN — HEPARIN SODIUM 15 UNITS/KG/HR: 10000 INJECTION, SOLUTION INTRAVENOUS at 04:25

## 2023-06-05 RX ADMIN — Medication 12.5 MG: at 08:29

## 2023-06-05 RX ADMIN — PANTOPRAZOLE SODIUM 40 MG: 40 TABLET, DELAYED RELEASE ORAL at 08:29

## 2023-06-05 RX ADMIN — ROSUVASTATIN 20 MG: 20 TABLET, FILM COATED ORAL at 08:29

## 2023-06-05 RX ADMIN — POLYETHYLENE GLYCOL 3350 17 G: 17 POWDER, FOR SOLUTION ORAL at 08:34

## 2023-06-05 RX ADMIN — INSULIN LISPRO 4 UNITS: 100 INJECTION, SOLUTION INTRAVENOUS; SUBCUTANEOUS at 08:30

## 2023-06-05 RX ADMIN — GABAPENTIN 200 MG: 100 CAPSULE ORAL at 08:29

## 2023-06-05 RX ADMIN — ENOXAPARIN SODIUM 160 MG: 80 INJECTION SUBCUTANEOUS at 12:35

## 2023-06-05 RX ADMIN — WARFARIN SODIUM 5 MG: 5 TABLET ORAL at 17:05

## 2023-06-05 RX ADMIN — Medication 10 ML: at 08:30

## 2023-06-05 RX ADMIN — INSULIN LISPRO 4 UNITS: 100 INJECTION, SOLUTION INTRAVENOUS; SUBCUTANEOUS at 12:35

## 2023-06-05 RX ADMIN — DOCUSATE SODIUM 50 MG AND SENNOSIDES 8.6 MG 2 TABLET: 8.6; 5 TABLET, FILM COATED ORAL at 08:34

## 2023-06-05 NOTE — TELEPHONE ENCOUNTER
"Notified by inpatient pharmacist hat Mr Hall is likely to be discharged form hospital today.  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 Per cardiology's note \"recommend no bridge of Lovenox- single shot today before discharge and then restart coumadin 5mg daily with PT/INR check in 3 days\"    He will be returning home and normally check at [facility]  Plan to recheck on Thursday 6/8.  Of note he currently has 2 profiles in [employer] that have not yet been merged.  Details of his admission are on the second profile.  "

## 2023-06-05 NOTE — CONSULTS
"Diabetes Education  Assessment/Teaching    Patient Name:  Manjeet Hall  YOB: 1975  MRN: [id number]  Admit Date:  5/26/2023      Assessment Date:  6/5/2023    Patient seen at bedside for followup education. Diabetes nurse educator provided education on 6/2/23. Patient reports \" he needs no further education at this time.\" Patient will need a meter and testing supplies to test 1 to 2 times daily at discharge. Note placed on care teams communication note.     Thank you for the referral,    Keyla StaleyRN,BSN,PCCN      Electronically signed by:  Keyla Staley RN  06/05/23 13:50 EDT  "

## 2023-06-05 NOTE — DISCHARGE SUMMARY
[facility] Medicine Services  DISCHARGE SUMMARY    Patient Name: Manjeet Hall  : 1975  MRN: [id number]    Date of Admission: 2023  9:26 AM  Date of Discharge:  2023  Primary Care Physician: Provider, No Known    Consults       Date and Time Order Name Status Description    2023 12:31 AM Inpatient Cardiology Consult Completed     2023  8:26 PM Inpatient Neurosurgery Consult              Hospital Course       Active Hospital Problems    Diagnosis  POA    **Bilateral Subdural Hematoma [I62.00]  Yes    HTN [I10]  Yes    Hyperlipidemia (Diet controlled, off statin) [E78.5]  Yes    Hypothyroidism [E03.9]  Yes    Anxiety [F41.9]  Yes    H/O aortic mechanical valve replacement on Coumadin [Z95.2]  Not Applicable    New Onset Type 2 diabetes mellitus [E11.9]  Yes      Resolved Hospital Problems   No resolved problems to display.          Hospital Course:  Manjeet Hall is a 47 y.o. 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      SDH, non traumatic  - s/p craniotomy and MMA embolization  - Neurosurgery ok with resuming AC today  - Follow-up 1 month      Mechanical AoV  - Cardiology has evaluated   - Plan to give 1 dose of Lovenox and DC home on Coumadin 5mg (home dose) and follow-up INR in 2-3 days. Referral generated for Coumadin Clinic      DMII  - new onset, A1c 8.9  - DC on Metformin 500mg daily   - patient requests referral to endocrine at discharge, ordered      Hypothroid  - levothyroxine     Anxiety  Depression  -Continue home medications  09/19/19 1058 Wound Leg lower Left;Lateral  
Date First Assessed/Time First Assessed: 08/05/19 0853   Present on Hospital Admission: Yes  Wound Approximate Age at First Assessment (Weeks): 4 weeks  Primary Wound Type: Vascular  Location: Leg lower  Wound Location Orientation: Left;Lateral  
Dressing Type Applied (adaptic, [employer]) Discharged from wound center, ambulatory, with assistive device, has appointment to follow up with MD in 1 week. Ute Sheppard     Discharge Follow Up Recommendations for outpatient labs/diagnostics:  -PCP 1 week  -INR check, Coumadin Clinic, 2-3 days   -Dr John 4 weeks   -Dr Womack 1 month     Day of Discharge     HPI:   Patient seen and examined. Doing well, wants to go home.     Review of Systems  Gen- No fevers, chills  CV- No chest pain, palpitations  Resp- No cough, dyspnea  GI- No N/V/D, abd pain    Vital Signs:   Temp:  [98 °F (36.7 °C)-98.6 °F (37 °C)] 98 °F (36.7 °C)  Heart Rate:  [77-95] 78  Resp:  [16] 16  BP: (100-116)/(72-80) 104/76      Physical Exam:  Constitutional: No acute distress, awake, alert  HENT: NCAT, mucous membranes moist  Respiratory: Clear to auscultation bilaterally, respiratory effort normal   Cardiovascular: RRR, no murmurs, rubs, or gallops  Gastrointestinal: Positive bowel sounds, soft, nontender, nondistended  Musculoskeletal: No bilateral ankle edema  Psychiatric: Appropriate affect, cooperative  Neurologic: Oriented x 3, no focal deficits   Skin: No rashes     Pertinent  and/or Most Recent Results     LAB RESULTS:      Lab 06/05/23  0646 06/04/23  1401 06/04/23  0539 06/03/23  2150 06/03/23  1605 06/03/23  0721 06/03/23  0720 06/02/23  0339 06/01/23  1230 06/01/23  0447 05/31/23  0435 05/30/23  1801 05/30/23  1222   WBC 10.30  --  10.46  --   --  8.98  --  8.60  --  9.61 9.33  --   --    HEMOGLOBIN 11.7*  --  11.4*  --   --  11.7*  --  10.8*  --  11.8* 11.9*  --   --    HEMATOCRIT 36.8*  --  36.2*  --   --  37.2*  --  34.1*  --  36.7* 36.8*  --   --    PLATELETS 207  --  203  --   --  210  --  192  --  227 200  --   --    NEUTROS ABS  --   --   --   --   --  5.13  --   --   --   --  5.47  --   --    IMMATURE GRANS (ABS)  --   --   --   --   --  0.34*  --   --   --   --  0.37*  --   --    LYMPHS ABS  --   --   --   --   --  2.19  --   --   --   --  2.25  --   --    MONOS ABS  --   --   --   --   --  0.76  --   --   --   --  0.70  --   --    EOS ABS  --   --   --   --   --  0.51*  --   --   --   --   0.47*  --   --    MCV 85.0  --  85.2  --   --  85.1  --  87.2  --  85.2 85.6  --   --    PROTIME 13.5  --   --   --   --   --   --   --   --   --   --   --  13.5   APTT  --   --   --   --   --   --   --   --   --   --   --   --  29.8*   HEPARIN ANTI-XA 0.41 0.38 0.36 0.24* 0.31  --    < > 0.40   < > 0.11*  --    < > 0.35    < > = values in this interval not displayed.         Lab 06/05/23  0646 06/04/23  0539 06/03/23  0720 06/02/23  0339 06/01/23  0447 05/31/23  0435 05/30/23  0441   SODIUM 141 142 140 139 139 143 144   POTASSIUM 4.1 3.9 4.0 4.1 3.8 3.8 4.0   CHLORIDE 106 106 106 108* 105 106 106   CO2 24.0 24.0 24.0 23.0 25.0 27.0 28.0   ANION GAP 11.0 12.0 10.0 8.0 9.0 10.0 10.0   BUN 13 16 12 15 16 16 20   CREATININE 0.71* 0.81 0.72* 0.69* 0.77 0.70* 0.68*   EGFR 113.9 109.4 113.4 114.9 111.1 114.4 115.4   GLUCOSE 130* 120* 125* 114* 184* 134* 123*   CALCIUM 9.0 8.8 9.1 8.8 8.6 8.8 8.6   MAGNESIUM  --   --  2.3 2.3 2.3 2.3 2.1   PHOSPHORUS  --   --  3.7 3.3 3.7 4.4 5.0*             Lab 06/05/23  0646 05/30/23  1222   PROTIME 13.5 13.5   INR 1.02 1.02                 Brief Urine Lab Results       None          Microbiology Results (last 10 days)       ** No results found for the last 240 hours. **            CT Head Without Contrast    Result Date: 6/1/2023  CT HEAD WO CONTRAST Date of Exam: 6/1/2023 4:30 AM EDT Indication: SDH. Comparison: 5/27/2023 Technique: Axial CT images were obtained of the head without contrast administration.  Automated exposure control and iterative construction methods were used. Findings: The air and fluid collections in the subdural space on both the right and left have not changed significantly in size since the last study. There are postoperative changes of bilateral craniotomy and drain placement. Blood is identified dependently in both subdural collections. The ventricular size and configuration is unchanged compared with the last study. There is low-attenuation identified in the  left temporal lobe which appears more pronounced than on the previous exam. The right temporal hypodensity has improved. A small amount of parafalcine blood is identified in the frontal region. This is relatively focal.     Impression: 1. No significant change in the subdural fluid collections following craniotomy and drainage. 2. Small focal parafalcine subdural hematoma, unchanged from the patient's last study. 3. Resolution of the low-attenuation in the right temporal lobe. The left temporal hypoattenuation is more pronounced on the current study likely representing an evolving area of infarction. Electronically Signed: Vimal PIÑA Maria Luisa  6/1/2023 7:41 AM EDT  Workstation ID: [id number]    CT Head Without Contrast    Result Date: 5/27/2023  CT HEAD WO CONTRAST Date of Exam: 5/27/2023 4:07 AM EDT Indication: Craniotomy, post-op. Comparison: 1 day prior. Technique: Axial CT images were obtained of the head without contrast administration.  Reconstructed coronal and sagittal images were also obtained. Automated exposure control and iterative construction methods were used. Findings: Postoperative changes are noted from interval bilateral craniotomy with evacuation of previously noted acute subdural hematomas. Subdural drains are noted in place. Small bilateral persistent extra-axial mixed fluid collections are present with low-density fluid, gas and minimal persisting hyperdense blood products, 7 mm maximal thickness on the left, 6 mm on the right. There is improved diffuse cerebral edema, with restored patency of the basal cisterns and decreased effacement of the ventricles. Some underlying parenchymal hypoattenuation is noted involving temporal lobes bilaterally, greater on the left, concerning for underlying contusion/ischemia. There is also evident trace acute left parafalcine subdural hematoma, fairly localized, measuring 4 mm in maximal thickness without significant associated mass effect. The orbits are normal. The  paranasal sinuses are grossly clear.     Impression: Expected postoperative changes following bilateral craniotomy for evacuation of acute subdural hematomas. Cerebral edema is improved and there is restored patency of the basal cisterns and decreased effacement of the ventricles. Bilateral subdural drains  are noted in place. 4 mm small and somewhat localized left parafalcine subdural hematoma. Increase in conspicuity, there is somewhat heterogeneous hypoattenuation present involving the left greater than right temporal lobes, concerning for underlying component of either persisting edema, contusion or ischemia. Electronically Signed: Ruben Morris  5/27/2023 6:07 AM EDT  Workstation ID: [id number]    XR Chest 1 View    Result Date: 5/26/2023  XR CHEST 1 VW Date of Exam: 5/26/2023 10:04 AM EDT Indication: ams Comparison: 5/23/2023 Findings: Status post aortic valve repair. Low lung volumes. Heart size indeterminate due to projection and lung volumes. Pulmonary vasculature is within normal limits. Lungs are grossly clear     Impression: Low lung volumes with no acute process demonstrated Electronically Signed: Mateusz Hare  5/26/2023 10:21 AM EDT  Workstation ID: [id number]    CT Head Without Contrast Stroke Protocol    Result Date: 5/26/2023  CT HEAD WO CONTRAST STROKE PROTOCOL Date of Exam: 5/26/2023 9:31 AM EDT Indication: stroke. Comparison: 5/22/2023. Technique: Axial CT images were obtained of the head without contrast administration.  Reconstructed coronal and sagittal images were also obtained. Automated exposure control and iterative construction methods were used. Findings: There are bilateral iso to hyperdense subdural hematomas present, appearing acute and new since recent outside comparison. The left-sided subdural hematoma measures 14 mm in maximal thickness over the left frontal lobe, 13 mm on the right. There is evidence of diffuse cerebral edema, with effacement of the right lateral ventricle,  bilateral uncal medialization, with crowding of the basilar cisterns and impending brain herniation. There is trace, 2 to 3 mm of leftward midline shift. There is also some effacement of the third ventricle and enlargement of the left occipital horn and left temporal horn, concerning for component of entrapment and evolving hydrocephalus. No definite acute intraparenchymal hemorrhage is present. The orbits are normal. The paranasal sinuses are clear. The calvarium is intact.     Impression: Bilateral 13 to 14 mm acute subdural hematomas are present. There is evidence of diffuse cerebral edema, with bilateral uncal medialization, crowding of the basal cisterns and concern for impending herniation. There is 2 to 3 mm of leftward midline shift  with effacement of the right lateral and third ventricles, with prominence of the left temporal horn concerning for early entrapment. Scan performed at 9:31 a.m. 5/26/2023. Results discussed with the stroke team and ER physician by Elia Morris in person at time of scanning. Electronically Signed: Ruben Morris  5/26/2023 9:55 AM EDT  Workstation ID: [id number]    Invasive peripheral vascular study    Result Date: 6/2/2023  PREOPERATIVE DIAGNOSIS: Bilateral subacute subdural hematomas  POSTOPERATIVE DIAGNOSIS: Same  INDICATION: This is a 47 y.o. male on Coumadin for mechanical heart valve, who presented with bilateral subacute subdural hematomas requiring evacuation.  He subsequently came for MMA embolization to decrease the risk of recurrence.  The risks and benefits of the procedure were explained to the patient and they elected to proceed with the intervention. PROCEDURES PERFORMED: 4 Vessel diagnostic cerebral arteriogram. Superselective angiogram of the right middle meningeal artery Superselective angiogram of the left middle meningeal artery 4.  Intracranial embolization of the left middle meningeal artery 5.  Super-selective angiogram and interpretation of the middle  meningeal artery  Physician: Dr. Stephane Womack MD  SEDATION: General anesthesia Vessels selected:  1.  Right internal carotid artery 2.  Right common carotid artery 3.  Right middle meningeal artery 4.  Left internal carotid artery 5.  Left external carotid artery 6.  Left middle meningeal artery   PROCEDURE:  The patient was taken to the bi-plane neuroangiography suite and placed supine on the procedure table. The patient was prepped and draped in the usual sterile fashion. A procedural timeout was performed prior to beginning the procedure.  Following the usual sterile prep and drape, a micropuncture kit and use of Seldinger technique was performed to gain access within the right common femoral artery. A 6 Frisian sheath was then placed. A 6 Frisian Benchmark passed over a 0.035 inch Terumo guidewire was advanced through the abdominal thoracic aorta under direct fluoroscopic guidance. This system was utilized to select the right internal carotid artery, and during injection, digital subtraction angiography was performed as noted. The catheter was then positioned into the right common carotid artery and digital subtraction angiography was performed. Based upon this, a roadmap was used. At this time, a Renegade 21 microcatheter with the traxcess microwire inside of it was then positioned into the middle meningeal artery. Angiogram within the middle meningeal artery showed that the vessel was extremely small in caliber and there was no significant flow to the area of the known subdural hematomas.  It was decided that this vessel was too small for embolization.  The Micra system was removed and the benchmark catheter was then positioned into the left internal carotid artery, and during injection, digital subtraction angiography was performed as noted. The catheter was then positioned into the external carotid artery and digital subtraction angiography was obtained. Based upon this, a roadmap was used. At this time,  a Renegade 21 microcatheter with the traxcess microwire inside of it was then positioned into the middle meningeal artery. Angiogram within the middle meningeal artery confirmed good location of the microcatheter. At this time, we slowly injected 150-250 sized particles mixed with contrast through the microcatheter. During injection, we watched with x-ray to confirm the particles were advancing into the correct vessels. Once we visualized stasis within the middle meningeal artery, we removed the microcatheter and performed another external carotid artery angiogram through the base catheter. This showed good stasis within the middle meningeal artery. The catheter was then removed from the arterial system, a closure device was deployed. Manual compression over the puncture site was performed until hemostasis was attained. FINDINGS: Angiography of the right internal carotid artery shows normal distal cervical, petrous, cavernous and clinoid segments. The ophthalmic artery fills normally. The posterior communicating artery is not well visualized and the anterior choroidal artery is small in caliber. The middle cerebral artery fills normally in all segments. The anterior cerebral artery fills normally in all segments. Angiography of the right common carotid artery shows normal opacification of the distal common carotid artery, the proximal ICA and proximal ECA branches. There is no evidence of atherosclerotic disease or stenosis in the proximal internal carotid artery.  The ANGIE branches overall are small in caliber. Superselective angiogram through the microcatheter in the right middle meningeal artery shows that the vessel caliber is extremely small.  There is no significant fluid to the area of the known subdural hematoma.   Angiography of the left internal carotid artery shows normal distal cervical, petrous, cavernous and clinoid segments. The ophthalmic artery fills normally. The posterior communicating artery and  anterior choroidal artery are easily visualized and of normal caliber. The middle cerebral artery fills normally in all segments. The A1 segment is very hypoplastic with only minimal filling into the ANGIE territory.  Initial angiography of the left external carotid artery shows normal filling of all its branches. No shunting or fistula is noted.  There are noted collaterals from the distal middle meningeal artery supplying the known subdural hematoma.  Superselective angiogram through the microcatheter in the middle meningeal artery shows opacification of the vessel without any filling into the orbit.  Follow-up angiogram after the above-mentioned embolization shows that there is minimal opacification into the middle meningeal artery in the parietal region.  Flour Time (min): 11.9  Air Kerma (mGy): 597  Contrast: 40 cc of 320 mg/ml Visipaque       Successful embolization of the left middle meningeal artery.  No complications noted    Aborted Cath Cath    Result Date: 5/30/2023  Aborted Case    Arterial Line    Result Date: 5/31/2023  Naila Jewell DO     5/31/2023  4:26 PM Arterial Line Patient reassessed immediately prior to procedure Patient location during procedure: pre-op Line placed for hemodynamic monitoring. Performed By Anesthesiologist: Naila Jewell DO Preanesthetic Checklist Completed: patient identified, IV checked, site marked, risks and benefits discussed, surgical consent, monitors and equipment checked, pre-op evaluation and timeout performed Arterial Line Prep  Sterile Tech: cap, gloves and sterile barriers Prep: ChloraPrep Patient monitoring: blood pressure monitoring, continuous pulse oximetry and EKG Arterial Line Procedure Laterality:left Location:  radial artery Catheter size: 20 G Guidance: ultrasound guided and palpation technique Number of attempts: 1 Successful placement: yes Post Assessment Dressing Type: line sutured, occlusive dressing applied, secured with tape and wrist guard  applied. Complications no Circ/Move/Sens Assessment: normal and unchanged. Patient Tolerance: patient tolerated the procedure well with no apparent complications                  Plan for Follow-up of Pending Labs/Results: Inbox     Discharge Details        Discharge Medications        New Medications        Instructions Start Date   metFORMIN  MG 24 hr tablet  Commonly known as: GLUCOPHAGE-XR   500 mg, Oral, Daily With Breakfast      metoprolol tartrate 25 MG tablet  Commonly known as: LOPRESSOR   12.5 mg, Oral, Every 12 Hours Scheduled             Continue These Medications        Instructions Start Date   divalproex 250 MG 24 hr tablet  Commonly known as: DEPAKOTE ER   250 mg, Oral, Nightly      DULoxetine 60 MG capsule  Commonly known as: CYMBALTA   60 mg, Oral, Daily      gabapentin 100 MG capsule  Commonly known as: NEURONTIN   200 mg, Oral, 2 Times Daily      hydrOXYzine 10 MG tablet  Commonly known as: ATARAX   Take 1 to 2 tablets by mouth twice a day as needed for anxiety and sleep.      lansoprazole 30 MG capsule  Commonly known as: PREVACID   30 mg, Oral, Daily      levothyroxine 125 MCG tablet  Commonly known as: SYNTHROID, LEVOTHROID   125 mcg, Oral, Daily, Further Refills per PCP with Repeat Lab Work      rosuvastatin 20 MG tablet  Commonly known as: CRESTOR   20 mg, Oral, Daily      warfarin 5 MG tablet  Commonly known as: COUMADIN   5 mg, Oral, Daily Warfarin, As directed by Anticoagulation Clinic             Stop These Medications      propranolol LA 60 MG 24 hr capsule  Commonly known as: INDERAL LA              Allergies   Allergen Reactions    Tegaderm Chg Dressing [Chlorhexidine] Rash         Discharge Disposition:      Diet:  Hospital:  Diet Order   Procedures    Diet: Regular/House Diet, Diabetic Diets; Consistent Carbohydrate; Texture: Regular Texture (IDDSI 7); Fluid Consistency: Thin (IDDSI 0)       Activity:      Restrictions or Other Recommendations:       CODE STATUS:    Code  "Status and Medical Interventions:   Ordered at: 05/27/23 0900     Code Status (Patient has no pulse and is not breathing):    CPR (Attempt to Resuscitate)     Medical Interventions (Patient has pulse or is breathing):    Full Support     Release to patient:    Routine Release       Future Appointments   Date Time Provider Department Center   6/23/2023  2:00 PM [facility]       Additional Instructions for the Follow-ups that You Need to Schedule       Ambulatory Referral to  [facility]   As directed       Select To DEPT SPEC to filter TO DEPT       Send INR reminders to the \"clinical pool\" of the TO DEPT     (ie:  [facility]  or [facility] CLINICAL POOL)     Discharge Follow-up with PCP   As directed       Currently Documented PCP:    Provider, No Known    PCP Phone Number:    None     Follow Up Details: 3-5 days         Discharge Follow-up with Specified Provider: Coumadin Clinic as scheduled for INR check   As directed      To: [facility]madin Clinic as scheduled for INR check         Discharge Follow-up with Specified Provider: Dr John; 1 Month   As directed      To: Dr John    Follow Up: 1 Month         Discharge Follow-up with Specified Provider: Dr Womack; 1 Month   As directed      To: Dr Womack    Follow Up: 1 Month                       Faustina Mckeon DO  06/05/23      Time Spent on Discharge:  I spent  45  minutes on this discharge activity which included: face-to-face encounter with the patient, reviewing the data in the system, coordination of the care with the nursing staff as well as consultants, documentation, and entering orders.          "

## 2023-06-05 NOTE — NURSING NOTE
Prior to central line removal, order for the removal of catheter was verified, patient was assessed, necessary materials were gathered and patient was educated regarding procedure .    Patient was positioned flat to ensure that the insertion site was at or below the level of the heart.    Hands were washed, clean gloves were applied and central line dressing was gently removed. Catheter exit site was not cultured.     A new pair of clean gloves were then applied. Insertion site was cleansed with 2% Chlorhexidine swab using a circular motion beginning at the insertion site and moving outward for 30 seconds and allowed to dry.     Clamp on line was present and clamped.     Patient was instructed to hold breath as catheter was withdrawn.     The central line was grasped at the insertion site and slowly pulled outward parallel to the skin. Resistance was not met.    After central line was completely removed, a sterile 4x4 gauze pad was used to apply light pressure until bleeding stopped. At that time, petroleum-based gauze and a sterile occlusive dressing was applied to exit site.     Patient was instructed to keep dressing in place for at least 24 hours and to remain in a flat or reclining position for 30 minutes post-removal.     Catheter was inspected after removal and was intact. Tip of central line was not sent for culture. Patient tolerated procedure.

## 2023-06-05 NOTE — OUTREACH NOTE
Prep Survey      Flowsheet Row Responses   [facility] facility patient discharged from? [place]   Is LACE score < 7 ? No   Eligibility Readm Mgmt   Discharge diagnosis Bilateral Subdural Hematoma- Emoblization cerebral   Does the patient have one of the following disease processes/diagnoses(primary or secondary)? General Surgery   Does the patient have Home health ordered? No   Is there a DME ordered? No   Prep survey completed? Yes            Kaila POE - Registered Nurse

## 2023-06-05 NOTE — CASE MANAGEMENT/SOCIAL WORK
Case Management Discharge Note    Final Note: Mr. Hall is being discharged home today if medically ready. Family is requesting outpatient speech services. Orders placed in EPIC. No other discharge needs identified.           Selected Continued Care - Admitted Since 5/26/2023       Destination    No services have been selected for the patient.                Durable Medical Equipment    No services have been selected for the patient.                Dialysis/Infusion    No services have been selected for the patient.                Home Medical Care    No services have been selected for the patient.                Therapy    No services have been selected for the patient.                Community Resources    No services have been selected for the patient.                Community & DME    No services have been selected for the patient.                         Final Discharge Disposition Code: 01 - home or self-care

## 2023-06-05 NOTE — PROGRESS NOTES
HEPARIN INFUSION  Manjeet Hall is a  47 y.o. male receiving heparin infusion.     Therapy for (VTE/Cardiac): Cardiac   Patient Weight: 108 kg (changed from 104 kg)  Initial Bolus (Y/N): No  Any Bolus (Y/N): Yes        Signs or Symptoms of Bleeding: No    Cardiac or Other (Not VTE)   Initial rate: 12 units/kg/hr (Max 1,000 units/hr)   Anti Xa Rebolus Infusion Hold time Change infusion Dose (Units/kg/hr) Next Anti Xa or aPTT Level Due   < 0.11 50 Units/kg  (4000 Units Max) None Increase by  3 Units/kg/hr 6 hours   0.11- 0.19 25 Units/kg  (2000 Units Max) None Increase by  2 Units/kg/hr 6 hours   0.2 - 0.29 0 None Increase by  1 Units/kg/hr 6 hours   0.3 - 0.5 0 None No Change 6 hours (after 2 consecutive levels in range check qAM)   0.51 - 0.6 0 None Decrease by  1 Units/kg/hr 6 hours   0.61 - 0.8 0 30 Minutes Decrease by  2 Units/kg/hr 6 hours   0.81 - 1 0 60 Minutes Decrease by  3 Units/kg/hr 6 hours   >1 0 Hold  After Anti Xa less than 0.5 decrease previous rate by  4 Units/kg/hr  Every 2 hours until Anti Xa  less than 0.5 then when infusion restarts in 6 hours       Results from last 7 days   Lab Units 06/05/23  0646 06/04/23  0539 06/03/23  0721 05/31/23  0435 05/30/23  1222   INR   --   --   --   --  1.02   HEMOGLOBIN g/dL 11.7* 11.4* 11.7*   < >  --    HEMATOCRIT % 36.8* 36.2* 37.2*   < >  --    PLATELETS 10*3/mm3 207 203 210   < >  --     < > = values in this interval not displayed.          Date   Time   Anti-Xa Current Rate (Unit/kg/hr) Bolus   (Units) Rate Change   (Unit/kg/hr) New Rate (Unit/kg/hr) Next   Anti-Xa Comments  Pump Check Daily   5/30 1230 0.35 0 No initial bolus  +9.5 9.5 1830 D/W SAMANTA Novak. Stop time entered.    5/30 1801 0.32 9.5 -- -- 9.5 None D/w RN, plan to d/c heparin @0600 for procedure   6/1 0000 -- OFF -- +9.5 9.5 0600 Restart heparin drip 6/1 @ 0000 per Dr. Womack; D/w SAMANTA Novak   6/1 0630 0.11 9.5 -- +2 11.5 1200 D/w RN   6/1 1230 >1.1 11.5 -- -- -- 1330  Dw SAMANTA. Unsure  if lab was drawn from the line so will redraw.   6/1 1345 0.23 11.5 -- +1 12.5 2000 Dw RN. Arden never held, previous lab likely an error.    6/1 2006 0.33 12.5 -- -- 12.5 0200 DW RN   6/2 0430 0.40 12.5 -- -- 12.5 0600 6/3 D/w RN   6/3 0720 0.26 12.5 -- +1 13.5 1500 D/w RN   6/3 1605 0.31 13.5 -- -- 13.5 2200 D/w Brennan RN    6/3 2315 -- -- -- -- 13.8  Based on 108 kg -- Pump set at 110 kg  13.5 units/kg/hr  14.9 mL/hr   6/3 2150 0.24 13.8 -- +1.2 15 0600 Dosing weight 108 kg  Settings confirmed  15 units/kg/hr  16.2 mL/hr   6/4 0539 0.36 15 -- -- 15 1300 D/w RN   6/4 1401 0.38 15 -- -- 15 0600  RN   6/5 0646 0.41 15 -- -- 15 0600  SAMANTA Haddad, PharmD  6/5/2023

## 2023-06-05 NOTE — CONSULTS
[facility] at [facility]   Consult Note      Reason for Consultation: aortic valve/anticoagulation      Patient Active Problem List    Diagnosis Date Noted    *Bilateral Subdural Hematoma 05/26/2023    Somnolence 05/28/2023    HTN 05/26/2023    Hyperlipidemia (Diet controlled, off statin) 05/26/2023    Hypothyroidism 05/26/2023    Anxiety 05/26/2023    Hodgkin's Lymphoma s/p XRT & Chemo 05/26/2023    VSD s/p closure (age 14) 05/26/2023    History of aortic regurgitation / stenosis s/p ROSS procedure with subsequent reversal with Mechanical AVR 05/26/2023    H/O aortic mechanical valve replacement on Coumadin 05/26/2023    New Onset Type 2 diabetes mellitus 05/26/2023        History of present illness:    Manjeet Hall is a 47 y.o. 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  He was admitted to the hospital on 5/26/23 with bilateral hematomas that were evacuated and then had embolization of the MMA on 5/31/23.  We have been consulted for anticoagulation.   Pt states that he has been stable on 5mg of coumadin for years and that his INR needs to be between 2.5-3.5    Past Medical History:   Diagnosis Date    Cancer     Diabetes mellitus     Elevated cholesterol     Stroke        Past Surgical History:   Procedure Laterality Date    EMBOLIZATION CEREBRAL N/A 5/31/2023    Procedure: Emoblization cerebral;  Surgeon: Stephane Womack MD;  Location: [facility] INVASIVE LOCATION;  Service: Interventional Radiology;  Laterality: N/A;         Allergies   Allergen Reactions    Tegaderm Chg Dressing [Chlorhexidine] Rash             Medications Prior to Admission   Medication Sig Dispense Refill Last Dose    divalproex (DEPAKOTE ER) 250 MG 24 hr tablet Take 1 tablet by mouth Every Night.   5/25/2023    DULoxetine  "(CYMBALTA) 60 MG capsule Take 1 capsule by mouth Daily.   5/25/2023    gabapentin (NEURONTIN) 100 MG capsule Take 2 capsules by mouth 2 (Two) Times a Day.   5/25/2023    hydrOXYzine (ATARAX) 10 MG tablet Take 1 to 2 tablets by mouth twice a day as needed for anxiety and sleep.   5/25/2023    lansoprazole (PREVACID) 30 MG capsule Take 1 capsule by mouth Daily.   5/25/2023    levothyroxine (SYNTHROID, LEVOTHROID) 125 MCG tablet Take 1 tablet by mouth Daily. Further Refills per PCP with Repeat Lab Work   5/25/2023    propranolol LA (INDERAL LA) 60 MG 24 hr capsule Take 1 capsule by mouth Daily.   5/25/2023    rosuvastatin (CRESTOR) 20 MG tablet Take 1 tablet by mouth Daily.   5/25/2023    warfarin (COUMADIN) 5 MG tablet Take 1 tablet by mouth Daily. As directed by Anticoagulation Clinic   5/25/2023         Subjective .         Social History     Socioeconomic History    Marital status: Single   Tobacco Use    Smoking status: Never    Smokeless tobacco: Never   Vaping Use    Vaping Use: Never used   Substance and Sexual Activity    Alcohol use: Never    Drug use: Never    Sexual activity: Yes     History reviewed. No pertinent family history.      Review of Systems:  ROS  Pertinent positives noted in history, exam, and assessment. Otherwise reviewed and negative.       Objective   Vitals:  /76 (BP Location: Left arm, Patient Position: Lying)   Pulse 78   Temp 98 °F (36.7 °C) (Oral)   Resp 16   Ht 172.7 cm (68\")   Wt 108 kg (238 lb 1.6 oz)   SpO2 94%   BMI 36.20 kg/m²      Intake/Output Summary (Last 24 hours) at 6/5/2023 1039  Last data filed at 6/5/2023 0700  Gross per 24 hour   Intake 1348 ml   Output --   Net 1348 ml       Vitals reviewed.   Constitutional:       Appearance: Not in distress.   Pulmonary:      Effort: Pulmonary effort is normal.      Breath sounds: Normal breath sounds.   Cardiovascular:      Normal rate. Regular rhythm.      Comments: Good click of the aortic valve  Edema:     Peripheral " edema absent.   Musculoskeletal: Normal range of motion. Neurological:      Mental Status: Alert.             Results Review:  I reviewed the patient's new clinical results.  Results from last 7 days   Lab Units 06/05/23  0646   WBC 10*3/mm3 10.30   HEMOGLOBIN g/dL 11.7*   HEMATOCRIT % 36.8*   PLATELETS 10*3/mm3 207     Results from last 7 days   Lab Units 06/05/23  0646   SODIUM mmol/L 141   POTASSIUM mmol/L 4.1   CHLORIDE mmol/L 106   CO2 mmol/L 24.0   BUN mg/dL 13   CREATININE mg/dL 0.71*   CALCIUM mg/dL 9.0   GLUCOSE mg/dL 130*     Results from last 7 days   Lab Units 06/05/23  0646   SODIUM mmol/L 141   POTASSIUM mmol/L 4.1   CHLORIDE mmol/L 106   CO2 mmol/L 24.0   BUN mg/dL 13   CREATININE mg/dL 0.71*   GLUCOSE mg/dL 130*   CALCIUM mg/dL 9.0     Results from last 7 days   Lab Units 06/05/23  0646 05/30/23  1222   INR  1.02 1.02     Tele:  normal sinus      Bilateral Subdural Hematoma    HTN    Hyperlipidemia (Diet controlled, off statin)    Hypothyroidism    Anxiety    H/O aortic mechanical valve replacement on Coumadin    New Onset Type 2 diabetes mellitus      Assessment and Plan:    Aortic valve disease  - On-X mechanical valve, has been on coumadin before hospitalization  - From neurosurgical perspective, patient is okay to transition to oral anticoagulation at this time   - will d/c IV heparin, will give 1.5mg/kg enoxaparin prior to discharge  - with his On-X valve in a high flow position and recent SDH would recommend no bridging aside from enoxaparin prior to d/c   - restart coumadin 5mg daily with PT/INR check in 3 days   -f/u with Dr John in 4 weeks  -will get pt established with anticoagulation clinic    Subdural hematoma, bilateral   -evacuation of bilateral hematomas on 5/26/23  -emboliztion of the left middle meningeal artery 5/31/23  -f/u per neurosurgery     Hypertension  -pt is currently on Lopresor 6.25mg twice a day     Hyperlipidemia  -pt has been placed on Lipitor 20mg daily              Nelly personally evaluated and examined the patient and agree with the assessment, treatment plan, and disposition of the patient as recorded by the physician assistant        Electronically signed by Mile Shirley PA-C, 06/05/23, 9:03 AM EDT.      I personally performed the services described in this documentation as scribed by the above named individual in my presence, and it is both accurate and complete.    OCTAVIO Villegas MD  06/05/23  11:40 EDT

## 2023-06-05 NOTE — PROGRESS NOTES
"    [facility]    NEUROSURGERY PROGRESS NOTE    06/05/23    Chief Complaint: Bilateral subacute subdural hematomas    Subjective: Stable overnight.  No acute complaints this AM.  Patient is eager to get out of the hospital.    5 Days Post-Op    Objective:     /76 (BP Location: Left arm, Patient Position: Lying)   Pulse 78   Temp 98 °F (36.7 °C) (Oral)   Resp 16   Ht 172.7 cm (68\")   Wt 108 kg (238 lb 1.6 oz)   SpO2 94%   BMI 36.20 kg/m²        Physical Exam  Awake, alert, oriented x3  Opens eyes spontaneously  Pupils equal and reactive bilaterally  Extraocular movements intact bilaterally  Face symmetric  Tongue midline  5/5 in all 4 extremities  No pronator drift  Incisions are clean dry and intact      Intake/Output Summary (Last 24 hours) at 6/5/2023 0834  Last data filed at 6/4/2023 2100  Gross per 24 hour   Intake 828 ml   Output --   Net 828 ml         Current Facility-Administered Medications:     acetaminophen (TYLENOL) tablet 650 mg, 650 mg, Oral, Q6H PRN, Chanell Garcia, APRN, 650 mg at 06/02/23 2117    dextrose (D50W) (25 g/50 mL) IV injection 25 g, 25 g, Intravenous, Q15 Min PRN, Chanell Garcia, APRN    dextrose (GLUTOSE) oral gel 15 g, 15 g, Oral, Q15 Min PRN, Chanell Garcia, APRN    divalproex (DEPAKOTE ER) 24 hr tablet 250 mg, 250 mg, Oral, Nightly, Chanell Garcia, APRN, 250 mg at 06/04/23 2135    docusate sodium (COLACE) capsule 100 mg, 100 mg, Oral, BID PRN, Chanell Garcia, APRN    DULoxetine (CYMBALTA) DR capsule 60 mg, 60 mg, Oral, Daily, Chanell Garcia, APRN, 60 mg at 06/05/23 0829    gabapentin (NEURONTIN) capsule 200 mg, 200 mg, Oral, BID, Chanell Garcia, APRN, 200 mg at 06/05/23 0829    glucagon (GLUCAGEN) injection 1 mg, 1 mg, Intramuscular, Q15 Min PRN, Jose, Chanell W, APRN    heparin 54982 units/250 mL (100 units/mL) in 0.45 % NaCl infusion, 15 Units/kg/hr, Intravenous, Titrated, Abdi Nation [facility], Last Rate: 16.2 mL/hr at " 06/05/23 0425, 15 Units/kg/hr at 06/05/23 0425    HYDROcodone-acetaminophen (NORCO) 7.5-325 MG per tablet 1 tablet, 1 tablet, Oral, Q6H PRN, Jose Maria Townsend MD, 1 tablet at 06/04/23 0556    insulin detemir (LEVEMIR) injection 8 Units, 8 Units, Subcutaneous, Q12H, Chanell Garcia APRN, 8 Units at 06/05/23 0829    Insulin Lispro (humaLOG) injection 3-14 Units, 3-14 Units, Subcutaneous, 4x Daily AC & at Bedtime, Chanell Garcia APRN, 3 Units at 06/04/23 2135    Insulin Lispro (humaLOG) injection 4 Units, 4 Units, Subcutaneous, TID With Meals, Chanell Garcia APRN, 4 Units at 06/05/23 0830    levothyroxine (SYNTHROID, LEVOTHROID) tablet 125 mcg, 125 mcg, Oral, Daily, Chanell Garcia APRN, 125 mcg at 06/05/23 0829    magnesium hydroxide (MILK OF MAGNESIA) 400 MG/5ML suspension 15 mL, 15 mL, Oral, Daily PRN, Chanell Garcia APRTHALIA    melatonin tablet 5 mg, 5 mg, Oral, Nightly PRN, Chanell Garcia APRN, 5 mg at 06/02/23 2112    metoprolol tartrate (LOPRESSOR) half tablet 12.5 mg, 12.5 mg, Oral, Q12H, Chanell Garcia APRN, 12.5 mg at 06/05/23 0829    nitroglycerin (NITROSTAT) SL tablet 0.4 mg, 0.4 mg, Sublingual, Q5 Min PRN, Cahnell Garcia APRN    ondansetron (ZOFRAN) injection 4 mg, 4 mg, Intravenous, Q6H PRN **OR** ondansetron (ZOFRAN) tablet 4 mg, 4 mg, Oral, Q6H PRN, Chanell Garcia, APRN    pantoprazole (PROTONIX) EC tablet 40 mg, 40 mg, Oral, Q AM, Chanell Garcia APRN, 40 mg at 06/05/23 0829    Pharmacy to Dose Heparin, , Does not apply, Continuous PRN, Chanell Garcia APRN    polyethylene glycol (MIRALAX) packet 17 g, 17 g, Oral, Daily, Chanell Garcia APRN, 17 g at 06/03/23 0932    rosuvastatin (CRESTOR) tablet 20 mg, 20 mg, Oral, Daily, Chanell Garcia APRN, 20 mg at 06/05/23 0829    sennosides-docusate (PERICOLACE) 8.6-50 MG per tablet 2 tablet, 2 tablet, Oral, BID, Chanell Garcia APRN, 2 tablet at 06/04/23 2135    sodium chloride 0.9 % flush 10 mL, 10 mL, Intravenous, Q12H, Chanell Garcia APRN, 10 mL at  06/05/23 0830    sodium chloride 0.9 % flush 10 mL, 10 mL, Intravenous, PRN, Chanell Garcia, APRN, 10 mL at 06/03/23 0933    sodium chloride 0.9 % infusion 40 mL, 40 mL, Intravenous, PRN, Chanell Garcia, APRN    Laboratory Results:        Lab 06/05/23  0646 06/04/23  1401 06/04/23  0539 06/03/23  2150 06/03/23  1605 06/03/23  0721 06/03/23  0720 06/02/23  0339 06/01/23  1230 06/01/23  0447 05/31/23  0435 05/30/23  1801 05/30/23  1222   WBC 10.30  --  10.46  --   --  8.98  --  8.60  --  9.61 9.33  --   --    HEMOGLOBIN 11.7*  --  11.4*  --   --  11.7*  --  10.8*  --  11.8* 11.9*  --   --    HEMATOCRIT 36.8*  --  36.2*  --   --  37.2*  --  34.1*  --  36.7* 36.8*  --   --    PLATELETS 207  --  203  --   --  210  --  192  --  227 200  --   --    NEUTROS ABS  --   --   --   --   --  5.13  --   --   --   --  5.47  --   --    IMMATURE GRANS (ABS)  --   --   --   --   --  0.34*  --   --   --   --  0.37*  --   --    LYMPHS ABS  --   --   --   --   --  2.19  --   --   --   --  2.25  --   --    MONOS ABS  --   --   --   --   --  0.76  --   --   --   --  0.70  --   --    EOS ABS  --   --   --   --   --  0.51*  --   --   --   --  0.47*  --   --    MCV 85.0  --  85.2  --   --  85.1  --  87.2  --  85.2 85.6  --   --    PROTIME 13.5  --   --   --   --   --   --   --   --   --   --   --  13.5   APTT  --   --   --   --   --   --   --   --   --   --   --   --  29.8*   HEPARIN ANTI-XA 0.41 0.38 0.36 0.24* 0.31  --    < > 0.40   < > 0.11*  --    < > 0.35    < > = values in this interval not displayed.         Lab 06/05/23  0646 06/04/23  0539 06/03/23  0720 06/02/23  0339 06/01/23  0447 05/31/23  0435 05/30/23  0441   SODIUM 141 142 140 139 139 143 144   POTASSIUM 4.1 3.9 4.0 4.1 3.8 3.8 4.0   CHLORIDE 106 106 106 108* 105 106 106   CO2 24.0 24.0 24.0 23.0 25.0 27.0 28.0   ANION GAP 11.0 12.0 10.0 8.0 9.0 10.0 10.0   BUN 13 16 12 15 16 16 20   CREATININE 0.71* 0.81 0.72* 0.69* 0.77 0.70* 0.68*   EGFR 113.9 109.4 113.4 114.9 111.1 114.4  115.4   GLUCOSE 130* 120* 125* 114* 184* 134* 123*   CALCIUM 9.0 8.8 9.1 8.8 8.6 8.8 8.6   MAGNESIUM  --   --  2.3 2.3 2.3 2.3 2.1   PHOSPHORUS  --   --  3.7 3.3 3.7 4.4 5.0*             Lab 06/05/23  0646 05/30/23  1222   PROTIME 13.5 13.5   INR 1.02 1.02                 Brief Urine Lab Results       None          Microbiology Results (last 10 days)       ** No results found for the last 240 hours. **                     Diagnostic Imaging: I personally reviewed and interpreted the new imaging    Assessment and plan:  This is a 47-year-old male status post bilateral craniotomies for evacuation of subacute subdural hematomas on 5/26 followed by embolization of the left middle meningeal artery on 5/31.  Patient continues to do well neurologically.  From neurosurgical perspective, patient is okay to transition to oral anticoagulation at this time.  Appreciate hospitalist assistance.      Any copied data from previous notes included in the (1) HPI, (2) PE, (3) MDM and/or Assessment and Plan has been reviewed and accurate as of 06/05/23.    Chanell Rock PA-C  06/05/23  08:34 EDT

## 2023-06-06 DIAGNOSIS — I62.00 SUBDURAL HEMATOMA, NONTRAUMATIC: Primary | ICD-10-CM

## 2023-06-06 DIAGNOSIS — K21.9 GASTROESOPHAGEAL REFLUX DISEASE, UNSPECIFIED WHETHER ESOPHAGITIS PRESENT: ICD-10-CM

## 2023-06-06 RX ORDER — HYDROCODONE BITARTRATE AND ACETAMINOPHEN 5; 325 MG/1; MG/1
1 TABLET ORAL EVERY 6 HOURS PRN
Qty: 15 TABLET | Refills: 0 | Status: SHIPPED | OUTPATIENT
Start: 2023-06-06 | End: 2023-06-06 | Stop reason: SDUPTHER

## 2023-06-06 RX ORDER — LANSOPRAZOLE 30 MG/1
CAPSULE, DELAYED RELEASE ORAL
Qty: 90 CAPSULE | Refills: 1 | Status: SHIPPED | OUTPATIENT
Start: 2023-06-06

## 2023-06-06 RX ORDER — HYDROCODONE BITARTRATE AND ACETAMINOPHEN 5; 325 MG/1; MG/1
1 TABLET ORAL EVERY 6 HOURS PRN
Qty: 15 TABLET | Refills: 0 | Status: SHIPPED | OUTPATIENT
Start: 2023-06-06

## 2023-06-06 NOTE — TELEPHONE ENCOUNTER
Rx Refill Note  Requested Prescriptions     Pending Prescriptions Disp Refills    lansoprazole (PREVACID) 30 MG capsule [Pharmacy Med Name: LANSOPRAZOLE DR 30 MG CAPSULE] 90 capsule 1     Sig: TAKE 1 CAPSULE BY MOUTH EVERY DAY      Last office visit with prescribing clinician: 4/12/2023   Last telemedicine visit with prescribing clinician: Visit date not found   Next office visit with prescribing clinician: 6/7/2023                         Would you like a call back once the refill request has been completed: [] Yes [] No    If the office needs to give you a call back, can they leave a voicemail: [] Yes [] No    Rebecca Wells MA  06/06/23, 10:37 EDT

## 2023-06-06 NOTE — TELEPHONE ENCOUNTER
Provider:  Vu  Surgery/Procedure:  Craniotomy for bilateral subdural hematomas  Surgery/Procedure Date:  5/26/23  Last visit:   NA  Next visit: 6/12/23     Reason for call:  Patient called and is requesting pain medication be prescribed. He was discharged from the hospital yesterday but was not sent home with a prescription for pain medication, he was taking Hydrocodone 7.5-325mg and Tylenol while inpatient. He has incisional pain on both sides of his head and is having difficulty sleeping due to pain. No headaches and no other symptoms to report. He states the pain level is a 8/10 today.     Atif:  Pat ID Date Filled RX # Drug Name Prescriber Name Dispenser Name Qty Days MED PMT Rpt To  1 04/14/2023 [id number] Gabapentin 100MG Aaron, [facility]  [address] [place]  Date Written New/Refill Dosage Form Prescriber [facility]  04/14/2023 [facility]  Pat ID Date Filled RX # Drug Name Prescriber Name Dispenser Name Qty Days MED PMT Rpt To  1 03/21/2023 [id number] Gabapentin 300MG Aaron, [facility]  [address] [place]  Date Written New/Refill Dosage Form Prescriber [facility]  03/14/2023 [facility]  Pat ID Date Filled RX # Drug Name Prescriber Name Dispenser Name Qty Days MED PMT Rpt To  1 02/16/2023 [id number] Gabapentin 300MG [facility]  [address] [place]  Date Written New/Refill Dosage Form Prescriber [facility]  02/01/2023 [facility]  Pat ID Date Filled RX # Drug Name Prescriber Name Dispenser Name Qty Days MED PMT Rpt To  1 01/05/2023 [id number] Gabapentin 300MG [facility], [facility]  [address] [place]  Date Written New/Refill Dosage Form Prescriber [facility]  01/05/2023 [facility]

## 2023-06-06 NOTE — PAYOR COMM NOTE
"Ref# [id number]   Second Request    Utilization Review  Phone [phone]  Fax [phone]    [facility]  [address] [place] [address]       Marisabel Hall (47 y.o. Male)       Date of Birth   1975    Social Security Number       Address   [address] [place] [address]    Home Phone   [phone]    MRN   [id number]       Yazidi   Christianity    Marital Status   Single                            Admission Date   5/26/23    Admission Type   Emergency    Admitting Provider   Faustina Mckeon DO    Attending Provider       Department, Room/Bed   [facility] 3F, S326/1       Discharge Date   6/5/2023    Discharge Disposition   Home or Self Care    Discharge Destination                                 Attending Provider: (none)   Allergies: Tegaderm Chg Dressing [Chlorhexidine]    Isolation: None   Infection: None   Code Status: Prior    Ht: 172.7 cm (68\")   Wt: 108 kg (238 lb 1.6 oz)    Admission Cmt: None   Principal Problem: Bilateral Subdural Hematoma [I62.00]                   Active Insurance as of 5/26/2023       Primary Coverage       Payor Plan Insurance Group Employer/Plan Group    ANTHEM MEDICARE REPLACEMENT ANTHEM MEDICARE ADVANTAGE [id number]       Payor Plan Address Payor Plan Phone Number Payor Plan Fax Number Effective Dates    [address] [phone]  1/1/2023 - None Entered    [facility] [address]         Subscriber Name Subscriber Birth Date Member ID       MARISABEL HALL 1975 [id number]               Secondary Coverage       Payor Plan Insurance Group Employer/Plan Group    ANTHEM MEDICAID ANTHEM MEDICAID [id number]       Payor Plan Address Payor Plan Phone Number Payor Plan Fax Number Effective Dates    [address] [address] [phone]  2/1/2021 - None Entered    [facility] [id number]         Subscriber Name Subscriber Birth Date Member ID       YEYO HALLTHALIA FERNANDEZ 1975 [id number]               " "      Emergency Contacts        (Rel.) Home Phone Work Phone Mobile Phone    Payton Diallo (Friend) -- -- [phone]    SUKUMAR GREENBERG (Other) [phone] -- --    Marko Hall (Power of ) -- -- [phone]    Kimberly Hall -- -- [phone]    HallErrol meeks (Brother) [phone] -- --    Faina Hall (Daughter) -- -- [phone]    EmekashahzadPayton (Significant Other) -- -- [phone]                 Physician Progress Notes (all)        Chanell Rock PA-C at 06/05/23 0834       Attestation signed by Stephane Womack MD at 06/05/23 0842    I have reviewed this documentation and agree.                      [facility]    NEUROSURGERY PROGRESS NOTE    06/05/23    Chief Complaint: Bilateral subacute subdural hematomas    Subjective: Stable overnight.  No acute complaints this AM.  Patient is eager to get out of the hospital.    5 Days Post-Op    Objective:     /76 (BP Location: Left arm, Patient Position: Lying)   Pulse 78   Temp 98 °F (36.7 °C) (Oral)   Resp 16   Ht 172.7 cm (68\")   Wt 108 kg (238 lb 1.6 oz)   SpO2 94%   BMI 36.20 kg/m²        Physical Exam  Awake, alert, oriented x3  Opens eyes spontaneously  Pupils equal and reactive bilaterally  Extraocular movements intact bilaterally  Face symmetric  Tongue midline  5/5 in all 4 extremities  No pronator drift  Incisions are clean dry and intact      Intake/Output Summary (Last 24 hours) at 6/5/2023 0834  Last data filed at 6/4/2023 2100  Gross per 24 hour   Intake 828 ml   Output --   Net 828 ml         Current Facility-Administered Medications:     acetaminophen (TYLENOL) tablet 650 mg, 650 mg, Oral, Q6H PRN, Chanell Garcia, APRN, 650 mg at 06/02/23 2117    dextrose (D50W) (25 g/50 mL) IV injection 25 g, 25 g, Intravenous, Q15 Min PRN, Chanell Garcia APRN    dextrose (GLUTOSE) oral gel 15 g, 15 g, Oral, Q15 Min PRN, Chanell Garcia, BETHANIE    divalproex (DEPAKOTE ER) 24 hr " tablet 250 mg, 250 mg, Oral, Nightly, Chanell Garcia APRN, 250 mg at 06/04/23 2135    docusate sodium (COLACE) capsule 100 mg, 100 mg, Oral, BID PRN, Chanell Garcia APRN    DULoxetine (CYMBALTA) DR capsule 60 mg, 60 mg, Oral, Daily, Chanell Garcia APRN, 60 mg at 06/05/23 0829    gabapentin (NEURONTIN) capsule 200 mg, 200 mg, Oral, BID, Chanell Garcia APRN, 200 mg at 06/05/23 0829    glucagon (GLUCAGEN) injection 1 mg, 1 mg, Intramuscular, Q15 Min PRN, Chanell Garcia APRN    heparin 98359 units/250 mL (100 units/mL) in 0.45 % NaCl infusion, 15 Units/kg/hr, Intravenous, Titrated, Rios, Abdi GARCIA [facility], Last Rate: 16.2 mL/hr at 06/05/23 0425, 15 Units/kg/hr at 06/05/23 0425    HYDROcodone-acetaminophen (NORCO) 7.5-325 MG per tablet 1 tablet, 1 tablet, Oral, Q6H PRN, Jose Maria Townsend MD, 1 tablet at 06/04/23 0556    insulin detemir (LEVEMIR) injection 8 Units, 8 Units, Subcutaneous, Q12H, Chanell Garcia APRN, 8 Units at 06/05/23 0829    Insulin Lispro (humaLOG) injection 3-14 Units, 3-14 Units, Subcutaneous, 4x Daily AC & at Bedtime, Chanell Garcia APRN, 3 Units at 06/04/23 2135    Insulin Lispro (humaLOG) injection 4 Units, 4 Units, Subcutaneous, TID With Meals, Chanell Garcia APRN, 4 Units at 06/05/23 0830    levothyroxine (SYNTHROID, LEVOTHROID) tablet 125 mcg, 125 mcg, Oral, Daily, Chanell Garcia APRN, 125 mcg at 06/05/23 0829    magnesium hydroxide (MILK OF MAGNESIA) 400 MG/5ML suspension 15 mL, 15 mL, Oral, Daily PRN, Chanell Garcia APRN    melatonin tablet 5 mg, 5 mg, Oral, Nightly PRN, Chanell Garcia, APRN, 5 mg at 06/02/23 2112    metoprolol tartrate (LOPRESSOR) half tablet 12.5 mg, 12.5 mg, Oral, Q12H, Chanell Garcia, APRN, 12.5 mg at 06/05/23 0829    nitroglycerin (NITROSTAT) SL tablet 0.4 mg, 0.4 mg, Sublingual, Q5 Min PRN, Chanell Garcia, APRN    ondansetron (ZOFRAN) injection 4 mg, 4 mg, Intravenous, Q6H PRN **OR** ondansetron (ZOFRAN) tablet 4 mg, 4 mg, Oral, Q6H PRN, Chanell Garcia, APRN     pantoprazole (PROTONIX) EC tablet 40 mg, 40 mg, Oral, Q AM, Chanell Garcia APRN, 40 mg at 06/05/23 0829    Pharmacy to Dose Heparin, , Does not apply, Continuous PRN, Chanell Garcia APRN    polyethylene glycol (MIRALAX) packet 17 g, 17 g, Oral, Daily, Chanell Garcia APRN, 17 g at 06/03/23 0932    rosuvastatin (CRESTOR) tablet 20 mg, 20 mg, Oral, Daily, Chanell Garcia APRN, 20 mg at 06/05/23 0829    sennosides-docusate (PERICOLACE) 8.6-50 MG per tablet 2 tablet, 2 tablet, Oral, BID, Chanell Garcia APRN, 2 tablet at 06/04/23 2135    sodium chloride 0.9 % flush 10 mL, 10 mL, Intravenous, Q12H, Chanell Garcia APRN, 10 mL at 06/05/23 0830    sodium chloride 0.9 % flush 10 mL, 10 mL, Intravenous, PRN, Chanell Garcia APRN, 10 mL at 06/03/23 0933    sodium chloride 0.9 % infusion 40 mL, 40 mL, Intravenous, PRN, Chanell Garcia APRN    Laboratory Results:        Lab 06/05/23  0646 06/04/23  1401 06/04/23  0539 06/03/23  2150 06/03/23  1605 06/03/23  0721 06/03/23  0720 06/02/23  0339 06/01/23  1230 06/01/23  0447 05/31/23  0435 05/30/23  1801 05/30/23  1222   WBC 10.30  --  10.46  --   --  8.98  --  8.60  --  9.61 9.33  --   --    HEMOGLOBIN 11.7*  --  11.4*  --   --  11.7*  --  10.8*  --  11.8* 11.9*  --   --    HEMATOCRIT 36.8*  --  36.2*  --   --  37.2*  --  34.1*  --  36.7* 36.8*  --   --    PLATELETS 207  --  203  --   --  210  --  192  --  227 200  --   --    NEUTROS ABS  --   --   --   --   --  5.13  --   --   --   --  5.47  --   --    IMMATURE GRANS (ABS)  --   --   --   --   --  0.34*  --   --   --   --  0.37*  --   --    LYMPHS ABS  --   --   --   --   --  2.19  --   --   --   --  2.25  --   --    MONOS ABS  --   --   --   --   --  0.76  --   --   --   --  0.70  --   --    EOS ABS  --   --   --   --   --  0.51*  --   --   --   --  0.47*  --   --    MCV 85.0  --  85.2  --   --  85.1  --  87.2  --  85.2 85.6  --   --    PROTIME 13.5  --   --   --   --   --   --   --   --   --   --   --  13.5   APTT  --   --    --   --   --   --   --   --   --   --   --   --  29.8*   HEPARIN ANTI-XA 0.41 0.38 0.36 0.24* 0.31  --    < > 0.40   < > 0.11*  --    < > 0.35    < > = values in this interval not displayed.         Lab 06/05/23  0646 06/04/23  0539 06/03/23  0720 06/02/23  0339 06/01/23  0447 05/31/23  0435 05/30/23  0441   SODIUM 141 142 140 139 139 143 144   POTASSIUM 4.1 3.9 4.0 4.1 3.8 3.8 4.0   CHLORIDE 106 106 106 108* 105 106 106   CO2 24.0 24.0 24.0 23.0 25.0 27.0 28.0   ANION GAP 11.0 12.0 10.0 8.0 9.0 10.0 10.0   BUN 13 16 12 15 16 16 20   CREATININE 0.71* 0.81 0.72* 0.69* 0.77 0.70* 0.68*   EGFR 113.9 109.4 113.4 114.9 111.1 114.4 115.4   GLUCOSE 130* 120* 125* 114* 184* 134* 123*   CALCIUM 9.0 8.8 9.1 8.8 8.6 8.8 8.6   MAGNESIUM  --   --  2.3 2.3 2.3 2.3 2.1   PHOSPHORUS  --   --  3.7 3.3 3.7 4.4 5.0*             Lab 06/05/23  0646 05/30/23  1222   PROTIME 13.5 13.5   INR 1.02 1.02                 Brief Urine Lab Results       None          Microbiology Results (last 10 days)       ** No results found for the last 240 hours. **                     Diagnostic Imaging: I personally reviewed and interpreted the new imaging    Assessment and plan:  This is a 47-year-old male status post bilateral craniotomies for evacuation of subacute subdural hematomas on 5/26 followed by embolization of the left middle meningeal artery on 5/31.  Patient continues to do well neurologically.  From neurosurgical perspective, patient is okay to transition to oral anticoagulation at this time.  Appreciate hospitalist assistance.      Any copied data from previous notes included in the (1) HPI, (2) PE, (3) MDM and/or Assessment and Plan has been reviewed and accurate as of 06/05/23.    Chanell Rock PA-C  06/05/23  08:34 EDT        Electronically signed by Stephane Womack MD at 06/05/23 0842       Alexey Benites MD at 06/04/23 0936              [facility] Medicine Services  PROGRESS NOTE    Patient Name: Manjeet  Srinivas Hall  : 1975  MRN: [id number]    Date of Admission: 2023  Primary Care Physician: Provider, No Known    Subjective   Subjective     CC:  SDH    HPI:  Feels ok, no complaints.  Denies weakness.  Hasn't walked yet to today.     ROS:  Gen: no fever  Pulm: no cough    Objective   Objective     Vital Signs:   Temp:  [98.4 °F (36.9 °C)-98.6 °F (37 °C)] 98.4 °F (36.9 °C)  Heart Rate:  [] 77  Resp:  [16-18] 16  BP: (109-118)/(67-76) 109/67  Flow (L/min):  [0] 0     Physical Exam:  Constitutional - no acute distress, nontoxic, in bed  HEENT-mucous membranes moist  CV-RRR, systolic murmur, aortic valve click  Resp-CTAB, no wheezes, rhonchi or rales  Abd-soft, nontender, nondistended, normoactive bowel sounds  Ext-No lower extremity cyanosis, clubbing or edema bilaterally  Neuro-alert and oriented, speech clear, moves all extremities   Psych-normal affect   Skin- No rash on exposed UE or LE bilaterally      Results Reviewed:  LAB RESULTS:      Lab 23  0539 23  2150 23  1605 23  0721 23  0720 23  0339 23  1230 23  0447 23  0435 23  1801 23  1222   WBC 10.46  --   --  8.98  --  8.60  --  9.61 9.33  --   --    HEMOGLOBIN 11.4*  --   --  11.7*  --  10.8*  --  11.8* 11.9*  --   --    HEMATOCRIT 36.2*  --   --  37.2*  --  34.1*  --  36.7* 36.8*  --   --    PLATELETS 203  --   --  210  --  192  --  227 200  --   --    NEUTROS ABS  --   --   --  5.13  --   --   --   --  5.47  --   --    IMMATURE GRANS (ABS)  --   --   --  0.34*  --   --   --   --  0.37*  --   --    LYMPHS ABS  --   --   --  2.19  --   --   --   --  2.25  --   --    MONOS ABS  --   --   --  0.76  --   --   --   --  0.70  --   --    EOS ABS  --   --   --  0.51*  --   --   --   --  0.47*  --   --    MCV 85.2  --   --  85.1  --  87.2  --  85.2 85.6  --   --    PROTIME  --   --   --   --   --   --   --   --   --   --  13.5   APTT  --   --   --   --   --   --   --   --   --   --   29.8*   HEPARIN ANTI-XA 0.36 0.24* 0.31  --  0.26* 0.40   < > 0.11*  --    < > 0.35    < > = values in this interval not displayed.         Lab 06/04/23  0539 06/03/23  0720 06/02/23  0339 06/01/23  0447 05/31/23  0435 05/30/23  0441   SODIUM 142 140 139 139 143 144   POTASSIUM 3.9 4.0 4.1 3.8 3.8 4.0   CHLORIDE 106 106 108* 105 106 106   CO2 24.0 24.0 23.0 25.0 27.0 28.0   ANION GAP 12.0 10.0 8.0 9.0 10.0 10.0   BUN 16 12 15 16 16 20   CREATININE 0.81 0.72* 0.69* 0.77 0.70* 0.68*   EGFR 109.4 113.4 114.9 111.1 114.4 115.4   GLUCOSE 120* 125* 114* 184* 134* 123*   CALCIUM 8.8 9.1 8.8 8.6 8.8 8.6   MAGNESIUM  --  2.3 2.3 2.3 2.3 2.1   PHOSPHORUS  --  3.7 3.3 3.7 4.4 5.0*             Lab 05/30/23  1222   PROTIME 13.5   INR 1.02                 Brief Urine Lab Results       None            Microbiology Results Abnormal       None            No radiology results from the last 24 hrs        Current medications:  Scheduled Meds:divalproex, 250 mg, Oral, Nightly  DULoxetine, 60 mg, Oral, Daily  gabapentin, 200 mg, Oral, BID  insulin detemir, 8 Units, Subcutaneous, Q12H  insulin lispro, 3-14 Units, Subcutaneous, 4x Daily AC & at Bedtime  Insulin Lispro, 4 Units, Subcutaneous, TID With Meals  levothyroxine, 125 mcg, Oral, Daily  metoprolol tartrate, 12.5 mg, Oral, Q12H  pantoprazole, 40 mg, Oral, Q AM  polyethylene glycol, 17 g, Oral, Daily  rosuvastatin, 20 mg, Oral, Daily  senna-docusate sodium, 2 tablet, Oral, BID  sodium chloride, 10 mL, Intravenous, Q12H  sodium chloride, 10 mL, Intravenous, Q12H      Continuous Infusions:heparin, 15 Units/kg/hr, Last Rate: 15 Units/kg/hr (06/03/23 4847)  Pharmacy to Dose Heparin,       PRN Meds:.  acetaminophen    dextrose    dextrose    docusate sodium    glucagon (human recombinant)    HYDROcodone-acetaminophen    magnesium hydroxide    melatonin    nitroglycerin    ondansetron **OR** ondansetron    Pharmacy to Dose Heparin    sodium chloride    sodium chloride    sodium  chloride    Assessment & Plan   Assessment & Plan     Active Hospital Problems    Diagnosis  POA    **Bilateral Subdural Hematoma [I62.00]  Yes    HTN [I10]  Yes    Hyperlipidemia (Diet controlled, off statin) [E78.5]  Yes    Hypothyroidism [E03.9]  Yes    Anxiety [F41.9]  Yes    H/O aortic mechanical valve replacement on Coumadin [Z95.2]  Not Applicable    New Onset Type 2 diabetes mellitus [E11.9]  Yes      Resolved Hospital Problems   No resolved problems to display.        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        SDH, non traumatic  - s/p craniotomy and MMA embolization    Mechanical AoV  - heparin drip, consider transition to oral anticoagulation next week -- consider lovenox bridge  - will need close monitoring of INR as an outpatient -- home INR machine?  - consider Cardiology consultation Monday    DMII  - new onset, A1c 8.9  - basal bolus for now, probable metformin at discharge  - diabetes education Monday  - patient requests referral to endocrine at discharge    Hypothroid  - levothyroxine    Anxiety  Depression    Constipation      Expected Discharge Location and Transportation: home  Expected Discharge   Expected Discharge Date: 6/6/2023; Expected Discharge Time:      DVT prophylaxis:  Medical and mechanical DVT prophylaxis orders are present.     AM-PAC 6 Clicks Score (PT): 24 (06/03/23 0822)    CODE STATUS:   Code Status and Medical Interventions:   Ordered at: 05/27/23 0900     Code Status (Patient has no pulse and is not breathing):    CPR (Attempt to  Resuscitate)     Medical Interventions (Patient has pulse or is breathing):    Full Support     Release to patient:    Routine Release       Alexey Benites MD  23        Electronically signed by Alexey Benites MD at 23 0941       Alexey Benites MD at 23 1842              [facility] Medicine Services  PROGRESS NOTE    Patient Name: Manjeet Hall  : 1975  MRN: [id number]    Date of Admission: 2023  Primary Care Physician: Provider, No Known    Subjective   Subjective     CC:  SDH    HPI:  Feels fine today. Denies weakness    ROS:  Gen: no fever  Pulm: no cough  CV: no chest pain    Objective   Objective     Vital Signs:   Temp:  [98.3 °F (36.8 °C)-98.5 °F (36.9 °C)] 98.4 °F (36.9 °C)  Heart Rate:  [68-88] 80  Resp:  [16-18] 16  BP: (118-134)/(72-82) 118/76  Flow (L/min):  [0] 0     Physical Exam:  Constitutional - no acute distress, nontoxic, in bed  HEENT-NCAT, mucous membranes moist  CV-RRR, systolic murmur  Resp-CTAB, no wheezes, rhonchi or rales  Abd-soft, nontender, nondistended, normoactive bowel sounds  Ext-No lower extremity cyanosis, clubbing or edema bilaterally  Neuro-alert, speech clear, moves all extremities   Psych-normal affect   Skin- No rash on exposed UE or LE bilaterally      Results Reviewed:  LAB RESULTS:      Lab 23  1605 23  0721 23  0720 23  0339 23  1345 23  1230 23  0447 23  0435 23  1801 23  1222 23  0441   WBC  --  8.98  --  8.60  --   --   --  9.61 9.33  --   --  10.05   HEMOGLOBIN  --  11.7*  --  10.8*  --   --   --  11.8* 11.9*  --   --  11.3*   HEMATOCRIT  --  37.2*  --  34.1*  --   --   --  36.7* 36.8*  --   --  36.0*   PLATELETS  --  210  --  192  --   --   --  227 200  --   --  183   NEUTROS ABS  --  5.13  --   --   --   --   --   --  5.47  --   --   --    IMMATURE GRANS (ABS)  --  0.34*  --   --   --   --   --   --  0.37*  --   --   --     LYMPHS ABS  --  2.19  --   --   --   --   --   --  2.25  --   --   --    MONOS ABS  --  0.76  --   --   --   --   --   --  0.70  --   --   --    EOS ABS  --  0.51*  --   --   --   --   --   --  0.47*  --   --   --    MCV  --  85.1  --  87.2  --   --   --  85.2 85.6  --   --  87.4   PROTIME  --   --   --   --   --   --   --   --   --   --  13.5  --    APTT  --   --   --   --   --   --   --   --   --   --  29.8*  --    HEPARIN ANTI-XA 0.31  --  0.26* 0.40 0.33 0.23*   < > 0.11*  --    < > 0.35  --     < > = values in this interval not displayed.         Lab 06/03/23  0720 06/02/23  0339 06/01/23  0447 05/31/23  0435 05/30/23  0441   SODIUM 140 139 139 143 144   POTASSIUM 4.0 4.1 3.8 3.8 4.0   CHLORIDE 106 108* 105 106 106   CO2 24.0 23.0 25.0 27.0 28.0   ANION GAP 10.0 8.0 9.0 10.0 10.0   BUN 12 15 16 16 20   CREATININE 0.72* 0.69* 0.77 0.70* 0.68*   EGFR 113.4 114.9 111.1 114.4 115.4   GLUCOSE 125* 114* 184* 134* 123*   CALCIUM 9.1 8.8 8.6 8.8 8.6   MAGNESIUM 2.3 2.3 2.3 2.3 2.1   PHOSPHORUS 3.7 3.3 3.7 4.4 5.0*             Lab 05/30/23  1222   PROTIME 13.5   INR 1.02                 Brief Urine Lab Results       None            Microbiology Results Abnormal       None            No radiology results from the last 24 hrs        Current medications:  Scheduled Meds:divalproex, 250 mg, Oral, Nightly  DULoxetine, 60 mg, Oral, Daily  gabapentin, 200 mg, Oral, BID  insulin detemir, 8 Units, Subcutaneous, Q12H  insulin lispro, 3-14 Units, Subcutaneous, 4x Daily AC & at Bedtime  Insulin Lispro, 4 Units, Subcutaneous, TID With Meals  levothyroxine, 125 mcg, Oral, Daily  metoprolol tartrate, 12.5 mg, Oral, Q12H  pantoprazole, 40 mg, Oral, Q AM  polyethylene glycol, 17 g, Oral, Daily  rosuvastatin, 20 mg, Oral, Daily  senna-docusate sodium, 2 tablet, Oral, BID  sodium chloride, 10 mL, Intravenous, Q12H  sodium chloride, 10 mL, Intravenous, Q12H      Continuous Infusions:heparin, 13.5 Units/kg/hr, Last Rate: 13.5 Units/kg/hr  (06/03/23 1606)  Pharmacy to Dose Heparin,       PRN Meds:.  acetaminophen    dextrose    dextrose    docusate sodium    glucagon (human recombinant)    HYDROcodone-acetaminophen    magnesium hydroxide    melatonin    nitroglycerin    ondansetron **OR** ondansetron    Pharmacy to Dose Heparin    sodium chloride    sodium chloride    sodium chloride    Assessment & Plan   Assessment & Plan     Active Hospital Problems    Diagnosis  POA    **Bilateral Subdural Hematoma [I62.00]  Yes    HTN [I10]  Yes    Hyperlipidemia (Diet controlled, off statin) [E78.5]  Yes    Hypothyroidism [E03.9]  Yes    Anxiety [F41.9]  Yes    H/O aortic mechanical valve replacement on Coumadin [Z95.2]  Not Applicable    New Onset Type 2 diabetes mellitus [E11.9]  Yes      Resolved Hospital Problems   No resolved problems to display.        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        SDH, non traumatic  - s/p craniotomy and MMA embolization    Mechanical AoV  - heparin drip, consider transition to oral anticoagulation next week  - consider Cardiology consultation Monday    DMII  - new onset, A1c 8.9  - basal bolus  - diabetes education  - refer to endocrine at discharge    Hypothroid  - levothyroxine    Anxiety  Depression    Constipation      Expected Discharge Location and Transportation: home  Expected Discharge   Expected discharge date/ time has not been documented.     DVT prophylaxis:  Medical and mechanical DVT prophylaxis orders are present.     AM-PAC 6 Clicks Score (PT): 24 (06/03/23  "0122)    CODE STATUS:   Code Status and Medical Interventions:   Ordered at: 05/27/23 0900     Code Status (Patient has no pulse and is not breathing):    CPR (Attempt to Resuscitate)     Medical Interventions (Patient has pulse or is breathing):    Full Support     Release to patient:    Routine Release       Alexey Benites MD  06/03/23        Electronically signed by Alexey Benites MD at 06/03/23 1852       Stephane Womack MD at 06/02/23 0917          NEUROSURGERY PROGRESS NOTE    Chief Complaint: Bilateral subacute subdural hematomas    Subjective: Stable overnight.  No issues    Objective    Vital Signs: Blood pressure 126/84, pulse 85, temperature 97.2 °F (36.2 °C), temperature source Axillary, resp. rate 14, height 172.7 cm (68\"), weight 108 kg (238 lb 1.6 oz), SpO2 96 %.    Physical Exam  Awake, alert and oriented x 3  Opens eyes spont  Pupils 3 mm rx bilat  Extraocular muscles intact bilaterally  Face symmetric bilaterally  Tongue midline  5/5 in all 4 ext  No pronator drift  Incisions clean dry and intact    Intake/Output:     Intake/Output Summary (Last 24 hours) at 6/2/2023 0917  Last data filed at 6/2/2023 0900  Gross per 24 hour   Intake 543 ml   Output --   Net 543 ml       Current Medications:   Current Facility-Administered Medications:     acetaminophen (TYLENOL) tablet 650 mg, 650 mg, Oral, Q6H PRN, Stephane Womack MD, 650 mg at 06/01/23 1542    dextrose (D50W) (25 g/50 mL) IV injection 25 g, 25 g, Intravenous, Q15 Min PRN, Stephane Womack MD    dextrose (GLUTOSE) oral gel 15 g, 15 g, Oral, Q15 Min PRN, Stephane Womack MD    divalproex (DEPAKOTE ER) 24 hr tablet 250 mg, 250 mg, Oral, Nightly, Stephane Womack MD, 250 mg at 06/01/23 2109    docusate sodium (COLACE) capsule 100 mg, 100 mg, Oral, BID PRN, Stephane Womack MD    DULoxetine (CYMBALTA) DR capsule 60 mg, 60 mg, Oral, Daily, Stephane Womack MD, 60 mg at 06/02/23 0844    gabapentin (NEURONTIN) capsule 200 mg, 200 mg, " Oral, BID, Stephane Womack MD, 200 mg at 06/02/23 0843    glucagon (GLUCAGEN) injection 1 mg, 1 mg, Intramuscular, Q15 Min PRN, Stephane Womack MD    heparin 16646 units/250 mL (100 units/mL) in 0.45 % NaCl infusion, 12.5 Units/kg/hr, Intravenous, Titrated, Destiny Jean, PharmD, Last Rate: 13 mL/hr at 06/02/23 0420, 12.5 Units/kg/hr at 06/02/23 0420    HYDROcodone-acetaminophen (NORCO) 5-325 MG per tablet 1 tablet, 1 tablet, Oral, Q6H PRN, Stephane Womack MD, 1 tablet at 06/02/23 0203    HYDROmorphone (DILAUDID) injection 0.5 mg, 0.5 mg, Intravenous, Q2H PRN **AND** naloxone (NARCAN) injection 0.4 mg, 0.4 mg, Intravenous, Q5 Min PRN, Stephane Womack MD    insulin detemir (LEVEMIR) injection 8 Units, 8 Units, Subcutaneous, Q12H, Stephane Womack MD, 8 Units at 06/02/23 0843    Insulin Lispro (humaLOG) injection 3-14 Units, 3-14 Units, Subcutaneous, 4x Daily AC & at Bedtime, Stephane Womack MD, 3 Units at 06/01/23 2121    Insulin Lispro (humaLOG) injection 4 Units, 4 Units, Subcutaneous, TID With Meals, Stephane Womack MD, 4 Units at 06/02/23 0843    levothyroxine (SYNTHROID, LEVOTHROID) tablet 125 mcg, 125 mcg, Oral, Daily, Stephane Womack MD, 125 mcg at 06/02/23 0843    magnesium hydroxide (MILK OF MAGNESIA) 400 MG/5ML suspension 15 mL, 15 mL, Oral, Daily PRN, Stephane Womack MD    melatonin tablet 5 mg, 5 mg, Oral, Nightly PRN, Chanell Garcia, APRN, 5 mg at 06/01/23 2109    metoprolol tartrate (LOPRESSOR) half tablet 12.5 mg, 12.5 mg, Oral, Q12H, Stephane Womack MD, 12.5 mg at 06/02/23 0855    nitroglycerin (NITROSTAT) SL tablet 0.4 mg, 0.4 mg, Sublingual, Q5 Min PRN, Stephane Womack MD    ondansetron (ZOFRAN) injection 4 mg, 4 mg, Intravenous, Q6H PRN **OR** ondansetron (ZOFRAN) tablet 4 mg, 4 mg, Oral, Q6H PRN, Stephane Womack MD    pantoprazole (PROTONIX) EC tablet 40 mg, 40 mg, Oral, Q AM, Stephane Womack MD, 40 mg at 06/02/23 0855    Pharmacy to Dose Heparin, , Does not  apply, Continuous PRN, Stephane Womack MD    polyethylene glycol (MIRALAX) packet 17 g, 17 g, Oral, Daily, Stephane Womack MD, 17 g at 05/29/23 1153    rosuvastatin (CRESTOR) tablet 20 mg, 20 mg, Oral, Daily, Stephane Womack MD, 20 mg at 06/02/23 0843    sennosides-docusate (PERICOLACE) 8.6-50 MG per tablet 2 tablet, 2 tablet, Oral, BID, Stephane Womack MD, 2 tablet at 05/31/23 0807    sodium chloride 0.9 % flush 10 mL, 10 mL, Intravenous, Q12H, Stephane Womack MD, 10 mL at 06/02/23 0845    sodium chloride 0.9 % flush 10 mL, 10 mL, Intravenous, PRN, Stephane Womack MD    sodium chloride 0.9 % flush 10 mL, 10 mL, Intravenous, Q12H, Stephane Womack MD, 10 mL at 06/02/23 0845    sodium chloride 0.9 % flush 10 mL, 10 mL, Intravenous, PRN, Stephane Womack MD    sodium chloride 0.9 % infusion 40 mL, 40 mL, Intravenous, Vu PADRON Nicolas, MD     Laboratory Results:       Lab 06/02/23  0339 06/01/23 2006 06/01/23  1345 06/01/23  1230 06/01/23  0447 05/31/23  0435 05/30/23  1801 05/30/23  1222 05/30/23  0441 05/29/23  1004 05/27/23  1153 05/27/23  0519 05/26/23  1349 05/26/23  1348 05/26/23  1008   WBC 8.60  --   --   --  9.61 9.33  --   --  10.05 13.76*  --    < >  --   --  11.50*   HEMOGLOBIN 10.8*  --   --   --  11.8* 11.9*  --   --  11.3* 10.9*  --    < >  --   --  14.6   HEMATOCRIT 34.1*  --   --   --  36.7* 36.8*  --   --  36.0* 34.6*  --    < >  --   --  45.8   PLATELETS 192  --   --   --  227 200  --   --  183 173  --    < >  --   --  193   NEUTROS ABS  --   --   --   --   --  5.47  --   --   --   --   --   --   --   --  8.68*   IMMATURE GRANS (ABS)  --   --   --   --   --  0.37*  --   --   --   --   --   --   --   --  0.16*   LYMPHS ABS  --   --   --   --   --  2.25  --   --   --   --   --   --   --   --  1.63   MONOS ABS  --   --   --   --   --  0.70  --   --   --   --   --   --   --   --  0.71   EOS ABS  --   --   --   --   --  0.47*  --   --   --   --   --   --   --   --  0.26   MCV  87.2  --   --   --  85.2 85.6  --   --  87.4 85.2  --    < >  --   --  85.1   PROTIME  --   --   --   --   --   --   --  13.5  --   --  14.7*  --  13.3 14.0 23.6*   APTT  --   --   --   --   --   --   --  29.8*  --   --   --   --   --   --   --    HEPARIN ANTI-XA 0.40 0.33 0.23* >1.10* 0.11*  --    < > 0.35  --   --   --   --   --   --   --     < > = values in this interval not displayed.         Lab 06/02/23  0339 06/01/23 0447 05/31/23  0435 05/30/23  0441 05/29/23  1004 05/27/23  0519 05/26/23  1008   SODIUM 139 139 143 144 143   < > 139   POTASSIUM 4.1 3.8 3.8 4.0 3.8   < > 4.4   CHLORIDE 108* 105 106 106 105   < > 101   CO2 23.0 25.0 27.0 28.0 28.0   < > 24.0   ANION GAP 8.0 9.0 10.0 10.0 10.0   < > 14.0   BUN 15 16 16 20 20   < > 14   CREATININE 0.69* 0.77 0.70* 0.68* 0.64*   < > 0.72*   EGFR 114.9 111.1 114.4 115.4 117.5   < > 113.4   GLUCOSE 114* 184* 134* 123* 151*   < > 220*   CALCIUM 8.8 8.6 8.8 8.6 9.0   < > 9.7   MAGNESIUM 2.3 2.3 2.3 2.1 2.1   < > 2.2   PHOSPHORUS 3.3 3.7 4.4 5.0* 2.8   < >  --    HEMOGLOBIN A1C  --   --   --   --   --   --  8.90*   TSH  --   --   --   --   --   --  1.610    < > = values in this interval not displayed.         Lab 05/26/23  1008   TOTAL PROTEIN 8.4   ALBUMIN 4.7   GLOBULIN 3.7   ALT (SGPT) 14   AST (SGOT) 18   BILIRUBIN 0.6   ALK PHOS 86         Lab 05/30/23  1222 05/27/23  1153 05/26/23  1349 05/26/23  1348 05/26/23  1008   PROTIME 13.5 14.7* 13.3 14.0 23.6*   INR 1.02 1.13* 1.1 1.07 2.08*         Lab 05/27/23  0519   CHOLESTEROL 181   LDL CHOL 107*   HDL CHOL 48   TRIGLYCERIDES 147         Lab 05/26/23  1125 05/26/23  1014   ABO TYPING A A   RH TYPING Positive Positive   ANTIBODY SCREEN  --  Negative         Brief Urine Lab Results       None          Microbiology Results (last 10 days)       ** No results found for the last 240 hours. **             Diagnostic Imaging: I reviewed and independently interpreted the new imaging.     Assessment/Plan:  This is a  47-year-old male status post bilateral craniotomies for evacuation of subacute subdural hematomas followed by embolization of the left middle meningeal artery.  Patient is doing well neurologically as he is significantly improved from presentation.  Head CT from yesterday shows continued resolution of subdural hematomas.  I would like to keep the patient on a heparin drip over the weekend and then we can discuss transitioning to oral anticoagulation next week.  From neurosurgical perspective, the patient is cleared to be transferred out of the ICU.  Appreciate ICU assistance.    Any copied data from previous notes included in the (1) History of Present Illness, (2) Physical Examination and (3) Medical Decision Making and/or Assessment and Plan has been reviewed and is accurate as of 06/02/23      Stephane Womack MD  06/02/23  09:17 EDT        Electronically signed by Stephane Womack MD at 06/02/23 0918       Rupali Crespo, DNP, APRN at 06/02/23 0831            Intensive Care Follow-up     Hospital:  LOS: 7 days   Mr. Manjeet Hall, 47 y.o. male is followed for:   Subdural hematoma, nontraumatic     Subjective   Interval History:  The chart has been reviewed. No acute events overnight. AM labs unremarkable. No imaging today.     Patient sitting up in the chair this AM in NAD. He remains afebrile, hemodynamically stable, and is oxygenating appropriately on RA. He is tolerating a PO diet and is working with PT & OT. No complaints at this time.     He denies current shortness of breath, dizziness, chest pain, nausea, vomiting, diarrhea, abdominal discomfort, or extremity numbness or tingling.      The patient's past medical, surgical and social history were reviewed and updated in Epic as appropriate.    Objective     Infusions:  heparin, 12.5 Units/kg/hr, Last Rate: 12.5 Units/kg/hr (06/02/23 0420)  Pharmacy to Dose Heparin,       Medications:  divalproex, 250 mg, Oral, Nightly  DULoxetine, 60 mg, Oral,  Daily  gabapentin, 200 mg, Oral, BID  insulin detemir, 8 Units, Subcutaneous, Q12H  insulin lispro, 3-14 Units, Subcutaneous, 4x Daily AC & at Bedtime  Insulin Lispro, 4 Units, Subcutaneous, TID With Meals  levothyroxine, 125 mcg, Oral, Daily  metoprolol tartrate, 12.5 mg, Oral, Q12H  pantoprazole, 40 mg, Oral, Q AM  polyethylene glycol, 17 g, Oral, Daily  rosuvastatin, 20 mg, Oral, Daily  senna-docusate sodium, 2 tablet, Oral, BID  sodium chloride, 10 mL, Intravenous, Q12H  sodium chloride, 10 mL, Intravenous, Q12H      I reviewed the patient's medications.    Vital Sign Min/Max for last 24 hours  Temp  Min: 97.2 °F (36.2 °C)  Max: 98.1 °F (36.7 °C)   BP  Min: 111/73  Max: 138/80   Pulse  Min: 65  Max: 88   Resp  Min: 14  Max: 16   SpO2  Min: 90 %  Max: 97 %   No data recorded       Input/Output for last 24 hour shift  06/01 0701 - 06/02 0700  In: 303 [I.V.:303]  Out: -       Physical Exam:  GENERAL: Well-appearing, pleasant male sitting up in the chair and conversant. No acute distress.   HEENT: Normocephalic. Bilateral craniotomy incisions approximated with staples, sites C/D/I. Trachea midline. PER. EOM WNL.   LUNGS: Chest rise of normal depth and symmetric. Lungs clear to auscultation bilaterally. No wheezes, rhonchi, or rales.   HEART: S1,S2 detected. Regular rate and rhythm. No rub, murmur, or gallop.   ABDOMEN: Soft, round, nondistended, and nontender. Bowel sounds present.   EXTREMITIES: No clubbing, edema, or cyanosis. Peripheral pulses present. Skin warm and dry. RUE PICC line in place, dressing C/D/I.    NEURO/PSYCH: Alert and oriented. Follows commands. Moves all extremities. No new focal deficits. Baseline aphasia / word finding-difficulty.      Interval:  (Return from procedure)  1a. Level of Consciousness: 0-->Alert, keenly responsive  1b. LOC Questions: 0-->Answers both questions correctly  1c. LOC Commands: 0-->Performs both tasks correctly  2. Best Gaze: 0-->Normal  3. Visual: 0-->No visual  loss  4. Facial Palsy: 0-->Normal symmetrical movements  5a. Motor Arm, Left: 0-->No drift, limb holds 90 (or 45) degrees for full 10 secs  5b. Motor Arm, Right: 0-->No drift, limb holds 90 (or 45) degrees for full 10 secs  6a. Motor Leg, Left: 0-->No drift, leg holds 30 degree position for full 5 secs  6b. Motor Leg, Right: 0-->No drift, leg holds 30 degree position for full 5 secs  7. Limb Ataxia: 0-->Absent  8. Sensory: 0-->Normal, no sensory loss  9. Best Language: 1-->Mild-to-moderate aphasia, some obvious loss of fluency or facility of comprehension, without significant limitation on ideas expressed or form of expression. Reduction of speech and/or comprehension, however, makes conversation. . . (see row details)  10. Dysarthria: 0-->Normal  11. Extinction and Inattention (formerly Neglect): 0-->No abnormality    Total (NIH Stroke Scale): 1    Results from last 7 days   Lab Units 06/02/23  0339 06/01/23 0447 05/31/23  0435   WBC 10*3/mm3 8.60 9.61 9.33   HEMOGLOBIN g/dL 10.8* 11.8* 11.9*   PLATELETS 10*3/mm3 192 227 200     Results from last 7 days   Lab Units 06/02/23  0339 06/01/23 0447 05/31/23  0435   SODIUM mmol/L 139 139 143   POTASSIUM mmol/L 4.1 3.8 3.8   CO2 mmol/L 23.0 25.0 27.0   BUN mg/dL 15 16 16   CREATININE mg/dL 0.69* 0.77 0.70*   MAGNESIUM mg/dL 2.3 2.3 2.3   PHOSPHORUS mg/dL 3.3 3.7 4.4   GLUCOSE mg/dL 114* 184* 134*     Estimated Creatinine Clearance: 157.6 mL/min (A) (by C-G formula based on SCr of 0.69 mg/dL (L)).    I reviewed the patient's new clinical results.  I reviewed the patient's new imaging results/reports including actual images and agree with reports.     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     He also had associated edema with some leftward midline shift, right lateral and third ventricular effacement, and bilateral uncal medialization. He was hypertensive requiring Cardene infusion. INR was 2.08 received KCentra and Vitamin K.     NS was consulted and he was initiated on Keppra for seizure prophylaxis. He was taken to OR on 5/26 for emergent bilateral craniotomy and evacuation of subdural hematomas per Dr. Womack, followed by MMA embolization on 5/31.      Plan        # Bilateral subdural hematomas   # H/O CVA postop AVR with residual receptive aphasia and word finding difficulty   # On chronic anticoagulation (warfarin)   S/P bilateral craniotomies with SDH evacuation on 5/26 per Dr. Womack; drains D/C'd on 5/28  Underwent Lt MMA embolization on 5/31 per Dr. Womack; right MMA too small for embolization   Postoperative care per Neurosurgery  Continue PT / OT / SLP; CM to set up outpatient SLP to help with expressive aphasia and word-finding difficulty upon discharge   Patient wants to establish care with new PCP upon discharge  AM labs     # HTN  # Dyslipidemia   # H/O mechanical AVR (2018) on warfarin   Continue BB, statin therapy (Crestor)  Plan to continue Heparin gtt over the weekend and transition to oral anticoagulation next week per Neurosurgery      # New onset T2DM (HgbA1c 8.9)   # Neuropathy   Keep FSBG </= 150 mg/dL per Neurosurgery; currently within goal   Continue current basal, prandial, & SSI correction   Diabetes education to see prior to discharge   Patient would like to establish care with Endocrinology upon  discharge  Continue gabapentin     # Hypothyroidism   Continue levothyroxine     # Anxiety  # Depression   Continue Cymbalta and Depakote ER  Hold home Atarax for now     # Constipation   Continue bowel regimen     DVT Prophylaxis: Heparin gtt / SCDs  GI Prophylaxis: PPI  Dispo: Transfer to telemetry; Hospitalists to assume attending role in AM     Time spent: 37 minutes   Plan of care and goals reviewed with multidisciplinary/antibiotic stewardship team during rounds.   I discussed the patient's findings and my recommendations with patient and nursing staff     Rupali Crespo DNP, APRN, [facility]  Pulmonary and Critical Care Medicine        Electronically signed by Rupali Crespo DNP, APRN at 06/02/23 1042       Chanell Garcia APRN at 06/01/23 1158            Intensive Care Follow-up     Hospital:  LOS: 6 days   Mr. Manjeet Hall, 47 y.o. male is followed for:   Subdural hematoma, nontraumatic   Glycemic, Electrolyte, Respiratory, and Medical management     Subjective   Interval History:  The patient is sitting upright in the chair, pleasant, in good spirits, and family is at bedside. Underwent MMA embolization yesterday by Dr. Womack with stable post-op course. Remains on a Heparin gtt. No complaints.      The patient's past medical, surgical and social history were reviewed and updated in Epic as appropriate.    Review of Systems - General ROS: negative for - chills, fatigue, fever or night sweats  Respiratory ROS: negative for - cough, shortness of breath or wheezing  Cardiovascular ROS: negative for - chest pain, edema or palpitations  Gastrointestinal ROS: positive for constipation    Objective   Objective     Infusions:  heparin, 11.5 Units/kg/hr, Last Rate: 11.5 Units/kg/hr (06/01/23 0948)  Pharmacy to Dose Heparin,          Medications:  divalproex, 250 mg, Oral, Nightly  DULoxetine, 60 mg, Oral, Daily  gabapentin, 200 mg, Oral, BID  insulin detemir, 8 Units, Subcutaneous, Q12H  insulin lispro,  "3-14 Units, Subcutaneous, 4x Daily AC & at Bedtime  Insulin Lispro, 4 Units, Subcutaneous, TID With Meals  levothyroxine, 125 mcg, Oral, Daily  metoprolol tartrate, 12.5 mg, Oral, Q12H  pantoprazole, 40 mg, Oral, Q AM  polyethylene glycol, 17 g, Oral, Daily  rosuvastatin, 20 mg, Oral, Daily  senna-docusate sodium, 2 tablet, Oral, BID  sodium chloride, 10 mL, Intravenous, Q12H  sodium chloride, 10 mL, Intravenous, Q12H        Vital Sign Min/Max for last 24 hours  Temp  Min: 96.3 °F (35.7 °C)  Max: 97.6 °F (36.4 °C)   BP  Min: 103/72  Max: 154/91   Pulse  Min: 64  Max: 93   Resp  Min: 12  Max: 18   SpO2  Min: 90 %  Max: 100 %   Flow (L/min)  Min: 2  Max: 2       Input/Output for last 24 hour shift  05/31 0701 - 06/01 0700  In: 300 [I.V.:300]  Out: -    S RR:  [2-12] 2     Objective:  General Appearance:  In no acute distress, comfortable, well-appearing and not in pain.    Vital signs: (most recent): Blood pressure 138/80, pulse 88, temperature 97.4 °F (36.3 °C), temperature source Oral, resp. rate 16, height 172.7 cm (68\"), weight 110 kg (242 lb 8.1 oz), SpO2 95 %.  Vital signs are normal.    HEENT: (Bilateral incisions CDI and well-approximated with staples.)    Lungs:  Normal effort and normal respiratory rate.  Breath sounds clear to auscultation.  He is not in respiratory distress.  No rales, wheezes or rhonchi.    Heart: Normal rate.  Regular rhythm.  S1 normal and S2 normal.  No murmur or friction rub. (+ click)  Abdomen: Abdomen is soft and non-distended.  Bowel sounds are normal.   There is no abdominal tenderness.     Extremities: There is no dependent edema.    Pulses: Distal pulses are intact.    Neurological: Patient is alert and oriented to person, place and time.  GCS score is 15.  (Expressive aphasia and word finding difficulty at baseline).    Pupils:  Pupils are equal, round, and reactive to light.    Skin:  Warm and dry.  No rash. (JOLLY PICC site CDI )        Interval:  (Return from procedure)  1a. " Level of Consciousness: 0-->Alert, keenly responsive  1b. LOC Questions: 0-->Answers both questions correctly  1c. LOC Commands: 0-->Performs both tasks correctly  2. Best Gaze: 0-->Normal  3. Visual: 0-->No visual loss  4. Facial Palsy: 0-->Normal symmetrical movements  5a. Motor Arm, Left: 0-->No drift, limb holds 90 (or 45) degrees for full 10 secs  5b. Motor Arm, Right: 0-->No drift, limb holds 90 (or 45) degrees for full 10 secs  6a. Motor Leg, Left: 0-->No drift, leg holds 30 degree position for full 5 secs  6b. Motor Leg, Right: 0-->No drift, leg holds 30 degree position for full 5 secs  7. Limb Ataxia: 0-->Absent  8. Sensory: 0-->Normal, no sensory loss  9. Best Language: 1-->Mild-to-moderate aphasia, some obvious loss of fluency or facility of comprehension, without significant limitation on ideas expressed or form of expression. Reduction of speech and/or comprehension, however, makes conversation. . . (see row details)  10. Dysarthria: 0-->Normal  11. Extinction and Inattention (formerly Neglect): 0-->No abnormality    Total (NIH Stroke Scale): 1    Results from last 7 days   Lab Units 06/01/23 0447 05/31/23  0435 05/30/23  0441   WBC 10*3/mm3 9.61 9.33 10.05   HEMOGLOBIN g/dL 11.8* 11.9* 11.3*   PLATELETS 10*3/mm3 227 200 183     Results from last 7 days   Lab Units 06/01/23 0447 05/31/23  0435 05/30/23  0441   SODIUM mmol/L 139 143 144   POTASSIUM mmol/L 3.8 3.8 4.0   CO2 mmol/L 25.0 27.0 28.0   BUN mg/dL 16 16 20   CREATININE mg/dL 0.77 0.70* 0.68*   MAGNESIUM mg/dL 2.3 2.3 2.1   PHOSPHORUS mg/dL 3.7 4.4 5.0*   GLUCOSE mg/dL 184* 134* 123*     Estimated Creatinine Clearance: 142.6 mL/min (by C-G formula based on SCr of 0.77 mg/dL).          Images:   None new    I reviewed the patient's results and images.     Assessment & Plan   Impression        Bilateral Subdural Hematoma    HTN    Hyperlipidemia (Diet controlled, off statin)    Hypothyroidism    Anxiety    H/O aortic mechanical valve replacement  on Coumadin    New Onset Type 2 diabetes mellitus       Plan      Manjeet Hall is a 47 y.o. malewith PMH HTN, dyslipidemia, hypothyroidism, T2DM, Hodgkin's lymphoma (2020), PDA closure at birth, VSD closure at age 14, AR / AS s/p Ross procedure and subsequent mechanical AVR with right ventricular outflow tract reconstruction ([facility]) that was complicated by pancreatitis and CVA 2* holding anticoagulation with some expressive aphasia and word finding difficulty (2018) who presented 5/26 with AMS found to have bilateral subdural hematomas.     Also had associated edema, some leftward midline shift, right lateral and third ventricular effacement, and bilateral uncal medialization. He was hypertensive requiring Cardene. INR was subtherapeutic at 2.08 given Kcentra and vitamin K.     NS consulted started on Keppra for seizure prophylaxis. Taken to OR on 5/26 for emergent bilateral craniotomy and evacuation of subdural hematomas by Dr. Womack. Subsequent left MMA embolization on 5/31.     Bilateral Subdural Hematomas   H/O Stroke post op AVR with residual receptive aphasia and word finding difficulty  On chronic anticoagulation (warfarin)   S/P bilateral craniotomies and SDH evacuation on 5/26 by Dr. Womack; drains d/c'd on 5/28   Underwent L MMA embolization on 5/31 by Dr. Womack; right MMA was too small for embolization   Post op care per NS  Continue heparin drip for now with eventual transition to oral AC early next week per NS   Continue PT/OT/SLP  CM assisting with setting up outpatient SLP to help with expressive aphasia and word finding difficulty upon discharge  Patient wants to establish care with new PCP at discharge  AM labs    HTN  HLP  H/O mechanical AVR 2018 on warfarin  Continue Metoprolol and Crestor   Plan to continue Heparin gtt over the weekend and transition to oral anticoagulation next week per NS     New onset T2DM, Hgb A1c 8.9   Neuropathy  Keep glucose <= 150 per NS  BGs  "90-180s  Continue 8 units levemir, 4 units prandial, + moderate-high correction SSI  Steroids discontinued 5/28  Diabetes educator still needs to see patient for formal evaluation   Continue Gabapentin  Patient would like to establish care with Endocrinologist at discharge    Hypothyroidism  Continue Levothyroxine    Anxiety  Depression   Continue Cymbalta and Depakote ER  Hold home Atarax for now    Constipation  BM noted overnight   Continue bowel protocol    DVT prophylaxis:  Hep gtt / SCDs  GI prophylaxis:  PPI  Disposition:  OK to downgrade to telemetry per NS     Plan of care and goals reviewed during interdisciplinary rounds.  I discussed the patient's findings and my recommendations with nursing staff     MDM:    Problem(s) High due to: Acute or Chronic illness or injury that may poses a threat to life or bodily function  Risk: High due to: drug(s) requiring intensive monitoring for toxicity    High    Electronically signed by BETHANIE Peterson, 06/01/23, 11:56 AM EDT.       Electronically signed by Chanell Garcia APRN at 06/01/23 1158       Chanell Rock PA-C at 06/01/23 0847       Attestation signed by Stephane Womack MD at 06/01/23 0901    I have reviewed this documentation and agree.   okay for patient to be transferred to the floor as long as he can remain on the heparin drip.  Plan to transition to oral anticoagulation at some point next week.                      [facility]    NEUROSURGERY PROGRESS NOTE    06/01/23    Chief Complaint: Bilateral subdural hematomas    Subjective: Patient underwent MMA embolization yesterday.  Stable overnight.    1 Day Post-Op    Objective:     /80   Pulse 75   Temp 97.1 °F (36.2 °C) (Axillary)   Resp 14   Ht 172.7 cm (68\")   Wt 110 kg (242 lb 8.1 oz)   SpO2 90%   BMI 36.87 kg/m²        Physical Exam  Awake, alert, oriented  Pupils equal bilaterally  Extraocular movements intact bilaterally  Face " symmetric  5 out of 5 all 4 extremities  Incisions clean dry and intact      Intake/Output Summary (Last 24 hours) at 6/1/2023 0847  Last data filed at 5/31/2023 1658  Gross per 24 hour   Intake 300 ml   Output --   Net 300 ml         Current Facility-Administered Medications:     acetaminophen (TYLENOL) tablet 650 mg, 650 mg, Oral, Q6H PRN, Stephane Womack MD, 650 mg at 05/31/23 1427    dextrose (D50W) (25 g/50 mL) IV injection 25 g, 25 g, Intravenous, Q15 Min PRN, Stephane Womack MD    dextrose (GLUTOSE) oral gel 15 g, 15 g, Oral, Q15 Min PRN, Stephane Womack MD    divalproex (DEPAKOTE ER) 24 hr tablet 250 mg, 250 mg, Oral, Nightly, Stephane Womack MD, 250 mg at 05/31/23 2137    docusate sodium (COLACE) capsule 100 mg, 100 mg, Oral, BID PRN, Stephane Womack MD    DULoxetine (CYMBALTA) DR capsule 60 mg, 60 mg, Oral, Daily, Stephane Womack MD, 60 mg at 05/31/23 0807    gabapentin (NEURONTIN) capsule 200 mg, 200 mg, Oral, BID, Stephane Womack MD, 200 mg at 05/31/23 2026    glucagon (GLUCAGEN) injection 1 mg, 1 mg, Intramuscular, Q15 Min PRN, Stephane Womack MD    heparin 10595 units/250 mL (100 units/mL) in 0.45 % NaCl infusion, 11.5 Units/kg/hr, Intravenous, Titrated, Kashmir Paz, PharmD, Last Rate: 11.96 mL/hr at 06/01/23 0747, 11.5 Units/kg/hr at 06/01/23 0747    HYDROcodone-acetaminophen (NORCO) 5-325 MG per tablet 1 tablet, 1 tablet, Oral, Q6H PRN, Stephane Womack MD, 1 tablet at 06/01/23 0507    HYDROmorphone (DILAUDID) injection 0.5 mg, 0.5 mg, Intravenous, Q2H PRN **AND** naloxone (NARCAN) injection 0.4 mg, 0.4 mg, Intravenous, Q5 Min PRN, Stephane Womack MD    insulin detemir (LEVEMIR) injection 8 Units, 8 Units, Subcutaneous, Q12H, Stephane Womack MD, 8 Units at 05/31/23 2026    Insulin Lispro (humaLOG) injection 3-14 Units, 3-14 Units, Subcutaneous, 4x Daily AC & at Bedtime, Stephane Womack MD, 5 Units at 05/29/23 2037    Insulin Lispro (humaLOG) injection 4 Units,  4 Units, Subcutaneous, TID With Meals, Stephane Womack MD, 4 Units at 05/31/23 0807    levothyroxine (SYNTHROID, LEVOTHROID) tablet 125 mcg, 125 mcg, Oral, Daily, Stephane Womack MD, 125 mcg at 05/31/23 0807    magnesium hydroxide (MILK OF MAGNESIA) 400 MG/5ML suspension 15 mL, 15 mL, Oral, Daily PRN, Stephane Womack MD    metoprolol tartrate (LOPRESSOR) half tablet 12.5 mg, 12.5 mg, Oral, Q12H, Stephane Womack MD, 12.5 mg at 05/31/23 2026    nitroglycerin (NITROSTAT) SL tablet 0.4 mg, 0.4 mg, Sublingual, Q5 Min PRN, Stephane Womack MD    ondansetron (ZOFRAN) injection 4 mg, 4 mg, Intravenous, Q6H PRN **OR** ondansetron (ZOFRAN) tablet 4 mg, 4 mg, Oral, Q6H PRN, Stephane Womack MD    pantoprazole (PROTONIX) EC tablet 40 mg, 40 mg, Oral, Q AM, Stephane Womack MD, 40 mg at 06/01/23 0456    Pharmacy to Dose Heparin, , Does not apply, Continuous PRN, Stephane Womack MD    polyethylene glycol (MIRALAX) packet 17 g, 17 g, Oral, Daily, Stephane Womack MD, 17 g at 05/29/23 1153    rosuvastatin (CRESTOR) tablet 20 mg, 20 mg, Oral, Daily, Stephane Womack MD, 20 mg at 05/31/23 0807    sennosides-docusate (PERICOLACE) 8.6-50 MG per tablet 2 tablet, 2 tablet, Oral, BID, Stephane Womack MD, 2 tablet at 05/31/23 0807    sodium chloride 0.9 % flush 10 mL, 10 mL, Intravenous, Q12H, Stephane Womack MD, 10 mL at 05/31/23 0808    sodium chloride 0.9 % flush 10 mL, 10 mL, Intravenous, PRN, Stephane Womack MD    sodium chloride 0.9 % flush 10 mL, 10 mL, Intravenous, Q12H, Stephane Womack MD, 10 mL at 05/31/23 0807    sodium chloride 0.9 % flush 10 mL, 10 mL, Intravenous, PRNVu Nicolas, MD    sodium chloride 0.9 % infusion 40 mL, 40 mL, Intravenous, Vu PADRON Nicolas, MD    Laboratory Results:        Lab 06/01/23  0447 05/31/23  0435 05/30/23  1801 05/30/23  1222 05/30/23  0441 05/29/23  1004 05/27/23  1153 05/27/23  0519 05/26/23  1349 05/26/23  1348 05/26/23  1008   WBC 9.61 9.33   --   --  10.05 13.76*  --  13.78*  --   --  11.50*   HEMOGLOBIN 11.8* 11.9*  --   --  11.3* 10.9*  --  11.3*  --   --  14.6   HEMATOCRIT 36.7* 36.8*  --   --  36.0* 34.6*  --  35.4*  --   --  45.8   PLATELETS 227 200  --   --  183 173  --  200  --   --  193   NEUTROS ABS  --  5.47  --   --   --   --   --   --   --   --  8.68*   IMMATURE GRANS (ABS)  --  0.37*  --   --   --   --   --   --   --   --  0.16*   LYMPHS ABS  --  2.25  --   --   --   --   --   --   --   --  1.63   MONOS ABS  --  0.70  --   --   --   --   --   --   --   --  0.71   EOS ABS  --  0.47*  --   --   --   --   --   --   --   --  0.26   MCV 85.2 85.6  --   --  87.4 85.2  --  84.7  --   --  85.1   PROTIME  --   --   --  13.5  --   --  14.7*  --  13.3 14.0 23.6*   APTT  --   --   --  29.8*  --   --   --   --   --   --   --    HEPARIN ANTI-XA 0.11*  --  0.32 0.35  --   --   --   --   --   --   --          Lab 06/01/23 0447 05/31/23  0435 05/30/23  0441 05/29/23  1004 05/27/23  0519 05/26/23  1008   SODIUM 139 143 144 143 140 139   POTASSIUM 3.8 3.8 4.0 3.8 4.2 4.4   CHLORIDE 105 106 106 105 103 101   CO2 25.0 27.0 28.0 28.0 24.0 24.0   ANION GAP 9.0 10.0 10.0 10.0 13.0 14.0   BUN 16 16 20 20 15 14   CREATININE 0.77 0.70* 0.68* 0.64* 0.71* 0.72*   EGFR 111.1 114.4 115.4 117.5 113.9 113.4   GLUCOSE 184* 134* 123* 151* 234* 220*   CALCIUM 8.6 8.8 8.6 9.0 9.2 9.7   MAGNESIUM 2.3 2.3 2.1 2.1 1.8 2.2   PHOSPHORUS 3.7 4.4 5.0* 2.8 4.0  --    HEMOGLOBIN A1C  --   --   --   --   --  8.90*   TSH  --   --   --   --   --  1.610         Lab 05/26/23  1008   TOTAL PROTEIN 8.4   ALBUMIN 4.7   GLOBULIN 3.7   ALT (SGPT) 14   AST (SGOT) 18   BILIRUBIN 0.6   ALK PHOS 86         Lab 05/30/23  1222 05/27/23  1153 05/26/23  1349 05/26/23  1348 05/26/23  1008   PROTIME 13.5 14.7* 13.3 14.0 23.6*   INR 1.02 1.13* 1.1 1.07 2.08*         Lab 05/27/23  0519   CHOLESTEROL 181   LDL CHOL 107*   HDL CHOL 48   TRIGLYCERIDES 147         Lab 05/26/23  1125 05/26/23  1014   ABO TYPING  A A   RH TYPING Positive Positive   ANTIBODY SCREEN  --  Negative         Brief Urine Lab Results       None          Microbiology Results (last 10 days)       ** No results found for the last 240 hours. **                     Diagnostic Imaging: I personally reviewed and interpreted the new imaging    Assessment and plan:  This is a 47-year-old male status post bilateral craniotomies for evacuation of subdural hematomas on 5/26 as well as MMA embolization on 5/31.  Neurologically patient is stable.  We will continue the heparin drip through the weekend.  We will have discussion of oral anticoagulation early next week.  Blood sugars have been improving.  Goal 150 with ICU managing insulin.  From a neurosurgery perspective, patient can be transferred to the floor.  Appreciate ICU assistance.      Any copied data from previous notes included in the (1) HPI, (2) PE, (3) MDM and/or Assessment and Plan has been reviewed and accurate as of 06/01/23.    Chanell Rock PA-C  06/01/23  08:47 EDT        Electronically signed by Stephane Womack MD at 06/01/23 0901       Tracy Morales APRN at 05/31/23 0933            Intensive Care Follow-up     Hospital:  LOS: 5 days   Mr. Manjeet Hall, 47 y.o. male is followed for:   Subdural hematoma, nontraumatic     Subjective     History of present illness:   Manjeet Hall is a 47 y.o. 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  He was hypertensive and hyperglycemic on admission.  His INR was subtherapeutic at 2.08.  He received Kcentra and vitamin K and was started on Keppra for seizure prophylaxis, Cardene for hypertension and taken to OR for emergent bilateral craniotomy and evacuation of subdural hematomas.      Subjective   Interval History:  Patient is resting in bed awaiting embolization this afternoon.  He is NPO and heparin drip is off.  He is anxious to get the procedure over.  He has no complaints.       The patient's past medical, surgical and social history were reviewed and updated in Epic as appropriate.    Review of Systems - General ROS: negative for - chills, fatigue, fever or night sweats  Respiratory ROS: negative for - cough, shortness of breath or wheezing  Cardiovascular ROS: negative for - chest pain, edema or palpitations  Gastrointestinal ROS: positive for constipation    Objective   Objective     Infusions:  Pharmacy to Dose Heparin,          Medications:  divalproex, 250 mg, Oral, Nightly  DULoxetine, 60 mg, Oral, Daily  gabapentin, 200 mg, Oral, BID  insulin detemir, 8 Units, Subcutaneous, Q12H  insulin lispro, 3-14 Units, Subcutaneous, 4x Daily AC & at Bedtime  Insulin Lispro, 4 Units, Subcutaneous, TID With Meals  levothyroxine, 125 mcg, Oral, Daily  metoprolol tartrate, 12.5 mg, Oral, Q12H  pantoprazole, 40 mg, Oral, Q AM  polyethylene glycol, 17 g, Oral, Daily  rosuvastatin, 20 mg, Oral, Daily  senna-docusate sodium, 2 tablet, Oral, BID  sodium chloride, 10 mL, Intravenous, Q12H  sodium chloride, 10 mL, Intravenous, Q12H        Vital Sign Min/Max for last 24 hours  Temp  Min: 97.3 °F (36.3 °C)  Max: 98.4 °F (36.9 °C)   BP  Min: 114/89  Max: 167/87   Pulse  Min: 64  Max: 95   Resp  Min: 14  Max: 16   SpO2  Min: 84 %  Max: 98 %   Flow (L/min)  Min: 2  Max: 2       Input/Output for last 24 hour shift  05/30 0701 - 05/31 0700  In: 346.8  "[I.V.:346.8]  Out: -          Objective:  General Appearance:  In no acute distress and comfortable.    Vital signs: (most recent): Blood pressure 119/85, pulse 75, temperature 97.4 °F (36.3 °C), temperature source Axillary, resp. rate 14, height 172.7 cm (68\"), weight 104 kg (229 lb 0.9 oz), SpO2 95 %.  Vital signs are normal.    HEENT: (Bilateral incisions approximated with staples.  No drainage, redness.)    Lungs:  Normal effort and normal respiratory rate.  Breath sounds clear to auscultation.  He is not in respiratory distress.  No rales, wheezes or rhonchi.    Heart: Normal rate.  Regular rhythm.  S1 normal and S2 normal.  No murmur or friction rub. (+ click)  Abdomen: Abdomen is soft and non-distended.  Hypoactive bowel sounds.   There is no abdominal tenderness.     Extremities: There is no dependent edema.    Pulses: Distal pulses are intact.    Neurological: Patient is alert and oriented to person, place and time.  GCS score is 15.  (Expressive aphasia and word finding difficulty at baseline).    Pupils:  Pupils are equal, round, and reactive to light.    Skin:  Warm and dry.  No rash.             Results from last 7 days   Lab Units 05/31/23  0435 05/30/23  0441 05/29/23  1004   WBC 10*3/mm3 9.33 10.05 13.76*   HEMOGLOBIN g/dL 11.9* 11.3* 10.9*   PLATELETS 10*3/mm3 200 183 173     Results from last 7 days   Lab Units 05/31/23  0435 05/30/23  0441 05/29/23  1004   SODIUM mmol/L 143 144 143   POTASSIUM mmol/L 3.8 4.0 3.8   CO2 mmol/L 27.0 28.0 28.0   BUN mg/dL 16 20 20   CREATININE mg/dL 0.70* 0.68* 0.64*   MAGNESIUM mg/dL 2.3 2.1 2.1   PHOSPHORUS mg/dL 4.4 5.0* 2.8   GLUCOSE mg/dL 134* 123* 151*     Estimated Creatinine Clearance: 152.4 mL/min (A) (by C-G formula based on SCr of 0.7 mg/dL (L)).          Images:   None new    I reviewed the patient's results and images.     Assessment & Plan   Impression        Bilateral Subdural Hematoma    HTN    Hyperlipidemia (Diet controlled, off statin)    New Onset " Type 2 diabetes mellitus    Hypothyroidism    H/O aortic mechanical valve replacement on Coumadin    Anxiety       Plan        Bilateral Subdural Hematomas s/p bilateral crani for evacuation of subdural hematomas, drains d/c'd 5/28  H/O Stroke post op AVR with residual receptive aphasia and word finding difficulty  Post op care per NS  Embolization today  Keep NPO until after procedure  Restart heparin drip when ok with NS  Continue PT/OT/SLP  CM assisting with setting up outpatient SLP to help with expressive aphasia and word finding difficulty upon discharge  Patient wants to establish care with new PCP at discharge  AM labs    HTN  HLP  H/O mechanical AVR 2018 on Warfarin  Continue Metoprolol  Continue Crestor  Resume heparin drip when ok with NS to bridge to Warfarin    New onset T2DM, Hgb A1c 8.9   Neuropathy  Keep glucose <= 150 per NS  Continue basal insulin  Continue correction SSI  Continue prandial insulin  Steroids discontinued 5/28  Diabetes educator still needs to see patient  Continue Gabapentin  Patient would like to establish care with Endocrinologist at discharge    Hypothyroidism  Continue Levothyroxine    Anxiety  Depression   Continue Cymbalta  Continue Depakote ER  Hold home Atarax for now    Constipation  Change BM regimen from as needed to scheduled      DVT prophylaxis:  SCDs  GI prophylaxis:  PPI  Disposition:  Keep in ICU    Plan of care and goals reviewed with multidisciplinary/antibiotic stewardship team during rounds.   I discussed the patient's findings and my recommendations with patient, family, nursing staff and primary care team     Time: 35 minutes, face to face, with the patient and family or on the [place]sey coordinating care with other health care providers.     I spent > 50% percent of this time, counseling and discussing evaluation, current status and management.      Tracy Soto APRN, AGACNP-BC, FNP-BC  Pulmonary and Critical Care Service       Electronically  signed by Tracy Morales APRN at 05/31/23 1244       Tracy Morales, BETHANIE at 05/30/23 1011            Intensive Care Follow-up     Hospital:  LOS: 4 days   Mr. Manjeet Hall, 47 y.o. male is followed for:   Subdural hematoma, nontraumatic     Subjective     History of present illness:   Manjeet Hall is a 47 y.o. 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  He was hypertensive and hyperglycemic on admission.  His INR was subtherapeutic at 2.08.  He received Kcentra and vitamin K and was started on Keppra for seizure prophylaxis, Cardene for hypertension and taken to OR for emergent bilateral craniotomy and evacuation of subdural hematomas.      Subjective   Interval History:  Patient went to cath lab for embolization today, but it was cancelled due to poor IV access.  Needs PICC line today.  Plan for embolization tomorrow.  NS ok with heparin drip today and needs to be discontinued at 6 am in the morning prior to procedure.    Patient has no complaints.  He is upset about being a newly diagnosed diabetic.  He is requesting an Endocrinologist appt at discharge.  He would also like to establish care with a new PCP as his is retiring soon.         The patient's past medical, surgical and social history were reviewed and updated in Epic as appropriate.    Review of Systems - General ROS:  "negative for - chills, fatigue, fever or night sweats  Respiratory ROS: negative for - cough, shortness of breath or wheezing  Cardiovascular ROS: negative for - chest pain, edema or palpitations  Gastrointestinal ROS: positive for constipation    Objective   Objective     Infusions:  heparin, 9.5 Units/kg/hr  Pharmacy to Dose Heparin,          Medications:  divalproex, 250 mg, Oral, Nightly  DULoxetine, 60 mg, Oral, Daily  gabapentin, 200 mg, Oral, BID  insulin detemir, 10 Units, Subcutaneous, Nightly  insulin lispro, 3-14 Units, Subcutaneous, 4x Daily AC & at Bedtime  Insulin Lispro, 4 Units, Subcutaneous, TID With Meals  levothyroxine, 125 mcg, Oral, Daily  metoprolol tartrate, 12.5 mg, Oral, Q12H  pantoprazole, 40 mg, Oral, Q AM  polyethylene glycol, 17 g, Oral, Daily  rosuvastatin, 20 mg, Oral, Daily  senna-docusate sodium, 2 tablet, Oral, BID  sodium chloride, 10 mL, Intravenous, Q12H        Vital Sign Min/Max for last 24 hours  Temp  Min: 97.3 °F (36.3 °C)  Max: 98.7 °F (37.1 °C)   BP  Min: 96/74  Max: 146/83   Pulse  Min: 58  Max: 82   Resp  Min: 14  Max: 16   SpO2  Min: 86 %  Max: 100 %   Flow (L/min)  Min: 2  Max: 2       Input/Output for last 24 hour shift  No intake/output data recorded.         Objective:  General Appearance:  In no acute distress and comfortable.    Vital signs: (most recent): Blood pressure 120/85, pulse 73, temperature 97.4 °F (36.3 °C), temperature source Axillary, resp. rate 16, height 172.7 cm (68\"), weight 104 kg (229 lb 0.9 oz), SpO2 (!) 88 %.  Vital signs are normal.    HEENT: (Bilateral incisions approximated with staples.  No drainage, redness.)    Lungs:  Normal effort and normal respiratory rate.  Breath sounds clear to auscultation.  He is not in respiratory distress.  No rales, wheezes or rhonchi.    Heart: Normal rate.  Regular rhythm.  S1 normal and S2 normal.  No murmur or friction rub. (+ click)  Abdomen: Abdomen is soft and non-distended.  Hypoactive bowel sounds.   " There is no abdominal tenderness.     Extremities: There is no dependent edema.    Pulses: Distal pulses are intact.    Neurological: Patient is alert and oriented to person, place and time.  GCS score is 15.  (Expressive aphasia and word finding difficulty at baseline).    Pupils:  Pupils are equal, round, and reactive to light.    Skin:  Warm and dry.  No rash.           Results from last 7 days   Lab Units 05/30/23  0441 05/29/23  1004 05/27/23  0519   WBC 10*3/mm3 10.05 13.76* 13.78*   HEMOGLOBIN g/dL 11.3* 10.9* 11.3*   PLATELETS 10*3/mm3 183 173 200     Results from last 7 days   Lab Units 05/30/23  0441 05/29/23  1004 05/27/23  0519   SODIUM mmol/L 144 143 140   POTASSIUM mmol/L 4.0 3.8 4.2   CO2 mmol/L 28.0 28.0 24.0   BUN mg/dL 20 20 15   CREATININE mg/dL 0.68* 0.64* 0.71*   MAGNESIUM mg/dL 2.1 2.1 1.8   PHOSPHORUS mg/dL 5.0* 2.8 4.0   GLUCOSE mg/dL 123* 151* 234*     Estimated Creatinine Clearance: 156.9 mL/min (A) (by C-G formula based on SCr of 0.68 mg/dL (L)).          Images:   None new    I reviewed the patient's results and images.     Assessment & Plan   Impression        Bilateral Subdural Hematoma    HTN    Hyperlipidemia (Diet controlled, off statin)    New Onset Type 2 diabetes mellitus    Hypothyroidism    H/O aortic mechanical valve replacement on Coumadin    Anxiety       Plan        Bilateral Subdural Hematomas s/p bilateral crani for evacuation of subdural hematomas, drains d/c'd 5/28  H/O Stroke post op AVR with residual receptive aphasia and word finding difficulty  Post op care per NS  Plan to return to cath lab for embolization tomorrow 5/31  NPO after MN  NS ok with heparin drip started today with no initial bolus.  Needs to be discontinued at 6 am on 5/31  Continue PT/OT/SLP  CM ordered outpatient SLP to help with expressive aphasia and word finding difficulty upon discharge  PICC line today  Patient wants to establish care with new PCP at discharge  AM labs    HTN  HLP  H/O  mechanical AVR 2018 on Warfarin  Continue Metoprolol  Continue Crestor  Heparin drip to be started today.  Discontinue in am.  Resume when ok with NS to bridge to Warfarin    New onset T2DM, Hgb A1c 8.9   Neuropathy  Keep glucose <= 150 per NS  Change basal to every 12 hours  Continue correction SSI  Continue prandial  Steroids discontinued 5/28  Diabetes education  Continue Gabapentin  Patient would like to establish care with Endocrinologist at discharge    Hypothyroidism  Continue Levothyroxine    Anxiety  Depression   Continue Cymbalta  Continue Depakote ER  Hold home Atarax for now    Constipation  Change BM regimen from as needed to scheduled      DVT prophylaxis:  Heparin drip / SCDs  GI prophylaxis:  PPI  Disposition:  Keep in ICU    Plan of care and goals reviewed with multidisciplinary/antibiotic stewardship team during rounds.   I discussed the patient's findings and my recommendations with patient, family, nursing staff and primary care team     Time: 35 minutes, face to face, with the patient and family or on the [place] coordinating care with other health care providers.     I spent > 50% percent of this time, counseling and discussing evaluation, current status and management.      BETHANIE Markham, AGACNP-BC, FNP-BC  Pulmonary and Critical Care Service       Electronically signed by Tracy Morales APRN at 05/30/23 1255       Tracy Morales APRN at 05/29/23 1145            Intensive Care Follow-up     Hospital:  LOS: 3 days   Mr. Manjeet Hall, 47 y.o. male is followed for:   Subdural hematoma, nontraumatic     Subjective     History of present illness:   Manjeet Hall is a 47 y.o. 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  He was hypertensive and hyperglycemic on admission.  His INR was subtherapeutic at 2.08.  He received Kcentra and vitamin K and was started on Keppra for seizure prophylaxis, Cardene for hypertension and taken to OR for emergent bilateral craniotomy and evacuation of subdural hematomas.      Subjective   Interval History:  Patient is resting in bed this morning.  Girlfriend is at bedside.  Good oxygenation on RA.  VSS. States that he is scared about his hospitalization and just current medical illness in general given his medical history since 2018.  At baseline, he has mild receptive aphasia and word finding difficulties.  He is requesting outpatient SLP therapy for his receptive aphasia and word finding difficulties.  He states that they have progressively worsened since he last had therapy 2018.    States that when he is started on Warfarin, he would like to stay in the hospital until therapeutic.  He does not want Lovenox injections.   He has not had a BM in several days.         The patient's past medical, surgical and social history were reviewed and updated in Epic as appropriate.    Review of Systems - General ROS: negative for - chills, fatigue, fever or night sweats  Respiratory ROS: negative for - cough, shortness of breath or wheezing  Cardiovascular ROS: negative for - chest pain, edema or palpitations  Gastrointestinal ROS: positive for constipation    Objective   Objective     Infusions:        Medications:  divalproex, 250 mg, Oral, Nightly  DULoxetine, 60 mg, Oral, Daily  enoxaparin, 40 mg, Subcutaneous, Q24H  famotidine, 20  "mg, Intravenous, Q12H  gabapentin, 200 mg, Oral, BID  insulin detemir, 10 Units, Subcutaneous, Nightly  insulin lispro, 3-14 Units, Subcutaneous, 4x Daily AC & at Bedtime  Insulin Lispro, 4 Units, Subcutaneous, TID With Meals  levothyroxine, 125 mcg, Oral, Daily  metoprolol tartrate, 12.5 mg, Oral, Q12H  pantoprazole, 40 mg, Oral, Q AM  rosuvastatin, 20 mg, Oral, Daily  sodium chloride, 10 mL, Intravenous, Q12H        Vital Sign Min/Max for last 24 hours  Temp  Min: 97.7 °F (36.5 °C)  Max: 97.9 °F (36.6 °C)   BP  Min: 104/87  Max: 137/73   Pulse  Min: 59  Max: 96   Resp  Min: 14  Max: 18   SpO2  Min: 84 %  Max: 99 %   No data recorded       Input/Output for last 24 hour shift  05/28 0701 - 05/29 0700  In: 910 [I.V.:810]  Out: -          Objective:  General Appearance:  In no acute distress and comfortable.    Vital signs: (most recent): Blood pressure 137/73, pulse 81, temperature 97.9 °F (36.6 °C), temperature source Oral, resp. rate 14, height 172.7 cm (68\"), weight 104 kg (229 lb 0.9 oz), SpO2 97 %.  Vital signs are normal.    HEENT: (Rt sided incision approximated with staples.  No drainage, redness.)    Lungs:  Normal effort and normal respiratory rate.  Breath sounds clear to auscultation.  He is not in respiratory distress.  No rales, wheezes or rhonchi.    Heart: Normal rate.  Regular rhythm.  S1 normal and S2 normal.  No murmur or friction rub. (+ click)  Abdomen: Abdomen is rigid and non-distended.  Hypoactive bowel sounds.   There is no abdominal tenderness.     Extremities: There is no dependent edema.    Pulses: Distal pulses are intact.    Neurological: Patient is alert and oriented to person, place and time.  GCS score is 15.    Pupils:  Pupils are equal, round, and reactive to light.    Skin:  Warm and dry.  No rash.             Results from last 7 days   Lab Units 05/29/23  1004 05/27/23  0519 05/26/23  1008   WBC 10*3/mm3 13.76* 13.78* 11.50*   HEMOGLOBIN g/dL 10.9* 11.3* 14.6   PLATELETS 10*3/mm3 " 173 200 193     Results from last 7 days   Lab Units 05/29/23  1004 05/27/23  0519 05/26/23  1008   SODIUM mmol/L 143 140 139   POTASSIUM mmol/L 3.8 4.2 4.4   CO2 mmol/L 28.0 24.0 24.0   BUN mg/dL 20 15 14   CREATININE mg/dL 0.64* 0.71* 0.72*   MAGNESIUM mg/dL 2.1 1.8 2.2   PHOSPHORUS mg/dL 2.8 4.0  --    GLUCOSE mg/dL 151* 234* 220*     Estimated Creatinine Clearance: 166.7 mL/min (A) (by C-G formula based on SCr of 0.64 mg/dL (L)).          Images:   None new    I reviewed the patient's results and images.     Assessment & Plan   Impression        Bilateral Subdural Hematoma    HTN    Hyperlipidemia (Diet controlled, off statin)    New Onset Type 2 diabetes mellitus    Hypothyroidism    H/O aortic mechanical valve replacement on Coumadin    Anxiety       Plan        Bilateral Subdural Hematomas s/p bilateral crani for evacuation of subdural hematomas, drains d/c'd 5/28  H/O Stroke post op AVR with residual receptive aphasia and word finding difficulty  Post op care per NS  Plan for embolization tomorrow 5/30  NPO after MN  Continue PT/OT/SLP  Patient would like outpatient SLP to help with receptive aphasia and word finding difficulty upon discharge  Resume anticoagulation when ok with NS  AM labs    HTN  HLP  H/O mechanical AVR 2018 on Warfarin  Continue Metoprolol  Continue Crestor  Will need anticoagulation restarted soon per NS with bridge to Warfarin    New onset T2DM, Hgb A1c 8.9   Neuropathy  Keep glucose <= 150 per NS  Continue basal  Continue correction SSI  Continue prandial  Glucose has improved since basal added yesterday.  Glucose ~ 150 today.  May need insulins adjusted more later.  Steroids discontinued yesterday  Diabetes education  Continue Gabapentin    Hypothyroidism  Continue Levothyroxine    Anxiety  Depression   Continue Cymbalta  Continue Depakote ER  Hold home Atarax for now    Constipation  Change BM regimen from as needed to scheduled      DVT prophylaxis:  Lovenox SQ / SCDs  GI  "prophylaxis:  PPI  Disposition:  Keep in ICU    Plan of care and goals reviewed with multidisciplinary/antibiotic stewardship team during rounds.   I discussed the patient's findings and my recommendations with patient, family, nursing staff and primary care team     Time: 35 minutes, face to face, with the patient and family or on the [place] coordinating care with other health care providers.     I spent > 50% percent of this time, counseling and discussing evaluation, current status and management.      BETHANIE Markham, AGACNP-BC, FNP-BC  Pulmonary and Critical Care Service       Electronically signed by Tracy Morales APRN at 05/29/23 1223       Haylee Domingo PA-C at 05/29/23 1051       Attestation signed by Milind Leblanc MD at 05/30/23 1006    I have reviewed this documentation and agree.                  NEUROSURGERY PROGRESS NOTE    Interval History:   Bilateral SDH, s/p bilateral craniotomy for evacuation 5/26/23. POD 3  Patient awake and conversant. Has been up to chair and walking on floor with assistance. Mild headache, no other complaints. Drains removed,     Vital Signs  Blood pressure 137/73, pulse 81, temperature 97.9 °F (36.6 °C), temperature source Oral, resp. rate 14, height 172.7 cm (68\"), weight 104 kg (229 lb 0.9 oz), SpO2 97 %.    Physical Exam:  Awake, alert, oriented  Pupils equal bilaterally  Extraocular movements and facial expression intact bilaterally  5/5 all 4 extremities  Incisions clean, dry, intact. Drains were removed yesterday     Results Review:    WBC   Date Value Ref Range Status   05/29/2023 13.76 (H) 3.40 - 10.80 10*3/mm3 Final     RBC   Date Value Ref Range Status   05/29/2023 4.06 (L) 4.14 - 5.80 10*6/mm3 Final     Hemoglobin   Date Value Ref Range Status   05/29/2023 10.9 (L) 13.0 - 17.7 g/dL Final     Hematocrit   Date Value Ref Range Status   05/29/2023 34.6 (L) 37.5 - 51.0 % Final     MCV   Date Value Ref Range Status "   05/29/2023 85.2 79.0 - 97.0 fL Final     MCH   Date Value Ref Range Status   05/29/2023 26.8 26.6 - 33.0 pg Final     MCHC   Date Value Ref Range Status   05/29/2023 31.5 31.5 - 35.7 g/dL Final     RDW   Date Value Ref Range Status   05/29/2023 15.1 12.3 - 15.4 % Final     RDW-SD   Date Value Ref Range Status   05/29/2023 46.9 37.0 - 54.0 fl Final     MPV   Date Value Ref Range Status   05/29/2023 11.8 6.0 - 12.0 fL Final     Platelets   Date Value Ref Range Status   05/29/2023 173 140 - 450 10*3/mm3 Final     Basic Metabolic Panel    Sodium Sodium   Date Value Ref Range Status   05/29/2023 143 136 - 145 mmol/L Final   05/27/2023 140 136 - 145 mmol/L Final      Potassium Potassium   Date Value Ref Range Status   05/29/2023 3.8 3.5 - 5.2 mmol/L Final     Comment:     Slight hemolysis detected by analyzer. Results may be affected.   05/27/2023 4.2 3.5 - 5.2 mmol/L Final      Chloride Chloride   Date Value Ref Range Status   05/29/2023 105 98 - 107 mmol/L Final   05/27/2023 103 98 - 107 mmol/L Final      Bicarbonate No results found for: PLASMABICARB   BUN BUN   Date Value Ref Range Status   05/29/2023 20 6 - 20 mg/dL Final   05/27/2023 15 6 - 20 mg/dL Final      Creatinine Creatinine   Date Value Ref Range Status   05/29/2023 0.64 (L) 0.76 - 1.27 mg/dL Final   05/27/2023 0.71 (L) 0.76 - 1.27 mg/dL Final      Calcium Calcium   Date Value Ref Range Status   05/29/2023 9.0 8.6 - 10.5 mg/dL Final   05/27/2023 9.2 8.6 - 10.5 mg/dL Final      Glucose      No components found for: GLUCOSE.*     I/O last 3 completed shifts:  In: 2110 [I.V.:1910; IV Piggyback:200]  Out: 25 [Drains:25]  No intake/output data recorded.      ASSESSMENT/Plan:   46 yo M POD 3 from bilateral craniotomies for SDH evacuation.Significant neurologic improvement. Drains removed yesterday: incisions intact.   Keppra/decadron d/c yesterday as well (patient on depakote and gabapentin at home and this is continued); blood sugars improving. Goal 150. ICU  "managing insulin.   Lovenox for DVT prophylaxis. Patient has history of mechanical aortic valve replacement and was previously on coumadin. Plan is for MMA embolization tomorrow 5/30. NPO after midnight. Further adjustment of anticoagulation after this procedure.        Haylee Domingo PA-C  05/29/23  10:51 EDT    Electronically signed by Milind Leblanc MD at 05/30/23 1006       Meryl Ashby MD at 05/28/23 0939          Critical Care Note     LOS: 2 days   Patient Care Team:  Provider, No Known as PCP - General    Chief Complaint/Reason for visit:    Chief Complaint   Patient presents with    Stroke   Bilateral subdural hematomas  History of mechanical aortic valve replacement on Coumadin    Subjective     Interval History:     He is afebrile.  Room air saturation 95%.  Up in the chair tolerating p.o.  Surgical drains removed this morning.    Review of Systems:    All systems were reviewed and negative except as noted in subjective.    Medical history, surgical history, social history, family history reviewed    Objective     Intake/Output:    Intake/Output Summary (Last 24 hours) at 5/28/2023 0939  Last data filed at 5/28/2023 0600  Gross per 24 hour   Intake 1949.8 ml   Output 495 ml   Net 1454.8 ml       Nutrition:  Diet: Cardiac Diets, Diabetic Diets; Healthy Heart (2-3 Na+); Consistent Carbohydrate; Texture: Regular Texture (IDDSI 7); Fluid Consistency: Thin (IDDSI 0)  NPO Diet NPO Type: Sips with Meds    Infusions:  niCARdipine, 5-15 mg/hr, Last Rate: Stopped (05/26/23 2318)        Telemetry: Sinus rhythm             Vital Signs  Blood pressure 136/74, pulse 85, temperature 97.7 °F (36.5 °C), temperature source Oral, resp. rate 14, height 172.7 cm (68\"), weight 104 kg (229 lb 0.9 oz), SpO2 95 %.    Physical Exam:  General Appearance:   Middle-aged gentleman  upright in chair in no distress   Head:   Bilateral craniotomies with incisions intact, no erythema or drainage   Eyes:          " Conjunctiva pink, pupils equal   Ears:     Throat:  Oral mucosa moist   Neck:  Trachea midline, no crepitus   Back:      Lungs:    Symmetric chest expansion without wheeze or rhonchi    Heart:   Regular rhythm, mechanical S2   Abdomen:    Bowel sounds present, nontender   Rectal:   Deferred   Extremities:  Right radial arterial line.  No pretibial edema   Pulses:  Palpable pedal pulses   Skin:  Warm and dry   Lymph nodes:  No cervical adenopathy   Neurologic:  Awake, oriented x3.   symmetric      Results Review:     I reviewed the patient's new clinical results.   Results from last 7 days   Lab Units 05/27/23  0519 05/26/23  1008 05/26/23  0950   SODIUM mmol/L 140 139  --    POTASSIUM mmol/L 4.2 4.4  --    CHLORIDE mmol/L 103 101  --    CO2 mmol/L 24.0 24.0  --    BUN mg/dL 15 14  --    CREATININE mg/dL 0.71* 0.72* 0.70   CALCIUM mg/dL 9.2 9.7  --    BILIRUBIN mg/dL  --  0.6  --    ALK PHOS U/L  --  86  --    ALT (SGPT) U/L  --  14  --    AST (SGOT) U/L  --  18  --    GLUCOSE mg/dL 234* 220*  --      Results from last 7 days   Lab Units 05/27/23  0519 05/26/23  1008 05/26/23  0950   WBC 10*3/mm3 13.78* 11.50*  --    HEMOGLOBIN g/dL 11.3* 14.6  --    HEMOGLOBIN, POC g/dL  --   --  15.3   HEMATOCRIT % 35.4* 45.8  --    HEMATOCRIT POC %  --   --  45   PLATELETS 10*3/mm3 200 193  --          No results found for: BLOODCX  No results found for: URINECX    I reviewed the patient's new imaging including images and reports.      Findings:   Postoperative changes are noted from interval bilateral craniotomy with evacuation of previously noted acute subdural hematomas. Subdural drains are noted in place. Small bilateral persistent extra-axial mixed fluid collections are present with   low-density fluid, gas and minimal persisting hyperdense blood products, 7 mm maximal thickness on the left, 6 mm on the right. There is improved diffuse cerebral edema, with restored patency of the basal cisterns and decreased effacement  of the   ventricles. Some underlying parenchymal hypoattenuation is noted involving temporal lobes bilaterally, greater on the left, concerning for underlying contusion/ischemia. There is also evident trace acute left parafalcine subdural hematoma, fairly   localized, measuring 4 mm in maximal thickness without significant associated mass effect. The orbits are normal. The paranasal sinuses are grossly clear.    Impression:     Impression:   Expected postoperative changes following bilateral craniotomy for evacuation of acute subdural hematomas. Cerebral edema is improved and there is restored patency of the basal cisterns and decreased effacement of the ventricles. Bilateral subdural drains    are noted in place.     4 mm small and somewhat localized left parafalcine subdural hematoma.     Increase in conspicuity, there is somewhat heterogeneous hypoattenuation present involving the left greater than right temporal lobes, concerning for underlying component of either persisting edema, contusion or ischemia.         Electronically Signed: Ruben Morris    5/27/2023 6:07 AM EDT          All medications reviewed.   divalproex, 250 mg, Oral, Nightly  DULoxetine, 60 mg, Oral, Daily  enoxaparin, 40 mg, Subcutaneous, Q24H  famotidine, 20 mg, Intravenous, Q12H  gabapentin, 200 mg, Oral, BID  insulin detemir, 10 Units, Subcutaneous, Nightly  Insulin Lispro, 4 Units, Subcutaneous, TID With Meals  insulin regular, 3-14 Units, Subcutaneous, Q6H  levothyroxine, 125 mcg, Oral, Daily  pantoprazole, 40 mg, Oral, Q AM  rosuvastatin, 20 mg, Oral, Daily  sodium chloride, 10 mL, Intravenous, Q12H          Assessment & Plan       Bilateral Subdural Hematoma    HTN    Hyperlipidemia (Diet controlled, off statin)    Hypothyroidism    Anxiety    H/O aortic mechanical valve replacement on Coumadin    Prediabetes    Somnolence    47-year-old gentleman with hypertension, dyslipidemia, hypothyroidism, diabetes, history of Hodgkin's lymphoma,  "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  Postoperatively he developed pancreatitis and anticoagulation was held and he suffered a stroke resulting in some aphasia in 2018.  He presented May 26 with altered mentation and bilateral subdural hematomas with edema and some leftward midline shift and effacement of the right lateral and third ventricles and bilateral uncal medialization.  He was hypertensive and hyperglycemic on admission.  His INR was subtherapeutic at 2.08.  He received Kcentra and vitamin K and was started on Keppra, Cardene for hypertension and taken to surgery for emergent bilateral craniotomy and evacuation of subdural hematomas.    Today he is significantly more alert with no focal neurologic deficits.  Systolic blood pressures are 110-136 on no medications.  As an outpatient he was only on propranolol and Crestor for cardiac.  He remains off anticoagulation.  Risk of valve thrombosis with aortic valve is low at 1.3%.  There is a risk of embolic events.  However risk of rebleeding outweighs risk of embolic events currently.    Blood sugars remain in the mid 200s in part from dexamethasone which was stopped today.  His admission A1c is 8.9 consistent with new onset diabetes.  He had been previously told that he was \"prediabetic\".  He will need additional insulin coverage to improve glucose management with goal of blood sugar 140-180    He has a known history of hypothyroidism and is on replacement therapy.  His TSH is 1.6    PLAN:    -Monitor surgical drainage  -Monitor neurologic exam  -Emergent CT scan for any decline in neurologic status  -Maintain systolic blood pressure less than 140  -Restart a beta-blocker, I will use metoprolol  -Add Levemir 10 units nightly, 4 units of Humalog with meals and continue sliding scale  -Neurosurgery " "discontinued dexamethasone and Keppra  -He remains on his home dose of Depakote, gabapentin  -Continue thyroid replacement  -Add DVT prophylaxis, Lovenox      VTE Prophylaxis:SCDS    Stress Ulcer Prophylaxis: Protonix    Meryl Ashby MD  05/28/23  09:39 EDT      Time: 25min       Electronically signed by Meryl Ashby MD at 05/28/23 0950       Stephane Womack MD at 05/28/23 0857          NEUROSURGERY PROGRESS NOTE    Chief Complaint: Bilateral subdural hematomas    Subjective: Continues to improve.  Patient even more awake and interactive this morning.    Objective    Vital Signs: Blood pressure 110/75, pulse 75, temperature 99.7 °F (37.6 °C), temperature source Oral, resp. rate 14, height 172.7 cm (68\"), weight 104 kg (229 lb 0.9 oz), SpO2 95 %.    Physical Exam  Awake, alert and conversive  Opens eyes spont  Pupils 3 mm rx bilat  Extraocular muscles intact bilaterally  Face symmetric bilaterally  Tongue midline  5/5 in all 4 ext  Incisions clean dry and intact    Intake/Output:     Intake/Output Summary (Last 24 hours) at 5/28/2023 0857  Last data filed at 5/28/2023 0600  Gross per 24 hour   Intake 1949.8 ml   Output 495 ml   Net 1454.8 ml       Current Medications:   Current Facility-Administered Medications:     acetaminophen (TYLENOL) tablet 650 mg, 650 mg, Oral, Q6H PRN, Robbi Yang, APRN, 650 mg at 05/27/23 0051    dexamethasone (DECADRON) injection 4 mg, 4 mg, Intravenous, Q6H, Stephane Womack MD, 4 mg at 05/28/23 0633    dextrose (D50W) (25 g/50 mL) IV injection 25 g, 25 g, Intravenous, Q15 Min PRN, Meryl Ashby MD    dextrose (GLUTOSE) oral gel 15 g, 15 g, Oral, Q15 Min PRN, Meryl Ashby MD    divalproex (DEPAKOTE ER) 24 hr tablet 250 mg, 250 mg, Oral, Nightly, Jose Maria Townsend MD, 250 mg at 05/27/23 4795    docusate sodium (COLACE) capsule 100 mg, 100 mg, Oral, BID PRN, Stephane Womack MD    DULoxetine (CYMBALTA) DR capsule 60 mg, 60 mg, Oral, Daily, " Jose Maria Townsend MD, 60 mg at 05/28/23 0844    famotidine (PEPCID) injection 20 mg, 20 mg, Intravenous, Q12H, Stephane Womack MD, 20 mg at 05/28/23 0844    gabapentin (NEURONTIN) capsule 200 mg, 200 mg, Oral, BID, Jose Maria Townsend MD, 200 mg at 05/28/23 0844    glucagon (GLUCAGEN) injection 1 mg, 1 mg, Intramuscular, Q15 Min PRN, Meryl Ashby MD    HYDROcodone-acetaminophen (NORCO) 5-325 MG per tablet 1 tablet, 1 tablet, Oral, Q6H PRN, Robbi Yang, APRN, 1 tablet at 05/28/23 0723    HYDROmorphone (DILAUDID) injection 0.5 mg, 0.5 mg, Intravenous, Q2H PRN **AND** naloxone (NARCAN) injection 0.4 mg, 0.4 mg, Intravenous, Q5 Min PRN, Stephane Womack MD    insulin regular (humuLIN R,novoLIN R) injection 3-14 Units, 3-14 Units, Subcutaneous, Q6H, Meryl Ashby MD, 6 Units at 05/28/23 0632    levETIRAcetam in NaCl 0.82% (KEPPRA) IVPB 500 mg, 500 mg, Intravenous, Q12H, Stephane Womack MD, 500 mg at 05/28/23 0844    levothyroxine (SYNTHROID, LEVOTHROID) tablet 125 mcg, 125 mcg, Oral, Daily, Jose Maria Townsend MD, 125 mcg at 05/28/23 0844    magnesium hydroxide (MILK OF MAGNESIA) 400 MG/5ML suspension 15 mL, 15 mL, Oral, Daily PRN, Stephane Womack MD    niCARdipine (CARDENE) 25mg in 250mL NS infusion, 5-15 mg/hr, Intravenous, Titrated, Kayla Toure, APRN, Stopped at 05/26/23 2318    ondansetron (ZOFRAN) injection 4 mg, 4 mg, Intravenous, Q6H PRN **OR** ondansetron (ZOFRAN) tablet 4 mg, 4 mg, Oral, Q6H PRN, Stephane Womack MD    pantoprazole (PROTONIX) EC tablet 40 mg, 40 mg, Oral, Q AM, Robbi Yang, APRN, 40 mg at 05/28/23 0640    polyethylene glycol (MIRALAX) packet 17 g, 17 g, Oral, Daily PRN, Stephane Womack MD    rosuvastatin (CRESTOR) tablet 20 mg, 20 mg, Oral, Daily, Jose Maria Townsend MD, 20 mg at 05/28/23 0844    sennosides-docusate (PERICOLACE) 8.6-50 MG per tablet 1 tablet, 1 tablet, Oral, Nightly PRN, Stephane Womack MD    sodium chloride 0.9 % flush 10  mL, 10 mL, Intravenous, Q12H, Kayla Toure APRN, 10 mL at 05/28/23 0844    sodium chloride 0.9 % flush 10 mL, 10 mL, Intravenous, PRN, Kayla Toure APRN    sodium chloride 0.9 % infusion 40 mL, 40 mL, Intravenous, PRN, Kayla Toure APRN    sodium chloride 0.9 % infusion, 100 mL/hr, Intravenous, Continuous, Chanell Rock PA-C, Last Rate: 100 mL/hr at 05/28/23 0600, 100 mL/hr at 05/28/23 0600     Laboratory Results:       Lab 05/27/23  1153 05/27/23  0519 05/26/23  1349 05/26/23  1348 05/26/23  1008 05/26/23  0958 05/26/23  0950   WBC  --  13.78*  --   --  11.50*  --   --    HEMOGLOBIN  --  11.3*  --   --  14.6  --   --    HEMOGLOBIN, POC  --   --   --   --   --   --  15.3   HEMATOCRIT  --  35.4*  --   --  45.8  --   --    HEMATOCRIT POC  --   --   --   --   --   --  45   PLATELETS  --  200  --   --  193  --   --    NEUTROS ABS  --   --   --   --  8.68*  --   --    IMMATURE GRANS (ABS)  --   --   --   --  0.16*  --   --    LYMPHS ABS  --   --   --   --  1.63  --   --    MONOS ABS  --   --   --   --  0.71  --   --    EOS ABS  --   --   --   --  0.26  --   --    MCV  --  84.7  --   --  85.1  --   --    PROTIME 14.7*  --  13.3 14.0 23.6* 35.9*  --          Lab 05/27/23  0519 05/26/23  1008 05/26/23  0950   SODIUM 140 139  --    POTASSIUM 4.2 4.4  --    CHLORIDE 103 101  --    CO2 24.0 24.0  --    ANION GAP 13.0 14.0  --    BUN 15 14  --    CREATININE 0.71* 0.72* 0.70   EGFR 113.9 113.4 114.4   GLUCOSE 234* 220*  --    CALCIUM 9.2 9.7  --    MAGNESIUM 1.8 2.2  --    PHOSPHORUS 4.0  --   --    HEMOGLOBIN A1C  --  8.90*  --    TSH  --  1.610  --          Lab 05/26/23  1008   TOTAL PROTEIN 8.4   ALBUMIN 4.7   GLOBULIN 3.7   ALT (SGPT) 14   AST (SGOT) 18   BILIRUBIN 0.6   ALK PHOS 86         Lab 05/27/23  1153 05/26/23  1349 05/26/23  1348 05/26/23  1008 05/26/23  0958   PROTIME 14.7* 13.3 14.0 23.6* 35.9*   INR 1.13* 1.1 1.07 2.08* 3.2*         Lab 05/27/23  0519   CHOLESTEROL 181   LDL CHOL 107*    HDL CHOL 48   TRIGLYCERIDES 147         Lab 05/26/23  1125 05/26/23  1014   ABO TYPING A A   RH TYPING Positive Positive   ANTIBODY SCREEN  --  Negative         Brief Urine Lab Results       None          Microbiology Results (last 10 days)       ** No results found for the last 240 hours. **             Diagnostic Imaging: I reviewed and independently interpreted the new imaging.     Assessment/Plan:  This is a 47-year-old male status post bilateral craniotomies for evacuation of subacute subdural hematomas on 5/26.  Neurologically, the patient continues to improve.  We will remove his Hemovac drains today.    Continue to mobilize patient. We will d/c keppra and Decadron.   We will start Lovenox for DVT prophylaxis today..  Patient's glucoses continue to be elevated.  Would prefer to have them around 150.  Defer to ICU for management of insulin.  We will plan for an MMA embolization on Tuesday.  Would hold off on heparin drip or oral anticoagulation until after procedure.  Appreciate ICU assistance.      Any copied data from previous notes included in the (1) History of Present Illness, (2) Physical Examination and (3) Medical Decision Making and/or Assessment and Plan has been reviewed and is accurate as of 05/28/23      Stephane Womack MD  05/28/23  08:57 EDT        Electronically signed by Stephane Womack MD at 05/28/23 0911       Meryl Ashby MD at 05/27/23 0846          Critical Care Note     LOS: 1 day   Patient Care Team:  Provider, No Known as PCP - General    Chief Complaint/Reason for visit:    Chief Complaint   Patient presents with    Stroke   Bilateral subdural hematomas  History of aortic valve replacement on Coumadin    Subjective     Interval History:     He remains afebrile.  On room air saturation is 94%.  He is in sinus rhythm.  Lopez catheter in place with 1 L of urine.  Left subdural drain 90 mL, right subdural drain 50 mL    Review of Systems:    All systems were reviewed and  "negative except as noted in subjective.    Medical history, surgical history, social history, family history reviewed    Objective     Intake/Output:    Intake/Output Summary (Last 24 hours) at 5/27/2023 0815  Last data filed at 5/27/2023 0617  Gross per 24 hour   Intake 1550 ml   Output 1140 ml   Net 410 ml       Nutrition:  NPO Diet NPO Type: Strict NPO    Infusions:  niCARdipine, 5-15 mg/hr, Last Rate: Stopped (05/26/23 2319)  sodium chloride, 100 mL/hr, Last Rate: 100 mL/hr (05/27/23 0510)        Mechanical Ventilator Settings:                                                Telemetry: Sinus rhythm             Vital Signs  Blood pressure 101/63, pulse 86, temperature 98.5 °F (36.9 °C), temperature source Axillary, resp. rate 16, height 172.7 cm (68\"), weight 104 kg (229 lb 0.9 oz), SpO2 91 %.    Physical Exam:  General Appearance:   Middle-aged gentleman supine in bed in no distress   Head:   Bilateral craniotomies with drains, scant bloody output   Eyes:          Gaze is conjugate   Ears:     Throat:  Oral mucosa moist   Neck:  Trachea midline, no crepitus   Back:      Lungs:    Symmetric chest expansion without wheeze or rhonchi    Heart:   Regular rhythm, mechanical S2   Abdomen:    Bowel sounds present, nontender   Rectal:   Deferred   Extremities:  Right radial arterial line, right hand pink   Pulses:    Skin:  Warm and dry   Lymph nodes:  No cervical adenopathy   Neurologic:  Awake, oriented to person, follows commands with all 4 extremities      Results Review:     I reviewed the patient's new clinical results.   Results from last 7 days   Lab Units 05/27/23  0519 05/26/23  1008 05/26/23  0950   SODIUM mmol/L 140 139  --    POTASSIUM mmol/L 4.2 4.4  --    CHLORIDE mmol/L 103 101  --    CO2 mmol/L 24.0 24.0  --    BUN mg/dL 15 14  --    CREATININE mg/dL 0.71* 0.72* 0.70   CALCIUM mg/dL 9.2 9.7  --    BILIRUBIN mg/dL  --  0.6  --    ALK PHOS U/L  --  86  --    ALT (SGPT) U/L  --  14  --    AST (SGOT) U/L  -- "  18  --    GLUCOSE mg/dL 234* 220*  --      Results from last 7 days   Lab Units 05/27/23  0519 05/26/23  1008 05/26/23  0950   WBC 10*3/mm3 13.78* 11.50*  --    HEMOGLOBIN g/dL 11.3* 14.6  --    HEMOGLOBIN, POC g/dL  --   --  15.3   HEMATOCRIT % 35.4* 45.8  --    HEMATOCRIT POC %  --   --  45   PLATELETS 10*3/mm3 200 193  --          No results found for: BLOODCX  No results found for: URINECX    I reviewed the patient's new imaging including images and reports.      Findings:   Postoperative changes are noted from interval bilateral craniotomy with evacuation of previously noted acute subdural hematomas. Subdural drains are noted in place. Small bilateral persistent extra-axial mixed fluid collections are present with   low-density fluid, gas and minimal persisting hyperdense blood products, 7 mm maximal thickness on the left, 6 mm on the right. There is improved diffuse cerebral edema, with restored patency of the basal cisterns and decreased effacement of the   ventricles. Some underlying parenchymal hypoattenuation is noted involving temporal lobes bilaterally, greater on the left, concerning for underlying contusion/ischemia. There is also evident trace acute left parafalcine subdural hematoma, fairly   localized, measuring 4 mm in maximal thickness without significant associated mass effect. The orbits are normal. The paranasal sinuses are grossly clear.    Impression:     Impression:   Expected postoperative changes following bilateral craniotomy for evacuation of acute subdural hematomas. Cerebral edema is improved and there is restored patency of the basal cisterns and decreased effacement of the ventricles. Bilateral subdural drains    are noted in place.     4 mm small and somewhat localized left parafalcine subdural hematoma.     Increase in conspicuity, there is somewhat heterogeneous hypoattenuation present involving the left greater than right temporal lobes, concerning for underlying component of  either persisting edema, contusion or ischemia.         Electronically Signed: Ruben Morris    5/27/2023 6:07 AM EDT          All medications reviewed.   dexamethasone, 4 mg, Intravenous, Q6H  divalproex, 250 mg, Oral, Nightly  DULoxetine, 60 mg, Oral, Daily  famotidine, 20 mg, Intravenous, Q12H  gabapentin, 200 mg, Oral, BID  insulin lispro, 2-7 Units, Subcutaneous, 4x Daily AC & at Bedtime  levETIRAcetam, 500 mg, Intravenous, Q12H  levothyroxine, 125 mcg, Oral, Daily  pantoprazole, 40 mg, Oral, Q AM  rosuvastatin, 20 mg, Oral, Daily  sodium chloride, 10 mL, Intravenous, Q12H          Assessment & Plan       Bilateral Subdural Hematoma    HTN    Hyperlipidemia (Diet controlled, off statin)    Hypothyroidism    Anxiety    H/O aortic mechanical valve replacement on Coumadin    Prediabetes    47-year-old gentleman with hypertension, dyslipidemia, hypothyroidism, diabetes, history of Hodgkin's lymphoma, 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  Postoperatively he developed pancreatitis and anticoagulation was held and he suffered a stroke resulting in some aphasia in 2018.  He presented May 26 with altered mentation and bilateral subdural hematomas with edema and some leftward midline shift and effacement of the right lateral and third ventricles and bilateral uncal medialization.  He was hypertensive and hyperglycemic on admission.  His INR was subtherapeutic at 2.08.  He received Kcentra and vitamin K and was started on Keppra, Cardene for hypertension and taken to surgery for emergent bilateral craniotomy and evacuation of subdural hematomas.    Neurosurgery reports that he is significantly more awake today.  He is oriented to person only.  He is moving all extremities equally.  Currently his blood pressure is 108 systolic, and he is off  "Cardene.      PLAN:    -Monitor surgical drainage  -Monitor neurologic exam  -Emergent CT scan for any decline in neurologic status  -Maintain systolic blood pressure less than 140  -Increase sliding scale insulin  -Speech therapy to evaluate for possible swallowing  -Add long-acting insulin if he tolerated p.o. diet  -Continue to hold anticoagulation  -Dexamethasone  -Keppra  -Thyroid replacement  -With mechanical valve he will need anticoagulation restarted, will likely restart at 72 hours  -Neurosurgery plans middle meningeal artery embolization next week      VTE Prophylaxis:SCDS    Stress Ulcer Prophylaxis: Protonix    Meryl Ashby MD  05/27/23  08:47 EDT      Time: 25min       Electronically signed by Meryl Ashby MD at 05/27/23 0901       Stephane Womack MD at 05/27/23 0825          NEUROSURGERY PROGRESS NOTE    Chief Complaint: Bilateral subdural hematomas    Subjective: Patient much more awake this morning.    Objective    Vital Signs: Blood pressure 101/63, pulse 86, temperature 98.5 °F (36.9 °C), temperature source Axillary, resp. rate 16, height 172.7 cm (68\"), weight 104 kg (229 lb 0.9 oz), SpO2 91 %.    Physical Exam  Awake, alert and oriented x 1, says incorrect year and city, but much more conversive  Opens eyes spont  Pupils 3 mm rx bilat  Extraocular muscles intact bilaterally  Face symmetric bilaterally  Tongue midline  5/5 in all 4 ext  Dressing dry    Intake/Output:     Intake/Output Summary (Last 24 hours) at 5/27/2023 0825  Last data filed at 5/27/2023 0617  Gross per 24 hour   Intake 1550 ml   Output 1140 ml   Net 410 ml       Current Medications:   Current Facility-Administered Medications:     acetaminophen (TYLENOL) tablet 650 mg, 650 mg, Oral, Q6H PRN, Robbi Yang, APRN, 650 mg at 05/27/23 0051    dexamethasone (DECADRON) injection 4 mg, 4 mg, Intravenous, Q6H, Stephane Womack MD, 4 mg at 05/27/23 0617    dextrose (D50W) (25 g/50 mL) IV injection 25 g, 25 " g, Intravenous, Q15 Min PRN, Navdeep Paz, APRN    dextrose (D50W) (25 g/50 mL) IV injection 25 g, 25 g, Intravenous, Q15 Min PRN, Jose Maria Townsend MD    dextrose (GLUTOSE) oral gel 15 g, 15 g, Oral, Q15 Min PRN, Navdeep Paz, APRN    dextrose (GLUTOSE) oral gel 15 g, 15 g, Oral, Q15 Min PRN, Jose Maria Townsend MD    divalproex (DEPAKOTE ER) 24 hr tablet 250 mg, 250 mg, Oral, Nightly, Jose Maria Townsend MD, 250 mg at 05/26/23 2141    docusate sodium (COLACE) capsule 100 mg, 100 mg, Oral, BID PRN, Stephane Womack MD    DULoxetine (CYMBALTA) DR capsule 60 mg, 60 mg, Oral, Daily, Jose Maria Townsend MD    famotidine (PEPCID) injection 20 mg, 20 mg, Intravenous, Q12H, Stephane Womack MD, 20 mg at 05/26/23 2317    gabapentin (NEURONTIN) capsule 200 mg, 200 mg, Oral, BID, Jose Maria Townsend MD, 200 mg at 05/26/23 2156    glucagon (GLUCAGEN) injection 1 mg, 1 mg, Intramuscular, Q15 Min PRN, Navdeep Paz, APRN    glucagon (GLUCAGEN) injection 1 mg, 1 mg, Intramuscular, Q15 Min PRN, Jose Maria Townsend MD    HYDROcodone-acetaminophen (NORCO) 5-325 MG per tablet 1 tablet, 1 tablet, Oral, Q6H PRN, Robbi Yang APRN, 1 tablet at 05/26/23 2208    HYDROmorphone (DILAUDID) injection 0.5 mg, 0.5 mg, Intravenous, Q2H PRN **AND** naloxone (NARCAN) injection 0.4 mg, 0.4 mg, Intravenous, Q5 Min PRN, Stephane Womack MD    Insulin Lispro (humaLOG) injection 2-7 Units, 2-7 Units, Subcutaneous, 4x Daily AC & at Bedtime, Jose Maria Townsend MD, 3 Units at 05/26/23 2141    levETIRAcetam in NaCl 0.75% (KEPPRA) IVPB 1,000 mg, 1,000 mg, Intravenous, Q12H, Kayla Toure APRN, Last Rate: 0 mL/hr at 05/26/23 1053, 1,000 mg at 05/26/23 2141    levothyroxine (SYNTHROID, LEVOTHROID) tablet 125 mcg, 125 mcg, Oral, Daily, Jose Maria Townsend MD    magnesium hydroxide (MILK OF MAGNESIA) 400 MG/5ML suspension 15 mL, 15 mL, Oral, Daily PRN, Stephane Womack MD    niCARdipine (CARDENE) 25mg in 250mL NS infusion, 5-15  mg/hr, Intravenous, Titrated, Kayla Toure APRN, Stopped at 05/26/23 2318    ondansetron (ZOFRAN) injection 4 mg, 4 mg, Intravenous, Q6H PRN **OR** ondansetron (ZOFRAN) tablet 4 mg, 4 mg, Oral, Q6H PRN, Stephane Womack MD    pantoprazole (PROTONIX) EC tablet 40 mg, 40 mg, Oral, Q AM, Robbi Yang APRN, 40 mg at 05/27/23 0617    polyethylene glycol (MIRALAX) packet 17 g, 17 g, Oral, Daily PRN, Stephane Womack MD    rosuvastatin (CRESTOR) tablet 20 mg, 20 mg, Oral, Daily, Jose Maria Townsend MD    sennosides-docusate (PERICOLACE) 8.6-50 MG per tablet 1 tablet, 1 tablet, Oral, Nightly PRN, Stephane Womack MD    sodium chloride 0.9 % flush 10 mL, 10 mL, Intravenous, Q12H, Kayla Toure APRN, 10 mL at 05/26/23 2142    sodium chloride 0.9 % flush 10 mL, 10 mL, Intravenous, PRN, Kayla Toure APRN    sodium chloride 0.9 % infusion 40 mL, 40 mL, Intravenous, PRN, Kayla Toure APRN    sodium chloride 0.9 % infusion, 100 mL/hr, Intravenous, Continuous, Chanell Rock PA-C, Last Rate: 100 mL/hr at 05/27/23 0510, 100 mL/hr at 05/27/23 0510     Laboratory Results:       Lab 05/27/23  0519 05/26/23  1349 05/26/23  1348 05/26/23  1008 05/26/23  0958 05/26/23  0950   WBC 13.78*  --   --  11.50*  --   --    HEMOGLOBIN 11.3*  --   --  14.6  --   --    HEMOGLOBIN, POC  --   --   --   --   --  15.3   HEMATOCRIT 35.4*  --   --  45.8  --   --    HEMATOCRIT POC  --   --   --   --   --  45   PLATELETS 200  --   --  193  --   --    NEUTROS ABS  --   --   --  8.68*  --   --    IMMATURE GRANS (ABS)  --   --   --  0.16*  --   --    LYMPHS ABS  --   --   --  1.63  --   --    MONOS ABS  --   --   --  0.71  --   --    EOS ABS  --   --   --  0.26  --   --    MCV 84.7  --   --  85.1  --   --    PROTIME  --  13.3 14.0 23.6* 35.9*  --          Lab 05/27/23  0519 05/26/23  1008 05/26/23  0950   SODIUM 140 139  --    POTASSIUM 4.2 4.4  --    CHLORIDE 103 101  --    CO2 24.0 24.0  --    ANION GAP 13.0 14.0   --    BUN 15 14  --    CREATININE 0.71* 0.72* 0.70   EGFR 113.9 113.4 114.4   GLUCOSE 234* 220*  --    CALCIUM 9.2 9.7  --    MAGNESIUM 1.8 2.2  --    PHOSPHORUS 4.0  --   --    HEMOGLOBIN A1C  --  8.90*  --    TSH  --  1.610  --          Lab 05/26/23  1008   TOTAL PROTEIN 8.4   ALBUMIN 4.7   GLOBULIN 3.7   ALT (SGPT) 14   AST (SGOT) 18   BILIRUBIN 0.6   ALK PHOS 86         Lab 05/26/23  1349 05/26/23  1348 05/26/23  1008 05/26/23  0958   PROTIME 13.3 14.0 23.6* 35.9*   INR 1.1 1.07 2.08* 3.2*         Lab 05/27/23  0519   CHOLESTEROL 181   LDL CHOL 107*   HDL CHOL 48   TRIGLYCERIDES 147         Lab 05/26/23  1125 05/26/23  1014   ABO TYPING A A   RH TYPING Positive Positive   ANTIBODY SCREEN  --  Negative         Brief Urine Lab Results       None          Microbiology Results (last 10 days)       ** No results found for the last 240 hours. **             Diagnostic Imaging: I reviewed and independently interpreted the new imaging.     Assessment/Plan:  This is a 47-year-old male status post bilateral craniotomies for evacuation of subacute subdural hematomas on 5/26.  Neurologically, the patient is much more awake and interactive.  His postoperative head CT shows good evacuation of hematomas with significant improvement in mass effect.  We will continue his Hemovac drains 1 more day.  Okay for patient to mobilize and get out of bed today.  Continue Keppra and Decadron.  We will hold off on DVT prophylaxis 1 more day.  Patient's glucoses are elevated.  Would prefer to have them around 150.  Defer to ICU for management of insulin.  Appreciate ICU assistance.    Any copied data from previous notes included in the (1) History of Present Illness, (2) Physical Examination and (3) Medical Decision Making and/or Assessment and Plan has been reviewed and is accurate as of 05/27/23      Stephane Womack MD  05/27/23  08:25 EDT        Electronically signed by Stephane Womack MD at 05/27/23 0883          Consult Notes  (all)        Vimal Villegas MD at 06/05/23 0903        Consult Orders    1. Inpatient Cardiology Consult [[id number]] ordered by Alexey Benites MD at 06/04/23 0936                 [facility]   Consult Note      Reason for Consultation: aortic valve/anticoagulation      Patient Active Problem List    Diagnosis Date Noted    *Bilateral Subdural Hematoma 05/26/2023    Somnolence 05/28/2023    HTN 05/26/2023    Hyperlipidemia (Diet controlled, off statin) 05/26/2023    Hypothyroidism 05/26/2023    Anxiety 05/26/2023    Hodgkin's Lymphoma s/p XRT & Chemo 05/26/2023    VSD s/p closure (age 14) 05/26/2023    History of aortic regurgitation / stenosis s/p ROSS procedure with subsequent reversal with Mechanical AVR 05/26/2023    H/O aortic mechanical valve replacement on Coumadin 05/26/2023    New Onset Type 2 diabetes mellitus 05/26/2023        History of present illness:    Manjeet Hall is a 47 y.o. 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  He was admitted to the hospital on 5/26/23 with bilateral hematomas that were evacuated and then had embolization of the MMA on 5/31/23.  We have been consulted for anticoagulation.   Pt states that he has been stable on 5mg of coumadin for years and that his INR needs to be between 2.5-3.5    Past Medical History:   Diagnosis Date    Cancer     Diabetes mellitus     Elevated cholesterol     Stroke        Past Surgical History:   Procedure Laterality Date    EMBOLIZATION CEREBRAL N/A 5/31/2023    Procedure: Emoblization cerebral;  Surgeon: Stephane Womack MD;  Location: [facility] INVASIVE LOC[facility];  Service: Interventional Radiology;  Laterality: N/A;         Allergies   Allergen Reactions    Tegaderm Chg Dressing [Chlorhexidine] Rash  "            Medications Prior to Admission   Medication Sig Dispense Refill Last Dose    divalproex (DEPAKOTE ER) 250 MG 24 hr tablet Take 1 tablet by mouth Every Night.   5/25/2023    DULoxetine (CYMBALTA) 60 MG capsule Take 1 capsule by mouth Daily.   5/25/2023    gabapentin (NEURONTIN) 100 MG capsule Take 2 capsules by mouth 2 (Two) Times a Day.   5/25/2023    hydrOXYzine (ATARAX) 10 MG tablet Take 1 to 2 tablets by mouth twice a day as needed for anxiety and sleep.   5/25/2023    lansoprazole (PREVACID) 30 MG capsule Take 1 capsule by mouth Daily.   5/25/2023    levothyroxine (SYNTHROID, LEVOTHROID) 125 MCG tablet Take 1 tablet by mouth Daily. Further Refills per PCP with Repeat Lab Work   5/25/2023    propranolol LA (INDERAL LA) 60 MG 24 hr capsule Take 1 capsule by mouth Daily.   5/25/2023    rosuvastatin (CRESTOR) 20 MG tablet Take 1 tablet by mouth Daily.   5/25/2023    warfarin (COUMADIN) 5 MG tablet Take 1 tablet by mouth Daily. As directed by Anticoagulation Clinic   5/25/2023         Subjective .         Social History     Socioeconomic History    Marital status: Single   Tobacco Use    Smoking status: Never    Smokeless tobacco: Never   Vaping Use    Vaping Use: Never used   Substance and Sexual Activity    Alcohol use: Never    Drug use: Never    Sexual activity: Yes     History reviewed. No pertinent family history.      Review of Systems:  ROS  Pertinent positives noted in history, exam, and assessment. Otherwise reviewed and negative.       Objective   Vitals:  /76 (BP Location: Left arm, Patient Position: Lying)   Pulse 78   Temp 98 °F (36.7 °C) (Oral)   Resp 16   Ht 172.7 cm (68\")   Wt 108 kg (238 lb 1.6 oz)   SpO2 94%   BMI 36.20 kg/m²      Intake/Output Summary (Last 24 hours) at 6/5/2023 1039  Last data filed at 6/5/2023 0700  Gross per 24 hour   Intake 1348 ml   Output --   Net 1348 ml       Vitals reviewed.   Constitutional:       Appearance: Not in distress.   Pulmonary:      " Effort: Pulmonary effort is normal.      Breath sounds: Normal breath sounds.   Cardiovascular:      Normal rate. Regular rhythm.      Comments: Good click of the aortic valve  Edema:     Peripheral edema absent.   Musculoskeletal: Normal range of motion. Neurological:      Mental Status: Alert.             Results Review:  I reviewed the patient's new clinical results.  Results from last 7 days   Lab Units 06/05/23  0646   WBC 10*3/mm3 10.30   HEMOGLOBIN g/dL 11.7*   HEMATOCRIT % 36.8*   PLATELETS 10*3/mm3 207     Results from last 7 days   Lab Units 06/05/23  0646   SODIUM mmol/L 141   POTASSIUM mmol/L 4.1   CHLORIDE mmol/L 106   CO2 mmol/L 24.0   BUN mg/dL 13   CREATININE mg/dL 0.71*   CALCIUM mg/dL 9.0   GLUCOSE mg/dL 130*     Results from last 7 days   Lab Units 06/05/23  0646   SODIUM mmol/L 141   POTASSIUM mmol/L 4.1   CHLORIDE mmol/L 106   CO2 mmol/L 24.0   BUN mg/dL 13   CREATININE mg/dL 0.71*   GLUCOSE mg/dL 130*   CALCIUM mg/dL 9.0     Results from last 7 days   Lab Units 06/05/23  0646 05/30/23  1222   INR  1.02 1.02     Tele:  normal sinus      Bilateral Subdural Hematoma    HTN    Hyperlipidemia (Diet controlled, off statin)    Hypothyroidism    Anxiety    H/O aortic mechanical valve replacement on Coumadin    New Onset Type 2 diabetes mellitus      Assessment and Plan:    Aortic valve disease  - On-X mechanical valve, has been on coumadin before hospitalization  - From neurosurgical perspective, patient is okay to transition to oral anticoagulation at this time   - will d/c IV heparin, will give 1.5mg/kg enoxaparin prior to discharge  - with his On-X valve in a high flow position and recent SDH would recommend no bridging aside from enoxaparin prior to d/c   - restart coumadin 5mg daily with PT/INR check in 3 days   -f/u with Dr John in 4 weeks  -will get pt established with anticoagulation clinic    Subdural hematoma, bilateral   -evacuation of bilateral hematomas on 5/26/23  -emboliztion of the  left middle meningeal artery 23  -f/u per neurosurgery     Hypertension  -pt is currently on Lopresor 6.25mg twice a day     Hyperlipidemia  -pt has been placed on Lipitor 20mg daily            Dr Villegas personally evaluated and examined the patient and agree with the assessment, treatment plan, and disposition of the patient as recorded by the physician assistant        Electronically signed by Mile Shirley PA-C, 23, 9:03 AM EDT.      I personally performed the services described in this documentation as scribed by the above named individual in my presence, and it is both accurate and complete.    OCTAVIO Villegas MD  23  11:40 EDT         Electronically signed by Vimal Villegas MD at 23 1140       Stephane Womack MD at 23 1005          Patient: Manjeet Hall  : 1975    Primary Care Provider: Provider, No Known    Requesting Provider: As above      Chief Complaint: Altered mental status    History of Present Illness: This is a 47 y.o. 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  He is status post cardiac surgery and is currently on Coumadin.  He presented to the emergency room by himself with altered mental status.  He was not speaking very well and a history was unable to be obtained.  He underwent a CT scan of his head which showed subacute bilateral subdural hematomas.  He has been more awake since the CT scan.    PMHX  Allergies:  Not on File  Medications    Current Facility-Administered Medications:     prothrombin complex conc human (KCentra) IV solution 2,500 Units, 2,500 Units, Intravenous, Once **AND** phytonadione (AQUA-MEPHYTON, VITAMIN K) 10 mg in sodium chloride 0.9 % 50 mL IVPB, 10 mg, Intravenous, Once **AND** Protime-INR, , , Once **AND** [CANCELED] Protime-INR, , , Once **AND** [START ON 2023] Protime-INR, , , Once **AND** Monitor for signs for thromboembolic events, , , Per Order Details, Willow  Cameron LOZANO MD    sodium chloride 0.9 % infusion, 125 mL/hr, Intravenous, Continuous, Cameron Daugherty MD  No current outpatient medications on file.  Past Medical History:  No past medical history on file.  Past Surgical History:  No past surgical history on file.  Social Hx:     Family Hx:  No family history on file.  Review of Systems:        Unable to be obtained given patient's status and lack of family present    Physical Exam:   BP (!) 169/103   Pulse 79   Resp 16   Wt 104 kg (229 lb 0.9 oz)   SpO2 97%   Patient appears comfortable, resting  Eyes open spontaneously  Able to say name, does not know year  Pupils 3 mm reactive bilaterally  Face symmetric  Follows commands briskly x4    Intake/Output: No intake or output data in the 24 hours ending 05/26/23 1005    Current Medications:   Current Facility-Administered Medications:     prothrombin complex conc human (KCentra) IV solution 2,500 Units, 2,500 Units, Intravenous, Once **AND** phytonadione (AQUA-MEPHYTON, VITAMIN K) 10 mg in sodium chloride 0.9 % 50 mL IVPB, 10 mg, Intravenous, Once **AND** Protime-INR, , , Once **AND** [CANCELED] Protime-INR, , , Once **AND** [START ON 5/27/2023] Protime-INR, , , Once **AND** Monitor for signs for thromboembolic events, , , Per Order Details, Cameron Daugherty MD    sodium chloride 0.9 % infusion, 125 mL/hr, Intravenous, Continuous, Cameron Daugherty MD  No current outpatient medications on file.     Laboratory Results:      Lab 05/26/23  0958   PROTIME 35.9*                 Lab 05/26/23  0958   PROTIME 35.9*   INR 3.2*                 Brief Urine Lab Results       None          Microbiology Results (last 10 days)       ** No results found for the last 240 hours. **             Diagnostic Imaging: The patient's diagnostic imaging was independently reviewed and interpreted by myself.    Assessment/Plan:  This is a 47-year-old male with a history of left hemispheric infarct and cardiac surgery, currently on Coumadin.   He presented to the emergency room with altered mental status.  CT scan of the head shows subacute bilateral subdural hematomas.  He is clinically stable and is following commands briskly.  We will obtain a full set of labs and reverse his Coumadin.  We will obtain a brain MRI with and without contrast.  The patient will likely need drainage of the subdural hematomas.  I will follow-up with the family and patient after his tests are completed.  Would recommend admitting him to ICU.      Stephane Womack MD  23  10:05 EDT          Electronically signed by Stephane Womack MD at 23 1008          Discharge Summary        Faustina Mckeon DO at 23 1132              [facility] Medicine Services  DISCHARGE SUMMARY    Patient Name: Manjeet Hall  : 1975  MRN: [id number]    Date of Admission: 2023  9:26 AM  Date of Discharge:  2023  Primary Care Physician: Provider, No Known    Consults       Date and Time Order Name Status Description    2023 12:31 AM Inpatient Cardiology Consult Completed     2023  8:26 PM Inpatient Neurosurgery Consult              Hospital Course       Active Hospital Problems    Diagnosis  POA    **Bilateral Subdural Hematoma [I62.00]  Yes    HTN [I10]  Yes    Hyperlipidemia (Diet controlled, off statin) [E78.5]  Yes    Hypothyroidism [E03.9]  Yes    Anxiety [F41.9]  Yes    H/O aortic mechanical valve replacement on Coumadin [Z95.2]  Not Applicable    New Onset Type 2 diabetes mellitus [E11.9]  Yes      Resolved Hospital Problems   No resolved problems to display.          Hospital Course:  Manjeet Hall is a 47 y.o. 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      SDH, non traumatic  - s/p craniotomy and MMA embolization  - Neurosurgery ok with resuming AC today  - Follow-up 1 month      Mechanical AoV  - Cardiology has evaluated   - Plan to give 1 dose of Lovenox and DC home on Coumadin 5mg (home dose) and follow-up INR in 2-3 days. Referral generated for Coumadin Clinic      DMII  - new onset, A1c 8.9  - DC on Metformin 500mg daily   - patient requests referral to endocrine at discharge, ordered      Hypothroid  - levothyroxine     Anxiety  Depression  -Continue home medications     Discharge Follow Up Recommendations for outpatient labs/diagnostics:  -PCP 1 week  -INR check, Coumadin Clinic, 2-3 days   -Dr John 4 weeks   -Dr Womack 1 month     Day of Discharge     HPI:   Patient seen and examined. Doing well, wants to go home.     Review of Systems  Gen- No fevers, chills  CV- No chest pain, palpitations  Resp- No cough, dyspnea  GI- No N/V/D, abd pain    Vital Signs:   Temp:  [98 °F (36.7 °C)-98.6 °F (37 °C)] 98 °F (36.7 °C)  Heart Rate:  [77-95] 78  Resp:  [16] 16  BP: (100-116)/(72-80) 104/76      Physical Exam:  Constitutional: No acute distress, awake, alert  HENT: NCAT, mucous membranes moist  Respiratory: Clear to auscultation bilaterally, respiratory effort normal   Cardiovascular: RRR, no murmurs, rubs, or gallops  Gastrointestinal: Positive bowel sounds, soft, nontender, nondistended  Musculoskeletal: No bilateral ankle edema  Psychiatric: Appropriate affect, cooperative  Neurologic: Oriented x 3, no focal deficits   Skin: No rashes     Pertinent  and/or Most Recent Results     LAB RESULTS:      Lab 06/05/23  0646 06/04/23  1401 06/04/23  0539 06/03/23  2150 06/03/23  1605 06/03/23  0721 06/03/23  0720 06/02/23  0339 06/01/23  1230 06/01/23  0447 05/31/23  0435 05/30/23  1801 05/30/23  1222   WBC 10.30   --  10.46  --   --  8.98  --  8.60  --  9.61 9.33  --   --    HEMOGLOBIN 11.7*  --  11.4*  --   --  11.7*  --  10.8*  --  11.8* 11.9*  --   --    HEMATOCRIT 36.8*  --  36.2*  --   --  37.2*  --  34.1*  --  36.7* 36.8*  --   --    PLATELETS 207  --  203  --   --  210  --  192  --  227 200  --   --    NEUTROS ABS  --   --   --   --   --  5.13  --   --   --   --  5.47  --   --    IMMATURE GRANS (ABS)  --   --   --   --   --  0.34*  --   --   --   --  0.37*  --   --    LYMPHS ABS  --   --   --   --   --  2.19  --   --   --   --  2.25  --   --    MONOS ABS  --   --   --   --   --  0.76  --   --   --   --  0.70  --   --    EOS ABS  --   --   --   --   --  0.51*  --   --   --   --  0.47*  --   --    MCV 85.0  --  85.2  --   --  85.1  --  87.2  --  85.2 85.6  --   --    PROTIME 13.5  --   --   --   --   --   --   --   --   --   --   --  13.5   APTT  --   --   --   --   --   --   --   --   --   --   --   --  29.8*   HEPARIN ANTI-XA 0.41 0.38 0.36 0.24* 0.31  --    < > 0.40   < > 0.11*  --    < > 0.35    < > = values in this interval not displayed.         Lab 06/05/23  0646 06/04/23  0539 06/03/23  0720 06/02/23  0339 06/01/23  0447 05/31/23  0435 05/30/23  0441   SODIUM 141 142 140 139 139 143 144   POTASSIUM 4.1 3.9 4.0 4.1 3.8 3.8 4.0   CHLORIDE 106 106 106 108* 105 106 106   CO2 24.0 24.0 24.0 23.0 25.0 27.0 28.0   ANION GAP 11.0 12.0 10.0 8.0 9.0 10.0 10.0   BUN 13 16 12 15 16 16 20   CREATININE 0.71* 0.81 0.72* 0.69* 0.77 0.70* 0.68*   EGFR 113.9 109.4 113.4 114.9 111.1 114.4 115.4   GLUCOSE 130* 120* 125* 114* 184* 134* 123*   CALCIUM 9.0 8.8 9.1 8.8 8.6 8.8 8.6   MAGNESIUM  --   --  2.3 2.3 2.3 2.3 2.1   PHOSPHORUS  --   --  3.7 3.3 3.7 4.4 5.0*             Lab 06/05/23  0646 05/30/23  1222   PROTIME 13.5 13.5   INR 1.02 1.02                 Brief Urine Lab Results       None          Microbiology Results (last 10 days)       ** No results found for the last 240 hours. **            CT Head Without Contrast    Result  Date: 6/1/2023  CT HEAD WO CONTRAST Date of Exam: 6/1/2023 4:30 AM EDT Indication: SDH. Comparison: 5/27/2023 Technique: Axial CT images were obtained of the head without contrast administration.  Automated exposure control and iterative construction methods were used. Findings: The air and fluid collections in the subdural space on both the right and left have not changed significantly in size since the last study. There are postoperative changes of bilateral craniotomy and drain placement. Blood is identified dependently in both subdural collections. The ventricular size and configuration is unchanged compared with the last study. There is low-attenuation identified in the left temporal lobe which appears more pronounced than on the previous exam. The right temporal hypodensity has improved. A small amount of parafalcine blood is identified in the frontal region. This is relatively focal.     Impression: 1. No significant change in the subdural fluid collections following craniotomy and drainage. 2. Small focal parafalcine subdural hematoma, unchanged from the patient's last study. 3. Resolution of the low-attenuation in the right temporal lobe. The left temporal hypoattenuation is more pronounced on the current study likely representing an evolving area of infarction. Electronically Signed: Vimal Glass  6/1/2023 7:41 AM EDT  Workstation ID: [id number]    CT Head Without Contrast    Result Date: 5/27/2023  CT HEAD WO CONTRAST Date of Exam: 5/27/2023 4:07 AM EDT Indication: Craniotomy, post-op. Comparison: 1 day prior. Technique: Axial CT images were obtained of the head without contrast administration.  Reconstructed coronal and sagittal images were also obtained. Automated exposure control and iterative construction methods were used. Findings: Postoperative changes are noted from interval bilateral craniotomy with evacuation of previously noted acute subdural hematomas. Subdural drains are noted in place. Small  bilateral persistent extra-axial mixed fluid collections are present with low-density fluid, gas and minimal persisting hyperdense blood products, 7 mm maximal thickness on the left, 6 mm on the right. There is improved diffuse cerebral edema, with restored patency of the basal cisterns and decreased effacement of the ventricles. Some underlying parenchymal hypoattenuation is noted involving temporal lobes bilaterally, greater on the left, concerning for underlying contusion/ischemia. There is also evident trace acute left parafalcine subdural hematoma, fairly localized, measuring 4 mm in maximal thickness without significant associated mass effect. The orbits are normal. The paranasal sinuses are grossly clear.     Impression: Expected postoperative changes following bilateral craniotomy for evacuation of acute subdural hematomas. Cerebral edema is improved and there is restored patency of the basal cisterns and decreased effacement of the ventricles. Bilateral subdural drains  are noted in place. 4 mm small and somewhat localized left parafalcine subdural hematoma. Increase in conspicuity, there is somewhat heterogeneous hypoattenuation present involving the left greater than right temporal lobes, concerning for underlying component of either persisting edema, contusion or ischemia. Electronically Signed: Ruben Morris  5/27/2023 6:07 AM EDT  Workstation ID: [id number]    XR Chest 1 View    Result Date: 5/26/2023  XR CHEST 1 VW Date of Exam: 5/26/2023 10:04 AM EDT Indication: ams Comparison: 5/23/2023 Findings: Status post aortic valve repair. Low lung volumes. Heart size indeterminate due to projection and lung volumes. Pulmonary vasculature is within normal limits. Lungs are grossly clear     Impression: Low lung volumes with no acute process demonstrated Electronically Signed: Mateusz Hare  5/26/2023 10:21 AM EDT  Workstation ID: [id number]    CT Head Without Contrast Stroke Protocol    Result Date: 5/26/2023  CT  HEAD WO CONTRAST STROKE PROTOCOL Date of Exam: 5/26/2023 9:31 AM EDT Indication: stroke. Comparison: 5/22/2023. Technique: Axial CT images were obtained of the head without contrast administration.  Reconstructed coronal and sagittal images were also obtained. Automated exposure control and iterative construction methods were used. Findings: There are bilateral iso to hyperdense subdural hematomas present, appearing acute and new since recent outside comparison. The left-sided subdural hematoma measures 14 mm in maximal thickness over the left frontal lobe, 13 mm on the right. There is evidence of diffuse cerebral edema, with effacement of the right lateral ventricle, bilateral uncal medialization, with crowding of the basilar cisterns and impending brain herniation. There is trace, 2 to 3 mm of leftward midline shift. There is also some effacement of the third ventricle and enlargement of the left occipital horn and left temporal horn, concerning for component of entrapment and evolving hydrocephalus. No definite acute intraparenchymal hemorrhage is present. The orbits are normal. The paranasal sinuses are clear. The calvarium is intact.     Impression: Bilateral 13 to 14 mm acute subdural hematomas are present. There is evidence of diffuse cerebral edema, with bilateral uncal medialization, crowding of the basal cisterns and concern for impending herniation. There is 2 to 3 mm of leftward midline shift  with effacement of the right lateral and third ventricles, with prominence of the left temporal horn concerning for early entrapment. Scan performed at 9:31 a.m. 5/26/2023. Results discussed with the stroke team and ER physician by Elia Morris in person at time of scanning. Electronically Signed: Ruben Morris  5/26/2023 9:55 AM EDT  Workstation ID: [id number]    Invasive peripheral vascular study    Result Date: 6/2/2023  PREOPERATIVE DIAGNOSIS: Bilateral subacute subdural hematomas  POSTOPERATIVE DIAGNOSIS:  Same  INDICATION: This is a 47 y.o. male on Coumadin for mechanical heart valve, who presented with bilateral subacute subdural hematomas requiring evacuation.  He subsequently came for MMA embolization to decrease the risk of recurrence.  The risks and benefits of the procedure were explained to the patient and they elected to proceed with the intervention. PROCEDURES PERFORMED: 4 Vessel diagnostic cerebral arteriogram. Superselective angiogram of the right middle meningeal artery Superselective angiogram of the left middle meningeal artery 4.  Intracranial embolization of the left middle meningeal artery 5.  Super-selective angiogram and interpretation of the middle meningeal artery  Physician: Dr. Stephane Womack MD  SEDATION: General anesthesia Vessels selected:  1.  Right internal carotid artery 2.  Right common carotid artery 3.  Right middle meningeal artery 4.  Left internal carotid artery 5.  Left external carotid artery 6.  Left middle meningeal artery   PROCEDURE:  The patient was taken to the bi-plane neuroangiography suite and placed supine on the procedure table. The patient was prepped and draped in the usual sterile fashion. A procedural timeout was performed prior to beginning the procedure.  Following the usual sterile prep and drape, a micropuncture kit and use of Seldinger technique was performed to gain access within the right common femoral artery. A 6 Burmese sheath was then placed. A 6 Burmese Benchmark passed over a 0.035 inch Terumo guidewire was advanced through the abdominal thoracic aorta under direct fluoroscopic guidance. This system was utilized to select the right internal carotid artery, and during injection, digital subtraction angiography was performed as noted. The catheter was then positioned into the right common carotid artery and digital subtraction angiography was performed. Based upon this, a roadmap was used. At this time, a Renegade 21 microcatheter with the [facility]  microwire inside of it was then positioned into the middle meningeal artery. Angiogram within the middle meningeal artery showed that the vessel was extremely small in caliber and there was no significant flow to the area of the known subdural hematomas.  It was decided that this vessel was too small for embolization.  The Micra system was removed and the benchmark catheter was then positioned into the left internal carotid artery, and during injection, digital subtraction angiography was performed as noted. The catheter was then positioned into the external carotid artery and digital subtraction angiography was obtained. Based upon this, a roadmap was used. At this time, a Renegade 21 microcatheter with the traxcess microwire inside of it was then positioned into the middle meningeal artery. Angiogram within the middle meningeal artery confirmed good location of the microcatheter. At this time, we slowly injected 150-250 sized particles mixed with contrast through the microcatheter. During injection, we watched with x-ray to confirm the particles were advancing into the correct vessels. Once we visualized stasis within the middle meningeal artery, we removed the microcatheter and performed another external carotid artery angiogram through the base catheter. This showed good stasis within the middle meningeal artery. The catheter was then removed from the arterial system, a closure device was deployed. Manual compression over the puncture site was performed until hemostasis was attained. FINDINGS: Angiography of the right internal carotid artery shows normal distal cervical, petrous, cavernous and clinoid segments. The ophthalmic artery fills normally. The posterior communicating artery is not well visualized and the anterior choroidal artery is small in caliber. The middle cerebral artery fills normally in all segments. The anterior cerebral artery fills normally in all segments. Angiography of the right common  carotid artery shows normal opacification of the distal common carotid artery, the proximal ICA and proximal ECA branches. There is no evidence of atherosclerotic disease or stenosis in the proximal internal carotid artery.  The ANGIE branches overall are small in caliber. Superselective angiogram through the microcatheter in the right middle meningeal artery shows that the vessel caliber is extremely small.  There is no significant fluid to the area of the known subdural hematoma.   Angiography of the left internal carotid artery shows normal distal cervical, petrous, cavernous and clinoid segments. The ophthalmic artery fills normally. The posterior communicating artery and anterior choroidal artery are easily visualized and of normal caliber. The middle cerebral artery fills normally in all segments. The A1 segment is very hypoplastic with only minimal filling into the ANGIE territory.  Initial angiography of the left external carotid artery shows normal filling of all its branches. No shunting or fistula is noted.  There are noted collaterals from the distal middle meningeal artery supplying the known subdural hematoma.  Superselective angiogram through the microcatheter in the middle meningeal artery shows opacification of the vessel without any filling into the orbit.  Follow-up angiogram after the above-mentioned embolization shows that there is minimal opacification into the middle meningeal artery in the parietal region.  Flour Time (min): 11.9  Air Kerma (mGy): 597  Contrast: 40 cc of 320 mg/ml Visipaque       Successful embolization of the left middle meningeal artery.  No complications noted    Aborted Cath Cath    Result Date: 5/30/2023  Aborted Case    Arterial Line    Result Date: 5/31/2023  Naila Jewell DO     5/31/2023  4:26 PM Arterial Line Patient reassessed immediately prior to procedure Patient location during procedure: pre-op Line placed for hemodynamic monitoring. Performed By  Anesthesiologist: Naila Jewell, DO Preanesthetic Checklist Completed: patient identified, IV checked, site marked, risks and benefits discussed, surgical consent, monitors and equipment checked, pre-op evaluation and timeout performed Arterial Line Prep  Sterile Tech: cap, gloves and sterile barriers Prep: ChloraPrep Patient monitoring: blood pressure monitoring, continuous pulse oximetry and EKG Arterial Line Procedure Laterality:left Location:  radial artery Catheter size: 20 G Guidance: ultrasound guided and palpation technique Number of attempts: 1 Successful placement: yes Post Assessment Dressing Type: line sutured, occlusive dressing applied, secured with tape and wrist guard applied. Complications no Circ/Move/Sens Assessment: normal and unchanged. Patient Tolerance: patient tolerated the procedure well with no apparent complications                  Plan for Follow-up of Pending Labs/Results: Inbox     Discharge Details        Discharge Medications        New Medications        Instructions Start Date   metFORMIN  MG 24 hr tablet  Commonly known as: GLUCOPHAGE-XR   500 mg, Oral, Daily With Breakfast      metoprolol tartrate 25 MG tablet  Commonly known as: LOPRESSOR   12.5 mg, Oral, Every 12 Hours Scheduled             Continue These Medications        Instructions Start Date   divalproex 250 MG 24 hr tablet  Commonly known as: DEPAKOTE ER   250 mg, Oral, Nightly      DULoxetine 60 MG capsule  Commonly known as: CYMBALTA   60 mg, Oral, Daily      gabapentin 100 MG capsule  Commonly known as: NEURONTIN   200 mg, Oral, 2 Times Daily      hydrOXYzine 10 MG tablet  Commonly known as: ATARAX   Take 1 to 2 tablets by mouth twice a day as needed for anxiety and sleep.      lansoprazole 30 MG capsule  Commonly known as: PREVACID   30 mg, Oral, Daily      levothyroxine 125 MCG tablet  Commonly known as: SYNTHROID, LEVOTHROID   125 mcg, Oral, Daily, Further Refills per PCP with Repeat Lab Work     "  rosuvastatin 20 MG tablet  Commonly known as: CRESTOR   20 mg, Oral, Daily      warfarin 5 MG tablet  Commonly known as: COUMADIN   5 mg, Oral, Daily Warfarin, As directed by Anticoagulation Clinic             Stop These Medications      propranolol LA 60 MG 24 hr capsule  Commonly known as: INDERAL LA              Allergies   Allergen Reactions    Tegaderm Chg Dressing [Chlorhexidine] Rash         Discharge Disposition:      Diet:  Hospital:  Diet Order   Procedures    Diet: Regular/House Diet, Diabetic Diets; Consistent Carbohydrate; Texture: Regular Texture (IDDSI 7); Fluid Consistency: Thin (IDDSI 0)       Activity:      Restrictions or Other Recommendations:       CODE STATUS:    Code Status and Medical Interventions:   Ordered at: 05/27/23 0900     Code Status (Patient has no pulse and is not breathing):    CPR (Attempt to Resuscitate)     Medical Interventions (Patient has pulse or is breathing):    Full Support     Release to patient:    Routine Release       Future Appointments   Date Time Provider Department Center   6/23/2023  2:00 PM [facility]       Additional Instructions for the Follow-ups that You Need to Schedule       Ambulatory Referral to  [facility]   As directed       Select To DEPT SPEC to filter TO DEPT       Send INR reminders to the \"clinical pool\" of the TO DEPT     (ie:  [facility]  or [facility] CLINICAL POOL)     Discharge Follow-up with PCP   As directed       Currently Documented PCP:    Provider, No Known    PCP Phone Number:    None     Follow Up Details: 3-5 days         Discharge Follow-up with Specified Provider: [facility] as scheduled for INR check   As directed      To: [facility] as scheduled for INR check         Discharge Follow-up with Specified Provider: Dr John; 1 Month   As directed      To: Dr John    Follow Up: 1 Month         Discharge Follow-up with Specified Provider: Dr Womack; 1 Month   As " directed      To: Dr Womack    Follow Up: 1 Month                       Ying Hansen DO  06/05/23      Time Spent on Discharge:  I spent  45  minutes on this discharge activity which included: face-to-face encounter with the patient, reviewing the data in the system, coordination of the care with the nursing staff as well as consultants, documentation, and entering orders.            Electronically signed by Ying Hansen DO at 06/05/23 1138       Discharge Order (From admission, onward)       Start     Ordered    06/05/23 1130  Discharge patient  Once        Expected Discharge Date: 06/05/23    Discharge Disposition: Home or Self Care    Physician of Record for Attribution - Please select from Treatment Team: YING HANSEN [[id number]]    Review needed by CMO to determine Physician of Record: No       Question Answer Comment   Physician of Record for Attribution - Please select from Treatment Team YING HANSEN    Review needed by CMO to determine Physician of Record No        06/05/23 1132

## 2023-06-06 NOTE — TELEPHONE ENCOUNTER
Provider:  Vu  Surgery/Procedure:  Craniotomy for bilateral subdural hematomas  Surgery/Procedure Date:  5/26/23  Last visit:   NA  Next visit: 6/12/23     Reason for call: [facility] that hydrocodone 5-[facility] sent to is out of stock. [facility] secondary [facility], pended med again for approval

## 2023-06-07 ENCOUNTER — OFFICE VISIT (OUTPATIENT)
Dept: FAMILY MEDICINE CLINIC | Facility: CLINIC | Age: 48
End: 2023-06-07
Payer: MEDICARE

## 2023-06-07 VITALS
HEIGHT: 68 IN | DIASTOLIC BLOOD PRESSURE: 64 MMHG | SYSTOLIC BLOOD PRESSURE: 116 MMHG | BODY MASS INDEX: 33.52 KG/M2 | HEART RATE: 103 BPM | WEIGHT: 221.2 LBS | TEMPERATURE: 97.8 F

## 2023-06-07 DIAGNOSIS — S06.5XAA SUBDURAL HEMATOMA: Primary | ICD-10-CM

## 2023-06-07 DIAGNOSIS — Z79.01 WARFARIN ANTICOAGULATION: ICD-10-CM

## 2023-06-08 ENCOUNTER — READMISSION MANAGEMENT (OUTPATIENT)
Dept: CALL CENTER | Facility: HOSPITAL | Age: 48
End: 2023-06-08
Payer: MEDICARE

## 2023-06-08 ENCOUNTER — ANTICOAGULATION VISIT (OUTPATIENT)
Dept: PHARMACY | Facility: HOSPITAL | Age: 48
End: 2023-06-08
Payer: MEDICARE

## 2023-06-08 DIAGNOSIS — Z95.2 HX OF AORTIC VALVE REPLACEMENT, MECHANICAL: Primary | ICD-10-CM

## 2023-06-08 LAB
INR PPP: 1.3 (ref 0.91–1.09)
PROTHROMBIN TIME: 15.3 SECONDS (ref 10–13.8)

## 2023-06-08 PROCEDURE — G0463 HOSPITAL OUTPT CLINIC VISIT: HCPCS | Performed by: PHARMACIST

## 2023-06-08 PROCEDURE — 85610 PROTHROMBIN TIME: CPT

## 2023-06-08 PROCEDURE — 36416 COLLJ CAPILLARY BLOOD SPEC: CPT

## 2023-06-08 NOTE — OUTREACH NOTE
General Surgery Week 1 Survey      Flowsheet Row Responses   [facility], [place], operated by [facility] patient discharged from? [place]   Does the patient have one of the following disease processes/diagnoses(primary or secondary)? General Surgery   Week 1 attempt successful? Yes   Call start time 1333   Call end time 1345   Discharge diagnosis s/p craniotomy and MMA embolization, bilateral subdural hematoma   Is patient permission given to speak with other caregiver? Yes   List who call center can speak with Dening,Payton Friend   Person spoke with today (if not patient) and relationship Dening,Payton Friend   Medication alerts for this patient ON warfarin, had INR check today   Meds reviewed with patient/caregiver? Yes   Does the patient have all medications related to this admission filled (includes all antibiotics, pain medications, etc.) Yes  [Reports that patient did get some pain medication called in. ]   Is the patient taking all medications as directed (includes completed medication regime)? Yes   Does the patient have a follow up appointment scheduled with their surgeon? Yes  [9/12 with PA,  7/6 wiht Dr Womack]   Has the patient kept scheduled appointments due by today? Yes  [Seen by PCP yesterday]   Comments Diabetes ED 6/23   Has home health visited the patient within 72 hours of discharge? N/A  [[place] [facility]   Psychosocial issues? No   Comments Will contact Diabetes Ed regarding glucometer supplies, or PCP if neccessary   Did the patient receive a copy of their discharge instructions? Yes   Nursing interventions Reviewed instructions with patient   What is the patient's perception of their health status since discharge? Improving   Is the patient/caregiver able to teach back signs and symptoms of incisional infection? Increased redness, swelling or pain at the incisonal site, Increased drainage or bleeding, Pus or odor from incision, Fever   Is the patient/caregiver able to teach  back steps to recovery at home? Set small, achievable goals for return to baseline health, Eat a well-balance diet   If the patient is a current smoker, are they able to teach back resources for cessation? Not a smoker   Is the patient/caregiver able to teach back the hierarchy of who to call/visit for symptoms/problems? PCP, Specialist, Home health nurse, Urgent Care, ED, 911 Yes   Week 1 call completed? Yes   Is the patient interested in additional calls from an ambulatory ?  NOTE:  applies to high risk patients requiring additional follow-up. No            EDUARDO PIÑA - Registered Nurse

## 2023-06-08 NOTE — PROGRESS NOTES
Transitional Care Follow Up Visit  Subjective     Manjeet Srinivas Hall is a 47 y.o. male who presents for a transitional care management visit.    Within 48 business hours after discharge our office contacted him via telephone to coordinate his care and needs.      I reviewed and discussed the details of that call along with the discharge summary, hospital problems, inpatient lab results, inpatient diagnostic studies, and consultation reports with Manjeet.     Current outpatient and discharge medications have been reconciled for the patient.  Reviewed by: Nile Faust MD           No data to display              Risk for Readmission (LACE) Score: 12 (6/5/2023  6:00 AM)      History of Present Illness  Patient was recently hospitalized with a subdural hematoma and a severe headache he had to undergo a neurosurgical procedure to relieve the pressure.  He is doing better has started back on his warfarin has an INR test tomorrow.  He is ambulating well he seems to be back to normal mentally.  Neurologic Problem  The patient's primary symptoms include an altered mental status. The current episode started 1 to 4 weeks ago. Associated symptoms include headaches. Pertinent negatives include no chest pain, fever, nausea, shortness of breath or vomiting.    Course During Hospital Stay: See hospital discharge summary     The following portions of the patient's history were reviewed and updated as appropriate: past family history, past medical history, past social history, and past surgical history.    Review of Systems   Constitutional:  Negative for chills and fever.   HENT:  Negative for congestion and sinus pressure.    Respiratory:  Negative for cough and shortness of breath.    Cardiovascular:  Negative for chest pain.   Gastrointestinal:  Negative for nausea and vomiting.   Genitourinary:  Negative for dysuria.   Neurological:  Positive for speech difficulty and headaches.     Objective   Physical Exam  Vitals and  nursing note reviewed.   Constitutional:       Appearance: He is well-developed.   HENT:      Head: Normocephalic.      Right Ear: Tympanic membrane and external ear normal.      Left Ear: Tympanic membrane and external ear normal.      Nose: Nose normal.   Eyes:      General: No scleral icterus.     Conjunctiva/sclera: Conjunctivae normal.      Pupils: Pupils are equal, round, and reactive to light.   Neck:      Thyroid: No thyromegaly.   Cardiovascular:      Rate and Rhythm: Normal rate and regular rhythm.      Heart sounds: Murmur heard.   Pulmonary:      Effort: Pulmonary effort is normal.      Breath sounds: Normal breath sounds.   Musculoskeletal:         General: Normal range of motion.      Cervical back: Neck supple.   Skin:     General: Skin is warm and dry.      Findings: No rash.      Comments: Surgical incision in the right parietal region with clip still in place   Neurological:      Mental Status: He is alert and oriented to person, place, and time. Mental status is at baseline.      Comments: No focal deficits no lateralizing signs   Psychiatric:         Mood and Affect: Mood normal.         Behavior: Behavior is cooperative.       Assessment & Plan   Diagnoses and all orders for this visit:    1. Subdural hematoma (Primary)    2. Warfarin anticoagulation      Continue to follow-up with neurosurgery and the warfarin clinic.  Follow-up with me in 1 month

## 2023-06-08 NOTE — PROGRESS NOTES
Anticoagulation Clinic - Remote Progress Note  REMOTE LAB    Indication: On-X Mechanical valve #25 and a 30mm hemashield; stroke  Referring Provider: Alvaro (Last seen:  1/31/21)  Initial Warfarin Start Date: ~ Sept 2018  Goal INR: 2.0-3.0 per referral  Current Drug Interactions: duloxetine, lansoprazole, levothyroxine, Trintellix, valproic acid, rosuvastatin  Bleed Risk: ascending aortic aneurysm   Other: Hodgkins Lymphoma (hx of chemo and radiation); h/o CVA + hemorrhagic pancreatitis (during hospitalization for mAVR)    Diet: avoids GLV (2/20/23)  Alcohol: socially  Tobacco: none  OTC Pain Medication: APAP PRN pain    INR History:  Date 9/27 9/28 10/1 10/4 10/8 10/11 10/17 10/19 10/23 11/8 11/16 12/21   Total Weekly Dose 8mg 12mg 19mg 29mg 31mg 34mg 43mg 41mg 45mg 49mg 49mg 49mg   INR 1.3 1.22 1.3 1.8 1.85 1.70 2.91 2.82 3.79 3.00 2.96 3.44   Notes clinic enox enox enox; clinic enox enox      desvenla     Date 1/9/19 1/16 2/4 3/1 3/22 5/16 6/13 6/24 7/10 7/22 8/5 8/26   Total Weekly Dose 49mg 47mg 49mg 49mg 49mg 49mg 49mg 49mg 49mg 49mg 49mg 49mg   INR 4.40 2.36 2.62 3.02 2.15 2.60 2.25 2.98 2.83 2.97 2.90 3.20   Notes   self adj              Date 9/26 10/18 10/28 11/19 12/3 12/17 1/3/20 1/21 2/3 2/12 2/19 2/27   Total Weekly Dose 49mg 49mg 49mg 49mg 49mg 45mg 45mg 45mg 47mg 43mg 43mg 43mg   INR 3.57 3.12 2.88 3.33 4.06 2.59 3.21 3.85 3.11 2.34 3.01 3.20   Notes less GLV Cymbalta start 9/25; Depakote start 9/5 1x decr dose  depakote inc; trintellix dcd ceftin x10 days Recv'd 1/6  incr GLV  dieting        Date 3/5 3/12 3/20 4/2 4/17 5/7 5/28 6/16 6/29 7/17 8/14 8/28   Total Weekly Dose 41mg 41mg 41mg 41mg 41mg 41mg 41mg 39mg 39mg 39mg 39mg 39mg   INR 3.07 3.01 2.95 2.66 2.9 3.03 3.36 2.48 2.42 3.08 2.78 2.28   Notes 1x decr   1x decr         recv'd 6/30  recv'd 8/13 recv'd 8/31     Date 9/28 10/29  12/4 1/8/21 2/9 3/31 5/12 6/9  8/3 8/31   Total Weekly Dose 39mg 39mg non-  compliant 39mg 39mg 39mg 39mg 39mg 39mg  non- compliant 39mg 39mg   INR 2.33 2.39  2.46 2.45 2.85 2.48 2.52 2.51  2.88 2.77   Notes recv'd 9/29 recv'd 10/30   recv'd 1/11  recv'd 4/1           Date 10/4 11/1 12/6  3/8 4/5 5/9 6/10 6/23 7/5 7/20 8/5   Total Weekly Dose 39mg 39mg 39mg  non- compliant  39mg 39mg 39 mg 39 mg 39 mg 39 mg 39 mg 39mg   INR 2.50 2.54 2.2  2.40 2.40 2.98 2.26 2.96 2.84 2.88 3.48   Notes  Rec 11/2 Rec 12/7       rec'd 7/6  rec 8/8     Date 8/17 8/25 9/19 9/22 10/13 10/31 11/10 12/2 12/29 12/30 1/3/2023 1/13   Total Weekly Dose 37mg 35mg 39 mg  37mg 35 mg 35 mg 35 mg 35 mg 35mg 37.5mg 41.5mg 35mg   INR 3.24 2.6 3.13 3.39 2.46 2.75 2.71 2.77 1.1 2.42 4.14 3.59   Notes  rec 8/26  Inc activity Rec 10/14  rec 11/11 rec 12/5 ??? rcv'd 1/3       Date 1/27 2/17 3/10 4/5 4/14 5/18 5/26-6/5 6/8       Total Weekly Dose 35mg 35mg 35 mg 35 mg 35mg 35 mg Inpatient hospital        INR 2.94 2.58 2.5 2.44 3.11 2.43  1.3       Notes rcv'd 1/30 rcv'd 2/20 rec 3/13 crestor  rcv'd 4/17 rec 4/19 Subdural hematoma        *takes warfarin in AM*    Phone Interview:  Verbal Release Authorization: please contact Mr. Hall directly - if unable to reach patient may contact brotherMarko  Tablet Strength: 2mg and 5mg tablets  Patient Contact Info: [phone]; [email or web address] [place]  Lab Contact Info: Megan Vu     Patient Findings  Positives: Hospital admission   Negatives: Signs/symptoms of thrombosis, Signs/symptoms of bleeding, Laboratory test error suspected, Change in health, Change in alcohol use, Change in activity, Upcoming invasive procedure, Emergency department visit, Upcoming dental procedure, Missed doses, Extra doses, Change in medications, Change in diet/appetite, Bruising, Other complaints   Comments: Recently discharged from hospital:      He was admitted on 5/26, received Kcentra and vitamin K for INR reversal for surgery at that time and now is status post bilateral craniotomies for evacuation of subacute subdural hematomas on  "5/26 followed by embolization of the left middle meningeal artery on 5/31. Per cardiology's note \"recommend no bridge of Lovenox- single shot today before discharge and then restart coumadin 5mg daily with PT/INR check in 3 days\"   He was newly diagnosed with DM, started metformin; changed propranolol to metoprolol.      Plan:  1. INR is SUBtherapeutic at 1.3. (goal 2.0-3.0). he has restarted his warfarin 5 mg po daily on Monday after discharge s/p right and left craniotomy for evacuation of subdural hematoma. Instructed Mr Hall to take 7.5 mg today and Saturday only and to continue warfarin 5mg oral daily until recheck.  2. Repeat INR next week  6/13 or 6/14 at [facility].  3. Verbal and written information provided in the clinic. Mr. Hall RBV dosing instructions, expresses understanding by teach back, and has no further questions at this time.    Abdi Vides, PharmD  6/8/2023  10:51 EDT       "

## 2023-06-12 ENCOUNTER — LAB (OUTPATIENT)
Dept: LAB | Facility: HOSPITAL | Age: 48
End: 2023-06-12
Payer: MEDICARE

## 2023-06-12 ENCOUNTER — OFFICE VISIT (OUTPATIENT)
Dept: NEUROSURGERY | Facility: CLINIC | Age: 48
End: 2023-06-12
Payer: MEDICARE

## 2023-06-12 VITALS
WEIGHT: 222.4 LBS | SYSTOLIC BLOOD PRESSURE: 112 MMHG | DIASTOLIC BLOOD PRESSURE: 80 MMHG | HEART RATE: 88 BPM | TEMPERATURE: 97.8 F | BODY MASS INDEX: 33.71 KG/M2 | HEIGHT: 68 IN

## 2023-06-12 DIAGNOSIS — I62.00 SUBDURAL HEMATOMA, NONTRAUMATIC: Primary | ICD-10-CM

## 2023-06-12 DIAGNOSIS — Z95.2 HX OF AORTIC VALVE REPLACEMENT, MECHANICAL: ICD-10-CM

## 2023-06-12 LAB
INR PPP: 1.62 (ref 0.89–1.12)
PROTHROMBIN TIME: 19.4 SECONDS (ref 12.2–14.5)

## 2023-06-12 PROCEDURE — 85610 PROTHROMBIN TIME: CPT

## 2023-06-12 PROCEDURE — 3074F SYST BP LT 130 MM HG: CPT

## 2023-06-12 PROCEDURE — 1159F MED LIST DOCD IN RCRD: CPT

## 2023-06-12 PROCEDURE — 3079F DIAST BP 80-89 MM HG: CPT

## 2023-06-12 PROCEDURE — 36415 COLL VENOUS BLD VENIPUNCTURE: CPT

## 2023-06-12 PROCEDURE — 1160F RVW MEDS BY RX/DR IN RCRD: CPT

## 2023-06-12 PROCEDURE — 99024 POSTOP FOLLOW-UP VISIT: CPT

## 2023-06-12 NOTE — LETTER
June 12, 2023       No Recipients    Patient: Manjeet Hall   YOB: 1975   Date of Visit: 6/12/2023       Dear Dr. Rivera Recipients:    Thank you for referring Manjeet Hall to me for evaluation. Below are the relevant portions of my assessment and plan of care.    If you have questions, please do not hesitate to call me. I look forward to following Manjeet along with you.         Sincerely,        Chanell Rock PA-C        CC:   No Recipients    Chanell Rock PA-C  06/12/23 1228  Sign when Signing Visit  Manjeet Hall  1975 06/12/2023  [id number]    CC: s/p bilateral craniotomies for bilateral SDH and MMA embolization    HPI: This is a 47-year-old male with a history of left hemispheric infarct with residual aphasia who underwent bilateral craniotomies on 5/26/2023 for bilateral SDH.  He presented to the hospital on 5/26/2023 with altered mental status.  CT scan of the head showed subacute bilateral subdural hematomas.  He proceeded to become more sleepy throughout the day, therefore Dr. Womack recommended bilateral evacuations of his SDH.  Postoperatively, patient continued to improve neurologically.  He proceeded to undergo MMA embolization on 5/31/2023.  He was discharged on 6/5/2023.  He presents today for follow-up and staple removal.  He denies any acute complaints today.  Denies headache, vision changes, confusion, focal neurodeficit, weakness, numbness, tingling.  Incisions look like they are healing well.  We will remove staples today.      Past Medical History:   Diagnosis Date   • Abnormal heart rhythm    • Anxiety    • Cancer     hodgkins   • Cancer    • Depression    • Diabetes mellitus    • Elevated cholesterol    • GERD (gastroesophageal reflux disease)    • Heart murmur    • History of echocardiogram    • History of left heart catheterization    • History of stomach ulcers    • Hypothyroidism    • Mitral valvular disorder    • Stroke    • Thyroid disorder         Allergies   Allergen Reactions   • Tegaderm Chg Dressing [Chlorhexidine] Rash         Current Outpatient Medications:   •  buPROPion XL (WELLBUTRIN XL) 150 MG 24 hr tablet, Take 1 tablet by mouth Every Morning., Disp: , Rfl:   •  divalproex (DEPAKOTE ER) 250 MG 24 hr tablet, Take 1 tablet by mouth Every Night., Disp: , Rfl:   •  DULoxetine (CYMBALTA) 60 MG capsule, Take 1 capsule by mouth Daily., Disp: , Rfl: 2  •  gabapentin (NEURONTIN) 100 MG capsule, Take 2 capsules by mouth 2 (Two) Times a Day., Disp: , Rfl:   •  gabapentin (NEURONTIN) 300 MG capsule, Take 1 capsule by mouth 2 (Two) Times a Day., Disp: , Rfl:   •  HYDROcodone-acetaminophen (NORCO) 5-325 MG per tablet, Take 1 tablet by mouth Every 6 (Six) Hours As Needed for Severe Pain or Moderate Pain., Disp: 15 tablet, Rfl: 0  •  hydrOXYzine (ATARAX) 10 MG tablet, Take 1 to 2 tablets by mouth twice a day as needed for anxiety and sleep., Disp: , Rfl:   •  lansoprazole (PREVACID) 30 MG capsule, Take 1 capsule by mouth Daily., Disp: , Rfl:   •  lansoprazole (PREVACID) 30 MG capsule, TAKE 1 CAPSULE BY MOUTH EVERY DAY, Disp: 90 capsule, Rfl: 1  •  levothyroxine (SYNTHROID, LEVOTHROID) 125 MCG tablet, TAKE 1 TABLET BY MOUTH DAILY. FURTHER REFILLS PER PCP WITH REPEAT LAB WORK., Disp: 90 tablet, Rfl: 0  •  metFORMIN ER (GLUCOPHAGE-XR) 500 MG 24 hr tablet, Take 1 tablet by mouth Daily With Breakfast., Disp: 30 tablet, Rfl: 0  •  metoprolol tartrate (LOPRESSOR) 25 MG tablet, Take 0.5 tablets by mouth Every 12 (Twelve) Hours., Disp: 30 tablet, Rfl: 0  •  propranolol LA (INDERAL LA) 60 MG 24 hr capsule, TAKE 1 CAPSULE BY MOUTH EVERY DAY, Disp: 90 capsule, Rfl: 1  •  rosuvastatin (CRESTOR) 20 MG tablet, TAKE 1 TABLET BY MOUTH EVERY DAY, Disp: 90 tablet, Rfl: 1  •  warfarin (COUMADIN) 5 MG tablet, TAKE 1 TABLET BY MOUTH DAILY AS DIRECTED BY ANTICOAGULATION CLINIC., Disp: 90 tablet, Rfl: 1  No current facility-administered medications for this  visit.    Facility-Administered Medications Ordered in Other Visits:   •  atropine sulfate injection 0.5 mg, 0.5 mg, Intravenous, Once PRN, Vimal Pringle CRNA  •  ePHEDrine injection 5 mg, 5 mg, Intravenous, Once PRN, Vimal Pringle CRNA  •  fentaNYL citrate (PF) (SUBLIMAZE) injection 50 mcg, 50 mcg, Intravenous, Q5 Min PRN, Vimal Pringle CRNA  •  ondansetron (ZOFRAN) injection 4 mg, 4 mg, Intravenous, Once PRN, Vimal Pringle CRNA    Review of Systems   Constitutional:  Negative for activity change, appetite change, chills, diaphoresis, fatigue, fever and unexpected weight change.   HENT:  Negative for congestion, dental problem, drooling, ear discharge, ear pain, facial swelling, hearing loss, mouth sores, nosebleeds, postnasal drip, rhinorrhea, sinus pressure, sinus pain, sneezing, sore throat, tinnitus, trouble swallowing and voice change.    Eyes:  Negative for photophobia, pain, discharge, redness, itching and visual disturbance.   Respiratory:  Negative for apnea, cough, choking, chest tightness, shortness of breath, wheezing and stridor.    Cardiovascular:  Negative for chest pain, palpitations and leg swelling.   Gastrointestinal:  Negative for abdominal distention, abdominal pain, anal bleeding, blood in stool, constipation, diarrhea, nausea, rectal pain and vomiting.   Endocrine: Negative for cold intolerance, heat intolerance, polydipsia, polyphagia and polyuria.   Genitourinary:  Negative for decreased urine volume, difficulty urinating, dysuria, enuresis, flank pain, frequency, genital sores, hematuria and urgency.   Musculoskeletal:  Negative for arthralgias, back pain, gait problem, joint swelling, myalgias, neck pain and neck stiffness.   Skin:  Negative for color change, pallor, rash and wound.   Allergic/Immunologic: Negative for environmental allergies, food allergies and immunocompromised state.   Neurological:  Negative for dizziness, tremors, seizures, syncope, facial asymmetry,  "speech difficulty, weakness, light-headedness, numbness and headaches.   Hematological:  Negative for adenopathy. Does not bruise/bleed easily.   Psychiatric/Behavioral:  Negative for agitation, behavioral problems, confusion, decreased concentration, dysphoric mood, hallucinations, self-injury, sleep disturbance and suicidal ideas. The patient is not nervous/anxious and is not hyperactive.    All other systems reviewed and are negative.      PE:  /80 (BP Location: Right arm, Patient Position: Sitting, Cuff Size: Adult)   Pulse 88   Temp 97.8 °F (36.6 °C) (Infrared)   Ht 172.7 cm (67.99\")   Wt 101 kg (222 lb 6.4 oz)   BMI 33.82 kg/m²   Heart- RRR  Lungs- no wheezing, normal expansion        Neurologic Exam   Awake, alert and oriented x 3  Speech f/c  Opens eyes spontaneously  Pupils 3 mm rx bilaterally  Extraocular muscles intact bilaterally  Visual fields full  Normal sensation to light touch in all 3 distributions of CN V bilaterally  Face symmetric bilaterally  Tongue midline  Motor examination does not reveal weakness in the , upper or lower extremities.   Normal sensation to light touch in upper and lower extremities        Social History    Tobacco Use      Smoking status: Never      Smokeless tobacco: Never       Tobacco Use: Low Risk    • Smoking Tobacco Use: Never   • Smokeless Tobacco Use: Never   • Passive Exposure: Never         BMI is >= 30 and <35. (Class 1 Obesity). The following options were offered after discussion;: referral to primary care      STEADI Fall Risk Assessment has not been completed.      Diagnoses and all orders for this visit:    1. Subdural hematoma, nontraumatic (Primary)  -     CT Head Without Contrast; Future       Assessment/Plan  This is a 47-year-old male who is about 2 weeks status post bilateral craniotomies for evacuation of subdural dural hematomas as well as MMA embolization.  He is overall doing well and has no acute complaints today.  He states he has " started to feel more like himself over the past couple weeks.  He remains neurologically stable on exam.  Staples were removed today without complications.  We will have the patient follow-up in 4 weeks with Dr. Womack with a new head CT at that time. Wound care was discussed with the patient. Patient encouraged to contact us if he has any changes in his condition or any concerns.    Any copied data from previous notes included in the (1) HPI, (2) PE, (3) MDM and/or Assessment and Plan has been reviewed and is accurate as of 06/12/23.    Chanell Rock PA-C  06/12/23

## 2023-06-12 NOTE — PROGRESS NOTES
Manjeetbria Hall  1975 06/12/2023  [id number]    CC: s/p bilateral craniotomies for bilateral SDH and MMA embolization    HPI: This is a 47-year-old male with a history of left hemispheric infarct with residual aphasia who underwent bilateral craniotomies on 5/26/2023 for bilateral SDH.  He presented to the hospital on 5/26/2023 with altered mental status.  CT scan of the head showed subacute bilateral subdural hematomas.  He proceeded to become more sleepy throughout the day, therefore Dr. Womack recommended bilateral evacuations of his SDH.  Postoperatively, patient continued to improve neurologically.  He proceeded to undergo MMA embolization on 5/31/2023.  He was discharged on 6/5/2023.  He presents today for follow-up and staple removal.  He denies any acute complaints today.  Denies headache, vision changes, confusion, focal neurodeficit, weakness, numbness, tingling.  Incisions look like they are healing well.  We will remove staples today.      Past Medical History:   Diagnosis Date    Abnormal heart rhythm     Anxiety     Cancer     hodgkins    Cancer     Depression     Diabetes mellitus     Elevated cholesterol     GERD (gastroesophageal reflux disease)     Heart murmur     History of echocardiogram     History of left heart catheterization     History of stomach ulcers     Hypothyroidism     Mitral valvular disorder     Stroke     Thyroid disorder        Allergies   Allergen Reactions    Tegaderm Chg Dressing [Chlorhexidine] Rash         Current Outpatient Medications:     buPROPion XL (WELLBUTRIN XL) 150 MG 24 hr tablet, Take 1 tablet by mouth Every Morning., Disp: , Rfl:     divalproex (DEPAKOTE ER) 250 MG 24 hr tablet, Take 1 tablet by mouth Every Night., Disp: , Rfl:     DULoxetine (CYMBALTA) 60 MG capsule, Take 1 capsule by mouth Daily., Disp: , Rfl: 2    gabapentin (NEURONTIN) 100 MG capsule, Take 2 capsules by mouth 2 (Two) Times a Day., Disp: , Rfl:     gabapentin (NEURONTIN) 300 MG  capsule, Take 1 capsule by mouth 2 (Two) Times a Day., Disp: , Rfl:     HYDROcodone-acetaminophen (NORCO) 5-325 MG per tablet, Take 1 tablet by mouth Every 6 (Six) Hours As Needed for Severe Pain or Moderate Pain., Disp: 15 tablet, Rfl: 0    hydrOXYzine (ATARAX) 10 MG tablet, Take 1 to 2 tablets by mouth twice a day as needed for anxiety and sleep., Disp: , Rfl:     lansoprazole (PREVACID) 30 MG capsule, Take 1 capsule by mouth Daily., Disp: , Rfl:     lansoprazole (PREVACID) 30 MG capsule, TAKE 1 CAPSULE BY MOUTH EVERY DAY, Disp: 90 capsule, Rfl: 1    levothyroxine (SYNTHROID, LEVOTHROID) 125 MCG tablet, TAKE 1 TABLET BY MOUTH DAILY. FURTHER REFILLS PER PCP WITH REPEAT LAB WORK., Disp: 90 tablet, Rfl: 0    metFORMIN ER (GLUCOPHAGE-XR) 500 MG 24 hr tablet, Take 1 tablet by mouth Daily With Breakfast., Disp: 30 tablet, Rfl: 0    metoprolol tartrate (LOPRESSOR) 25 MG tablet, Take 0.5 tablets by mouth Every 12 (Twelve) Hours., Disp: 30 tablet, Rfl: 0    propranolol LA (INDERAL LA) 60 MG 24 hr capsule, TAKE 1 CAPSULE BY MOUTH EVERY DAY, Disp: 90 capsule, Rfl: 1    rosuvastatin (CRESTOR) 20 MG tablet, TAKE 1 TABLET BY MOUTH EVERY DAY, Disp: 90 tablet, Rfl: 1    warfarin (COUMADIN) 5 MG tablet, TAKE 1 TABLET BY MOUTH DAILY AS DIRECTED BY ANTICOAGULATION CLINIC., Disp: 90 tablet, Rfl: 1  No current facility-administered medications for this visit.    Facility-Administered Medications Ordered in Other Visits:     atropine sulfate injection 0.5 mg, 0.5 mg, Intravenous, Once PRN, Vimal Pringle CRNA    ePHEDrine injection 5 mg, 5 mg, Intravenous, Once PRN, Vimal Pringle CRNA    fentaNYL citrate (PF) (SUBLIMAZE) injection 50 mcg, 50 mcg, Intravenous, Q5 Min PRN, Vimal Pringle CRNA    ondansetron (ZOFRAN) injection 4 mg, 4 mg, Intravenous, Once PRN, Vimal Pringle CRNA    Review of Systems   Constitutional:  Negative for activity change, appetite change, chills, diaphoresis, fatigue, fever and unexpected weight  change.   HENT:  Negative for congestion, dental problem, drooling, ear discharge, ear pain, facial swelling, hearing loss, mouth sores, nosebleeds, postnasal drip, rhinorrhea, sinus pressure, sinus pain, sneezing, sore throat, tinnitus, trouble swallowing and voice change.    Eyes:  Negative for photophobia, pain, discharge, redness, itching and visual disturbance.   Respiratory:  Negative for apnea, cough, choking, chest tightness, shortness of breath, wheezing and stridor.    Cardiovascular:  Negative for chest pain, palpitations and leg swelling.   Gastrointestinal:  Negative for abdominal distention, abdominal pain, anal bleeding, blood in stool, constipation, diarrhea, nausea, rectal pain and vomiting.   Endocrine: Negative for cold intolerance, heat intolerance, polydipsia, polyphagia and polyuria.   Genitourinary:  Negative for decreased urine volume, difficulty urinating, dysuria, enuresis, flank pain, frequency, genital sores, hematuria and urgency.   Musculoskeletal:  Negative for arthralgias, back pain, gait problem, joint swelling, myalgias, neck pain and neck stiffness.   Skin:  Negative for color change, pallor, rash and wound.   Allergic/Immunologic: Negative for environmental allergies, food allergies and immunocompromised state.   Neurological:  Negative for dizziness, tremors, seizures, syncope, facial asymmetry, speech difficulty, weakness, light-headedness, numbness and headaches.   Hematological:  Negative for adenopathy. Does not bruise/bleed easily.   Psychiatric/Behavioral:  Negative for agitation, behavioral problems, confusion, decreased concentration, dysphoric mood, hallucinations, self-injury, sleep disturbance and suicidal ideas. The patient is not nervous/anxious and is not hyperactive.    All other systems reviewed and are negative.      PE:  /80 (BP Location: Right arm, Patient Position: Sitting, Cuff Size: Adult)   Pulse 88   Temp 97.8 °F (36.6 °C) (Infrared)   Ht 172.7 cm  "(67.99\")   Wt 101 kg (222 lb 6.4 oz)   BMI 33.82 kg/m²   Heart- RRR  Lungs- no wheezing, normal expansion        Neurologic Exam   Awake, alert and oriented x 3  Speech f/c  Opens eyes spontaneously  Pupils 3 mm rx bilaterally  Extraocular muscles intact bilaterally  Visual fields full  Normal sensation to light touch in all 3 distributions of CN V bilaterally  Face symmetric bilaterally  Tongue midline  Motor examination does not reveal weakness in the , upper or lower extremities.   Normal sensation to light touch in upper and lower extremities        Social History    Tobacco Use      Smoking status: Never      Smokeless tobacco: Never       Tobacco Use: Low Risk     Smoking Tobacco Use: Never    Smokeless Tobacco Use: Never    Passive Exposure: Never         BMI is >= 30 and <35. (Class 1 Obesity). The following options were offered after discussion;: referral to primary care      STEADI Fall Risk Assessment has not been completed.      Diagnoses and all orders for this visit:    1. Subdural hematoma, nontraumatic (Primary)  -     CT Head Without Contrast; Future       Assessment/Plan  This is a 47-year-old male who is about 2 weeks status post bilateral craniotomies for evacuation of subdural dural hematomas as well as MMA embolization.  He is overall doing well and has no acute complaints today.  He states he has started to feel more like himself over the past couple weeks.  He remains neurologically stable on exam.  Staples were removed today without complications.  We will have the patient follow-up in 4 weeks with Dr. Womack with a new head CT at that time. Wound care was discussed with the patient. Patient encouraged to contact us if he has any changes in his condition or any concerns.    Any copied data from previous notes included in the (1) HPI, (2) PE, (3) MDM and/or Assessment and Plan has been reviewed and is accurate as of 06/12/23.    Chanell Rock PA-C  06/12/23    "

## 2023-06-13 ENCOUNTER — ANTICOAGULATION VISIT (OUTPATIENT)
Dept: PHARMACY | Facility: HOSPITAL | Age: 48
End: 2023-06-13
Payer: MEDICARE

## 2023-06-13 DIAGNOSIS — Z95.2 HX OF AORTIC VALVE REPLACEMENT, MECHANICAL: Primary | ICD-10-CM

## 2023-06-13 NOTE — PROGRESS NOTES
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   Other: Hodgkins Lymphoma (hx of chemo and radiation); h/o CVA + hemorrhagic pancreatitis (during hospitalization for mAVR)    Diet: avoids GLV (2/20/23)  Alcohol: socially  Tobacco: none  OTC Pain Medication: APAP PRN pain    INR History:  Date 9/27 9/28 10/1 10/4 10/8 10/11 10/17 10/19 10/23 11/8 11/16 12/21   Total Weekly Dose 8mg 12mg 19mg 29mg 31mg 34mg 43mg 41mg 45mg 49mg 49mg 49mg   INR 1.3 1.22 1.3 1.8 1.85 1.70 2.91 2.82 3.79 3.00 2.96 3.44   Notes clinic enox enox enox; clinic enox enox      desvenla     Date 1/9/19 1/16 2/4 3/1 3/22 5/16 6/13 6/24 7/10 7/22 8/5 8/26   Total Weekly Dose 49mg 47mg 49mg 49mg 49mg 49mg 49mg 49mg 49mg 49mg 49mg 49mg   INR 4.40 2.36 2.62 3.02 2.15 2.60 2.25 2.98 2.83 2.97 2.90 3.20   Notes   self adj              Date 9/26 10/18 10/28 11/19 12/3 12/17 1/3/20 1/21 2/3 2/12 2/19 2/27   Total Weekly Dose 49mg 49mg 49mg 49mg 49mg 45mg 45mg 45mg 47mg 43mg 43mg 43mg   INR 3.57 3.12 2.88 3.33 4.06 2.59 3.21 3.85 3.11 2.34 3.01 3.20   Notes less GLV Cymbalta start 9/25; Depakote start 9/5 1x decr dose  depakote inc; trintellix dcd ceftin x10 days Recv'd 1/6  incr GLV  dieting        Date 3/5 3/12 3/20 4/2 4/17 5/7 5/28 6/16 6/29 7/17 8/14 8/28   Total Weekly Dose 41mg 41mg 41mg 41mg 41mg 41mg 41mg 39mg 39mg 39mg 39mg 39mg   INR 3.07 3.01 2.95 2.66 2.9 3.03 3.36 2.48 2.42 3.08 2.78 2.28   Notes 1x decr   1x  decr         recv'd 6/30  recv'd 8/13 recv'd 8/31     Date 9/28 10/29  12/4 1/8/21 2/9 3/31 5/12 6/9  8/3 8/31   Total Weekly Dose 39mg 39mg non-  compliant 39mg 39mg 39mg 39mg 39mg 39mg non- compliant 39mg 39mg   INR 2.33 2.39  2.46 2.45 2.85 2.48 2.52 2.51  2.88 2.77   Notes recv'd 9/29 recv'd 10/30   recv'd 1/11  recv'd 4/1           Date 10/4 11/1 12/6  3/8 4/5 5/9 6/10 6/23 7/5 7/20 8/5   Total Weekly Dose 39mg 39mg 39mg  non- compliant  39mg 39mg 39 mg 39 mg 39 mg 39 mg 39 mg 39mg   INR 2.50 2.54 2.2  2.40 2.40 2.98 2.26 2.96 2.84 2.88 3.48   Notes  Rec 11/2 Rec 12/7       rec'd 7/6  rec 8/8     Date 8/17 8/25 9/19 9/22 10/13 10/31 11/10 12/2 12/29 12/30 1/3/2023 1/13   Total Weekly Dose 37mg 35mg 39 mg  37mg 35 mg 35 mg 35 mg 35 mg 35mg 37.5mg 41.5mg 35mg   INR 3.24 2.6 3.13 3.39 2.46 2.75 2.71 2.77 1.1 2.42 4.14 3.59   Notes  rec 8/26  Inc activity Rec 10/14  rec 11/11 rec 12/5 ??? rcv'd 1/3       Date 1/27 2/17 3/10 4/5 4/14 5/18 5/26-6/5 6/8 6/12      Total Weekly Dose 35mg 35mg 35 mg 35 mg 35mg 35 mg Inpatient hospital 15mg 40mg      INR 2.94 2.58 2.5 2.44 3.11 2.43  1.3 1.62      Notes rcv'd 1/30 rcv'd 2/20 rec 3/13 crestor  rcv'd 4/17 rec 4/19 Subdural hematoma; kcentra  Rcv'd 6/13      *takes warfarin in AM*    Phone Interview:  Verbal Release Authorization: please contact Mr. Hall directly - if unable to reach patient may contact brotherMarko  Tablet Strength: 2mg and 5mg tablets  Patient Contact Info: [phone]; [email or web address]  Lab Contact Info: Megan Vu     Patient Findings  Negatives: Signs/symptoms of thrombosis, Signs/symptoms of bleeding, Laboratory test error suspected, Change in health, Change in alcohol use, Change in activity, Upcoming invasive procedure, Emergency department visit, Upcoming dental procedure, Missed doses, Extra doses, Change in medications, Change in diet/appetite, Hospital admission, Bruising, Other complaints     Plan:  1. INR is SUBtherapeutic  at 1.62 following kcentra and subdural hematoma. (goal 2.0-3.0).  Patient will boost does to 7.5mg today and then continue warfarin 5mg oral daily until recheck. Noted by cardiology- no lovenox at discharge.  2. Repeat INR 6/16.  3. Verbal and written information provided in the clinic. Mr. Hall RBV dosing instructions, expresses understanding by teach back, and has no further questions at this time.    Sondra Brooks, PharmD  6/13/2023  10:10 EDT

## 2023-07-05 PROBLEM — F41.9 ANXIETY DISORDER: Chronic | Status: RESOLVED | Noted: 2023-05-26 | Resolved: 2023-07-05

## 2023-07-05 PROBLEM — E03.9 HYPOTHYROIDISM: Status: RESOLVED | Noted: 2017-12-14 | Resolved: 2023-07-05

## 2023-07-26 ENCOUNTER — TREATMENT (OUTPATIENT)
Dept: PHYSICAL THERAPY | Facility: CLINIC | Age: 48
End: 2023-07-26
Payer: MEDICARE

## 2023-07-26 DIAGNOSIS — R26.9 GAIT ABNORMALITY: Primary | ICD-10-CM

## 2023-07-26 DIAGNOSIS — R41.841 COGNITIVE COMMUNICATION DEFICIT: ICD-10-CM

## 2023-07-26 DIAGNOSIS — R47.01 APHASIA: Primary | ICD-10-CM

## 2023-07-26 DIAGNOSIS — S06.5XAA SUBDURAL HEMATOMA: ICD-10-CM

## 2023-07-26 PROCEDURE — 92507 TX SP LANG VOICE COMM INDIV: CPT | Performed by: SPEECH-LANGUAGE PATHOLOGIST

## 2023-07-26 NOTE — PROGRESS NOTES
Outpatient Speech Language Pathology   Adult Speech Language Cognitive Progress Note  [address] [place] [address]       Patient Name: Manjeet Hall  : 1975  MRN: [id number]  Today's Date: 2023        Visit Date: 2023   Patient Active Problem List   Diagnosis    Depression    Generalized anxiety disorder    Gastroesophageal reflux disease    Congenital heart disease    Thoracic aortic aneurysm    Hx of aortic valve replacement, mechanical [Z95.2]    Sequelae, post-stroke    Warfarin anticoagulation    Hypertension    Ischemic stroke    Pulmonic stenosis    Subdural hematoma    Bilateral Subdural Hematoma    HTN    Hyperlipidemia (Diet controlled, off statin)    Hypothyroidism    Hodgkin's Lymphoma s/p XRT & Chemo    VSD s/p closure (age 14)    History of aortic regurgitation / stenosis s/p ROSS procedure with subsequent reversal with Mechanical AVR    H/O aortic mechanical valve replacement on Coumadin    New Onset Type 2 diabetes mellitus    Somnolence        Past Medical History:   Diagnosis Date    Abnormal heart rhythm     Anxiety     Cancer     hodgkins    Cancer     Depression     Diabetes mellitus     Elevated cholesterol     GERD (gastroesophageal reflux disease)     Heart murmur     History of echocardiogram     History of left heart catheterization     History of stomach ulcers     Hypothyroidism     Mitral valvular disorder     Stroke     Thyroid disorder         Past Surgical History:   Procedure Laterality Date    CARDIAC SURGERY      CRANIOTOMY FOR SUBDURAL HEMATOMA Bilateral 2023    Procedure: CRANIOTOMY FOR BILATERAL SUBDURAL HEMATOMAS;  Surgeon: Stephane Womack MD;  Location: [facility] OR;  Service: Neurosurgery;  Laterality: Bilateral;    EMBOLIZATION CEREBRAL N/A 2023    Procedure: Emoblization cerebral;  Surgeon: Stephane Womack MD;  Location: [facility] CATH INVASIVE LOCATION;  Service: Interventional Radiology;  Laterality: N/A;    SKIN CANCER EXCISION      mass  removed from neck and chest for hodgkins         Visit Dx:    ICD-10-CM ICD-9-CM   1. Aphasia  R47.01 784.3   2. Cognitive communication deficit  R41.841 799.52            OP SLP Assessment/Plan -           SLP Assessment    Functional Problems Speech Language- Adult/Cognition  -RB    Impact on Function: Adult Speech Language/Cognition Difficulty communicating wants, needs, and ideas;Difficulty communicating in a medical emergency;Restrictions in personal and social life;Difficulty sequencing thoughts to express complex messages;Unable to understand written/spoken language;Difficulty following directions  -RB    Clinical Impression: Speech Language-Adult/Congnition Moderate:;Receptive Aphasia;Expressive Aphasia  -RB    Functional Problems Comment impacts communication, safety, participation, QOL, daily tasks  -RB    Clinical Impression Comments Highly motivated, following HEP, good support, following HEP consistently, making gains overall with written expression and auditory comprehension -RB    Please refer to paper survey for additional self-reported information Yes  -RB    Please refer to items scanned into chart for additional diagnostic informaiton and handouts as provided by clinician Yes  -RB    SLP Diagnosis Aphasia, cog/comm deficit -RB    Prognosis Good (comment)  -RB    Patient/caregiver participated in establishment of treatment plan and goals Yes  -RB    Patient would benefit from skilled therapy intervention Yes  -RB       SLP Plan    Frequency 1x/weekly  -RB    Duration 9weeks  -RB    Planned CPT's? SLP INDIVIDUAL SPEECH THERAPY: 35802  -RB    Expected Duration of Therapy Session (SLP Eval) 45  -RB    Plan Comments continue POC  -RB              User Key  (r) = Recorded By, (t) = Taken By, (c) = Cosigned By      Initials Name Provider Type    Yolande Vernon SLP Speech and Language Pathologist                    Pain: 0  Subjective: PT reports progress, excited for his daughter's birthday         ACTIVITY  ACCURACY ASSISTANCE NEEDED   LTGs:   Pt will comprehend spoken information in all settings at 90% no-min cues      PT will express wants/thoughts/needs across settings at 90% no cues      Pt will be able to comprehend written material and social/avocational/work setting at 90% no cues    Pt will be able to use written language skills to communicate effectively in all situations at 95% no cues   Targeted auditory comprehension tasks             Targeted verbal expression with naming and open ended questions      Targeted reading comprehension with community materials             Targeted at word and phrase level     STG:  Pt will demonstrate comprehension of spoken language by answering complex/abstract general information questions at 90% no-min cues        Pt will demonstrate comprehension of spoken language by answering questions about a short paragraph at 90% no-min cues       Targeted in conversation and with structured auditory comprehension tasks                       Not targeted 70-75% No cues   STG:   PT will name functional objects and describe them at 90% no cues                  Pt will answer open ended questions appropriately at 90% min cues             Targeted functional naming of pictures- benefits from visual written cues                    Targeted open ended conversation questions 90%                          70-75% No cues                          Min cues   STG:  Pt will improve written language skills by writing grammatically correct sentences at 90% no cues Targeted copying words and writing words independently  Targeted writing functional words (cooking and holidays)       STG:   Pt will improve comprehension of written language skills by answering written questions related to home management/social/work tasks (bills, recipes, tv guide, emails, procedures) at 90% no cues Targeted reading comprehension with cooking instructions/questions 85% No cues         certification:  6/28/23-9/27/23               OP SLP Education       Row Name        Education    Barriers to Learning Decreased comprehension  -RB    Action Taken to Address Barriers visuals, repetition  -RB    Education Provided ;Patient participated in establishing goals and treatment plan;Patient demonstrated recommended strategies;Patient requires further education on strategies, risks  -RB    Assessed Learning needs;Learning motivation;Learning readiness;Learning preferences  -RB    Learning Motivation Strong  -RB    Learning Method Explanation;Demonstration;Teach back;Written materials  -RB    Teaching Response Verbalized understanding;Demonstrated understanding;Reinforcement needed  -RB    Education Comments HEP: written expression and verbal expression tasks, reading tasks, AC tasks  -RB              User Key  (r) = Recorded By, (t) = Taken By, (c) = Cosigned By      Initials Name Effective Dates    Yolande Vernon SLP 05/31/23 -                     Yolande Guillory MA CCC-SLP [facility] license: [id number]

## 2023-07-26 NOTE — TELEPHONE ENCOUNTER
Called patient to report message above from . No answer. Left voice message to call our office.   General

## 2023-07-26 NOTE — PROGRESS NOTES
Physical Therapy Daily Note  Visit: 3  Date of Initial Visit: Type: THERAPY  Noted: 2023    Patient: Manjeet Hall   : 1975  Diagnosis/ICD-10 Code:  Gait abnormality [R26.9]  Referring practitioner: Faustina Mckeon DO  Date of Initial Visit: Type: THERAPY  Noted: 2023  Today's Date: 2023  Patient seen for 3 sessions    Subjective:   Patient reports: all of his exercises are going well.   Pain: 0/10  Clinical Progress: improved  Home Program Compliance: Yes  Treatment has included: therapeutic exercise and neuromuscular re-education    Objective   See Exercise, Manual, and Modality Logs for complete treatment.    PT Neuro          Assessment & Plan     Assessment    Assessment details: Patient is independent with all exercises and is performing his wellness program which includes walking, swimming and strength based exercises. He feels he is able to manage independently and will be discharged at this time.    Plan  Plan details: Patient will be discharged from PT services at this time.       Timed:  Manual Therapy:            0     mins  76693;  Therapeutic Exercise:    30    mins  84671;     Neuromuscular Roxy:    8    mins  34494;    Therapeutic Activity:      0     mins  03363;     Gait Trainin    mins  64842;     Electrical Stimulation:    0    mins  96303 ( );     Untimed:  Canalith Repositioning techniques _[id number]      Timed Treatment:   38   mins   Total Treatment:     38   mins      Harika Cardenas, PT, DPT, MSCS, CDP, CSRS  [place] License #: [id number]  Physical Therapist

## 2023-07-27 RX ORDER — PROPRANOLOL HCL 60 MG
CAPSULE, EXTENDED RELEASE 24HR ORAL
Qty: 90 CAPSULE | Refills: 1 | OUTPATIENT
Start: 2023-07-27

## 2023-07-28 ENCOUNTER — TREATMENT (OUTPATIENT)
Dept: PHYSICAL THERAPY | Facility: CLINIC | Age: 48
End: 2023-07-28
Payer: MEDICARE

## 2023-07-28 DIAGNOSIS — R29.898 DECREASED GRIP STRENGTH OF RIGHT HAND: Primary | ICD-10-CM

## 2023-07-28 PROCEDURE — 97110 THERAPEUTIC EXERCISES: CPT | Performed by: OCCUPATIONAL THERAPIST

## 2023-07-28 PROCEDURE — 97112 NEUROMUSCULAR REEDUCATION: CPT | Performed by: OCCUPATIONAL THERAPIST

## 2023-07-28 PROCEDURE — 97530 THERAPEUTIC ACTIVITIES: CPT | Performed by: OCCUPATIONAL THERAPIST

## 2023-08-01 ENCOUNTER — OFFICE VISIT (OUTPATIENT)
Dept: FAMILY MEDICINE CLINIC | Facility: CLINIC | Age: 48
End: 2023-08-01
Payer: MEDICARE

## 2023-08-01 VITALS
WEIGHT: 223.8 LBS | HEART RATE: 74 BPM | HEIGHT: 69 IN | BODY MASS INDEX: 33.15 KG/M2 | TEMPERATURE: 98.7 F | DIASTOLIC BLOOD PRESSURE: 78 MMHG | SYSTOLIC BLOOD PRESSURE: 128 MMHG

## 2023-08-01 DIAGNOSIS — E03.9 HYPOTHYROIDISM (ACQUIRED): Chronic | ICD-10-CM

## 2023-08-01 DIAGNOSIS — E11.65 TYPE 2 DIABETES MELLITUS WITH HYPERGLYCEMIA, WITHOUT LONG-TERM CURRENT USE OF INSULIN: Primary | ICD-10-CM

## 2023-08-01 DIAGNOSIS — I10 ESSENTIAL HYPERTENSION: Chronic | ICD-10-CM

## 2023-08-01 PROBLEM — R40.0 SOMNOLENCE: Status: RESOLVED | Noted: 2023-05-28 | Resolved: 2023-08-01

## 2023-08-01 LAB
EXPIRATION DATE: NORMAL
Lab: NORMAL
POC CREATININE URINE: 300
POC MICROALBUMIN URINE: 80

## 2023-08-01 PROCEDURE — 3078F DIAST BP <80 MM HG: CPT | Performed by: FAMILY MEDICINE

## 2023-08-01 PROCEDURE — 1160F RVW MEDS BY RX/DR IN RCRD: CPT | Performed by: FAMILY MEDICINE

## 2023-08-01 PROCEDURE — 3074F SYST BP LT 130 MM HG: CPT | Performed by: FAMILY MEDICINE

## 2023-08-01 PROCEDURE — 1159F MED LIST DOCD IN RCRD: CPT | Performed by: FAMILY MEDICINE

## 2023-08-01 PROCEDURE — 99214 OFFICE O/P EST MOD 30 MIN: CPT | Performed by: FAMILY MEDICINE

## 2023-08-01 PROCEDURE — 3044F HG A1C LEVEL LT 7.0%: CPT | Performed by: FAMILY MEDICINE

## 2023-08-01 PROCEDURE — 82044 UR ALBUMIN SEMIQUANTITATIVE: CPT | Performed by: FAMILY MEDICINE

## 2023-08-02 ENCOUNTER — TREATMENT (OUTPATIENT)
Dept: PHYSICAL THERAPY | Facility: CLINIC | Age: 48
End: 2023-08-02
Payer: MEDICARE

## 2023-08-02 DIAGNOSIS — R41.841 COGNITIVE COMMUNICATION DEFICIT: ICD-10-CM

## 2023-08-02 DIAGNOSIS — R47.01 APHASIA: Primary | ICD-10-CM

## 2023-08-02 PROCEDURE — 92507 TX SP LANG VOICE COMM INDIV: CPT | Performed by: SPEECH-LANGUAGE PATHOLOGIST

## 2023-08-09 ENCOUNTER — TREATMENT (OUTPATIENT)
Dept: PHYSICAL THERAPY | Facility: CLINIC | Age: 48
End: 2023-08-09
Payer: MEDICARE

## 2023-08-09 DIAGNOSIS — R29.898 DECREASED GRIP STRENGTH OF RIGHT HAND: Primary | ICD-10-CM

## 2023-08-09 DIAGNOSIS — R41.841 COGNITIVE COMMUNICATION DEFICIT: ICD-10-CM

## 2023-08-09 DIAGNOSIS — R47.01 APHASIA: Primary | ICD-10-CM

## 2023-08-09 PROCEDURE — 97530 THERAPEUTIC ACTIVITIES: CPT | Performed by: OCCUPATIONAL THERAPIST

## 2023-08-09 PROCEDURE — 92507 TX SP LANG VOICE COMM INDIV: CPT | Performed by: SPEECH-LANGUAGE PATHOLOGIST

## 2023-08-09 PROCEDURE — 97112 NEUROMUSCULAR REEDUCATION: CPT | Performed by: OCCUPATIONAL THERAPIST

## 2023-08-09 PROCEDURE — 97110 THERAPEUTIC EXERCISES: CPT | Performed by: OCCUPATIONAL THERAPIST

## 2023-08-09 NOTE — PROGRESS NOTES
Outpatient Speech Language Pathology   Adult Speech Language Cognitive Treatment Note  [address] [address]       Patient Name: Manjeet Hall  : 1975  MRN: [id number]  Today's Date: 2023        Visit Date: 2023   Patient Active Problem List   Diagnosis    Depression    Generalized anxiety disorder    Gastroesophageal reflux disease    Congenital heart disease    Thoracic aortic aneurysm    Hx of aortic valve replacement, mechanical [Z95.2]    Sequelae, post-stroke    Warfarin anticoagulation    Ischemic stroke    Pulmonic stenosis    Subdural hematoma    Bilateral Subdural Hematoma    HTN    Hyperlipidemia (Diet controlled, off statin)    Hypothyroidism    Hodgkin's Lymphoma s/p XRT & Chemo    VSD s/p closure (age 14)    History of aortic regurgitation / stenosis s/p ROSS procedure with subsequent reversal with Mechanical AVR    H/O aortic mechanical valve replacement on Coumadin    Type 2 diabetes mellitus with hyperglycemia, without long-term current use of insulin        Past Medical History:   Diagnosis Date    Abnormal heart rhythm     Depression     Elevated cholesterol     GERD (gastroesophageal reflux disease)     Heart murmur     History of echocardiogram     History of left heart catheterization     History of stomach ulcers     Mitral valvular disorder     Stroke         Past Surgical History:   Procedure Laterality Date    CARDIAC SURGERY      aortic valve replacement in , further repair in 2018    CRANIOTOMY FOR SUBDURAL HEMATOMA Bilateral 2023    Procedure: CRANIOTOMY FOR BILATERAL SUBDURAL HEMATOMAS;  Surgeon: Stephane Womack MD;  Location:  [facility];  Service: Neurosurgery;  Laterality: Bilateral;    EMBOLIZATION CEREBRAL N/A 2023    Procedure: Emoblization cerebral;  Surgeon: Stephane Womack MD;  Location:  [facility];  Service: Interventional Radiology;  Laterality: N/A;    SKIN CANCER EXCISION      mass removed from neck and  chest for hodgkins         Visit Dx:    ICD-10-CM ICD-9-CM   1. Aphasia  R47.01 784.3   2. Cognitive communication deficit  R41.841 799.52            OP SLP Assessment/Plan -           SLP Assessment    Functional Problems Speech Language- Adult/Cognition  -RB    Impact on Function: Adult Speech Language/Cognition Difficulty communicating wants, needs, and ideas;Difficulty communicating in a medical emergency;Restrictions in personal and social life;Difficulty sequencing thoughts to express complex messages;Unable to understand written/spoken language;Difficulty following directions  -RB    Clinical Impression: Speech Language-Adult/Congnition Moderate:;Receptive Aphasia;Expressive Aphasia  -RB    Functional Problems Comment impacts communication, safety, participation, QOL, daily tasks  -RB    Clinical Impression Comments Highly motivated, following HEP, good support, following HEP consistently, pt reports family is noticing improvements-RB    Please refer to paper survey for additional self-reported information Yes  -RB    Please refer to items scanned into chart for additional diagnostic informaiton and handouts as provided by clinician Yes  -RB    SLP Diagnosis Aphasia, cog/comm deficit -RB    Prognosis Good (comment)  -RB    Patient/caregiver participated in establishment of treatment plan and goals Yes  -RB    Patient would benefit from skilled therapy intervention Yes  -RB       SLP Plan    Frequency 1x/weekly  -RB    Duration 7 weeks  -RB    Planned CPT's? SLP INDIVIDUAL SPEECH THERAPY: 74244  -RB    Expected Duration of Therapy Session (SLP Ana) 45  -RB    Plan Comments continue POC  -RB              User Key  (r) = Recorded By, (t) = Taken By, (c) = Cosigned By      Initials Name Provider Type    Yolande Vernon SLP Speech and Language Pathologist                    Pain: 0  Subjective: PT reports he is getting better        ACTIVITY  ACCURACY ASSISTANCE NEEDED   LTGs:   Pt will comprehend spoken  information in all settings at 90% no-min cues      PT will express wants/thoughts/needs across settings at 90% no cues      Pt will be able to comprehend written material and social/avocational/work setting at 90% no cues    Pt will be able to use written language skills to communicate effectively in all situations at 95% no cues   Targeted auditory comprehension tasks             Targeted verbal expression with naming and open ended questions      Targeted reading comprehension with community materials             Targeted at word and phrase level 75%                85-90%            80%                  STG:  Pt will demonstrate comprehension of spoken language by answering complex/abstract general information questions at 90% no-min cues        Pt will demonstrate comprehension of spoken language by answering questions about a short paragraph at 90% no-min cues       Targeted in conversation and with structured auditory comprehension tasks                       Not targeted 75% No cues   STG:   PT will name functional objects and describe them at 90% no cues                  Pt will answer open ended questions appropriately at 90% min cues             Targeted functional naming of pictures- benefits from visual written cues- MET                     Targeted open ended conversation questions, quicker processing speed today 90%                          75-80% No cues                          Min cues   STG:  Pt will improve written language skills by writing grammatically correct sentences at 90% no cues Targeted copying words and writing words and short phrases independently  Targeted writing functional words, phrases   Wrote out baseball teams and cities     STG:   Pt will improve comprehension of written language skills by answering written questions related to home management/social/work tasks (bills, recipes, tv guide, emails, procedures) at 90% no cues Targeted reading comprehension with medicine and  baseball instructions/questions 85% No cues         certification: 6/28/23-9/27/23               OP SLP Education       Row Name        Education    Barriers to Learning Decreased comprehension  -RB    Action Taken to Address Barriers visuals, repetition  -RB    Education Provided ;Patient participated in establishing goals and treatment plan;Patient demonstrated recommended strategies;Patient requires further education on strategies, risks  -RB    Assessed Learning needs;Learning motivation;Learning readiness;Learning preferences  -RB    Learning Motivation Strong  -RB    Learning Method Explanation;Demonstration;Teach back;Written materials  -RB    Teaching Response Verbalized understanding;Demonstrated understanding;Reinforcement needed  -RB    Education Comments HEP: written expression and verbal expression tasks, reading tasks, AC tasks, written categories task  -RB              User Key  (r) = Recorded By, (t) = Taken By, (c) = Cosigned By      Initials Name Effective Dates    Yolande Vernon SLP 05/31/23 -                     Yolande Guillory MA, CCC-SLP [facility] license: [id number]

## 2023-08-10 NOTE — PROGRESS NOTES
Occupational Therapy Daily Treatment Note  Visit: 5  Date of Initial Visit: Type: THERAPY  Noted: 2023      Patient: Manjeet Hall   : 1975  Diagnosis/ICD-10 Code:  Decreased  strength of right hand [R29.898]  Referring practitioner: Faustina Mckeon DO  Date of Initial Visit: Type: THERAPY  Noted: 2023  Today's Date: 8/10/2023  Patient seen for 5 sessions      Subjective:   Patient reports: no complaints  Pain: 0/10  Subjective Questionnaire: n/a  Clinical Progress: improved  Home Program Compliance: Yes  Treatment has included: therapeutic exercise and therapeutic activity    Subjective   Objective     OT Neuro     SEE FLOW SHEETS AND ACTIVITY LOGS FOR DETAILS     Assessment & Plan     Assessment    Assessment details: Pt demonstrates increasing strength and activity tolerance with strengthening during repetitive/sustained tasks. Pt with no instances LOB on air ex during cog and FMC dual tasks. Pt with significantly improved attn to task and quick recall of item names and ability to describe these items. Pt however does require intermittent context cues for correct and quick identification of items and anomia is still present.    Plan  Plan details: Continue w/OT POC and goals to reflect pt needs and promote increased independence, safety and engagement in daily tasks.           Visit Diagnoses:    ICD-10-CM ICD-9-CM   1. Decreased  strength of right hand  R29.898 729.89             Timed:  Manual Therapy:    0     mins  55437;  Therapeutic Exercise:  8     mins  94900;     Neuromuscular Roxy:    15    mins  57624;    Therapeutic Activity:     22     mins  64901;     Self-Care/ADL     0     mins  53434;   Sensory Int. Tech      0     mins 46380;  Ultrasound:     0     mins  89785;    Electrical Stimulation:    0     mins  59492 ( );    Untimed:  Electrical Stimulation:    0     mins  13758 ( );    Timed Treatment:   45   mins   Total Treatment:     45   mins    OT  Signature: Puja Hernández MS, OTR/L, CDP  [place] License #: [id number]

## 2023-08-15 ENCOUNTER — OFFICE VISIT (OUTPATIENT)
Dept: NEUROSURGERY | Facility: CLINIC | Age: 48
End: 2023-08-15
Payer: MEDICARE

## 2023-08-15 ENCOUNTER — HOSPITAL ENCOUNTER (OUTPATIENT)
Dept: CT IMAGING | Facility: HOSPITAL | Age: 48
Discharge: HOME | End: 2023-08-15
Admitting: STUDENT IN AN ORGANIZED HEALTH CARE EDUCATION/TRAINING PROGRAM
Payer: MEDICARE

## 2023-08-15 VITALS
TEMPERATURE: 97.1 F | HEIGHT: 69 IN | SYSTOLIC BLOOD PRESSURE: 122 MMHG | DIASTOLIC BLOOD PRESSURE: 80 MMHG | WEIGHT: 223 LBS | BODY MASS INDEX: 33.03 KG/M2

## 2023-08-15 DIAGNOSIS — I62.00 SUBDURAL HEMATOMA, NONTRAUMATIC: ICD-10-CM

## 2023-08-15 DIAGNOSIS — I62.00 SUBDURAL HEMATOMA, NONTRAUMATIC: Primary | ICD-10-CM

## 2023-08-15 PROCEDURE — 3079F DIAST BP 80-89 MM HG: CPT

## 2023-08-15 PROCEDURE — 3074F SYST BP LT 130 MM HG: CPT

## 2023-08-15 PROCEDURE — 70450 CT HEAD/BRAIN W/O DYE: CPT

## 2023-08-15 PROCEDURE — 99024 POSTOP FOLLOW-UP VISIT: CPT

## 2023-08-15 NOTE — PROGRESS NOTES
Name: Manjeet Hall    : 1975     MRN: [id number]     Primary Care Provider: Scotty Means MD    Chief Complaint  SDH      History of Present Illness:  Manjeet Hall is a 47 y.o. male who underwent bilateral craniotomies for bilateral subdural hematomas followed by MMA embolization about 3 months ago.  He is here today with family for follow-up and a new head CT for evaluation.  Patient states he is doing overall very well.  He has no acute complaints today.  He still has his baseline speech issues from prior CVA but has no new neurological changes.  Patient states he has been walking with his kids for exercise.    PMHX  Allergies:  Allergies   Allergen Reactions    Tegaderm Chg Dressing [Chlorhexidine] Rash     Medications    Current Outpatient Medications:     buPROPion XL (WELLBUTRIN XL) 150 MG 24 hr tablet, Take 1 tablet by mouth Every Morning., Disp: , Rfl:     DULoxetine (CYMBALTA) 60 MG capsule, Take 1 capsule by mouth Daily., Disp: , Rfl: 2    gabapentin (NEURONTIN) 100 MG capsule, Take 2 capsules by mouth 2 (Two) Times a Day., Disp: , Rfl:     HYDROcodone-acetaminophen (NORCO) 5-325 MG per tablet, Take 1 tablet by mouth Every 6 (Six) Hours As Needed for Severe Pain or Moderate Pain., Disp: 15 tablet, Rfl: 0    hydrOXYzine (ATARAX) 10 MG tablet, Take 1 to 2 tablets by mouth twice a day as needed for anxiety and sleep., Disp: , Rfl:     lansoprazole (PREVACID) 30 MG capsule, TAKE 1 CAPSULE BY MOUTH EVERY DAY, Disp: 90 capsule, Rfl: 1    levothyroxine (SYNTHROID, LEVOTHROID) 125 MCG tablet, TAKE 1 TABLET BY MOUTH DAILY. FURTHER REFILLS PER PCP WITH REPEAT LAB WORK., Disp: 90 tablet, Rfl: 0    metFORMIN ER (GLUCOPHAGE-XR) 500 MG 24 hr tablet, Take 1 tablet by mouth Daily With Breakfast., Disp: 90 tablet, Rfl: 1    metoprolol tartrate (LOPRESSOR) 25 MG tablet, Take 0.5 tablets by mouth Every 12 (Twelve) Hours., Disp: 90 tablet, Rfl: 3    rosuvastatin (CRESTOR) 20 MG tablet, TAKE 1  TABLET BY MOUTH EVERY DAY, Disp: 90 tablet, Rfl: 1    warfarin (COUMADIN) 5 MG tablet, TAKE 1 TABLET BY MOUTH DAILY AS DIRECTED BY ANTICOAGULATION CLINIC., Disp: 90 tablet, Rfl: 1  Past Medical History:  Past Medical History:   Diagnosis Date    Abnormal heart rhythm     Depression     Elevated cholesterol     GERD (gastroesophageal reflux disease)     Heart murmur     History of echocardiogram     History of left heart catheterization     History of stomach ulcers     Mitral valvular disorder     Stroke      Past Surgical History:  Past Surgical History:   Procedure Laterality Date    CARDIAC SURGERY      aortic valve replacement in 1986, further repair in 2018    CRANIOTOMY FOR SUBDURAL HEMATOMA Bilateral 05/26/2023    Procedure: CRANIOTOMY FOR BILATERAL SUBDURAL HEMATOMAS;  Surgeon: Stephane Womack MD;  Location:  [facility];  Service: Neurosurgery;  Laterality: Bilateral;    EMBOLIZATION CEREBRAL N/A 05/31/2023    Procedure: Emoblization cerebral;  Surgeon: Stephane Womack MD;  Location:  [facility];  Service: Interventional Radiology;  Laterality: N/A;    SKIN CANCER EXCISION      mass removed from neck and chest for hodgkins     Social Hx:  Social History     Tobacco Use    Smoking status: Never     Passive exposure: Never    Smokeless tobacco: Never   Vaping Use    Vaping Use: Never used   Substance Use Topics    Alcohol use: Yes     Comment: socially on occasion    Drug use: Never     Family Hx:  Family History   Problem Relation Age of Onset    COPD Mother     Ovarian cancer Mother     Hypertension Father     Stroke Father     No Known Problems Brother     Stroke Paternal Grandmother     Hypertension Paternal Grandmother     Cancer Paternal Grandmother         unknown    Brain cancer Paternal Grandfather     Heart disease Paternal Grandfather     Cancer Maternal Grandmother         unknown    Other Maternal Grandfather         unknown    No Known Problems Daughter     No Known Problems  Daughter     No Known Problems Son      Review of Systems:        Review of Systems   Constitutional:  Negative for activity change, appetite change, chills, diaphoresis, fatigue, fever and unexpected weight change.   HENT:  Negative for congestion, dental problem, drooling, ear discharge, ear pain, facial swelling, hearing loss, mouth sores, nosebleeds, postnasal drip, rhinorrhea, sinus pressure, sinus pain, sneezing, sore throat, tinnitus, trouble swallowing and voice change.    Eyes:  Negative for photophobia, pain, discharge, redness, itching and visual disturbance.   Respiratory:  Negative for apnea, cough, choking, chest tightness, shortness of breath, wheezing and stridor.    Cardiovascular:  Negative for chest pain, palpitations and leg swelling.   Gastrointestinal:  Negative for abdominal distention, abdominal pain, anal bleeding, blood in stool, constipation, diarrhea, nausea, rectal pain and vomiting.   Endocrine: Negative for cold intolerance, heat intolerance, polydipsia, polyphagia and polyuria.   Genitourinary:  Negative for decreased urine volume, difficulty urinating, dysuria, enuresis, flank pain, frequency, genital sores, hematuria, penile discharge, penile pain, penile swelling, scrotal swelling, testicular pain and urgency.   Musculoskeletal:  Negative for arthralgias, back pain, gait problem, joint swelling, myalgias, neck pain and neck stiffness.   Skin:  Negative for color change, pallor, rash and wound.   Allergic/Immunologic: Negative for environmental allergies, food allergies and immunocompromised state.   Neurological:  Negative for dizziness, tremors, seizures, syncope, facial asymmetry, speech difficulty, weakness, light-headedness, numbness and headaches.   Hematological:  Negative for adenopathy. Does not bruise/bleed easily.   Psychiatric/Behavioral:  Negative for agitation, behavioral problems, confusion, decreased concentration, dysphoric mood, hallucinations, self-injury, sleep  "disturbance and suicidal ideas. The patient is not nervous/anxious and is not hyperactive.    All other systems reviewed and are negative.       Vital Signs: /80 (BP Location: Left arm, Patient Position: Sitting, Cuff Size: Adult)   Temp 97.1 øF (36.2 øC)   Ht 175.3 cm (69\")   Wt 101 kg (223 lb)   BMI 32.93 kg/mý      Physical Exam  Awake, alert and oriented x 3  Some word finding difficulties, baseline finding  Opens eyes spontaneously  Pupils 3 mm rx bilaterally  Extraocular muscles intact bilaterally  Face symmetric bilaterally  Tongue midline  Moves all extremities to command  No pronator drift  Incisions clean dry and intact, healing well      Social History    Tobacco Use      Smoking status: Never      Smokeless tobacco: Never       Tobacco Use: Low Risk     Smoking Tobacco Use: Never    Smokeless Tobacco Use: Never    Passive Exposure: Never         STEADI Fall Risk Assessment has not been completed.      Diagnostic Studies: (All imaging is independently reviewed unless stated otherwise.)  CT head from today shows the left subdural hematoma has essentially resolved.  There is a decrease in size and expected evolution of right subdural hematoma.  No acute intracranial findings      Assessment/Plan    Diagnoses and all orders for this visit:    1. Subdural hematoma, nontraumatic (Primary)  -     CT Head Without Contrast; Future         This is a 47-year-old male who underwent bilateral craniotomies for evacuation of subdural hematomas followed by MMA embolization approximately 3 months ago.  Patient continues to do well neurologically.  He is neurologically stable on exam and has no acute complaints today. He feels good overall. His head CT shows improvement in his subdural hematomas.  We will not schedule any formal follow up at this time, however, patient encouraged to contact us if he has any changes in his condition or any concerns.    Any copied data from previous notes included in the (1) HPI, " (2) PE, (3) MDM and/or Assessment and Plan has been reviewed and accurate as of 08/15/23.    Chanell Rock PA-C  08/15/23

## 2023-08-16 ENCOUNTER — TREATMENT (OUTPATIENT)
Dept: PHYSICAL THERAPY | Facility: CLINIC | Age: 48
End: 2023-08-16
Payer: MEDICARE

## 2023-08-16 DIAGNOSIS — R41.841 COGNITIVE COMMUNICATION DEFICIT: ICD-10-CM

## 2023-08-16 DIAGNOSIS — R47.01 APHASIA: Primary | ICD-10-CM

## 2023-08-16 PROCEDURE — 92507 TX SP LANG VOICE COMM INDIV: CPT | Performed by: SPEECH-LANGUAGE PATHOLOGIST

## 2023-08-16 NOTE — PROGRESS NOTES
Outpatient Speech Language Pathology   Adult Speech Language Cognitive Treatment Note  [address] [address]       Patient Name: Manjeet Hall  : 1975  MRN: [id number]  Today's Date: 2023        Visit Date: 2023   Patient Active Problem List   Diagnosis    Depression    Generalized anxiety disorder    Gastroesophageal reflux disease    Congenital heart disease    Thoracic aortic aneurysm    Hx of aortic valve replacement, mechanical [Z95.2]    Sequelae, post-stroke    Warfarin anticoagulation    Ischemic stroke    Pulmonic stenosis    Subdural hematoma    Bilateral Subdural Hematoma    HTN    Hyperlipidemia (Diet controlled, off statin)    Hypothyroidism    Hodgkin's Lymphoma s/p XRT & Chemo    VSD s/p closure (age 14)    History of aortic regurgitation / stenosis s/p ROSS procedure with subsequent reversal with Mechanical AVR    H/O aortic mechanical valve replacement on Coumadin    Type 2 diabetes mellitus with hyperglycemia, without long-term current use of insulin        Past Medical History:   Diagnosis Date    Abnormal heart rhythm     Depression     Elevated cholesterol     GERD (gastroesophageal reflux disease)     Heart murmur     History of echocardiogram     History of left heart catheterization     History of stomach ulcers     Mitral valvular disorder     Stroke         Past Surgical History:   Procedure Laterality Date    CARDIAC SURGERY      aortic valve replacement in , further repair in 2018    CRANIOTOMY FOR SUBDURAL HEMATOMA Bilateral 2023    Procedure: CRANIOTOMY FOR BILATERAL SUBDURAL HEMATOMAS;  Surgeon: Stephane Womack MD;  Location:  [facility];  Service: Neurosurgery;  Laterality: Bilateral;    EMBOLIZATION CEREBRAL N/A 2023    Procedure: Emoblization cerebral;  Surgeon: Stephane Womack MD;  Location:  [facility];  Service: Interventional Radiology;  Laterality: N/A;    SKIN CANCER EXCISION      mass removed from neck and  chest for hodgkins         Visit Dx:    ICD-10-CM ICD-9-CM   1. Aphasia  R47.01 784.3   2. Cognitive communication deficit  R41.841 799.52            OP SLP Assessment/Plan -           SLP Assessment    Functional Problems Speech Language- Adult/Cognition  -RB    Impact on Function: Adult Speech Language/Cognition Difficulty communicating wants, needs, and ideas;Difficulty communicating in a medical emergency;Restrictions in personal and social life;Difficulty sequencing thoughts to express complex messages;Unable to understand written/spoken language;Difficulty following directions  -RB    Clinical Impression: Speech Language-Adult/Congnition Moderate:;Receptive Aphasia;Expressive Aphasia  -RB    Functional Problems Comment impacts communication, safety, participation, QOL, daily tasks  -RB    Clinical Impression Comments Highly motivated, following HEP, good support, following HEP consistently, making improvements across language domains-RB    Please refer to paper survey for additional self-reported information Yes  -RB    Please refer to items scanned into chart for additional diagnostic informaiton and handouts as provided by clinician Yes  -RB    SLP Diagnosis Aphasia, cog/comm deficit -RB    Prognosis Good (comment)  -RB    Patient/caregiver participated in establishment of treatment plan and goals Yes  -RB    Patient would benefit from skilled therapy intervention Yes  -RB       SLP Plan    Frequency 1x/weekly  -RB    Duration 6 weeks  -RB    Planned CPT's? SLP INDIVIDUAL SPEECH THERAPY: 77687  -RB    Expected Duration of Therapy Session (SLP Eval) 45  -RB    Plan Comments continue POC  -RB              User Key  (r) = Recorded By, (t) = Taken By, (c) = Cosigned By      Initials Name Provider Type    Yolande Vernon SLP Speech and Language Pathologist                    Pain: 0  Subjective: Pt reports he is doing well        ACTIVITY  ACCURACY ASSISTANCE NEEDED   LTGs:   Pt will comprehend spoken  information in all settings at 90% no-min cues      PT will express wants/thoughts/needs across settings at 90% no cues      Pt will be able to comprehend written material and social/avocational/work setting at 90% no cues    Pt will be able to use written language skills to communicate effectively in all situations at 95% no cues   Targeted auditory comprehension tasks             Targeted verbal expression with naming and open ended questions      Targeted reading comprehension with community materials             Targeted at word and phrase level 75%                85-90%            80%              80%                  STG:  Pt will demonstrate comprehension of spoken language by answering complex/abstract general information questions at 90% no-min cues        Pt will demonstrate comprehension of spoken language by answering questions about a short paragraph at 90% no-min cues       Targeted in conversation and with structured auditory comprehension tasks                       Not targeted 75-80% No cues   STG:   PT will name functional objects and describe them at 90% no cues                  Pt will answer open ended questions appropriately at 90% min cues             MET                         Targeted open ended conversation questions                           80%                           Min cues   STG:  Pt will improve written language skills by writing grammatically correct sentences at 90% no cues Targeted copying words and writing words and short phrases and sentences independently  Targeted writing functional words, phrases   Wrote out states and sentences about pictures     STG:   Pt will improve comprehension of written language skills by answering written questions related to home management/social/work tasks (bills, recipes, tv guide, emails, procedures) at 90% no cues Targeted reading comprehension  90% No cues         certification: 6/28/23-9/27/23               OP SLP Education       Row Name         Education    Barriers to Learning Decreased comprehension  -RB    Action Taken to Address Barriers visuals, repetition  -RB    Education Provided ;Patient participated in establishing goals and treatment plan;Patient demonstrated recommended strategies;Patient requires further education on strategies, risks  -RB    Assessed Learning needs;Learning motivation;Learning readiness;Learning preferences  -RB    Learning Motivation Strong  -RB    Learning Method Explanation;Demonstration;Teach back;Written materials  -RB    Teaching Response Verbalized understanding;Demonstrated understanding;Reinforcement needed  -RB    Education Comments HEP: written expression and verbal expression tasks, AC tasks -RB              User Key  (r) = Recorded By, (t) = Taken By, (c) = Cosigned By      Initials Name Effective Dates    Yolande Vernon SLP 05/31/23 -                     Yolande Guillory MA CCC-SLP [facility]  [place] license: [id number]

## 2023-08-23 ENCOUNTER — LAB (OUTPATIENT)
Dept: LAB | Facility: HOSPITAL | Age: 48
End: 2023-08-23
Payer: MEDICARE

## 2023-08-23 ENCOUNTER — TREATMENT (OUTPATIENT)
Dept: PHYSICAL THERAPY | Facility: CLINIC | Age: 48
End: 2023-08-23
Payer: MEDICARE

## 2023-08-23 DIAGNOSIS — R47.01 APHASIA: Primary | ICD-10-CM

## 2023-08-23 DIAGNOSIS — R29.898 DECREASED GRIP STRENGTH OF RIGHT HAND: Primary | ICD-10-CM

## 2023-08-23 DIAGNOSIS — R41.841 COGNITIVE COMMUNICATION DEFICIT: ICD-10-CM

## 2023-08-23 DIAGNOSIS — Z95.2 HX OF AORTIC VALVE REPLACEMENT, MECHANICAL: ICD-10-CM

## 2023-08-23 LAB
INR PPP: 2.51 (ref 0.89–1.12)
PROTHROMBIN TIME: 27.3 SECONDS (ref 12.2–14.5)

## 2023-08-23 PROCEDURE — 97110 THERAPEUTIC EXERCISES: CPT | Performed by: OCCUPATIONAL THERAPIST

## 2023-08-23 PROCEDURE — 97530 THERAPEUTIC ACTIVITIES: CPT | Performed by: OCCUPATIONAL THERAPIST

## 2023-08-23 PROCEDURE — 36415 COLL VENOUS BLD VENIPUNCTURE: CPT

## 2023-08-23 PROCEDURE — 92507 TX SP LANG VOICE COMM INDIV: CPT | Performed by: SPEECH-LANGUAGE PATHOLOGIST

## 2023-08-23 PROCEDURE — 97112 NEUROMUSCULAR REEDUCATION: CPT | Performed by: OCCUPATIONAL THERAPIST

## 2023-08-23 PROCEDURE — 85610 PROTHROMBIN TIME: CPT

## 2023-08-23 NOTE — PROGRESS NOTES
OT Progress Note        [facility] [place]                                              [address]      Patient: Manjeet Hall   : 1975  Diagnosis/ICD-10 Code:  Decreased  strength of right hand [R29.898]  Referring practitioner: Faustina Mckeon DO  Date of Initial Visit: Type: THERAPY  Noted: 2023  Today's Date: 2023  Patient seen for 6 sessions    Subjective:   Patient reports: no complaints  Pain: 0/10  Subjective Questionnaire: 9HPT RIGHT:  22.21      LEFT: 21.13  Significantly improved accuracy with less tremor; targeting improved in home environment also per pt report.  Clinical Progress: improved  Home Program Compliance: Yes  Treatment has included: therapeutic exercise, neuromuscular re-education, and therapeutic activity    Subjective   Objective          Strength/Myotome Testing     Left Wrist/Hand      (2nd hand position)     Trial 1: 70 lbs    Trial 2: 68 lbs    Trial 3: 70 lbs    Average: 69.33 lbs    Right Wrist/Hand      (2nd hand position)     Trial 1: 60 lbs    Trial 2: 70 lbs    Trial 3: 65 lbs    Average: 65 lbs      OT Neuro     SEE FLOW SHEETS AND ACTIVITY LOGS FOR DETAILS    Assessment & Plan       Assessment  Impairments: abnormal coordination, abnormal gait, activity intolerance, impaired balance, impaired physical strength, lacks appropriate home exercise program and safety issue   Functional limitations: walking, moving in bed and standing   Assessment details: OT re-assessment completed per POC. Pt met 3 goals this re-assessment and partially met remaining goals. He is progressing significantly with dynamic balance while multi and dual tasking high level FMC tasks and maintaining attn and follow through of tasks with min-to no vc'ing required. Pt has the most difficulty when cog tasks added and required to think abstractly. Pt does best when copying items for has visuals. HW legibility improving but does decline when in a  gloria.   Recommend skilled OT services to address above deficits and to promote increased satisfaction and QOL with daily tasks.   Prognosis: good    Goals  Plan Goals:   Pt will improve bilateral scores by 4 or more seconds on the 9HPT to demonstrate improved accuracy and speed with fine motor coordination by 8 wks.  MET LEFT HAND, PROGRESSING RIGHT HAND      Pt will increase R  strength by 5 or more # by 8 wks to demonstrate improved strength and function for daily tasks. MET    Pt will be independent with hand strengthening HEPs to increase independence with ADL/IADL performance tasks by 4 wks. MET    Pt will be independent with hand FMC HEPs to increase independence with ADL/IADL performance tasks by 4 wks. PROGRESSING DIFFICULTY LEVEL, PARTIALLY MET    Pt will be independent with B UE strengthening HEPs to increase performance in ADL/IADL tasks by 8 wks. MET    Pt will be independent with 3 step task requiring SUA and no vc'ing for carry over with ordering food from menu with 100% accuracy by d/c. NEW     Plan  Planned therapy interventions: ADL retraining, balance/weight-bearing training, fine motor coordination training, flexibility, functional ROM exercises, home exercise program, motor coordination training, neuromuscular re-education, postural training, strengthening, stretching, therapeutic activities, transfer training, IADL retraining, gait training, body mechanics training and abdominal trunk stabilization  Frequency: 1x week  Duration in visits: 4  Treatment plan discussed with: patient  Plan details: Continue w/OT POC and goals to reflect pt needs and promote increased independence, safety and engagement in daily tasks.         Visit Diagnoses:    ICD-10-CM ICD-9-CM   1. Decreased  strength of right hand  R29.898 729.89       Progress toward previous goals: Partially Met      Recommendations: Continue as planned  Timeframe: 1 month  Prognosis to achieve goals: fair    OT Signature: Puja  Betty MS, OTR/L, CDP  [place] License #: [id number]    Based upon review of the patient's progress and continued therapy plan, it is my medical opinion that Manjeet Hall should continue occupational therapy treatment at [facility]  [facility] [address] [facility] [address] [phone].    Signature: __________________________________  Faustina Mckeon,     Timed:  Manual Therapy:    0     mins  88056;  Therapeutic Exercise:    15     mins  71058;     Neuromuscular Roxy:    15    mins  57715;    Therapeutic Activity:     15     mins  42789;     Self-Care/ADL     0     mins  59784;   Sensory Int. Tech      0     mins 44412;  Ultrasound:     0     mins  99312;    Electrical Stimulation:    0     mins  26893 ( );    Untimed:  Electrical Stimulation:    0     mins  31805 ( );    Timed Treatment:   45   mins   Total Treatment:     45   mins

## 2023-08-23 NOTE — PROGRESS NOTES
Outpatient Speech Language Pathology   Adult Speech Language Cognitive Progress Note  [address] [place] [address]       Patient Name: Manjeet Hall  : 1975  MRN: [id number]  Today's Date: 2023        Visit Date: 2023   Patient Active Problem List   Diagnosis    Depression    Generalized anxiety disorder    Gastroesophageal reflux disease    Congenital heart disease    Thoracic aortic aneurysm    Hx of aortic valve replacement, mechanical [Z95.2]    Sequelae, post-stroke    Warfarin anticoagulation    Ischemic stroke    Pulmonic stenosis    Subdural hematoma    Bilateral Subdural Hematoma    HTN    Hyperlipidemia (Diet controlled, off statin)    Hypothyroidism    Hodgkin's Lymphoma s/p XRT & Chemo    VSD s/p closure (age 14)    History of aortic regurgitation / stenosis s/p ROSS procedure with subsequent reversal with Mechanical AVR    H/O aortic mechanical valve replacement on Coumadin    Type 2 diabetes mellitus with hyperglycemia, without long-term current use of insulin        Past Medical History:   Diagnosis Date    Abnormal heart rhythm     Depression     Elevated cholesterol     GERD (gastroesophageal reflux disease)     Heart murmur     History of echocardiogram     History of left heart catheterization     History of stomach ulcers     Mitral valvular disorder     Stroke         Past Surgical History:   Procedure Laterality Date    CARDIAC SURGERY      aortic valve replacement in , further repair in 2018    CRANIOTOMY FOR SUBDURAL HEMATOMA Bilateral 2023    Procedure: CRANIOTOMY FOR BILATERAL SUBDURAL HEMATOMAS;  Surgeon: Stephane Womack MD;  Location:  [facility];  Service: Neurosurgery;  Laterality: Bilateral;    EMBOLIZATION CEREBRAL N/A 2023    Procedure: Emoblization cerebral;  Surgeon: Stephane Womack MD;  Location:  [facility] CATH INVASIVE LOCATION;  Service: Interventional Radiology;  Laterality: N/A;    SKIN CANCER EXCISION      mass removed from neck and  chest for hodgkins         Visit Dx:    ICD-10-CM ICD-9-CM   1. Aphasia  R47.01 784.3   2. Cognitive communication deficit  R41.841 799.52            OP SLP Assessment/Plan -           SLP Assessment    Functional Problems Speech Language- Adult/Cognition  -RB    Impact on Function: Adult Speech Language/Cognition Difficulty communicating wants, needs, and ideas;Difficulty communicating in a medical emergency;Restrictions in personal and social life;Difficulty sequencing thoughts to express complex messages;Unable to understand written/spoken language;Difficulty following directions  -RB    Clinical Impression: Speech Language-Adult/Congnition Moderate:;Receptive Aphasia;Expressive Aphasia  -RB    Functional Problems Comment impacts communication, safety, participation, QOL, daily tasks  -RB    Clinical Impression Comments Highly motivated, following HEP, good support, pt making gains with all goals, continuing to have the most difficulty with auditory comprehension-RB    Please refer to paper survey for additional self-reported information Yes  -RB    Please refer to items scanned into chart for additional diagnostic informaiton and handouts as provided by clinician Yes  -RB    SLP Diagnosis Aphasia, cog/comm deficit -RB    Prognosis Good (comment)  -RB    Patient/caregiver participated in establishment of treatment plan and goals Yes  -RB    Patient would benefit from skilled therapy intervention Yes  -RB       SLP Plan    Frequency 1x/weekly  -RB    Duration 5 weeks  -RB    Planned CPT's? SLP INDIVIDUAL SPEECH THERAPY: 97321  -RB    Expected Duration of Therapy Session (SLP Eval) 45  -RB    Plan Comments continue POC  -RB              User Key  (r) = Recorded By, (t) = Taken By, (c) = Cosigned By      Initials Name Provider Type    Yolande Vernon SLP Speech and Language Pathologist                    Pain: 0  Subjective: Pt reports working on strategies and using alphabet board        ACTIVITY  ACCURACY  ASSISTANCE NEEDED   LTGs:   Pt will comprehend spoken information in all settings at 90% no-min cues      PT will express wants/thoughts/needs across settings at 90% no cues      Pt will be able to comprehend written material and social/avocational/work setting at 90% no cues    Pt will be able to use written language skills to communicate effectively in all situations at 95% no cues   Targeted auditory comprehension tasks             Targeted verbal expression with naming and open ended questions      Targeted reading comprehension with community materials             Targeted at word and phrase level 80%                85-90%            80%              80%                  STG:  Pt will demonstrate comprehension of spoken language by answering complex/abstract general information questions at 90% no-min cues        Pt will demonstrate comprehension of spoken language by answering questions about a short paragraph at 90% no-min cues       Targeted in conversation and with structured auditory comprehension tasks                       Not targeted 80% No cues   STG:   PT will name functional objects and describe them at 90% no cues                  Pt will answer open ended questions appropriately at 90% min cues             MET                         Targeted open ended conversation questions                           80-85%                           Min cues   STG:  Pt will improve written language skills by writing grammatically correct sentences at 90% no cues Targeted copying words and writing words and short phrases and sentences independently  Targeted writing functional words, phrases   Targeted writing out sports names and TV information, movies  Used WebVisible board for cues     STG:   Pt will improve comprehension of written language skills by answering written questions related to home management/social/work tasks (bills, recipes, tv guide, emails, procedures) at 90% no cues          PT will utilize  semantic feature analysis in conversation at 95% no cues-NEW Targeted reading comprehension                                Targeted SFA in conversation, pt using descriptions more often without cues  Targeted using technology to look up words for word finding deficits  90% No cues         certification: 6/28/23-9/27/23               OP SLP Education       Row Name        Education    Barriers to Learning Decreased comprehension  -RB    Action Taken to Address Barriers visuals, repetition  -RB    Education Provided ;Patient participated in establishing goals and treatment plan;Patient demonstrated recommended strategies;Patient requires further education on strategies, risks  -RB    Assessed Learning needs;Learning motivation;Learning readiness;Learning preferences  -RB    Learning Motivation Strong  -RB    Learning Method Explanation;Demonstration;Teach back;Written materials  -RB    Teaching Response Verbalized understanding;Demonstrated understanding;Reinforcement needed  -RB    Education Comments HEP: written expression and verbal expression tasks, AC tasks -RB              User Key  (r) = Recorded By, (t) = Taken By, (c) = Cosigned By      Initials Name Effective Dates    Yolande Vernon SLP 05/31/23 -                     Yolande Guillory MA CCC-SLP CBIS  [place] license: [id number]

## 2023-08-24 ENCOUNTER — ANTICOAGULATION VISIT (OUTPATIENT)
Dept: PHARMACY | Facility: HOSPITAL | Age: 48
End: 2023-08-24
Payer: MEDICARE

## 2023-08-24 DIAGNOSIS — Z95.2 HX OF AORTIC VALVE REPLACEMENT, MECHANICAL: Primary | ICD-10-CM

## 2023-08-24 NOTE — PROGRESS NOTES
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   Other: Hodgkins Lymphoma (hx of chemo and radiation); h/o CVA + hemorrhagic pancreatitis (during hospitalization for mAVR)    Diet: avoids GLV (2/20/23)  Alcohol: socially  Tobacco: none  OTC Pain Medication: APAP PRN pain    INR History:  Date 9/27 9/28 10/1 10/4 10/8 10/11 10/17 10/19 10/23 11/8 11/16 12/21   Total Weekly Dose 8mg 12mg 19mg 29mg 31mg 34mg 43mg 41mg 45mg 49mg 49mg 49mg   INR 1.3 1.22 1.3 1.8 1.85 1.70 2.91 2.82 3.79 3.00 2.96 3.44   Notes clinic enox enox enox; clinic enox enox      desvenla     Date 1/9/19 1/16 2/4 3/1 3/22 5/16 6/13 6/24 7/10 7/22 8/5 8/26   Total Weekly Dose 49mg 47mg 49mg 49mg 49mg 49mg 49mg 49mg 49mg 49mg 49mg 49mg   INR 4.40 2.36 2.62 3.02 2.15 2.60 2.25 2.98 2.83 2.97 2.90 3.20   Notes   self adj              Date 9/26 10/18 10/28 11/19 12/3 12/17 1/3/20 1/21 2/3 2/12 2/19 2/27   Total Weekly Dose 49mg 49mg 49mg 49mg 49mg 45mg 45mg 45mg 47mg 43mg 43mg 43mg   INR 3.57 3.12 2.88 3.33 4.06 2.59 3.21 3.85 3.11 2.34 3.01 3.20   Notes less GLV Cymbalta start 9/25; Depakote start 9/5 1x decr dose  depakote inc; trintellix dcd ceftin x10 days Recv'd 1/6  incr GLV  dieting        Date 3/5 3/12 3/20 4/2 4/17 5/7 5/28 6/16 6/29 7/17 8/14 8/28   Total Weekly Dose 41mg 41mg 41mg 41mg 41mg 41mg 41mg 39mg 39mg 39mg 39mg 39mg   INR 3.07 3.01 2.95 2.66 2.9 3.03 3.36 2.48 2.42 3.08 2.78 2.28   Notes 1x decr   1x  decr         recv'd 6/30  recv'd 8/13 recv'd 8/31     Date 9/28 10/29  12/4 1/8/21 2/9 3/31 5/12 6/9  8/3 8/31   Total Weekly Dose 39mg 39mg non-  compliant 39mg 39mg 39mg 39mg 39mg 39mg non- compliant 39mg 39mg   INR 2.33 2.39  2.46 2.45 2.85 2.48 2.52 2.51  2.88 2.77   Notes recv'd 9/29 recv'd 10/30   recv'd 1/11  recv'd 4/1           Date 10/4 11/1 12/6  3/8 4/5 5/9 6/10 6/23 7/5 7/20 8/5   Total Weekly Dose 39mg 39mg 39mg  non- compliant  39mg 39mg 39 mg 39 mg 39 mg 39 mg 39 mg 39mg   INR 2.50 2.54 2.2  2.40 2.40 2.98 2.26 2.96 2.84 2.88 3.48   Notes  Rec 11/2 Rec 12/7       rec'd 7/6  rec 8/8     Date 8/17 8/25 9/19 9/22 10/13 10/31 11/10 12/2 12/29 12/30 1/3/2023 1/13   Total Weekly Dose 37mg 35mg 39 mg  37mg 35 mg 35 mg 35 mg 35 mg 35mg 37.5mg 41.5mg 35mg   INR 3.24 2.6 3.13 3.39 2.46 2.75 2.71 2.77 1.1 2.42 4.14 3.59   Notes  rec 8/26  Inc activity Rec 10/14  rec 11/11 rec 12/5 ??? rcv'd 1/3       Date 1/27 2/17 3/10 4/5 4/14 5/18 5/26-6/5 6/8 6/12 6/20 6/28 7/6   Total Weekly Dose 35mg 35mg 35 mg 35 mg 35mg 35 mg Inpatient hospital 15mg 40mg 37.5 mg 35 mg 35mg   INR 2.94 2.58 2.5 2.44 3.11 2.43  1.3 1.62 2.1 2.83 2.76   Notes rcv'd 1/30 rcv'd 2/20 rec 3/13 crestor  rcv'd 4/17 rec 4/19 Subdural hematoma; kcentra  Rcv'd 6/13   Rcvd 7/7     Date 7/19 8/23/23          Total Weekly Dose  35 mg          INR 2.53 2.51          Notes  Rec'd 8/24            *takes warfarin in AM*    Phone Interview:  Verbal Release Authorization: please contact Mr. Hall directly - if unable to reach patient may contact [email or web address]  Tablet Strength: 2mg and 5mg tablets  Patient Contact Info: [phone]; [email or web address]  Lab Contact Info: Megan Vu       Patient Findings  Negatives: Signs/symptoms of thrombosis, Signs/symptoms of bleeding, Laboratory test error suspected, Change in health, Change in alcohol use, Change in activity, Upcoming invasive procedure, Emergency department visit, Upcoming dental  procedure, Missed doses, Extra doses, Change in medications, Change in diet/appetite, Hospital admission, Bruising, Other complaints   Comments: All findings negative per patient       Plan:  1. INR was therapeutic yesterday at 2.51 (goal 2.0-3.0). instructed Patient to continue warfarin 5mg oral daily until recheck.   2. Repeat INR in 2 weeks, 9/7/23,  will continue to follow closely considering recent subdural hematoma.  3. Verbal and written information provided in the clinic. Mr. Hall RBV dosing instructions, expresses understanding by teach back, and has no further questions at this time.    Delano Curtis, Pharmacy Technician  8/24/2023  13:43 EDT    I, Adam Mayen, [facility], have reviewed the note in full and agree with the assessment and plan.  08/24/23  15:15 EDT

## 2023-08-30 ENCOUNTER — TREATMENT (OUTPATIENT)
Dept: PHYSICAL THERAPY | Facility: CLINIC | Age: 48
End: 2023-08-30
Payer: MEDICARE

## 2023-08-30 DIAGNOSIS — R29.898 DECREASED GRIP STRENGTH OF RIGHT HAND: Primary | ICD-10-CM

## 2023-08-30 DIAGNOSIS — R47.01 APHASIA: Primary | ICD-10-CM

## 2023-08-30 DIAGNOSIS — R41.841 COGNITIVE COMMUNICATION DEFICIT: ICD-10-CM

## 2023-08-30 PROCEDURE — 97530 THERAPEUTIC ACTIVITIES: CPT | Performed by: OCCUPATIONAL THERAPIST

## 2023-08-30 PROCEDURE — 97110 THERAPEUTIC EXERCISES: CPT | Performed by: OCCUPATIONAL THERAPIST

## 2023-08-30 PROCEDURE — 92507 TX SP LANG VOICE COMM INDIV: CPT | Performed by: SPEECH-LANGUAGE PATHOLOGIST

## 2023-08-30 NOTE — PROGRESS NOTES
Occupational Therapy Daily Treatment Note  Visit: 7  Date of Initial Visit: Type: THERAPY  Noted: 2023      Patient: Manjeet Hall   : 1975  Diagnosis/ICD-10 Code:  Decreased  strength of right hand [R29.898]  Referring practitioner: Scotty Means MD  Date of Initial Visit: Type: THERAPY  Noted: 2023  Today's Date: 2023  Patient seen for 7 sessions      Subjective:   Patient reports: no complaints   Pain: 0/10  Subjective Questionnaire: n/a  Clinical Progress: improved  Home Program Compliance: Yes  Treatment has included: therapeutic exercise and therapeutic activity    Subjective   Objective     OT Neuro     SEE FLOW SHEETS AND ACTIVITY LOGS FOR DETAILS          Assessment & Plan       Assessment  Assessment details: Pt completed money solving problems w/100% accuracy with ability to sort coins, paper money and improved HW legibility despite increased concentration required. Pt also with increased ease and recall of items when required to sequence and categorize items with coming up with additional abstract ideas. Spelling was significantly better than during previous session and requiring ~70% less cuing/assist.     Plan  Plan details: Continue w/ OT POC and goals to reflect pt needs and promote increased independence, safety and engagement in daily tasks.         Visit Diagnoses:    ICD-10-CM ICD-9-CM   1. Decreased  strength of right hand  R29.898 729.89             Timed:  Manual Therapy:    0     mins  55421;  Therapeutic Exercise:    8     mins  35949;     Neuromuscular Roxy:    0    mins  47036;    Therapeutic Activity:     37     mins  73399;     Self-Care/ADL     0     mins  07345;   Sensory Int. Tech      0     mins 22622;  Ultrasound:     0     mins  05869;    Electrical Stimulation:    0     mins  04252 ( );    Untimed:  Electrical Stimulation:    0     mins  31178 ( );    Timed Treatment:   45   mins   Total Treatment:     45   mins    OT  Signature: Puja Hernández MS, OTR/L, CDP  [place] License #: [id number]

## 2023-08-30 NOTE — PROGRESS NOTES
Outpatient Speech Language Pathology   Adult Speech Language Cognitive Treatment Note  [address] [address]       Patient Name: Manjeet Hlal  : 1975  MRN: [id number]  Today's Date: 2023        Visit Date: 2023   Patient Active Problem List   Diagnosis    Depression    Generalized anxiety disorder    Gastroesophageal reflux disease    Congenital heart disease    Thoracic aortic aneurysm    Hx of aortic valve replacement, mechanical [Z95.2]    Sequelae, post-stroke    Warfarin anticoagulation    Ischemic stroke    Pulmonic stenosis    Subdural hematoma    Bilateral Subdural Hematoma    HTN    Hyperlipidemia (Diet controlled, off statin)    Hypothyroidism    Hodgkin's Lymphoma s/p XRT & Chemo    VSD s/p closure (age 14)    History of aortic regurgitation / stenosis s/p ROSS procedure with subsequent reversal with Mechanical AVR    H/O aortic mechanical valve replacement on Coumadin    Type 2 diabetes mellitus with hyperglycemia, without long-term current use of insulin        Past Medical History:   Diagnosis Date    Abnormal heart rhythm     Depression     Elevated cholesterol     GERD (gastroesophageal reflux disease)     Heart murmur     History of echocardiogram     History of left heart catheterization     History of stomach ulcers     Mitral valvular disorder     Stroke         Past Surgical History:   Procedure Laterality Date    CARDIAC SURGERY      aortic valve replacement in , further repair in 2018    CRANIOTOMY FOR SUBDURAL HEMATOMA Bilateral 2023    Procedure: CRANIOTOMY FOR BILATERAL SUBDURAL HEMATOMAS;  Surgeon: Stephane Womack MD;  Location:  [facility];  Service: Neurosurgery;  Laterality: Bilateral;    EMBOLIZATION CEREBRAL N/A 2023    Procedure: Emoblization cerebral;  Surgeon: Stephane Womack MD;  Location:  [facility];  Service: Interventional Radiology;  Laterality: N/A;    SKIN CANCER EXCISION      mass removed from neck and  chest for hodgkins         Visit Dx:    ICD-10-CM ICD-9-CM   1. Aphasia  R47.01 784.3   2. Cognitive communication deficit  R41.841 799.52            OP SLP Assessment/Plan -           SLP Assessment    Functional Problems Speech Language- Adult/Cognition  -RB    Impact on Function: Adult Speech Language/Cognition Difficulty communicating wants, needs, and ideas;Difficulty communicating in a medical emergency;Restrictions in personal and social life;Difficulty sequencing thoughts to express complex messages;Unable to understand written/spoken language;Difficulty following directions  -RB    Clinical Impression: Speech Language-Adult/Congnition Moderate:;Receptive Aphasia;Expressive Aphasia  -RB    Functional Problems Comment impacts communication, safety, participation, QOL, daily tasks  -RB    Clinical Impression Comments Highly motivated, following HEP, good support, pt making written expression gains and verbal expression gains-RB    Please refer to paper survey for additional self-reported information Yes  -RB    Please refer to items scanned into chart for additional diagnostic informaiton and handouts as provided by clinician Yes  -RB    SLP Diagnosis Aphasia, cog/comm deficit -RB    Prognosis Good (comment)  -RB    Patient/caregiver participated in establishment of treatment plan and goals Yes  -RB    Patient would benefit from skilled therapy intervention Yes  -RB       SLP Plan    Frequency 1x/weekly  -RB    Duration 4 weeks  -RB    Planned CPT's? SLP INDIVIDUAL SPEECH THERAPY: 04073  -RB    Expected Duration of Therapy Session (SLP Ana) 45  -RB    Plan Comments continue POC  -RB              User Key  (r) = Recorded By, (t) = Taken By, (c) = Cosigned By      Initials Name Provider Type    RB Yolande Guillory SLP Speech and Language Pathologist                    Pain: 0  Subjective: Pt reports progress, plans to go to the [employer] game Saturday        ACTIVITY  ACCURACY ASSISTANCE NEEDED   LTGs:   Pt will  comprehend spoken information in all settings at 90% no-min cues      PT will express wants/thoughts/needs across settings at 90% no cues      Pt will be able to comprehend written material and social/avocational/work setting at 90% no cues    Pt will be able to use written language skills to communicate effectively in all situations at 95% no cues   Targeted auditory comprehension tasks             Targeted verbal expression with naming and open ended questions      Targeted reading comprehension with community materials             Targeted sentences 80%                85-90%            80%              90%                  STG:  Pt will demonstrate comprehension of spoken language by answering complex/abstract general information questions at 90% no-min cues        Pt will demonstrate comprehension of spoken language by answering questions about a short paragraph at 90% no-min cues       Targeted in conversation and with structured auditory comprehension tasks                       Not targeted 80% No cues   STG:   PT will name functional objects and describe them at 90% no cues                  Pt will answer open ended questions appropriately at 90% min cues             MET                         Targeted open ended conversation questions                           80-85%                           Min cues   STG:  Pt will improve written language skills by writing grammatically correct sentences at 90% no cues Targeted writing sentences about his children and cleaning topics 95% No cues   STG:   Pt will improve comprehension of written language skills by answering written questions related to home management/social/work tasks (bills, recipes, tv guide, emails, procedures) at 90% no cues          PT will utilize semantic feature analysis in conversation at 95% no cues Targeted reading comprehension                                Targeted SFA in conversation, pt using descriptions more often without  cues  Targeted using technology to look up words for word finding deficits  90%                                    85% No cues                                  No cues         certification: 6/28/23-9/27/23               OP SLP Education       Row Name        Education    Barriers to Learning Decreased comprehension  -RB    Action Taken to Address Barriers visuals, repetition  -RB    Education Provided ;Patient participated in establishing goals and treatment plan;Patient demonstrated recommended strategies;Patient requires further education on strategies, risks  -RB    Assessed Learning needs;Learning motivation;Learning readiness;Learning preferences  -RB    Learning Motivation Strong  -RB    Learning Method Explanation;Demonstration;Teach back;Written materials  -RB    Teaching Response Verbalized understanding;Demonstrated understanding;Reinforcement needed  -RB    Education Comments HEP: written expression and verbal expression tasks, AC tasks, sentences -RB              User Key  (r) = Recorded By, (t) = Taken By, (c) = Cosigned By      Initials Name Effective Dates    Yolande Vernon SLP 05/31/23 -                     Yolande Guillory MA CCC-SLP [facility]  [place] license: [id number]

## 2023-09-06 ENCOUNTER — TREATMENT (OUTPATIENT)
Dept: PHYSICAL THERAPY | Facility: CLINIC | Age: 48
End: 2023-09-06
Payer: MEDICARE

## 2023-09-06 DIAGNOSIS — R47.01 APHASIA: Primary | Chronic | ICD-10-CM

## 2023-09-06 DIAGNOSIS — R41.841 COGNITIVE COMMUNICATION DEFICIT: ICD-10-CM

## 2023-09-06 PROCEDURE — 92507 TX SP LANG VOICE COMM INDIV: CPT | Performed by: SPEECH-LANGUAGE PATHOLOGIST

## 2023-09-06 NOTE — PROGRESS NOTES
Outpatient Speech Language Pathology   Adult Speech Language Cognitive Treatment Note  [address] [address]       Patient Name: Manjeet Hall  : 1975  MRN: [id number]  Today's Date: 2023        Visit Date: 2023   Patient Active Problem List   Diagnosis    Depression    Generalized anxiety disorder    Gastroesophageal reflux disease    Congenital heart disease    Thoracic aortic aneurysm    Hx of aortic valve replacement, mechanical [Z95.2]    Sequelae, post-stroke    Warfarin anticoagulation    Ischemic stroke    Pulmonic stenosis    Subdural hematoma    Bilateral Subdural Hematoma    HTN    Hyperlipidemia (Diet controlled, off statin)    Hypothyroidism    Hodgkin's Lymphoma s/p XRT & Chemo    VSD s/p closure (age 14)    History of aortic regurgitation / stenosis s/p ROSS procedure with subsequent reversal with Mechanical AVR    H/O aortic mechanical valve replacement on Coumadin    Type 2 diabetes mellitus with hyperglycemia, without long-term current use of insulin        Past Medical History:   Diagnosis Date    Abnormal heart rhythm     Depression     Elevated cholesterol     GERD (gastroesophageal reflux disease)     Heart murmur     History of echocardiogram     History of left heart catheterization     History of stomach ulcers     Mitral valvular disorder     Stroke         Past Surgical History:   Procedure Laterality Date    CARDIAC SURGERY      aortic valve replacement in , further repair in 2018    CRANIOTOMY FOR SUBDURAL HEMATOMA Bilateral 2023    Procedure: CRANIOTOMY FOR BILATERAL SUBDURAL HEMATOMAS;  Surgeon: Stephane Womack MD;  Location:  [facility];  Service: Neurosurgery;  Laterality: Bilateral;    EMBOLIZATION CEREBRAL N/A 2023    Procedure: Emoblization cerebral;  Surgeon: Stephane Womack MD;  Location:  [facility];  Service: Interventional Radiology;  Laterality: N/A;    SKIN CANCER EXCISION      mass removed from neck and  chest for hodgkins         Visit Dx:    ICD-10-CM ICD-9-CM   1. Aphasia  R47.01 784.3   2. Cognitive communication deficit  R41.841 799.52            OP SLP Assessment/Plan -           SLP Assessment    Functional Problems Speech Language- Adult/Cognition  -RB    Impact on Function: Adult Speech Language/Cognition Difficulty communicating wants, needs, and ideas;Difficulty communicating in a medical emergency;Restrictions in personal and social life;Difficulty sequencing thoughts to express complex messages;Unable to understand written/spoken language;Difficulty following directions  -RB    Clinical Impression: Speech Language-Adult/Congnition Moderate:;Receptive Aphasia;Expressive Aphasia  -RB    Functional Problems Comment impacts communication, safety, participation, QOL, daily tasks  -RB    Clinical Impression Comments Highly motivated, following HEP, good support, pt making written expression gains and verbal expression gains, pt using external aids for word finding-RB    Please refer to paper survey for additional self-reported information Yes  -RB    Please refer to items scanned into chart for additional diagnostic informaiton and handouts as provided by clinician Yes  -RB    SLP Diagnosis Aphasia, cog/comm deficit -RB    Prognosis Good (comment)  -RB    Patient/caregiver participated in establishment of treatment plan and goals Yes  -RB    Patient would benefit from skilled therapy intervention Yes  -RB       SLP Plan    Frequency 1x/weekly  -RB    Duration 3 weeks  -RB    Planned CPT's? SLP INDIVIDUAL SPEECH THERAPY: 75603  -RB    Expected Duration of Therapy Session (SLP Ana) 45  -RB    Plan Comments continue POC  -RB              User Key  (r) = Recorded By, (t) = Taken By, (c) = Cosigned By      Initials Name Provider Type    Yolande Vernon SLP Speech and Language Pathologist                    Pain: 0  Subjective: Pt reports some progress, plans to watch the [employer] game Saturday        ACTIVITY   ACCURACY ASSISTANCE NEEDED   LTGs:   Pt will comprehend spoken information in all settings at 90% no-min cues      PT will express wants/thoughts/needs across settings at 90% no cues      Pt will be able to comprehend written material and social/avocational/work setting at 90% no cues    Pt will be able to use written language skills to communicate effectively in all situations at 95% no cues   Targeted auditory comprehension tasks             Targeted verbal expression with naming and open ended questions      Targeted reading comprehension with written cues              Targeted sentences 80%                85-90%            90%              90%                  STG:  Pt will demonstrate comprehension of spoken language by answering complex/abstract general information questions at 90% no-min cues        Pt will demonstrate comprehension of spoken language by answering questions about a short paragraph at 90% no-min cues         Targeted in conversation and with structured auditory comprehension tasks                       Not targeted   80%   No cues   STG:   PT will name functional objects and describe them at 90% no cues                  Pt will answer open ended questions appropriately at 90% min cues             MET                           Targeted open ended conversation questions                           85%                           Min cues   STG:  Pt will improve written language skills by writing grammatically correct sentences at 90% no cues   Targeted writing sentences about sports-MET   95%   No cues   STG:   Pt will improve comprehension of written language skills by answering written questions related to home management/social/work tasks (bills, recipes, tv guide, emails, procedures) at 90% no cues          PT will utilize semantic feature analysis in conversation at 95% no cues   Not targeted                                Targeted SFA in conversation, pt using descriptions more often without  cues  Targeted using technology to look up words for word finding deficits                                    85%                                   No cues         certification: 6/28/23-9/27/23               OP SLP Education       Row Name        Education    Barriers to Learning Decreased comprehension  -RB    Action Taken to Address Barriers visuals, repetition  -RB    Education Provided ;Patient participated in establishing goals and treatment plan;Patient demonstrated recommended strategies;Patient requires further education on strategies, risks  -RB    Assessed Learning needs;Learning motivation;Learning readiness;Learning preferences  -RB    Learning Motivation Strong  -RB    Learning Method Explanation;Demonstration;Teach back;Written materials  -RB    Teaching Response Verbalized understanding;Demonstrated understanding;Reinforcement needed  -RB    Education Comments HEP: written expression -RB              User Key  (r) = Recorded By, (t) = Taken By, (c) = Cosigned By      Initials Name Effective Dates    Yolnade Vernon SLP 05/31/23 -                   OSCAR Elliott  provided treatment under my direct supervision     Yolande Guillory MA CCC-SLP [facility] license: [id number]

## 2023-09-13 ENCOUNTER — TREATMENT (OUTPATIENT)
Dept: PHYSICAL THERAPY | Facility: CLINIC | Age: 48
End: 2023-09-13
Payer: MEDICARE

## 2023-09-13 DIAGNOSIS — R29.898 DECREASED GRIP STRENGTH OF RIGHT HAND: Primary | ICD-10-CM

## 2023-09-13 DIAGNOSIS — R41.841 COGNITIVE COMMUNICATION DEFICIT: ICD-10-CM

## 2023-09-13 DIAGNOSIS — R47.01 APHASIA: Primary | ICD-10-CM

## 2023-09-13 PROCEDURE — 97110 THERAPEUTIC EXERCISES: CPT | Performed by: OCCUPATIONAL THERAPIST

## 2023-09-13 PROCEDURE — 97530 THERAPEUTIC ACTIVITIES: CPT | Performed by: OCCUPATIONAL THERAPIST

## 2023-09-13 PROCEDURE — 92507 TX SP LANG VOICE COMM INDIV: CPT | Performed by: SPEECH-LANGUAGE PATHOLOGIST

## 2023-09-13 NOTE — PROGRESS NOTES
Occupational Therapy Daily Treatment Note  Visit: 8  Date of Initial Visit: Type: THERAPY  Noted: 2023      Patient: Manjeet Hall   : 1975  Diagnosis/ICD-10 Code:  Decreased  strength of right hand [R29.898]  Referring practitioner: Scotty Means MD  Date of Initial Visit: Type: THERAPY  Noted: 2023  Today's Date: 9/15/2023  Patient seen for 8 sessions      Subjective:   Patient reports: no complaints  Pain: 0/10  Subjective Questionnaire: n/a  Clinical Progress: improved  Home Program Compliance: Yes  Treatment has included: therapeutic exercise and therapeutic activity    Subjective   Objective     OT Neuro     SEE FLOW SHEETS AND ACTIVITY LOGS FOR DETAILS      Assessment & Plan       Assessment  Assessment details: Pt exhibits progressive independence with correct spelling and identification of items, colors, and details both written and aloud. Written communication continues to be much quicker and easier. Pt able to identify 11/12 objects with 100% accuracy and no cuing.     Plan  Plan details: Continue w/OT POC and goals as est to aid with pt meeting needs with progression toward daily independence, safety and engagement with ADLs/IADLs.      Visit Diagnoses:    ICD-10-CM ICD-9-CM   1. Decreased  strength of right hand  R29.898 729.89             Timed:  Manual Therapy:    0     mins  48816;  Therapeutic Exercise:    8     mins  32281;     Neuromuscular Roxy:    0    mins  88105;    Therapeutic Activity:     32     mins  00323;     Self-Care/ADL     0     mins  04193;   Sensory Int. Tech      0     mins 36333;  Ultrasound:     0     mins  42544;    Electrical Stimulation:    0     mins  84575 ( );    Untimed:  Electrical Stimulation:    0     mins  53012 ( );    Timed Treatment:   40   mins   Total Treatment:     40   mins    OT Signature: Puja Hernández MS, OTR/L, CDP  [place] License #: [id number]

## 2023-09-13 NOTE — PROGRESS NOTES
Outpatient Speech Language Pathology   Adult Speech Language Cognitive Treatment Note  [address] [address]       Patient Name: Manjeet Hall  : 1975  MRN: [id number]  Today's Date: 2023        Visit Date: 2023   Patient Active Problem List   Diagnosis    Depression    Generalized anxiety disorder    Gastroesophageal reflux disease    Congenital heart disease    Thoracic aortic aneurysm    Hx of aortic valve replacement, mechanical [Z95.2]    Sequelae, post-stroke    Warfarin anticoagulation    Ischemic stroke    Pulmonic stenosis    Subdural hematoma    Bilateral Subdural Hematoma    HTN    Hyperlipidemia (Diet controlled, off statin)    Hypothyroidism    Hodgkin's Lymphoma s/p XRT & Chemo    VSD s/p closure (age 14)    History of aortic regurgitation / stenosis s/p ROSS procedure with subsequent reversal with Mechanical AVR    H/O aortic mechanical valve replacement on Coumadin    Type 2 diabetes mellitus with hyperglycemia, without long-term current use of insulin        Past Medical History:   Diagnosis Date    Abnormal heart rhythm     Depression     Elevated cholesterol     GERD (gastroesophageal reflux disease)     Heart murmur     History of echocardiogram     History of left heart catheterization     History of stomach ulcers     Mitral valvular disorder     Stroke         Past Surgical History:   Procedure Laterality Date    CARDIAC SURGERY      aortic valve replacement in , further repair in 2018    CRANIOTOMY FOR SUBDURAL HEMATOMA Bilateral 2023    Procedure: CRANIOTOMY FOR BILATERAL SUBDURAL HEMATOMAS;  Surgeon: Stephane Womack MD;  Location:  [facility];  Service: Neurosurgery;  Laterality: Bilateral;    EMBOLIZATION CEREBRAL N/A 2023    Procedure: Emoblization cerebral;  Surgeon: Stephane Womack MD;  Location:  [facility];  Service: Interventional Radiology;  Laterality: N/A;    SKIN CANCER EXCISION      mass removed from neck and  chest for hodgkins         Visit Dx:    ICD-10-CM ICD-9-CM   1. Aphasia  R47.01 784.3   2. Cognitive communication deficit  R41.841 799.52            OP SLP Assessment/Plan -           SLP Assessment    Functional Problems Speech Language- Adult/Cognition  -RB    Impact on Function: Adult Speech Language/Cognition Difficulty communicating wants, needs, and ideas;Difficulty communicating in a medical emergency;Restrictions in personal and social life;Difficulty sequencing thoughts to express complex messages;Unable to understand written/spoken language;Difficulty following directions  -RB    Clinical Impression: Speech Language-Adult/Congnition Moderate:;Receptive Aphasia;Expressive Aphasia  -RB    Functional Problems Comment impacts communication, safety, participation, QOL, daily tasks  -RB    Clinical Impression Comments Highly motivated, following HEP, good support,making expression gains and comprehension gains  -RB    Please refer to paper survey for additional self-reported information Yes  -RB    Please refer to items scanned into chart for additional diagnostic informaiton and handouts as provided by clinician Yes  -RB    SLP Diagnosis Aphasia, cog/comm deficit -RB    Prognosis Good (comment)  -RB    Patient/caregiver participated in establishment of treatment plan and goals Yes  -RB    Patient would benefit from skilled therapy intervention Yes  -RB       SLP Plan    Frequency 1x/weekly  -RB    Duration 2 weeks  -RB    Planned CPT's? SLP INDIVIDUAL SPEECH THERAPY: 53071  -RB    Expected Duration of Therapy Session (SLP Eval) 45  -RB    Plan Comments continue POC  -RB              User Key  (r) = Recorded By, (t) = Taken By, (c) = Cosigned By      Initials Name Provider Type    Yolande Vernon SLP Speech and Language Pathologist                    Pain: 0  Subjective: Pt reports brother being sick        ACTIVITY  ACCURACY ASSISTANCE NEEDED   LTGs:   Pt will comprehend spoken information in all settings  at 90% no-min cues      PT will express wants/thoughts/needs across settings at 90% no cues      Pt will be able to comprehend written material and social/avocational/work setting at 90% no cues    Pt will be able to use written language skills to communicate effectively in all situations at 95% no cues     Targeted auditory comprehension tasks           Targeted verbal expression with naming and open ended questions      Targeted reading comprehension with written cues            Targeted sentences   80%            85-90%            90%              90%                    STG:  Pt will demonstrate comprehension of spoken language by answering complex/abstract general information questions at 90% no-min cues        Pt will demonstrate comprehension of spoken language by answering questions about a short paragraph at 90% no-min cues         Targeted in conversation and with structured auditory comprehension tasks                 Not targeted   85%   No cues   STG:   PT will name functional objects and describe them at 90% no cues                  Pt will answer open ended questions appropriately at 90% min cues             MET                           Targeted open ended conversation questions                           85%                           Min cues   STG:  Pt will improve written language skills by writing grammatically correct sentences at 90% no cues   Targeted writing sentences about previous vacations-MET   95%   No cues   STG:   Pt will improve comprehension of written language skills by answering written questions related to home management/social/work tasks (bills, recipes, tv guide, emails, procedures) at 90% no cues          PT will utilize semantic feature analysis in conversation at 95% no cues   Not targeted                              Targeted SFA in conversation, pt using descriptions more often without cues  Targeted using technology to look up words for word finding deficits                                   90%                                 No cues         certification: 6/28/23-9/27/23               OP SLP Education       Row Name        Education    Barriers to Learning Decreased comprehension  -RB    Action Taken to Address Barriers visuals, repetition  -RB    Education Provided ;Patient participated in establishing goals and treatment plan;Patient demonstrated recommended strategies;Patient requires further education on strategies, risks  -RB    Assessed Learning needs;Learning motivation;Learning readiness;Learning preferences  -RB    Learning Motivation Strong  -RB    Learning Method Explanation;Demonstration;Teach back;Written materials  -RB    Teaching Response Verbalized understanding;Demonstrated understanding;Reinforcement needed  -RB    Education Comments HEP: written expression, sports games -RB              User Key  (r) = Recorded By, (t) = Taken By, (c) = Cosigned By      Initials Name Effective Dates    Yolande Vernon SLP 05/31/23 -                   OSCAR Elliott  provided treatment under my direct supervision     Yolande Guillory MA CCC-SLP [facility]  [place] license: [id number]

## 2023-09-18 RX ORDER — ROSUVASTATIN CALCIUM 20 MG/1
TABLET, COATED ORAL
Qty: 90 TABLET | Refills: 1 | Status: SHIPPED | OUTPATIENT
Start: 2023-09-18

## 2023-09-20 ENCOUNTER — TELEPHONE (OUTPATIENT)
Dept: PHYSICAL THERAPY | Facility: CLINIC | Age: 48
End: 2023-09-20

## 2023-09-29 ENCOUNTER — OFFICE VISIT (OUTPATIENT)
Dept: ENDOCRINOLOGY | Facility: CLINIC | Age: 48
End: 2023-09-29
Payer: MEDICARE

## 2023-09-29 VITALS
SYSTOLIC BLOOD PRESSURE: 130 MMHG | BODY MASS INDEX: 32.14 KG/M2 | HEIGHT: 69 IN | HEART RATE: 99 BPM | DIASTOLIC BLOOD PRESSURE: 62 MMHG | WEIGHT: 217 LBS

## 2023-09-29 DIAGNOSIS — E11.9 CONTROLLED TYPE 2 DIABETES MELLITUS WITHOUT COMPLICATION, WITHOUT LONG-TERM CURRENT USE OF INSULIN: Primary | Chronic | ICD-10-CM

## 2023-09-29 DIAGNOSIS — E03.9 HYPOTHYROIDISM (ACQUIRED): Chronic | ICD-10-CM

## 2023-09-29 LAB
EXPIRATION DATE: ABNORMAL
EXPIRATION DATE: NORMAL
GLUCOSE BLDC GLUCOMTR-MCNC: 150 MG/DL (ref 70–130)
HBA1C MFR BLD: 6.3 %
Lab: ABNORMAL
Lab: NORMAL

## 2023-09-29 RX ORDER — SEMAGLUTIDE 0.68 MG/ML
INJECTION, SOLUTION SUBCUTANEOUS
Qty: 9 ML | Refills: 1 | Status: SHIPPED | OUTPATIENT
Start: 2023-09-29

## 2023-09-29 NOTE — PROGRESS NOTES
Chief Complaint  Establish care for Diabetes Mellitus.    [facility]   Manjeet Hall is a 47 y.o. male with hx of hodgkin's lymphoma, CVA, subdural hematoma, mechanical aortic valve, who is here today for evaluation of Diabetes Mellitus type 2. The initial diagnosis of diabetes was made in his early 40s.     Presents to appt with daughter, Hamzah.     Diabetic complications: CVA 2017 or 2018  Eye exam current (within one year): due    Current diabetic medications include:  Metformin  mg daily    Statin: crestor 20     Past medications: none    Diabetic Monitoring  - no meter or log for review    Hypothyroid - on levothyroxine 125 mcg     The following portions of the patient's history were reviewed and updated by me as appropriate: allergies, current medications, past family history, past social history, past surgical history and problem list.    Past Medical History:   Diagnosis Date    Abnormal heart rhythm     Depression     Elevated cholesterol     GERD (gastroesophageal reflux disease)     Heart murmur     History of echocardiogram     History of left heart catheterization     History of stomach ulcers     Mitral valvular disorder     Stroke        Medications    Current Outpatient Medications:     DULoxetine (CYMBALTA) 60 MG capsule, Take 1 capsule by mouth Daily., Disp: , Rfl: 2    lansoprazole (PREVACID) 30 MG capsule, TAKE 1 CAPSULE BY MOUTH EVERY DAY, Disp: 90 capsule, Rfl: 1    levothyroxine (SYNTHROID, LEVOTHROID) 125 MCG tablet, TAKE 1 TABLET BY MOUTH DAILY. FURTHER REFILLS PER PCP WITH REPEAT LAB WORK., Disp: 90 tablet, Rfl: 0    metoprolol tartrate (LOPRESSOR) 25 MG tablet, Take 0.5 tablets by mouth Every 12 (Twelve) Hours., Disp: 90 tablet, Rfl: 3    rosuvastatin (CRESTOR) 20 MG tablet, TAKE 1 TABLET BY MOUTH EVERY DAY, Disp: 90 tablet, Rfl: 1    warfarin (COUMADIN) 5 MG tablet, TAKE 1 TABLET BY MOUTH DAILY AS DIRECTED BY ANTICOAGULATION CLINIC., Disp: 90 tablet, Rfl: 1    Semaglutide,0.25 or  "0.5MG/DOS, (Ozempic, 0.25 or 0.5 MG/DOSE,) 2 MG/3ML solution pen-injector, Start with 0.25 mg weekly for 4 weeks then increase to 0.5 mg weekly, Disp: 9 mL, Rfl: 1    Review of Systems  Review of Systems   All other systems reviewed and are negative.     Physical Exam    /62   Pulse 99   Ht 175.3 cm (69.02\")   Wt 98.4 kg (217 lb)   BMI 32.03 kg/m² Body mass index is 32.03 kg/m².  Physical Exam  Constitutional:       General: He is not in acute distress.     Appearance: He is well-developed. He is not diaphoretic.   Neck:      Thyroid: No thyromegaly.      Vascular: No JVD.      Trachea: No tracheal deviation.   Cardiovascular:      Rate and Rhythm: Normal rate and regular rhythm.      Pulses:           Dorsalis pedis pulses are 2+ on the right side and 2+ on the left side.        Posterior tibial pulses are 2+ on the right side and 2+ on the left side.      Heart sounds: Normal heart sounds. No murmur heard.  Pulmonary:      Effort: Pulmonary effort is normal. No respiratory distress.      Breath sounds: Normal breath sounds. No wheezing.   Musculoskeletal:         General: No tenderness.      Cervical back: Neck supple.      Right foot: No deformity or Charcot foot.      Left foot: No deformity or Charcot foot.   Feet:      Right foot:      Protective Sensation: 5 sites tested.  5 sites sensed.      Skin integrity: No ulcer, blister, skin breakdown, erythema, warmth or callus.      Left foot:      Protective Sensation: 5 sites tested.  5 sites sensed.      Skin integrity: No ulcer, blister, skin breakdown, erythema, warmth or callus.      Comments: Diabetic Foot Exam Performed and Monofilament Test Performed      Skin:     General: Skin is warm and dry.      Findings: No erythema or rash.   Neurological:      Mental Status: He is alert and oriented to person, place, and time.   Psychiatric:         Behavior: Behavior normal.         Thought Content: Thought content normal.         Judgment: Judgment " normal.       Labs and Imaging   Lab Results   Component Value Date    HGBA1C 6.3 09/29/2023    HGBA1C 6.4 07/05/2023    HGBA1C 8.90 (H) 05/26/2023     CMP          6/3/2023    07:20 6/4/2023    05:39 6/5/2023    06:46   CMP   Glucose 125  120  130    BUN 12  16  13    Creatinine 0.72  0.81  0.71    EGFR 113.4  109.4  113.9    Sodium 140  142  141    Potassium 4.0  3.9  4.1    Chloride 106  106  106    Calcium 9.1  8.8  9.0    BUN/Creatinine Ratio 16.7  19.8  18.3    Anion Gap 10.0  12.0  11.0      CBC          6/3/2023    07:21 6/4/2023    05:39 6/5/2023    06:46   CBC   WBC 8.98  10.46  10.30    RBC 4.37  4.25  4.33    Hemoglobin 11.7  11.4  11.7    Hematocrit 37.2  36.2  36.8    MCV 85.1  85.2  85.0    MCH 26.8  26.8  27.0    MCHC 31.5  31.5  31.8    RDW 15.4  15.4  15.6    Platelets 210  203  207      Lipid Panel          12/28/2022    13:40 5/27/2023    05:19   Lipid Panel   Total Cholesterol 196  181    Triglycerides 208  147    HDL Cholesterol 52  48    VLDL Cholesterol 36  26    LDL Cholesterol  108  107    LDL/HDL Ratio 1.97  2.16      TSH          12/28/2022    13:40 5/26/2023    10:08   TSH   TSH 0.985  1.610      Most Recent A1C          9/29/2023    14:34   HGBA1C Most Recent   Hemoglobin A1C 6.3          Assessment / Plan   Diagnoses and all orders for this visit:    1. Controlled type 2 diabetes mellitus without complication, without long-term current use of insulin (Primary)  -     POC Glucose, Blood  -     POC Glycosylated Hemoglobin (Hb A1C)  -     Semaglutide,0.25 or 0.5MG/DOS, (Ozempic, 0.25 or 0.5 MG/DOSE,) 2 MG/3ML solution pen-injector; Start with 0.25 mg weekly for 4 weeks then increase to 0.5 mg weekly  Dispense: 9 mL; Refill: 1    2. Hypothyroidism        Diabetes Mellitus 2 is under good control.  -A1c 6.3  -diabetes is well controlled  -we discussed changing metformin to glp-1 agonist for added CV benefits and he is in agreement  -start ozempic 0.25 mg weekly for 4 weeks then increase to  0.5 mg weekly. Possible side effects reviewed. No hx of pancreatitis. No personal or fam hx of thyroid ca or MEN2  -dc metformin  -consider adding sglt2i later  -labs are utd, foot exam updated today, encouraged pt to update eye exam    Hypothyroidism  -normal TSH 5/2023  -continue levothyroxine 125 mcg daily    There are no Patient Instructions on file for this visit.    Follow up: Return in about 3 months (around 12/29/2023).    Signed by: Candido Turner PA-C  Endocrinology

## 2023-09-29 NOTE — LETTER
September 29, 2023     Faustina Mckeon DO  [address] [facility] [address]    Patient: Manjeet Hall   YOB: 1975   Date of Visit: 9/29/2023     Dear Faustina Mckeon DO:       Thank you for referring Manjeet Hall to me for evaluation. Below are the relevant portions of my assessment and plan of care.    If you have questions, please do not hesitate to call me. I look forward to following Manjeet along with you.         Sincerely,        Candido Turner PA-C        CC: No Recipients    Candido Turner PA-C  09/29/23 1524  Sign when Signing Visit  Chief Complaint  Establish care for Diabetes Mellitus.    HPI   Manjeet Hall is a 47 y.o. male with hx of hodgkin's lymphoma, CVA, subdural hematoma, mechanical aortic valve, who is here today for evaluation of Diabetes Mellitus type 2. The initial diagnosis of diabetes was made in his early 40s.     Presents to appt with daughter, Hamzah.     Diabetic complications: CVA 2017 or 2018  Eye exam current (within one year): due    Current diabetic medications include:  Metformin  mg daily    Statin: crestor 20     Past medications: none    Diabetic Monitoring  - no meter or log for review    Hypothyroid - on levothyroxine 125 mcg     The following portions of the patient's history were reviewed and updated by me as appropriate: allergies, current medications, past family history, past social history, past surgical history and problem list.    Past Medical History:   Diagnosis Date   • Abnormal heart rhythm    • Depression    • Elevated cholesterol    • GERD (gastroesophageal reflux disease)    • Heart murmur    • History of echocardiogram    • History of left heart catheterization    • History of stomach ulcers    • Mitral valvular disorder    • Stroke        Medications    Current Outpatient Medications:   •  DULoxetine (CYMBALTA) 60 MG capsule, Take 1 capsule by mouth Daily., Disp: , Rfl: 2  •  lansoprazole (PREVACID) 30  "MG capsule, TAKE 1 CAPSULE BY MOUTH EVERY DAY, Disp: 90 capsule, Rfl: 1  •  levothyroxine (SYNTHROID, LEVOTHROID) 125 MCG tablet, TAKE 1 TABLET BY MOUTH DAILY. FURTHER REFILLS PER PCP WITH REPEAT LAB WORK., Disp: 90 tablet, Rfl: 0  •  metoprolol tartrate (LOPRESSOR) 25 MG tablet, Take 0.5 tablets by mouth Every 12 (Twelve) Hours., Disp: 90 tablet, Rfl: 3  •  rosuvastatin (CRESTOR) 20 MG tablet, TAKE 1 TABLET BY MOUTH EVERY DAY, Disp: 90 tablet, Rfl: 1  •  warfarin (COUMADIN) 5 MG tablet, TAKE 1 TABLET BY MOUTH DAILY AS DIRECTED BY ANTICOAGULATION CLINIC., Disp: 90 tablet, Rfl: 1  •  Semaglutide,0.25 or 0.5MG/DOS, (Ozempic, 0.25 or 0.5 MG/DOSE,) 2 MG/3ML solution pen-injector, Start with 0.25 mg weekly for 4 weeks then increase to 0.5 mg weekly, Disp: 9 mL, Rfl: 1    Review of Systems  Review of Systems   All other systems reviewed and are negative.     Physical Exam    /62   Pulse 99   Ht 175.3 cm (69.02\")   Wt 98.4 kg (217 lb)   BMI 32.03 kg/m² Body mass index is 32.03 kg/m².  Physical Exam  Constitutional:       General: He is not in acute distress.     Appearance: He is well-developed. He is not diaphoretic.   Neck:      Thyroid: No thyromegaly.      Vascular: No JVD.      Trachea: No tracheal deviation.   Cardiovascular:      Rate and Rhythm: Normal rate and regular rhythm.      Pulses:           Dorsalis pedis pulses are 2+ on the right side and 2+ on the left side.        Posterior tibial pulses are 2+ on the right side and 2+ on the left side.      Heart sounds: Normal heart sounds. No murmur heard.  Pulmonary:      Effort: Pulmonary effort is normal. No respiratory distress.      Breath sounds: Normal breath sounds. No wheezing.   Musculoskeletal:         General: No tenderness.      Cervical back: Neck supple.      Right foot: No deformity or Charcot foot.      Left foot: No deformity or Charcot foot.   Feet:      Right foot:      Protective Sensation: 5 sites tested.  5 sites sensed.      Skin " integrity: No ulcer, blister, skin breakdown, erythema, warmth or callus.      Left foot:      Protective Sensation: 5 sites tested.  5 sites sensed.      Skin integrity: No ulcer, blister, skin breakdown, erythema, warmth or callus.      Comments: Diabetic Foot Exam Performed and Monofilament Test Performed      Skin:     General: Skin is warm and dry.      Findings: No erythema or rash.   Neurological:      Mental Status: He is alert and oriented to person, place, and time.   Psychiatric:         Behavior: Behavior normal.         Thought Content: Thought content normal.         Judgment: Judgment normal.       Labs and Imaging   Lab Results   Component Value Date    HGBA1C 6.3 09/29/2023    HGBA1C 6.4 07/05/2023    HGBA1C 8.90 (H) 05/26/2023     CMP          6/3/2023    07:20 6/4/2023    05:39 6/5/2023    06:46   CMP   Glucose 125  120  130    BUN 12  16  13    Creatinine 0.72  0.81  0.71    EGFR 113.4  109.4  113.9    Sodium 140  142  141    Potassium 4.0  3.9  4.1    Chloride 106  106  106    Calcium 9.1  8.8  9.0    BUN/Creatinine Ratio 16.7  19.8  18.3    Anion Gap 10.0  12.0  11.0      CBC          6/3/2023    07:21 6/4/2023    05:39 6/5/2023    06:46   CBC   WBC 8.98  10.46  10.30    RBC 4.37  4.25  4.33    Hemoglobin 11.7  11.4  11.7    Hematocrit 37.2  36.2  36.8    MCV 85.1  85.2  85.0    MCH 26.8  26.8  27.0    MCHC 31.5  31.5  31.8    RDW 15.4  15.4  15.6    Platelets 210  203  207      Lipid Panel          12/28/2022    13:40 5/27/2023    05:19   Lipid Panel   Total Cholesterol 196  181    Triglycerides 208  147    HDL Cholesterol 52  48    VLDL Cholesterol 36  26    LDL Cholesterol  108  107    LDL/HDL Ratio 1.97  2.16      TSH          12/28/2022    13:40 5/26/2023    10:08   TSH   TSH 0.985  1.610      Most Recent A1C          9/29/2023    14:34   HGBA1C Most Recent   Hemoglobin A1C 6.3          Assessment / Plan   Diagnoses and all orders for this visit:    1. Controlled type 2 diabetes mellitus  without complication, without long-term current use of insulin (Primary)  -     POC Glucose, Blood  -     POC Glycosylated Hemoglobin (Hb A1C)  -     Semaglutide,0.25 or 0.5MG/DOS, (Ozempic, 0.25 or 0.5 MG/DOSE,) 2 MG/3ML solution pen-injector; Start with 0.25 mg weekly for 4 weeks then increase to 0.5 mg weekly  Dispense: 9 mL; Refill: 1    2. Hypothyroidism        Diabetes Mellitus 2 is under good control.  -A1c 6.3  -diabetes is well controlled  -we discussed changing metformin to glp-1 agonist for added CV benefits and he is in agreement  -start ozempic 0.25 mg weekly for 4 weeks then increase to 0.5 mg weekly. Possible side effects reviewed. No hx of pancreatitis. No personal or fam hx of thyroid ca or MEN2  -dc metformin  -consider adding sglt2i later  -labs are utd, foot exam updated today, encouraged pt to update eye exam    Hypothyroidism  -normal TSH 5/2023  -continue levothyroxine 125 mcg daily    There are no Patient Instructions on file for this visit.    Follow up: Return in about 3 months (around 12/29/2023).    Signed by: Candido Turner PA-C  Endocrinology

## 2023-10-05 ENCOUNTER — OFFICE VISIT (OUTPATIENT)
Dept: FAMILY MEDICINE CLINIC | Facility: CLINIC | Age: 48
End: 2023-10-05
Payer: MEDICARE

## 2023-10-05 VITALS
HEIGHT: 69 IN | WEIGHT: 221 LBS | HEART RATE: 79 BPM | BODY MASS INDEX: 32.73 KG/M2 | SYSTOLIC BLOOD PRESSURE: 130 MMHG | TEMPERATURE: 98.2 F | DIASTOLIC BLOOD PRESSURE: 84 MMHG

## 2023-10-05 DIAGNOSIS — I10 ESSENTIAL HYPERTENSION: Primary | Chronic | ICD-10-CM

## 2023-10-05 PROCEDURE — 3079F DIAST BP 80-89 MM HG: CPT | Performed by: FAMILY MEDICINE

## 2023-10-05 PROCEDURE — 3044F HG A1C LEVEL LT 7.0%: CPT | Performed by: FAMILY MEDICINE

## 2023-10-05 PROCEDURE — 3075F SYST BP GE 130 - 139MM HG: CPT | Performed by: FAMILY MEDICINE

## 2023-10-05 PROCEDURE — 1159F MED LIST DOCD IN RCRD: CPT | Performed by: FAMILY MEDICINE

## 2023-10-05 PROCEDURE — 99213 OFFICE O/P EST LOW 20 MIN: CPT | Performed by: FAMILY MEDICINE

## 2023-10-05 PROCEDURE — 1160F RVW MEDS BY RX/DR IN RCRD: CPT | Performed by: FAMILY MEDICINE

## 2023-10-05 RX ORDER — METFORMIN HYDROCHLORIDE 500 MG/1
TABLET, EXTENDED RELEASE ORAL
COMMUNITY
Start: 2023-09-29

## 2023-10-05 NOTE — PROGRESS NOTES
Manjeet Hall is a 47 y.o. male who presents today for Hyperlipidemia, Hypertension, and Diabetes      Patient saw endo and his Dm is well controlled. She started him on ozempic. He has been tolerating this well. He has been checking his blood pressure when he gets INR checked most numbers in the 120-130s/60-80s. He takes metoprolol as directed and tolerates this well. He has been taking statin nightly and tolerates this well. He needs his annual disability paper work filled out for the year and brought form with him today.        Review of Systems   Constitutional:  Negative for fever and unexpected weight loss.   HENT:  Negative for congestion, ear pain and sore throat.    Eyes:  Negative for visual disturbance.   Respiratory:  Negative for cough, shortness of breath and wheezing.    Cardiovascular:  Negative for chest pain and palpitations.   Gastrointestinal:  Negative for abdominal pain, blood in stool, constipation, diarrhea, nausea, vomiting and GERD.   Endocrine: Negative for polydipsia and polyuria.   Genitourinary:  Negative for difficulty urinating.   Musculoskeletal:  Negative for joint swelling.   Skin:  Negative for rash and skin lesions.   Allergic/Immunologic: Negative for environmental allergies.   Neurological:  Negative for seizures and syncope.   Hematological:  Does not bruise/bleed easily.   Psychiatric/Behavioral:  Negative for suicidal ideas.       The following portions of the patient's history were reviewed and updated as appropriate: allergies, current medications, past family history, past medical history, past social history, past surgical history and problem list.    Current Outpatient Medications on File Prior to Visit   Medication Sig Dispense Refill    DULoxetine (CYMBALTA) 60 MG capsule Take 1 capsule by mouth Daily.  2    lansoprazole (PREVACID) 30 MG capsule TAKE 1 CAPSULE BY MOUTH EVERY DAY 90 capsule 1    levothyroxine (SYNTHROID, LEVOTHROID) 125 MCG tablet TAKE 1 TABLET  "BY MOUTH DAILY. FURTHER REFILLS PER PCP WITH REPEAT LAB WORK. 90 tablet 0    metFORMIN ER (GLUCOPHAGE-XR) 500 MG 24 hr tablet       metoprolol tartrate (LOPRESSOR) 25 MG tablet Take 0.5 tablets by mouth Every 12 (Twelve) Hours. 90 tablet 3    rosuvastatin (CRESTOR) 20 MG tablet TAKE 1 TABLET BY MOUTH EVERY DAY 90 tablet 1    Semaglutide,0.25 or 0.5MG/DOS, (Ozempic, 0.25 or 0.5 MG/DOSE,) 2 MG/3ML solution pen-injector Start with 0.25 mg weekly for 4 weeks then increase to 0.5 mg weekly 9 mL 1    warfarin (COUMADIN) 5 MG tablet TAKE 1 TABLET BY MOUTH DAILY AS DIRECTED BY ANTICOAGULATION CLINIC. 90 tablet 1     No current facility-administered medications on file prior to visit.       Allergies   Allergen Reactions    Tegaderm Chg Dressing [Chlorhexidine] Rash        Visit Vitals  /84   Pulse 79   Temp 98.2 °F (36.8 °C)   Ht 175.3 cm (69\")   Wt 100 kg (221 lb)   BMI 32.64 kg/m²        Physical Exam  Constitutional:       General: He is not in acute distress.     Appearance: He is well-developed. He is not diaphoretic.   HENT:      Head: Atraumatic.   Cardiovascular:      Rate and Rhythm: Normal rate and regular rhythm.      Heart sounds: Normal heart sounds. Murmur (click from mechanical valve) heard.     No friction rub. No gallop.   Pulmonary:      Effort: Pulmonary effort is normal. No respiratory distress.      Breath sounds: Normal breath sounds. No stridor. No wheezing, rhonchi or rales.   Musculoskeletal:      Cervical back: Normal range of motion and neck supple.   Skin:     General: Skin is warm and dry.   Neurological:      Mental Status: He is alert and oriented to person, place, and time.   Psychiatric:         Behavior: Behavior normal.        Results for orders placed or performed in visit on 09/29/23   POC Glucose, Blood    Specimen: Blood   Result Value Ref Range    Glucose 150 (A) 70 - 130 mg/dL    Lot Number [id number]     Expiration Date 03/17/2024    POC Glycosylated Hemoglobin (Hb A1C)    " Specimen: Blood   Result Value Ref Range    Hemoglobin A1C 6.3 %    Lot Number [id number]     Expiration Date 05/02/2025         Problems Addressed this Visit          Cardiac and Vasculature    HTN - Primary (Chronic)     Hypertension is unchanged.  Continue current treatment regimen.  Blood pressure will be reassessed in 3 months.          Diagnoses         Codes Comments    HTN    -  Primary ICD-10-CM: I10  ICD-9-CM: 401.9             Return for Next scheduled follow up.    20 minutes was spent on this patient encounter which included history taking, physical exam, answering patient questions, counseling, discussing treatment plan, placing orders, and documentation.    Scotty Means MD   10/5/2023

## 2023-10-05 NOTE — PATIENT INSTRUCTIONS
"Hypertension, Adult  High blood pressure (hypertension) is when the force of blood pumping through the arteries is too strong. The arteries are the blood vessels that carry blood from the heart throughout the body. Hypertension forces the heart to work harder to pump blood and may cause arteries to become narrow or stiff. Untreated or uncontrolled hypertension can lead to a heart attack, heart failure, a stroke, kidney disease, and other problems.  A blood pressure reading consists of a higher number over a lower number. Ideally, your blood pressure should be below 120/80. The first (\"top\") number is called the systolic pressure. It is a measure of the pressure in your arteries as your heart beats. The second (\"bottom\") number is called the diastolic pressure. It is a measure of the pressure in your arteries as the heart relaxes.  What are the causes?  The exact cause of this condition is not known. There are some conditions that result in high blood pressure.  What increases the risk?  Certain factors may make you more likely to develop high blood pressure. Some of these risk factors are under your control, including:  Smoking.  Not getting enough exercise or physical activity.  Being overweight.  Having too much fat, sugar, calories, or salt (sodium) in your diet.  Drinking too much alcohol.  Other risk factors include:  Having a personal history of heart disease, diabetes, high cholesterol, or kidney disease.  Stress.  Having a family history of high blood pressure and high cholesterol.  Having obstructive sleep apnea.  Age. The risk increases with age.  What are the signs or symptoms?  High blood pressure may not cause symptoms. Very high blood pressure (hypertensive crisis) may cause:  Headache.  Fast or irregular heartbeats (palpitations).  Shortness of breath.  Nosebleed.  Nausea and vomiting.  Vision changes.  Severe chest pain, dizziness, and seizures.  How is this diagnosed?  This condition is diagnosed by " measuring your blood pressure while you are seated, with your arm resting on a flat surface, your legs uncrossed, and your feet flat on the floor. The cuff of the blood pressure monitor will be placed directly against the skin of your upper arm at the level of your heart. Blood pressure should be measured at least twice using the same arm. Certain conditions can cause a difference in blood pressure between your right and left arms.  If you have a high blood pressure reading during one visit or you have normal blood pressure with other risk factors, you may be asked to:  Return on a different day to have your blood pressure checked again.  Monitor your blood pressure at home for 1 week or longer.  If you are diagnosed with hypertension, you may have other blood or imaging tests to help your health care provider understand your overall risk for other conditions.  How is this treated?  This condition is treated by making healthy lifestyle changes, such as eating healthy foods, exercising more, and reducing your alcohol intake. You may be referred for counseling on a healthy diet and physical activity.  Your health care provider may prescribe medicine if lifestyle changes are not enough to get your blood pressure under control and if:  Your systolic blood pressure is above 130.  Your diastolic blood pressure is above 80.  Your personal target blood pressure may vary depending on your medical conditions, your age, and other factors.  Follow these instructions at home:  Eating and drinking    Eat a diet that is high in fiber and potassium, and low in sodium, added sugar, and fat. An example of this eating plan is called the DASH diet. DASH stands for Dietary Approaches to Stop Hypertension. To eat this way:  Eat plenty of fresh fruits and vegetables. Try to fill one half of your plate at each meal with fruits and vegetables.  Eat whole grains, such as whole-wheat pasta, brown rice, or whole-grain bread. Fill about one  fourth of your plate with whole grains.  Eat or drink low-fat dairy products, such as skim milk or low-fat yogurt.  Avoid fatty cuts of meat, processed or cured meats, and poultry with skin. Fill about one fourth of your plate with lean proteins, such as fish, chicken without skin, beans, eggs, or tofu.  Avoid pre-made and processed foods. These tend to be higher in sodium, added sugar, and fat.  Reduce your daily sodium intake. Many people with hypertension should eat less than 1,500 mg of sodium a day.  Do not drink alcohol if:  Your health care provider tells you not to drink.  You are pregnant, may be pregnant, or are planning to become pregnant.  If you drink alcohol:  Limit how much you have to:  0-1 drink a day for women.  0-2 drinks a day for men.  Know how much alcohol is in your drink. In the [place] one drink equals one 12 oz bottle of beer (355 mL), one 5 oz glass of wine (148 mL), or one 1½ oz glass of hard liquor (44 mL).  Lifestyle    Work with your health care provider to maintain a healthy body weight or to lose weight. Ask what an ideal weight is for you.  Get at least 30 minutes of exercise that causes your heart to beat faster (aerobic exercise) most days of the week. Activities may include walking, swimming, or biking.  Include exercise to strengthen your muscles (resistance exercise), such as Pilates or lifting weights, as part of your weekly exercise routine. Try to do these types of exercises for 30 minutes at least 3 days a week.  Do not use any products that contain nicotine or tobacco. These products include cigarettes, chewing tobacco, and vaping devices, such as e-cigarettes. If you need help quitting, ask your health care provider.  Monitor your blood pressure at home as told by your health care provider.  Keep all follow-up visits. This is important.  Medicines  Take over-the-counter and prescription medicines only as told by your health care provider. Follow directions carefully. Blood  pressure medicines must be taken as prescribed.  Do not skip doses of blood pressure medicine. Doing this puts you at risk for problems and can make the medicine less effective.  Ask your health care provider about side effects or reactions to medicines that you should watch for.  Contact a health care provider if you:  Think you are having a reaction to a medicine you are taking.  Have headaches that keep coming back (recurring).  Feel dizzy.  Have swelling in your ankles.  Have trouble with your vision.  Get help right away if you:  Develop a severe headache or confusion.  Have unusual weakness or numbness.  Feel faint.  Have severe pain in your chest or abdomen.  Vomit repeatedly.  Have trouble breathing.  These symptoms may be an emergency. Get help right away. Call 911.  Do not wait to see if the symptoms will go away.  Do not drive yourself to the hospital.  Summary  Hypertension is when the force of blood pumping through your arteries is too strong. If this condition is not controlled, it may put you at risk for serious complications.  Your personal target blood pressure may vary depending on your medical conditions, your age, and other factors. For most people, a normal blood pressure is less than 120/80.  Hypertension is treated with lifestyle changes, medicines, or a combination of both. Lifestyle changes include losing weight, eating a healthy, low-sodium diet, exercising more, and limiting alcohol.  This information is not intended to replace advice given to you by your health care provider. Make sure you discuss any questions you have with your health care provider.  Document Revised: 10/25/2022 Document Reviewed: 10/25/2022  [employer] Patient Education © 2023 [employer].  BMI for Adults  What is BMI?  Body mass index (BMI) is a number that is calculated from a person's weight and height. BMI can help estimate how much of a person's weight is composed of fat. BMI does not measure body fat directly.  "Rather, it is an alternative to procedures that directly measure body fat, which can be difficult and expensive.  BMI can help identify people who may be at higher risk for certain medical problems.  What are BMI measurements used for?  BMI is used as a screening tool to identify possible weight problems. It helps determine whether a person is obese, overweight, a healthy weight, or underweight.  BMI is useful for:  Identifying a weight problem that may be related to a medical condition or may increase the risk for medical problems.  Promoting changes, such as changes in diet and exercise, to help reach a healthy weight. BMI screening can be repeated to see if these changes are working.  How is BMI calculated?  BMI involves measuring your weight in relation to your height. Both height and weight are measured, and the BMI is calculated from those numbers. This can be done either in English ([place] or metric measurements. Note that charts and online BMI calculators are available to help you find your BMI quickly and easily without having to do these calculations yourself.  To calculate your BMI in English ([place] measurements:    Measure your weight in pounds (lb).  Multiply the number of pounds by 703.  For example, for a person who weighs 180 lb, multiply that number by 703, which equals 126,540.  Measure your height in inches. Then multiply that number by itself to get a measurement called \"inches squared.\"  For example, for a person who is 70 inches tall, the \"inches squared\" measurement is 70 inches x 70 inches, which equals 4,900 inches squared.  Divide the total from step 2 (number of lb x 703) by the total from step 3 (inches squared): 126,540 ÷ 4,900 = 25.8. This is your BMI.  To calculate your BMI in metric measurements:  Measure your weight in kilograms (kg).  Measure your height in meters (m). Then multiply that number by itself to get a measurement called \"meters squared.\"  For example, for a person who is " "1.75 m tall, the \"meters squared\" measurement is 1.75 m x 1.75 m, which is equal to 3.1 meters squared.  Divide the number of kilograms (your weight) by the meters squared number. In this example: 70 ÷ 3.1 = 22.6. This is your BMI.  What do the results mean?  BMI charts are used to identify whether you are underweight, normal weight, overweight, or obese. The following guidelines will be used:  Underweight: BMI less than 18.5.  Normal weight: BMI between 18.5 and 24.9.  Overweight: BMI between 25 and 29.9.  Obese: BMI of 30 or above.  Keep these notes in mind:  Weight includes both fat and muscle, so someone with a muscular build, such as an athlete, may have a BMI that is higher than 24.9. In cases like these, BMI is not an accurate measure of body fat.  To determine if excess body fat is the cause of a BMI of 25 or higher, further assessments may need to be done by a health care provider.  BMI is usually interpreted in the same way for men and women.  Where to find more information  For more information about BMI, including tools to quickly calculate your BMI, go to these websites:  Centers for Disease Control and Prevention: www.cdc.gov  American Heart Association: www.heart.org  National Heart, Lung, and Blood [place]: www.nhlbi.nih.gov  Summary  Body mass index (BMI) is a number that is calculated from a person's weight and height.  BMI may help estimate how much of a person's weight is composed of fat. BMI can help identify those who may be at higher risk for certain medical problems.  BMI can be measured using English measurements or metric measurements.  BMI charts are used to identify whether you are underweight, normal weight, overweight, or obese.  This information is not intended to replace advice given to you by your health care provider. Make sure you discuss any questions you have with your health care provider.  Document Revised: 09/09/2020 Document Reviewed: 07/17/2020  Elsevier Patient Education " © 2023 Elsevier Inc.  Diabetes Mellitus and Nutrition, Adult  When you have diabetes, or diabetes mellitus, it is very important to have healthy eating habits because your blood sugar (glucose) levels are greatly affected by what you eat and drink. Eating healthy foods in the right amounts, at about the same times every day, can help you:  Manage your blood glucose.  Lower your risk of heart disease.  Improve your blood pressure.  Reach or maintain a healthy weight.  What can affect my meal plan?  Every person with diabetes is different, and each person has different needs for a meal plan. Your health care provider may recommend that you work with a dietitian to make a meal plan that is best for you. Your meal plan may vary depending on factors such as:  The calories you need.  The medicines you take.  Your weight.  Your blood glucose, blood pressure, and cholesterol levels.  Your activity level.  Other health conditions you have, such as heart or kidney disease.  How do carbohydrates affect me?  Carbohydrates, also called carbs, affect your blood glucose level more than any other type of food. Eating carbs raises the amount of glucose in your blood.  It is important to know how many carbs you can safely have in each meal. This is different for every person. Your dietitian can help you calculate how many carbs you should have at each meal and for each snack.  How does alcohol affect me?  Alcohol can cause a decrease in blood glucose (hypoglycemia), especially if you use insulin or take certain diabetes medicines by mouth. Hypoglycemia can be a life-threatening condition. Symptoms of hypoglycemia, such as sleepiness, dizziness, and confusion, are similar to symptoms of having too much alcohol.  Do not drink alcohol if:  Your health care provider tells you not to drink.  You are pregnant, may be pregnant, or are planning to become pregnant.  If you drink alcohol:  Limit how much you have to:  0-1 drink a day for  "women.  0-2 drinks a day for men.  Know how much alcohol is in your drink. In the [place] one drink equals one 12 oz bottle of beer (355 mL), one 5 oz glass of wine (148 mL), or one 1½ oz glass of hard liquor (44 mL).  Keep yourself hydrated with water, diet soda, or unsweetened iced tea. Keep in mind that regular soda, juice, and other mixers may contain a lot of sugar and must be counted as carbs.  What are tips for following this plan?    Reading food labels  Start by checking the serving size on the Nutrition Facts label of packaged foods and drinks. The number of calories and the amount of carbs, fats, and other nutrients listed on the label are based on one serving of the item. Many items contain more than one serving per package.  Check the total grams (g) of carbs in one serving.  Check the number of grams of saturated fats and trans fats in one serving. Choose foods that have a low amount or none of these fats.  Check the number of milligrams (mg) of salt (sodium) in one serving. Most people should limit total sodium intake to less than 2,300 mg per day.  Always check the nutrition information of foods labeled as \"low-fat\" or \"nonfat.\" These foods may be higher in added sugar or refined carbs and should be avoided.  Talk to your dietitian to identify your daily goals for nutrients listed on the label.  Shopping  Avoid buying canned, pre-made, or processed foods. These foods tend to be high in fat, sodium, and added sugar.  Shop around the outside edge of the grocery store. This is where you will most often find fresh fruits and vegetables, bulk grains, fresh meats, and fresh dairy products.  Cooking  Use low-heat cooking methods, such as baking, instead of high-heat cooking methods, such as deep frying.  Cook using healthy oils, such as olive, canola, or sunflower oil.  Avoid cooking with butter, cream, or high-fat meats.  Meal planning  Eat meals and snacks regularly, preferably at the same times every day. " Avoid going long periods of time without eating.  Eat foods that are high in fiber, such as fresh fruits, vegetables, beans, and whole grains.  Eat 4-6 oz (112-168 g) of lean protein each day, such as lean meat, chicken, fish, eggs, or tofu. One ounce (oz) (28 g) of lean protein is equal to:  1 oz (28 g) of meat, chicken, or fish.  1 egg.  ¼ cup (62 g) of tofu.  Eat some foods each day that contain healthy fats, such as avocado, nuts, seeds, and fish.  What foods should I eat?  Fruits  Berries. Apples. Oranges. Peaches. Apricots. Plums. Grapes. Mangoes. Papayas. Pomegranates. Kiwi. Cherries.  Vegetables  Leafy greens, including lettuce, spinach, kale, chard, marisol greens, mustard greens, and cabbage. Beets. Cauliflower. Broccoli. Carrots. Green beans. Tomatoes. Peppers. Onions. Cucumbers. [place] sprouts.  Grains  Whole grains, such as whole-wheat or whole-grain bread, crackers, tortillas, cereal, and pasta. Unsweetened oatmeal. Quinoa. Brown or wild rice.  Meats and other proteins  Seafood. Poultry without skin. Lean cuts of poultry and beef. Tofu. Nuts. Seeds.  Dairy  Low-fat or fat-free dairy products such as milk, yogurt, and cheese.  The items listed above may not be a complete list of foods and beverages you can eat and drink. Contact a dietitian for more information.  What foods should I avoid?  Fruits  Fruits canned with syrup.  Vegetables  Canned vegetables. Frozen vegetables with butter or cream sauce.  Grains  Refined white flour and flour products such as bread, pasta, snack foods, and cereals. Avoid all processed foods.  Meats and other proteins  Fatty cuts of meat. Poultry with skin. Breaded or fried meats. Processed meat. Avoid saturated fats.  Dairy  Full-fat yogurt, cheese, or milk.  Beverages  Sweetened drinks, such as soda or iced tea.  The items listed above may not be a complete list of foods and beverages you should avoid. Contact a dietitian for more information.  Questions to ask a health  care provider  Do I need to meet with a certified diabetes care and ?  Do I need to meet with a dietitian?  What number can I call if I have questions?  When are the best times to check my blood glucose?  Where to find more information:  American Diabetes Association: diabetes.org  Academy of Nutrition and Dietetics: eatright.org  National [place] of Diabetes and Digestive and Kidney Diseases: niddk.nih.gov  Association of Diabetes Care & Education Specialists: diabeteseducator.org  Summary  It is important to have healthy eating habits because your blood sugar (glucose) levels are greatly affected by what you eat and drink. It is important to use alcohol carefully.  A healthy meal plan will help you manage your blood glucose and lower your risk of heart disease.  Your health care provider may recommend that you work with a dietitian to make a meal plan that is best for you.  This information is not intended to replace advice given to you by your health care provider. Make sure you discuss any questions you have with your health care provider.  Document Revised: 07/21/2021 Document Reviewed: 07/21/2021  Elsevier Patient Education © 2023 Elsevier Inc.

## 2023-10-23 ENCOUNTER — TELEPHONE (OUTPATIENT)
Dept: FAMILY MEDICINE CLINIC | Facility: CLINIC | Age: 48
End: 2023-10-23
Payer: MEDICARE

## 2023-10-23 NOTE — TELEPHONE ENCOUNTER
Caller: Payton Diallo    Relationship to patient: Emergency Contact    Best call back number: [phone]     Patient is needing: PATIENTS FRIEND (PAYTON) CALLED TO FOLLOW UP ON DISABILITY FORMS THAT WERE FAXED LATE SEPTEMBER AND DROPPED OFF OCTOBER 6, DAY AFTER HIS OCTOBER 5 APPOINTMENT.    STATED HE WAS TOLD TO FILL OUT HIS NAME AND DROP OFF THE PAPERWORK.  STATED THE INFORMATION HAD ALREADY BEEN ON THE FORMS.      DISABILITY FORMS ARE FROM [employer].  STATED THEY RECEIVED ANOTHER NOTICE THAT FORMS HAVE NOT BEEN FAXED.    STATED THEY ARE CONCERNED THAT HE WILL LOSE HIS BENEFITS AND HAS ONLY TWO DAYS LEFT.    PAYTON STATED SHE IS AT WORK. STATED OK TO LEAVE VOICE MESSAGE.      URGENT.

## 2023-10-25 ENCOUNTER — LAB (OUTPATIENT)
Dept: LAB | Facility: HOSPITAL | Age: 48
End: 2023-10-25
Payer: MEDICARE

## 2023-10-25 ENCOUNTER — ANTICOAGULATION VISIT (OUTPATIENT)
Dept: PHARMACY | Facility: HOSPITAL | Age: 48
End: 2023-10-25
Payer: MEDICARE

## 2023-10-25 DIAGNOSIS — Z95.2 HX OF AORTIC VALVE REPLACEMENT, MECHANICAL: Primary | ICD-10-CM

## 2023-10-25 DIAGNOSIS — Z95.2 HX OF AORTIC VALVE REPLACEMENT, MECHANICAL: ICD-10-CM

## 2023-10-25 LAB
INR PPP: 2.73 (ref 0.89–1.12)
PROTHROMBIN TIME: 29.2 SECONDS (ref 12.2–14.5)

## 2023-10-25 PROCEDURE — 36415 COLL VENOUS BLD VENIPUNCTURE: CPT

## 2023-10-25 PROCEDURE — 85610 PROTHROMBIN TIME: CPT

## 2023-10-25 NOTE — PROGRESS NOTES
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   Other: Hodgkins Lymphoma (hx of chemo and radiation); h/o CVA + hemorrhagic pancreatitis (during hospitalization for mAVR)    Diet: avoids GLV (2/20/23)  Alcohol: socially  Tobacco: none  OTC Pain Medication: APAP PRN pain    INR History:  Date 9/27 9/28 10/1 10/4 10/8 10/11 10/17 10/19 10/23 11/8 11/16 12/21   Total Weekly Dose 8mg 12mg 19mg 29mg 31mg 34mg 43mg 41mg 45mg 49mg 49mg 49mg   INR 1.3 1.22 1.3 1.8 1.85 1.70 2.91 2.82 3.79 3.00 2.96 3.44   Notes clinic enox enox enox; clinic enox enox      desvenla     Date 1/9/19 1/16 2/4 3/1 3/22 5/16 6/13 6/24 7/10 7/22 8/5 8/26   Total Weekly Dose 49mg 47mg 49mg 49mg 49mg 49mg 49mg 49mg 49mg 49mg 49mg 49mg   INR 4.40 2.36 2.62 3.02 2.15 2.60 2.25 2.98 2.83 2.97 2.90 3.20   Notes   self adj              Date 9/26 10/18 10/28 11/19 12/3 12/17 1/3/20 1/21 2/3 2/12 2/19 2/27   Total Weekly Dose 49mg 49mg 49mg 49mg 49mg 45mg 45mg 45mg 47mg 43mg 43mg 43mg   INR 3.57 3.12 2.88 3.33 4.06 2.59 3.21 3.85 3.11 2.34 3.01 3.20   Notes less GLV Cymbalta start 9/25; Depakote start 9/5 1x decr dose  depakote inc; trintellix dcd ceftin x10 days Recv'd 1/6  incr GLV  dieting        Date 3/5 3/12 3/20 4/2 4/17 5/7 5/28 6/16 6/29 7/17 8/14 8/28   Total Weekly Dose 41mg 41mg 41mg 41mg 41mg 41mg 41mg 39mg 39mg 39mg 39mg 39mg   INR 3.07 3.01 2.95 2.66 2.9 3.03 3.36 2.48 2.42 3.08 2.78 2.28   Notes 1x decr   1x  decr         recv'd 6/30  recv'd 8/13 recv'd 8/31     Date 9/28 10/29  12/4 1/8/21 2/9 3/31 5/12 6/9  8/3 8/31   Total Weekly Dose 39mg 39mg non-  compliant 39mg 39mg 39mg 39mg 39mg 39mg non- compliant 39mg 39mg   INR 2.33 2.39  2.46 2.45 2.85 2.48 2.52 2.51  2.88 2.77   Notes recv'd 9/29 recv'd 10/30   recv'd 1/11  recv'd 4/1           Date 10/4 11/1 12/6  3/8 4/5 5/9 6/10 6/23 7/5 7/20 8/5   Total Weekly Dose 39mg 39mg 39mg  non- compliant  39mg 39mg 39 mg 39 mg 39 mg 39 mg 39 mg 39mg   INR 2.50 2.54 2.2  2.40 2.40 2.98 2.26 2.96 2.84 2.88 3.48   Notes  Rec 11/2 Rec 12/7       rec'd 7/6  rec 8/8     Date 8/17 8/25 9/19 9/22 10/13 10/31 11/10 12/2 12/29 12/30 1/3/2023 1/13   Total Weekly Dose 37mg 35mg 39 mg  37mg 35 mg 35 mg 35 mg 35 mg 35mg 37.5mg 41.5mg 35mg   INR 3.24 2.6 3.13 3.39 2.46 2.75 2.71 2.77 1.1 2.42 4.14 3.59   Notes  rec 8/26  Inc activity Rec 10/14  rec 11/11 rec 12/5 ??? rcv'd 1/3       Date 1/27 2/17 3/10 4/5 4/14 5/18 5/26-6/5 6/8 6/12 6/20 6/28 7/6   Total Weekly Dose 35mg 35mg 35 mg 35 mg 35mg 35 mg Inpatient hospital 15mg 40mg 37.5 mg 35 mg 35mg   INR 2.94 2.58 2.5 2.44 3.11 2.43  1.3 1.62 2.1 2.83 2.76   Notes rcv'd 1/30 rcv'd 2/20 rec 3/13 crestor  rcv'd 4/17 rec 4/19 Subdural hematoma; kcentra  Rcv'd 6/13   Rcvd 7/7     Date 7/19 8/23/23 10/25         Total Weekly Dose  35 mg 35 mg          INR 2.53 2.51 2.73         Notes  Rec'd 8/24            *takes warfarin in AM*    Phone Interview:  Verbal Release Authorization: please contact Mr. Hall directly - if unable to reach patient may contact brotherMarko  Tablet Strength: 2mg and 5mg tablets  Patient Contact Info: [phone]; [email or web address]  Lab Contact Info: Megan Vu       Patient Findings  Negatives: Signs/symptoms of thrombosis, Signs/symptoms of bleeding, Laboratory test error suspected, Change in health, Change in alcohol use, Change in activity, Upcoming invasive procedure, Emergency department visit,  Upcoming dental procedure, Missed doses, Extra doses, Change in medications, Change in diet/appetite, Hospital admission, Bruising, Other complaints   Comments: Pt denies any changes in diet or medications. No upcoming procedures/ Verified dose.        Plan:  1. INR was therapeutic yesterday at 2.73 (goal 2.0-3.0). instructed Patient to continue warfarin 5mg oral daily until recheck.   2. Repeat INR in 4 weeks, 11/22/23.  3. Verbal and written information provided in the clinic. Mr. Hall RBV dosing instructions, expresses understanding by teach back, and has no further questions at this time.    Darlin Shaikh, Pharmacy Intern  10/25/23  15:05 EDT       Pt saw endo and PCP since last appointment- Diabetes well controlled and dc'd  metformin   I, Sondra Brooks, PharmD, have reviewed the note in full and agree with the assessment and plan.  10/26/23  11:45 EDT

## 2023-10-31 RX ORDER — WARFARIN SODIUM 5 MG/1
TABLET ORAL
Qty: 90 TABLET | Refills: 0 | Status: SHIPPED | OUTPATIENT
Start: 2023-10-31

## 2023-11-15 ENCOUNTER — TELEPHONE (OUTPATIENT)
Dept: PHYSICAL THERAPY | Facility: CLINIC | Age: 48
End: 2023-11-15

## 2023-11-15 NOTE — TELEPHONE ENCOUNTER
Caller: Payton Diallo    Relationship: Emergency Contact    What was the call regarding: PATIENT WILL NOT BE AT TODAY'S APPTS.  HE IS NOT FEELING WELL.

## 2023-12-01 ENCOUNTER — OFFICE VISIT (OUTPATIENT)
Dept: ENDOCRINOLOGY | Facility: CLINIC | Age: 48
End: 2023-12-01
Payer: MEDICARE

## 2023-12-01 VITALS
HEART RATE: 95 BPM | WEIGHT: 216 LBS | HEIGHT: 69 IN | DIASTOLIC BLOOD PRESSURE: 68 MMHG | SYSTOLIC BLOOD PRESSURE: 126 MMHG | OXYGEN SATURATION: 97 % | BODY MASS INDEX: 31.99 KG/M2

## 2023-12-01 DIAGNOSIS — E78.2 MIXED HYPERLIPIDEMIA: Chronic | ICD-10-CM

## 2023-12-01 DIAGNOSIS — E11.9 CONTROLLED TYPE 2 DIABETES MELLITUS WITHOUT COMPLICATION, WITHOUT LONG-TERM CURRENT USE OF INSULIN: Chronic | ICD-10-CM

## 2023-12-01 DIAGNOSIS — E03.9 HYPOTHYROIDISM (ACQUIRED): Chronic | ICD-10-CM

## 2023-12-01 LAB
EXPIRATION DATE: NORMAL
GLUCOSE BLDC GLUCOMTR-MCNC: 118 MG/DL (ref 70–130)
Lab: NORMAL

## 2023-12-01 PROCEDURE — 84443 ASSAY THYROID STIM HORMONE: CPT | Performed by: PHYSICIAN ASSISTANT

## 2023-12-01 PROCEDURE — 82043 UR ALBUMIN QUANTITATIVE: CPT | Performed by: PHYSICIAN ASSISTANT

## 2023-12-01 PROCEDURE — 82570 ASSAY OF URINE CREATININE: CPT | Performed by: PHYSICIAN ASSISTANT

## 2023-12-01 PROCEDURE — 80053 COMPREHEN METABOLIC PANEL: CPT | Performed by: PHYSICIAN ASSISTANT

## 2023-12-01 PROCEDURE — 84439 ASSAY OF FREE THYROXINE: CPT | Performed by: PHYSICIAN ASSISTANT

## 2023-12-01 PROCEDURE — 80061 LIPID PANEL: CPT | Performed by: PHYSICIAN ASSISTANT

## 2023-12-01 RX ORDER — SEMAGLUTIDE 0.68 MG/ML
0.5 INJECTION, SOLUTION SUBCUTANEOUS WEEKLY
Qty: 9 ML | Refills: 1 | Status: SHIPPED | OUTPATIENT
Start: 2023-12-01

## 2023-12-01 NOTE — PROGRESS NOTES
Chief Complaint  F/u for Diabetes Mellitus.    \[facility] [place] [facility]\""   Manjeet Hall is a 47 y.o. male with hx of hodgkin's lymphoma, CVA, subdural hematoma, mechanical aortic valve, who is here today for f/u of Diabetes Mellitus type 2. The initial diagnosis of diabetes was made in his early 40s.     Presents to appt with daughter, Hamzah.   Weight down about 7 pounds.     Diabetic complications: CVA 2017 or 2018  Eye exam current (within one year): due    Current diabetic medications include:  Ozempic 0.5 mg weekly - tolerating well    Statin: crestor 20 for hyperlipidemia    Past medications: metformin (changed to ozempic)    Diabetic Monitoring  - no meter or log for review    Hypothyroid - on levothyroxine 125 mcg     The following portions of the patient's history were reviewed and updated by me as appropriate: allergies, current medications, past family history, past social history, past surgical history and problem list.        Past Medical History:   Diagnosis Date    Abnormal heart rhythm     Depression     Elevated cholesterol     GERD (gastroesophageal reflux disease)     Heart murmur     History of echocardiogram     History of left heart catheterization     History of stomach ulcers     Mitral valvular disorder     Stroke     Type 2 diabetes mellitus with hyperglycemia, without long-term current use of insulin 05/26/2023       Medications    Current Outpatient Medications:     DULoxetine (CYMBALTA) 60 MG capsule, Take 1 capsule by mouth Daily., Disp: , Rfl: 2    lansoprazole (PREVACID) 30 MG capsule, TAKE 1 CAPSULE BY MOUTH EVERY DAY, Disp: 90 capsule, Rfl: 1    levothyroxine (SYNTHROID, LEVOTHROID) 125 MCG tablet, TAKE 1 TABLET BY MOUTH DAILY. FURTHER REFILLS PER PCP WITH REPEAT LAB WORK., Disp: 90 tablet, Rfl: 0    metFORMIN ER (GLUCOPHAGE-XR) 500 MG 24 hr tablet, , Disp: , Rfl:     metoprolol tartrate (LOPRESSOR) 25 MG tablet, Take 0.5 tablets by mouth Every 12 (Twelve) Hours., Disp: 90 tablet, Rfl: 3     "rosuvastatin (CRESTOR) 20 MG tablet, TAKE 1 TABLET BY MOUTH EVERY DAY, Disp: 90 tablet, Rfl: 1    Semaglutide,0.25 or 0.5MG/DOS, (Ozempic, 0.25 or 0.5 MG/DOSE,) 2 MG/3ML solution pen-injector, Inject 0.5 mg under the skin into the appropriate area as directed 1 (One) Time Per Week., Disp: 9 mL, Rfl: 1    warfarin (COUMADIN) 5 MG tablet, TAKE 1 TABLET BY MOUTH DAILY AS DIRECTED BY ANTICOAGULATION CLINIC., Disp: 90 tablet, Rfl: 0    Review of Systems  Review of Systems   All other systems reviewed and are negative.       Physical Exam    /68   Pulse 95   Ht 175.3 cm (69\")   Wt 98 kg (216 lb)   SpO2 97%   BMI 31.90 kg/m² Body mass index is 31.9 kg/m².  Physical Exam  Constitutional:       General: He is not in acute distress.     Appearance: He is well-developed. He is not diaphoretic.   Neck:      Thyroid: No thyromegaly.      Vascular: No JVD.      Trachea: No tracheal deviation.   Cardiovascular:      Rate and Rhythm: Normal rate and regular rhythm.      Heart sounds: Normal heart sounds. No murmur heard.  Pulmonary:      Effort: Pulmonary effort is normal. No respiratory distress.      Breath sounds: Normal breath sounds. No wheezing.   Musculoskeletal:         General: No tenderness.      Cervical back: Neck supple.   Skin:     General: Skin is warm and dry.      Findings: No erythema or rash.   Neurological:      Mental Status: He is alert and oriented to person, place, and time.   Psychiatric:         Behavior: Behavior normal.         Thought Content: Thought content normal.         Judgment: Judgment normal.         Labs and Imaging   Lab Results   Component Value Date    HGBA1C 6.3 09/29/2023    HGBA1C 6.4 07/05/2023    HGBA1C 8.90 (H) 05/26/2023     CMP          6/3/2023    07:20 6/4/2023    05:39 6/5/2023    06:46   CMP   Glucose 125  120  130    BUN 12  16  13    Creatinine 0.72  0.81  0.71    EGFR 113.4  109.4  113.9    Sodium 140  142  141    Potassium 4.0  3.9  4.1    Chloride 106  106  106  "   Calcium 9.1  8.8  9.0    BUN/Creatinine Ratio 16.7  19.8  18.3    Anion Gap 10.0  12.0  11.0      CBC          6/3/2023    07:21 6/4/2023    05:39 6/5/2023    06:46   CBC   WBC 8.98  10.46  10.30    RBC 4.37  4.25  4.33    Hemoglobin 11.7  11.4  11.7    Hematocrit 37.2  36.2  36.8    MCV 85.1  85.2  85.0    MCH 26.8  26.8  27.0    MCHC 31.5  31.5  31.8    RDW 15.4  15.4  15.6    Platelets 210  203  207      Lipid Panel          12/28/2022    13:40 5/27/2023    05:19   Lipid Panel   Total Cholesterol 196  181    Triglycerides 208  147    HDL Cholesterol 52  48    VLDL Cholesterol 36  26    LDL Cholesterol  108  107    LDL/HDL Ratio 1.97  2.16      TSH          12/28/2022    13:40 5/26/2023    10:08   TSH   TSH 0.985  1.610      Most Recent A1C          9/29/2023    14:34   HGBA1C Most Recent   Hemoglobin A1C 6.3          Assessment / Plan   Diagnoses and all orders for this visit:    1. Controlled type 2 diabetes mellitus without complication, without long-term current use of insulin  -     POC Glucose, Blood  -     Semaglutide,0.25 or 0.5MG/DOS, (Ozempic, 0.25 or 0.5 MG/DOSE,) 2 MG/3ML solution pen-injector; Inject 0.5 mg under the skin into the appropriate area as directed 1 (One) Time Per Week.  Dispense: 9 mL; Refill: 1  -     Microalbumin / Creatinine Urine Ratio - Urine, Clean Catch  -     Comprehensive Metabolic Panel  -     Lipid Panel    2. Hypothyroidism  -     T4, Free  -     TSH    3. Mixed hyperlipidemia  -     Comprehensive Metabolic Panel  -     Lipid Panel        Diabetes Mellitus 2 is under good control.  -A1c 6.3 9/29/2023 (too soon to repeat)  - today  -continue ozempic 0.5 mg weekly  -consider adding [id number] later  -labs today, foot exam updated 9/2023, encouraged pt to update eye exam    Hypothyroidism  -repeat tfts  -continue levothyroxine 125 mcg daily    There are no Patient Instructions on file for this visit.    Follow up: Return in about 6 months (around 6/1/2024).    Signed by:  Candido Turner PA-C  Endocrinology

## 2023-12-02 LAB
ALBUMIN SERPL-MCNC: 4.7 G/DL (ref 3.5–5.2)
ALBUMIN UR-MCNC: 1.8 MG/DL
ALBUMIN/GLOB SERPL: 1.7 G/DL
ALP SERPL-CCNC: 71 U/L (ref 39–117)
ALT SERPL W P-5'-P-CCNC: 28 U/L (ref 1–41)
ANION GAP SERPL CALCULATED.3IONS-SCNC: 11.7 MMOL/L (ref 5–15)
AST SERPL-CCNC: 30 U/L (ref 1–40)
BILIRUB SERPL-MCNC: 0.5 MG/DL (ref 0–1.2)
BUN SERPL-MCNC: 14 MG/DL (ref 6–20)
BUN/CREAT SERPL: 12.6 (ref 7–25)
CALCIUM SPEC-SCNC: 9.7 MG/DL (ref 8.6–10.5)
CHLORIDE SERPL-SCNC: 105 MMOL/L (ref 98–107)
CHOLEST SERPL-MCNC: 149 MG/DL (ref 0–200)
CO2 SERPL-SCNC: 24.3 MMOL/L (ref 22–29)
CREAT SERPL-MCNC: 1.11 MG/DL (ref 0.76–1.27)
CREAT UR-MCNC: 204.9 MG/DL
EGFRCR SERPLBLD CKD-EPI 2021: 82.4 ML/MIN/1.73
GLOBULIN UR ELPH-MCNC: 2.7 GM/DL
GLUCOSE SERPL-MCNC: 98 MG/DL (ref 65–99)
HDLC SERPL-MCNC: 47 MG/DL (ref 40–60)
LDLC SERPL CALC-MCNC: 56 MG/DL (ref 0–100)
LDLC/HDLC SERPL: 0.92 {RATIO}
MICROALBUMIN/CREAT UR: 8.8 MG/G (ref 0–29)
POTASSIUM SERPL-SCNC: 4.2 MMOL/L (ref 3.5–5.2)
PROT SERPL-MCNC: 7.4 G/DL (ref 6–8.5)
SODIUM SERPL-SCNC: 141 MMOL/L (ref 136–145)
T4 FREE SERPL-MCNC: 1.84 NG/DL (ref 0.93–1.7)
TRIGL SERPL-MCNC: 293 MG/DL (ref 0–150)
TSH SERPL DL<=0.05 MIU/L-ACNC: 0.03 UIU/ML (ref 0.27–4.2)
VLDLC SERPL-MCNC: 46 MG/DL (ref 5–40)

## 2023-12-03 DIAGNOSIS — E03.9 HYPOTHYROIDISM (ACQUIRED): Primary | ICD-10-CM

## 2023-12-03 RX ORDER — LEVOTHYROXINE SODIUM 112 UG/1
112 TABLET ORAL DAILY
Qty: 90 TABLET | Refills: 0 | Status: SHIPPED | OUTPATIENT
Start: 2023-12-03

## 2023-12-05 ENCOUNTER — LAB (OUTPATIENT)
Dept: LAB | Facility: HOSPITAL | Age: 48
End: 2023-12-05
Payer: MEDICARE

## 2023-12-05 DIAGNOSIS — E03.9 HYPOTHYROIDISM (ACQUIRED): ICD-10-CM

## 2023-12-05 LAB
T4 FREE SERPL-MCNC: 1.72 NG/DL (ref 0.93–1.7)
TSH SERPL DL<=0.05 MIU/L-ACNC: 0.04 UIU/ML (ref 0.27–4.2)

## 2023-12-05 PROCEDURE — 84439 ASSAY OF FREE THYROXINE: CPT

## 2023-12-05 PROCEDURE — 84443 ASSAY THYROID STIM HORMONE: CPT

## 2023-12-06 DIAGNOSIS — E03.9 HYPOTHYROIDISM (ACQUIRED): Primary | ICD-10-CM

## 2023-12-19 DIAGNOSIS — K21.9 GASTROESOPHAGEAL REFLUX DISEASE, UNSPECIFIED WHETHER ESOPHAGITIS PRESENT: ICD-10-CM

## 2023-12-19 RX ORDER — LANSOPRAZOLE 30 MG/1
30 CAPSULE, DELAYED RELEASE ORAL DAILY
Qty: 90 CAPSULE | Refills: 1 | Status: SHIPPED | OUTPATIENT
Start: 2023-12-19

## 2023-12-19 NOTE — TELEPHONE ENCOUNTER
Caller: Payton Diallo    Relationship: Emergency Contact    Best call back number: [phone]    Requested Prescriptions:   Requested Prescriptions     Pending Prescriptions Disp Refills    lansoprazole (PREVACID) 30 MG capsule 90 capsule 1     Sig: Take 1 capsule by mouth Daily.        Pharmacy where request should be sent: [facility]ACY #[id number] - [place], [place] - [address] [place] [address] AT [facility] - [phone]  - [phone] FX     Last office visit with prescribing clinician: 10/5/2023   Last telemedicine visit with prescribing clinician: Visit date not found   Next office visit with prescribing clinician: 1/11/2024     Additional details provided by patient: PATIENT IS COMPLETELY OUT    Does the patient have less than a 3 day supply:  [x] Yes  [] No    Would you like a call back once the refill request has been completed: [] Yes [x] No    If the office needs to give you a call back, can they leave a voicemail: [] Yes [x] No    Piper Chinchilla Rep   12/19/23 08:50 EST

## 2023-12-28 RX ORDER — ROSUVASTATIN CALCIUM 20 MG/1
20 TABLET, COATED ORAL DAILY
Qty: 90 TABLET | Refills: 3 | Status: SHIPPED | OUTPATIENT
Start: 2023-12-28

## 2023-12-28 NOTE — TELEPHONE ENCOUNTER
Caller: Payton Diallo    Relationship: Emergency Contact    Best call back number: [phone]    Requested Prescriptions:   Requested Prescriptions     Pending Prescriptions Disp Refills    rosuvastatin (CRESTOR) 20 MG tablet 90 tablet 1     Sig: Take 1 tablet by mouth Daily.        Pharmacy where request should be sent:      Last office visit with prescribing clinician: 7/10/2023   Last telemedicine visit with prescribing clinician: Visit date not found   Next office visit with prescribing clinician: 1/15/2024     Additional details provided by patient: OUT    Does the patient have less than a 3 day supply:  [x] Yes  [] No    Would you like a call back once the refill request has been completed: [] Yes [x] No    If the office needs to give you a call back, can they leave a voicemail: [] Yes [x] No    Piper Barillas Rep   12/28/23 08:49 EST

## 2024-01-02 RX ORDER — METFORMIN HYDROCHLORIDE 500 MG/1
TABLET, EXTENDED RELEASE ORAL
OUTPATIENT
Start: 2024-01-02

## 2024-01-02 NOTE — TELEPHONE ENCOUNTER
Metformin not a current medication for patient.  Per Endocrine note, patient was changed to Ozempic.  Encounter 12/1/23.  Patient will need to check with Endocrine.

## 2024-01-02 NOTE — TELEPHONE ENCOUNTER
Rx Refill Note  Requested Prescriptions     Pending Prescriptions Disp Refills    metFORMIN ER (GLUCOPHAGE-XR) 500 MG 24 hr tablet        Last office visit with prescribing clinician: 10/5/2023   Last telemedicine visit with prescribing clinician: Visit date not found   Next office visit with prescribing clinician: 1/11/2024                         Would you like a call back once the refill request has been completed: [] Yes [] No    If the office needs to give you a call back, can they leave a voicemail: [] Yes [] No    Chasidy Doherty MA  01/02/24, 14:04 EST

## 2024-01-02 NOTE — TELEPHONE ENCOUNTER
Caller: Payton Diallo    Relationship: Emergency Contact    Best call back number: [phone]     Requested Prescriptions:   Requested Prescriptions     Pending Prescriptions Disp Refills    metFORMIN ER (GLUCOPHAGE-XR) 500 MG 24 hr tablet          Pharmacy where request should be sent: [facility]/PHARMACY #[id number] - [place], [place] - [address] [place] [address] AT [facility] - [phone]  - [phone] FX     Last office visit with prescribing clinician: 10/5/2023   Last telemedicine visit with prescribing clinician: Visit date not found   Next office visit with prescribing clinician: 1/11/2024     Additional details provided by patient: PATIENT IS OUT OF THIS MEDICATION AND NEEDS REFILL    Does the patient have less than a 3 day supply:  [x] Yes  [] No    Would you like a call back once the refill request has been completed: [x] Yes [] No    If the office needs to give you a call back, can they leave a voicemail: [x] Yes [] No    Piper Madrid Rep   01/02/24 13:18 EST

## 2024-01-11 ENCOUNTER — OFFICE VISIT (OUTPATIENT)
Dept: FAMILY MEDICINE CLINIC | Facility: CLINIC | Age: 49
End: 2024-01-11
Payer: MEDICARE

## 2024-01-11 ENCOUNTER — LAB (OUTPATIENT)
Dept: LAB | Facility: HOSPITAL | Age: 49
End: 2024-01-11
Payer: MEDICARE

## 2024-01-11 VITALS
HEART RATE: 68 BPM | TEMPERATURE: 97.6 F | HEIGHT: 67 IN | WEIGHT: 220.6 LBS | SYSTOLIC BLOOD PRESSURE: 125 MMHG | DIASTOLIC BLOOD PRESSURE: 74 MMHG | BODY MASS INDEX: 34.62 KG/M2

## 2024-01-11 DIAGNOSIS — Z00.00 MEDICARE ANNUAL WELLNESS VISIT, SUBSEQUENT: Primary | ICD-10-CM

## 2024-01-11 DIAGNOSIS — E66.09 CLASS 1 OBESITY DUE TO EXCESS CALORIES WITH SERIOUS COMORBIDITY AND BODY MASS INDEX (BMI) OF 34.0 TO 34.9 IN ADULT: ICD-10-CM

## 2024-01-11 DIAGNOSIS — K21.9 GASTROESOPHAGEAL REFLUX DISEASE, UNSPECIFIED WHETHER ESOPHAGITIS PRESENT: ICD-10-CM

## 2024-01-11 DIAGNOSIS — E11.65 TYPE 2 DIABETES MELLITUS WITH HYPERGLYCEMIA, WITHOUT LONG-TERM CURRENT USE OF INSULIN: ICD-10-CM

## 2024-01-11 DIAGNOSIS — Z23 IMMUNIZATION DUE: ICD-10-CM

## 2024-01-11 DIAGNOSIS — I10 ESSENTIAL HYPERTENSION: Chronic | ICD-10-CM

## 2024-01-11 DIAGNOSIS — Z00.00 MEDICARE ANNUAL WELLNESS VISIT, SUBSEQUENT: ICD-10-CM

## 2024-01-11 DIAGNOSIS — Z79.01 WARFARIN ANTICOAGULATION: ICD-10-CM

## 2024-01-11 DIAGNOSIS — F32.89 OTHER DEPRESSION: ICD-10-CM

## 2024-01-11 DIAGNOSIS — E78.5 HYPERLIPIDEMIA LDL GOAL <70: ICD-10-CM

## 2024-01-11 DIAGNOSIS — Z95.2 H/O HEART VALVE REPLACEMENT WITH MECHANICAL VALVE: Chronic | ICD-10-CM

## 2024-01-11 DIAGNOSIS — E03.9 HYPOTHYROIDISM (ACQUIRED): ICD-10-CM

## 2024-01-11 PROBLEM — E66.01 MORBID (SEVERE) OBESITY DUE TO EXCESS CALORIES: Status: ACTIVE | Noted: 2024-01-11

## 2024-01-11 PROBLEM — E66.811 CLASS 1 OBESITY DUE TO EXCESS CALORIES WITH SERIOUS COMORBIDITY AND BODY MASS INDEX (BMI) OF 34.0 TO 34.9 IN ADULT: Status: ACTIVE | Noted: 2024-01-11

## 2024-01-11 LAB
BASOPHILS # BLD AUTO: 0.05 10*3/MM3 (ref 0–0.2)
BASOPHILS NFR BLD AUTO: 0.6 % (ref 0–1.5)
DEPRECATED RDW RBC AUTO: 43.7 FL (ref 37–54)
EOSINOPHIL # BLD AUTO: 0.53 10*3/MM3 (ref 0–0.4)
EOSINOPHIL NFR BLD AUTO: 6.4 % (ref 0.3–6.2)
ERYTHROCYTE [DISTWIDTH] IN BLOOD BY AUTOMATED COUNT: 15 % (ref 12.3–15.4)
HBA1C MFR BLD: 6.3 % (ref 4.8–5.6)
HCT VFR BLD AUTO: 43.4 % (ref 37.5–51)
HGB BLD-MCNC: 14 G/DL (ref 13–17.7)
IMM GRANULOCYTES # BLD AUTO: 0.06 10*3/MM3 (ref 0–0.05)
IMM GRANULOCYTES NFR BLD AUTO: 0.7 % (ref 0–0.5)
LYMPHOCYTES # BLD AUTO: 2.37 10*3/MM3 (ref 0.7–3.1)
LYMPHOCYTES NFR BLD AUTO: 28.5 % (ref 19.6–45.3)
MCH RBC QN AUTO: 26 PG (ref 26.6–33)
MCHC RBC AUTO-ENTMCNC: 32.3 G/DL (ref 31.5–35.7)
MCV RBC AUTO: 80.5 FL (ref 79–97)
MONOCYTES # BLD AUTO: 0.51 10*3/MM3 (ref 0.1–0.9)
MONOCYTES NFR BLD AUTO: 6.1 % (ref 5–12)
NEUTROPHILS NFR BLD AUTO: 4.79 10*3/MM3 (ref 1.7–7)
NEUTROPHILS NFR BLD AUTO: 57.7 % (ref 42.7–76)
NRBC BLD AUTO-RTO: 0.1 /100 WBC (ref 0–0.2)
PLATELET # BLD AUTO: 189 10*3/MM3 (ref 140–450)
PMV BLD AUTO: 11.7 FL (ref 6–12)
RBC # BLD AUTO: 5.39 10*6/MM3 (ref 4.14–5.8)
WBC NRBC COR # BLD AUTO: 8.31 10*3/MM3 (ref 3.4–10.8)

## 2024-01-11 PROCEDURE — 83036 HEMOGLOBIN GLYCOSYLATED A1C: CPT

## 2024-01-11 PROCEDURE — 85025 COMPLETE CBC W/AUTO DIFF WBC: CPT

## 2024-01-11 RX ORDER — ROSUVASTATIN CALCIUM 20 MG/1
20 TABLET, COATED ORAL DAILY
Qty: 90 TABLET | Refills: 3 | Status: SHIPPED | OUTPATIENT
Start: 2024-01-11

## 2024-01-11 RX ORDER — LANSOPRAZOLE 30 MG/1
30 CAPSULE, DELAYED RELEASE ORAL DAILY
Qty: 90 CAPSULE | Refills: 3 | Status: SHIPPED | OUTPATIENT
Start: 2024-01-11

## 2024-01-11 RX ORDER — METFORMIN HYDROCHLORIDE 500 MG/1
500 TABLET, EXTENDED RELEASE ORAL
Qty: 90 TABLET | Refills: 3 | Status: SHIPPED | OUTPATIENT
Start: 2024-01-11

## 2024-01-11 RX ORDER — SEMAGLUTIDE 0.68 MG/ML
0.5 INJECTION, SOLUTION SUBCUTANEOUS WEEKLY
Qty: 9 ML | Refills: 1 | Status: CANCELLED | OUTPATIENT
Start: 2024-01-11

## 2024-01-11 RX ORDER — LEVOTHYROXINE SODIUM 112 UG/1
112 TABLET ORAL DAILY
Qty: 90 TABLET | Refills: 0 | Status: CANCELLED | OUTPATIENT
Start: 2024-01-11

## 2024-01-11 RX ORDER — DULOXETIN HYDROCHLORIDE 60 MG/1
60 CAPSULE, DELAYED RELEASE ORAL DAILY
Qty: 90 CAPSULE | Refills: 3 | Status: SHIPPED | OUTPATIENT
Start: 2024-01-11

## 2024-01-11 RX ORDER — WARFARIN SODIUM 5 MG/1
TABLET ORAL
Qty: 90 TABLET | Refills: 3 | Status: SHIPPED | OUTPATIENT
Start: 2024-01-11

## 2024-01-11 NOTE — LETTER
[facility]  Vaccine Consent Form    Patient Name:  Manjeet Hall  Patient :  1975     Vaccine(s) Ordered    Fluzone (or Fluarix & Flulaval for VFC) >6 Mos ([id number])  Pneumococcal Conjugate Vaccine 20-Valent (PCV20)  Hepatitis B Vaccine Adult IM        Screening Checklist  The following questions should be completed prior to vaccination. If you answer “yes” to any question, it does not necessarily mean you should not be vaccinated. It just means we may need to clarify or ask more questions. If a question is unclear, please ask your healthcare provider to explain it.    Yes No   Any fever or moderate to severe illness today (mild illness and/or antibiotic treatment are not contraindications)?     Do you have a history of a serious reaction to any previous vaccinations, such as anaphylaxis, encephalopathy within 7 days, Guillain-[place] syndrome within 6 weeks, seizure?     Have you received any live vaccine(s) (e.g MMR, LALY) or any other vaccines in the last month (to ensure duplicate doses aren't given)?     Do you have an anaphylactic allergy to latex (DTaP, DTaP-IPV, Hep A, Hep B, MenB, RV, Td, Tdap), baker’s yeast (Hep B, HPV), polysorbates (RSV, nirsevimab, PCV 20, Rotavirrus, Tdap, Shingrix), or gelatin (LALY, MMR)?     Do you have an anaphylactic allergy to neomycin (Rabies, LALY, MMR, IPV, Hep A), polymyxin B (IPV), or streptomycin (IPV)?      Any cancer, leukemia, AIDS, or other immune system disorder? (LALY, MMR, RV)     Do you have a parent, brother, or sister with an immune system problem (if immune competence of vaccine recipient clinically verified, can proceed)? (MMR, LALY)     Any recent steroid treatments for >2 weeks, chemotherapy, or radiation treatment? (LALY, MMR)     Have you received antibody-containing blood transfusions or IVIG in the past 11 months (recommended interval is dependent on product)? (MMR, LALY)     Have you taken antiviral drugs (acyclovir, famciclovir, valacyclovir  "for LALY) in the last 24 or 48 hours, respectively?      Are you pregnant or planning to become pregnant within 1 month? (LALY, MMR, HPV, IPV, MenB, Abrexvy; For Hep B- refer to Engerix-B; For RSV - Abrysvo is indicated for 32-36 weeks of pregnancy from September to January)     For infants, have you ever been told your child has had intussusception or a medical emergency involving obstruction of the intestine (Rotavirus)? If not for an infant, can skip this question.         *Ordering Physicians/APC should be consulted if \"yes\" is checked by the patient or guardian above.  I have received, read, and understand the Vaccine Information Statement (VIS) for each vaccine ordered.  I have considered my or my child's health status as well as the health status of my close contacts.  I have taken the opportunity to discuss my vaccine questions with my or my child's health care provider.   I have requested that the ordered vaccine(s) be given to me or my child.  I understand the benefits and risks of the vaccines.  I understand that I should remain in the clinic for 15 minutes after receiving the vaccine(s).  _________________________________________________________  Signature of Patient or Parent/Legal Guardian ____________________  Date     "

## 2024-01-11 NOTE — PROGRESS NOTES
The ABCs of the Annual Wellness Visit  Subsequent Medicare Wellness Visit    Subjective      Manjeet Hall is a 48 y.o. male who presents for a Subsequent Medicare Wellness Visit.    The following portions of the patient's history were reviewed and   updated as appropriate: allergies, current medications, past family history, past medical history, past social history, past surgical history, and problem list.    Compared to one year ago, the patient feels his physical   health is the same.    Compared to one year ago, the patient feels his mental   health is the same.    Recent Hospitalizations:  This patient has had a  admission record on file within the last 365 days.    Current Medical Providers:  Patient Care Team:  Scotty Means MD as PCP - General (Family Medicine)  Vimal John III, MD as Cardiologist (Cardiology)  Candido Turner PA-C as Physician Assistant (Endocrinology)  Stephane Womack MD as Consulting Physician (Neurosurgery)  Lindsay Summers MD as Consulting Physician (Neurology)    Outpatient Medications Prior to Visit   Medication Sig Dispense Refill    levothyroxine (SYNTHROID, LEVOTHROID) 112 MCG tablet Take 1 tablet by mouth Daily. Dose decrease 90 tablet 0    Semaglutide,0.25 or 0.5MG/DOS, (Ozempic, 0.25 or 0.5 MG/DOSE,) 2 MG/3ML solution pen-injector Inject 0.5 mg under the skin into the appropriate area as directed 1 (One) Time Per Week. 9 mL 1    DULoxetine (CYMBALTA) 60 MG capsule Take 1 capsule by mouth Daily.  2    lansoprazole (PREVACID) 30 MG capsule Take 1 capsule by mouth Daily. 90 capsule 1    metFORMIN ER (GLUCOPHAGE-XR) 500 MG 24 hr tablet       metoprolol tartrate (LOPRESSOR) 25 MG tablet Take 0.5 tablets by mouth Every 12 (Twelve) Hours. 90 tablet 3    rosuvastatin (CRESTOR) 20 MG tablet Take 1 tablet by mouth Daily. 90 tablet 3    warfarin (COUMADIN) 5 MG tablet TAKE 1 TABLET BY MOUTH DAILY AS DIRECTED BY ANTICOAGULATION CLINIC. 90 tablet 0  "    No facility-administered medications prior to visit.       No opioid medication identified on active medication list. I have reviewed chart for other potential  high risk medication/s and harmful drug interactions in the elderly.        Aspirin is not on active medication list.  Aspirin use is contraindicated for this patient due to: current use of warfarin.  .    Patient Active Problem List   Diagnosis    Depression    Generalized anxiety disorder    Gastroesophageal reflux disease    Congenital heart disease    Thoracic aortic aneurysm    Hx of aortic valve replacement, mechanical [Z95.2]    Sequelae, post-stroke    Warfarin anticoagulation    Ischemic stroke    Pulmonic stenosis    Subdural hematoma    Bilateral Subdural Hematoma    HTN    Hypothyroidism    Hodgkin's Lymphoma s/p XRT & Chemo    VSD s/p closure (age 14)    History of aortic regurgitation / stenosis s/p ROSS procedure with subsequent reversal with Mechanical AVR    H/O aortic mechanical valve replacement on Coumadin    Type 2 diabetes mellitus with hyperglycemia, without long-term current use of insulin    Hyperlipidemia LDL goal <70    Class 1 obesity due to excess calories with serious comorbidity and body mass index (BMI) of 34.0 to 34.9 in adult    Medicare annual wellness visit, subsequent     Advance Care Planning   Advance Care Planning     Advance Directive is not on file.  ACP discussion was held with the patient during this visit. Patient does not have an advance directive, information provided.     Objective    Vitals:    01/11/24 0800   BP: 125/74   Pulse: 68   Temp: 97.6 °F (36.4 °C)   TempSrc: Temporal   Weight: 100 kg (220 lb 9.6 oz)   Height: 170.2 cm (67\")     Estimated body mass index is 34.55 kg/m² as calculated from the following:    Height as of this encounter: 170.2 cm (67\").    Weight as of this encounter: 100 kg (220 lb 9.6 oz).           Does the patient have evidence of cognitive impairment?   No    Lab Results "   Component Value Date    TRIG 293 (H) 2023    HDL 47 2023    LDL 56 2023    VLDL 46 (H) 2023          HEALTH RISK ASSESSMENT    Smoking Status:  Social History     Tobacco Use   Smoking Status Never    Passive exposure: Never   Smokeless Tobacco Never     Alcohol Consumption:  Social History     Substance and Sexual Activity   Alcohol Use Yes    Comment: socially on occasion     Fall Risk Screen:    JUAN A Fall Risk Assessment was completed, and patient is at LOW risk for falls.Assessment completed on:2024    Depression Screenin/11/2024     8:05 AM   PHQ-2/PHQ-9 Depression Screening   Little Interest or Pleasure in Doing Things 0-->not at all   Feeling Down, Depressed or Hopeless 0-->not at all   PHQ-9: Brief Depression Severity Measure Score 0       Health Habits and Functional and Cognitive Screenin/11/2024     8:00 AM   Functional & Cognitive Status   Do you have difficulty preparing food and eating? No   Do you have difficulty bathing yourself, getting dressed or grooming yourself? No   Do you have difficulty using the toilet? No   Do you have difficulty moving around from place to place? No   Do you have trouble with steps or getting out of a bed or a chair? No   Current Diet Well Balanced Diet   Dental Exam Up to date   Eye Exam Up to date   Exercise (times per week) 3 times per week   Current Exercises Include Walking   Do you need help using the phone?  No   Are you deaf or do you have serious difficulty hearing?  No   Do you need help to go to places out of walking distance? No   Do you need help shopping? No   Do you need help preparing meals?  No   Do you need help with housework?  No   Do you need help with laundry? No   Do you need help taking your medications? No   Do you need help managing money? No   Do you ever drive or ride in a car without wearing a seat belt? No   Have you felt unusual stress, anger or loneliness in the last month? No   Who do you  live with? Spouse   If you need help, do you have trouble finding someone available to you? No   Have you been bothered in the last four weeks by sexual problems? No   Do you have difficulty concentrating, remembering or making decisions? No       Age-appropriate Screening Schedule:  Refer to the list below for future screening recommendations based on patient's age, sex and/or medical conditions. Orders for these recommended tests are listed in the plan section. The patient has been provided with a written plan.    Health Maintenance   Topic Date Due    Hepatitis B (1 of 3 - 3-dose series) Never done    Pneumococcal Vaccine 0-64 (1 of 2 - PCV) Never done    INFLUENZA VACCINE  08/01/2023    TDAP/TD VACCINES (1 - Tdap) 01/11/2024 (Originally [birth year]/7/1994)    COVID-19 Vaccine (5 - 2023-24 season) 01/13/2024 (Originally 9/1/2023)    DIABETIC EYE EXAM  02/29/2024 (Originally 12/14/2017)    HEMOGLOBIN A1C  03/29/2024    DIABETIC FOOT EXAM  12/01/2024    LIPID PANEL  12/01/2024    URINE MICROALBUMIN  12/01/2024    ANNUAL WELLNESS VISIT  01/11/2025    BMI FOLLOWUP  01/11/2025    COLORECTAL CANCER SCREENING  05/04/2028    HEPATITIS C SCREENING  Completed                  CMS Preventative Services Quick Reference  Risk Factors Identified During Encounter:    Immunizations Discussed/Encouraged: Tdap, Hepatitis B Vaccine/Series, Influenza, Prevnar 20 (Pneumococcal 20-valent conjugate), and COVID19    The above risks/problems have been discussed with the patient.  Pertinent information has been shared with the patient in the After Visit Summary.    Diagnoses and all orders for this visit:    1. Medicare annual wellness visit, subsequent (Primary)  Assessment & Plan:  The patient is here for health maintenance visit.  Currently, the patient consumes a healthy diet and has an adequate exercise regimen.  Screening lab work is ordered.  Immunizations were reviewed today.  Advice and education was given regarding nutrition, aerobic  exercise, routine dental evaluations, routine eye exams, reproductive health, cardiovascular risk reduction, sunscreen use, self skin examination (annual dermatology evaluations) and seatbelt use (general overall safety).  Further recommendations will be given if needed after lab evaluation.  Annual wellness evaluation is recommended.      Orders:  -     Hemoglobin A1c; Future  -     CBC & Differential; Future    2. Gastroesophageal reflux disease, unspecified whether esophagitis present  -     lansoprazole (PREVACID) 30 MG capsule; Take 1 capsule by mouth Daily.  Dispense: 90 capsule; Refill: 3    3. Hypothyroidism (acquired)    4. HTN  -     metoprolol tartrate (LOPRESSOR) 25 MG tablet; Take 0.5 tablets by mouth Every 12 (Twelve) Hours.  Dispense: 90 tablet; Refill: 3    5. Immunization due  -     Pneumococcal Conjugate Vaccine 20-Valent (PCV20)  -     Hepatitis B Vaccine Adult IM    6. Other depression  -     DULoxetine (CYMBALTA) 60 MG capsule; Take 1 capsule by mouth Daily.  Dispense: 90 capsule; Refill: 3    7. Hyperlipidemia LDL goal <70  -     rosuvastatin (CRESTOR) 20 MG tablet; Take 1 tablet by mouth Daily.  Dispense: 90 tablet; Refill: 3    8. Warfarin anticoagulation  -     warfarin (COUMADIN) 5 MG tablet; Take 1 tablet by mouth daily as directed by anticoagulation clinic.  Dispense: 90 tablet; Refill: 3    9. H/O aortic mechanical valve replacement on Coumadin  -     warfarin (COUMADIN) 5 MG tablet; Take 1 tablet by mouth daily as directed by anticoagulation clinic.  Dispense: 90 tablet; Refill: 3    10. Class 1 obesity due to excess calories with serious comorbidity and body mass index (BMI) of 34.0 to 34.9 in adult  Assessment & Plan:  Patient's (Body mass index is 34.55 kg/m².) indicates that they are obese (BMI >30) with health conditions that include hypertension, diabetes mellitus, dyslipidemias, and GERD . Weight is unchanged. BMI  is above average; BMI management plan is completed. We discussed  low calorie, low carb based diet program, portion control, and increasing exercise.       11. Type 2 diabetes mellitus with hyperglycemia, without long-term current use of insulin  -     metFORMIN ER (GLUCOPHAGE-XR) 500 MG 24 hr tablet; Take 1 tablet by mouth Daily With Breakfast.  Dispense: 90 tablet; Refill: 3  -     rosuvastatin (CRESTOR) 20 MG tablet; Take 1 tablet by mouth Daily.  Dispense: 90 tablet; Refill: 3  -     Hemoglobin A1c; Future  -     CBC & Differential; Future    Other orders  -     Fluzone (or Fluarix & Flulaval for VFC) >6 Mos ([id number])        Follow Up:   Next Medicare Wellness visit to be scheduled in 1 year.      An After Visit Summary and PPPS were made available to the patient.

## 2024-01-11 NOTE — PATIENT INSTRUCTIONS
Advance Care Planning and Advance Directives     You make decisions on a daily basis - decisions about where you want to live, your career, your home, your life. Perhaps one of the most important decisions you face is your choice for future medical care. Take time to talk with your family and your healthcare team and start planning today.  Advance Care Planning is a process that can help you:  Understand possible future healthcare decisions in light of your own experiences  Reflect on those decision in light of your goals and values  Discuss your decisions with those closest to you and the healthcare professionals that care for you  Make a plan by creating a document that reflects your wishes    Surrogate Decision Maker  In the event of a medical emergency, which has left you unable to communicate or to make your own decisions, you would need someone to make decisions for you.  It is important to discuss your preferences for medical treatment with this person while you are in good health.     Qualities of a surrogate decision maker:  Willing to take on this role and responsibility  Knows what you want for future medical care  Willing to follow your wishes even if they don't agree with them  Able to make difficult medical decisions under stressful circumstances    Advance Directives  These are legal documents you can create that will guide your healthcare team and decision maker(s) when needed. These documents can be stored in the electronic medical record.    Living Will - a legal document to guide your care if you have a terminal condition or a serious illness and are unable to communicate. States vary by statute in document names/types, but most forms may include one or more of the following:        -  Directions regarding life-prolonging treatments        -  Directions regarding artificially provided nutrition/hydration        -  Choosing a healthcare decision maker        -  Direction regarding organ/tissue  donation    Durable Power of  for Healthcare - this document names an -in-fact to make medical decisions for you, but it may also allow this person to make personal and financial decisions for you. Please seek the advice of an  if you need this type of document.    **Advance Directives are not required and no one may discriminate against you if you do not sign one.    Medical Orders  Many states allow specific forms/orders signed by your physician to record your wishes for medical treatment in your current state of health. This form, signed in personal communication with your physician, addresses resuscitation and other medical interventions that you may or may not want.      For more information or to schedule a time with a [facility] Advance Care Planning Facilitator contact: [facility] [employer] or call [phone] and someone will contact you directly.Advance Directive    Advance directives are legal documents that allow you to make decisions about your health care and medical treatment in case you become unable to communicate for yourself. Advance directives let your wishes be known to family, friends, and health care providers.  Discussing and writing advance directives should happen over time rather than all at once. Advance directives can be changed and updated at any time. There are different types of advance directives, such as:  Medical power of .  Living will.  Do not resuscitate (DNR) order or do not attempt resuscitation (DNAR) order.  Health care proxy and medical power of   A health care proxy is also called a health care agent. This person is appointed to make medical decisions for you when you are unable to make decisions for yourself. Generally, people ask a trusted friend or family member to act as their proxy and represent their preferences. Make sure you have an agreement with your trusted person to act as your proxy. A proxy may have to make a  medical decision on your behalf if your wishes are not known.  A medical power of , also called a durable power of  for health care, is a legal document that names your health care proxy. Depending on the laws in your state, the document may need to be:  Signed.  Notarized.  Dated.  Copied.  Witnessed.  Incorporated into your medical record.  You may also want to appoint a trusted person to manage your money in the event you are unable to do so. This is called a durable power of  for finances. It is a separate legal document from the durable power of  for health care. You may choose your health care proxy or someone different to act as your agent in money matters.  If you do not appoint a proxy, or there is a concern that the proxy is not acting in your best interest, a court may appoint a guardian to act on your behalf.  Living will  A living will is a set of instructions that state your wishes about medical care when you cannot express them yourself. Health care providers should keep a copy of your living will in your medical record. You may want to give a copy to family members or friends. To alert caregivers in case of an emergency, you can place a card in your wallet to let them know that you have a living will and where they can find it. A living will is used if you become:  Terminally ill.  Disabled.  Unable to communicate or make decisions.  The following decisions should be included in your living will:  To use or not to use life support equipment, such as dialysis machines and breathing machines (ventilators).  Whether you want a DNR or DNAR order. This tells health care providers not to use cardiopulmonary resuscitation (CPR) if breathing or heartbeat stops.  To use or not to use tube feeding.  To be given or not to be given food and fluids.  Whether you want comfort (palliative) care when the goal becomes comfort rather than a cure.  Whether you want to donate your organs  and tissues.  A living will does not give instructions for distributing your money and property if you should pass away.  DNR or DNAR  A DNR or DNAR order is a request not to have CPR in the event that your heart stops beating or you stop breathing. If a DNR or DNAR order has not been made and shared, a health care provider will try to help any patient whose heart has stopped or who has stopped breathing. If you plan to have surgery, talk with your health care provider about how your DNR or DNAR order will be followed if problems occur.  What if I do not have an advance directive?  Some states assign family decision makers to act on your behalf if you do not have an advance directive. Each state has its own laws about advance directives. You may want to check with your health care provider, , or state representative about the laws in your state.  Summary  Advance directives are legal documents that allow you to make decisions about your health care and medical treatment in case you become unable to communicate for yourself.  The process of discussing and writing advance directives should happen over time. You can change and update advance directives at any time.  Advance directives may include a medical power of , a living will, and a DNR or DNAR order.  This information is not intended to replace advice given to you by your health care provider. Make sure you discuss any questions you have with your health care provider.  Document Revised: 09/21/2021 Document Reviewed: 09/21/2021  [employer] Patient Education © 2023 [employer]  BMI for Adults  What is BMI?  Body mass index (BMI) is a number that is calculated from a person's weight and height. BMI can help estimate how much of a person's weight is composed of fat. BMI does not measure body fat directly. Rather, it is an alternative to procedures that directly measure body fat, which can be difficult and expensive.  BMI can help identify people who  "may be at higher risk for certain medical problems.  What are BMI measurements used for?  BMI is used as a screening tool to identify possible weight problems. It helps determine whether a person is obese, overweight, a healthy weight, or underweight.  BMI is useful for:  Identifying a weight problem that may be related to a medical condition or may increase the risk for medical problems.  Promoting changes, such as changes in diet and exercise, to help reach a healthy weight. BMI screening can be repeated to see if these changes are working.  How is BMI calculated?  BMI involves measuring your weight in relation to your height. Both height and weight are measured, and the BMI is calculated from those numbers. This can be done either in English ([place] or metric measurements. Note that charts and online BMI calculators are available to help you find your BMI quickly and easily without having to do these calculations yourself.  To calculate your BMI in English ([place] measurements:    Measure your weight in pounds (lb).  Multiply the number of pounds by 703.  For example, for a person who weighs 180 lb, multiply that number by 703, which equals 126,540.  Measure your height in inches. Then multiply that number by itself to get a measurement called \"inches squared.\"  For example, for a person who is 70 inches tall, the \"inches squared\" measurement is 70 inches x 70 inches, which equals 4,900 inches squared.  Divide the total from step 2 (number of lb x 703) by the total from step 3 (inches squared): 126,540 ÷ 4,900 = 25.8. This is your BMI.  To calculate your BMI in metric measurements:  Measure your weight in kilograms (kg).  Measure your height in meters (m). Then multiply that number by itself to get a measurement called \"meters squared.\"  For example, for a person who is 1.75 m tall, the \"meters squared\" measurement is 1.75 m x 1.75 m, which is equal to 3.1 meters squared.  Divide the number of kilograms (your " weight) by the meters squared number. In this example: 70 ÷ 3.1 = 22.6. This is your BMI.  What do the results mean?  BMI charts are used to identify whether you are underweight, normal weight, overweight, or obese. The following guidelines will be used:  Underweight: BMI less than 18.5.  Normal weight: BMI between 18.5 and 24.9.  Overweight: BMI between 25 and 29.9.  Obese: BMI of 30 or above.  Keep these notes in mind:  Weight includes both fat and muscle, so someone with a muscular build, such as an athlete, may have a BMI that is higher than 24.9. In cases like these, BMI is not an accurate measure of body fat.  To determine if excess body fat is the cause of a BMI of 25 or higher, further assessments may need to be done by a health care provider.  BMI is usually interpreted in the same way for men and women.  Where to find more information  For more information about BMI, including tools to quickly calculate your BMI, go to these websites:  Centers for Disease Control and Prevention: www.cdc.gov  American Heart Association: www.heart.org  National Heart, Lung, and Blood [place]: www.nhlbi.nih.gov  Summary  Body mass index (BMI) is a number that is calculated from a person's weight and height.  BMI may help estimate how much of a person's weight is composed of fat. BMI can help identify those who may be at higher risk for certain medical problems.  BMI can be measured using English measurements or metric measurements.  BMI charts are used to identify whether you are underweight, normal weight, overweight, or obese.  This information is not intended to replace advice given to you by your health care provider. Make sure you discuss any questions you have with your health care provider.  Document Revised: 05/31/2023 Document Reviewed: 07/17/2020  [employer] Patient Education © 2023 [employer]  Diabetes Mellitus and Nutrition, Adult  When you have diabetes, or diabetes mellitus, it is very important to have  healthy eating habits because your blood sugar (glucose) levels are greatly affected by what you eat and drink. Eating healthy foods in the right amounts, at about the same times every day, can help you:  Manage your blood glucose.  Lower your risk of heart disease.  Improve your blood pressure.  Reach or maintain a healthy weight.  What can affect my meal plan?  Every person with diabetes is different, and each person has different needs for a meal plan. Your health care provider may recommend that you work with a dietitian to make a meal plan that is best for you. Your meal plan may vary depending on factors such as:  The calories you need.  The medicines you take.  Your weight.  Your blood glucose, blood pressure, and cholesterol levels.  Your activity level.  Other health conditions you have, such as heart or kidney disease.  How do carbohydrates affect me?  Carbohydrates, also called carbs, affect your blood glucose level more than any other type of food. Eating carbs raises the amount of glucose in your blood.  It is important to know how many carbs you can safely have in each meal. This is different for every person. Your dietitian can help you calculate how many carbs you should have at each meal and for each snack.  How does alcohol affect me?  Alcohol can cause a decrease in blood glucose (hypoglycemia), especially if you use insulin or take certain diabetes medicines by mouth. Hypoglycemia can be a life-threatening condition. Symptoms of hypoglycemia, such as sleepiness, dizziness, and confusion, are similar to symptoms of having too much alcohol.  Do not drink alcohol if:  Your health care provider tells you not to drink.  You are pregnant, may be pregnant, or are planning to become pregnant.  If you drink alcohol:  Limit how much you have to:  0-1 drink a day for women.  0-2 drinks a day for men.  Know how much alcohol is in your drink. In the [place] one drink equals one 12 oz bottle of beer (355 mL),  "one 5 oz glass of wine (148 mL), or one 1½ oz glass of hard liquor (44 mL).  Keep yourself hydrated with water, diet soda, or unsweetened iced tea. Keep in mind that regular soda, juice, and other mixers may contain a lot of sugar and must be counted as carbs.  What are tips for following this plan?    Reading food labels  Start by checking the serving size on the Nutrition Facts label of packaged foods and drinks. The number of calories and the amount of carbs, fats, and other nutrients listed on the label are based on one serving of the item. Many items contain more than one serving per package.  Check the total grams (g) of carbs in one serving.  Check the number of grams of saturated fats and trans fats in one serving. Choose foods that have a low amount or none of these fats.  Check the number of milligrams (mg) of salt (sodium) in one serving. Most people should limit total sodium intake to less than 2,300 mg per day.  Always check the nutrition information of foods labeled as \"low-fat\" or \"nonfat.\" These foods may be higher in added sugar or refined carbs and should be avoided.  Talk to your dietitian to identify your daily goals for nutrients listed on the label.  Shopping  Avoid buying canned, pre-made, or processed foods. These foods tend to be high in fat, sodium, and added sugar.  Shop around the outside edge of the grocery store. This is where you will most often find fresh fruits and vegetables, bulk grains, fresh meats, and fresh dairy products.  Cooking  Use low-heat cooking methods, such as baking, instead of high-heat cooking methods, such as deep frying.  Cook using healthy oils, such as olive, canola, or sunflower oil.  Avoid cooking with butter, cream, or high-fat meats.  Meal planning  Eat meals and snacks regularly, preferably at the same times every day. Avoid going long periods of time without eating.  Eat foods that are high in fiber, such as fresh fruits, vegetables, beans, and whole " grains.  Eat 4-6 oz (112-168 g) of lean protein each day, such as lean meat, chicken, fish, eggs, or tofu. One ounce (oz) (28 g) of lean protein is equal to:  1 oz (28 g) of meat, chicken, or fish.  1 egg.  ¼ cup (62 g) of tofu.  Eat some foods each day that contain healthy fats, such as avocado, nuts, seeds, and fish.  What foods should I eat?  Fruits  Berries. Apples. Oranges. Peaches. Apricots. Plums. Grapes. Mangoes. Papayas. Pomegranates. Kiwi. Cherries.  Vegetables  Leafy greens, including lettuce, spinach, kale, chard, marisol greens, mustard greens, and cabbage. Beets. Cauliflower. Broccoli. Carrots. Green beans. Tomatoes. Peppers. Onions. Cucumbers. [place] sprouts.  Grains  Whole grains, such as whole-wheat or whole-grain bread, crackers, tortillas, cereal, and pasta. Unsweetened oatmeal. Quinoa. Brown or wild rice.  Meats and other proteins  Seafood. Poultry without skin. Lean cuts of poultry and beef. Tofu. Nuts. Seeds.  Dairy  Low-fat or fat-free dairy products such as milk, yogurt, and cheese.  The items listed above may not be a complete list of foods and beverages you can eat and drink. Contact a dietitian for more information.  What foods should I avoid?  Fruits  Fruits canned with syrup.  Vegetables  Canned vegetables. Frozen vegetables with butter or cream sauce.  Grains  Refined white flour and flour products such as bread, pasta, snack foods, and cereals. Avoid all processed foods.  Meats and other proteins  Fatty cuts of meat. Poultry with skin. Breaded or fried meats. Processed meat. Avoid saturated fats.  Dairy  Full-fat yogurt, cheese, or milk.  Beverages  Sweetened drinks, such as soda or iced tea.  The items listed above may not be a complete list of foods and beverages you should avoid. Contact a dietitian for more information.  Questions to ask a health care provider  Do I need to meet with a certified diabetes care and ?  Do I need to meet with a dietitian?  What  number can I call if I have questions?  When are the best times to check my blood glucose?  Where to find more information:  American Diabetes Association: diabetes.org  Academy of Nutrition and Dietetics: eatright.org  National [place] of Diabetes and Digestive and Kidney Diseases: niddk.nih.gov  Association of Diabetes Care & Education Specialists: diabeteseducator.org  Summary  It is important to have healthy eating habits because your blood sugar (glucose) levels are greatly affected by what you eat and drink. It is important to use alcohol carefully.  A healthy meal plan will help you manage your blood glucose and lower your risk of heart disease.  Your health care provider may recommend that you work with a dietitian to make a meal plan that is best for you.  This information is not intended to replace advice given to you by your health care provider. Make sure you discuss any questions you have with your health care provider.  Document Revised: 07/21/2021 Document Reviewed: 07/21/2021  Elsevier Patient Education © 2023 [employer].

## 2024-01-11 NOTE — ASSESSMENT & PLAN NOTE
Patient's (Body mass index is 34.55 kg/m².) indicates that they are obese (BMI >30) with health conditions that include hypertension, diabetes mellitus, dyslipidemias, and GERD . Weight is unchanged. BMI  is above average; BMI management plan is completed. We discussed low calorie, low carb based diet program, portion control, and increasing exercise.

## 2024-01-15 ENCOUNTER — OFFICE VISIT (OUTPATIENT)
Dept: CARDIOLOGY | Facility: CLINIC | Age: 49
End: 2024-01-15
Payer: MEDICARE

## 2024-01-15 VITALS
HEIGHT: 69 IN | WEIGHT: 218 LBS | OXYGEN SATURATION: 98 % | HEART RATE: 84 BPM | DIASTOLIC BLOOD PRESSURE: 80 MMHG | BODY MASS INDEX: 32.29 KG/M2 | SYSTOLIC BLOOD PRESSURE: 130 MMHG

## 2024-01-15 DIAGNOSIS — E78.5 HYPERLIPIDEMIA LDL GOAL <70: ICD-10-CM

## 2024-01-15 DIAGNOSIS — I71.20 THORACIC AORTIC ANEURYSM WITHOUT RUPTURE, UNSPECIFIED PART: ICD-10-CM

## 2024-01-15 DIAGNOSIS — Q24.9 CONGENITAL HEART DISEASE: Primary | ICD-10-CM

## 2024-01-15 DIAGNOSIS — Z86.79 HISTORY OF AORTIC REGURGITATION: Chronic | ICD-10-CM

## 2024-01-15 DIAGNOSIS — I10 ESSENTIAL HYPERTENSION: Chronic | ICD-10-CM

## 2024-01-15 PROCEDURE — 3079F DIAST BP 80-89 MM HG: CPT | Performed by: INTERNAL MEDICINE

## 2024-01-15 PROCEDURE — 99214 OFFICE O/P EST MOD 30 MIN: CPT | Performed by: INTERNAL MEDICINE

## 2024-01-15 PROCEDURE — 3075F SYST BP GE 130 - 139MM HG: CPT | Performed by: INTERNAL MEDICINE

## 2024-01-15 NOTE — PROGRESS NOTES
[facility] at [facility]  Office visit  Manjeet Hall  1975      VISIT DATE:  1/15/2024      PCP: Scotty Means MD  [address] [facility] [address] [place], [place] [id number]    CC:  Chief Complaint   Patient presents with    H/O aortic mechanical valve replacement on Coumadin       Previous cardiac history:  -Diagnosed with large PDA and VSD at birth, PDA close spontaneously.  -Developed subvalvular aortic stenosis which required surgery, VSD closed as well at 15yo  -Ross procedure for increasing aortic insufficiency with moderate aortic stenosis at 21yo  -Treated for Hodgkin's lymphoma, age 21 - CTX and chest XRT    -May 2018 Echo  Estimated EF appears to be in the range of 56 - 60%.  Left ventricular wall thickness is consistent with mild concentric hypertrophy.  Mild to moderate aortic valve regurgitation is present.  Right ventricular cavity is borderline dilated.  Left atrial cavity size is mildly dilated.  Moderate dilation of the aortic root is present. 4.8 cm. Moderate dilation of the ascending aorta is present. 5.4 cm.    -CT angiography chest June 2018:aortic root at the upper portion of the valve largest AP dimension is 5.4 cm. Approximately 2 cm above the aortic root the ascending thoracic aorta largest oblique dimension is 7.0 cm and AP dimension 6.2 cm.    Cardiac catheterization in July 2018 [facility]  LMT: - The LMT has mild luminal irregularities.  LAD:  - There was moderate diffuse disease.  - The mid LAD is narrowed 30 % - focal disease.   Additional Comment: very large vessel, wraps around the apex to perfuse the   entire mid and apical inferior wall.  LCX: - There was mild diffuse disease.  - The circ AV groove continuation circumflex is narrowed 80 % - focal disease.  - The 1st obtuse marginal circumflex is narrowed 60 % - focal disease.   Additional Comment: AV LCx perfuses small left PDA.  RAMUS: - The Ramus is Absent.  RCA:  - The RCA has mild luminal  irregularities.     August 13, 2018  Reoperation, third open-heart surgery.  Reversed Ross procedure  including aortic valve and root re-replacement with a composite graft including an On-X mechanical valve #25 and a 30-mm Hemashield graft, right ventricular outflow  tract reconstruction with the re-repaired autograft.    Echo September 2018 [facility]  EF = 60 ± 5% . There is no evidence of LV apical thrombus.  - The right ventricle is normal in size. Right ventricular systolic function is   low normal.  - On-X prosthetic aortic valve (size #25). There is no aortic valve regurgitation.   Transprosthetic gradients could not be obtained on the current study. Valve   leaflets not well visualized. If clinically indicated, consider ZACHARY to assess   further.  - There is a small pericardial effusion with organization. It is smaller when   compared to that documented on the prior echocardiogram performed 8/23/18. There   is no RV/RA collapse. There is no obvious evidence of cardiac tamponade.  - Status post reverse Ross procedure including aortic root, ascending aorta and   aortic valve replacement, RVOT reconstruction with rerepaired autograft. No   significant PI. The peak/mean gradients 19/10 mmHg [similar to the prior study]    August 2019 echo  Left ventricular systolic function is normal.  Estimated EF appears to be in the range of 56 - 60%  Left ventricular diastolic dysfunction (grade II) consistent with pseudonormalization.  Left ventricular wall thickness is consistent with mild concentric hypertrophy.  Normal mechanical aortic valve.    August 2021 TTE  Left ventricular ejection fraction appears to be 56 - 60%. Left ventricular systolic function is normal.  Left ventricular wall thickness is consistent with mild concentric hypertrophy.  Left ventricular diastolic function is consistent with (grade II w/high LAP) pseudonormalization.  Left atrial volume is mildly increased.  The right atrial cavity is mildly  dilated.  There is a normally functioning mechanical aortic valve prosthesis present.    May 2023  CT head  Bilateral 13 to 14 mm acute subdural hematomas are present. There is evidence of diffuse cerebral edema, with bilateral uncal medialization, crowding of the basal cisterns and concern for impending herniation. There is 2 to 3 mm of leftward midline shift   with effacement of the right lateral and third ventricles, with prominence of the left temporal horn concerning for early entrapment.    June 2023  CT head  1. No significant change in the subdural fluid collections following craniotomy and drainage.  2. Small focal parafalcine subdural hematoma, unchanged from the patient's last study.  3. Resolution of the low-attenuation in the right temporal lobe. The left temporal hypoattenuation is more pronounced on the current study likely representing an evolving area of infarction.    ASSESSMENT:   Diagnosis Plan   1. Congenital heart disease        2. History of aortic regurgitation / stenosis s/p ROSS procedure with subsequent reversal with Mechanical AVR        3. HTN        4. Hyperlipidemia LDL goal <70        5. Thoracic aortic aneurysm without rupture, unspecified part              PLAN:  Congenital heart disease: Status post reversed Ross procedure  including aortic valve and root re-replacement with a composite graft including an On-X mechanical valve #25 and a 30-mm Hemashield graft, right ventricular outflow  tract reconstruction with the re-repaired autograft in 2018.  Postoperative course complicated by stroke and pancreatitis.  Afterload currently well-controlled.  Continue anticoagulation with goal INR of 2-3.  surveillance echocardiographic assessment every 3 years throughout the first 10 years post valve implant. Repeat CTA chest every 24 months, pending.      Coronary artery disease: Currently stable and asymptomatic.  Afterload well controlled.  Continue rosuvastatin 20 mg p.o. daily.  Goal LDL  "is 100, ideally less than 70.    Tremors: Continue beta-blockade.        Subjective  History of stroke with persistent word finding difficulties.  History of postoperative pancreatitis.  Clinical course complicated by bilateral subdural hematoma requiring craniotomy and drainage and cerebral artery arterial embolization in June 2023.  Denies chest discomfort.  Blood pressures running less than 120/80 mmHg.  compliant with medical therapy.  Stable shortness of breath in a mild class II pattern.  Resting tremors are stable.      PHYSICAL EXAMINATION:  Vitals:    01/15/24 1406   BP: 130/80   BP Location: Left arm   Patient Position: Sitting   Cuff Size: Adult   Pulse: 84   SpO2: 98%   Weight: 98.9 kg (218 lb)   Height: 175.3 cm (69\")         General Appearance:    Alert, cooperative, no distress, appears stated age   Head:    Normocephalic, without obvious abnormality, atraumatic   Eyes:    conjunctiva/corneas clear   Nose:   Nares normal, septum midline, mucosa normal, no drainage   Throat:   Lips, teeth and gums normal   Neck:   Supple, symmetrical, trachea midline, no carotid    bruit or JVD   Lungs:     Clear to auscultation bilaterally, respirations unlabored   Chest Wall:    No tenderness or deformity    Heart:    Regular rate and rhythm, mechanical A2,, III/6 early peaking systolic murmur left upper sternal border, rub   or gallop, normal carotid impulse bilaterally without bruit.   Abdomen:     Soft, non-tender   Extremities:   Extremities normal, atraumatic, no cyanosis or edema   Pulses:   2+ and symmetric all extremities   Skin:   Skin color, texture, turgor normal, no rashes or lesions       Diagnostic Data:  Procedures  Lab Results   Component Value Date    CHLPL 259 (H) 07/17/2015    TRIG 293 (H) 12/01/2023    HDL 47 12/01/2023     Lab Results   Component Value Date    GLUCOSE 98 12/01/2023    BUN 14 12/01/2023    CREATININE 1.11 12/01/2023     12/01/2023    K 4.2 12/01/2023     12/01/2023    " CO2 24.3 12/01/2023     Lab Results   Component Value Date    HGBA1C 6.30 (H) 01/11/2024     Lab Results   Component Value Date    WBC 8.31 01/11/2024    HGB 14.0 01/11/2024    HCT 43.4 01/11/2024     01/11/2024       Allergies  Allergies   Allergen Reactions    Tegaderm Chg Dressing [Chlorhexidine] Rash       Current Medications    Current Outpatient Medications:     DULoxetine (CYMBALTA) 60 MG capsule, Take 1 capsule by mouth Daily., Disp: 90 capsule, Rfl: 3    lansoprazole (PREVACID) 30 MG capsule, Take 1 capsule by mouth Daily., Disp: 90 capsule, Rfl: 3    levothyroxine (SYNTHROID, LEVOTHROID) 112 MCG tablet, Take 1 tablet by mouth Daily. Dose decrease, Disp: 90 tablet, Rfl: 0    metFORMIN ER (GLUCOPHAGE-XR) 500 MG 24 hr tablet, Take 1 tablet by mouth Daily With Breakfast., Disp: 90 tablet, Rfl: 3    metoprolol tartrate (LOPRESSOR) 25 MG tablet, Take 0.5 tablets by mouth Every 12 (Twelve) Hours., Disp: 90 tablet, Rfl: 3    rosuvastatin (CRESTOR) 20 MG tablet, Take 1 tablet by mouth Daily., Disp: 90 tablet, Rfl: 3    Semaglutide,0.25 or 0.5MG/DOS, (Ozempic, 0.25 or 0.5 MG/DOSE,) 2 MG/3ML solution pen-injector, Inject 0.5 mg under the skin into the appropriate area as directed 1 (One) Time Per Week., Disp: 9 mL, Rfl: 1    warfarin (COUMADIN) 5 MG tablet, Take 1 tablet by mouth daily as directed by anticoagulation clinic., Disp: 90 tablet, Rfl: 3          ROS  Review of Systems   Cardiovascular:  Negative for chest pain, dyspnea on exertion, irregular heartbeat and syncope.   Respiratory:  Negative for cough, shortness of breath and wheezing.          SOCIAL HX  Social History     Socioeconomic History    Marital status: Single   Tobacco Use    Smoking status: Never     Passive exposure: Never    Smokeless tobacco: Never   Vaping Use    Vaping Use: Never used   Substance and Sexual Activity    Alcohol use: Yes     Comment: socially on occasion    Drug use: Never    Sexual activity: Yes     Partners: Female      Birth control/protection: None     Comment: female partner       FAMILY HX  Family History   Problem Relation Age of Onset    COPD Mother     Ovarian cancer Mother     Hypertension Father     Stroke Father     No Known Problems Brother     Stroke Paternal Grandmother     Hypertension Paternal Grandmother     Cancer Paternal Grandmother         unknown    Brain cancer Paternal Grandfather     Heart disease Paternal Grandfather     Cancer Maternal Grandmother         unknown    Other Maternal Grandfather         unknown    No Known Problems Daughter     No Known Problems Daughter     No Known Problems Son              Vimal John III, MD, FACC       abdominal pain

## 2024-01-26 DIAGNOSIS — E03.9 HYPOTHYROIDISM (ACQUIRED): ICD-10-CM

## 2024-01-26 RX ORDER — LEVOTHYROXINE SODIUM 112 UG/1
112 TABLET ORAL DAILY
Qty: 30 TABLET | Refills: 0 | Status: SHIPPED | OUTPATIENT
Start: 2024-01-26

## 2024-01-26 NOTE — TELEPHONE ENCOUNTER
Rx Refill Note  Requested Prescriptions     Pending Prescriptions Disp Refills    levothyroxine (SYNTHROID, LEVOTHROID) 112 MCG tablet [Pharmacy Med Name: LEVOTHYROXINE 112 MCG TABLET] 90 tablet 0     Sig: TAKE 1 TABLET BY MOUTH DAILY. DOSE DECREASE      Last office visit with prescribing clinician: 12/1/2023   Last telemedicine visit with prescribing clinician: Visit date not found   Next office visit with prescribing clinician: 6/3/2024                         Would you like a call back once the refill request has been completed: [] Yes [] No    If the office needs to give you a call back, can they leave a voicemail: [] Yes [] No    Mary Boothe MA  01/26/24, 14:03 EST

## 2024-01-31 ENCOUNTER — HOSPITAL ENCOUNTER (OUTPATIENT)
Dept: CT IMAGING | Facility: HOSPITAL | Age: 49
Discharge: HOME | End: 2024-01-31
Admitting: INTERNAL MEDICINE
Payer: MEDICARE

## 2024-01-31 LAB — CREAT BLDA-MCNC: 0.9 MG/DL (ref 0.6–1.3)

## 2024-01-31 PROCEDURE — 25510000001 IOPAMIDOL PER 1 ML: Performed by: INTERNAL MEDICINE

## 2024-01-31 PROCEDURE — 82565 ASSAY OF CREATININE: CPT

## 2024-01-31 PROCEDURE — 71275 CT ANGIOGRAPHY CHEST: CPT

## 2024-01-31 RX ADMIN — IOPAMIDOL 85 ML: 755 INJECTION, SOLUTION INTRAVENOUS at 09:11

## 2024-02-07 ENCOUNTER — TELEPHONE (OUTPATIENT)
Dept: CARDIOLOGY | Facility: CLINIC | Age: 49
End: 2024-02-07
Payer: MEDICARE

## 2024-02-07 NOTE — TELEPHONE ENCOUNTER
----- Message from Vimal John III, MD sent at 2/7/2024  2:30 PM EST -----  Stable anatomy based on CT chest.

## 2024-03-04 ENCOUNTER — LAB (OUTPATIENT)
Dept: LAB | Facility: HOSPITAL | Age: 49
End: 2024-03-04
Payer: MEDICARE

## 2024-03-04 DIAGNOSIS — E03.9 HYPOTHYROIDISM (ACQUIRED): ICD-10-CM

## 2024-03-04 LAB
T4 FREE SERPL-MCNC: 1.63 NG/DL (ref 0.93–1.7)
TSH SERPL DL<=0.05 MIU/L-ACNC: 0.08 UIU/ML (ref 0.27–4.2)

## 2024-03-04 PROCEDURE — 84439 ASSAY OF FREE THYROXINE: CPT

## 2024-03-04 PROCEDURE — 84443 ASSAY THYROID STIM HORMONE: CPT

## 2024-03-06 DIAGNOSIS — E03.9 HYPOTHYROIDISM (ACQUIRED): ICD-10-CM

## 2024-03-06 RX ORDER — LEVOTHYROXINE SODIUM 0.1 MG/1
100 TABLET ORAL DAILY
Qty: 90 TABLET | Refills: 0 | Status: SHIPPED | OUTPATIENT
Start: 2024-03-06

## 2024-04-04 ENCOUNTER — TELEPHONE (OUTPATIENT)
Dept: PHARMACY | Facility: HOSPITAL | Age: 49
End: 2024-04-04

## 2024-04-04 NOTE — TELEPHONE ENCOUNTER
Patient is scheduled to come to the clinic tomorrow morning at 11:00am.     Simran Beavers CPhT   14:48 EDT 4/4/2024

## 2024-04-05 ENCOUNTER — LAB (OUTPATIENT)
Dept: LAB | Facility: HOSPITAL | Age: 49
End: 2024-04-05
Payer: MEDICARE

## 2024-04-05 DIAGNOSIS — E03.9 HYPOTHYROIDISM (ACQUIRED): ICD-10-CM

## 2024-04-05 LAB
T4 FREE SERPL-MCNC: 1.33 NG/DL (ref 0.93–1.7)
TSH SERPL DL<=0.05 MIU/L-ACNC: 0.54 UIU/ML (ref 0.27–4.2)

## 2024-04-05 PROCEDURE — 84443 ASSAY THYROID STIM HORMONE: CPT

## 2024-04-05 PROCEDURE — 84439 ASSAY OF FREE THYROXINE: CPT

## 2024-05-07 ENCOUNTER — LAB (OUTPATIENT)
Dept: LAB | Facility: HOSPITAL | Age: 49
End: 2024-05-07
Payer: MEDICARE

## 2024-05-07 ENCOUNTER — ANTICOAGULATION VISIT (OUTPATIENT)
Dept: PHARMACY | Facility: HOSPITAL | Age: 49
End: 2024-05-07
Payer: MEDICARE

## 2024-05-07 DIAGNOSIS — Z95.2 HX OF AORTIC VALVE REPLACEMENT, MECHANICAL: Primary | ICD-10-CM

## 2024-05-07 DIAGNOSIS — Z95.2 HX OF AORTIC VALVE REPLACEMENT, MECHANICAL: ICD-10-CM

## 2024-05-07 LAB
INR PPP: 3.13 (ref 0.89–1.12)
PROTHROMBIN TIME: 32.4 SECONDS (ref 12.2–14.5)

## 2024-05-07 PROCEDURE — 36415 COLL VENOUS BLD VENIPUNCTURE: CPT

## 2024-05-07 PROCEDURE — 85610 PROTHROMBIN TIME: CPT

## 2024-05-30 ENCOUNTER — LAB (OUTPATIENT)
Dept: LAB | Facility: HOSPITAL | Age: 49
End: 2024-05-30
Payer: MEDICARE

## 2024-05-30 ENCOUNTER — ANTICOAGULATION VISIT (OUTPATIENT)
Dept: PHARMACY | Facility: HOSPITAL | Age: 49
End: 2024-05-30
Payer: MEDICARE

## 2024-05-30 DIAGNOSIS — Z95.2 HX OF AORTIC VALVE REPLACEMENT, MECHANICAL: Primary | ICD-10-CM

## 2024-05-30 DIAGNOSIS — Z95.2 HX OF AORTIC VALVE REPLACEMENT, MECHANICAL: ICD-10-CM

## 2024-05-30 LAB
INR PPP: 2.72 (ref 0.89–1.12)
PROTHROMBIN TIME: 29.1 SECONDS (ref 12.2–14.5)

## 2024-05-30 PROCEDURE — 85610 PROTHROMBIN TIME: CPT

## 2024-05-30 PROCEDURE — 36415 COLL VENOUS BLD VENIPUNCTURE: CPT

## 2024-05-30 NOTE — PROGRESS NOTES
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   Other: Hodgkins Lymphoma (hx of chemo and radiation); h/o CVA + hemorrhagic pancreatitis (during hospitalization for mAVR)    Diet: avoids GLV (2/20/23)  Alcohol: socially  Tobacco: none  OTC Pain Medication: APAP PRN pain    INR History:  Date 9/27 9/28 10/1 10/4 10/8 10/11 10/17 10/19 10/23 11/8 11/16 12/21   Total Weekly Dose 8mg 12mg 19mg 29mg 31mg 34mg 43mg 41mg 45mg 49mg 49mg 49mg   INR 1.3 1.22 1.3 1.8 1.85 1.70 2.91 2.82 3.79 3.00 2.96 3.44   Notes clinic enox enox enox; clinic enox enox      desvenla     Date 1/9/19 1/16 2/4 3/1 3/22 5/16 6/13 6/24 7/10 7/22 8/5 8/26   Total Weekly Dose 49mg 47mg 49mg 49mg 49mg 49mg 49mg 49mg 49mg 49mg 49mg 49mg   INR 4.40 2.36 2.62 3.02 2.15 2.60 2.25 2.98 2.83 2.97 2.90 3.20   Notes   self adj              Date 9/26 10/18 10/28 11/19 12/3 12/17 1/3/20 1/21 2/3 2/12 2/19 2/27   Total Weekly Dose 49mg 49mg 49mg 49mg 49mg 45mg 45mg 45mg 47mg 43mg 43mg 43mg   INR 3.57 3.12 2.88 3.33 4.06 2.59 3.21 3.85 3.11 2.34 3.01 3.20   Notes less GLV Cymbalta start 9/25; Depakote start 9/5 1x decr dose  depakote inc; trintellix dcd ceftin x10 days Recv'd 1/6  incr GLV  dieting        Date 3/5 3/12 3/20 4/2 4/17 5/7 5/28 6/16 6/29 7/17 8/14 8/28   Total Weekly Dose 41mg 41mg 41mg 41mg 41mg 41mg 41mg 39mg 39mg 39mg 39mg 39mg   INR 3.07 3.01 2.95 2.66 2.9 3.03 3.36 2.48 2.42 3.08 2.78 2.28   Notes 1x decr   1x  decr         recv'd 6/30  recv'd 8/13 recv'd 8/31     Date 9/28 10/29  12/4 1/8/21 2/9 3/31 5/12 6/9  8/3 8/31   Total Weekly Dose 39mg 39mg non-  compliant 39mg 39mg 39mg 39mg 39mg 39mg non- compliant 39mg 39mg   INR 2.33 2.39  2.46 2.45 2.85 2.48 2.52 2.51  2.88 2.77   Notes recv'd 9/29 recv'd 10/30   recv'd 1/11  recv'd 4/1           Date 10/4 11/1 12/6  3/8 4/5 5/9 6/10 6/23 7/5 7/20 8/5   Total Weekly Dose 39mg 39mg 39mg  non- compliant  39mg 39mg 39 mg 39 mg 39 mg 39 mg 39 mg 39mg   INR 2.50 2.54 2.2  2.40 2.40 2.98 2.26 2.96 2.84 2.88 3.48   Notes  Rec 11/2 Rec 12/7       rec'd 7/6  rec 8/8     Date 8/17 8/25 9/19 9/22 10/13 10/31 11/10 12/2 12/29 12/30 1/3/2023 1/13   Total Weekly Dose 37mg 35mg 39 mg  37mg 35 mg 35 mg 35 mg 35 mg 35mg 37.5mg 41.5mg 35mg   INR 3.24 2.6 3.13 3.39 2.46 2.75 2.71 2.77 1.1 2.42 4.14 3.59   Notes  rec 8/26  Inc activity Rec 10/14  rec 11/11 rec 12/5 ??? rcv'd 1/3       Date 1/27 2/17 3/10 4/5 4/14 5/18 5/26-6/5 6/8 6/12 6/20 6/28 7/6   Total Weekly Dose 35mg 35mg 35 mg 35 mg 35mg 35 mg Inpatient hospital 15mg 40mg 37.5 mg 35 mg 35mg   INR 2.94 2.58 2.5 2.44 3.11 2.43  1.3 1.62 2.1 2.83 2.76   Notes rcv'd 1/30 rcv'd 2/20 rec 3/13 crestor  rcv'd 4/17 rec 4/19 Subdural hematoma; kcentra  Rcv'd 6/13   Rcvd 7/7     Date 7/19 8/23/23 10/25 Noncomp 5/7 5/30      Total Weekly Dose  35 mg 35 mg   35 mg 35 mg      INR 2.53 2.51 2.73  3.13 2.72      Notes  Rec'd 8/24   Rec 5/8       *takes warfarin in AM*  Phone Interview:  Verbal Release Authorization: please contact Mr. Hall directly - if unable to reach patient may contact [email or web address]  Tablet Strength: 2mg and 5mg tablets  Patient Contact Info: [phone]; [email or web address]  Lab Contact Info: Megan Vu     Patient Findings    Negatives: Signs/symptoms of thrombosis, Signs/symptoms of bleeding, Laboratory test error suspected, Change in health, Change in alcohol use, Change in activity, Upcoming invasive procedure,  Emergency department visit, Upcoming dental procedure, Missed doses, Extra doses, Change in medications, Change in diet/appetite, Hospital admission, Bruising, Other complaints   Comments: All findings negative per patient.     Plan:  1. INR is therapeutic today at 2.72 (goal 2.0-3.0). Instructed Mr. Hall to continue warfarin 5mg oral daily until recheck.  2. Repeat INR in 3 weeks, 6/20/24  3. Verbal and written information provided in the clinic. Mr. Hall RBV dosing instructions, expresses understanding by teach back, and has no further questions at this time.      Simran Beavers CPhT   14:25 EDT 5/30/2024    I, Errol St, PharmD, have reviewed the note in full and agree with the assessment and plan.  05/30/24  15:05 EDT

## 2024-06-02 DIAGNOSIS — E03.9 HYPOTHYROIDISM (ACQUIRED): ICD-10-CM

## 2024-06-03 ENCOUNTER — OFFICE VISIT (OUTPATIENT)
Dept: ENDOCRINOLOGY | Facility: CLINIC | Age: 49
End: 2024-06-03
Payer: MEDICARE

## 2024-06-03 VITALS
WEIGHT: 224 LBS | OXYGEN SATURATION: 97 % | DIASTOLIC BLOOD PRESSURE: 82 MMHG | SYSTOLIC BLOOD PRESSURE: 124 MMHG | HEART RATE: 86 BPM | HEIGHT: 69 IN | BODY MASS INDEX: 33.18 KG/M2

## 2024-06-03 DIAGNOSIS — E78.2 MIXED HYPERLIPIDEMIA: Chronic | ICD-10-CM

## 2024-06-03 DIAGNOSIS — E03.9 HYPOTHYROIDISM (ACQUIRED): Chronic | ICD-10-CM

## 2024-06-03 DIAGNOSIS — E11.9 CONTROLLED TYPE 2 DIABETES MELLITUS WITHOUT COMPLICATION, WITHOUT LONG-TERM CURRENT USE OF INSULIN: Primary | Chronic | ICD-10-CM

## 2024-06-03 DIAGNOSIS — E03.9 HYPOTHYROIDISM (ACQUIRED): Primary | ICD-10-CM

## 2024-06-03 LAB
ALBUMIN SERPL-MCNC: 4.6 G/DL (ref 3.5–5.2)
ALBUMIN/GLOB SERPL: 1.7 G/DL
ALP SERPL-CCNC: 77 U/L (ref 39–117)
ALT SERPL W P-5'-P-CCNC: 32 U/L (ref 1–41)
ANION GAP SERPL CALCULATED.3IONS-SCNC: 8 MMOL/L (ref 5–15)
AST SERPL-CCNC: 27 U/L (ref 1–40)
BILIRUB SERPL-MCNC: 0.5 MG/DL (ref 0–1.2)
BUN SERPL-MCNC: 12 MG/DL (ref 6–20)
BUN/CREAT SERPL: 13.3 (ref 7–25)
CALCIUM SPEC-SCNC: 8.8 MG/DL (ref 8.6–10.5)
CHLORIDE SERPL-SCNC: 105 MMOL/L (ref 98–107)
CHOLEST SERPL-MCNC: 180 MG/DL (ref 0–200)
CO2 SERPL-SCNC: 26 MMOL/L (ref 22–29)
CREAT SERPL-MCNC: 0.9 MG/DL (ref 0.76–1.27)
EGFRCR SERPLBLD CKD-EPI 2021: 105.4 ML/MIN/1.73
EXPIRATION DATE: ABNORMAL
EXPIRATION DATE: ABNORMAL
GLOBULIN UR ELPH-MCNC: 2.7 GM/DL
GLUCOSE BLDC GLUCOMTR-MCNC: 142 MG/DL (ref 70–130)
GLUCOSE SERPL-MCNC: 156 MG/DL (ref 65–99)
HBA1C MFR BLD: 6.3 % (ref 4.5–5.7)
HDLC SERPL-MCNC: 58 MG/DL (ref 40–60)
LDLC SERPL CALC-MCNC: 74 MG/DL (ref 0–100)
LDLC/HDLC SERPL: 1.07 {RATIO}
Lab: ABNORMAL
Lab: ABNORMAL
POTASSIUM SERPL-SCNC: 4.1 MMOL/L (ref 3.5–5.2)
PROT SERPL-MCNC: 7.3 G/DL (ref 6–8.5)
SODIUM SERPL-SCNC: 139 MMOL/L (ref 136–145)
T4 FREE SERPL-MCNC: 1.2 NG/DL (ref 0.92–1.68)
TRIGL SERPL-MCNC: 300 MG/DL (ref 0–150)
TSH SERPL DL<=0.05 MIU/L-ACNC: 0.81 UIU/ML (ref 0.27–4.2)
VLDLC SERPL-MCNC: 48 MG/DL (ref 5–40)

## 2024-06-03 PROCEDURE — 2033F EYE IMG VALID W/O RTNOPTHY: CPT | Performed by: PHYSICIAN ASSISTANT

## 2024-06-03 PROCEDURE — 84439 ASSAY OF FREE THYROXINE: CPT | Performed by: PHYSICIAN ASSISTANT

## 2024-06-03 PROCEDURE — 3044F HG A1C LEVEL LT 7.0%: CPT | Performed by: PHYSICIAN ASSISTANT

## 2024-06-03 PROCEDURE — 3074F SYST BP LT 130 MM HG: CPT | Performed by: PHYSICIAN ASSISTANT

## 2024-06-03 PROCEDURE — 84443 ASSAY THYROID STIM HORMONE: CPT | Performed by: PHYSICIAN ASSISTANT

## 2024-06-03 PROCEDURE — 80061 LIPID PANEL: CPT | Performed by: PHYSICIAN ASSISTANT

## 2024-06-03 PROCEDURE — 92250 FUNDUS PHOTOGRAPHY W/I&R: CPT | Performed by: PHYSICIAN ASSISTANT

## 2024-06-03 PROCEDURE — 1159F MED LIST DOCD IN RCRD: CPT | Performed by: PHYSICIAN ASSISTANT

## 2024-06-03 PROCEDURE — 1160F RVW MEDS BY RX/DR IN RCRD: CPT | Performed by: PHYSICIAN ASSISTANT

## 2024-06-03 PROCEDURE — 80053 COMPREHEN METABOLIC PANEL: CPT | Performed by: PHYSICIAN ASSISTANT

## 2024-06-03 PROCEDURE — 83036 HEMOGLOBIN GLYCOSYLATED A1C: CPT | Performed by: PHYSICIAN ASSISTANT

## 2024-06-03 PROCEDURE — 82947 ASSAY GLUCOSE BLOOD QUANT: CPT | Performed by: PHYSICIAN ASSISTANT

## 2024-06-03 PROCEDURE — 99214 OFFICE O/P EST MOD 30 MIN: CPT | Performed by: PHYSICIAN ASSISTANT

## 2024-06-03 PROCEDURE — 3079F DIAST BP 80-89 MM HG: CPT | Performed by: PHYSICIAN ASSISTANT

## 2024-06-03 RX ORDER — LEVOTHYROXINE SODIUM 112 UG/1
112 TABLET ORAL DAILY
COMMUNITY
End: 2024-06-03 | Stop reason: SDUPTHER

## 2024-06-03 RX ORDER — LEVOTHYROXINE SODIUM 112 UG/1
112 TABLET ORAL DAILY
Qty: 90 TABLET | Refills: 1 | Status: SHIPPED | OUTPATIENT
Start: 2024-06-03

## 2024-06-03 RX ORDER — LEVOTHYROXINE SODIUM 0.1 MG/1
100 TABLET ORAL DAILY
Qty: 90 TABLET | Refills: 0 | OUTPATIENT
Start: 2024-06-03

## 2024-06-03 RX ORDER — SEMAGLUTIDE 0.68 MG/ML
0.5 INJECTION, SOLUTION SUBCUTANEOUS WEEKLY
Qty: 9 ML | Refills: 1 | Status: SHIPPED | OUTPATIENT
Start: 2024-06-03

## 2024-06-03 NOTE — PROGRESS NOTES
Chief Complaint  F/u for Diabetes Mellitus.    [facility]   Manjeet Hall is a 48 y.o. male with hx of hodgkin's lymphoma, CVA, subdural hematoma, mechanical aortic valve, who is here today for f/u of Diabetes Mellitus type 2. The initial diagnosis of diabetes was made in his early 40s.     Patient reports to be doing well. No health-related changes since last visit.     Diabetic complications: CVA 2017 or 2018  Eye exam current (within one year): due    Current diabetic medications include:  Ozempic 0.5 mg weekly - tolerating well    Statin: crestor 20 for hyperlipidemia    Past medications: metformin (changed to ozempic)    Diabetic Monitoring  - no meter or log for review    Hypothyroidism - on levothyroxine 112 mcg     The following portions of the patient's history were reviewed and updated by me as appropriate: allergies, current medications, past family history, past social history, past surgical history and problem list.    Past Medical History:   Diagnosis Date    Abnormal heart rhythm     Depression     Elevated cholesterol     GERD (gastroesophageal reflux disease)     Heart murmur     History of echocardiogram     History of left heart catheterization     History of stomach ulcers     Mitral valvular disorder     Stroke     Type 2 diabetes mellitus with hyperglycemia, without long-term current use of insulin 05/26/2023       Medications    Current Outpatient Medications:     DULoxetine (CYMBALTA) 60 MG capsule, Take 1 capsule by mouth Daily., Disp: 90 capsule, Rfl: 3    lansoprazole (PREVACID) 30 MG capsule, Take 1 capsule by mouth Daily., Disp: 90 capsule, Rfl: 3    levothyroxine (SYNTHROID, LEVOTHROID) 112 MCG tablet, Take 1 tablet by mouth Daily., Disp: , Rfl:     metoprolol tartrate (LOPRESSOR) 25 MG tablet, Take 0.5 tablets by mouth Every 12 (Twelve) Hours., Disp: 90 tablet, Rfl: 3    rosuvastatin (CRESTOR) 20 MG tablet, Take 1 tablet by mouth Daily., Disp: 90 tablet, Rfl: 3    Semaglutide,0.25 or  "0.5MG/DOS, (Ozempic, 0.25 or 0.5 MG/DOSE,) 2 MG/3ML solution pen-injector, Inject 0.5 mg under the skin into the appropriate area as directed 1 (One) Time Per Week., Disp: 9 mL, Rfl: 1    warfarin (COUMADIN) 5 MG tablet, Take 1 tablet by mouth daily as directed by anticoagulation clinic., Disp: 90 tablet, Rfl: 3    Review of Systems  Review of Systems   All other systems reviewed and are negative.       Physical Exam    /82   Pulse 86   Ht 175.3 cm (69\")   Wt 102 kg (224 lb)   SpO2 97%   BMI 33.08 kg/m² Body mass index is 33.08 kg/m².  Physical Exam  Constitutional:       General: He is not in acute distress.     Appearance: He is well-developed. He is not diaphoretic.   Neck:      Thyroid: No thyromegaly.      Vascular: No JVD.      Trachea: No tracheal deviation.   Cardiovascular:      Rate and Rhythm: Normal rate and regular rhythm.      Comments: Mechanical valve click  Pulmonary:      Effort: Pulmonary effort is normal.      Breath sounds: Normal breath sounds.   Musculoskeletal:         General: No tenderness.      Cervical back: Neck supple.   Skin:     General: Skin is warm and dry.      Findings: No erythema or rash.   Neurological:      Mental Status: He is alert and oriented to person, place, and time.   Psychiatric:         Behavior: Behavior normal.         Thought Content: Thought content normal.         Judgment: Judgment normal.         Labs and Imaging   Lab Results   Component Value Date    HGBA1C 6.3 (A) 06/03/2024    HGBA1C 6.30 (H) 01/11/2024    HGBA1C 6.3 09/29/2023     CMP          12/1/2023    15:09 1/31/2024    09:02   CMP   Glucose 98     BUN 14     Creatinine 1.11  0.90    EGFR 82.4     Sodium 141     Potassium 4.2     Chloride 105     Calcium 9.7     Total Protein 7.4     Albumin 4.7     Globulin 2.7     Total Bilirubin 0.5     Alkaline Phosphatase 71     AST (SGOT) 30     ALT (SGPT) 28     Albumin/Globulin Ratio 1.7     BUN/Creatinine Ratio 12.6     Anion Gap 11.7       CBC  "         1/11/2024    08:58   CBC   WBC 8.31    RBC 5.39    Hemoglobin 14.0    Hematocrit 43.4    MCV 80.5    MCH 26.0    MCHC 32.3    RDW 15.0    Platelets 189      Lipid Panel          12/1/2023    15:09   Lipid Panel   Total Cholesterol 149    Triglycerides 293    HDL Cholesterol 47    VLDL Cholesterol 46    LDL Cholesterol  56    LDL/HDL Ratio 0.92      TSH          12/5/2023    09:28 3/4/2024    13:07 4/5/2024    11:50   TSH   TSH 0.040  0.078  0.543      Most Recent A1C          6/3/2024    09:09   HGBA1C Most Recent   Hemoglobin A1C 6.3      Microalbumin          12/1/2023    15:09   Microalbumin   Microalbumin, Urine 1.8        Assessment / Plan   Diagnoses and all orders for this visit:    1. Controlled type 2 diabetes mellitus without complication, without long-term current use of insulin (Primary)  -     POC Glycosylated Hemoglobin (Hb A1C)  -     POC Glucose, Blood  -     Comprehensive Metabolic Panel  -     Lipid Panel  -     Semaglutide,0.25 or 0.5MG/DOS, (Ozempic, 0.25 or 0.5 MG/DOSE,) 2 MG/3ML solution pen-injector; Inject 0.5 mg under the skin into the appropriate area as directed 1 (One) Time Per Week.  Dispense: 9 mL; Refill: 1    2. Hypothyroidism (acquired)  -     TSH  -     T4, Free    3. Mixed hyperlipidemia  -     Comprehensive Metabolic Panel  -     Lipid Panel        Diabetes Mellitus 2 is under good control.  -A1c 6.3   -continue ozempic 0.5 mg weekly  -consider adding [id number] in the future - hold off for now due to cost of ozempic  -labs today, UACR is utd, foot exam updated 9/2023, retinopathy scan today    Hypothyroidism  -repeat tfts  -continue levothyroxine 112 mcg daily    There are no Patient Instructions on file for this visit.    Follow up: Return in about 6 months (around 12/3/2024).    Signed by: Candido Turner PA-C  Endocrinology    Addendum 6/7/2024: Diabetic retinopathy screening reviewed, no diabetic retinopathy bilaterally, patient notified

## 2024-06-10 ENCOUNTER — TELEPHONE (OUTPATIENT)
Dept: ENDOCRINOLOGY | Facility: CLINIC | Age: 49
End: 2024-06-10
Payer: MEDICARE

## 2024-07-03 ENCOUNTER — LAB (OUTPATIENT)
Dept: LAB | Facility: HOSPITAL | Age: 49
End: 2024-07-03
Payer: MEDICARE

## 2024-07-03 DIAGNOSIS — Z95.2 HX OF AORTIC VALVE REPLACEMENT, MECHANICAL: ICD-10-CM

## 2024-07-03 LAB
INR PPP: 2.34 (ref 0.89–1.12)
PROTHROMBIN TIME: 25.8 SECONDS (ref 12.2–14.5)

## 2024-07-03 PROCEDURE — 85610 PROTHROMBIN TIME: CPT

## 2024-07-03 PROCEDURE — 36415 COLL VENOUS BLD VENIPUNCTURE: CPT

## 2024-07-05 ENCOUNTER — ANTICOAGULATION VISIT (OUTPATIENT)
Dept: PHARMACY | Facility: HOSPITAL | Age: 49
End: 2024-07-05
Payer: MEDICARE

## 2024-07-05 DIAGNOSIS — Z95.2 HX OF AORTIC VALVE REPLACEMENT, MECHANICAL: Primary | ICD-10-CM

## 2024-07-05 NOTE — PROGRESS NOTES
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   Other: Hodgkins Lymphoma (hx of chemo and radiation); h/o CVA + hemorrhagic pancreatitis (during hospitalization for mAVR)    Diet: avoids GLV (2/20/23)  Alcohol: socially  Tobacco: none  OTC Pain Medication: APAP PRN pain    INR History:  Date 9/27 9/28 10/1 10/4 10/8 10/11 10/17 10/19 10/23 11/8 11/16 12/21   Total Weekly Dose 8mg 12mg 19mg 29mg 31mg 34mg 43mg 41mg 45mg 49mg 49mg 49mg   INR 1.3 1.22 1.3 1.8 1.85 1.70 2.91 2.82 3.79 3.00 2.96 3.44   Notes clinic enox enox enox; clinic enox enox      desvenla     Date 1/9/19 1/16 2/4 3/1 3/22 5/16 6/13 6/24 7/10 7/22 8/5 8/26   Total Weekly Dose 49mg 47mg 49mg 49mg 49mg 49mg 49mg 49mg 49mg 49mg 49mg 49mg   INR 4.40 2.36 2.62 3.02 2.15 2.60 2.25 2.98 2.83 2.97 2.90 3.20   Notes   self adj              Date 9/26 10/18 10/28 11/19 12/3 12/17 1/3/20 1/21 2/3 2/12 2/19 2/27   Total Weekly Dose 49mg 49mg 49mg 49mg 49mg 45mg 45mg 45mg 47mg 43mg 43mg 43mg   INR 3.57 3.12 2.88 3.33 4.06 2.59 3.21 3.85 3.11 2.34 3.01 3.20   Notes less GLV Cymbalta start 9/25; Depakote start 9/5 1x decr dose  depakote inc; trintellix dcd ceftin x10 days Recv'd 1/6  incr GLV  dieting        Date 3/5 3/12 3/20 4/2 4/17 5/7 5/28 6/16 6/29 7/17 8/14 8/28   Total Weekly Dose 41mg 41mg 41mg 41mg 41mg 41mg 41mg 39mg 39mg 39mg 39mg 39mg   INR 3.07 3.01 2.95 2.66 2.9 3.03 3.36 2.48 2.42 3.08 2.78 2.28   Notes 1x decr   1x  decr         recv'd 6/30  recv'd 8/13 recv'd 8/31     Date 9/28 10/29  12/4 1/8/21 2/9 3/31 5/12 6/9  8/3 8/31   Total Weekly Dose 39mg 39mg non-  compliant 39mg 39mg 39mg 39mg 39mg 39mg non- compliant 39mg 39mg   INR 2.33 2.39  2.46 2.45 2.85 2.48 2.52 2.51  2.88 2.77   Notes recv'd 9/29 recv'd 10/30   recv'd 1/11  recv'd 4/1           Date 10/4 11/1 12/6  3/8 4/5 5/9 6/10 6/23 7/5 7/20 8/5   Total Weekly Dose 39mg 39mg 39mg  non- compliant  39mg 39mg 39 mg 39 mg 39 mg 39 mg 39 mg 39mg   INR 2.50 2.54 2.2  2.40 2.40 2.98 2.26 2.96 2.84 2.88 3.48   Notes  Rec 11/2 Rec 12/7       rec'd 7/6  rec 8/8     Date 8/17 8/25 9/19 9/22 10/13 10/31 11/10 12/2 12/29 12/30 1/3/2023 1/13   Total Weekly Dose 37mg 35mg 39 mg  37mg 35 mg 35 mg 35 mg 35 mg 35mg 37.5mg 41.5mg 35mg   INR 3.24 2.6 3.13 3.39 2.46 2.75 2.71 2.77 1.1 2.42 4.14 3.59   Notes  rec 8/26  Inc activity Rec 10/14  rec 11/11 rec 12/5 ??? rcv'd 1/3       Date 1/27 2/17 3/10 4/5 4/14 5/18 5/26-6/5 6/8 6/12 6/20 6/28 7/6   Total Weekly Dose 35mg 35mg 35 mg 35 mg 35mg 35 mg Inpatient hospital 15mg 40mg 37.5 mg 35 mg 35mg   INR 2.94 2.58 2.5 2.44 3.11 2.43  1.3 1.62 2.1 2.83 2.76   Notes rcv'd 1/30 rcv'd 2/20 rec 3/13 crestor  rcv'd 4/17 rec 4/19 Subdural hematoma; kcentra  Rcv'd 6/13   Rcvd 7/7     Date 7/19 8/23/23 10/25 Noncomp 5/7 5/30 7/3          Total Weekly Dose  35 mg 35 mg   35 mg 35 mg 35 mg         INR 2.53 2.51 2.73  3.13 2.72 2.34         Notes  Rec'd 8/24   Rec 5/8  Rec'd 7/5         *takes warfarin in AM*  Phone Interview:  Verbal Release Authorization: please contact Mr. Hall directly - if unable to reach patient may contact [email or web address]  Tablet Strength: 2mg and 5mg tablets  Patient Contact Info: [phone]; [email or web address]  Lab Contact Info: Megan Vu     Patient Findings  Negatives: Signs/symptoms of thrombosis, Signs/symptoms of bleeding, Laboratory test error suspected, Change in health, Change in alcohol use, Change in  activity, Upcoming invasive procedure, Emergency department visit, Upcoming dental procedure, Missed doses, Extra doses, Change in medications, Change in diet/appetite, Hospital admission, Bruising, Other complaints   Comments: Above findings negative     Plan:    1. INR was therapeutic on 7/3 at 2.34 (goal 2.0-3.0). Result received 7/5; unable to reach him until today.. Instructed Mr. Hall to continue warfarin 5mg oral daily until recheck.  2. Repeat INR in 3 weeks, 7/24/24  3. Verbal information provided over the phone. Mr. Hall RBV dosing instructions, expresses understanding by teach back, and has no further questions at this time.    Adeline Bearden, PharmD  7/8/2024  14:19 EDT

## 2024-08-03 DIAGNOSIS — E03.9 HYPOTHYROIDISM (ACQUIRED): ICD-10-CM

## 2024-08-05 RX ORDER — LEVOTHYROXINE SODIUM 0.1 MG/1
100 TABLET ORAL DAILY
Qty: 90 TABLET | Refills: 0 | OUTPATIENT
Start: 2024-08-05

## 2024-08-12 ENCOUNTER — LAB (OUTPATIENT)
Dept: LAB | Facility: HOSPITAL | Age: 49
End: 2024-08-12
Payer: MEDICARE

## 2024-08-12 DIAGNOSIS — Z95.2 HX OF AORTIC VALVE REPLACEMENT, MECHANICAL: ICD-10-CM

## 2024-08-12 LAB
INR PPP: 2.98 (ref 0.89–1.12)
PROTHROMBIN TIME: 31 SECONDS (ref 12.2–14.5)

## 2024-08-12 PROCEDURE — 85610 PROTHROMBIN TIME: CPT

## 2024-08-12 PROCEDURE — 36415 COLL VENOUS BLD VENIPUNCTURE: CPT

## 2024-08-13 ENCOUNTER — OFFICE VISIT (OUTPATIENT)
Dept: NEUROLOGY | Facility: CLINIC | Age: 49
End: 2024-08-13
Payer: MEDICARE

## 2024-08-13 ENCOUNTER — ANTICOAGULATION VISIT (OUTPATIENT)
Dept: PHARMACY | Facility: HOSPITAL | Age: 49
End: 2024-08-13
Payer: MEDICARE

## 2024-08-13 VITALS
WEIGHT: 215 LBS | HEIGHT: 69 IN | OXYGEN SATURATION: 97 % | HEART RATE: 98 BPM | SYSTOLIC BLOOD PRESSURE: 138 MMHG | DIASTOLIC BLOOD PRESSURE: 64 MMHG | BODY MASS INDEX: 31.84 KG/M2

## 2024-08-13 DIAGNOSIS — I69.30 SEQUELAE, POST-STROKE: Primary | ICD-10-CM

## 2024-08-13 DIAGNOSIS — Z95.2 HX OF AORTIC VALVE REPLACEMENT, MECHANICAL: Primary | ICD-10-CM

## 2024-08-13 PROCEDURE — 3078F DIAST BP <80 MM HG: CPT | Performed by: PSYCHIATRY & NEUROLOGY

## 2024-08-13 PROCEDURE — 3075F SYST BP GE 130 - 139MM HG: CPT | Performed by: PSYCHIATRY & NEUROLOGY

## 2024-08-13 PROCEDURE — 1160F RVW MEDS BY RX/DR IN RCRD: CPT | Performed by: PSYCHIATRY & NEUROLOGY

## 2024-08-13 PROCEDURE — 1159F MED LIST DOCD IN RCRD: CPT | Performed by: PSYCHIATRY & NEUROLOGY

## 2024-08-13 PROCEDURE — 99213 OFFICE O/P EST LOW 20 MIN: CPT | Performed by: PSYCHIATRY & NEUROLOGY

## 2024-08-13 RX ORDER — METFORMIN HYDROCHLORIDE 500 MG/1
500 TABLET, EXTENDED RELEASE ORAL
COMMUNITY
Start: 2024-07-05

## 2024-08-13 RX ORDER — HYDROXYZINE HYDROCHLORIDE 10 MG/1
TABLET, FILM COATED ORAL
COMMUNITY
Start: 2024-07-03

## 2024-08-13 NOTE — PROGRESS NOTES
Subjective:    CC: Manjeet Hall is seen today for stroke       HPI:  Current visit-patient last saw me 1 year ago.  He denies having any new strokelike symptoms since then.  Has been doing well in addition to managing his diet and exercising regularly by walking.  He continues to take Coumadin (last INR was 2.98) and Crestor 20 mg daily.  His last LDL was 74 and A1c was 6.3.  He was seeing nutrition counseling.  He is also trying to eat healthy at home.  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  Denies any major depressive symptoms.  Continues to be on Cymbalta 60 mg daily in addition to hydroxyzine as needed.     Last visit-patient was admitted to the hospital in May for confusion and headaches.  He had a CT scan of the head that showed bilateral subdural hematomas with sulcal effacement for which she underwent bilateral craniectomies with drainage as well as left MMA.  Repeat CT head done after a few days showed encephalomalacia in the left more than right hemisphere.  Patient states he feels back to normal and denies having any symptoms.  Continues to have problems with his speech as a result of his previous stroke.  He is back to taking Coumadin and his last INR was 2.8.  Last A1c was 6.4.  He did follow-up with nutrition counseling and has lost 15 to 20 pounds in the last year.  He also continues to take Crestor 20 mg daily.  His lipid panel in May was as follows-, , , HDL 48.    Last visit-patient states that his speech has continued to improve but he has been doing less physical activity and this has caused him to gain over 30 pounds.  He continues to be on Coumadin (last INR was 2.96).  He has stopped taking aspirin 81 mg but was restarted on Crestor 20 mg by his PCP as his last lipid panel was elevated as follows-, , , HDL 44.  His A1c has also gone up to 6.4.    Last visit-patient states he is improving gradually in terms of his speech.  Has  stopped getting speech therapy.  Continues to take Coumadin with aspirin 81 mg.  He did cut back on his gabapentin and is now taking 300 mg twice a day (still does not know what he was started on it for).  He has not lost any weight but is now starting to exercise more.    Last visit-since the last visit patient has been doing the same in terms of his speech.  He is still getting speech therapy.  Continues to take Coumadin but stopped taking Lipitor.  His last lipid panel was well controlled as follows-, , LDL 92, HDL 59.  He is also on Depakote which is helping tremendously with his mood.  He states that he has gained a lot of weight in the past year due to not exercising as much.  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  Also takes gabapentin 600 mg in the morning and 900 mg at night but cannot tell me exactly why he is taking that.    Last visit-patient has been steadily making progress in terms of his speech.  He continues to go to [facility] for speech therapy twice a week.  He still has difficulty comprehending certain words and he tells me today that it is tough for him to hear certain words.  He has stopped taking atorvastatin but cannot tell me why.  Also takes Depakote.  Again is unsure of why he is taking it, thinks it may be for his headaches.    Last visit- patient speech continues to improve.  He is still getting speech therapy at [facility] at least 2-3 times a week.  He continues to be on Coumadin as well as atorvastatin 40 mg daily.  He is also exercising now and has lost weight.  Takes gabapentin for neuropathy.    Last visit- since his last visit his speech has improved and he is still getting speech therapy twice a week at [facility].  His carotid Doppler that I had ordered showed mild plaques bilaterally but no significant stenosis.  He continues to be on Coumadin with a therapeutic INR.  His lipid panel was as follows-TC  197, , , HDL 53.  I had started him on atorvastatin 40 mg which she is continuing to take.  Of note-I also personally reviewed his notes from [facility] which are as follows.    CT head showed decreased attenuation in the left temporal operculum and left inferior parietal lobe and parts of the lateral left superior parietal lobe reflecting a left MCA infarct.  MRI brain also showed restricted diffusion in the left posterior MCA area and left insular cortex (aspect score of 5)  CTA head showed thrombus in the left M1-2 junction with good distal collateral vessels and absent left A1 .The rest of the anterior circulation and vertebral basilar circulation was patent.  CTA neck showed mild carotid plaques without any significant stenosis on either side.  Echo showed EF of 51% with the limitation of right ventricle and moderate dilatation of left atrium, moderate aortic valve regurg    Initial [id number]-old male accompanied by his wife with a history of aortic stenosis status post valve replacement and VSD repair at age 14, aortic aneurysm and aortic valve regurgitation status post aneurysmal repair and aortic valve repair, anxiety, depression, Hodgkin's lymphoma at age 23 status post chemotherapy and radiation, hypothyroidism presents with a stroke.  As per patient's wife he underwent  a mechanical aortic valve repair as well as and aneurysmal repair in [facility] in August.  He was started on Coumadin soon after the procedure however he developed pancreatitis after surgery hence his Coumadin was stopped for 1 week and he was started on IV heparin.  During that time he had symptoms of garbled speech.  An MRI brain later confirmed a left hemispheric stroke.  He was also found to have a thrombus in his mechanical aortic valve and restarted back on Coumadin.  Currently being bridged with Lovenox.  His last INR yesterday was 1.85.  He has not yet started getting speech therapy and will start  soon.    The following portions of the patient's history were reviewed today and updated as of 11/19/2019  : allergies, current medications, past family history, past medical history, past social history, past surgical history and problem list  These document will be scanned to patient's chart.      Current Outpatient Medications:     DULoxetine (CYMBALTA) 60 MG capsule, Take 1 capsule by mouth Daily., Disp: 90 capsule, Rfl: 3    hydrOXYzine (ATARAX) 10 MG tablet, TAKE 1-2 TABLET BY MOUTH TWICE A DAY AS NEEDED FOR ANXIETY AND SLEEP, Disp: , Rfl:     lansoprazole (PREVACID) 30 MG capsule, Take 1 capsule by mouth Daily., Disp: 90 capsule, Rfl: 3    levothyroxine (SYNTHROID, LEVOTHROID) 112 MCG tablet, Take 1 tablet by mouth Daily., Disp: 90 tablet, Rfl: 1    metFORMIN ER (GLUCOPHAGE-XR) 500 MG 24 hr tablet, Take 1 tablet by mouth Daily With Breakfast., Disp: , Rfl:     metoprolol tartrate (LOPRESSOR) 25 MG tablet, Take 0.5 tablets by mouth Every 12 (Twelve) Hours., Disp: 90 tablet, Rfl: 3    rosuvastatin (CRESTOR) 20 MG tablet, Take 1 tablet by mouth Daily., Disp: 90 tablet, Rfl: 3    Semaglutide,0.25 or 0.5MG/DOS, (Ozempic, 0.25 or 0.5 MG/DOSE,) 2 MG/3ML solution pen-injector, Inject 0.5 mg under the skin into the appropriate area as directed 1 (One) Time Per Week., Disp: 9 mL, Rfl: 1    warfarin (COUMADIN) 5 MG tablet, Take 1 tablet by mouth daily as directed by anticoagulation clinic., Disp: 90 tablet, Rfl: 3   Past Medical History:   Diagnosis Date    Abnormal heart rhythm     Depression     Elevated cholesterol     GERD (gastroesophageal reflux disease)     Heart murmur     History of echocardiogram     History of left heart catheterization     History of stomach ulcers     Mitral valvular disorder     Stroke     Type 2 diabetes mellitus with hyperglycemia, without long-term current use of insulin 05/26/2023      Past Surgical History:   Procedure Laterality Date    CARDIAC SURGERY      aortic valve replacement  "in 1986, further repair in 2018    CRANIOTOMY FOR SUBDURAL HEMATOMA Bilateral 05/26/2023    Procedure: CRANIOTOMY FOR BILATERAL SUBDURAL HEMATOMAS;  Surgeon: Stephane Womack MD;  Location:  [facility];  Service: Neurosurgery;  Laterality: Bilateral;    EMBOLIZATION CEREBRAL N/A 05/31/2023    Procedure: Emoblization cerebral;  Surgeon: Stephane Womack MD;  Location:  [facility] CATH INVASIVE [facility]ATION;  Service: Interventional Radiology;  Laterality: N/A;    SKIN CANCER EXCISION      mass removed from neck and chest for hodgkins      Family History   Problem Relation Age of Onset    COPD Mother     Ovarian cancer Mother     Hypertension Father     Stroke Father     No Known Problems Brother     Stroke Paternal Grandmother     Hypertension Paternal Grandmother     Cancer Paternal Grandmother         unknown    Brain cancer Paternal Grandfather     Heart disease Paternal Grandfather     Cancer Maternal Grandmother         unknown    Other Maternal Grandfather         unknown    No Known Problems Daughter     No Known Problems Daughter     No Known Problems Son       Social History     Socioeconomic History    Marital status: Single   Tobacco Use    Smoking status: Never     Passive exposure: Never    Smokeless tobacco: Never   Vaping Use    Vaping status: Never Used   Substance and Sexual Activity    Alcohol use: Yes     Comment: socially on occasion    Drug use: Never    Sexual activity: Yes     Partners: Female     Birth control/protection: None     Comment: female partner     Review of Systems   Neurological: Positive for speech difficulty.   All other systems reviewed and are negative.      Objective:    /64 (BP Location: Right arm, Patient Position: Sitting, Cuff Size: Adult)   Pulse 98   Ht 175.3 cm (69\")   Wt 97.5 kg (215 lb)   SpO2 97%   BMI 31.75 kg/m²     Neurology Exam:     General apperance: NAD.     Mental status: Alert, awake and oriented to time place and person.    Recent and Remote memory: " Intact.    Attention span and Concentration: Normal.     Language and Speech: Intact- No dysarthria.    Fluency, Naming , Repitition and Comprehension: Difficulty comprehending certain things.  Also reduced fluency however significantly improved since previous visits.  Does make mild paraphasic errors    Cranial Nerves:   CN II: Visual fields are full. Intact. Fundi - Normal, No papillederma, Pupils - MATTHIEU  CN III, IV and VI: Extraocular movements are intact. Normal saccades.   CN V: Facial sensation is intact.   CN VII: Muscles of facial expression reveal no asymmetry. Intact.   CN VIII: Hearing is intact. Whispered voice intact.   CN IX and X: Palate elevates symmetrically. Intact  CN XI: Shoulder shrug is intact.   CN XII: Tongue is midline without evidence of atrophy or fasciculation.     Ophthalmoscopic exam of optic disc-normal    Motor:  Right UE muscle strength 5/5. Normal tone.     Left UE muscle strength 5/5. Normal tone.      Right LE muscle strength5/5. Normal tone.     Left LE muscle strength 5/5. Normal tone.      Sensory: Normal light touch, vibration and pinprick sensation bilaterally.    DTRs: 2+ bilaterally in upper and lower extremities.    Babinski: Negative bilaterally.    Co-ordination: Normal finger-to-nose, heel to shin B/L.    Rhomberg: Negative.    Gait: Normal.  Could do tandem walking    Cardiovascular: Regular rate and rhythm without murmur, gallop or rub.    Assessment and Plan:  1. Sequelae, post-stroke  Patient had a left hemispheric infarct secondary to an artificial aortic valve thrombus after he was taken off Coumadin briefly for pancreatitis.  Last year he had bilateral subdural hematoma status post craniectomies but is doing well now.  Currently on Coumadin.  Goal INR 2.5-3.5.   Maintain blood pressure less than 130/90.  His blood pressure is well controlled  Continue Crestor 20 mg daily.  Goal LDL of less than 100.  His LDL was 74  Last A1c of 63  I have also told him to  exercise regularly    2.  Prediabetes  He was [facility] at [facility]  I have once again told him to monitor his diet    Return in about 1 year (around 8/13/2025).         Lindsay Summers MD

## 2024-08-13 NOTE — PROGRESS NOTES
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   Other: Hodgkins Lymphoma (hx of chemo and radiation); h/o CVA + hemorrhagic pancreatitis (during hospitalization for mAVR)    Diet: avoids GLV (2/20/23)  Alcohol: socially  Tobacco: none  OTC Pain Medication: APAP PRN pain    INR History:  Date 9/27 9/28 10/1 10/4 10/8 10/11 10/17 10/19 10/23 11/8 11/16 12/21   Total Weekly Dose 8mg 12mg 19mg 29mg 31mg 34mg 43mg 41mg 45mg 49mg 49mg 49mg   INR 1.3 1.22 1.3 1.8 1.85 1.70 2.91 2.82 3.79 3.00 2.96 3.44   Notes clinic enox enox enox; clinic enox enox      desvenla     Date 1/9/19 1/16 2/4 3/1 3/22 5/16 6/13 6/24 7/10 7/22 8/5 8/26   Total Weekly Dose 49mg 47mg 49mg 49mg 49mg 49mg 49mg 49mg 49mg 49mg 49mg 49mg   INR 4.40 2.36 2.62 3.02 2.15 2.60 2.25 2.98 2.83 2.97 2.90 3.20   Notes   self adj              Date 9/26 10/18 10/28 11/19 12/3 12/17 1/3/20 1/21 2/3 2/12 2/19 2/27   Total Weekly Dose 49mg 49mg 49mg 49mg 49mg 45mg 45mg 45mg 47mg 43mg 43mg 43mg   INR 3.57 3.12 2.88 3.33 4.06 2.59 3.21 3.85 3.11 2.34 3.01 3.20   Notes less GLV Cymbalta start 9/25; Depakote start 9/5 1x decr dose  depakote inc; trintellix dcd ceftin x10 days Recv'd 1/6  incr GLV  dieting        Date 3/5 3/12 3/20 4/2 4/17 5/7 5/28 6/16 6/29 7/17 8/14 8/28   Total Weekly Dose 41mg 41mg 41mg 41mg 41mg 41mg 41mg 39mg 39mg 39mg 39mg 39mg   INR 3.07 3.01 2.95 2.66 2.9 3.03 3.36 2.48 2.42 3.08 2.78 2.28   Notes 1x decr   1x  decr         recv'd 6/30  recv'd 8/13 recv'd 8/31     Date 9/28 10/29  12/4 1/8/21 2/9 3/31 5/12 6/9  8/3 8/31   Total Weekly Dose 39mg 39mg non-  compliant 39mg 39mg 39mg 39mg 39mg 39mg non- compliant 39mg 39mg   INR 2.33 2.39  2.46 2.45 2.85 2.48 2.52 2.51  2.88 2.77   Notes recv'd 9/29 recv'd 10/30   recv'd 1/11  recv'd 4/1           Date 10/4 11/1 12/6  3/8 4/5 5/9 6/10 6/23 7/5 7/20 8/5   Total Weekly Dose 39mg 39mg 39mg  non- compliant  39mg 39mg 39 mg 39 mg 39 mg 39 mg 39 mg 39mg   INR 2.50 2.54 2.2  2.40 2.40 2.98 2.26 2.96 2.84 2.88 3.48   Notes  Rec 11/2 Rec 12/7       rec'd 7/6  rec 8/8     Date 8/17 8/25 9/19 9/22 10/13 10/31 11/10 12/2 12/29 12/30 1/3/2023 1/13   Total Weekly Dose 37mg 35mg 39 mg  37mg 35 mg 35 mg 35 mg 35 mg 35mg 37.5mg 41.5mg 35mg   INR 3.24 2.6 3.13 3.39 2.46 2.75 2.71 2.77 1.1 2.42 4.14 3.59   Notes  rec 8/26  Inc activity Rec 10/14  rec 11/11 rec 12/5 ??? rcv'd 1/3       Date 1/27 2/17 3/10 4/5 4/14 5/18 5/26-6/5 6/8 6/12 6/20 6/28 7/6   Total Weekly Dose 35mg 35mg 35 mg 35 mg 35mg 35 mg Inpatient hospital 15mg 40mg 37.5 mg 35 mg 35mg   INR 2.94 2.58 2.5 2.44 3.11 2.43  1.3 1.62 2.1 2.83 2.76   Notes rcv'd 1/30 rcv'd 2/20 rec 3/13 crestor  rcv'd 4/17 rec 4/19 Subdural hematoma; kcentra  Rcv'd 6/13   Rcvd 7/7     Date 7/19 8/23/23 10/25 Noncomp 5/7 5/30 7/3  8/12/24        Total Weekly Dose  35 mg 35 mg   35 mg 35 mg 35 mg 35 mg         INR 2.53 2.51 2.73  3.13 2.72 2.34 2.98        Notes  Rec'd 8/24   Rec 5/8  Rec'd 7/5 Rec'd 8/13  LVM         *takes warfarin in AM*  Phone Interview:  Verbal Release Authorization: please contact Mr. Hall directly - if unable to reach patient may contact [email or web address]  Tablet Strength: 2mg and 5mg tablets  Patient Contact Info: [phone]; [email or web address]  Lab Contact Info: Meagn Vu         UNABLE TO GET IN CONTACT WITH THE PATIENT. PLEASE DISREGARD THE FOLLOWING PLAN UNTIL ABLE TO GET IN CONTACT WITH PATIENT/ PATIENT  REPRESENTATIVE.      Plan:  3. Verbal information provided over the phone. Mr. Hall RBV dosing instructions, expresses understanding by teach back, and has no further questions at this time.    Delano Curtis, Pharmacy Technician  8/13/2024  09:48 EDT

## 2024-08-15 ENCOUNTER — PATIENT ROUNDING (BHMG ONLY) (OUTPATIENT)
Dept: NEUROLOGY | Facility: CLINIC | Age: 49
End: 2024-08-15
Payer: MEDICARE

## 2024-09-04 ENCOUNTER — OFFICE VISIT (OUTPATIENT)
Dept: CARDIOLOGY | Facility: CLINIC | Age: 49
End: 2024-09-04
Payer: MEDICARE

## 2024-09-04 VITALS
DIASTOLIC BLOOD PRESSURE: 78 MMHG | HEART RATE: 87 BPM | OXYGEN SATURATION: 98 % | HEIGHT: 69 IN | BODY MASS INDEX: 31.67 KG/M2 | WEIGHT: 213.8 LBS | SYSTOLIC BLOOD PRESSURE: 132 MMHG

## 2024-09-04 DIAGNOSIS — I35.1 NONRHEUMATIC AORTIC VALVE INSUFFICIENCY: Primary | ICD-10-CM

## 2024-09-04 PROCEDURE — 3075F SYST BP GE 130 - 139MM HG: CPT | Performed by: INTERNAL MEDICINE

## 2024-09-04 PROCEDURE — 3078F DIAST BP <80 MM HG: CPT | Performed by: INTERNAL MEDICINE

## 2024-09-04 PROCEDURE — 99214 OFFICE O/P EST MOD 30 MIN: CPT | Performed by: INTERNAL MEDICINE

## 2024-09-04 NOTE — PROGRESS NOTES
[facility]  Office visit  Manjeet Hall  1975      VISIT DATE:  9/4/2024      PCP: Scotty Means MD  [address] [facility] [address] [place], [place] [id number]    CC:  Chief Complaint   Patient presents with    Congenital heart disease       Previous cardiac history:  -Diagnosed with large PDA and VSD at birth, PDA close spontaneously.  -Developed subvalvular aortic stenosis which required surgery, VSD closed as well at 13yo  -Ross procedure for increasing aortic insufficiency with moderate aortic stenosis at 19yo  -Treated for Hodgkin's lymphoma, age 21 - CTX and chest XRT    -May 2018 Echo  Estimated EF appears to be in the range of 56 - 60%.  Left ventricular wall thickness is consistent with mild concentric hypertrophy.  Mild to moderate aortic valve regurgitation is present.  Right ventricular cavity is borderline dilated.  Left atrial cavity size is mildly dilated.  Moderate dilation of the aortic root is present. 4.8 cm. Moderate dilation of the ascending aorta is present. 5.4 cm.    -CT angiography chest June 2018:aortic root at the upper portion of the valve largest AP dimension is 5.4 cm. Approximately 2 cm above the aortic root the ascending thoracic aorta largest oblique dimension is 7.0 cm and AP dimension 6.2 cm.    Cardiac catheterization in July 2018 [facility]  LMT: - The LMT has mild luminal irregularities.  LAD:  - There was moderate diffuse disease.  - The mid LAD is narrowed 30 % - focal disease.   Additional Comment: very large vessel, wraps around the apex to perfuse the   entire mid and apical inferior wall.  LCX: - There was mild diffuse disease.  - The circ AV groove continuation circumflex is narrowed 80 % - focal disease.  - The 1st obtuse marginal circumflex is narrowed 60 % - focal disease.   Additional Comment: AV LCx perfuses small left PDA.  RAMUS: - The Ramus is Absent.  RCA:  - The RCA has mild luminal irregularities.     August 13,  2018  Reoperation, third open-heart surgery.  Reversed Ross procedure  including aortic valve and root re-replacement with a composite graft including an On-X mechanical valve #25 and a 30-mm Hemashield graft, right ventricular outflow  tract reconstruction with the re-repaired autograft.    Echo September 2018 [facility]  EF = 60 ± 5% . There is no evidence of LV apical thrombus.  - The right ventricle is normal in size. Right ventricular systolic function is   low normal.  - On-X prosthetic aortic valve (size #25). There is no aortic valve regurgitation.   Transprosthetic gradients could not be obtained on the current study. Valve   leaflets not well visualized. If clinically indicated, consider ZACHARY to assess   further.  - There is a small pericardial effusion with organization. It is smaller when   compared to that documented on the prior echocardiogram performed 8/23/18. There   is no RV/RA collapse. There is no obvious evidence of cardiac tamponade.  - Status post reverse Ross procedure including aortic root, ascending aorta and   aortic valve replacement, RVOT reconstruction with rerepaired autograft. No   significant PI. The peak/mean gradients 19/10 mmHg [similar to the prior study]    August 2019 echo  Left ventricular systolic function is normal.  Estimated EF appears to be in the range of 56 - 60%  Left ventricular diastolic dysfunction (grade II) consistent with pseudonormalization.  Left ventricular wall thickness is consistent with mild concentric hypertrophy.  Normal mechanical aortic valve.    August 2021 TTE  Left ventricular ejection fraction appears to be 56 - 60%. Left ventricular systolic function is normal.  Left ventricular wall thickness is consistent with mild concentric hypertrophy.  Left ventricular diastolic function is consistent with (grade II w/high LAP) pseudonormalization.  Left atrial volume is mildly increased.  The right atrial cavity is mildly dilated.  There is a normally  functioning mechanical aortic valve prosthesis present.    May 2023  CT head  Bilateral 13 to 14 mm acute subdural hematomas are present. There is evidence of diffuse cerebral edema, with bilateral uncal medialization, crowding of the basal cisterns and concern for impending herniation. There is 2 to 3 mm of leftward midline shift   with effacement of the right lateral and third ventricles, with prominence of the left temporal horn concerning for early entrapment.    June 2023  CT head  1. No significant change in the subdural fluid collections following craniotomy and drainage.  2. Small focal parafalcine subdural hematoma, unchanged from the patient's last study.  3. Resolution of the low-attenuation in the right temporal lobe. The left temporal hypoattenuation is more pronounced on the current study likely representing an evolving area of infarction.    ASSESSMENT:   Diagnosis Plan   1. Nonrheumatic aortic valve insufficiency  Adult Transthoracic Echo Complete W/ Cont if Necessary Per Protocol              PLAN:  Congenital heart disease: Status post reversed Ross procedure  including aortic valve and root re-replacement with a composite graft including an On-X mechanical valve #25 and a 30-mm Hemashield graft, right ventricular outflow  tract reconstruction with the re-repaired autograft in 2018.  Postoperative course complicated by stroke and pancreatitis.  Afterload currently well-controlled.  Continue anticoagulation with goal INR of 2-3.  surveillance echocardiographic assessment every 3 years throughout the first 10 years post valve implant. Repeat CTA chest every 24 months.    Coronary artery disease: Currently stable and asymptomatic.  Afterload well controlled.  Continue rosuvastatin 20 mg p.o. daily.  Goal LDL is 100, ideally less than 70.    Tremors: Continue beta-blockade.        Subjective  History of stroke with persistent word finding difficulties.  History of postoperative pancreatitis.  Clinical  "course complicated by bilateral subdural hematoma requiring craniotomy and drainage and cerebral artery arterial embolization in June 2023.  Denies chest discomfort.  Blood pressures running less than 120/80 mmHg.  compliant with medical therapy.  Stable shortness of breath in a mild class II pattern.  Resting tremors are stable.      PHYSICAL EXAMINATION:  Vitals:    09/04/24 1311   BP: 132/78   BP Location: Left arm   Patient Position: Sitting   Cuff Size: Adult   Pulse: 87   SpO2: 98%   Weight: 97 kg (213 lb 12.8 oz)   Height: 175.3 cm (69\")         General Appearance:    Alert, cooperative, no distress, appears stated age   Head:    Normocephalic, without obvious abnormality, atraumatic   Eyes:    conjunctiva/corneas clear   Nose:   Nares normal, septum midline, mucosa normal, no drainage   Throat:   Lips, teeth and gums normal   Neck:   Supple, symmetrical, trachea midline, no carotid    bruit or JVD   Lungs:     Clear to auscultation bilaterally, respirations unlabored   Chest Wall:    No tenderness or deformity    Heart:    Regular rate and rhythm, mechanical A2,, III/6 early peaking systolic murmur left upper sternal border, rub   or gallop, normal carotid impulse bilaterally without bruit.   Abdomen:     Soft, non-tender   Extremities:   Extremities normal, atraumatic, no cyanosis or edema   Pulses:   2+ and symmetric all extremities   Skin:   Skin color, texture, turgor normal, no rashes or lesions       Diagnostic Data:  Procedures  Lab Results   Component Value Date    CHLPL 259 (H) 07/17/2015    TRIG 300 (H) 06/03/2024    HDL 58 06/03/2024     Lab Results   Component Value Date    GLUCOSE 156 (H) 06/03/2024    BUN 12 06/03/2024    CREATININE 0.90 06/03/2024     06/03/2024    K 4.1 06/03/2024     06/03/2024    CO2 26.0 06/03/2024     Lab Results   Component Value Date    HGBA1C 6.3 (A) 06/03/2024     Lab Results   Component Value Date    WBC 8.31 01/11/2024    HGB 14.0 01/11/2024    HCT 43.4 " 01/11/2024     01/11/2024       Allergies  Allergies   Allergen Reactions    Tegaderm Chg Dressing [Chlorhexidine] Rash       Current Medications    Current Outpatient Medications:     DULoxetine (CYMBALTA) 60 MG capsule, Take 1 capsule by mouth Daily., Disp: 90 capsule, Rfl: 3    hydrOXYzine (ATARAX) 10 MG tablet, TAKE 1-2 TABLET BY MOUTH TWICE A DAY AS NEEDED FOR ANXIETY AND SLEEP, Disp: , Rfl:     lansoprazole (PREVACID) 30 MG capsule, Take 1 capsule by mouth Daily., Disp: 90 capsule, Rfl: 3    levothyroxine (SYNTHROID, LEVOTHROID) 112 MCG tablet, Take 1 tablet by mouth Daily., Disp: 90 tablet, Rfl: 1    metFORMIN ER (GLUCOPHAGE-XR) 500 MG 24 hr tablet, Take 1 tablet by mouth Daily With Breakfast., Disp: , Rfl:     metoprolol tartrate (LOPRESSOR) 25 MG tablet, Take 0.5 tablets by mouth Every 12 (Twelve) Hours., Disp: 90 tablet, Rfl: 3    rosuvastatin (CRESTOR) 20 MG tablet, Take 1 tablet by mouth Daily., Disp: 90 tablet, Rfl: 3    Semaglutide,0.25 or 0.5MG/DOS, (Ozempic, 0.25 or 0.5 MG/DOSE,) 2 MG/3ML solution pen-injector, Inject 0.5 mg under the skin into the appropriate area as directed 1 (One) Time Per Week., Disp: 9 mL, Rfl: 1    warfarin (COUMADIN) 5 MG tablet, Take 1 tablet by mouth daily as directed by anticoagulation clinic., Disp: 90 tablet, Rfl: 3          ROS  Review of Systems   Cardiovascular:  Negative for chest pain, dyspnea on exertion, irregular heartbeat and syncope.   Respiratory:  Negative for cough, shortness of breath and wheezing.          SOCIAL HX  Social History     Socioeconomic History    Marital status: Single   Tobacco Use    Smoking status: Never     Passive exposure: Never    Smokeless tobacco: Never   Vaping Use    Vaping status: Never Used   Substance and Sexual Activity    Alcohol use: Yes     Comment: socially on occasion    Drug use: Never    Sexual activity: Yes     Partners: Female     Birth control/protection: None     Comment: female partner       FAMILY HX  Family  History   Problem Relation Age of Onset    COPD Mother     Ovarian cancer Mother     Hypertension Father     Stroke Father     No Known Problems Brother     Stroke Paternal Grandmother     Hypertension Paternal Grandmother     Cancer Paternal Grandmother         unknown    Brain cancer Paternal Grandfather     Heart disease Paternal Grandfather     Cancer Maternal Grandmother         unknown    Other Maternal Grandfather         unknown    No Known Problems Daughter     No Known Problems Daughter     No Known Problems Son              Vimal John III, MD, FACC

## 2024-09-19 ENCOUNTER — TELEPHONE (OUTPATIENT)
Dept: FAMILY MEDICINE CLINIC | Facility: CLINIC | Age: 49
End: 2024-09-19
Payer: MEDICARE

## 2024-09-20 ENCOUNTER — HOSPITAL ENCOUNTER (OUTPATIENT)
Dept: CARDIOLOGY | Facility: HOSPITAL | Age: 49
Discharge: HOME | End: 2024-09-20
Payer: MEDICARE

## 2024-09-20 ENCOUNTER — TELEPHONE (OUTPATIENT)
Dept: CARDIOLOGY | Facility: CLINIC | Age: 49
End: 2024-09-20

## 2024-09-20 VITALS — WEIGHT: 211.64 LBS | HEIGHT: 69 IN | BODY MASS INDEX: 31.35 KG/M2

## 2024-09-20 DIAGNOSIS — I35.1 NONRHEUMATIC AORTIC VALVE INSUFFICIENCY: ICD-10-CM

## 2024-09-20 LAB
ASCENDING AORTA: 3.2 CM
BH CV ECHO MEAS - AO MAX PG: 7.2 MMHG
BH CV ECHO MEAS - AO MEAN PG: 3.7 MMHG
BH CV ECHO MEAS - AO ROOT DIAM: 3.2 CM
BH CV ECHO MEAS - AO V2 MAX: 134.3 CM/SEC
BH CV ECHO MEAS - AO V2 VTI: 23.3 CM
BH CV ECHO MEAS - AVA(I,D): 2.6 CM2
BH CV ECHO MEAS - EDV(CUBED): 50.7 ML
BH CV ECHO MEAS - EDV(MOD-SP2): 55.7 ML
BH CV ECHO MEAS - EDV(MOD-SP4): 64.3 ML
BH CV ECHO MEAS - EF(MOD-BP): 56.4 %
BH CV ECHO MEAS - EF(MOD-SP2): 51.9 %
BH CV ECHO MEAS - EF(MOD-SP4): 59.3 %
BH CV ECHO MEAS - ESV(CUBED): 17.6 ML
BH CV ECHO MEAS - ESV(MOD-SP2): 26.8 ML
BH CV ECHO MEAS - ESV(MOD-SP4): 26.2 ML
BH CV ECHO MEAS - FS: 29.7 %
BH CV ECHO MEAS - IVS/LVPW: 1 CM
BH CV ECHO MEAS - IVSD: 1.2 CM
BH CV ECHO MEAS - LA DIMENSION: 3.1 CM
BH CV ECHO MEAS - LAT PEAK E' VEL: 11.4 CM/SEC
BH CV ECHO MEAS - LV DIASTOLIC VOL/BSA (35-75): 30.3 CM2
BH CV ECHO MEAS - LV MASS(C)D: 147.3 GRAMS
BH CV ECHO MEAS - LV MAX PG: 4.8 MMHG
BH CV ECHO MEAS - LV MEAN PG: 2 MMHG
BH CV ECHO MEAS - LV SYSTOLIC VOL/BSA (12-30): 12.3 CM2
BH CV ECHO MEAS - LV V1 MAX: 109 CM/SEC
BH CV ECHO MEAS - LV V1 VTI: 19.4 CM
BH CV ECHO MEAS - LVIDD: 3.7 CM
BH CV ECHO MEAS - LVIDS: 2.6 CM
BH CV ECHO MEAS - LVOT AREA: 3.1 CM2
BH CV ECHO MEAS - LVOT DIAM: 2 CM
BH CV ECHO MEAS - LVPWD: 1.2 CM
BH CV ECHO MEAS - MED PEAK E' VEL: 7.2 CM/SEC
BH CV ECHO MEAS - MV A MAX VEL: 114 CM/SEC
BH CV ECHO MEAS - MV DEC SLOPE: 612.5 CM/SEC2
BH CV ECHO MEAS - MV DEC TIME: 0.21 SEC
BH CV ECHO MEAS - MV E MAX VEL: 118 CM/SEC
BH CV ECHO MEAS - MV E/A: 1.04
BH CV ECHO MEAS - MV MAX PG: 9.1 MMHG
BH CV ECHO MEAS - MV MEAN PG: 3.5 MMHG
BH CV ECHO MEAS - MV P1/2T: 69.1 MSEC
BH CV ECHO MEAS - MV V2 VTI: 36.9 CM
BH CV ECHO MEAS - MVA(P1/2T): 3.2 CM2
BH CV ECHO MEAS - MVA(VTI): 1.65 CM2
BH CV ECHO MEAS - RV MAX PG: 36 MMHG
BH CV ECHO MEAS - RV V1 MAX: 3.2 CM/SEC
BH CV ECHO MEAS - SV(LVOT): 60.9 ML
BH CV ECHO MEAS - SV(MOD-SP2): 28.9 ML
BH CV ECHO MEAS - SV(MOD-SP4): 38.1 ML
BH CV ECHO MEAS - SVI(LVOT): 28.7 ML/M2
BH CV ECHO MEAS - SVI(MOD-SP2): 13.6 ML/M2
BH CV ECHO MEAS - SVI(MOD-SP4): 18 ML/M2
BH CV ECHO MEAS - TAPSE (>1.6): 1.68 CM
BH CV ECHO MEASUREMENTS AVERAGE E/E' RATIO: 12.69
BH CV VAS BP RIGHT ARM: NORMAL MMHG
BH CV XLRA - RV BASE: 2.9 CM
BH CV XLRA - RV MID: 2.5 CM
BH CV XLRA - TDI S': 8.4 CM/SEC
LEFT ATRIUM VOLUME INDEX: 31.3 ML/M2

## 2024-09-20 PROCEDURE — 93306 TTE W/DOPPLER COMPLETE: CPT

## 2024-09-24 ENCOUNTER — LAB (OUTPATIENT)
Dept: LAB | Facility: HOSPITAL | Age: 49
End: 2024-09-24
Payer: MEDICARE

## 2024-09-24 DIAGNOSIS — Z95.2 HX OF AORTIC VALVE REPLACEMENT, MECHANICAL: ICD-10-CM

## 2024-09-24 LAB
INR PPP: 3.85 (ref 0.89–1.12)
PROTHROMBIN TIME: 37.8 SECONDS (ref 12.2–14.5)

## 2024-09-24 PROCEDURE — 36415 COLL VENOUS BLD VENIPUNCTURE: CPT

## 2024-09-24 PROCEDURE — 85610 PROTHROMBIN TIME: CPT

## 2024-09-25 ENCOUNTER — ANTICOAGULATION VISIT (OUTPATIENT)
Dept: PHARMACY | Facility: HOSPITAL | Age: 49
End: 2024-09-25
Payer: MEDICARE

## 2024-09-25 DIAGNOSIS — Z95.2 HX OF AORTIC VALVE REPLACEMENT, MECHANICAL: Primary | ICD-10-CM

## 2024-10-02 ENCOUNTER — ANTICOAGULATION VISIT (OUTPATIENT)
Dept: PHARMACY | Facility: HOSPITAL | Age: 49
End: 2024-10-02
Payer: MEDICARE

## 2024-10-02 ENCOUNTER — LAB (OUTPATIENT)
Dept: LAB | Facility: HOSPITAL | Age: 49
End: 2024-10-02
Payer: MEDICARE

## 2024-10-02 DIAGNOSIS — Z95.2 HX OF AORTIC VALVE REPLACEMENT, MECHANICAL: ICD-10-CM

## 2024-10-02 DIAGNOSIS — Z95.2 HX OF AORTIC VALVE REPLACEMENT, MECHANICAL: Primary | ICD-10-CM

## 2024-10-02 LAB
INR PPP: 3.43 (ref 0.89–1.12)
PROTHROMBIN TIME: 34.5 SECONDS (ref 12.2–14.5)

## 2024-10-02 PROCEDURE — 85610 PROTHROMBIN TIME: CPT

## 2024-10-02 PROCEDURE — 36415 COLL VENOUS BLD VENIPUNCTURE: CPT

## 2024-10-02 NOTE — PROGRESS NOTES
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   Other: Hodgkins Lymphoma (hx of chemo and radiation); h/o CVA + hemorrhagic pancreatitis (during hospitalization for mAVR)    Diet: avoids GLV (2/20/23)  Alcohol: socially  Tobacco: none  OTC Pain Medication: APAP PRN pain    INR History:  Date 9/27 9/28 10/1 10/4 10/8 10/11 10/17 10/19 10/23 11/8 11/16 12/21   Total Weekly Dose 8mg 12mg 19mg 29mg 31mg 34mg 43mg 41mg 45mg 49mg 49mg 49mg   INR 1.3 1.22 1.3 1.8 1.85 1.70 2.91 2.82 3.79 3.00 2.96 3.44   Notes clinic enox enox enox; clinic enox enox      desvenla     Date 1/9/19 1/16 2/4 3/1 3/22 5/16 6/13 6/24 7/10 7/22 8/5 8/26   Total Weekly Dose 49mg 47mg 49mg 49mg 49mg 49mg 49mg 49mg 49mg 49mg 49mg 49mg   INR 4.40 2.36 2.62 3.02 2.15 2.60 2.25 2.98 2.83 2.97 2.90 3.20   Notes   self adj              Date 9/26 10/18 10/28 11/19 12/3 12/17 1/3/20 1/21 2/3 2/12 2/19 2/27   Total Weekly Dose 49mg 49mg 49mg 49mg 49mg 45mg 45mg 45mg 47mg 43mg 43mg 43mg   INR 3.57 3.12 2.88 3.33 4.06 2.59 3.21 3.85 3.11 2.34 3.01 3.20   Notes less GLV Cymbalta start 9/25; Depakote start 9/5 1x decr dose  depakote inc; trintellix dcd ceftin x10 days Recv'd 1/6  incr GLV  dieting        Date 3/5 3/12 3/20 4/2 4/17 5/7 5/28 6/16 6/29 7/17 8/14 8/28   Total Weekly Dose 41mg 41mg 41mg 41mg 41mg 41mg 41mg 39mg 39mg 39mg 39mg 39mg   INR 3.07 3.01 2.95 2.66 2.9 3.03 3.36 2.48 2.42 3.08 2.78 2.28   Notes 1x decr   1x  decr         recv'd 6/30  recv'd 8/13 recv'd 8/31     Date 9/28 10/29  12/4 1/8/21 2/9 3/31 5/12 6/9  8/3 8/31   Total Weekly Dose 39mg 39mg non-  compliant 39mg 39mg 39mg 39mg 39mg 39mg non- compliant 39mg 39mg   INR 2.33 2.39  2.46 2.45 2.85 2.48 2.52 2.51  2.88 2.77   Notes recv'd 9/29 recv'd 10/30   recv'd 1/11  recv'd 4/1           Date 10/4 11/1 12/6  3/8 4/5 5/9 6/10 6/23 7/5 7/20 8/5   Total Weekly Dose 39mg 39mg 39mg  non- compliant  39mg 39mg 39 mg 39 mg 39 mg 39 mg 39 mg 39mg   INR 2.50 2.54 2.2  2.40 2.40 2.98 2.26 2.96 2.84 2.88 3.48   Notes  Rec 11/2 Rec 12/7       rec'd 7/6  rec 8/8     Date 8/17 8/25 9/19 9/22 10/13 10/31 11/10 12/2 12/29 12/30 1/3/2023 1/13   Total Weekly Dose 37mg 35mg 39 mg  37mg 35 mg 35 mg 35 mg 35 mg 35mg 37.5mg 41.5mg 35mg   INR 3.24 2.6 3.13 3.39 2.46 2.75 2.71 2.77 1.1 2.42 4.14 3.59   Notes  rec 8/26  Inc activity Rec 10/14  rec 11/11 rec 12/5 ??? rcv'd 1/3       Date 1/27 2/17 3/10 4/5 4/14 5/18 5/26-6/5 6/8 6/12 6/20 6/28 7/6   Total Weekly Dose 35mg 35mg 35 mg 35 mg 35mg 35 mg Inpatient hospital 15mg 40mg 37.5 mg 35 mg 35mg   INR 2.94 2.58 2.5 2.44 3.11 2.43  1.3 1.62 2.1 2.83 2.76   Notes rcv'd 1/30 rcv'd 2/20 rec 3/13 crestor  rcv'd 4/17 rec 4/19 Subdural hematoma; kcentra  Rcv'd 6/13   Rcvd 7/7     Date 7/19 8/23/23 10/25 Noncomp 5/7 5/30 7/3  8/12/24 9/24 10/2      Total Weekly Dose  35 mg 35 mg   35 mg 35 mg 35 mg 35 mg  35 mg 32.5mg       INR 2.53 2.51 2.73  3.13 2.72 2.34 2.98 3.85 3.43      Notes  Rec'd 8/24   Rec 5/8  Rec'd 7/5 Rec'd 8/13  LVM  Rec'd 9/25  Prevacid       *takes warfarin in AM*  Phone Interview:  Verbal Release Authorization: please contact Mr. Hall directly - if unable to reach patient may contact brotherMarko  Tablet Strength: 2mg and 5mg tablets  Patient Contact Info: [phone]; [email or web address]  Lab Contact Info: Megan Vu       Patient Findings:  Positives: Change in medications   Negatives: Signs/symptoms of  thrombosis, Signs/symptoms of bleeding, Laboratory test error suspected, Change in health, Change in alcohol use, Change in activity, Upcoming invasive procedure, Emergency department visit, Upcoming dental procedure, Missed doses, Extra doses, Change in diet/appetite, Hospital admission, Bruising, Other complaints   Comments: Has not been taking Prevacid since about ~1 week ago (Prevacid can increase INR)  All other findings negative per patient       Plan:  INR supratherapeutic yesterday at 3.43 (goal 2-3). Patient did stop taking Prevacid about 1 week ago. Instructed Mr. Hall to take a decreased dose of warfarin 2.5 mg today and then continue warfarin 5 mg daily until recheck.  Repeat INR in 1 week 10/9. If INR remains elevated would likely decrease dose. Hesitant to do so today based on prior stability.  Verbal information provided over the phone. Mr. Hall RBV dosing instructions, expresses understanding by teach back, and has no further questions at this time.    Ro Govea, PharmD   10/2/2024  15:08 EDT

## 2024-10-09 ENCOUNTER — LAB (OUTPATIENT)
Dept: LAB | Facility: HOSPITAL | Age: 49
End: 2024-10-09
Payer: MEDICARE

## 2024-10-09 ENCOUNTER — ANTICOAGULATION VISIT (OUTPATIENT)
Dept: PHARMACY | Facility: HOSPITAL | Age: 49
End: 2024-10-09
Payer: MEDICARE

## 2024-10-09 DIAGNOSIS — Z95.2 HX OF AORTIC VALVE REPLACEMENT, MECHANICAL: ICD-10-CM

## 2024-10-09 DIAGNOSIS — Z95.2 HX OF AORTIC VALVE REPLACEMENT, MECHANICAL: Primary | ICD-10-CM

## 2024-10-09 LAB
INR PPP: 3.34 (ref 0.89–1.12)
PROTHROMBIN TIME: 33.8 SECONDS (ref 12.2–14.5)

## 2024-10-09 PROCEDURE — 85610 PROTHROMBIN TIME: CPT

## 2024-10-09 PROCEDURE — 36415 COLL VENOUS BLD VENIPUNCTURE: CPT

## 2024-10-09 NOTE — PROGRESS NOTES
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   Other: Hodgkins Lymphoma (hx of chemo and radiation); h/o CVA + hemorrhagic pancreatitis (during hospitalization for mAVR)    Diet: avoids GLV (2/20/23)  Alcohol: socially  Tobacco: none  OTC Pain Medication: APAP PRN pain    INR History:  Date 9/27 9/28 10/1 10/4 10/8 10/11 10/17 10/19 10/23 11/8 11/16 12/21   Total Weekly Dose 8mg 12mg 19mg 29mg 31mg 34mg 43mg 41mg 45mg 49mg 49mg 49mg   INR 1.3 1.22 1.3 1.8 1.85 1.70 2.91 2.82 3.79 3.00 2.96 3.44   Notes clinic enox enox enox; clinic enox enox      desvenla     Date 1/9/19 1/16 2/4 3/1 3/22 5/16 6/13 6/24 7/10 7/22 8/5 8/26   Total Weekly Dose 49mg 47mg 49mg 49mg 49mg 49mg 49mg 49mg 49mg 49mg 49mg 49mg   INR 4.40 2.36 2.62 3.02 2.15 2.60 2.25 2.98 2.83 2.97 2.90 3.20   Notes   self adj              Date 9/26 10/18 10/28 11/19 12/3 12/17 1/3/20 1/21 2/3 2/12 2/19 2/27   Total Weekly Dose 49mg 49mg 49mg 49mg 49mg 45mg 45mg 45mg 47mg 43mg 43mg 43mg   INR 3.57 3.12 2.88 3.33 4.06 2.59 3.21 3.85 3.11 2.34 3.01 3.20   Notes less GLV Cymbalta start 9/25; Depakote start 9/5 1x decr dose  depakote inc; trintellix dcd ceftin x10 days Recv'd 1/6  incr GLV  dieting        Date 3/5 3/12 3/20 4/2 4/17 5/7 5/28 6/16 6/29 7/17 8/14 8/28   Total Weekly Dose 41mg 41mg 41mg 41mg 41mg 41mg 41mg 39mg 39mg 39mg 39mg 39mg   INR 3.07 3.01 2.95 2.66 2.9 3.03 3.36 2.48 2.42 3.08 2.78 2.28   Notes 1x decr   1x  decr         recv'd 6/30  recv'd 8/13 recv'd 8/31     Date 9/28 10/29  12/4 1/8/21 2/9 3/31 5/12 6/9  8/3 8/31   Total Weekly Dose 39mg 39mg non-  compliant 39mg 39mg 39mg 39mg 39mg 39mg non- compliant 39mg 39mg   INR 2.33 2.39  2.46 2.45 2.85 2.48 2.52 2.51  2.88 2.77   Notes recv'd 9/29 recv'd 10/30   recv'd 1/11  recv'd 4/1           Date 10/4 11/1 12/6  3/8 4/5 5/9 6/10 6/23 7/5 7/20 8/5   Total Weekly Dose 39mg 39mg 39mg  non- compliant  39mg 39mg 39 mg 39 mg 39 mg 39 mg 39 mg 39mg   INR 2.50 2.54 2.2  2.40 2.40 2.98 2.26 2.96 2.84 2.88 3.48   Notes  Rec 11/2 Rec 12/7       rec'd 7/6  rec 8/8     Date 8/17 8/25 9/19 9/22 10/13 10/31 11/10 12/2 12/29 12/30 1/3/2023 1/13   Total Weekly Dose 37mg 35mg 39 mg  37mg 35 mg 35 mg 35 mg 35 mg 35mg 37.5mg 41.5mg 35mg   INR 3.24 2.6 3.13 3.39 2.46 2.75 2.71 2.77 1.1 2.42 4.14 3.59   Notes  rec 8/26  Inc activity Rec 10/14  rec 11/11 rec 12/5 ??? rcv'd 1/3       Date 1/27 2/17 3/10 4/5 4/14 5/18 5/26-6/5 6/8 6/12 6/20 6/28 7/6   Total Weekly Dose 35mg 35mg 35 mg 35 mg 35mg 35 mg Inpatient hospital 15mg 40mg 37.5 mg 35 mg 35mg   INR 2.94 2.58 2.5 2.44 3.11 2.43  1.3 1.62 2.1 2.83 2.76   Notes rcv'd 1/30 rcv'd 2/20 rec 3/13 crestor  rcv'd 4/17 rec 4/19 Subdural hematoma; kcentra  Rcv'd 6/13   Rcvd 7/7     Date 7/19 8/23/23 10/25 Noncomp 5/7 5/30 7/3  8/12/24 9/24 10/2 10/9     Total Weekly Dose  35 mg 35 mg   35 mg 35 mg 35 mg 35 mg  35 mg 32.5mg  32.5 mg     INR 2.53 2.51 2.73  3.13 2.72 2.34 2.98 3.85 3.43 3.34     Notes  Rec'd 8/24   Rec 5/8  Rec'd 7/5 Rec'd 8/13  LVM  Rec'd 9/25  Prevacid       *takes warfarin in AM*  Phone Interview:  Verbal Release Authorization: please contact Mr. Hall directly - if unable to reach patient may contact brotherMarko  Tablet Strength: 2mg and 5mg tablets  Patient Contact Info: [phone]; [email or web address]  Lab Contact Info: Megan Vu       Patient Findings:  Negatives: Signs/symptoms of thrombosis, Signs/symptoms  of bleeding, Laboratory test error suspected, Change in health, Change in alcohol use, Change in activity, Upcoming invasive procedure, Emergency department visit, Upcoming dental procedure, Missed doses, Extra doses, Change in medications, Change in diet/appetite, Hospital admission, Bruising, Other complaints   Comments: All findings negative per patient.       Plan:  INR supratherapeutic today at 3.34 (goal 2-3).  Instructed Mr. Hall to take a decreased dose of warfarin 2.5 mg today and then continue warfarin 5 mg daily until recheck.  Repeat INR in 1 week 10/16. If INR remains elevated would likely decrease dose. Hesitant to do so today based on prior stability.  Verbal information provided over the phone. Mr. Hall RBV dosing instructions, expresses understanding by teach back, and has no further questions at this time.      Tati García, PharmD  10/9/2024  15:21 EDT

## 2024-10-16 ENCOUNTER — LAB (OUTPATIENT)
Dept: LAB | Facility: HOSPITAL | Age: 49
End: 2024-10-16
Payer: MEDICARE

## 2024-10-16 ENCOUNTER — ANTICOAGULATION VISIT (OUTPATIENT)
Dept: PHARMACY | Facility: HOSPITAL | Age: 49
End: 2024-10-16
Payer: MEDICARE

## 2024-10-16 DIAGNOSIS — Z95.2 HX OF AORTIC VALVE REPLACEMENT, MECHANICAL: ICD-10-CM

## 2024-10-16 DIAGNOSIS — Z95.2 HX OF AORTIC VALVE REPLACEMENT, MECHANICAL: Primary | ICD-10-CM

## 2024-10-16 LAB
INR PPP: 2.11 (ref 0.89–1.12)
PROTHROMBIN TIME: 23.7 SECONDS (ref 12.2–14.5)

## 2024-10-16 PROCEDURE — 85610 PROTHROMBIN TIME: CPT

## 2024-10-16 PROCEDURE — 36415 COLL VENOUS BLD VENIPUNCTURE: CPT

## 2024-10-16 NOTE — PROGRESS NOTES
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   Other: Hodgkins Lymphoma (hx of chemo and radiation); h/o CVA + hemorrhagic pancreatitis (during hospitalization for mAVR)    Diet: avoids GLV (2/20/23)  Alcohol: socially  Tobacco: none  OTC Pain Medication: APAP PRN pain    INR History:  Date 9/27 9/28 10/1 10/4 10/8 10/11 10/17 10/19 10/23 11/8 11/16 12/21   Total Weekly Dose 8mg 12mg 19mg 29mg 31mg 34mg 43mg 41mg 45mg 49mg 49mg 49mg   INR 1.3 1.22 1.3 1.8 1.85 1.70 2.91 2.82 3.79 3.00 2.96 3.44   Notes clinic enox enox enox; clinic enox enox      desvenla     Date 1/9/19 1/16 2/4 3/1 3/22 5/16 6/13 6/24 7/10 7/22 8/5 8/26   Total Weekly Dose 49mg 47mg 49mg 49mg 49mg 49mg 49mg 49mg 49mg 49mg 49mg 49mg   INR 4.40 2.36 2.62 3.02 2.15 2.60 2.25 2.98 2.83 2.97 2.90 3.20   Notes   self adj              Date 9/26 10/18 10/28 11/19 12/3 12/17 1/3/20 1/21 2/3 2/12 2/19 2/27   Total Weekly Dose 49mg 49mg 49mg 49mg 49mg 45mg 45mg 45mg 47mg 43mg 43mg 43mg   INR 3.57 3.12 2.88 3.33 4.06 2.59 3.21 3.85 3.11 2.34 3.01 3.20   Notes less GLV Cymbalta start 9/25; Depakote start 9/5 1x decr dose  depakote inc; trintellix dcd ceftin x10 days Recv'd 1/6  incr GLV  dieting        Date 3/5 3/12 3/20 4/2 4/17 5/7 5/28 6/16 6/29 7/17 8/14 8/28   Total Weekly Dose 41mg 41mg 41mg 41mg 41mg 41mg 41mg 39mg 39mg 39mg 39mg 39mg   INR 3.07 3.01 2.95 2.66 2.9 3.03 3.36 2.48 2.42 3.08 2.78 2.28   Notes 1x decr   1x  decr         recv'd 6/30  recv'd 8/13 recv'd 8/31     Date 9/28 10/29  12/4 1/8/21 2/9 3/31 5/12 6/9  8/3 8/31   Total Weekly Dose 39mg 39mg non-  compliant 39mg 39mg 39mg 39mg 39mg 39mg non- compliant 39mg 39mg   INR 2.33 2.39  2.46 2.45 2.85 2.48 2.52 2.51  2.88 2.77   Notes recv'd 9/29 recv'd 10/30   recv'd 1/11  recv'd 4/1           Date 10/4 11/1 12/6  3/8 4/5 5/9 6/10 6/23 7/5 7/20 8/5   Total Weekly Dose 39mg 39mg 39mg  non- compliant  39mg 39mg 39 mg 39 mg 39 mg 39 mg 39 mg 39mg   INR 2.50 2.54 2.2  2.40 2.40 2.98 2.26 2.96 2.84 2.88 3.48   Notes  Rec 11/2 Rec 12/7       rec'd 7/6  rec 8/8     Date 8/17 8/25 9/19 9/22 10/13 10/31 11/10 12/2 12/29 12/30 1/3/2023 1/13   Total Weekly Dose 37mg 35mg 39 mg  37mg 35 mg 35 mg 35 mg 35 mg 35mg 37.5mg 41.5mg 35mg   INR 3.24 2.6 3.13 3.39 2.46 2.75 2.71 2.77 1.1 2.42 4.14 3.59   Notes  rec 8/26  Inc activity Rec 10/14  rec 11/11 rec 12/5 ??? rcv'd 1/3       Date 1/27 2/17 3/10 4/5 4/14 5/18 5/26-6/5 6/8 6/12 6/20 6/28 7/6   Total Weekly Dose 35mg 35mg 35 mg 35 mg 35mg 35 mg Inpatient hospital 15mg 40mg 37.5 mg 35 mg 35mg   INR 2.94 2.58 2.5 2.44 3.11 2.43  1.3 1.62 2.1 2.83 2.76   Notes rcv'd 1/30 rcv'd 2/20 rec 3/13 crestor  rcv'd 4/17 rec 4/19 Subdural hematoma; kcentra  Rcv'd 6/13   Rcvd 7/7     Date 7/19 8/23/23 10/25 Noncomp 5/7 5/30 7/3  8/12/24 9/24 10/2 10/9 10/16    Total Weekly Dose  35 mg 35 mg   35 mg 35 mg 35 mg 35 mg  35 mg 32.5mg  32.5 mg 32.5 mg    INR 2.53 2.51 2.73  3.13 2.72 2.34 2.98 3.85 3.43 3.34 2.11    Notes  Rec'd 8/24   Rec 5/8  Rec'd 7/5 Rec'd 8/13  LVM  Rec'd 9/25  Prevacid       *takes warfarin in AM*  Phone Interview:  Verbal Release Authorization: please contact Mr. Hall directly - if unable to reach patient may contact brotherMarko  Tablet Strength: 2mg and 5mg tablets  Patient Contact Info: [phone]; [email or web address]  Lab Contact Info: Megan Vu       Patient Findings:  Negatives: Signs/symptoms of thrombosis,  Signs/symptoms of bleeding, Laboratory test error suspected, Change in health, Change in alcohol use, Change in activity, Upcoming invasive procedure, Emergency department visit, Upcoming dental procedure, Missed doses, Extra doses, Change in medications, Change in diet/appetite, Hospital admission, Bruising, Other complaints   Comments: All findings negative per patient.       Plan:  INR therapeutic today at 2.11 (goal 2-3).  Instructed Mr. Hall to  take warfarin 5 mg daily until recheck.  Repeat INR  10/30.   Verbal information provided over the phone. Mr. Hall RBV dosing instructions, expresses understanding by teach back, and has no further questions at this time.      Tati García, PharmD  10/16/2024  14:38 EDT

## 2024-10-23 ENCOUNTER — ANTICOAGULATION VISIT (OUTPATIENT)
Dept: PHARMACY | Facility: HOSPITAL | Age: 49
End: 2024-10-23
Payer: MEDICARE

## 2024-10-23 ENCOUNTER — LAB (OUTPATIENT)
Dept: LAB | Facility: HOSPITAL | Age: 49
End: 2024-10-23
Payer: MEDICARE

## 2024-10-23 DIAGNOSIS — Z95.2 HX OF AORTIC VALVE REPLACEMENT, MECHANICAL: Primary | ICD-10-CM

## 2024-10-23 DIAGNOSIS — Z95.2 HX OF AORTIC VALVE REPLACEMENT, MECHANICAL: ICD-10-CM

## 2024-10-23 LAB
INR PPP: 3.51 (ref 0.89–1.12)
PROTHROMBIN TIME: 35.2 SECONDS (ref 12.2–14.5)

## 2024-10-23 PROCEDURE — 85610 PROTHROMBIN TIME: CPT

## 2024-10-23 PROCEDURE — 36415 COLL VENOUS BLD VENIPUNCTURE: CPT

## 2024-10-23 NOTE — PROGRESS NOTES
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   Other: Hodgkins Lymphoma (hx of chemo and radiation); h/o CVA + hemorrhagic pancreatitis (during hospitalization for mAVR)    Diet: avoids GLV (2/20/23)  Alcohol: socially  Tobacco: none  OTC Pain Medication: APAP PRN pain    INR History:  Date 9/27 9/28 10/1 10/4 10/8 10/11 10/17 10/19 10/23 11/8 11/16 12/21   Total Weekly Dose 8mg 12mg 19mg 29mg 31mg 34mg 43mg 41mg 45mg 49mg 49mg 49mg   INR 1.3 1.22 1.3 1.8 1.85 1.70 2.91 2.82 3.79 3.00 2.96 3.44   Notes clinic enox enox enox; clinic enox enox      desvenla     Date 1/9/19 1/16 2/4 3/1 3/22 5/16 6/13 6/24 7/10 7/22 8/5 8/26   Total Weekly Dose 49mg 47mg 49mg 49mg 49mg 49mg 49mg 49mg 49mg 49mg 49mg 49mg   INR 4.40 2.36 2.62 3.02 2.15 2.60 2.25 2.98 2.83 2.97 2.90 3.20   Notes   self adj              Date 9/26 10/18 10/28 11/19 12/3 12/17 1/3/20 1/21 2/3 2/12 2/19 2/27   Total Weekly Dose 49mg 49mg 49mg 49mg 49mg 45mg 45mg 45mg 47mg 43mg 43mg 43mg   INR 3.57 3.12 2.88 3.33 4.06 2.59 3.21 3.85 3.11 2.34 3.01 3.20   Notes less GLV Cymbalta start 9/25; Depakote start 9/5 1x decr dose  depakote inc; trintellix dcd ceftin x10 days Recv'd 1/6  incr GLV  dieting        Date 3/5 3/12 3/20 4/2 4/17 5/7 5/28 6/16 6/29 7/17 8/14 8/28   Total Weekly Dose 41mg 41mg 41mg 41mg 41mg 41mg 41mg 39mg 39mg 39mg 39mg 39mg   INR 3.07 3.01 2.95 2.66 2.9 3.03 3.36 2.48 2.42 3.08 2.78 2.28   Notes 1x decr   1x  decr         recv'd 6/30  recv'd 8/13 recv'd 8/31     Date 9/28 10/29  12/4 1/8/21 2/9 3/31 5/12 6/9  8/3 8/31   Total Weekly Dose 39mg 39mg non-  compliant 39mg 39mg 39mg 39mg 39mg 39mg non- compliant 39mg 39mg   INR 2.33 2.39  2.46 2.45 2.85 2.48 2.52 2.51  2.88 2.77   Notes recv'd 9/29 recv'd 10/30   recv'd 1/11  recv'd 4/1           Date 10/4 11/1 12/6  3/8 4/5 5/9 6/10 6/23 7/5 7/20 8/5   Total Weekly Dose 39mg 39mg 39mg  non- compliant  39mg 39mg 39 mg 39 mg 39 mg 39 mg 39 mg 39mg   INR 2.50 2.54 2.2  2.40 2.40 2.98 2.26 2.96 2.84 2.88 3.48   Notes  Rec 11/2 Rec 12/7       rec'd 7/6  rec 8/8     Date 8/17 8/25 9/19 9/22 10/13 10/31 11/10 12/2 12/29 12/30 1/3/2023 1/13   Total Weekly Dose 37mg 35mg 39 mg  37mg 35 mg 35 mg 35 mg 35 mg 35mg 37.5mg 41.5mg 35mg   INR 3.24 2.6 3.13 3.39 2.46 2.75 2.71 2.77 1.1 2.42 4.14 3.59   Notes  rec 8/26  Inc activity Rec 10/14  rec 11/11 rec 12/5 ??? rcv'd 1/3       Date 1/27 2/17 3/10 4/5 4/14 5/18 5/26-6/5 6/8 6/12 6/20 6/28 7/6   Total Weekly Dose 35mg 35mg 35 mg 35 mg 35mg 35 mg Inpatient hospital 15mg 40mg 37.5 mg 35 mg 35mg   INR 2.94 2.58 2.5 2.44 3.11 2.43  1.3 1.62 2.1 2.83 2.76   Notes rcv'd 1/30 rcv'd 2/20 rec 3/13 crestor  rcv'd 4/17 rec 4/19 Subdural hematoma; kcentra  Rcv'd 6/13   Rcvd 7/7     Date 7/19 8/23/23 10/25 Noncomp 5/7 5/30 7/3  8/12/24 9/24 10/2 10/9 10/16  10/23   Total Weekly Dose  35 mg 35 mg   35 mg 35 mg 35 mg 35 mg  35 mg 32.5mg  32.5 mg 32.5 mg  35 mg   INR 2.53 2.51 2.73  3.13 2.72 2.34 2.98 3.85 3.43 3.34 2.11  3.51   Notes  Rec'd 8/24   Rec 5/8  Rec'd 7/5 Rec'd 8/13  LVM  Rec'd 9/25  Prevacid    Unable to contact    *takes warfarin in AM*  Phone Interview:  Verbal Release Authorization: please contact Mr. Hall directly - if unable to reach patient may contact brotherMarko  Tablet Strength: 2mg and 5mg tablets  Patient Contact Info: [phone]; [email or web address]  Lab Contact Info: Megan Vu       UNABLE TO GET IN CONTACT  WITH THE PATIENT.   LVM on 10/23/2024 at 15:25 EDT    PLEASE DISREGARD THE FOLLOWING PLAN UNTIL ABLE TO GET IN CONTACT WITH PATIENT/ PATIENT REPRESENTATIVE. LVM 10/29        Plan:    INR is SUPRA therapeutic today at 3.51 (goal 2-3).  Instructed Mr. Hall to continue maintenance regimen of warfarin 5 mg oral daily until recheck.  Repeat INR 10/30.   Verbal information provided over the phone. Mr. Hall RBV dosing instructions, expresses understanding by teach back, and has no further questions at this time.

## 2024-12-03 ENCOUNTER — OFFICE VISIT (OUTPATIENT)
Dept: ENDOCRINOLOGY | Facility: CLINIC | Age: 49
End: 2024-12-03
Payer: MEDICARE

## 2024-12-03 VITALS
DIASTOLIC BLOOD PRESSURE: 72 MMHG | HEIGHT: 69 IN | WEIGHT: 210 LBS | SYSTOLIC BLOOD PRESSURE: 130 MMHG | HEART RATE: 91 BPM | BODY MASS INDEX: 31.1 KG/M2

## 2024-12-03 DIAGNOSIS — E03.9 HYPOTHYROIDISM (ACQUIRED): ICD-10-CM

## 2024-12-03 DIAGNOSIS — E11.9 CONTROLLED TYPE 2 DIABETES MELLITUS WITHOUT COMPLICATION, WITHOUT LONG-TERM CURRENT USE OF INSULIN: Primary | Chronic | ICD-10-CM

## 2024-12-03 DIAGNOSIS — E03.9 HYPOTHYROIDISM (ACQUIRED): Chronic | ICD-10-CM

## 2024-12-03 LAB
ALBUMIN SERPL-MCNC: 4.3 G/DL (ref 3.5–5.2)
ALBUMIN UR-MCNC: 2.3 MG/DL
ALBUMIN/GLOB SERPL: 1.3 G/DL
ALP SERPL-CCNC: 61 U/L (ref 39–117)
ALT SERPL W P-5'-P-CCNC: 23 U/L (ref 1–41)
ANION GAP SERPL CALCULATED.3IONS-SCNC: 9.1 MMOL/L (ref 5–15)
AST SERPL-CCNC: 26 U/L (ref 1–40)
BILIRUB SERPL-MCNC: 0.6 MG/DL (ref 0–1.2)
BUN SERPL-MCNC: 10 MG/DL (ref 6–20)
BUN/CREAT SERPL: 11.4 (ref 7–25)
CALCIUM SPEC-SCNC: 9.4 MG/DL (ref 8.6–10.5)
CHLORIDE SERPL-SCNC: 105 MMOL/L (ref 98–107)
CHOLEST SERPL-MCNC: 145 MG/DL (ref 0–200)
CO2 SERPL-SCNC: 24.9 MMOL/L (ref 22–29)
CREAT SERPL-MCNC: 0.88 MG/DL (ref 0.76–1.27)
CREAT UR-MCNC: 212.2 MG/DL
EGFRCR SERPLBLD CKD-EPI 2021: 106.1 ML/MIN/1.73
EXPIRATION DATE: NORMAL
EXPIRATION DATE: NORMAL
GLOBULIN UR ELPH-MCNC: 3.2 GM/DL
GLUCOSE BLDC GLUCOMTR-MCNC: 101 MG/DL (ref 70–130)
GLUCOSE SERPL-MCNC: 84 MG/DL (ref 65–99)
HBA1C MFR BLD: 5.5 % (ref 4.5–5.7)
HDLC SERPL-MCNC: 56 MG/DL (ref 40–60)
LDLC SERPL CALC-MCNC: 63 MG/DL (ref 0–100)
LDLC/HDLC SERPL: 1.05 {RATIO}
Lab: NORMAL
Lab: NORMAL
MICROALBUMIN/CREAT UR: 10.8 MG/G (ref 0–29)
POTASSIUM SERPL-SCNC: 4 MMOL/L (ref 3.5–5.2)
PROT SERPL-MCNC: 7.5 G/DL (ref 6–8.5)
SODIUM SERPL-SCNC: 139 MMOL/L (ref 136–145)
T4 FREE SERPL-MCNC: 1.73 NG/DL (ref 0.92–1.68)
TRIGL SERPL-MCNC: 152 MG/DL (ref 0–150)
TSH SERPL DL<=0.05 MIU/L-ACNC: 0.05 UIU/ML (ref 0.27–4.2)
VLDLC SERPL-MCNC: 26 MG/DL (ref 5–40)

## 2024-12-03 PROCEDURE — 3044F HG A1C LEVEL LT 7.0%: CPT | Performed by: PHYSICIAN ASSISTANT

## 2024-12-03 PROCEDURE — 84443 ASSAY THYROID STIM HORMONE: CPT | Performed by: PHYSICIAN ASSISTANT

## 2024-12-03 PROCEDURE — 82947 ASSAY GLUCOSE BLOOD QUANT: CPT | Performed by: PHYSICIAN ASSISTANT

## 2024-12-03 PROCEDURE — 84439 ASSAY OF FREE THYROXINE: CPT | Performed by: PHYSICIAN ASSISTANT

## 2024-12-03 PROCEDURE — 3075F SYST BP GE 130 - 139MM HG: CPT | Performed by: PHYSICIAN ASSISTANT

## 2024-12-03 PROCEDURE — 1159F MED LIST DOCD IN RCRD: CPT | Performed by: PHYSICIAN ASSISTANT

## 2024-12-03 PROCEDURE — 3078F DIAST BP <80 MM HG: CPT | Performed by: PHYSICIAN ASSISTANT

## 2024-12-03 PROCEDURE — 1160F RVW MEDS BY RX/DR IN RCRD: CPT | Performed by: PHYSICIAN ASSISTANT

## 2024-12-03 PROCEDURE — 80061 LIPID PANEL: CPT | Performed by: PHYSICIAN ASSISTANT

## 2024-12-03 PROCEDURE — 80053 COMPREHEN METABOLIC PANEL: CPT | Performed by: PHYSICIAN ASSISTANT

## 2024-12-03 PROCEDURE — 99214 OFFICE O/P EST MOD 30 MIN: CPT | Performed by: PHYSICIAN ASSISTANT

## 2024-12-03 PROCEDURE — 82570 ASSAY OF URINE CREATININE: CPT | Performed by: PHYSICIAN ASSISTANT

## 2024-12-03 PROCEDURE — 83036 HEMOGLOBIN GLYCOSYLATED A1C: CPT | Performed by: PHYSICIAN ASSISTANT

## 2024-12-03 PROCEDURE — 82043 UR ALBUMIN QUANTITATIVE: CPT | Performed by: PHYSICIAN ASSISTANT

## 2024-12-03 RX ORDER — SEMAGLUTIDE 0.68 MG/ML
0.5 INJECTION, SOLUTION SUBCUTANEOUS WEEKLY
Qty: 9 ML | Refills: 1 | Status: SHIPPED | OUTPATIENT
Start: 2024-12-03

## 2024-12-03 RX ORDER — LEVOTHYROXINE SODIUM 112 UG/1
112 TABLET ORAL DAILY
Qty: 90 TABLET | Refills: 1 | OUTPATIENT
Start: 2024-12-03

## 2024-12-03 RX ORDER — LEVOTHYROXINE SODIUM 112 UG/1
112 TABLET ORAL DAILY
Qty: 90 TABLET | Refills: 1 | Status: SHIPPED | OUTPATIENT
Start: 2024-12-03 | End: 2024-12-04 | Stop reason: SDUPTHER

## 2024-12-03 NOTE — PROGRESS NOTES
Chief Complaint  F/u for Diabetes Mellitus.    [facility]   Manjeet Hall is a 48 y.o. male with hx of hodgkin's lymphoma, CVA, subdural hematoma, mechanical aortic valve, who is here today for f/u of Diabetes Mellitus type 2. The initial diagnosis of diabetes was made in his early 40s.     Patient reports to be doing well.     Diabetic complications: CVA 2017 or 2018  Eye exam current (within one year): retinopathy screening 6/2024 - no retinopathy    Current diabetic medications include:  Ozempic 0.5 mg weekly - tolerating well    Statin: crestor 20 for hyperlipidemia    Past medications: metformin (changed to ozempic)    Diabetic Monitoring  - no meter or log for review    Hypothyroidism - on levothyroxine 112 mcg     The following portions of the patient's history were reviewed and updated by me as appropriate: allergies, current medications, past family history, past social history, past surgical history and problem list.    Past Medical History:   Diagnosis Date    Abnormal heart rhythm     Depression     Elevated cholesterol     GERD (gastroesophageal reflux disease)     Heart murmur     History of echocardiogram     History of left heart catheterization     History of stomach ulcers     Mitral valvular disorder     Stroke     Type 2 diabetes mellitus with hyperglycemia, without long-term current use of insulin 05/26/2023       Medications    Current Outpatient Medications:     DULoxetine (CYMBALTA) 60 MG capsule, Take 1 capsule by mouth Daily., Disp: 90 capsule, Rfl: 3    hydrOXYzine (ATARAX) 10 MG tablet, TAKE 1-2 TABLET BY MOUTH TWICE A DAY AS NEEDED FOR ANXIETY AND SLEEP, Disp: , Rfl:     lansoprazole (PREVACID) 30 MG capsule, Take 1 capsule by mouth Daily., Disp: 90 capsule, Rfl: 3    levothyroxine (SYNTHROID, LEVOTHROID) 112 MCG tablet, Take 1 tablet by mouth Daily., Disp: 90 tablet, Rfl: 1    metoprolol tartrate (LOPRESSOR) 25 MG tablet, Take 0.5 tablets by mouth Every 12 (Twelve) Hours., Disp: 90  "tablet, Rfl: 3    rosuvastatin (CRESTOR) 20 MG tablet, Take 1 tablet by mouth Daily., Disp: 90 tablet, Rfl: 3    Semaglutide,0.25 or 0.5MG/DOS, (Ozempic, 0.25 or 0.5 MG/DOSE,) 2 MG/3ML solution pen-injector, Inject 0.5 mg under the skin into the appropriate area as directed 1 (One) Time Per Week., Disp: 9 mL, Rfl: 1    warfarin (COUMADIN) 5 MG tablet, Take 1 tablet by mouth daily as directed by anticoagulation clinic., Disp: 90 tablet, Rfl: 3    Review of Systems  Review of Systems   All other systems reviewed and are negative.       Physical Exam    /72   Pulse 91   Ht 175 cm (68.9\")   Wt 95.3 kg (210 lb)   BMI 31.10 kg/m² Body mass index is 31.1 kg/m².  Physical Exam  Constitutional:       General: He is not in acute distress.     Appearance: He is well-developed. He is not diaphoretic.   Neck:      Thyroid: No thyromegaly.      Vascular: No JVD.      Trachea: No tracheal deviation.   Cardiovascular:      Rate and Rhythm: Normal rate and regular rhythm.      Pulses:           Dorsalis pedis pulses are 2+ on the right side and 2+ on the left side.        Posterior tibial pulses are 2+ on the right side and 2+ on the left side.      Comments: Mechanical valve click  Pulmonary:      Effort: Pulmonary effort is normal.      Breath sounds: Normal breath sounds.   Musculoskeletal:         General: No tenderness.      Cervical back: Neck supple.      Right foot: No deformity or Charcot foot.      Left foot: No deformity or Charcot foot.   Feet:      Right foot:      Protective Sensation: 5 sites tested.  5 sites sensed.      Skin integrity: Callus present. No ulcer, blister, skin breakdown, erythema or warmth.      Left foot:      Protective Sensation: 5 sites tested.  5 sites sensed.      Skin integrity: Callus present. No ulcer, blister, skin breakdown, erythema or warmth.      Comments: Diabetic Foot Exam Performed and Monofilament Test Performed      Skin:     General: Skin is warm and dry.      Findings: " No erythema or rash.   Neurological:      Mental Status: He is alert and oriented to person, place, and time.   Psychiatric:         Behavior: Behavior normal.         Thought Content: Thought content normal.         Judgment: Judgment normal.         Labs and Imaging   Lab Results   Component Value Date    HGBA1C 5.5 12/03/2024    HGBA1C 6.3 (A) 06/03/2024    HGBA1C 6.30 (H) 01/11/2024     CMP          1/31/2024    09:02 6/3/2024    09:30   CMP   Glucose  156    BUN  12    Creatinine 0.90  0.90    EGFR  105.4    Sodium  139    Potassium  4.1    Chloride  105    Calcium  8.8    Total Protein  7.3    Albumin  4.6    Globulin  2.7    Total Bilirubin  0.5    Alkaline Phosphatase  77    AST (SGOT)  27    ALT (SGPT)  32    Albumin/Globulin Ratio  1.7    BUN/Creatinine Ratio  13.3    Anion Gap  8.0      CBC          1/11/2024    08:58   CBC   WBC 8.31    RBC 5.39    Hemoglobin 14.0    Hematocrit 43.4    MCV 80.5    MCH 26.0    MCHC 32.3    RDW 15.0    Platelets 189      Lipid Panel          6/3/2024    09:30   Lipid Panel   Total Cholesterol 180    Triglycerides 300    HDL Cholesterol 58    VLDL Cholesterol 48    LDL Cholesterol  74    LDL/HDL Ratio 1.07      TSH          3/4/2024    13:07 4/5/2024    11:50 6/3/2024    09:30   TSH   TSH 0.078  0.543  0.813      Most Recent A1C          12/3/2024    09:05   HGBA1C Most Recent   Hemoglobin A1C 5.5            Assessment / Plan   Diagnoses and all orders for this visit:    1. Controlled type 2 diabetes mellitus without complication, without long-term current use of insulin (Primary)  -     POC Glucose, Blood  -     POC Glycosylated Hemoglobin (Hb A1C)  -     Microalbumin / Creatinine Urine Ratio - Urine, Clean Catch  -     Comprehensive Metabolic Panel  -     Lipid Panel  -     Semaglutide,0.25 or 0.5MG/DOS, (Ozempic, 0.25 or 0.5 MG/DOSE,) 2 MG/3ML solution pen-injector; Inject 0.5 mg under the skin into the appropriate area as directed 1 (One) Time Per Week.  Dispense: 9 mL;  Refill: 1    2. Hypothyroidism (acquired)  -     TSH  -     T4, Free  -     levothyroxine (SYNTHROID, LEVOTHROID) 112 MCG tablet; Take 1 tablet by mouth Daily.  Dispense: 90 tablet; Refill: 1        Diabetes Mellitus 2 is under good control.  -A1c 5.5  -continue ozempic 0.5 mg weekly  -consider adding [id number] in the future - hold off for now due to cost of ozempic  -retinopathy scan updated 6/2024, labs and foot exam updated today    Hypothyroidism  -repeat tfts  -continue levothyroxine 112 mcg daily    There are no Patient Instructions on file for this visit.    Follow up: Return in about 6 months (around 6/3/2025).    Signed by: Candido Turner PA-C  Endocrinology

## 2024-12-04 DIAGNOSIS — E03.9 HYPOTHYROIDISM (ACQUIRED): Chronic | ICD-10-CM

## 2024-12-04 RX ORDER — LEVOTHYROXINE SODIUM 100 UG/1
100 TABLET ORAL DAILY
Qty: 90 TABLET | Refills: 0 | Status: SHIPPED | OUTPATIENT
Start: 2024-12-04

## 2024-12-11 ENCOUNTER — TELEPHONE (OUTPATIENT)
Dept: ENDOCRINOLOGY | Facility: CLINIC | Age: 49
End: 2024-12-11
Payer: MEDICARE

## 2024-12-11 RX ORDER — SEMAGLUTIDE 1.34 MG/ML
1 INJECTION, SOLUTION SUBCUTANEOUS WEEKLY
Qty: 9 ML | Refills: 1 | Status: SHIPPED | OUTPATIENT
Start: 2024-12-11

## 2024-12-11 NOTE — TELEPHONE ENCOUNTER
PATIENTS NA IS CALLING STATING PATIENT IS WANTING HIS OZEMPIC PRESCRIPTION INCREASED. THE CURRENT DOSAGE IS .5 AND WOULD LIKE TO HAVE THE DOSAGE INCREASED TO NEXT DOSAGE. PHONE NUMBER [phone]

## 2024-12-13 ENCOUNTER — TELEPHONE (OUTPATIENT)
Dept: ENDOCRINOLOGY | Facility: CLINIC | Age: 49
End: 2024-12-13

## 2024-12-13 NOTE — TELEPHONE ENCOUNTER
Left message to return call.  1mg dose was sent to pharmacy, [facility] in [place] on 12/11/2024.  Patient had previously been on 0.5mg dose.

## 2024-12-13 NOTE — TELEPHONE ENCOUNTER
Caller: Payton Diallo    Relationship: Emergency Contact    Best call back number: [phone]    Requested Prescriptions:  Semaglutide, 1 MG/DOSE, (Ozempic, 1 MG/DOSE,) 2 MG/1.5ML solution pen-injector   Requested Prescriptions      No prescriptions requested or ordered in this encounter        Pharmacy where request should be sent:  [facility] #[id number] - [place], [place] - [address] AT [address] - [phone]  - [phone] [address] [id number]      Last office visit with prescribing clinician: Visit date not found   Last telemedicine visit with prescribing clinician: Visit date not found   Next office visit with prescribing clinician: 6/6/2025     Additional details provided by patient: PT IS NEEDING A HIGHER DOSE OF OZEMPIC AND ASKED FOR 3 MG OR HIGHER  BE SENT TO HIS PHARMACY TODAY    Does the patient have less than a 3 day supply:  [x] Yes  [] No    Would you like a call back once the refill request has been completed: [x] Yes [] No    If the office needs to give you a call back, can they leave a voicemail: [x] Yes [] No    Piper Tripathi Rep   12/13/24 15:22 EST

## 2024-12-16 NOTE — TELEPHONE ENCOUNTER
Left message to return call.  Completing message pending return call from patient as increased dose was previously sent to pharmacy.

## 2024-12-16 NOTE — TELEPHONE ENCOUNTER
Hub staff attempted to follow warm transfer process and was unsuccessful     Caller: Payton Diallo    Relationship to patient: Emergency Contact    Best call back number: [phone]    Patient is needing: CALLING XANDER BACK. PLEASE ADVISE

## 2024-12-27 DIAGNOSIS — Z79.01 WARFARIN ANTICOAGULATION: ICD-10-CM

## 2024-12-27 DIAGNOSIS — Z95.2 H/O HEART VALVE REPLACEMENT WITH MECHANICAL VALVE: Chronic | ICD-10-CM

## 2024-12-27 DIAGNOSIS — E11.65 TYPE 2 DIABETES MELLITUS WITH HYPERGLYCEMIA, WITHOUT LONG-TERM CURRENT USE OF INSULIN: ICD-10-CM

## 2024-12-27 RX ORDER — WARFARIN SODIUM 5 MG/1
TABLET ORAL
Qty: 90 TABLET | Refills: 0 | Status: SHIPPED | OUTPATIENT
Start: 2024-12-27

## 2024-12-27 RX ORDER — METFORMIN HYDROCHLORIDE 500 MG/1
500 TABLET, EXTENDED RELEASE ORAL
Qty: 90 TABLET | Refills: 3 | OUTPATIENT
Start: 2024-12-27

## 2024-12-30 DIAGNOSIS — E11.65 TYPE 2 DIABETES MELLITUS WITH HYPERGLYCEMIA, WITHOUT LONG-TERM CURRENT USE OF INSULIN: ICD-10-CM

## 2024-12-30 DIAGNOSIS — E03.9 HYPOTHYROIDISM (ACQUIRED): Chronic | ICD-10-CM

## 2024-12-30 RX ORDER — LEVOTHYROXINE SODIUM 100 UG/1
100 TABLET ORAL DAILY
Qty: 90 TABLET | Refills: 0 | OUTPATIENT
Start: 2024-12-30

## 2024-12-30 RX ORDER — METFORMIN HYDROCHLORIDE 500 MG/1
500 TABLET, EXTENDED RELEASE ORAL
Qty: 90 TABLET | Refills: 0 | Status: SHIPPED | OUTPATIENT
Start: 2024-12-30

## 2024-12-30 NOTE — TELEPHONE ENCOUNTER
Rx Refill Note  Requested Prescriptions     Pending Prescriptions Disp Refills    metFORMIN ER (GLUCOPHAGE-XR) 500 MG 24 hr tablet [Pharmacy Med Name: METFORMIN HCL  MG TABLET] 90 tablet 3     Sig: TAKE 1 TABLET BY MOUTH EVERY DAY WITH BREAKFAST      Last office visit with prescribing clinician: 1/11/2024   Last telemedicine visit with prescribing clinician: Visit date not found   Next office visit with prescribing clinician: 1/13/2025                         Would you like a call back once the refill request has been completed: [] Yes [] No    If the office needs to give you a call back, can they leave a voicemail: [] Yes [] No    Chasidy Doherty MA  12/30/24, 13:11 EST

## 2024-12-30 NOTE — TELEPHONE ENCOUNTER
Rx Refill Note  Requested Prescriptions     Pending Prescriptions Disp Refills    levothyroxine (SYNTHROID, LEVOTHROID) 100 MCG tablet [Pharmacy Med Name: LEVOTHYROXINE 100 MCG TABLET] 90 tablet 0     Sig: TAKE 1 TABLET BY MOUTH DAILY. DOSE DECREASE          Last office visit with prescribing clinician: 12/3/2024     Next office visit with prescribing clinician: Visit date not found         Isabella Myers MA  12/30/24, 13:24 EST

## 2025-01-13 ENCOUNTER — LAB (OUTPATIENT)
Dept: LAB | Facility: HOSPITAL | Age: 50
End: 2025-01-13
Payer: MEDICARE

## 2025-01-13 ENCOUNTER — ANTICOAGULATION VISIT (OUTPATIENT)
Dept: PHARMACY | Facility: HOSPITAL | Age: 50
End: 2025-01-13
Payer: MEDICARE

## 2025-01-13 ENCOUNTER — OFFICE VISIT (OUTPATIENT)
Dept: FAMILY MEDICINE CLINIC | Facility: CLINIC | Age: 50
End: 2025-01-13
Payer: MEDICARE

## 2025-01-13 VITALS
WEIGHT: 208.2 LBS | HEIGHT: 69 IN | BODY MASS INDEX: 30.84 KG/M2 | TEMPERATURE: 97.9 F | OXYGEN SATURATION: 96 % | SYSTOLIC BLOOD PRESSURE: 126 MMHG | HEART RATE: 92 BPM | DIASTOLIC BLOOD PRESSURE: 82 MMHG

## 2025-01-13 DIAGNOSIS — E78.5 HYPERLIPIDEMIA LDL GOAL <70: ICD-10-CM

## 2025-01-13 DIAGNOSIS — I10 ESSENTIAL HYPERTENSION: ICD-10-CM

## 2025-01-13 DIAGNOSIS — F32.89 OTHER DEPRESSION: ICD-10-CM

## 2025-01-13 DIAGNOSIS — E66.09 CLASS 1 OBESITY DUE TO EXCESS CALORIES WITH SERIOUS COMORBIDITY AND BODY MASS INDEX (BMI) OF 30.0 TO 30.9 IN ADULT: ICD-10-CM

## 2025-01-13 DIAGNOSIS — E11.65 TYPE 2 DIABETES MELLITUS WITH HYPERGLYCEMIA, WITHOUT LONG-TERM CURRENT USE OF INSULIN: ICD-10-CM

## 2025-01-13 DIAGNOSIS — I10 ESSENTIAL HYPERTENSION: Chronic | ICD-10-CM

## 2025-01-13 DIAGNOSIS — Z00.00 MEDICARE ANNUAL WELLNESS VISIT, SUBSEQUENT: ICD-10-CM

## 2025-01-13 DIAGNOSIS — K21.9 GASTROESOPHAGEAL REFLUX DISEASE, UNSPECIFIED WHETHER ESOPHAGITIS PRESENT: ICD-10-CM

## 2025-01-13 DIAGNOSIS — Z95.2 HX OF AORTIC VALVE REPLACEMENT, MECHANICAL: Primary | ICD-10-CM

## 2025-01-13 DIAGNOSIS — Z23 IMMUNIZATION DUE: ICD-10-CM

## 2025-01-13 DIAGNOSIS — E66.811 CLASS 1 OBESITY DUE TO EXCESS CALORIES WITH SERIOUS COMORBIDITY AND BODY MASS INDEX (BMI) OF 30.0 TO 30.9 IN ADULT: ICD-10-CM

## 2025-01-13 DIAGNOSIS — Z95.2 HX OF AORTIC VALVE REPLACEMENT, MECHANICAL: ICD-10-CM

## 2025-01-13 DIAGNOSIS — Z00.00 MEDICARE ANNUAL WELLNESS VISIT, SUBSEQUENT: Primary | ICD-10-CM

## 2025-01-13 PROBLEM — I71.21 ASCENDING AORTIC ANEURYSM: Status: ACTIVE | Noted: 2018-06-25

## 2025-01-13 LAB
BASOPHILS # BLD AUTO: 0.04 10*3/MM3 (ref 0–0.2)
BASOPHILS NFR BLD AUTO: 0.4 % (ref 0–1.5)
DEPRECATED RDW RBC AUTO: 43.5 FL (ref 37–54)
EOSINOPHIL # BLD AUTO: 0.74 10*3/MM3 (ref 0–0.4)
EOSINOPHIL NFR BLD AUTO: 7.9 % (ref 0.3–6.2)
ERYTHROCYTE [DISTWIDTH] IN BLOOD BY AUTOMATED COUNT: 14.7 % (ref 12.3–15.4)
HCT VFR BLD AUTO: 43.6 % (ref 37.5–51)
HGB BLD-MCNC: 14.5 G/DL (ref 13–17.7)
IMM GRANULOCYTES # BLD AUTO: 0.04 10*3/MM3 (ref 0–0.05)
IMM GRANULOCYTES NFR BLD AUTO: 0.4 % (ref 0–0.5)
INR PPP: 3.09 (ref 0.89–1.12)
LYMPHOCYTES # BLD AUTO: 2.33 10*3/MM3 (ref 0.7–3.1)
LYMPHOCYTES NFR BLD AUTO: 24.7 % (ref 19.6–45.3)
MCH RBC QN AUTO: 27.5 PG (ref 26.6–33)
MCHC RBC AUTO-ENTMCNC: 33.3 G/DL (ref 31.5–35.7)
MCV RBC AUTO: 82.6 FL (ref 79–97)
MONOCYTES # BLD AUTO: 0.68 10*3/MM3 (ref 0.1–0.9)
MONOCYTES NFR BLD AUTO: 7.2 % (ref 5–12)
NEUTROPHILS NFR BLD AUTO: 5.59 10*3/MM3 (ref 1.7–7)
NEUTROPHILS NFR BLD AUTO: 59.4 % (ref 42.7–76)
NRBC BLD AUTO-RTO: 0 /100 WBC (ref 0–0.2)
PLATELET # BLD AUTO: 179 10*3/MM3 (ref 140–450)
PMV BLD AUTO: 11.8 FL (ref 6–12)
PROTHROMBIN TIME: 31.9 SECONDS (ref 12.2–14.5)
RBC # BLD AUTO: 5.28 10*6/MM3 (ref 4.14–5.8)
WBC NRBC COR # BLD AUTO: 9.42 10*3/MM3 (ref 3.4–10.8)

## 2025-01-13 PROCEDURE — 36415 COLL VENOUS BLD VENIPUNCTURE: CPT

## 2025-01-13 PROCEDURE — G0010 ADMIN HEPATITIS B VACCINE: HCPCS | Performed by: FAMILY MEDICINE

## 2025-01-13 PROCEDURE — 90656 IIV3 VACC NO PRSV 0.5 ML IM: CPT | Performed by: FAMILY MEDICINE

## 2025-01-13 PROCEDURE — G0439 PPPS, SUBSEQ VISIT: HCPCS | Performed by: FAMILY MEDICINE

## 2025-01-13 PROCEDURE — 90746 HEPB VACCINE 3 DOSE ADULT IM: CPT | Performed by: FAMILY MEDICINE

## 2025-01-13 PROCEDURE — G0008 ADMIN INFLUENZA VIRUS VAC: HCPCS | Performed by: FAMILY MEDICINE

## 2025-01-13 PROCEDURE — 85610 PROTHROMBIN TIME: CPT

## 2025-01-13 PROCEDURE — 85025 COMPLETE CBC W/AUTO DIFF WBC: CPT

## 2025-01-13 RX ORDER — LANSOPRAZOLE 30 MG/1
30 CAPSULE, DELAYED RELEASE ORAL DAILY
Qty: 90 CAPSULE | Refills: 3 | Status: SHIPPED | OUTPATIENT
Start: 2025-01-13

## 2025-01-13 RX ORDER — METOPROLOL TARTRATE 25 MG/1
12.5 TABLET, FILM COATED ORAL EVERY 12 HOURS SCHEDULED
Qty: 90 TABLET | Refills: 3 | Status: SHIPPED | OUTPATIENT
Start: 2025-01-13

## 2025-01-13 RX ORDER — ROSUVASTATIN CALCIUM 20 MG/1
20 TABLET, COATED ORAL DAILY
Qty: 90 TABLET | Refills: 3 | Status: SHIPPED | OUTPATIENT
Start: 2025-01-13

## 2025-01-13 RX ORDER — DULOXETIN HYDROCHLORIDE 60 MG/1
60 CAPSULE, DELAYED RELEASE ORAL DAILY
Qty: 90 CAPSULE | Refills: 3 | Status: SHIPPED | OUTPATIENT
Start: 2025-01-13

## 2025-01-13 RX ORDER — SEMAGLUTIDE 1.34 MG/ML
1 INJECTION, SOLUTION SUBCUTANEOUS
COMMUNITY
Start: 2024-12-11 | End: 2025-01-13 | Stop reason: SDUPTHER

## 2025-01-13 NOTE — ASSESSMENT & PLAN NOTE
Orders:    metoprolol tartrate (LOPRESSOR) 25 MG tablet; Take 0.5 tablets by mouth Every 12 (Twelve) Hours.    CBC & Differential; Future

## 2025-01-13 NOTE — PATIENT INSTRUCTIONS
Advance Care Planning and Advance Directives     You make decisions on a daily basis - decisions about where you want to live, your career, your home, your life. Perhaps one of the most important decisions you face is your choice for future medical care. Take time to talk with your family and your healthcare team and start planning today.  Advance Care Planning is a process that can help you:  Understand possible future healthcare decisions in light of your own experiences  Reflect on those decision in light of your goals and values  Discuss your decisions with those closest to you and the healthcare professionals that care for you  Make a plan by creating a document that reflects your wishes    Surrogate Decision Maker  In the event of a medical emergency, which has left you unable to communicate or to make your own decisions, you would need someone to make decisions for you.  It is important to discuss your preferences for medical treatment with this person while you are in good health.     Qualities of a surrogate decision maker:  Willing to take on this role and responsibility  Knows what you want for future medical care  Willing to follow your wishes even if they don't agree with them  Able to make difficult medical decisions under stressful circumstances    Advance Directives  These are legal documents you can create that will guide your healthcare team and decision maker(s) when needed. These documents can be stored in the electronic medical record.    Living Will - a legal document to guide your care if you have a terminal condition or a serious illness and are unable to communicate. States vary by statute in document names/types, but most forms may include one or more of the following:        -  Directions regarding life-prolonging treatments        -  Directions regarding artificially provided nutrition/hydration        -  Choosing a healthcare decision maker        -  Direction regarding organ/tissue  donation    Durable Power of  for Healthcare - this document names an -in-fact to make medical decisions for you, but it may also allow this person to make personal and financial decisions for you. Please seek the advice of an  if you need this type of document.    **Advance Directives are not required and no one may discriminate against you if you do not sign one.    Medical Orders  Many states allow specific forms/orders signed by your physician to record your wishes for medical treatment in your current state of health. This form, signed in personal communication with your physician, addresses resuscitation and other medical interventions that you may or may not want.      For more information or to schedule a time with a [facility] Advance Care Planning Facilitator contact: [email or web address] or call [phone] and someone will contact you directly.Advance Directive    Advance directives are legal papers that state your wishes about health care decisions. They let your wishes be known to family, friends, and health care providers if you become unable to speak for yourself.   You should write these papers out over time rather than all at once. They can be changed and updated at any time.  The types of advance directives include:  Medical power of  (POA).  Living alexis.  Do not resuscitate (DNR) or do not attempt resuscitation (DNAR) orders.  What are a health care proxy and medical POA?  A health care proxy is also called a health care agent. It's a person you choose to make medical decisions for you when you can't make them for yourself. In most cases, a proxy is a trusted friend or family member.  A medical POA is legal paperwork that names your proxy. It may need to be:  Signed.  Notarized.  Dated.  Copied.  Witnessed.  Added to your medical record.  You may also want to choose someone to handle your money if you can't do so. This is called a durable POA for finances.  It's separate from a medical POA. You may choose your health care proxy or someone else to act as your agent in money matters.  If you don't have a proxy, or if the proxy may not be acting in your best interest, a court may choose a guardian to act on your behalf.  What is a living will?  A living will is legal paperwork that states your wishes about medical care. Providers should keep a copy of it in your medical record. You may want to give a copy to family members or friends. You can also keep a card in your wallet to let loved ones know you have a living will and where they can find it. A living will may be used if:  You're very sick with something that will end your life.  You become disabled.  You can't make decisions or speak for yourself.  Your living will should include whether:  To use or not use life support equipment. This may include machines to filter your blood or to help you breathe.  You want a DNR or DNAR order. This tells providers not to use CPR if your heart or breathing stops.  To use or not use tube feeding.  You want to be given foods and fluids.  You want a type of comfort care called palliative care. This may be given when the goal for treatment becomes comfort rather than a cure.  You want to donate your organs and tissues.  A living will doesn't say what to do with your money and property if you pass away.  What is a DNR or DNAR?  A DNR or DNAR order is a request not to have CPR. If you don't have one of these orders, a provider will try to help you if your heart stops or you stop breathing.   If you plan to have surgery, talk with your provider about your DNR or DNAR order.  What happens if I don't have an advance directive?  Each state has its own laws about advance directives. Some states assign family decision makers to act on your behalf if you don't have an advance directive.   Check with your provider, , or state representative about the laws in your state.  Where to find  "more information  Each state has its own laws about advance directives. You can look up these laws at: nhpco.org  This information is not intended to replace advice given to you by your health care provider. Make sure you discuss any questions you have with your health care provider.  Document Revised: 05/06/2024 Document Reviewed: 05/06/2024  [employer] Patient Education © 2024 [employer]BMI for Adults  Body mass index (BMI) is a number found using a person's weight and height. BMI can help tell how much of a person's weight is made up of fat. BMI does not measure body fat directly. It is used instead of tests that directly measure body fat, which can be difficult and expensive.  What are BMI measurements used for?  BMI is useful to:  Find out if your weight puts you at higher risk for medical problems.  Help recommend changes, such as in diet and exercise. This can help you reach a healthy weight. BMI screening can be done again to see if these changes are working.  How is BMI calculated?  Your height and weight are measured. The BMI is found from those numbers. This can be done with [place] or metric measurements. Note that charts and online BMI calculators are available to help you find your BMI quickly and easily without doing these calculations.  To calculate your BMI in [place] measurements:  Measure your weight in pounds (lb).  Multiply the number of pounds by 703.  So, for an adult who weighs 150 lb, multiply that number by 703: 150 x 703, which equals 105,450.  Measure your height in inches. Then multiply that number by itself to get a measurement called \"inches squared.\"  So, for an adult who is 70 inches tall, the \"inches squared\" measurement is 70 inches x 70 inches, which equals 4,900 inches squared.  Divide the total from step 2 (number of lb x 703) by the total from step 3 (inches squared): 105,450 ÷ 4,900 = 21.5. This is your BMI.  To calculate your BMI in metric measurements:    Measure your weight in " "kilograms (kg).  For this example, the weight is 70 kg.  Measure your height in meters (m). Then multiply that number by itself to get a measurement called \"meters squared.\"  So, for an adult who is 1.75 m tall, the \"meters squared\" measurement is 1.75 m x 1.75 m, which equals 3.1 meters squared.  Divide the number of kilograms (your weight) by the meters squared number. In this example: 70 ÷ 3.1 = 22.6. This is your BMI.  What do the results mean?  BMI charts are used to see if you are underweight, normal weight, overweight, or obese. The following guidelines will be used:  Underweight: BMI less than 18.5.  Normal weight: BMI between 18.5 and 24.9.  Overweight: BMI between 25 and 29.9.  Obese: BMI of 30 or above.  BMI is a tool and cannot diagnose a condition. Talk with your health care provider about what your BMI means for you. Keep these notes in mind:  Weight includes fat and muscle. Someone with a muscular build, such as an athlete, may have a BMI that is higher than 24.9. In cases like these, BMI is not a correct measure of body fat.  If you have a BMI of 25 or higher, your provider may need to do more testing to find out if excess body fat is the cause.  BMI is measured the same way for males and females. Females usually have more body fat than males of the same height and weight.  Where to find more information  For more information about BMI, including tools to quickly find your BMI, go to:  Centers for Disease Control and Prevention: cdc.gov  American Heart Association: heart.org  National Heart, Lung, and Blood [place]: nhlbi.nih.gov  This information is not intended to replace advice given to you by your health care provider. Make sure you discuss any questions you have with your health care provider.  Document Revised: 09/07/2023 Document Reviewed: 08/31/2023  [employer] Patient Education © 2024 [employer]  Hypertension, Adult  High blood pressure (hypertension) is when the force of blood pumping " "through the arteries is too strong. The arteries are the blood vessels that carry blood from the heart throughout the body. Hypertension forces the heart to work harder to pump blood and may cause arteries to become narrow or stiff. Untreated or uncontrolled hypertension can lead to a heart attack, heart failure, a stroke, kidney disease, and other problems.  A blood pressure reading consists of a higher number over a lower number. Ideally, your blood pressure should be below 120/80. The first (\"top\") number is called the systolic pressure. It is a measure of the pressure in your arteries as your heart beats. The second (\"bottom\") number is called the diastolic pressure. It is a measure of the pressure in your arteries as the heart relaxes.  What are the causes?  The exact cause of this condition is not known. There are some conditions that result in high blood pressure.  What increases the risk?  Certain factors may make you more likely to develop high blood pressure. Some of these risk factors are under your control, including:  Smoking.  Not getting enough exercise or physical activity.  Being overweight.  Having too much fat, sugar, calories, or salt (sodium) in your diet.  Drinking too much alcohol.  Other risk factors include:  Having a personal history of heart disease, diabetes, high cholesterol, or kidney disease.  Stress.  Having a family history of high blood pressure and high cholesterol.  Having obstructive sleep apnea.  Age. The risk increases with age.  What are the signs or symptoms?  High blood pressure may not cause symptoms. Very high blood pressure (hypertensive crisis) may cause:  Headache.  Fast or irregular heartbeats (palpitations).  Shortness of breath.  Nosebleed.  Nausea and vomiting.  Vision changes.  Severe chest pain, dizziness, and seizures.  How is this diagnosed?  This condition is diagnosed by measuring your blood pressure while you are seated, with your arm resting on a flat " surface, your legs uncrossed, and your feet flat on the floor. The cuff of the blood pressure monitor will be placed directly against the skin of your upper arm at the level of your heart. Blood pressure should be measured at least twice using the same arm. Certain conditions can cause a difference in blood pressure between your right and left arms.  If you have a high blood pressure reading during one visit or you have normal blood pressure with other risk factors, you may be asked to:  Return on a different day to have your blood pressure checked again.  Monitor your blood pressure at home for 1 week or longer.  If you are diagnosed with hypertension, you may have other blood or imaging tests to help your health care provider understand your overall risk for other conditions.  How is this treated?  This condition is treated by making healthy lifestyle changes, such as eating healthy foods, exercising more, and reducing your alcohol intake. You may be referred for counseling on a healthy diet and physical activity.  Your health care provider may prescribe medicine if lifestyle changes are not enough to get your blood pressure under control and if:  Your systolic blood pressure is above 130.  Your diastolic blood pressure is above 80.  Your personal target blood pressure may vary depending on your medical conditions, your age, and other factors.  Follow these instructions at home:  Eating and drinking    Eat a diet that is high in fiber and potassium, and low in sodium, added sugar, and fat. An example of this eating plan is called the DASH diet. DASH stands for Dietary Approaches to Stop Hypertension. To eat this way:  Eat plenty of fresh fruits and vegetables. Try to fill one half of your plate at each meal with fruits and vegetables.  Eat whole grains, such as whole-wheat pasta, brown rice, or whole-grain bread. Fill about one fourth of your plate with whole grains.  Eat or drink low-fat dairy products, such as  skim milk or low-fat yogurt.  Avoid fatty cuts of meat, processed or cured meats, and poultry with skin. Fill about one fourth of your plate with lean proteins, such as fish, chicken without skin, beans, eggs, or tofu.  Avoid pre-made and processed foods. These tend to be higher in sodium, added sugar, and fat.  Reduce your daily sodium intake. Many people with hypertension should eat less than 1,500 mg of sodium a day.  Do not drink alcohol if:  Your health care provider tells you not to drink.  You are pregnant, may be pregnant, or are planning to become pregnant.  If you drink alcohol:  Limit how much you have to:  0-1 drink a day for women.  0-2 drinks a day for men.  Know how much alcohol is in your drink. In the [place] one drink equals one 12 oz bottle of beer (355 mL), one 5 oz glass of wine (148 mL), or one 1½ oz glass of hard liquor (44 mL).  Lifestyle    Work with your health care provider to maintain a healthy body weight or to lose weight. Ask what an ideal weight is for you.  Get at least 30 minutes of exercise that causes your heart to beat faster (aerobic exercise) most days of the week. Activities may include walking, swimming, or biking.  Include exercise to strengthen your muscles (resistance exercise), such as Pilates or lifting weights, as part of your weekly exercise routine. Try to do these types of exercises for 30 minutes at least 3 days a week.  Do not use any products that contain nicotine or tobacco. These products include cigarettes, chewing tobacco, and vaping devices, such as e-cigarettes. If you need help quitting, ask your health care provider.  Monitor your blood pressure at home as told by your health care provider.  Keep all follow-up visits. This is important.  Medicines  Take over-the-counter and prescription medicines only as told by your health care provider. Follow directions carefully. Blood pressure medicines must be taken as prescribed.  Do not skip doses of blood pressure  medicine. Doing this puts you at risk for problems and can make the medicine less effective.  Ask your health care provider about side effects or reactions to medicines that you should watch for.  Contact a health care provider if you:  Think you are having a reaction to a medicine you are taking.  Have headaches that keep coming back (recurring).  Feel dizzy.  Have swelling in your ankles.  Have trouble with your vision.  Get help right away if you:  Develop a severe headache or confusion.  Have unusual weakness or numbness.  Feel faint.  Have severe pain in your chest or abdomen.  Vomit repeatedly.  Have trouble breathing.  These symptoms may be an emergency. Get help right away. Call 911.  Do not wait to see if the symptoms will go away.  Do not drive yourself to the hospital.  Summary  Hypertension is when the force of blood pumping through your arteries is too strong. If this condition is not controlled, it may put you at risk for serious complications.  Your personal target blood pressure may vary depending on your medical conditions, your age, and other factors. For most people, a normal blood pressure is less than 120/80.  Hypertension is treated with lifestyle changes, medicines, or a combination of both. Lifestyle changes include losing weight, eating a healthy, low-sodium diet, exercising more, and limiting alcohol.  This information is not intended to replace advice given to you by your health care provider. Make sure you discuss any questions you have with your health care provider.  Document Revised: 10/25/2022 Document Reviewed: 10/25/2022  [employer] Patient Education © 2024 [employer]Diabetes Mellitus and Nutrition, Adult  When you have diabetes, or diabetes mellitus, it is very important to have healthy eating habits because your blood sugar (glucose) levels are greatly affected by what you eat and drink. Eating healthy foods in the right amounts, at about the same times every day, can help  you:  Manage your blood glucose.  Lower your risk of heart disease.  Improve your blood pressure.  Reach or maintain a healthy weight.  What can affect my meal plan?  Every person with diabetes is different, and each person has different needs for a meal plan. Your health care provider may recommend that you work with a dietitian to make a meal plan that is best for you. Your meal plan may vary depending on factors such as:  The calories you need.  The medicines you take.  Your weight.  Your blood glucose, blood pressure, and cholesterol levels.  Your activity level.  Other health conditions you have, such as heart or kidney disease.  How do carbohydrates affect me?  Carbohydrates, also called carbs, affect your blood glucose level more than any other type of food. Eating carbs raises the amount of glucose in your blood.  It is important to know how many carbs you can safely have in each meal. This is different for every person. Your dietitian can help you calculate how many carbs you should have at each meal and for each snack.  How does alcohol affect me?  Alcohol can cause a decrease in blood glucose (hypoglycemia), especially if you use insulin or take certain diabetes medicines by mouth. Hypoglycemia can be a life-threatening condition. Symptoms of hypoglycemia, such as sleepiness, dizziness, and confusion, are similar to symptoms of having too much alcohol.  Do not drink alcohol if:  Your health care provider tells you not to drink.  You are pregnant, may be pregnant, or are planning to become pregnant.  If you drink alcohol:  Limit how much you have to:  0-1 drink a day for women.  0-2 drinks a day for men.  Know how much alcohol is in your drink. In the [place] one drink equals one 12 oz bottle of beer (355 mL), one 5 oz glass of wine (148 mL), or one 1½ oz glass of hard liquor (44 mL).  Keep yourself hydrated with water, diet soda, or unsweetened iced tea. Keep in mind that regular soda, juice, and other  "mixers may contain a lot of sugar and must be counted as carbs.  What are tips for following this plan?    Reading food labels  Start by checking the serving size on the Nutrition Facts label of packaged foods and drinks. The number of calories and the amount of carbs, fats, and other nutrients listed on the label are based on one serving of the item. Many items contain more than one serving per package.  Check the total grams (g) of carbs in one serving.  Check the number of grams of saturated fats and trans fats in one serving. Choose foods that have a low amount or none of these fats.  Check the number of milligrams (mg) of salt (sodium) in one serving. Most people should limit total sodium intake to less than 2,300 mg per day.  Always check the nutrition information of foods labeled as \"low-fat\" or \"nonfat.\" These foods may be higher in added sugar or refined carbs and should be avoided.  Talk to your dietitian to identify your daily goals for nutrients listed on the label.  Shopping  Avoid buying canned, pre-made, or processed foods. These foods tend to be high in fat, sodium, and added sugar.  Shop around the outside edge of the grocery store. This is where you will most often find fresh fruits and vegetables, bulk grains, fresh meats, and fresh dairy products.  Cooking  Use low-heat cooking methods, such as baking, instead of high-heat cooking methods, such as deep frying.  Cook using healthy oils, such as olive, canola, or sunflower oil.  Avoid cooking with butter, cream, or high-fat meats.  Meal planning  Eat meals and snacks regularly, preferably at the same times every day. Avoid going long periods of time without eating.  Eat foods that are high in fiber, such as fresh fruits, vegetables, beans, and whole grains.  Eat 4-6 oz (112-168 g) of lean protein each day, such as lean meat, chicken, fish, eggs, or tofu. One ounce (oz) (28 g) of lean protein is equal to:  1 oz (28 g) of meat, chicken, or fish.  1 " egg.  ¼ cup (62 g) of tofu.  Eat some foods each day that contain healthy fats, such as avocado, nuts, seeds, and fish.  What foods should I eat?  Fruits  Berries. Apples. Oranges. Peaches. Apricots. Plums. Grapes. Mangoes. Papayas. Pomegranates. Kiwi. Cherries.  Vegetables  Leafy greens, including lettuce, spinach, kale, chard, marisol greens, mustard greens, and cabbage. Beets. Cauliflower. Broccoli. Carrots. Green beans. Tomatoes. Peppers. Onions. Cucumbers. McLean sprouts.  Grains  Whole grains, such as whole-wheat or whole-grain bread, crackers, tortillas, cereal, and pasta. Unsweetened oatmeal. Quinoa. Brown or wild rice.  Meats and other proteins  Seafood. Poultry without skin. Lean cuts of poultry and beef. Tofu. Nuts. Seeds.  Dairy  Low-fat or fat-free dairy products such as milk, yogurt, and cheese.  The items listed above may not be a complete list of foods and beverages you can eat and drink. Contact a dietitian for more information.  What foods should I avoid?  Fruits  Fruits canned with syrup.  Vegetables  Canned vegetables. Frozen vegetables with butter or cream sauce.  Grains  Refined white flour and flour products such as bread, pasta, snack foods, and cereals. Avoid all processed foods.  Meats and other proteins  Fatty cuts of meat. Poultry with skin. Breaded or fried meats. Processed meat. Avoid saturated fats.  Dairy  Full-fat yogurt, cheese, or milk.  Beverages  Sweetened drinks, such as soda or iced tea.  The items listed above may not be a complete list of foods and beverages you should avoid. Contact a dietitian for more information.  Questions to ask a health care provider  Do I need to meet with a certified diabetes care and ?  Do I need to meet with a dietitian?  What number can I call if I have questions?  When are the best times to check my blood glucose?  Where to find more information:  American Diabetes Association: diabetes.org  Academy of Nutrition and  Dietetics: eatright.org  National [place] of Diabetes and Digestive and Kidney Diseases: niddk.nih.gov  Association of Diabetes Care & Education Specialists: diabeteseducator.org  Summary  It is important to have healthy eating habits because your blood sugar (glucose) levels are greatly affected by what you eat and drink. It is important to use alcohol carefully.  A healthy meal plan will help you manage your blood glucose and lower your risk of heart disease.  Your health care provider may recommend that you work with a dietitian to make a meal plan that is best for you.  This information is not intended to replace advice given to you by your health care provider. Make sure you discuss any questions you have with your health care provider.  Document Revised: 2021 Document Reviewed: 2021  [employer] Patient Education ©  [employer]  Medicare Wellness  Personal Prevention Plan of Service     Date of Office Visit:    Encounter Provider:  Scotty Means MD  Place of Service:  [facility]  Patient Name: Manjeet Hall  :  1975    As part of the Medicare Wellness portion of your visit today, we are providing you with this personalized preventive plan of services (PPPS). This plan is based upon recommendations of the [place] Preventive Services Task Force (USPSTF) and the Advisory Committee on Immunization Practices (ACIP).    This lists the preventive care services that should be considered, and provides dates of when you are due. Items listed as completed are up-to-date and do not require any further intervention.    Health Maintenance   Topic Date Due    TDAP/TD VACCINES (1 - Tdap) Never done    Hepatitis B (2 of 3 - 19+ 3-dose series) 2024    INFLUENZA VACCINE  2024    COVID-19 Vaccine ( - - season) 2025 (Originally 2024)    HEMOGLOBIN A1C  2025    DIABETIC EYE EXAM  2025    LIPID PANEL  2025     URINE MICROALBUMIN  12/03/2025    BMI FOLLOWUP  12/11/2025    ANNUAL WELLNESS VISIT  01/13/2026    COLORECTAL CANCER SCREENING  05/04/2028    HEPATITIS C SCREENING  Completed    Pneumococcal Vaccine 0-64  Completed       No orders of the defined types were placed in this encounter.      Return in about 1 year (around 1/13/2026) for Medicare Wellness.

## 2025-01-13 NOTE — LETTER
[facility]  Vaccine Consent Form    Patient Name:  Manjeet Hall  Patient :  1975     Vaccine(s) Ordered    Hepatitis B Vaccine Adult IM  Fluzone >6mos ([id number])        Screening Checklist  The following questions should be completed prior to vaccination. If you answer “yes” to any question, it does not necessarily mean you should not be vaccinated. It just means we may need to clarify or ask more questions. If a question is unclear, please ask your healthcare provider to explain it.    Yes No   Any fever or moderate to severe illness today (mild illness and/or antibiotic treatment are not contraindications)?     Do you have a history of a serious reaction to any previous vaccinations, such as anaphylaxis, encephalopathy within 7 days, Guillain-Plano syndrome within 6 weeks, seizure?     Have you received any live vaccine(s) (e.g MMR, LALY) or any other vaccines in the last month (to ensure duplicate doses aren't given)?     Do you have an anaphylactic allergy to latex (DTaP, DTaP-IPV, Hep A, Hep B, MenB, RV, Td, Tdap), baker’s yeast (Hep B, HPV), polysorbates (RSV, nirsevimab, PCV 20, Rotavirrus, Tdap, Shingrix), or gelatin (LALY, MMR)?     Do you have an anaphylactic allergy to neomycin (Rabies, LALY, MMR, IPV, Hep A), polymyxin B (IPV), or streptomycin (IPV)?      Any cancer, leukemia, AIDS, or other immune system disorder? (LALY, MMR, RV)     Do you have a parent, brother, or sister with an immune system problem (if immune competence of vaccine recipient clinically verified, can proceed)? (MMR, LALY)     Any recent steroid treatments for >2 weeks, chemotherapy, or radiation treatment? (LALY, MMR)     Have you received antibody-containing blood transfusions or IVIG in the past 11 months (recommended interval is dependent on product)? (MMR, LALY)     Have you taken antiviral drugs (acyclovir, famciclovir, valacyclovir for LALY) in the last 24 or 48 hours, respectively?      Are you pregnant or  "planning to become pregnant within 1 month? (LALY, MMR, HPV, IPV, MenB, Abrexvy; For Hep B- refer to Engerix-B; For RSV - Abrysvo is indicated for 32-36 weeks of pregnancy from September to January)     For infants, have you ever been told your child has had intussusception or a medical emergency involving obstruction of the intestine (Rotavirus)? If not for an infant, can skip this question.         *Ordering Physicians/APC should be consulted if \"yes\" is checked by the patient or guardian above.  I have received, read, and understand the Vaccine Information Statement (VIS) for each vaccine ordered.  I have considered my or my child's health status as well as the health status of my close contacts.  I have taken the opportunity to discuss my vaccine questions with my or my child's health care provider.   I have requested that the ordered vaccine(s) be given to me or my child.  I understand the benefits and risks of the vaccines.  I understand that I should remain in the clinic for 15 minutes after receiving the vaccine(s).  _________________________________________________________  Signature of Patient or Parent/Legal Guardian ____________________  Date     "

## 2025-01-13 NOTE — ASSESSMENT & PLAN NOTE
Orders:    rosuvastatin (CRESTOR) 20 MG tablet; Take 1 tablet by mouth Daily.    CBC & Differential; Future

## 2025-01-13 NOTE — PROGRESS NOTES
Anticoagulation Clinic - Remote Progress Note  REMOTE LAB    Indication: On-X Mechanical valve #25 and a 30mm hemashield; stroke  Referring Provider: Alvaor (Last seen:  1/31/21)  Initial Warfarin Start Date: ~ Sept 2018  Goal INR: 2.0-3.0 per referral  Current Drug Interactions: duloxetine, lansoprazole, levothyroxine, Trintellix, valproic acid, rosuvastatin  Bleed Risk: ascending aortic aneurysm; Subdural Hematoma 5/26/2023: status post bilateral craniotomies for evacuation of subacute subdural hematomas on 5/26 followed by embolization of the left middle meningeal artery on 5/31.   Other: Hodgkins Lymphoma (hx of chemo and radiation); h/o CVA + hemorrhagic pancreatitis (during hospitalization for mAVR)    Diet: avoids GLV (2/20/23)  Alcohol: socially  Tobacco: none  OTC Pain Medication: APAP PRN pain    INR History:  Date 9/27 9/28 10/1 10/4 10/8 10/11 10/17 10/19 10/23 11/8 11/16 12/21   Total Weekly Dose 8mg 12mg 19mg 29mg 31mg 34mg 43mg 41mg 45mg 49mg 49mg 49mg   INR 1.3 1.22 1.3 1.8 1.85 1.70 2.91 2.82 3.79 3.00 2.96 3.44   Notes clinic enox enox enox; clinic enox enox      desvenla     Date 1/9/19 1/16 2/4 3/1 3/22 5/16 6/13 6/24 7/10 7/22 8/5 8/26   Total Weekly Dose 49mg 47mg 49mg 49mg 49mg 49mg 49mg 49mg 49mg 49mg 49mg 49mg   INR 4.40 2.36 2.62 3.02 2.15 2.60 2.25 2.98 2.83 2.97 2.90 3.20   Notes   self adj              Date 9/26 10/18 10/28 11/19 12/3 12/17 1/3/20 1/21 2/3 2/12 2/19 2/27   Total Weekly Dose 49mg 49mg 49mg 49mg 49mg 45mg 45mg 45mg 47mg 43mg 43mg 43mg   INR 3.57 3.12 2.88 3.33 4.06 2.59 3.21 3.85 3.11 2.34 3.01 3.20   Notes less GLV Cymbalta start 9/25; Depakote start 9/5 1x decr dose  depakote inc; trintellix dcd ceftin x10 days Recv'd 1/6  incr GLV  dieting        Date 3/5 3/12 3/20 4/2 4/17 5/7 5/28 6/16 6/29 7/17 8/14 8/28   Total Weekly Dose 41mg 41mg 41mg 41mg 41mg 41mg 41mg 39mg 39mg 39mg 39mg 39mg   INR 3.07 3.01 2.95 2.66 2.9 3.03 3.36 2.48 2.42 3.08 2.78 2.28   Notes 1x decr   1x  decr         recv'd 6/30  recv'd 8/13 recv'd 8/31     Date 9/28 10/29  12/4 1/8/21 2/9 3/31 5/12 6/9  8/3 8/31   Total Weekly Dose 39mg 39mg non-  compliant 39mg 39mg 39mg 39mg 39mg 39mg non- compliant 39mg 39mg   INR 2.33 2.39  2.46 2.45 2.85 2.48 2.52 2.51  2.88 2.77   Notes recv'd 9/29 recv'd 10/30   recv'd 1/11  recv'd 4/1           Date 10/4 11/1 12/6  3/8 4/5 5/9 6/10 6/23 7/5 7/20 8/5   Total Weekly Dose 39mg 39mg 39mg  non- compliant  39mg 39mg 39 mg 39 mg 39 mg 39 mg 39 mg 39mg   INR 2.50 2.54 2.2  2.40 2.40 2.98 2.26 2.96 2.84 2.88 3.48   Notes  Rec 11/2 Rec 12/7       rec'd 7/6  rec 8/8     Date 8/17 8/25 9/19 9/22 10/13 10/31 11/10 12/2 12/29 12/30 1/3/2023 1/13   Total Weekly Dose 37mg 35mg 39 mg  37mg 35 mg 35 mg 35 mg 35 mg 35mg 37.5mg 41.5mg 35mg   INR 3.24 2.6 3.13 3.39 2.46 2.75 2.71 2.77 1.1 2.42 4.14 3.59   Notes  rec 8/26  Inc activity Rec 10/14  rec 11/11 rec 12/5 ??? rcv'd 1/3       Date 1/27 2/17 3/10 4/5 4/14 5/18 5/26-6/5 6/8 6/12 6/20 6/28 7/6   Total Weekly Dose 35mg 35mg 35 mg 35 mg 35mg 35 mg Inpatient hospital 15mg 40mg 37.5 mg 35 mg 35mg   INR 2.94 2.58 2.5 2.44 3.11 2.43  1.3 1.62 2.1 2.83 2.76   Notes rcv'd 1/30 rcv'd 2/20 rec 3/13 crestor  rcv'd 4/17 rec 4/19 Subdural hematoma; kcentra  Rcv'd 6/13   Rcvd 7/7     Date 7/19 8/23/23 10/25 Noncomp 5/7 5/30 7/3  8/12/24 9/24 10/2 10/9 10/16  10/23   Total Weekly Dose  35 mg 35 mg   35 mg 35 mg 35 mg 35 mg  35 mg 32.5mg  32.5 mg 32.5 mg  35 mg   INR 2.53 2.51 2.73  3.13 2.72 2.34 2.98 3.85 3.43 3.34 2.11  3.51   Notes  Rec'd 8/24   Rec 5/8  Rec'd 7/5 Rec'd 8/13  LVM  Rec'd 9/25  Prevacid    Unable to contact      Date    1/13/25              Total Weekly  Dose Lost to follow up 35 mg              INR   3.09              Notes                  *takes warfarin in AM*  Phone Interview:  Verbal Release Authorization: please contact Mr. Hall directly - if unable to reach patient may contact brotherMarko  Tablet Strength: 2mg and 5mg  tablets  Patient Contact Info: [phone]; [email or web address]  Lab Contact Info: [facility]       Patient Findings  Positives: Change in diet/appetite, Other complaints   Negatives: Signs/symptoms of thrombosis, Signs/symptoms of bleeding, Laboratory test error suspected, Change in health, Change in alcohol use, Change in activity, Upcoming invasive procedure, Emergency department visit, Upcoming dental procedure, Missed doses, Extra doses, Change in medications, Hospital admission, Bruising   Comments: Mr. Hall last checked his INR on 10/23. He did not return messages and no INRs have been received since that time until today (drawn with labs at follow up appointment with PCP)    Discussed the importance of routine monitoring due to risks of out-of-range INR.  Discussed to monitor for s/sx of bleeding including epistaxis, hematuria, unusual bruising, hemoptysis, hematochezia as well as s/sx of stroke including impaired speech, unilateral paralysis, blurry vision and when to seek medical attention.  He verbalized understanding.  He would like to check his INR about every two weeks.  [facility] plans to go to [facility].      Dr. Maens refilled a 3 month supply of warfarin for Mr. Hall on 12/27.    He is currently at 208 pounds but would like to get down to ~ 180 pounds.  He plans to increase his activity level and monitor his intake.        Plan:    INR is slightly supra therapeutic today at 3.09 (goal 2-3).  Instructed Mr. Hall to continue maintenance regimen of warfarin 5 mg oral daily until recheck. He will have an extra serving of GLV today.  Repeat INR 1/27/25.  Verbal information provided over the phone. Mr. Hall RBV dosing instructions, expresses understanding by teach back, and has no further questions at this time.    Adeline Bearden, PharmD  1/13/2025  14:41 EST

## 2025-01-13 NOTE — PROGRESS NOTES
Subjective   The ABCs of the Annual Wellness Visit  Medicare Wellness Visit      Manjeet Hall is a 49 y.o. patient who presents for a Medicare Wellness Visit.    The following portions of the patient's history were reviewed and   updated as appropriate: allergies, current medications, past family history, past medical history, past social history, past surgical history, and problem list.    Compared to one year ago, the patient's physical   health is better.  Compared to one year ago, the patient's mental   health is better.    Recent Hospitalizations:  He was not admitted to the hospital during the last year.     Current Medical Providers:  Patient Care Team:  Scotty Means MD as PCP - General (Family Medicine)  Vimal John III, MD as Cardiologist (Cardiology)  Candido Turner PA-C as Physician Assistant (Endocrinology)  Stephane Womack MD as Consulting Physician (Neurosurgery)  Lindsay Summers MD as Consulting Physician (Neurology)    Outpatient Medications Prior to Visit   Medication Sig Dispense Refill    hydrOXYzine (ATARAX) 10 MG tablet TAKE 1-2 TABLET BY MOUTH TWICE A DAY AS NEEDED FOR ANXIETY AND SLEEP      levothyroxine (SYNTHROID, LEVOTHROID) 100 MCG tablet Take 1 tablet by mouth Daily. Dose decrease 90 tablet 0    metFORMIN ER (GLUCOPHAGE-XR) 500 MG 24 hr tablet TAKE 1 TABLET BY MOUTH EVERY DAY WITH BREAKFAST 90 tablet 0    Semaglutide, 1 MG/DOSE, (Ozempic, 1 MG/DOSE,) 2 MG/1.5ML solution pen-injector Inject 1 mg under the skin into the appropriate area as directed 1 (One) Time Per Week. 9 mL 1    warfarin (COUMADIN) 5 MG tablet TAKE 1 TABLET BY MOUTH DAILY AS DIRECTED BY ANTICOAGULATION CLINIC. 90 tablet 0    DULoxetine (CYMBALTA) 60 MG capsule Take 1 capsule by mouth Daily. 90 capsule 3    lansoprazole (PREVACID) 30 MG capsule Take 1 capsule by mouth Daily. 90 capsule 3    metoprolol tartrate (LOPRESSOR) 25 MG tablet Take 0.5 tablets by mouth Every 12 (Twelve) Hours. 90 tablet 3  "   Ozempic, 1 MG/DOSE, 4 MG/3ML solution pen-injector Inject 1 mg under the skin into the appropriate area as directed Every 7 (Seven) Days.      rosuvastatin (CRESTOR) 20 MG tablet Take 1 tablet by mouth Daily. 90 tablet 3     No facility-administered medications prior to visit.     No opioid medication identified on active medication list. I have reviewed chart for other potential  high risk medication/s and harmful drug interactions in the elderly.      Aspirin is not on active medication list.  Aspirin use is contraindicated for this patient due to: current use of warfarin.  .    Patient Active Problem List   Diagnosis    Depression    Generalized anxiety disorder    Gastroesophageal reflux disease    Congenital heart disease    Ascending aortic aneurysm    Hx of aortic valve replacement, mechanical [Z95.2]    Sequelae, post-stroke    Warfarin anticoagulation    Ischemic stroke    Pulmonic stenosis    Subdural hematoma    Bilateral Subdural Hematoma    HTN    Hypothyroidism    Hodgkin's Lymphoma s/p XRT & Chemo    VSD s/p closure (age 14)    History of aortic regurgitation / stenosis s/p ROSS procedure with subsequent reversal with Mechanical AVR    H/O aortic mechanical valve replacement on Coumadin    Type 2 diabetes mellitus with hyperglycemia, without long-term current use of insulin    Hyperlipidemia LDL goal <70    Class 1 obesity due to excess calories with serious comorbidity and body mass index (BMI) of 30.0 to 30.9 in adult    Medicare annual wellness visit, subsequent     Advance Care Planning Advance Directive is not on file.  ACP discussion was held with the patient during this visit. Patient does not have an advance directive, information provided.            Objective   Vitals:    01/13/25 0753   BP: 126/82   Pulse: 92   Temp: 97.9 °F (36.6 °C)   SpO2: 96%   Weight: 94.4 kg (208 lb 3.2 oz)   Height: 175 cm (68.9\")   PainSc: 0-No pain       Estimated body mass index is 30.84 kg/m² as calculated " "from the following:    Height as of this encounter: 175 cm (68.9\").    Weight as of this encounter: 94.4 kg (208 lb 3.2 oz).                Does the patient have evidence of cognitive impairment? No  Lab Results   Component Value Date    TRIG 152 (H) 2024    HDL 56 2024    LDL 63 2024    VLDL 26 2024    HGBA1C 5.5 2024                                                                                               Health  Risk Assessment    Smoking Status:  Social History     Tobacco Use   Smoking Status Never    Passive exposure: Never   Smokeless Tobacco Never     Alcohol Consumption:  Social History     Substance and Sexual Activity   Alcohol Use Yes    Comment: socially on occasion       Fall Risk Screen  STEADI Fall Risk Assessment was completed, and patient is at LOW risk for falls.Assessment completed on:2025    Depression Screening   Little interest or pleasure in doing things? Not at all   Feeling down, depressed, or hopeless? Not at all   PHQ-2 Total Score 0      Health Habits and Functional and Cognitive Screenin/13/2025     7:57 AM   Functional & Cognitive Status   Do you have difficulty preparing food and eating? No   Do you have difficulty bathing yourself, getting dressed or grooming yourself? No   Do you have difficulty using the toilet? No   Do you have difficulty moving around from place to place? No   Do you have trouble with steps or getting out of a bed or a chair? No   Current Diet Well Balanced Diet   Dental Exam Up to date   Eye Exam Up to date   Exercise (times per week) 4 times per week   Current Exercises Include Walking   Do you need help using the phone?  No   Are you deaf or do you have serious difficulty hearing?  No   Do you need help to go to places out of walking distance? No   Do you need help shopping? No   Do you need help preparing meals?  No   Do you need help with housework?  No   Do you need help with laundry? No   Do you need help " taking your medications? No   Do you need help managing money? No   Do you ever drive or ride in a car without wearing a seat belt? No   Have you felt unusual stress, anger or loneliness in the last month? No   Who do you live with? Spouse   If you need help, do you have trouble finding someone available to you? No   Have you been bothered in the last four weeks by sexual problems? No   Do you have difficulty concentrating, remembering or making decisions? No           Age-appropriate Screening Schedule:  Refer to the list below for future screening recommendations based on patient's age, sex and/or medical conditions. Orders for these recommended tests are listed in the plan section. The patient has been provided with a written plan.    Health Maintenance List  Health Maintenance   Topic Date Due    Hepatitis B (2 of 3 - 19+ 3-dose series) 02/08/2024    INFLUENZA VACCINE  07/01/2024    TDAP/TD VACCINES (1 - Tdap) 01/13/2025 (Originally 12/7/1994)    COVID-19 Vaccine (5 - 2024-25 season) 04/04/2025 (Originally 9/1/2024)    HEMOGLOBIN A1C  06/03/2025    DIABETIC EYE EXAM  06/30/2025    LIPID PANEL  12/03/2025    URINE MICROALBUMIN  12/03/2025    BMI FOLLOWUP  12/11/2025    ANNUAL WELLNESS VISIT  01/13/2026    COLORECTAL CANCER SCREENING  05/04/2028    HEPATITIS C SCREENING  Completed    Pneumococcal Vaccine 0-64  Completed                                                                                                                                                CMS Preventative Services Quick Reference  Risk Factors Identified During Encounter  Immunizations Discussed/Encouraged: Hepatitis B Vaccine/Series, Influenza, and COVID19    The above risks/problems have been discussed with the patient.  Pertinent information has been shared with the patient in the After Visit Summary.  An After Visit Summary and PPPS were made available to the patient.    Follow Up:   Next Medicare Wellness visit to be scheduled in 1  year.     Assessment & Plan  Medicare annual wellness visit, subsequent  The patient is here for health maintenance visit.  Currently, the patient consumes a healthy diet and has an adequate exercise regimen.  Screening lab work is ordered.  Immunizations were reviewed today.  Advice and education was given regarding nutrition, aerobic exercise, routine dental evaluations, routine eye exams, reproductive health, cardiovascular risk reduction, sunscreen use, self skin examination (annual dermatology evaluations) and seatbelt use (general overall safety).  Further recommendations will be given if needed after lab evaluation.  Annual wellness evaluation is recommended.      Orders:    CBC & Differential; Future    Gastroesophageal reflux disease, unspecified whether esophagitis present    Orders:    lansoprazole (PREVACID) 30 MG capsule; Take 1 capsule by mouth Daily.    HTN      Orders:    metoprolol tartrate (LOPRESSOR) 25 MG tablet; Take 0.5 tablets by mouth Every 12 (Twelve) Hours.    CBC & Differential; Future    Hyperlipidemia LDL goal <70       Orders:    rosuvastatin (CRESTOR) 20 MG tablet; Take 1 tablet by mouth Daily.    CBC & Differential; Future    Type 2 diabetes mellitus with hyperglycemia, without long-term current use of insulin      Orders:    rosuvastatin (CRESTOR) 20 MG tablet; Take 1 tablet by mouth Daily.    CBC & Differential; Future    Other depression      Orders:    DULoxetine (CYMBALTA) 60 MG capsule; Take 1 capsule by mouth Daily.    Class 1 obesity due to excess calories with serious comorbidity and body mass index (BMI) of 30.0 to 30.9 in adult  Patient's (Body mass index is 30.84 kg/m².) indicates that they are obese (BMI >30) with health conditions that include hypertension, coronary heart disease, diabetes mellitus, dyslipidemias, and GERD . Weight is improving with treatment. BMI  is above average; BMI management plan is completed. We discussed low calorie, low carb based diet program,  portion control, and increasing exercise.          Immunization due    Orders:    Hepatitis B Vaccine Adult IM    Fluzone >6mos ([id number])         Follow Up:   Return in about 1 year (around 1/13/2026) for Medicare Wellness.

## 2025-01-13 NOTE — ASSESSMENT & PLAN NOTE
The patient is here for health maintenance visit.  Currently, the patient consumes a healthy diet and has an adequate exercise regimen.  Screening lab work is ordered.  Immunizations were reviewed today.  Advice and education was given regarding nutrition, aerobic exercise, routine dental evaluations, routine eye exams, reproductive health, cardiovascular risk reduction, sunscreen use, self skin examination (annual dermatology evaluations) and seatbelt use (general overall safety).  Further recommendations will be given if needed after lab evaluation.  Annual wellness evaluation is recommended.      Orders:    CBC & Differential; Future

## 2025-01-13 NOTE — ASSESSMENT & PLAN NOTE
Patient's (Body mass index is 30.84 kg/m².) indicates that they are obese (BMI >30) with health conditions that include hypertension, coronary heart disease, diabetes mellitus, dyslipidemias, and GERD . Weight is improving with treatment. BMI  is above average; BMI management plan is completed. We discussed low calorie, low carb based diet program, portion control, and increasing exercise.

## 2025-01-15 ENCOUNTER — TELEPHONE (OUTPATIENT)
Dept: FAMILY MEDICINE CLINIC | Facility: CLINIC | Age: 50
End: 2025-01-15
Payer: MEDICARE

## 2025-01-15 NOTE — TELEPHONE ENCOUNTER
[employer]    Called patient and left a message for him to return our call. Also sent message through his my chart account.    Please let the patient know that labs that were drawn at previous visit were stable. Patient should continue current treatment plan.  If patient has any questions they can contact me.

## 2025-01-20 ENCOUNTER — TELEPHONE (OUTPATIENT)
Dept: FAMILY MEDICINE CLINIC | Facility: CLINIC | Age: 50
End: 2025-01-20
Payer: MEDICARE

## 2025-01-20 NOTE — TELEPHONE ENCOUNTER
Received papers from [employer]. I tried to called patient. Left message for him to call us back and about 20 dollar fee.

## 2025-01-27 ENCOUNTER — LAB (OUTPATIENT)
Dept: LAB | Facility: HOSPITAL | Age: 50
End: 2025-01-27
Payer: MEDICARE

## 2025-01-27 DIAGNOSIS — Z95.2 HX OF AORTIC VALVE REPLACEMENT, MECHANICAL: ICD-10-CM

## 2025-01-27 DIAGNOSIS — E03.9 HYPOTHYROIDISM (ACQUIRED): Chronic | ICD-10-CM

## 2025-01-27 LAB
INR PPP: 3.15 (ref 0.89–1.12)
PROTHROMBIN TIME: 32.3 SECONDS (ref 12.2–14.5)

## 2025-01-27 PROCEDURE — 84439 ASSAY OF FREE THYROXINE: CPT

## 2025-01-27 PROCEDURE — 85610 PROTHROMBIN TIME: CPT

## 2025-01-27 PROCEDURE — 84443 ASSAY THYROID STIM HORMONE: CPT

## 2025-01-27 PROCEDURE — 36415 COLL VENOUS BLD VENIPUNCTURE: CPT

## 2025-01-28 ENCOUNTER — ANTICOAGULATION VISIT (OUTPATIENT)
Dept: PHARMACY | Facility: HOSPITAL | Age: 50
End: 2025-01-28
Payer: MEDICARE

## 2025-01-28 ENCOUNTER — TELEPHONE (OUTPATIENT)
Dept: FAMILY MEDICINE CLINIC | Facility: CLINIC | Age: 50
End: 2025-01-28
Payer: MEDICARE

## 2025-01-28 DIAGNOSIS — E03.9 HYPOTHYROIDISM (ACQUIRED): Chronic | ICD-10-CM

## 2025-01-28 DIAGNOSIS — Z95.2 HX OF AORTIC VALVE REPLACEMENT, MECHANICAL: Primary | ICD-10-CM

## 2025-01-28 LAB
T4 FREE SERPL-MCNC: 1.69 NG/DL (ref 0.92–1.68)
TSH SERPL DL<=0.05 MIU/L-ACNC: 0.02 UIU/ML (ref 0.27–4.2)

## 2025-01-28 RX ORDER — LEVOTHYROXINE SODIUM 88 UG/1
88 TABLET ORAL DAILY
Qty: 90 TABLET | Refills: 0 | Status: SHIPPED | OUTPATIENT
Start: 2025-01-28

## 2025-01-28 NOTE — PROGRESS NOTES
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   Other: Hodgkins Lymphoma (hx of chemo and radiation); h/o CVA + hemorrhagic pancreatitis (during hospitalization for mAVR)    Diet: avoids GLV (2/20/23)  Alcohol: socially  Tobacco: none  OTC Pain Medication: APAP PRN pain    INR History:  Date 9/27 9/28 10/1 10/4 10/8 10/11 10/17 10/19 10/23 11/8 11/16 12/21   Total Weekly Dose 8mg 12mg 19mg 29mg 31mg 34mg 43mg 41mg 45mg 49mg 49mg 49mg   INR 1.3 1.22 1.3 1.8 1.85 1.70 2.91 2.82 3.79 3.00 2.96 3.44   Notes clinic enox enox enox; clinic enox enox      desvenla     Date 1/9/19 1/16 2/4 3/1 3/22 5/16 6/13 6/24 7/10 7/22 8/5 8/26   Total Weekly Dose 49mg 47mg 49mg 49mg 49mg 49mg 49mg 49mg 49mg 49mg 49mg 49mg   INR 4.40 2.36 2.62 3.02 2.15 2.60 2.25 2.98 2.83 2.97 2.90 3.20   Notes   self adj              Date 9/26 10/18 10/28 11/19 12/3 12/17 1/3/20 1/21 2/3 2/12 2/19 2/27   Total Weekly Dose 49mg 49mg 49mg 49mg 49mg 45mg 45mg 45mg 47mg 43mg 43mg 43mg   INR 3.57 3.12 2.88 3.33 4.06 2.59 3.21 3.85 3.11 2.34 3.01 3.20   Notes less GLV Cymbalta start 9/25; Depakote start 9/5 1x decr dose  depakote inc; trintellix dcd ceftin x10 days Recv'd 1/6  incr GLV  dieting        Date 3/5 3/12 3/20 4/2 4/17 5/7 5/28 6/16 6/29 7/17 8/14 8/28   Total Weekly Dose 41mg 41mg 41mg 41mg 41mg 41mg 41mg 39mg 39mg 39mg 39mg 39mg   INR 3.07 3.01 2.95 2.66 2.9 3.03 3.36 2.48 2.42 3.08 2.78 2.28   Notes 1x decr   1x  decr         recv'd 6/30  recv'd 8/13 recv'd 8/31     Date 9/28 10/29  12/4 1/8/21 2/9 3/31 5/12 6/9  8/3 8/31   Total Weekly Dose 39mg 39mg non-  compliant 39mg 39mg 39mg 39mg 39mg 39mg non- compliant 39mg 39mg   INR 2.33 2.39  2.46 2.45 2.85 2.48 2.52 2.51  2.88 2.77   Notes recv'd 9/29 recv'd 10/30   recv'd 1/11  recv'd 4/1           Date 10/4 11/1 12/6  3/8 4/5 5/9 6/10 6/23 7/5 7/20 8/5   Total Weekly Dose 39mg 39mg 39mg  non- compliant  39mg 39mg 39 mg 39 mg 39 mg 39 mg 39 mg 39mg   INR 2.50 2.54 2.2  2.40 2.40 2.98 2.26 2.96 2.84 2.88 3.48   Notes  Rec 11/2 Rec 12/7       rec'd 7/6  rec 8/8     Date 8/17 8/25 9/19 9/22 10/13 10/31 11/10 12/2 12/29 12/30 1/3/2023 1/13   Total Weekly Dose 37mg 35mg 39 mg  37mg 35 mg 35 mg 35 mg 35 mg 35mg 37.5mg 41.5mg 35mg   INR 3.24 2.6 3.13 3.39 2.46 2.75 2.71 2.77 1.1 2.42 4.14 3.59   Notes  rec 8/26  Inc activity Rec 10/14  rec 11/11 rec 12/5 ??? rcv'd 1/3       Date 1/27 2/17 3/10 4/5 4/14 5/18 5/26-6/5 6/8 6/12 6/20 6/28 7/6   Total Weekly Dose 35mg 35mg 35 mg 35 mg 35mg 35 mg Inpatient hospital 15mg 40mg 37.5 mg 35 mg 35mg   INR 2.94 2.58 2.5 2.44 3.11 2.43  1.3 1.62 2.1 2.83 2.76   Notes rcv'd 1/30 rcv'd 2/20 rec 3/13 crestor  rcv'd 4/17 rec 4/19 Subdural hematoma; kcentra  Rcv'd 6/13   Rcvd 7/7     Date 7/19 8/23/23 10/25 Noncomp 5/7 5/30 7/3  8/12/24 9/24 10/2 10/9 10/16  10/23   Total Weekly Dose  35 mg 35 mg   35 mg 35 mg 35 mg 35 mg  35 mg 32.5mg  32.5 mg 32.5 mg  35 mg   INR 2.53 2.51 2.73  3.13 2.72 2.34 2.98 3.85 3.43 3.34 2.11  3.51   Notes  Rec'd 8/24   Rec 5/8  Rec'd 7/5 Rec'd 8/13  LVM  Rec'd 9/25  Prevacid    Unable to contact      Date    1/13/25 1/27             Total Weekly  Dose Lost to follow up 35 mg 35 mg             INR   3.09 3.15             Notes   Rec'd 1/28               *takes warfarin in AM*  Phone Interview:  Verbal Release Authorization: please contact Mr. Hall directly - if unable to reach patient may contact brother, Marko  Lucero  Strength: 2mg and 5mg tablets  Patient Contact Info: [phone]; [email or web address]  Lab Contact Info: Megan Vu     Patient Findings    Negatives: Signs/symptoms of thrombosis, Signs/symptoms of bleeding, Laboratory test error suspected, Change in health, Change in alcohol use, Change in activity, Upcoming invasive procedure, Emergency department visit, Upcoming dental procedure, Missed doses, Extra doses, Change in medications, Change in diet/appetite, Hospital admission, Bruising, Other complaints   Comments: All findings negative per patient            Plan:    INR is slightly supra therapeutic 1/27 at 3.15 (goal 2-3).  Spoke with patient today. Instructed Mr. Hall to continue maintenance regimen of warfarin 5 mg oral daily until recheck. He will have an extra serving of GLV today.  Repeat INR 2/10/25  Verbal information provided over the phone. Mr. Hall RBV dosing instructions, expresses understanding by teach back, and has no further questions at this time.    Tati García, PharmD  1/31/2025  10:00 EST

## 2025-01-28 NOTE — TELEPHONE ENCOUNTER
Caller: Payton Diallo    Relationship: Emergency Contact    Best call back number: [phone]     What form or medical record are you requesting: LONG TERM DISABILITY PAPERWORK     Who is requesting this form or medical record from you: [employer]    How would you like to receive the form or medical records (pick-up, mail, fax): PICK-UP    Timeframe paperwork needed: ASAP    Additional notes: PATIENT HAD PAPERS AT THE OFFICE 1.27.25 AND THEY WERE RETURNED TO HIM BLANK. PATIENT WILL BE DROPPING THEM OFF 1.29.25 TO BE FILLED OUT. IF THERE ARE ISSUES BEFORE DROPPING THE PAPERS OFF OR FILLING THEM OUT, PLEASE CALL THE NUMBER LISTED ABOVE.

## 2025-01-29 NOTE — TELEPHONE ENCOUNTER
This was given to pt with list of places to get long term disability filled out by the front, we can not do this here

## 2025-01-29 NOTE — TELEPHONE ENCOUNTER
HUB TO RELAY    LVM.      This was given to pt with list of places to get long term disability filled out by the front, we can not do this here

## 2025-01-30 ENCOUNTER — TELEPHONE (OUTPATIENT)
Dept: FAMILY MEDICINE CLINIC | Facility: CLINIC | Age: 50
End: 2025-01-30
Payer: MEDICARE

## 2025-01-30 NOTE — TELEPHONE ENCOUNTER
Caller: Payton Diallo    Relationship: Emergency Contact    Best call back number: [phone]     What is the best time to reach you: AFTER 4: OR 12:30-1:00    Who are you requesting to speak with (clinical staff, provider,  specific staff member): CLINICAL STAFF    What was the call regarding: PAPERWORK FROM [facility] WAS NOT FILLED OUT, EVEN THOUGH THE PATIENT HAD BEEN SEEN BY DR AMBROSE.

## 2025-01-30 NOTE — TELEPHONE ENCOUNTER
HUB TO RELAY    Paper work can not be filled out by pcp, needs to go to someone that determines disability, this has already been explained with locations to get these forms competed    Dr. Faust is no longer with this office      Number given is out of order, called number on file, lvm

## 2025-02-10 ENCOUNTER — LAB (OUTPATIENT)
Dept: LAB | Facility: HOSPITAL | Age: 50
End: 2025-02-10
Payer: MEDICARE

## 2025-02-10 DIAGNOSIS — Z95.2 HX OF AORTIC VALVE REPLACEMENT, MECHANICAL: ICD-10-CM

## 2025-02-10 LAB
INR PPP: 3.06 (ref 0.89–1.12)
PROTHROMBIN TIME: 31.7 SECONDS (ref 12.2–14.5)

## 2025-02-10 PROCEDURE — 36415 COLL VENOUS BLD VENIPUNCTURE: CPT

## 2025-02-10 PROCEDURE — 85610 PROTHROMBIN TIME: CPT

## 2025-02-11 ENCOUNTER — ANTICOAGULATION VISIT (OUTPATIENT)
Dept: PHARMACY | Facility: HOSPITAL | Age: 50
End: 2025-02-11
Payer: MEDICARE

## 2025-02-11 DIAGNOSIS — Z95.2 HX OF AORTIC VALVE REPLACEMENT, MECHANICAL: Primary | ICD-10-CM

## 2025-02-11 NOTE — PROGRESS NOTES
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   Other: Hodgkins Lymphoma (hx of chemo and radiation); h/o CVA + hemorrhagic pancreatitis (during hospitalization for mAVR)    Diet: avoids GLV (2/20/23)  Alcohol: socially  Tobacco: none  OTC Pain Medication: APAP PRN pain    INR History:  Date 9/27 9/28 10/1 10/4 10/8 10/11 10/17 10/19 10/23 11/8 11/16 12/21   Total Weekly Dose 8mg 12mg 19mg 29mg 31mg 34mg 43mg 41mg 45mg 49mg 49mg 49mg   INR 1.3 1.22 1.3 1.8 1.85 1.70 2.91 2.82 3.79 3.00 2.96 3.44   Notes clinic enox enox enox; clinic enox enox      desvenla     Date 1/9/19 1/16 2/4 3/1 3/22 5/16 6/13 6/24 7/10 7/22 8/5 8/26   Total Weekly Dose 49mg 47mg 49mg 49mg 49mg 49mg 49mg 49mg 49mg 49mg 49mg 49mg   INR 4.40 2.36 2.62 3.02 2.15 2.60 2.25 2.98 2.83 2.97 2.90 3.20   Notes   self adj              Date 9/26 10/18 10/28 11/19 12/3 12/17 1/3/20 1/21 2/3 2/12 2/19 2/27   Total Weekly Dose 49mg 49mg 49mg 49mg 49mg 45mg 45mg 45mg 47mg 43mg 43mg 43mg   INR 3.57 3.12 2.88 3.33 4.06 2.59 3.21 3.85 3.11 2.34 3.01 3.20   Notes less GLV Cymbalta start 9/25; Depakote start 9/5 1x decr dose  depakote inc; trintellix dcd ceftin x10 days Recv'd 1/6  incr GLV  dieting        Date 3/5 3/12 3/20 4/2 4/17 5/7 5/28 6/16 6/29 7/17 8/14 8/28   Total Weekly Dose 41mg 41mg 41mg 41mg 41mg 41mg 41mg 39mg 39mg 39mg 39mg 39mg   INR 3.07 3.01 2.95 2.66 2.9 3.03 3.36 2.48 2.42 3.08 2.78 2.28   Notes 1x decr   1x  decr         recv'd 6/30  recv'd 8/13 recv'd 8/31     Date 9/28 10/29  12/4 1/8/21 2/9 3/31 5/12 6/9  8/3 8/31   Total Weekly Dose 39mg 39mg non-  compliant 39mg 39mg 39mg 39mg 39mg 39mg non- compliant 39mg 39mg   INR 2.33 2.39  2.46 2.45 2.85 2.48 2.52 2.51  2.88 2.77   Notes recv'd 9/29 recv'd 10/30   recv'd 1/11  recv'd 4/1           Date 10/4 11/1 12/6  3/8 4/5 5/9 6/10 6/23 7/5 7/20 8/5   Total Weekly Dose 39mg 39mg 39mg  non- compliant  39mg 39mg 39 mg 39 mg 39 mg 39 mg 39 mg 39mg   INR 2.50 2.54 2.2  2.40 2.40 2.98 2.26 2.96 2.84 2.88 3.48   Notes  Rec 11/2 Rec 12/7       rec'd 7/6  rec 8/8     Date 8/17 8/25 9/19 9/22 10/13 10/31 11/10 12/2 12/29 12/30 1/3/2023 1/13   Total Weekly Dose 37mg 35mg 39 mg  37mg 35 mg 35 mg 35 mg 35 mg 35mg 37.5mg 41.5mg 35mg   INR 3.24 2.6 3.13 3.39 2.46 2.75 2.71 2.77 1.1 2.42 4.14 3.59   Notes  rec 8/26  Inc activity Rec 10/14  rec 11/11 rec 12/5 ??? rcv'd 1/3       Date 1/27 2/17 3/10 4/5 4/14 5/18 5/26-6/5 6/8 6/12 6/20 6/28 7/6   Total Weekly Dose 35mg 35mg 35 mg 35 mg 35mg 35 mg Inpatient hospital 15mg 40mg 37.5 mg 35 mg 35mg   INR 2.94 2.58 2.5 2.44 3.11 2.43  1.3 1.62 2.1 2.83 2.76   Notes rcv'd 1/30 rcv'd 2/20 rec 3/13 crestor  rcv'd 4/17 rec 4/19 Subdural hematoma; kcentra  Rcv'd 6/13   Rcvd 7/7     Date 7/19 8/23/23 10/25 Noncomp 5/7 5/30 7/3  8/12/24 9/24 10/2 10/9 10/16  10/23   Total Weekly Dose  35 mg 35 mg   35 mg 35 mg 35 mg 35 mg  35 mg 32.5mg  32.5 mg 32.5 mg  35 mg   INR 2.53 2.51 2.73  3.13 2.72 2.34 2.98 3.85 3.43 3.34 2.11  3.51   Notes  Rec'd 8/24   Rec 5/8  Rec'd 7/5 Rec'd 8/13  LVM  Rec'd 9/25  Prevacid    Unable to contact      Date    1/13/25 1/27 2/10            Total Weekly  Dose Lost to follow up 35 mg 35 mg 35 mg             INR   3.09 3.15 3.06            Notes   Rec'd 1/28 Rec'd 2/11              *takes warfarin in AM*  Phone Interview:  Verbal Release Authorization: please contact Mr. Hall directly - if unable to reach patient may contact  Marko erickson  Tablet Strength: 2mg and 5mg tablets  Patient Contact Info: [phone]; [email or web address]  Lab Contact Info: Megan Vu     Patient Findings    Negatives: Signs/symptoms of thrombosis, Signs/symptoms of bleeding, Laboratory test error suspected, Change in health, Change in alcohol use, Change in activity, Upcoming invasive procedure, Emergency department visit, Upcoming dental procedure, Missed doses, Extra doses, Change in medications, Change in diet/appetite, Hospital admission, Bruising, Other complaints   Comments: All findings negative per patient            Plan:    INR is slightly supra therapeutic 2/10 at 3.06 (goal 2-3).  Spoke with patient today. Instructed Mr. Hall to continue maintenance regimen of warfarin 5 mg oral daily until recheck. He will have an extra serving of GLV today.  Repeat INR 2/24/25  Verbal information provided over the phone. Mr. Hall RBV dosing instructions, expresses understanding by teach back, and has no further questions at this time.    Tati García, PharmD  2/11/2025  08:14 EST

## 2025-02-21 DIAGNOSIS — E11.9 CONTROLLED TYPE 2 DIABETES MELLITUS WITHOUT COMPLICATION, WITHOUT LONG-TERM CURRENT USE OF INSULIN: Chronic | ICD-10-CM

## 2025-02-21 RX ORDER — SEMAGLUTIDE 0.68 MG/ML
0.5 INJECTION, SOLUTION SUBCUTANEOUS WEEKLY
Refills: 1 | OUTPATIENT
Start: 2025-02-21

## 2025-02-24 ENCOUNTER — TELEPHONE (OUTPATIENT)
Dept: FAMILY MEDICINE CLINIC | Facility: CLINIC | Age: 50
End: 2025-02-24
Payer: MEDICARE

## 2025-02-24 ENCOUNTER — LAB (OUTPATIENT)
Dept: LAB | Facility: HOSPITAL | Age: 50
End: 2025-02-24
Payer: MEDICARE

## 2025-02-24 DIAGNOSIS — E03.9 HYPOTHYROIDISM (ACQUIRED): Chronic | ICD-10-CM

## 2025-02-24 PROCEDURE — 84443 ASSAY THYROID STIM HORMONE: CPT

## 2025-02-24 PROCEDURE — 84439 ASSAY OF FREE THYROXINE: CPT

## 2025-02-24 NOTE — TELEPHONE ENCOUNTER
Caller: VASHTI    Relationship:     Best call back number: [phone]    What is the best time to reach you: ANYTIME     Who are you requesting to speak with (clinical staff, provider,  specific staff member): CLINICAL STAFF     VASHTI IS REQUESTING A CALL FROM CLINICAL STAFF ABOUT PATIENT LONG TERM DISABILITY CLAIM. PLEASE CALL TO DISCUSS.     [id number] REFERENCE #

## 2025-02-25 LAB
T4 FREE SERPL-MCNC: 1.35 NG/DL (ref 0.92–1.68)
TSH SERPL DL<=0.05 MIU/L-ACNC: 0.08 UIU/ML (ref 0.27–4.2)

## 2025-02-25 NOTE — TELEPHONE ENCOUNTER
Called and informed them we already told patient we were not completing forms. Gave them medical records fax and phone number to follow up on records request.

## 2025-02-28 DIAGNOSIS — E03.9 HYPOTHYROIDISM (ACQUIRED): Primary | Chronic | ICD-10-CM

## 2025-03-03 DIAGNOSIS — E03.9 HYPOTHYROIDISM (ACQUIRED): Chronic | ICD-10-CM

## 2025-03-03 RX ORDER — LEVOTHYROXINE SODIUM 100 UG/1
100 TABLET ORAL DAILY
Qty: 90 TABLET | Refills: 0 | OUTPATIENT
Start: 2025-03-03

## 2025-03-10 ENCOUNTER — LAB (OUTPATIENT)
Dept: LAB | Facility: HOSPITAL | Age: 50
End: 2025-03-10
Payer: MEDICARE

## 2025-03-10 DIAGNOSIS — E03.9 HYPOTHYROIDISM (ACQUIRED): Chronic | ICD-10-CM

## 2025-03-10 LAB
T4 FREE SERPL-MCNC: 1.61 NG/DL (ref 0.92–1.68)
TSH SERPL DL<=0.05 MIU/L-ACNC: 0.1 UIU/ML (ref 0.27–4.2)

## 2025-03-10 PROCEDURE — 84439 ASSAY OF FREE THYROXINE: CPT

## 2025-03-10 PROCEDURE — 84443 ASSAY THYROID STIM HORMONE: CPT

## 2025-03-13 DIAGNOSIS — E03.9 HYPOTHYROIDISM (ACQUIRED): Chronic | ICD-10-CM

## 2025-03-13 RX ORDER — LEVOTHYROXINE SODIUM 112 UG/1
112 TABLET ORAL DAILY
Qty: 90 TABLET | Refills: 0 | OUTPATIENT
Start: 2025-03-13

## 2025-03-13 NOTE — TELEPHONE ENCOUNTER
Rx Refill Note  Requested Prescriptions     Pending Prescriptions Disp Refills    levothyroxine (SYNTHROID, LEVOTHROID) 112 MCG tablet [Pharmacy Med Name: LEVOTHYROXINE 112 MCG TABLET] 90 tablet 0     Sig: TAKE 1 TABLET BY MOUTH EVERY DAY      Last office visit with prescribing clinician: 12/3/2024     Next office visit with prescribing clinician: 06/06/2025 with Chacha Kimball  03/13/25, 09:11 EDT

## 2025-03-24 ENCOUNTER — LAB (OUTPATIENT)
Dept: LAB | Facility: HOSPITAL | Age: 50
End: 2025-03-24
Payer: MEDICARE

## 2025-03-24 DIAGNOSIS — Z95.2 HX OF AORTIC VALVE REPLACEMENT, MECHANICAL: ICD-10-CM

## 2025-03-24 LAB
INR PPP: 3 (ref 0.89–1.12)
PROTHROMBIN TIME: 31.2 SECONDS (ref 12.2–14.5)

## 2025-03-24 PROCEDURE — 36415 COLL VENOUS BLD VENIPUNCTURE: CPT

## 2025-03-24 PROCEDURE — 85610 PROTHROMBIN TIME: CPT

## 2025-03-25 ENCOUNTER — ANTICOAGULATION VISIT (OUTPATIENT)
Dept: PHARMACY | Facility: HOSPITAL | Age: 50
End: 2025-03-25
Payer: MEDICARE

## 2025-03-25 DIAGNOSIS — Z95.2 HX OF AORTIC VALVE REPLACEMENT, MECHANICAL: Primary | ICD-10-CM

## 2025-03-25 NOTE — PROGRESS NOTES
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   Other: Hodgkins Lymphoma (hx of chemo and radiation); h/o CVA + hemorrhagic pancreatitis (during hospitalization for mAVR)    Diet: avoids GLV (2/20/23)  Alcohol: socially  Tobacco: none  OTC Pain Medication: APAP PRN pain    INR History:  Date 9/27 9/28 10/1 10/4 10/8 10/11 10/17 10/19 10/23 11/8 11/16 12/21   Total Weekly Dose 8mg 12mg 19mg 29mg 31mg 34mg 43mg 41mg 45mg 49mg 49mg 49mg   INR 1.3 1.22 1.3 1.8 1.85 1.70 2.91 2.82 3.79 3.00 2.96 3.44   Notes clinic enox enox enox; clinic enox enox      desvenla     Date 1/9/19 1/16 2/4 3/1 3/22 5/16 6/13 6/24 7/10 7/22 8/5 8/26   Total Weekly Dose 49mg 47mg 49mg 49mg 49mg 49mg 49mg 49mg 49mg 49mg 49mg 49mg   INR 4.40 2.36 2.62 3.02 2.15 2.60 2.25 2.98 2.83 2.97 2.90 3.20   Notes   self adj              Date 9/26 10/18 10/28 11/19 12/3 12/17 1/3/20 1/21 2/3 2/12 2/19 2/27   Total Weekly Dose 49mg 49mg 49mg 49mg 49mg 45mg 45mg 45mg 47mg 43mg 43mg 43mg   INR 3.57 3.12 2.88 3.33 4.06 2.59 3.21 3.85 3.11 2.34 3.01 3.20   Notes less GLV Cymbalta start 9/25; Depakote start 9/5 1x decr dose  depakote inc; trintellix dcd ceftin x10 days Recv'd 1/6  incr GLV  dieting        Date 3/5 3/12 3/20 4/2 4/17 5/7 5/28 6/16 6/29 7/17 8/14 8/28   Total Weekly Dose 41mg 41mg 41mg 41mg 41mg 41mg 41mg 39mg 39mg 39mg 39mg 39mg   INR 3.07 3.01 2.95 2.66 2.9 3.03 3.36 2.48 2.42 3.08 2.78 2.28   Notes 1x decr   1x  decr         recv'd 6/30  recv'd 8/13 recv'd 8/31     Date 9/28 10/29  12/4 1/8/21 2/9 3/31 5/12 6/9  8/3 8/31   Total Weekly Dose 39mg 39mg non-  compliant 39mg 39mg 39mg 39mg 39mg 39mg non- compliant 39mg 39mg   INR 2.33 2.39  2.46 2.45 2.85 2.48 2.52 2.51  2.88 2.77   Notes recv'd 9/29 recv'd 10/30   recv'd 1/11  recv'd 4/1           Date 10/4 11/1 12/6  3/8 4/5 5/9 6/10 6/23 7/5 7/20 8/5   Total Weekly Dose 39mg 39mg 39mg  non- compliant  39mg 39mg 39 mg 39 mg 39 mg 39 mg 39 mg 39mg   INR 2.50 2.54 2.2  2.40 2.40 2.98 2.26 2.96 2.84 2.88 3.48   Notes  Rec 11/2 Rec 12/7       rec'd 7/6  rec 8/8     Date 8/17 8/25 9/19 9/22 10/13 10/31 11/10 12/2 12/29 12/30 1/3/2023 1/13   Total Weekly Dose 37mg 35mg 39 mg  37mg 35 mg 35 mg 35 mg 35 mg 35mg 37.5mg 41.5mg 35mg   INR 3.24 2.6 3.13 3.39 2.46 2.75 2.71 2.77 1.1 2.42 4.14 3.59   Notes  rec 8/26  Inc activity Rec 10/14  rec 11/11 rec 12/5 ??? rcv'd 1/3       Date 1/27 2/17 3/10 4/5 4/14 5/18 5/26-6/5 6/8 6/12 6/20 6/28 7/6   Total Weekly Dose 35mg 35mg 35 mg 35 mg 35mg 35 mg Inpatient hospital 15mg 40mg 37.5 mg 35 mg 35mg   INR 2.94 2.58 2.5 2.44 3.11 2.43  1.3 1.62 2.1 2.83 2.76   Notes rcv'd 1/30 rcv'd 2/20 rec 3/13 crestor  rcv'd 4/17 rec 4/19 Subdural hematoma; kcentra  Rcv'd 6/13   Rcvd 7/7     Date 7/19 8/23/23 10/25 Noncomp 5/7 5/30 7/3  8/12/24 9/24 10/2 10/9 10/16  10/23   Total Weekly Dose  35 mg 35 mg   35 mg 35 mg 35 mg 35 mg  35 mg 32.5mg  32.5 mg 32.5 mg  35 mg   INR 2.53 2.51 2.73  3.13 2.72 2.34 2.98 3.85 3.43 3.34 2.11  3.51   Notes  Rec'd 8/24   Rec 5/8  Rec'd 7/5 Rec'd 8/13  LVM  Rec'd 9/25  Prevacid    Unable to contact      Date    1/13/25 1/27 2/10 3/24           Total Weekly  Dose Lost to follow up 35 mg 35 mg 35 mg  35 mg           INR   3.09 3.15 3.06 3.0           Notes   Rec'd 1/28 Rec'd 2/11 Rec'd 3/25             *takes warfarin in AM*  Phone Interview:  Verbal Release Authorization: please contact Mr. Hall directly - if unable to reach  patient may contact Marko erickson  Tablet Strength: 2mg and 5mg tablets  Patient Contact Info: [phone]; [email or web address]  Lab Contact Info: Megan Vu       Patient Findings    Negatives: Signs/symptoms of thrombosis, Signs/symptoms of bleeding, Laboratory test error suspected, Change in health, Change in alcohol use, Change in activity, Upcoming invasive procedure, Emergency department visit, Upcoming dental procedure, Missed doses, Extra doses, Change in medications, Change in diet/appetite, Hospital admission, Bruising, Other complaints   Comments: All findings negative per patient.        Plan:    INR therapeutic at 3.0 (goal 2-3). Instructed Mr. Hall to continue maintenance regimen of warfarin 5 mg oral daily until recheck.   Repeat INR 4/7/25  Verbal information provided over the phone. Mr. Hall RBV dosing instructions, expresses understanding by teach back, and has no further questions at this time.    Brennan Krause   [facility]  3/28/2025 09:34 EDT    I, Carlos Ardon, PharmD, have reviewed the note in full and agree with the assessment and plan.  03/28/25  10:05 EDT

## 2025-03-26 DIAGNOSIS — Z79.01 WARFARIN ANTICOAGULATION: ICD-10-CM

## 2025-03-26 DIAGNOSIS — Z95.2 H/O HEART VALVE REPLACEMENT WITH MECHANICAL VALVE: Chronic | ICD-10-CM

## 2025-03-26 RX ORDER — WARFARIN SODIUM 5 MG/1
TABLET ORAL
Qty: 90 TABLET | Refills: 0 | Status: SHIPPED | OUTPATIENT
Start: 2025-03-26

## 2025-03-31 ENCOUNTER — PATIENT MESSAGE (OUTPATIENT)
Dept: NEUROLOGY | Facility: CLINIC | Age: 50
End: 2025-03-31
Payer: MEDICARE

## 2025-04-01 DIAGNOSIS — E03.9 HYPOTHYROIDISM (ACQUIRED): Chronic | ICD-10-CM

## 2025-04-01 DIAGNOSIS — E11.9 CONTROLLED TYPE 2 DIABETES MELLITUS WITHOUT COMPLICATION, WITHOUT LONG-TERM CURRENT USE OF INSULIN: Chronic | ICD-10-CM

## 2025-04-01 DIAGNOSIS — E11.65 TYPE 2 DIABETES MELLITUS WITH HYPERGLYCEMIA, WITHOUT LONG-TERM CURRENT USE OF INSULIN: ICD-10-CM

## 2025-04-01 RX ORDER — LEVOTHYROXINE SODIUM 100 UG/1
100 TABLET ORAL DAILY
Qty: 90 TABLET | Refills: 0 | OUTPATIENT
Start: 2025-04-01

## 2025-04-01 RX ORDER — LEVOTHYROXINE SODIUM 88 UG/1
88 TABLET ORAL DAILY
Qty: 90 TABLET | Refills: 0 | Status: SHIPPED | OUTPATIENT
Start: 2025-04-01

## 2025-04-01 RX ORDER — SEMAGLUTIDE 0.68 MG/ML
0.5 INJECTION, SOLUTION SUBCUTANEOUS WEEKLY
Qty: 10 ML | Refills: 0 | OUTPATIENT
Start: 2025-04-01

## 2025-04-01 RX ORDER — METFORMIN HYDROCHLORIDE 500 MG/1
500 TABLET, EXTENDED RELEASE ORAL
Qty: 90 TABLET | Refills: 0 | Status: SHIPPED | OUTPATIENT
Start: 2025-04-01

## 2025-04-01 NOTE — TELEPHONE ENCOUNTER
Rx Refill Note  Requested Prescriptions     Pending Prescriptions Disp Refills    levothyroxine (SYNTHROID, LEVOTHROID) 88 MCG tablet [Pharmacy Med Name: LEVOTHYROXINE 88 MCG TABLET] 90 tablet 0     Sig: TAKE 1 TABLET BY MOUTH DAILY. DOSE DECREASE     Refused Prescriptions Disp Refills    Ozempic, 0.25 or 0.5 MG/DOSE, 2 MG/3ML solution pen-injector [Pharmacy Med Name: OZEMPIC 0.25-0.5 MG/DOSE PEN]  1     Sig: Inject 0.5 mg under the skin into the appropriate area as directed 1 (One) Time Per Week.    levothyroxine (SYNTHROID, LEVOTHROID) 100 MCG tablet [Pharmacy Med Name: LEVOTHYROXINE 100 MCG TABLET] 90 tablet 0     Sig: Take 1 tablet by mouth Daily. Dose decrease      Last office visit with prescribing clinician: 12/3/2024     Next office visit with prescribing clinician: 06/06/2025 with Chacha Kimball  04/01/25, 11:45 EDT

## 2025-04-07 ENCOUNTER — LAB (OUTPATIENT)
Dept: LAB | Facility: HOSPITAL | Age: 50
End: 2025-04-07
Payer: MEDICARE

## 2025-04-07 DIAGNOSIS — Z95.2 HX OF AORTIC VALVE REPLACEMENT, MECHANICAL: ICD-10-CM

## 2025-04-07 LAB
INR PPP: 3.17 (ref 0.89–1.12)
PROTHROMBIN TIME: 34.7 SECONDS (ref 12.2–15.3)

## 2025-04-07 PROCEDURE — 85610 PROTHROMBIN TIME: CPT

## 2025-04-07 PROCEDURE — 36415 COLL VENOUS BLD VENIPUNCTURE: CPT

## 2025-04-08 ENCOUNTER — ANTICOAGULATION VISIT (OUTPATIENT)
Dept: PHARMACY | Facility: HOSPITAL | Age: 50
End: 2025-04-08
Payer: MEDICARE

## 2025-04-08 DIAGNOSIS — Z95.2 HX OF AORTIC VALVE REPLACEMENT, MECHANICAL: Primary | ICD-10-CM

## 2025-04-08 NOTE — PROGRESS NOTES
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   Other: Hodgkins Lymphoma (hx of chemo and radiation); h/o CVA + hemorrhagic pancreatitis (during hospitalization for mAVR)    Diet: avoids GLV (2/20/23)  Alcohol: socially  Tobacco: none  OTC Pain Medication: APAP PRN pain    INR History:  Date 9/27 9/28 10/1 10/4 10/8 10/11 10/17 10/19 10/23 11/8 11/16 12/21   Total Weekly Dose 8mg 12mg 19mg 29mg 31mg 34mg 43mg 41mg 45mg 49mg 49mg 49mg   INR 1.3 1.22 1.3 1.8 1.85 1.70 2.91 2.82 3.79 3.00 2.96 3.44   Notes clinic enox enox enox; clinic enox enox      desvenla     Date 1/9/19 1/16 2/4 3/1 3/22 5/16 6/13 6/24 7/10 7/22 8/5 8/26   Total Weekly Dose 49mg 47mg 49mg 49mg 49mg 49mg 49mg 49mg 49mg 49mg 49mg 49mg   INR 4.40 2.36 2.62 3.02 2.15 2.60 2.25 2.98 2.83 2.97 2.90 3.20   Notes   self adj              Date 9/26 10/18 10/28 11/19 12/3 12/17 1/3/20 1/21 2/3 2/12 2/19 2/27   Total Weekly Dose 49mg 49mg 49mg 49mg 49mg 45mg 45mg 45mg 47mg 43mg 43mg 43mg   INR 3.57 3.12 2.88 3.33 4.06 2.59 3.21 3.85 3.11 2.34 3.01 3.20   Notes less GLV Cymbalta start 9/25; Depakote start 9/5 1x decr dose  depakote inc; trintellix dcd ceftin x10 days Recv'd 1/6  incr GLV  dieting        Date 3/5 3/12 3/20 4/2 4/17 5/7 5/28 6/16 6/29 7/17 8/14 8/28   Total Weekly Dose 41mg 41mg 41mg 41mg 41mg 41mg 41mg 39mg 39mg 39mg 39mg 39mg   INR 3.07 3.01 2.95 2.66 2.9 3.03 3.36 2.48 2.42 3.08 2.78 2.28   Notes 1x decr   1x  decr         recv'd 6/30  recv'd 8/13 recv'd 8/31     Date 9/28 10/29  12/4 1/8/21 2/9 3/31 5/12 6/9  8/3 8/31   Total Weekly Dose 39mg 39mg non-  compliant 39mg 39mg 39mg 39mg 39mg 39mg non- compliant 39mg 39mg   INR 2.33 2.39  2.46 2.45 2.85 2.48 2.52 2.51  2.88 2.77   Notes recv'd 9/29 recv'd 10/30   recv'd 1/11  recv'd 4/1           Date 10/4 11/1 12/6  3/8 4/5 5/9 6/10 6/23 7/5 7/20 8/5   Total Weekly Dose 39mg 39mg 39mg  non- compliant  39mg 39mg 39 mg 39 mg 39 mg 39 mg 39 mg 39mg   INR 2.50 2.54 2.2  2.40 2.40 2.98 2.26 2.96 2.84 2.88 3.48   Notes  Rec 11/2 Rec 12/7       rec'd 7/6  rec 8/8     Date 8/17 8/25 9/19 9/22 10/13 10/31 11/10 12/2 12/29 12/30 1/3/2023 1/13   Total Weekly Dose 37mg 35mg 39 mg  37mg 35 mg 35 mg 35 mg 35 mg 35mg 37.5mg 41.5mg 35mg   INR 3.24 2.6 3.13 3.39 2.46 2.75 2.71 2.77 1.1 2.42 4.14 3.59   Notes  rec 8/26  Inc activity Rec 10/14  rec 11/11 rec 12/5 ??? rcv'd 1/3       Date 1/27 2/17 3/10 4/5 4/14 5/18 5/26-6/5 6/8 6/12 6/20 6/28 7/6   Total Weekly Dose 35mg 35mg 35 mg 35 mg 35mg 35 mg Inpatient hospital 15mg 40mg 37.5 mg 35 mg 35mg   INR 2.94 2.58 2.5 2.44 3.11 2.43  1.3 1.62 2.1 2.83 2.76   Notes rcv'd 1/30 rcv'd 2/20 rec 3/13 crestor  rcv'd 4/17 rec 4/19 Subdural hematoma; kcentra  Rcv'd 6/13   Rcvd 7/7     Date 7/19 8/23/23 10/25 Noncomp 5/7 5/30 7/3  8/12/24 9/24 10/2 10/9 10/16  10/23   Total Weekly Dose  35 mg 35 mg   35 mg 35 mg 35 mg 35 mg  35 mg 32.5mg  32.5 mg 32.5 mg  35 mg   INR 2.53 2.51 2.73  3.13 2.72 2.34 2.98 3.85 3.43 3.34 2.11  3.51   Notes  Rec'd 8/24   Rec 5/8  Rec'd 7/5 Rec'd 8/13  LVM  Rec'd 9/25  Prevacid    Unable to contact      Date    1/13/25 1/27 2/10 3/24 4/7          Total Weekly  Dose Lost to follow up 35 mg 35 mg 35 mg  35 mg 35 mg          INR   3.09 3.15 3.06 3.0 3.17          Notes   Rec'd 1/28 Rec'd 2/11 Rec'd 3/25 Rec'd 4/8            *takes warfarin in AM*  Phone Interview:  Verbal Release Authorization: please contact Mr. Hall directly -  if unable to reach patient may contact [email or web address]  Tablet Strength: 2mg and 5mg tablets  Patient Contact Info: [phone]; [email or web address]  Lab Contact Info: Megan Vu         Patient Findings    Negatives: Signs/symptoms of thrombosis, Signs/symptoms of bleeding, Laboratory test error suspected, Change in health, Change in alcohol use, Change in activity, Upcoming invasive procedure, Emergency department visit, Upcoming dental procedure, Missed doses, Extra doses, Change in medications, Change in diet/appetite, Hospital admission, Bruising, Other complaints   Comments: All findings negative per patient.         Plan:    INR slightly supra therapeutic at 3.17 (goal 2-3). Spoke with patient 4/9. Instructed Mr. Hlal to continue maintenance regimen of warfarin 5 mg oral daily until recheck. Patient instructed to have a serving of GLV.   Repeat INR 4/21/25  Verbal information provided over the phone. Mr. Hall RBV dosing instructions, expresses understanding by teach back, and has no further questions at this time.    Brennan Krause CPHT  4/9/2025 08:20 EDT    I, Tati García, PharmD, have reviewed the note in full and agree with the assessment and plan.  04/09/25  10:30 EDT

## 2025-04-20 DIAGNOSIS — E11.65 TYPE 2 DIABETES MELLITUS WITH HYPERGLYCEMIA, WITHOUT LONG-TERM CURRENT USE OF INSULIN: ICD-10-CM

## 2025-04-20 DIAGNOSIS — E03.9 HYPOTHYROIDISM (ACQUIRED): Chronic | ICD-10-CM

## 2025-04-21 ENCOUNTER — OFFICE VISIT (OUTPATIENT)
Dept: CARDIOLOGY | Facility: CLINIC | Age: 50
End: 2025-04-21
Payer: MEDICARE

## 2025-04-21 VITALS
HEART RATE: 92 BPM | DIASTOLIC BLOOD PRESSURE: 78 MMHG | HEIGHT: 69 IN | OXYGEN SATURATION: 99 % | SYSTOLIC BLOOD PRESSURE: 138 MMHG | BODY MASS INDEX: 31.01 KG/M2 | WEIGHT: 209.4 LBS

## 2025-04-21 DIAGNOSIS — Z86.79 HISTORY OF AORTIC REGURGITATION: Chronic | ICD-10-CM

## 2025-04-21 DIAGNOSIS — Q24.9 CONGENITAL HEART DISEASE: Primary | ICD-10-CM

## 2025-04-21 DIAGNOSIS — E78.5 HYPERLIPIDEMIA LDL GOAL <70: ICD-10-CM

## 2025-04-21 DIAGNOSIS — I71.21 ANEURYSM OF ASCENDING AORTA WITHOUT RUPTURE: ICD-10-CM

## 2025-04-21 DIAGNOSIS — Z95.2 H/O HEART VALVE REPLACEMENT WITH MECHANICAL VALVE: Chronic | ICD-10-CM

## 2025-04-21 PROCEDURE — 3078F DIAST BP <80 MM HG: CPT | Performed by: INTERNAL MEDICINE

## 2025-04-21 PROCEDURE — 3075F SYST BP GE 130 - 139MM HG: CPT | Performed by: INTERNAL MEDICINE

## 2025-04-21 PROCEDURE — 99214 OFFICE O/P EST MOD 30 MIN: CPT | Performed by: INTERNAL MEDICINE

## 2025-04-21 RX ORDER — METFORMIN HYDROCHLORIDE 500 MG/1
500 TABLET, EXTENDED RELEASE ORAL
Qty: 90 TABLET | Refills: 0 | Status: SHIPPED | OUTPATIENT
Start: 2025-04-21

## 2025-04-21 RX ORDER — LEVOTHYROXINE SODIUM 100 UG/1
100 TABLET ORAL DAILY
Qty: 90 TABLET | Refills: 0 | OUTPATIENT
Start: 2025-04-21

## 2025-04-21 NOTE — TELEPHONE ENCOUNTER
Rx Refill Note  Requested Prescriptions     Pending Prescriptions Disp Refills    levothyroxine (SYNTHROID, LEVOTHROID) 100 MCG tablet [Pharmacy Med Name: LEVOTHYROXINE 100 MCG TABLET] 90 tablet 0     Sig: TAKE 1 TABLET BY MOUTH DAILY. DOSE DECREASE      Last office visit with prescribing clinician: 12/3/2024   Last telemedicine visit with prescribing clinician: Visit date not found   Next office visit with prescribing clinician:  06/06/2025      Adeline Chung MA  04/21/25, 09:18 EDT

## 2025-04-21 NOTE — PROGRESS NOTES
[facility] at [facility]  Office visit  Manjeet Hall  1975      VISIT DATE:  4/21/2025      PCP: Scotty Means MD  [address] [facility] [address] [place], [place] [id number]    CC:  Chief Complaint   Patient presents with    Nonrheumatic aortic valve insufficiency       Previous cardiac history:  -Diagnosed with large PDA and VSD at birth, PDA close spontaneously.  -Developed subvalvular aortic stenosis which required surgery, VSD closed as well at 13yo  -Ross procedure for increasing aortic insufficiency with moderate aortic stenosis at 21yo  -Treated for Hodgkin's lymphoma, age 21 - CTX and chest XRT    -May 2018 Echo  Estimated EF appears to be in the range of 56 - 60%.  Left ventricular wall thickness is consistent with mild concentric hypertrophy.  Mild to moderate aortic valve regurgitation is present.  Right ventricular cavity is borderline dilated.  Left atrial cavity size is mildly dilated.  Moderate dilation of the aortic root is present. 4.8 cm. Moderate dilation of the ascending aorta is present. 5.4 cm.    -CT angiography chest June 2018:aortic root at the upper portion of the valve largest AP dimension is 5.4 cm. Approximately 2 cm above the aortic root the ascending thoracic aorta largest oblique dimension is 7.0 cm and AP dimension 6.2 cm.    Cardiac catheterization in July 2018 [facility]  LMT: - The LMT has mild luminal irregularities.  LAD:  - There was moderate diffuse disease.  - The mid LAD is narrowed 30 % - focal disease.   Additional Comment: very large vessel, wraps around the apex to perfuse the   entire mid and apical inferior wall.  LCX: - There was mild diffuse disease.  - The circ AV groove continuation circumflex is narrowed 80 % - focal disease.  - The 1st obtuse marginal circumflex is narrowed 60 % - focal disease.   Additional Comment: AV LCx perfuses small left PDA.  RAMUS: - The Ramus is Absent.  RCA:  - The RCA has mild luminal irregularities.      August 13, 2018  Reoperation, third open-heart surgery.  Reversed Ross procedure  including aortic valve and root re-replacement with a composite graft including an On-X mechanical valve #25 and a 30-mm Hemashield graft, right ventricular outflow  tract reconstruction with the re-repaired autograft.    Echo September 2018 [facility]  EF = 60 ± 5% . There is no evidence of LV apical thrombus.  - The right ventricle is normal in size. Right ventricular systolic function is   low normal.  - On-X prosthetic aortic valve (size #25). There is no aortic valve regurgitation.   Transprosthetic gradients could not be obtained on the current study. Valve   leaflets not well visualized. If clinically indicated, consider ZACHARY to assess   further.  - There is a small pericardial effusion with organization. It is smaller when   compared to that documented on the prior echocardiogram performed 8/23/18. There   is no RV/RA collapse. There is no obvious evidence of cardiac tamponade.  - Status post reverse Ross procedure including aortic root, ascending aorta and   aortic valve replacement, RVOT reconstruction with rerepaired autograft. No   significant PI. The peak/mean gradients 19/10 mmHg [similar to the prior study]    August 2019 echo  Left ventricular systolic function is normal.  Estimated EF appears to be in the range of 56 - 60%  Left ventricular diastolic dysfunction (grade II) consistent with pseudonormalization.  Left ventricular wall thickness is consistent with mild concentric hypertrophy.  Normal mechanical aortic valve.    August 2021 TTE  Left ventricular ejection fraction appears to be 56 - 60%. Left ventricular systolic function is normal.  Left ventricular wall thickness is consistent with mild concentric hypertrophy.  Left ventricular diastolic function is consistent with (grade II w/high LAP) pseudonormalization.  Left atrial volume is mildly increased.  The right atrial cavity is mildly dilated.  There  is a normally functioning mechanical aortic valve prosthesis present.    May 2023  CT head  Bilateral 13 to 14 mm acute subdural hematomas are present. There is evidence of diffuse cerebral edema, with bilateral uncal medialization, crowding of the basal cisterns and concern for impending herniation. There is 2 to 3 mm of leftward midline shift   with effacement of the right lateral and third ventricles, with prominence of the left temporal horn concerning for early entrapment.    June 2023  CT head  1. No significant change in the subdural fluid collections following craniotomy and drainage.  2. Small focal parafalcine subdural hematoma, unchanged from the patient's last study.  3. Resolution of the low-attenuation in the right temporal lobe. The left temporal hypoattenuation is more pronounced on the current study likely representing an evolving area of infarction.    September 2024 TTE    Left ventricular systolic function is normal. Calculated left ventricular EF = 56.4% Left ventricular ejection fraction appears to be 56 - 60%.    Left ventricular wall thickness is consistent with mild concentric hypertrophy.    The left atrial cavity is mildly dilated.    The right atrial cavity is mildly  dilated.    There is a mechanical aortic valve prosthesis present.  Normally functioning    History of remote Ross procedure requiring reconstruction of the RV outflow track in 2018.  Currently with moderate RV outflow tract obstruction, peak gradient 36 mmHg.    ASSESSMENT:   Diagnosis Plan   1. Congenital heart disease        2. Aneurysm of ascending aorta without rupture        3. H/O aortic mechanical valve replacement on Coumadin        4. History of aortic regurgitation / stenosis s/p ROSS procedure with subsequent reversal with Mechanical AVR        5. Hyperlipidemia LDL goal <70                PLAN:  Congenital heart disease: Status post reversed Ross procedure  including aortic valve and root re-replacement with a  "composite graft including an On-X mechanical valve #25 and a 30-mm Hemashield graft, right ventricular outflow  tract reconstruction with the re-repaired autograft in 2018.  Postoperative course complicated by stroke and pancreatitis.  Afterload currently well-controlled.  Continue anticoagulation with goal INR of 2-3.  surveillance echocardiographic assessment every 3 years throughout the first 10 years post valve implant. Repeat CTA chest every 24 months.    Coronary artery disease: Currently stable and asymptomatic.  Afterload well controlled.  Continue rosuvastatin 20 mg p.o. daily.  Goal LDL is 100, ideally less than 70.    Tremors: Continue beta-blockade.    Subjective  History of stroke with persistent word finding difficulties.  History of postoperative pancreatitis.  Clinical course complicated by bilateral subdural hematoma requiring craniotomy and drainage and cerebral artery arterial embolization in June 2023.  Denies chest discomfort.  Blood pressures running less than 120/80 mmHg.  compliant with medical therapy.  Stable shortness of breath in a mild class II pattern.  Resting tremors are stable.      PHYSICAL EXAMINATION:  Vitals:    04/21/25 1134   BP: 138/78   BP Location: Left arm   Patient Position: Sitting   Pulse: 92   SpO2: 99%   Weight: 95 kg (209 lb 6.4 oz)   Height: 175.3 cm (69\")         General Appearance:    Alert, cooperative, no distress, appears stated age   Head:    Normocephalic, without obvious abnormality, atraumatic   Eyes:    conjunctiva/corneas clear   Nose:   Nares normal, septum midline, mucosa normal, no drainage   Throat:   Lips, teeth and gums normal   Neck:   Supple, symmetrical, trachea midline, no carotid    bruit or JVD   Lungs:     Clear to auscultation bilaterally, respirations unlabored   Chest Wall:    No tenderness or deformity    Heart:    Regular rate and rhythm, mechanical A2,, II/6 early peaking systolic murmur left upper sternal border, 2/6 to 3/6 holosystolic " murmur left upper sternal border, no rub   or gallop, normal carotid impulse bilaterally without bruit.   Abdomen:     Soft, non-tender   Extremities:   Extremities normal, atraumatic, no cyanosis or edema   Pulses:   2+ and symmetric all extremities   Skin:   Skin color, texture, turgor normal, no rashes or lesions       Diagnostic Data:  Procedures  Lab Results   Component Value Date    CHLPL 259 (H) 07/17/2015    TRIG 152 (H) 12/03/2024    HDL 56 12/03/2024     Lab Results   Component Value Date    GLUCOSE 84 12/03/2024    BUN 10 12/03/2024    CREATININE 0.88 12/03/2024     12/03/2024    K 4.0 12/03/2024     12/03/2024    CO2 24.9 12/03/2024     Lab Results   Component Value Date    HGBA1C 5.5 12/03/2024     Lab Results   Component Value Date    WBC 9.42 01/13/2025    HGB 14.5 01/13/2025    HCT 43.6 01/13/2025     01/13/2025       Allergies  Allergies   Allergen Reactions    Tegaderm Chg Dressing [Chlorhexidine] Rash       Current Medications    Current Outpatient Medications:     DULoxetine (CYMBALTA) 60 MG capsule, Take 1 capsule by mouth Daily., Disp: 90 capsule, Rfl: 3    hydrOXYzine (ATARAX) 10 MG tablet, TAKE 1-2 TABLET BY MOUTH TWICE A DAY AS NEEDED FOR ANXIETY AND SLEEP, Disp: , Rfl:     lansoprazole (PREVACID) 30 MG capsule, Take 1 capsule by mouth Daily., Disp: 90 capsule, Rfl: 3    levothyroxine (SYNTHROID, LEVOTHROID) 88 MCG tablet, TAKE 1 TABLET BY MOUTH DAILY. DOSE DECREASE, Disp: 90 tablet, Rfl: 0    metFORMIN ER (GLUCOPHAGE-XR) 500 MG 24 hr tablet, TAKE 1 TABLET BY MOUTH EVERY DAY WITH BREAKFAST, Disp: 90 tablet, Rfl: 0    metoprolol tartrate (LOPRESSOR) 25 MG tablet, Take 0.5 tablets by mouth Every 12 (Twelve) Hours., Disp: 90 tablet, Rfl: 3    rosuvastatin (CRESTOR) 20 MG tablet, Take 1 tablet by mouth Daily., Disp: 90 tablet, Rfl: 3    Semaglutide, 1 MG/DOSE, (Ozempic, 1 MG/DOSE,) 2 MG/1.5ML solution pen-injector, Inject 1 mg under the skin into the appropriate area as  directed 1 (One) Time Per Week., Disp: 9 mL, Rfl: 1    warfarin (COUMADIN) 5 MG tablet, TAKE 1 TABLET BY MOUTH DAILY AS DIRECTED BY ANTICOAGULATION CLINIC., Disp: 90 tablet, Rfl: 0          ROS  Review of Systems   Cardiovascular:  Negative for chest pain, dyspnea on exertion, irregular heartbeat and syncope.   Respiratory:  Negative for cough, shortness of breath and wheezing.          SOCIAL HX  Social History     Socioeconomic History    Marital status: Single   Tobacco Use    Smoking status: Never     Passive exposure: Never    Smokeless tobacco: Never   Vaping Use    Vaping status: Never Used   Substance and Sexual Activity    Alcohol use: Yes     Comment: socially on occasion    Drug use: Never    Sexual activity: Yes     Partners: Female     Birth control/protection: None     Comment: female partner       FAMILY HX  Family History   Problem Relation Age of Onset    COPD Mother     Ovarian cancer Mother     Hypertension Father     Stroke Father     No Known Problems Brother     Stroke Paternal Grandmother     Hypertension Paternal Grandmother     Cancer Paternal Grandmother         unknown    Brain cancer Paternal Grandfather     Heart disease Paternal Grandfather     Cancer Maternal Grandmother         unknown    Other Maternal Grandfather         unknown    No Known Problems Daughter     No Known Problems Daughter     No Known Problems Son              Vimal John III, MD, FACC

## 2025-05-06 ENCOUNTER — LAB (OUTPATIENT)
Dept: LAB | Facility: HOSPITAL | Age: 50
End: 2025-05-06
Payer: MEDICARE

## 2025-05-06 ENCOUNTER — ANTICOAGULATION VISIT (OUTPATIENT)
Dept: PHARMACY | Facility: HOSPITAL | Age: 50
End: 2025-05-06
Payer: MEDICARE

## 2025-05-06 DIAGNOSIS — Z95.2 HX OF AORTIC VALVE REPLACEMENT, MECHANICAL: ICD-10-CM

## 2025-05-06 DIAGNOSIS — Z95.2 HX OF AORTIC VALVE REPLACEMENT, MECHANICAL: Primary | ICD-10-CM

## 2025-05-06 LAB
INR PPP: 3.22 (ref 0.89–1.12)
PROTHROMBIN TIME: 35.2 SECONDS (ref 12.2–15.3)

## 2025-05-06 PROCEDURE — 36415 COLL VENOUS BLD VENIPUNCTURE: CPT

## 2025-05-06 PROCEDURE — 85610 PROTHROMBIN TIME: CPT

## 2025-05-06 NOTE — PROGRESS NOTES
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   Other: Hodgkins Lymphoma (hx of chemo and radiation); h/o CVA + hemorrhagic pancreatitis (during hospitalization for mAVR)    Diet: avoids GLV   Alcohol: socially  Tobacco: none  OTC Pain Medication: APAP PRN pain    INR History:  Date 9/27 9/28 10/1 10/4 10/8 10/11 10/17 10/19 10/23 11/8 11/16 12/21   Total Weekly Dose 8mg 12mg 19mg 29mg 31mg 34mg 43mg 41mg 45mg 49mg 49mg 49mg   INR 1.3 1.22 1.3 1.8 1.85 1.70 2.91 2.82 3.79 3.00 2.96 3.44   Notes clinic enox enox enox; clinic enox enox      desvenla     Date 1/9/19 1/16 2/4 3/1 3/22 5/16 6/13 6/24 7/10 7/22 8/5 8/26   Total Weekly Dose 49mg 47mg 49mg 49mg 49mg 49mg 49mg 49mg 49mg 49mg 49mg 49mg   INR 4.40 2.36 2.62 3.02 2.15 2.60 2.25 2.98 2.83 2.97 2.90 3.20   Notes   self adj              Date 9/26 10/18 10/28 11/19 12/3 12/17 1/3/20 1/21 2/3 2/12 2/19 2/27   Total Weekly Dose 49mg 49mg 49mg 49mg 49mg 45mg 45mg 45mg 47mg 43mg 43mg 43mg   INR 3.57 3.12 2.88 3.33 4.06 2.59 3.21 3.85 3.11 2.34 3.01 3.20   Notes less GLV Cymbalta start 9/25; Depakote start 9/5 1x decr dose  depakote inc; trintellix dcd ceftin x10 days Recv'd 1/6  incr GLV  dieting        Date 3/5 3/12 3/20 4/2 4/17 5/7 5/28 6/16 6/29 7/17 8/14 8/28   Total Weekly Dose 41mg 41mg 41mg 41mg 41mg 41mg 41mg 39mg 39mg 39mg 39mg 39mg   INR 3.07 3.01 2.95 2.66 2.9 3.03 3.36 2.48 2.42 3.08 2.78 2.28   Notes 1x decr   1x decr          recv'd 6/30  recv'd 8/13 recv'd 8/31     Date 9/28 10/29  12/4 1/8/21 2/9 3/31 5/12 6/9  8/3 8/31   Total Weekly Dose 39mg 39mg non-  compliant 39mg 39mg 39mg 39mg 39mg 39mg non- compliant 39mg 39mg   INR 2.33 2.39  2.46 2.45 2.85 2.48 2.52 2.51  2.88 2.77   Notes recv'd 9/29 recv'd 10/30   recv'd 1/11  recv'd 4/1           Date 10/4 11/1 12/6  3/8 4/5 5/9 6/10 6/23 7/5 7/20 8/5   Total Weekly Dose 39mg 39mg 39mg  non- compliant  39mg 39mg 39 mg 39 mg 39 mg 39 mg 39 mg 39mg   INR 2.50 2.54 2.2  2.40 2.40 2.98 2.26 2.96 2.84 2.88 3.48   Notes  Rec 11/2 Rec 12/7       rec'd 7/6  rec 8/8     Date 8/17 8/25 9/19 9/22 10/13 10/31 11/10 12/2 12/29 12/30 1/3/2023 1/13   Total Weekly Dose 37mg 35mg 39 mg  37mg 35 mg 35 mg 35 mg 35 mg 35mg 37.5mg 41.5mg 35mg   INR 3.24 2.6 3.13 3.39 2.46 2.75 2.71 2.77 1.1 2.42 4.14 3.59   Notes  rec 8/26  Inc activity Rec 10/14  rec 11/11 rec 12/5 ??? rcv'd 1/3       Date 1/27 2/17 3/10 4/5 4/14 5/18 5/26-6/5 6/8 6/12 6/20 6/28 7/6   Total Weekly Dose 35mg 35mg 35 mg 35 mg 35mg 35 mg Inpatient hospital 15mg 40mg 37.5 mg 35 mg 35mg   INR 2.94 2.58 2.5 2.44 3.11 2.43  1.3 1.62 2.1 2.83 2.76   Notes rcv'd 1/30 rcv'd 2/20 rec 3/13 crestor  rcv'd 4/17 rec 4/19 Subdural hematoma; kcentra  Rcv'd 6/13   Rcvd 7/7     Date 7/19 8/23/23 10/25 Noncomp 5/7 5/30 7/3  8/12/24 9/24 10/2 10/9 10/16  10/23   Total Weekly Dose  35 mg 35 mg   35 mg 35 mg 35 mg 35 mg  35 mg 32.5mg  32.5 mg 32.5 mg  35 mg   INR 2.53 2.51 2.73  3.13 2.72 2.34 2.98 3.85 3.43 3.34 2.11  3.51   Notes  Rec'd 8/24   Rec 5/8  Rec'd 7/5 Rec'd 8/13  LVM  Rec'd 9/25  Prevacid    Unable to contact      Date    1/13/25 1/27 2/10 3/24 4/7 5/6         Total Weekly  Dose Lost to follow up 35 mg 35 mg 35 mg  35 mg 35 mg 35 mg         INR   3.09 3.15 3.06 3.0 3.17 3.22         Notes   Rec'd 1/28 Rec'd 2/11 Rec'd 3/25 Rec'd 4/8            *takes warfarin in AM*  Phone Interview:  Verbal Release Authorization: please contact Mr. Hall directly  - if unable to reach patient may contact brotherMarko  Tablet Strength: 2mg and 5mg tablets  Patient Contact Info: [phone]; [email or web address]  Lab Contact Info: Megan Vu     Patient Findings    Positives: Change in activity, Change in medications   Negatives: Signs/symptoms of thrombosis, Signs/symptoms of bleeding, Laboratory test error suspected, Change in health, Change in alcohol use, Upcoming invasive procedure, Emergency department visit, Upcoming dental procedure, Missed doses, Extra doses, Change in diet/appetite, Hospital admission, Bruising, Other complaints   Comments: Patient reports reduced dose of levothyroxine 88 mcg daily (was 112 mcg). Patient attempting to lose weight and running more often. He already took his warfarin this morning.       Plan:  INR slightly supratherapeutic today at 3.22 (goal 2-3). Instructed Mr. Hall to take reduced dose of warfarin 5 mg oral daily except 2.5 mg Wed until recheck.   Repeat INR 5/12/25  Verbal information provided over the phone. Mr. Hall RBV dosing instructions, expresses understanding by teach back, and has no further questions at this time.    Miriam Jeffers, PharmD  05/06/25   15:26 EDT

## 2025-05-12 DIAGNOSIS — Z95.2 HX OF AORTIC VALVE REPLACEMENT, MECHANICAL: Primary | ICD-10-CM

## 2025-05-19 ENCOUNTER — ANTICOAGULATION VISIT (OUTPATIENT)
Dept: PHARMACY | Facility: HOSPITAL | Age: 50
End: 2025-05-19
Payer: MEDICARE

## 2025-05-19 ENCOUNTER — LAB (OUTPATIENT)
Dept: LAB | Facility: HOSPITAL | Age: 50
End: 2025-05-19
Payer: MEDICARE

## 2025-05-19 DIAGNOSIS — Z95.2 HX OF AORTIC VALVE REPLACEMENT, MECHANICAL: ICD-10-CM

## 2025-05-19 DIAGNOSIS — Z95.2 HX OF AORTIC VALVE REPLACEMENT, MECHANICAL: Primary | ICD-10-CM

## 2025-05-19 LAB
INR PPP: 3.47 (ref 0.89–1.12)
PROTHROMBIN TIME: 37.3 SECONDS (ref 12.2–15.3)

## 2025-05-19 PROCEDURE — 85610 PROTHROMBIN TIME: CPT

## 2025-05-19 PROCEDURE — 36415 COLL VENOUS BLD VENIPUNCTURE: CPT

## 2025-05-19 NOTE — PROGRESS NOTES
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   Other: Hodgkins Lymphoma (hx of chemo and radiation); h/o CVA + hemorrhagic pancreatitis (during hospitalization for mAVR)    Diet: avoids GLV   Alcohol: socially  Tobacco: none  OTC Pain Medication: APAP PRN pain    INR History:  Date 9/27 9/28 10/1 10/4 10/8 10/11 10/17 10/19 10/23 11/8 11/16 12/21   Total Weekly Dose 8mg 12mg 19mg 29mg 31mg 34mg 43mg 41mg 45mg 49mg 49mg 49mg   INR 1.3 1.22 1.3 1.8 1.85 1.70 2.91 2.82 3.79 3.00 2.96 3.44   Notes clinic enox enox enox; clinic enox enox      desvenla     Date 1/9/19 1/16 2/4 3/1 3/22 5/16 6/13 6/24 7/10 7/22 8/5 8/26   Total Weekly Dose 49mg 47mg 49mg 49mg 49mg 49mg 49mg 49mg 49mg 49mg 49mg 49mg   INR 4.40 2.36 2.62 3.02 2.15 2.60 2.25 2.98 2.83 2.97 2.90 3.20   Notes   self adj              Date 9/26 10/18 10/28 11/19 12/3 12/17 1/3/20 1/21 2/3 2/12 2/19 2/27   Total Weekly Dose 49mg 49mg 49mg 49mg 49mg 45mg 45mg 45mg 47mg 43mg 43mg 43mg   INR 3.57 3.12 2.88 3.33 4.06 2.59 3.21 3.85 3.11 2.34 3.01 3.20   Notes less GLV Cymbalta start 9/25; Depakote start 9/5 1x decr dose  depakote inc; trintellix dcd ceftin x10 days Recv'd 1/6  incr GLV  dieting        Date 3/5 3/12 3/20 4/2 4/17 5/7 5/28 6/16 6/29 7/17 8/14 8/28   Total Weekly Dose 41mg 41mg 41mg 41mg 41mg 41mg 41mg 39mg 39mg 39mg 39mg 39mg   INR 3.07 3.01 2.95 2.66 2.9 3.03 3.36 2.48 2.42 3.08 2.78 2.28   Notes 1x decr   1x decr          recv'd 6/30  recv'd 8/13 recv'd 8/31     Date 9/28 10/29  12/4 1/8/21 2/9 3/31 5/12 6/9  8/3 8/31   Total Weekly Dose 39mg 39mg non-  compliant 39mg 39mg 39mg 39mg 39mg 39mg non- compliant 39mg 39mg   INR 2.33 2.39  2.46 2.45 2.85 2.48 2.52 2.51  2.88 2.77   Notes recv'd 9/29 recv'd 10/30   recv'd 1/11  recv'd 4/1           Date 10/4 11/1 12/6  3/8 4/5 5/9 6/10 6/23 7/5 7/20 8/5   Total Weekly Dose 39mg 39mg 39mg  non- compliant  39mg 39mg 39 mg 39 mg 39 mg 39 mg 39 mg 39mg   INR 2.50 2.54 2.2  2.40 2.40 2.98 2.26 2.96 2.84 2.88 3.48   Notes  Rec 11/2 Rec 12/7       rec'd 7/6  rec 8/8     Date 8/17 8/25 9/19 9/22 10/13 10/31 11/10 12/2 12/29 12/30 1/3/2023 1/13   Total Weekly Dose 37mg 35mg 39 mg  37mg 35 mg 35 mg 35 mg 35 mg 35mg 37.5mg 41.5mg 35mg   INR 3.24 2.6 3.13 3.39 2.46 2.75 2.71 2.77 1.1 2.42 4.14 3.59   Notes  rec 8/26  Inc activity Rec 10/14  rec 11/11 rec 12/5 ??? rcv'd 1/3       Date 1/27 2/17 3/10 4/5 4/14 5/18 5/26-6/5 6/8 6/12 6/20 6/28 7/6   Total Weekly Dose 35mg 35mg 35 mg 35 mg 35mg 35 mg Inpatient hospital 15mg 40mg 37.5 mg 35 mg 35mg   INR 2.94 2.58 2.5 2.44 3.11 2.43  1.3 1.62 2.1 2.83 2.76   Notes rcv'd 1/30 rcv'd 2/20 rec 3/13 crestor  rcv'd 4/17 rec 4/19 Subdural hematoma; kcentra  Rcv'd 6/13   Rcvd 7/7     Date 7/19 8/23/23 10/25 Noncomp 5/7 5/30 7/3  8/12/24 9/24 10/2 10/9 10/16  10/23   Total Weekly Dose  35 mg 35 mg   35 mg 35 mg 35 mg 35 mg  35 mg 32.5mg  32.5 mg 32.5 mg  35 mg   INR 2.53 2.51 2.73  3.13 2.72 2.34 2.98 3.85 3.43 3.34 2.11  3.51   Notes  Rec'd 8/24   Rec 5/8  Rec'd 7/5 Rec'd 8/13  LVM  Rec'd 9/25  Prevacid    Unable to contact      Date    1/13/25 1/27 2/10 3/24 4/7 5/6 5/19        Total Weekly  Dose Lost to follow up 35 mg 35 mg 35 mg  35 mg 35 mg 35 mg 35 mg        INR   3.09 3.15 3.06 3.0 3.17 3.22 3.47        Notes   Rec'd 1/28 Rec'd 2/11 Rec'd 3/25 Rec'd 4/8            *takes warfarin in AM*  Phone Interview:  Verbal Release Authorization: please contact   Rafael directly - if unable to reach patient may contact brotherMarko  Tablet Strength: 2mg and 5mg tablets  Patient Contact Info: [phone]; [email or web address]  Lab Contact Info: Megan Vu     Patient Findings    Positives: Change in activity   Negatives: Signs/symptoms of thrombosis, Signs/symptoms of bleeding, Laboratory test error suspected, Change in health, Change in alcohol use, Upcoming invasive procedure, Emergency department visit, Upcoming dental procedure, Missed doses, Extra doses, Change in medications, Change in diet/appetite, Hospital admission, Bruising, Other complaints   Comments: Patient is trying to lose weight and is running, walking more and overall just more active.  Will trial a slight decrease in dose since INR has been elevated ~2 months    All other findings negative per patient           Plan:  INR slightly supratherapeutic today at 3.22 (goal 2-3). Instructed Mr. Hall to take reduced dose of warfarin 5 mg oral daily except 2.5 mg Tuesdays until recheck.   Repeat INR 5/27/25  Verbal information provided over the phone. Mr. Hall RBV dosing instructions, expresses understanding by teach back, and has no further questions at this time.    Tati García, PharmD  5/19/2025  15:44 EDT

## 2025-06-06 ENCOUNTER — OFFICE VISIT (OUTPATIENT)
Dept: ENDOCRINOLOGY | Facility: CLINIC | Age: 50
End: 2025-06-06
Payer: MEDICARE

## 2025-06-06 VITALS
SYSTOLIC BLOOD PRESSURE: 122 MMHG | BODY MASS INDEX: 30.57 KG/M2 | WEIGHT: 206.4 LBS | OXYGEN SATURATION: 98 % | HEART RATE: 80 BPM | HEIGHT: 69 IN | DIASTOLIC BLOOD PRESSURE: 70 MMHG

## 2025-06-06 DIAGNOSIS — E03.9 HYPOTHYROIDISM (ACQUIRED): ICD-10-CM

## 2025-06-06 DIAGNOSIS — E11.65 TYPE 2 DIABETES MELLITUS WITH HYPERGLYCEMIA, WITHOUT LONG-TERM CURRENT USE OF INSULIN: Primary | ICD-10-CM

## 2025-06-06 LAB
EXPIRATION DATE: NORMAL
EXPIRATION DATE: NORMAL
GLUCOSE BLDC GLUCOMTR-MCNC: 119 MG/DL (ref 70–130)
HBA1C MFR BLD: 5.6 % (ref 4.5–5.7)
Lab: NORMAL
Lab: NORMAL
T4 FREE SERPL-MCNC: 1.23 NG/DL (ref 0.92–1.68)
TSH SERPL DL<=0.05 MIU/L-ACNC: 0.41 UIU/ML (ref 0.27–4.2)

## 2025-06-06 PROCEDURE — 84443 ASSAY THYROID STIM HORMONE: CPT | Performed by: PHYSICIAN ASSISTANT

## 2025-06-06 PROCEDURE — 84439 ASSAY OF FREE THYROXINE: CPT | Performed by: PHYSICIAN ASSISTANT

## 2025-06-06 RX ORDER — SEMAGLUTIDE 0.68 MG/ML
0.5 INJECTION, SOLUTION SUBCUTANEOUS WEEKLY
Qty: 6 ML | Refills: 3 | Status: SHIPPED | OUTPATIENT
Start: 2025-06-06

## 2025-06-06 RX ORDER — AVOBENZONE, HOMOSALATE, OCTISALATE, OCTOCRYLENE 30; 40; 45; 26 MG/ML; MG/ML; MG/ML; MG/ML
CREAM TOPICAL
Qty: 200 EACH | Refills: 3 | Status: SHIPPED | OUTPATIENT
Start: 2025-06-06

## 2025-06-06 RX ORDER — BLOOD-GLUCOSE METER
KIT MISCELLANEOUS
Qty: 1 EACH | Refills: 0 | Status: SHIPPED | OUTPATIENT
Start: 2025-06-06

## 2025-06-06 NOTE — ASSESSMENT & PLAN NOTE
Dose of levothyroxine was reduced to 100 mcg at last visit.  Patient is feeling relatively well but does note some fatigue and shakiness.  Will check thyroid levels today with further recommendations regarding dose adjustment based on results.

## 2025-06-06 NOTE — ASSESSMENT & PLAN NOTE
Diabetes is stable.   Continue current treatment regimen.  Recommended an ADA diet.  Regular aerobic exercise.    Due to cost, will keep patient on Ozempic 0.5 mg weekly.  He is doing well with it and tolerates it without any problem.  If this is also expensive, can consider switching to a different GLP-1 RA.    Eye exam up-to-date, foot exam up-to-date, fasting labs and urine microalbumin creatinine ratio up-to-date.    Diabetes will be reassessed in 4 months.

## 2025-06-06 NOTE — PROGRESS NOTES
Office Note      Date: 2025  Patient Name: Manjeet Hall  MRN: [id number]  : 1975    Chief Complaint   Patient presents with    Diabetes     Controlled Type II Diabetes Mellitus without Complication, without Long-Term Current Use of Insulin    Hypothyroidism     Acquired       History of Present Illness:   Manjeet Hall is a 49 y.o. male who presents for Diabetes type 2.   Diagnosed: in early 40s  Current RX: Ozempic 0.5 mg weekly - tolerating well     Bg checks are done: none  Hypoglycemia :has had some symptomatic lows, has not checked blood sugar    Past medications: metformin (changed to ozempic)     Patient has been taking Ozempic 0.5 mg. Tried to go up to 1 mg and it was too expensive. He has continued the 0.5 mg dose. Tolerates this without any problem. Has noticed that his weight loss is not as rapid. Still walking 2 miles a day.    Last A1c:  Hemoglobin A1C   Date Value Ref Range Status   2025 5.6 4.5 - 5.7 % Final   2024 6.30 (H) 4.80 - 5.60 % Final       Changes in health since last visit: none.     DM Health Maintenance:  Ophtho: 2025  Monofilament / Foot exam: 12/3/24  Lipids/Statin: taking a statin with last FLP showing LDL 63 12/3/24  ANTONIETA: 12/3/24  TSH: 0.047 12/3/24  Aspirin: not taking  ACE/ARB: no     Diabetic Complications:  Eyes: No  Kidneys: No  Feet: No  Heart: No; CVA    Hypothyroidism - on levothyroxine 100 mcg, dose reduced at last visit. Has had some weight changes.     Subjective          Review of Systems:   Review of Systems   Constitutional:  Positive for fatigue. Negative for activity change and appetite change.   Gastrointestinal:  Negative for abdominal pain.   Musculoskeletal:  Negative for myalgias.   Neurological:  Positive for tremors.   Psychiatric/Behavioral:  The patient is not nervous/anxious.        The following portions of the patient's history were reviewed and updated as appropriate: allergies, current medications, past  "family history, past medical history, past social history, past surgical history, and problem list.    Objective     Visit Vitals  /70 (BP Location: Right arm, Patient Position: Sitting, Cuff Size: Adult)   Pulse 80   Ht 175.3 cm (69.02\")   Wt 93.6 kg (206 lb 6.4 oz)   SpO2 98%   BMI 30.47 kg/m²           Physical Exam:  Physical Exam  Constitutional:       Appearance: He is well-developed.   HENT:      Head: Normocephalic and atraumatic.      Right Ear: External ear normal.      Left Ear: External ear normal.   Eyes:      Conjunctiva/sclera: Conjunctivae normal.   Neck:      Thyroid: No thyroid mass, thyromegaly or thyroid tenderness.   Pulmonary:      Effort: Pulmonary effort is normal.   Musculoskeletal:         General: Normal range of motion.      Cervical back: Normal range of motion.   Skin:     General: Skin is warm and dry.   Psychiatric:         Behavior: Behavior normal.          Assessment / Plan      Assessment & Plan:  Diagnoses and all orders for this visit:    1. Type 2 diabetes mellitus with hyperglycemia, without long-term current use of insulin (Primary)  Assessment & Plan:  Diabetes is stable.   Continue current treatment regimen.  Recommended an ADA diet.  Regular aerobic exercise.    Due to cost, will keep patient on Ozempic 0.5 mg weekly.  He is doing well with it and tolerates it without any problem.  If this is also expensive, can consider switching to a different GLP-1 RA.    Eye exam up-to-date, foot exam up-to-date, fasting labs and urine microalbumin creatinine ratio up-to-date.    Diabetes will be reassessed in 4 months.    Orders:  -     POC Glucose, Blood  -     POC Glycosylated Hemoglobin (Hb A1C)  -     glucose monitor monitoring kit; Use as directed to check blood glucose 3 times daily prn  Dispense: 1 each; Refill: 0  -     glucose blood test strip; Use as directed to check blood glucose 3 times daily prn  Dispense: 200 each; Refill: 5  -     Lancets misc; Use as directed to " check blood glucose 3 times daily prn  Dispense: 200 each; Refill: 3  -     Semaglutide,0.25 or 0.5MG/DOS, (Ozempic, 0.25 or 0.5 MG/DOSE,) 2 MG/3ML solution pen-injector; Inject 0.5 mg under the skin into the appropriate area as directed 1 (One) Time Per Week.  Dispense: 6 mL; Refill: 3    2. Hypothyroidism (acquired)  Assessment & Plan:  Dose of levothyroxine was reduced to 100 mcg at last visit.  Patient is feeling relatively well but does note some fatigue and shakiness.  Will check thyroid levels today with further recommendations regarding dose adjustment based on results.    Orders:  -     T4, Free; Future  -     TSH; Future  -     T4, Free  -     TSH        Return in about 4 months (around 10/6/2025).    Portions of this note were completed with voice recognition program.  Electronically signed by Chacha Arizmendi PA-C  [facility]  06/06/2025

## 2025-06-23 ENCOUNTER — LAB (OUTPATIENT)
Dept: LAB | Facility: HOSPITAL | Age: 50
End: 2025-06-23
Payer: MEDICARE

## 2025-06-23 DIAGNOSIS — Z95.2 HX OF AORTIC VALVE REPLACEMENT, MECHANICAL: ICD-10-CM

## 2025-06-23 LAB
INR PPP: 2.86 (ref 0.89–1.12)
PROTHROMBIN TIME: 32 SECONDS (ref 12.2–15.3)

## 2025-06-23 PROCEDURE — 36415 COLL VENOUS BLD VENIPUNCTURE: CPT

## 2025-06-23 PROCEDURE — 85610 PROTHROMBIN TIME: CPT

## 2025-06-24 ENCOUNTER — ANTICOAGULATION VISIT (OUTPATIENT)
Dept: PHARMACY | Facility: HOSPITAL | Age: 50
End: 2025-06-24
Payer: MEDICARE

## 2025-06-24 DIAGNOSIS — Z95.2 HX OF AORTIC VALVE REPLACEMENT, MECHANICAL: Primary | ICD-10-CM

## 2025-06-24 NOTE — PROGRESS NOTES
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   Other: Hodgkins Lymphoma (hx of chemo and radiation); h/o CVA + hemorrhagic pancreatitis (during hospitalization for mAVR)    Diet: avoids GLV   Alcohol: socially  Tobacco: none  OTC Pain Medication: APAP PRN pain    INR History:  Date 9/27 9/28 10/1 10/4 10/8 10/11 10/17 10/19 10/23 11/8 11/16 12/21   Total Weekly Dose 8mg 12mg 19mg 29mg 31mg 34mg 43mg 41mg 45mg 49mg 49mg 49mg   INR 1.3 1.22 1.3 1.8 1.85 1.70 2.91 2.82 3.79 3.00 2.96 3.44   Notes clinic enox enox enox; clinic enox enox      desvenla     Date 1/9/19 1/16 2/4 3/1 3/22 5/16 6/13 6/24 7/10 7/22 8/5 8/26   Total Weekly Dose 49mg 47mg 49mg 49mg 49mg 49mg 49mg 49mg 49mg 49mg 49mg 49mg   INR 4.40 2.36 2.62 3.02 2.15 2.60 2.25 2.98 2.83 2.97 2.90 3.20   Notes   self adj              Date 9/26 10/18 10/28 11/19 12/3 12/17 1/3/20 1/21 2/3 2/12 2/19 2/27   Total Weekly Dose 49mg 49mg 49mg 49mg 49mg 45mg 45mg 45mg 47mg 43mg 43mg 43mg   INR 3.57 3.12 2.88 3.33 4.06 2.59 3.21 3.85 3.11 2.34 3.01 3.20   Notes less GLV Cymbalta start 9/25; Depakote start 9/5 1x decr dose  depakote inc; trintellix dcd ceftin x10 days Recv'd 1/6  incr GLV  dieting        Date 3/5 3/12 3/20 4/2 4/17 5/7 5/28 6/16 6/29 7/17 8/14 8/28   Total Weekly Dose 41mg 41mg 41mg 41mg 41mg 41mg 41mg 39mg 39mg 39mg 39mg 39mg   INR 3.07 3.01 2.95 2.66 2.9 3.03 3.36 2.48 2.42 3.08 2.78 2.28   Notes 1x decr   1x decr          recv'd 6/30  recv'd 8/13 recv'd 8/31     Date 9/28 10/29  12/4 1/8/21 2/9 3/31 5/12 6/9  8/3 8/31   Total Weekly Dose 39mg 39mg non-  compliant 39mg 39mg 39mg 39mg 39mg 39mg non- compliant 39mg 39mg   INR 2.33 2.39  2.46 2.45 2.85 2.48 2.52 2.51  2.88 2.77   Notes recv'd 9/29 recv'd 10/30   recv'd 1/11  recv'd 4/1           Date 10/4 11/1 12/6  3/8 4/5 5/9 6/10 6/23 7/5 7/20 8/5   Total Weekly Dose 39mg 39mg 39mg  non- compliant  39mg 39mg 39 mg 39 mg 39 mg 39 mg 39 mg 39mg   INR 2.50 2.54 2.2  2.40 2.40 2.98 2.26 2.96 2.84 2.88 3.48   Notes  Rec 11/2 Rec 12/7       rec'd 7/6  rec 8/8     Date 8/17 8/25 9/19 9/22 10/13 10/31 11/10 12/2 12/29 12/30 1/3/2023 1/13   Total Weekly Dose 37mg 35mg 39 mg  37mg 35 mg 35 mg 35 mg 35 mg 35mg 37.5mg 41.5mg 35mg   INR 3.24 2.6 3.13 3.39 2.46 2.75 2.71 2.77 1.1 2.42 4.14 3.59   Notes  rec 8/26  Inc activity Rec 10/14  rec 11/11 rec 12/5 ??? rcv'd 1/3       Date 1/27 2/17 3/10 4/5 4/14 5/18 5/26-6/5 6/8 6/12 6/20 6/28 7/6   Total Weekly Dose 35mg 35mg 35 mg 35 mg 35mg 35 mg Inpatient hospital 15mg 40mg 37.5 mg 35 mg 35mg   INR 2.94 2.58 2.5 2.44 3.11 2.43  1.3 1.62 2.1 2.83 2.76   Notes rcv'd 1/30 rcv'd 2/20 rec 3/13 crestor  rcv'd 4/17 rec 4/19 Subdural hematoma; kcentra  Rcv'd 6/13   Rcvd 7/7     Date 7/19 8/23/23 10/25 Noncomp 5/7 5/30 7/3  8/12/24 9/24 10/2 10/9 10/16  10/23   Total Weekly Dose  35 mg 35 mg   35 mg 35 mg 35 mg 35 mg  35 mg 32.5mg  32.5 mg 32.5 mg  35 mg   INR 2.53 2.51 2.73  3.13 2.72 2.34 2.98 3.85 3.43 3.34 2.11  3.51   Notes  Rec'd 8/24   Rec 5/8  Rec'd 7/5 Rec'd 8/13  LVM  Rec'd 9/25  Prevacid    Unable to contact      Date    1/13/25 1/27 2/10 3/24 4/7 5/6 5/19 6/23       Total Weekly  Dose Lost to follow up 35 mg 35 mg 35 mg  35 mg 35 mg 35 mg 35 mg 35 mg       INR   3.09 3.15 3.06 3.0 3.17 3.22 3.47 2.86       Notes   Rec'd 1/28 Rec'd 2/11 Rec'd 3/25 Rec'd 4/8   Rec'd 6/24         *takes warfarin in AM*  Phone Interview:  Verbal Release  Authorization: please contact Mr. Hall directly - if unable to reach patient may contact brotherMarko  Tablet Strength: 2mg and 5mg tablets  Patient Contact Info: [phone]; [email or web address]  Lab Contact Info: Megan Vu       Patient Findings    Positives: Extra doses, Bruising   Negatives: Signs/symptoms of thrombosis, Signs/symptoms of bleeding, Laboratory test error suspected, Change in health, Change in alcohol use, Change in activity, Upcoming invasive procedure, Emergency department visit, Upcoming dental procedure, Missed doses, Change in medications, Change in diet/appetite, Hospital admission, Other complaints   Comments: Patient states he has been taking 5 mg daily. Patient mentions small bruising on his arms/legs from working around the house. All other findings negative per patient.         Plan:  INR therapeutic at 2.86 (goal 2-3). Spoke with patient 6/27. Instructed Mr. Hall to continue warfarin 5 mg oral daily until recheck.   Repeat INR 7/21/25  Verbal information provided over the phone. Mr. Hall RBV dosing instructions, expresses understanding by teach back, and has no further questions at this time.        Brennan Krause   [facility]  6/27/2025 09:48 EDT    I, Tati García, PharmD, have reviewed the note in full and agree with the assessment and plan.  06/27/25  10:21 EDT

## 2025-07-07 ENCOUNTER — LAB (OUTPATIENT)
Dept: LAB | Facility: HOSPITAL | Age: 50
End: 2025-07-07
Payer: MEDICARE

## 2025-07-07 DIAGNOSIS — Z95.2 HX OF AORTIC VALVE REPLACEMENT, MECHANICAL: ICD-10-CM

## 2025-07-07 LAB
INR PPP: 2.69 (ref 0.89–1.12)
PROTHROMBIN TIME: 30.4 SECONDS (ref 12.2–15.3)

## 2025-07-07 PROCEDURE — 85610 PROTHROMBIN TIME: CPT

## 2025-07-07 PROCEDURE — 36415 COLL VENOUS BLD VENIPUNCTURE: CPT

## 2025-07-08 ENCOUNTER — ANTICOAGULATION VISIT (OUTPATIENT)
Dept: PHARMACY | Facility: HOSPITAL | Age: 50
End: 2025-07-08
Payer: MEDICARE

## 2025-07-08 DIAGNOSIS — Z95.2 HX OF AORTIC VALVE REPLACEMENT, MECHANICAL: Primary | ICD-10-CM

## 2025-07-08 NOTE — PROGRESS NOTES
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   Other: Hodgkins Lymphoma (hx of chemo and radiation); h/o CVA + hemorrhagic pancreatitis (during hospitalization for mAVR)    Diet: avoids GLV   Alcohol: socially  Tobacco: none  OTC Pain Medication: APAP PRN pain    INR History:  Date 7/19 8/23/23 10/25 Noncomp 5/7 5/30 7/3  8/12/24 9/24 10/2 10/9 10/16  10/23   Total Weekly Dose  35 mg 35 mg   35 mg 35 mg 35 mg 35 mg  35 mg 32.5mg  32.5 mg 32.5 mg  35 mg   INR 2.53 2.51 2.73  3.13 2.72 2.34 2.98 3.85 3.43 3.34 2.11  3.51   Notes  Rec'd 8/24   Rec 5/8  Rec'd 7/5 Rec'd 8/13  LVM  Rec'd 9/25  Prevacid    Unable to contact      Date    1/13/25 1/27 2/10 3/24 4/7 5/6 5/19 6/23 7/7      Total Weekly  Dose Lost to follow up 35 mg 35 mg 35 mg  35 mg 35 mg 35 mg 35 mg 35 mg 35 mg      INR   3.09 3.15 3.06 3.0 3.17 3.22 3.47 2.86 2.69      Notes   Rec'd 1/28 Rec'd 2/11 Rec'd 3/25 Rec'd 4/8   Rec'd 6/24 Rec'd 7/8      *takes warfarin in AM*    Phone Interview:  Verbal Release Authorization: please contact Mr. Hall directly - if unable to reach patient may contact brotherMarko  Tablet Strength: 2mg and 5mg tablets  Patient Contact Info: [phone]; [email or web address]  Lab Contact Info: Megan Vu       UNABLE TO GET IN CONTACT WITH THE PATIENT. PLEASE DISREGARD THE FOLLOWING PLAN UNTIL ABLE TO GET IN CONTACT WITH PATIENT/ PATIENT REPRESENTATIVE.   [facility] 7/8  VM  7/14    Plan:  INR was therapeutic yesterday at 2.69 (goal 2-3). Instructed patient to continue warfarin 5 mg daily until recheck.   Recheck INR in 4 weeks, 8.04.25  Verbal information provided over the phone. Mr. Hall RBV dosing instructions, expresses understanding by teach back, and has no further questions at this time.

## 2025-07-21 ENCOUNTER — LAB (OUTPATIENT)
Dept: LAB | Facility: HOSPITAL | Age: 50
End: 2025-07-21
Payer: MEDICARE

## 2025-07-21 ENCOUNTER — ANTICOAGULATION VISIT (OUTPATIENT)
Dept: PHARMACY | Facility: HOSPITAL | Age: 50
End: 2025-07-21
Payer: MEDICARE

## 2025-07-21 DIAGNOSIS — Z95.2 HX OF AORTIC VALVE REPLACEMENT, MECHANICAL: Primary | ICD-10-CM

## 2025-07-21 DIAGNOSIS — Z95.2 HX OF AORTIC VALVE REPLACEMENT, MECHANICAL: ICD-10-CM

## 2025-07-21 LAB
INR PPP: 2.32 (ref 0.89–1.12)
PROTHROMBIN TIME: 27.1 SECONDS (ref 12.2–15.3)

## 2025-07-21 PROCEDURE — 85610 PROTHROMBIN TIME: CPT

## 2025-07-21 PROCEDURE — 36415 COLL VENOUS BLD VENIPUNCTURE: CPT

## 2025-07-21 NOTE — PROGRESS NOTES
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   Other: Hodgkins Lymphoma (hx of chemo and radiation); h/o CVA + hemorrhagic pancreatitis (during hospitalization for mAVR)    Diet: avoids GLV   Alcohol: socially  Tobacco: none  OTC Pain Medication: APAP PRN pain    INR History:  Date 7/19 8/23/23 10/25 Noncomp 5/7 5/30 7/3  8/12/24 9/24 10/2 10/9 10/16  10/23   Total Weekly Dose  35 mg 35 mg   35 mg 35 mg 35 mg 35 mg  35 mg 32.5mg  32.5 mg 32.5 mg  35 mg   INR 2.53 2.51 2.73  3.13 2.72 2.34 2.98 3.85 3.43 3.34 2.11  3.51   Notes  Rec'd 8/24   Rec 5/8  Rec'd 7/5 Rec'd 8/13  LVM  Rec'd 9/25  Prevacid    Unable to contact      Date    1/13/25 1/27 2/10 3/24 4/7 5/6 5/19 6/23 7/7 7/21     Total Weekly  Dose Lost to follow up 35 mg 35 mg 35 mg  35 mg 35 mg 35 mg 35 mg 35 mg 35 mg 35 mg     INR   3.09 3.15 3.06 3.0 3.17 3.22 3.47 2.86 2.69 2.32     Notes   Rec'd 1/28 Rec'd 2/11 Rec'd 3/25 Rec'd 4/8   Rec'd 6/24 Rec'd 7/8      *takes warfarin in AM*    Phone Interview:  Verbal Release Authorization: please contact Mr. Hall directly - if unable to reach patient may contact brotherMarko  Tablet Strength: 2mg and 5mg tablets  Patient Contact Info: [phone]; [email or web address]  Lab Contact Info: Megan Vu       Patient Findings    Positives: Change in activity, Change in diet/appetite, Other complaints   Negatives: Signs/symptoms of thrombosis, Signs/symptoms of bleeding,  Laboratory test error suspected, Change in health, Change in alcohol use, Upcoming invasive procedure, Emergency department visit, Upcoming dental procedure, Missed doses, Extra doses, Change in medications, Hospital admission, Bruising   Comments: Patient mentions purple, green veins more apparent after ozempic injection. Patient has been taking ozempic for five months. Patient is trying to eat better and drink more water. Patient states he weighs 198 lbs and has been walking more for exercise. All other findings negative per patient.       Plan:  INR was therapeutic at 2.32 (goal 2-3). Instructed patient to continue warfarin 5 mg daily until recheck.   Recheck INR in 4 weeks, 8/18/25.  Verbal information provided over the phone. Mr. Hall RBV dosing instructions, expresses understanding by teach back, and has no further questions at this time.    Brennan Krause   [place]  7/21/2025 15:03 EDT    I, Miriam Jeffers [facility], have reviewed the note in full and agree with the assessment and plan.  07/21/25  15:07 EDT

## 2025-08-05 ENCOUNTER — LAB (OUTPATIENT)
Dept: LAB | Facility: HOSPITAL | Age: 50
End: 2025-08-05
Payer: MEDICARE

## 2025-08-05 DIAGNOSIS — Z95.2 HX OF AORTIC VALVE REPLACEMENT, MECHANICAL: ICD-10-CM

## 2025-08-05 LAB
INR PPP: 2.99 (ref 0.89–1.12)
PROTHROMBIN TIME: 33.1 SECONDS (ref 12.2–15.3)

## 2025-08-05 PROCEDURE — 36415 COLL VENOUS BLD VENIPUNCTURE: CPT

## 2025-08-05 PROCEDURE — 85610 PROTHROMBIN TIME: CPT

## 2025-08-06 ENCOUNTER — ANTICOAGULATION VISIT (OUTPATIENT)
Dept: PHARMACY | Facility: HOSPITAL | Age: 50
End: 2025-08-06
Payer: MEDICARE

## 2025-08-06 DIAGNOSIS — Z95.2 HX OF AORTIC VALVE REPLACEMENT, MECHANICAL: Primary | ICD-10-CM

## 2025-08-13 ENCOUNTER — OFFICE VISIT (OUTPATIENT)
Dept: NEUROLOGY | Facility: CLINIC | Age: 50
End: 2025-08-13
Payer: MEDICARE

## 2025-08-13 VITALS
BODY MASS INDEX: 30.18 KG/M2 | SYSTOLIC BLOOD PRESSURE: 150 MMHG | HEIGHT: 69 IN | HEART RATE: 94 BPM | OXYGEN SATURATION: 99 % | DIASTOLIC BLOOD PRESSURE: 60 MMHG | WEIGHT: 203.8 LBS

## 2025-08-13 DIAGNOSIS — I69.30 SEQUELAE, POST-STROKE: Primary | ICD-10-CM

## 2025-08-13 PROCEDURE — 3077F SYST BP >= 140 MM HG: CPT | Performed by: PSYCHIATRY & NEUROLOGY

## 2025-08-13 PROCEDURE — 1159F MED LIST DOCD IN RCRD: CPT | Performed by: PSYCHIATRY & NEUROLOGY

## 2025-08-13 PROCEDURE — 1160F RVW MEDS BY RX/DR IN RCRD: CPT | Performed by: PSYCHIATRY & NEUROLOGY

## 2025-08-13 PROCEDURE — 3078F DIAST BP <80 MM HG: CPT | Performed by: PSYCHIATRY & NEUROLOGY

## 2025-08-13 PROCEDURE — 99213 OFFICE O/P EST LOW 20 MIN: CPT | Performed by: PSYCHIATRY & NEUROLOGY

## 2025-08-13 RX ORDER — BLOOD-GLUCOSE METER
EACH MISCELLANEOUS
COMMUNITY
Start: 2025-06-06

## 2025-08-13 RX ORDER — SEMAGLUTIDE 1.34 MG/ML
INJECTION, SOLUTION SUBCUTANEOUS
COMMUNITY
Start: 2025-07-08

## 2025-08-14 ENCOUNTER — PATIENT ROUNDING (BHMG ONLY) (OUTPATIENT)
Facility: HOSPITAL | Age: 50
End: 2025-08-14
Payer: MEDICARE

## 2025-08-18 ENCOUNTER — LAB (OUTPATIENT)
Dept: LAB | Facility: HOSPITAL | Age: 50
End: 2025-08-18
Payer: MEDICARE

## 2025-08-18 ENCOUNTER — ANTICOAGULATION VISIT (OUTPATIENT)
Dept: PHARMACY | Facility: HOSPITAL | Age: 50
End: 2025-08-18
Payer: MEDICARE

## 2025-08-18 DIAGNOSIS — Z95.2 HX OF AORTIC VALVE REPLACEMENT, MECHANICAL: ICD-10-CM

## 2025-08-18 DIAGNOSIS — Z95.2 HX OF AORTIC VALVE REPLACEMENT, MECHANICAL: Primary | ICD-10-CM

## 2025-08-18 LAB
INR PPP: 3.04 (ref 0.89–1.12)
PROTHROMBIN TIME: 33.6 SECONDS (ref 12.2–15.3)

## 2025-08-18 PROCEDURE — 36415 COLL VENOUS BLD VENIPUNCTURE: CPT

## 2025-08-18 PROCEDURE — 85610 PROTHROMBIN TIME: CPT

## 2025-08-19 ENCOUNTER — OFFICE VISIT (OUTPATIENT)
Dept: FAMILY MEDICINE CLINIC | Facility: CLINIC | Age: 50
End: 2025-08-19
Payer: MEDICARE

## 2025-08-19 VITALS
WEIGHT: 203.6 LBS | OXYGEN SATURATION: 99 % | HEIGHT: 71 IN | SYSTOLIC BLOOD PRESSURE: 124 MMHG | TEMPERATURE: 98.6 F | HEART RATE: 84 BPM | DIASTOLIC BLOOD PRESSURE: 80 MMHG | BODY MASS INDEX: 28.5 KG/M2

## 2025-08-19 DIAGNOSIS — E11.65 TYPE 2 DIABETES MELLITUS WITH HYPERGLYCEMIA, WITHOUT LONG-TERM CURRENT USE OF INSULIN: ICD-10-CM

## 2025-08-19 DIAGNOSIS — Z23 IMMUNIZATION DUE: ICD-10-CM

## 2025-08-19 DIAGNOSIS — I10 ESSENTIAL HYPERTENSION: Primary | Chronic | ICD-10-CM

## 2025-08-19 DIAGNOSIS — E78.5 HYPERLIPIDEMIA LDL GOAL <70: ICD-10-CM

## (undated) DEVICE — PATIENT RETURN ELECTRODE, SINGLE-USE, CONTACT QUALITY MONITORING, ADULT, WITH 9FT CORD, FOR PATIENTS WEIGING OVER 33LBS. (15KG): Brand: MEGADYNE

## (undated) DEVICE — SYR CONTRL PRESS/LO FIX/M/LL W/THMB/RNG 10ML

## (undated) DEVICE — BLANKT WARM UNDER/BDY FUL/ACC A/ 90X206CM

## (undated) DEVICE — CATH MIC RENEGADE 2MARK 3F 10X150CM

## (undated) DEVICE — CRANIOTOMY DRAPE, STERILE: Brand: MEDLINE

## (undated) DEVICE — TUBING, SUCTION, 1/4" X 10', STRAIGHT: Brand: MEDLINE

## (undated) DEVICE — ANGIO-SEAL VIP VASCULAR CLOSURE DEVICE: Brand: ANGIO-SEAL

## (undated) DEVICE — NEURO SPONGES: Brand: DEROYAL

## (undated) DEVICE — GAMMEX® NON-LATEX SIZE 7.5, STERILE NEOPRENE POWDER-FREE SURGICAL GLOVE: Brand: GAMMEX

## (undated) DEVICE — NEURO GUIDEWIRE WITH HYDROPHILIC COATING: Brand: SYNCHRO 14

## (undated) DEVICE — STPCK 3/WY HP M/RA W/OFF/HNDL 1050PSI STRL

## (undated) DEVICE — BATRY SCRWDRVR PRECHARGED LITH 1P/U STRL

## (undated) DEVICE — SHEET, DRAPE, SPLIT, STERILE: Brand: MEDLINE

## (undated) DEVICE — DRSNG GZ PETROLTM XEROFORM CURAD 1X8IN STRL

## (undated) DEVICE — INTRO SHEATH ART/FEM ENGAGE .038 6F12CM

## (undated) DEVICE — ANTIBACTERIAL UNDYED BRAIDED (POLYGLACTIN 910), SYNTHETIC ABSORBABLE SUTURE: Brand: COATED VICRYL

## (undated) DEVICE — INTENDED USE FOR SURGICAL MARKING ON INTACT SKIN, ALSO PROVIDES A PERMANENT METHOD OF IDENTIFYING OBJECTS IN THE OPERATING ROOM: Brand: WRITESITE® REGULAR TIP SKIN MARKER

## (undated) DEVICE — SPNG GZ WOVN 4X4IN 12PLY 10/BX STRL

## (undated) DEVICE — PINNACLE INTRODUCER SHEATH: Brand: PINNACLE

## (undated) DEVICE — NDL HYPO ECLPS SFTY 25G 1 1/2IN

## (undated) DEVICE — APPL DURAPREP IODOPHOR APL 26ML

## (undated) DEVICE — PK CRANI 10

## (undated) DEVICE — ADHS SKIN PREMIERPRO EXOFIN TOPICAL HI/VISC .5ML

## (undated) DEVICE — DISPOSABLE BIPOLAR FORCEPS 7 3/4" (19.7CM) SCOVILLE BAYONET, INSULATED, 1.5MM TIP AND 12 FT. (3.6M) CABLE: Brand: KIRWAN

## (undated) DEVICE — DEV TORQ H2OTORQ GW .025TO.040IN ORNG

## (undated) DEVICE — KT CATH GUIDE BENCHMARK 071 STR 95CM W/CATH SELECT 5F

## (undated) DEVICE — DRSNG WND GZ PAD BORDERED 4X8IN STRL

## (undated) DEVICE — DRV BATRY MATRIXPRO STRL

## (undated) DEVICE — LEX NEURO ANGIOGRAPHY: Brand: MEDLINE INDUSTRIES, INC.

## (undated) DEVICE — ST ACC MICROPUNCTURE .018 TRANSLSS/SS/TP 5F/10CM 21G/7CM

## (undated) DEVICE — STRAP POSTN KN/BDY FM 5X72IN DISP

## (undated) DEVICE — CATH IV ANGIOCATH FEP 14GA 1.88IN ORNG

## (undated) DEVICE — ROTATING HEMOSTATIC VALVE .096": Brand: RHV

## (undated) DEVICE — SUT MNCRYL PLS ANTIB UD 3/0 PS2 27IN

## (undated) DEVICE — RADIFOCUS GLIDEWIRE: Brand: GLIDEWIRE

## (undated) DEVICE — TOOL MR8-9AC60M MR8 9CM ACORN 6MM 2FLT: Brand: MIDAS REX MR8

## (undated) DEVICE — Device

## (undated) DEVICE — SUT NUROLON 4/0 TF18 CR8 I8IN C584D

## (undated) DEVICE — TP SILK DURAPORE 3IN

## (undated) DEVICE — GLV SURG PREMIERPRO MIC LTX PF SZ7.5 BRN

## (undated) DEVICE — 400ML COMPACT EVACUATOR KIT, 1/8" PVC WITH TROCAR: Brand: HEMOVAC® WOUND DRAINAGE SYSTEM

## (undated) DEVICE — PENCL ROCKRSWCH MEGADYNE W/HOLSTR 10FT SS

## (undated) DEVICE — 3M™ MEDIPORE™ H SOFT CLOTH SURGICAL TAPE, 2863, 3 IN X 10 YD, 12/CASE: Brand: 3M™ MEDIPORE™

## (undated) DEVICE — ELECTRD BLD EZ CLN STD 2.5IN